# Patient Record
Sex: MALE | Race: WHITE | NOT HISPANIC OR LATINO | Employment: OTHER | URBAN - METROPOLITAN AREA
[De-identification: names, ages, dates, MRNs, and addresses within clinical notes are randomized per-mention and may not be internally consistent; named-entity substitution may affect disease eponyms.]

---

## 2017-01-09 ENCOUNTER — APPOINTMENT (OUTPATIENT)
Dept: LAB | Facility: CLINIC | Age: 72
End: 2017-01-09
Payer: MEDICARE

## 2017-01-09 ENCOUNTER — TRANSCRIBE ORDERS (OUTPATIENT)
Dept: LAB | Facility: CLINIC | Age: 72
End: 2017-01-09

## 2017-01-09 DIAGNOSIS — E11.9 TYPE 2 DIABETES MELLITUS WITHOUT COMPLICATIONS (HCC): ICD-10-CM

## 2017-01-09 DIAGNOSIS — E78.5 HYPERLIPIDEMIA: ICD-10-CM

## 2017-01-09 LAB
ALBUMIN SERPL BCP-MCNC: 3.8 G/DL (ref 3.5–5)
ALP SERPL-CCNC: 57 U/L (ref 46–116)
ALT SERPL W P-5'-P-CCNC: 14 U/L (ref 12–78)
ANION GAP SERPL CALCULATED.3IONS-SCNC: 7 MMOL/L (ref 4–13)
AST SERPL W P-5'-P-CCNC: 5 U/L (ref 5–45)
BILIRUB SERPL-MCNC: 0.48 MG/DL (ref 0.2–1)
BUN SERPL-MCNC: 33 MG/DL (ref 5–25)
CALCIUM SERPL-MCNC: 9.2 MG/DL (ref 8.3–10.1)
CHLORIDE SERPL-SCNC: 111 MMOL/L (ref 100–108)
CO2 SERPL-SCNC: 20 MMOL/L (ref 21–32)
CREAT SERPL-MCNC: 1.65 MG/DL (ref 0.6–1.3)
EST. AVERAGE GLUCOSE BLD GHB EST-MCNC: 128 MG/DL
GFR SERPL CREATININE-BSD FRML MDRD: 41.3 ML/MIN/1.73SQ M
GLUCOSE SERPL-MCNC: 162 MG/DL (ref 65–140)
HBA1C MFR BLD: 6.1 % (ref 4.2–6.3)
LDLC SERPL DIRECT ASSAY-MCNC: 88 MG/DL (ref 0–100)
POTASSIUM SERPL-SCNC: 5.5 MMOL/L (ref 3.5–5.3)
PROT SERPL-MCNC: 7.6 G/DL (ref 6.4–8.2)
SODIUM SERPL-SCNC: 138 MMOL/L (ref 136–145)
TRIGL SERPL-MCNC: 159 MG/DL

## 2017-01-09 PROCEDURE — 83036 HEMOGLOBIN GLYCOSYLATED A1C: CPT

## 2017-01-09 PROCEDURE — 83721 ASSAY OF BLOOD LIPOPROTEIN: CPT

## 2017-01-09 PROCEDURE — 84478 ASSAY OF TRIGLYCERIDES: CPT

## 2017-01-09 PROCEDURE — 36415 COLL VENOUS BLD VENIPUNCTURE: CPT

## 2017-01-09 PROCEDURE — 80053 COMPREHEN METABOLIC PANEL: CPT

## 2017-01-10 ENCOUNTER — GENERIC CONVERSION - ENCOUNTER (OUTPATIENT)
Dept: OTHER | Facility: OTHER | Age: 72
End: 2017-01-10

## 2017-01-18 ENCOUNTER — TRANSCRIBE ORDERS (OUTPATIENT)
Dept: LAB | Facility: CLINIC | Age: 72
End: 2017-01-18

## 2017-01-18 ENCOUNTER — ALLSCRIPTS OFFICE VISIT (OUTPATIENT)
Dept: OTHER | Facility: OTHER | Age: 72
End: 2017-01-18

## 2017-01-18 ENCOUNTER — APPOINTMENT (OUTPATIENT)
Dept: LAB | Facility: CLINIC | Age: 72
End: 2017-01-18
Payer: MEDICARE

## 2017-01-18 DIAGNOSIS — N28.9 DISORDER OF KIDNEY AND URETER: ICD-10-CM

## 2017-01-18 LAB
ANION GAP SERPL CALCULATED.3IONS-SCNC: 9 MMOL/L (ref 4–13)
BUN SERPL-MCNC: 23 MG/DL (ref 5–25)
CALCIUM SERPL-MCNC: 8.8 MG/DL (ref 8.3–10.1)
CHLORIDE SERPL-SCNC: 111 MMOL/L (ref 100–108)
CO2 SERPL-SCNC: 21 MMOL/L (ref 21–32)
CREAT SERPL-MCNC: 1.46 MG/DL (ref 0.6–1.3)
GFR SERPL CREATININE-BSD FRML MDRD: 47.6 ML/MIN/1.73SQ M
GLUCOSE SERPL-MCNC: 149 MG/DL (ref 65–140)
POTASSIUM SERPL-SCNC: 4.5 MMOL/L (ref 3.5–5.3)
SODIUM SERPL-SCNC: 141 MMOL/L (ref 136–145)

## 2017-01-18 PROCEDURE — 80048 BASIC METABOLIC PNL TOTAL CA: CPT

## 2017-01-18 PROCEDURE — 36415 COLL VENOUS BLD VENIPUNCTURE: CPT

## 2017-01-19 ENCOUNTER — GENERIC CONVERSION - ENCOUNTER (OUTPATIENT)
Dept: OTHER | Facility: OTHER | Age: 72
End: 2017-01-19

## 2017-02-01 ENCOUNTER — ALLSCRIPTS OFFICE VISIT (OUTPATIENT)
Dept: OTHER | Facility: OTHER | Age: 72
End: 2017-02-01

## 2017-02-07 DIAGNOSIS — I10 ESSENTIAL (PRIMARY) HYPERTENSION: ICD-10-CM

## 2017-03-21 ENCOUNTER — ALLSCRIPTS OFFICE VISIT (OUTPATIENT)
Dept: OTHER | Facility: OTHER | Age: 72
End: 2017-03-21

## 2017-03-21 DIAGNOSIS — Z12.5 ENCOUNTER FOR SCREENING FOR MALIGNANT NEOPLASM OF PROSTATE: ICD-10-CM

## 2017-03-21 DIAGNOSIS — I10 ESSENTIAL (PRIMARY) HYPERTENSION: ICD-10-CM

## 2017-03-21 DIAGNOSIS — E11.9 TYPE 2 DIABETES MELLITUS WITHOUT COMPLICATIONS (HCC): ICD-10-CM

## 2017-06-22 ENCOUNTER — APPOINTMENT (OUTPATIENT)
Dept: LAB | Facility: CLINIC | Age: 72
End: 2017-06-22
Payer: MEDICARE

## 2017-06-22 ENCOUNTER — TRANSCRIBE ORDERS (OUTPATIENT)
Dept: LAB | Facility: CLINIC | Age: 72
End: 2017-06-22

## 2017-06-22 DIAGNOSIS — Z12.5 ENCOUNTER FOR SCREENING FOR MALIGNANT NEOPLASM OF PROSTATE: ICD-10-CM

## 2017-06-22 DIAGNOSIS — E11.9 TYPE 2 DIABETES MELLITUS WITHOUT COMPLICATIONS (HCC): ICD-10-CM

## 2017-06-22 DIAGNOSIS — I10 ESSENTIAL (PRIMARY) HYPERTENSION: ICD-10-CM

## 2017-06-22 LAB
ANION GAP SERPL CALCULATED.3IONS-SCNC: 7 MMOL/L (ref 4–13)
BASOPHILS # BLD AUTO: 0.02 THOUSANDS/ΜL (ref 0–0.1)
BASOPHILS NFR BLD AUTO: 0 % (ref 0–1)
BUN SERPL-MCNC: 26 MG/DL (ref 5–25)
CALCIUM SERPL-MCNC: 8.9 MG/DL (ref 8.3–10.1)
CHLORIDE SERPL-SCNC: 106 MMOL/L (ref 100–108)
CO2 SERPL-SCNC: 23 MMOL/L (ref 21–32)
CREAT SERPL-MCNC: 1.53 MG/DL (ref 0.6–1.3)
CREAT UR-MCNC: 101 MG/DL
EOSINOPHIL # BLD AUTO: 0.23 THOUSAND/ΜL (ref 0–0.61)
EOSINOPHIL NFR BLD AUTO: 5 % (ref 0–6)
ERYTHROCYTE [DISTWIDTH] IN BLOOD BY AUTOMATED COUNT: 13.5 % (ref 11.6–15.1)
EST. AVERAGE GLUCOSE BLD GHB EST-MCNC: 131 MG/DL
GFR SERPL CREATININE-BSD FRML MDRD: 45.1 ML/MIN/1.73SQ M
GLUCOSE P FAST SERPL-MCNC: 152 MG/DL (ref 65–99)
HBA1C MFR BLD: 6.2 % (ref 4.2–6.3)
HCT VFR BLD AUTO: 38.3 % (ref 36.5–49.3)
HGB BLD-MCNC: 12.6 G/DL (ref 12–17)
LYMPHOCYTES # BLD AUTO: 1.59 THOUSANDS/ΜL (ref 0.6–4.47)
LYMPHOCYTES NFR BLD AUTO: 33 % (ref 14–44)
MCH RBC QN AUTO: 31.1 PG (ref 26.8–34.3)
MCHC RBC AUTO-ENTMCNC: 32.9 G/DL (ref 31.4–37.4)
MCV RBC AUTO: 95 FL (ref 82–98)
MICROALBUMIN UR-MCNC: 31 MG/L (ref 0–20)
MICROALBUMIN/CREAT 24H UR: 31 MG/G CREATININE (ref 0–30)
MONOCYTES # BLD AUTO: 0.38 THOUSAND/ΜL (ref 0.17–1.22)
MONOCYTES NFR BLD AUTO: 8 % (ref 4–12)
NEUTROPHILS # BLD AUTO: 2.64 THOUSANDS/ΜL (ref 1.85–7.62)
NEUTS SEG NFR BLD AUTO: 54 % (ref 43–75)
NRBC BLD AUTO-RTO: 0 /100 WBCS
PLATELET # BLD AUTO: 204 THOUSANDS/UL (ref 149–390)
PMV BLD AUTO: 10.8 FL (ref 8.9–12.7)
POTASSIUM SERPL-SCNC: 4.8 MMOL/L (ref 3.5–5.3)
PSA SERPL-MCNC: 1.8 NG/ML (ref 0–4)
RBC # BLD AUTO: 4.05 MILLION/UL (ref 3.88–5.62)
SODIUM SERPL-SCNC: 136 MMOL/L (ref 136–145)
WBC # BLD AUTO: 4.86 THOUSAND/UL (ref 4.31–10.16)

## 2017-06-22 PROCEDURE — G0103 PSA SCREENING: HCPCS

## 2017-06-22 PROCEDURE — 85025 COMPLETE CBC W/AUTO DIFF WBC: CPT

## 2017-06-22 PROCEDURE — 82043 UR ALBUMIN QUANTITATIVE: CPT

## 2017-06-22 PROCEDURE — 80048 BASIC METABOLIC PNL TOTAL CA: CPT

## 2017-06-22 PROCEDURE — 82570 ASSAY OF URINE CREATININE: CPT

## 2017-06-22 PROCEDURE — 36415 COLL VENOUS BLD VENIPUNCTURE: CPT

## 2017-06-22 PROCEDURE — 83036 HEMOGLOBIN GLYCOSYLATED A1C: CPT

## 2017-06-23 ENCOUNTER — GENERIC CONVERSION - ENCOUNTER (OUTPATIENT)
Dept: OTHER | Facility: OTHER | Age: 72
End: 2017-06-23

## 2017-07-17 DIAGNOSIS — E11.21 TYPE 2 DIABETES MELLITUS WITH DIABETIC NEPHROPATHY (HCC): ICD-10-CM

## 2017-08-04 ENCOUNTER — GENERIC CONVERSION - ENCOUNTER (OUTPATIENT)
Dept: OTHER | Facility: OTHER | Age: 72
End: 2017-08-04

## 2017-08-04 ENCOUNTER — APPOINTMENT (OUTPATIENT)
Dept: LAB | Facility: CLINIC | Age: 72
End: 2017-08-04
Payer: MEDICARE

## 2017-08-04 DIAGNOSIS — E11.21 TYPE 2 DIABETES MELLITUS WITH DIABETIC NEPHROPATHY (HCC): ICD-10-CM

## 2017-08-04 LAB
ANION GAP SERPL CALCULATED.3IONS-SCNC: 7 MMOL/L (ref 4–13)
BUN SERPL-MCNC: 20 MG/DL (ref 5–25)
CALCIUM SERPL-MCNC: 8.7 MG/DL (ref 8.3–10.1)
CHLORIDE SERPL-SCNC: 108 MMOL/L (ref 100–108)
CO2 SERPL-SCNC: 22 MMOL/L (ref 21–32)
CREAT SERPL-MCNC: 1.48 MG/DL (ref 0.6–1.3)
GFR SERPL CREATININE-BSD FRML MDRD: 47 ML/MIN/1.73SQ M
GLUCOSE P FAST SERPL-MCNC: 161 MG/DL (ref 65–99)
POTASSIUM SERPL-SCNC: 4.5 MMOL/L (ref 3.5–5.3)
SODIUM SERPL-SCNC: 137 MMOL/L (ref 136–145)

## 2017-08-04 PROCEDURE — 36415 COLL VENOUS BLD VENIPUNCTURE: CPT

## 2017-08-04 PROCEDURE — 80048 BASIC METABOLIC PNL TOTAL CA: CPT

## 2017-08-22 ENCOUNTER — ALLSCRIPTS OFFICE VISIT (OUTPATIENT)
Dept: OTHER | Facility: OTHER | Age: 72
End: 2017-08-22

## 2017-08-22 DIAGNOSIS — Z11.59 ENCOUNTER FOR SCREENING FOR OTHER VIRAL DISEASES: ICD-10-CM

## 2017-11-03 ENCOUNTER — ALLSCRIPTS OFFICE VISIT (OUTPATIENT)
Dept: OTHER | Facility: OTHER | Age: 72
End: 2017-11-03

## 2017-11-03 DIAGNOSIS — E78.5 HYPERLIPIDEMIA: ICD-10-CM

## 2017-11-03 DIAGNOSIS — E11.9 TYPE 2 DIABETES MELLITUS WITHOUT COMPLICATIONS (HCC): ICD-10-CM

## 2017-11-04 NOTE — PROGRESS NOTES
Assessment  1  Anemia (285 9) (D64 9)   2  DM type 2 (diabetes mellitus, type 2) (250 00) (E11 9)   3  Type 2 diabetes mellitus with diabetic chronic kidney disease (250 40,585 9) (E11 22)   4  Generalized anxiety disorder (300 02) (F41 1)   5  GERD without esophagitis (530 81) (K21 9)   6  Hyperlipidemia (272 4) (E78 5)   7  Hypertension (401 9) (I10)   8  Nephrolithiasis (592 0) (N20 0)    Plan  DM type 2 (diabetes mellitus, type 2)    · (1) HEMOGLOBIN A1C; Status:Active; Requested [id number];    · Follow-up visit in 3 months Evaluation and Treatment  Follow-up  Status: Hold For - Scheduling   Requested for: 26NQL1452   · Eat a normal well-balanced diet ; Status:Complete;   Done: 08XBS4449   · Have your eyes examined by an eye doctor every year ; Status:Complete;   Done: 56AZP0683   · Inspect your feet daily ; Status:Complete;   Done: 60HUJ4596   · Start eating more fiber ; Status:Complete;   Done: 09HMO1787  Hyperlipidemia    · (1) COMPREHENSIVE METABOLIC PANEL; Status:Active; Requested [id number];    · (1) LIPID PANEL FASTING W DIRECT LDL REFLEX; Status:Active; Requested [id number];    · Eat a low fat and low cholesterol diet ; Status:Complete;   Done: 87DWH9656  Hypertension    · Metoprolol Succinate  MG Oral Tablet Extended Release 24 Hour; TAKE 1 TABLET  DAILY   · A diet low in sodium and high in potassium, magnesium, and calcium can help your blood pressure ;  Status:Complete;   Done: 67FNR6206   · Begin a limited exercise program ; Status:Complete;   Done: 99SES0604   · Begin or continue regular aerobic exercise  Gradually work up to at least {count1} sessions of  {dur1} of exercise a week ; Status:Complete;   Done: 24ABB2157    Discussion/Summary  Discussion Summary:   Doing well except for the arthritis and refusing to see ortho again  Check labs  Refusing flu vaccine  BP remains up and will increase his metoprolol  Continue current treatment  Keep f/u with optho     Medication SE Review and Pt Understands Tx: Possible side effects of new medications were reviewed with the patient/guardian today  The treatment plan was reviewed with the patient/guardian  The patient/guardian understands and agrees with the treatment plan      Chief Complaint  Chief Complaint Free Text Note Form: Patient is being seen today for a RTN visit  Patient c/o knee and ankle arthritic pain  Chief Complaint Chronic Condition [facility]: Patient is here today for follow up of chronic conditions described in HPI  History of Present Illness  HPI: WM RTC for f/u htn, dm, etc  Doing well and no new issues  Remains active and goes to the gym despite knee and ankle pain  Sugars less than 140  Due for labs  Refusing flu vaccine  BP is up today  Anxiety Disorder (Follow-Up): The patient is being seen for follow-up of generalized anxiety disorder  The patient reports no change in the condition  He has no comorbid illnesses  He has had no significant interval events  Interval symptoms:  stable anxiety,-- improved sleep disruption-- and-- denies depression  Medication(s): beta blockers and benzodiazepines  Medications:  the patient is adherent to his medication regimen, but-- he denies medication side effects  Disease management:  the patient is doing well with his goals  Anemia (Follow-Up): The patient is being seen for follow-up of anemia  The patient reports doing well  There are no comorbid illnesses  He has had no significant interval events  The patient is currently asymptomatic  Associated symptoms: no palpitations-- and-- no chest pain  The patient is not currently on medication for this problem  Disease management:  the patient is not doing well with his goals  Gastroesophageal Reflux Disease (Follow-Up): The patient is being seen for follow-up of gastroesophageal reflux disease  The patient reports doing well  There are no comorbid illnesses  He has had no significant interval events   The patient is currently asymptomatic  No associated symptoms are reported  The patient is not currently on medication for this problem  Disease management:  the patient is doing well with his goals  Hyperlipidemia (Follow-Up): The patient states his hyperlipidemia has been under good control since the last visit  Comorbid Illnesses: diabetes mellitus-- and-- hypertension  He has no significant interval events  Symptoms: The patient is currently asymptomatic  Associated symptoms include no focal neurologic deficits-- and-- no memory loss  Medications: The patient is not currently on any medications for his hyperlipidemia  The patient is doing well with his hyperlipidemia goals  The patient is due for a lipid panel  Hypertension (Follow-Up): The patient presents for follow-up of essential hypertension  The patient states he has been stable with his blood pressure control since the last visit  Comorbid Illnesses: nephropathy  He has no significant interval events  Symptoms: The patient is currently asymptomatic  Associated symptoms include no headache-- and-- no focal neurologic deficits  Home monitoring: The patient checks his blood pressure sporadically  Blood pressure control has been good  Medications: the patient is adherent with his medication regimen  -- He denies medication side effects  Medication(s): a beta blocking agent-- and-- a calcium channel blocker  Disease Management: the patient is not doing well with his blood pressure goals  The patient is due for a lipid panel-- and-- a serum creatinine  Diabetes Type II (Follow-Up): The patient states he has been doing well with his Type II Diabetes control since the last visit  Comorbid Illnesses: hypertension-- and-- hyperlipidemia  Complications: peripheral neuropathy-- and-- nephropathy, but-- no gastroparesis,-- no coronary artery disease-- and-- no sexual dysfunction  He has no significant interval events     Symptoms: stable numbness of the feet-- and-- denies foot pain  Home monitoring: The patient checks his blood sugars regularly  -- Glycemic control has been good  -- the patient reports no symptomatic hypoglycemic episodes  Medications: the patient is adherent with his medication regimen  -- He denies medication side effects  Medication(s): Metformin HCl-- and-- Aspirin  The patient is doing well with his diabetes goals  Due For: a lipid panel-- and-- a hemoglobin A1c  Review of Systems  Complete-Male:   Constitutional: no fever,-- not feeling poorly,-- no chills-- and-- not feeling tired  Cardiovascular: no chest pain,-- no intermittent leg claudication,-- no palpitations-- and-- no extremity edema  Respiratory: no shortness of breath,-- no cough,-- no orthopnea,-- no wheezing,-- no shortness of breath during exertion-- and-- no PND  Gastrointestinal: no abdominal pain,-- no nausea,-- no constipation-- and-- no diarrhea  Genitourinary: No complaints of dysuria, no incontinence, no hesitancy, no nocturia, no genital lesion, no testicular pain  Musculoskeletal: arthralgias-- and-- joint stiffness, but-- as noted in HPI,-- no joint swelling-- and-- no myalgias  Integumentary: No complaints of skin rash or skin lesions, no itching, no skin wound, no dry skin  Neurological: no headache,-- no confusion-- and-- no convulsions  Psychiatric: anxiety, but-- not suicidal,-- no sleep disturbances-- and-- no depression  Endocrine: No complaints of proptosis, no hot flashes, no muscle weakness, no erectile dysfunction, no deepening of the voice, no feelings of weakness  Hematologic/Lymphatic: No complaints of swollen glands, no swollen glands in the neck, does not bleed easily, no easy bruising  Active Problems  1  Anemia (285 9) (D64 9)   2  Diabetic nephropathy (250 40,583 81) (E11 21)   3  Diabetic polyneuropathy associated with type 2 diabetes mellitus (250 60,357 2) (E11 42)   4  DM type 2 (diabetes mellitus, type 2) (250 00) (E11 9)   5   Eczema (692  9) (L30 9)   6  Erectile dysfunction of non-organic origin (302 72) (F52 21)   7  Generalized anxiety disorder (300 02) (F41 1)   8  GERD without esophagitis (530 81) (K21 9)   9  Hyperkalemia (276 7) (E87 5)   10  Hyperlipidemia (272 4) (E78 5)   11  Hypertension (401 9) (I10)   12  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)   13  Nephrolithiasis (592 0) (N20 0)   14  Neuropathy, diabetic (250 60,357 2) (E11 40)   15  Obesity (278 00) (E66 9)   16  Osteoarthritis (715 90) (M19 90)   17  Polyp of sigmoid colon (211 3) (D12 5)   18  Prostate cancer screening (V76 44) (Z12 5)   19  Screening for genitourinary condition (V81 6) (Z13 89)   20  Screening for neurological condition (V80 09) (Z13 89)   21  Type 2 diabetes mellitus with diabetic chronic kidney disease (250 40,585 9) (E11 22)   22  Uropathy, obstructive (599 60) (N13 9)    Past Medical History  1  History of Controlled type 2 diabetes mellitus without complication (410 03) (R93 5)   2  History of allergic rhinitis (V12 69) (Z87 09)   3  History of Medicare annual wellness visit, initial (V70 0) (Z00 00)   4  History of Need for hepatitis C screening test (V73 89) (Z11 59)  Active Problems And Past Medical History Reviewed: The active problems and past medical history were reviewed and updated today  Surgical History  1  History of Total Hip Replacement  Surgical History Reviewed: The surgical history was reviewed and updated today  Family History  Mother    1  No pertinent family history  Father    2  No pertinent family history  Family History    3  Denied: Family history of Family Problems  Family History Reviewed: The family history was reviewed and updated today  Social History   · Always uses seat belt   · Being A Social Drinker   · Cigars (___ A Day) (305 1)   · Former smoker (V15 82) (U32 211)   · No illicit drug use  Social History Reviewed: The social history was reviewed and updated today   The social history was reviewed and is unchanged  Current Meds   1  ALPRAZolam 0 5 MG Oral Tablet; TAKE 1 TABLET EVERY 8 HOURS AS NEEDED; Therapy: 38FTQ2216 to (Evaluate:11Lai3114); Last Rx:22Aug2017 Ordered   2  AmLODIPine Besylate 10 MG Oral Tablet; TAKE 1 TABLET DAILY  Requested for: 27Feb2017; Last   Rx:27Feb2017 Ordered   3  Dorzolamide HCl-Timolol Mal 22 3-6 8 MG/ML Ophthalmic Solution; Therapy: 01DYA9690 to (Evaluate:18Epl9657) Recorded   4  Latanoprost 0 005 % Ophthalmic Solution; Therapy: 96Uti8463 to (Evaluate:24Uhi7025) Recorded   5  MetFORMIN HCl - 850 MG Oral Tablet; TAKE 2 TABLET Daily  Requested for: 27Feb2017; Last   Rx:27Feb2017 Ordered   6  Metoprolol Succinate ER 50 MG Oral Tablet Extended Release 24 Hour; TAKE 1 TABLET DAILY; Therapy: 53KGJ4716 to (Evaluate:17Aug2018)  Requested for: 22Aug2017; Last Rx:22Aug2017;   Status: [address] tawanna Jones Verification Ordered   7  OneTouch Delica Lancets MISC; use 1 daily; Therapy: 26YSC2079 to (Evaluate:02Jan2014); Last CS:00YPH1703 Ordered   8  OneTouch Ultra 2 w/Device Kit; USE AS DIRECTED; Therapy: 23QRP7926 to (Last Rx:07Jan2013) Ordered   9  OneTouch Ultra Blue In [employer]; USE 1 STRIP DAILY; Therapy: 79MDM5471 to (Evaluate:02Jan2014); Last OB:59RTE9131 Ordered  Medication List Reviewed: The medication list was reviewed and updated today  Allergies  1  No Known Drug Allergies  2  Pollen    Vitals  Vital Signs    Recorded: 16CIL8693 10:58AM   Temperature 97 6 F   Heart Rate 71   Respiration 18   Systolic 981, Sitting   Diastolic 82, Sitting   Height 5 ft 11 in   Weight 245 lb 6 0 oz   BMI Calculated 34 22   BSA Calculated 2 3   O2 Saturation 98     Physical Exam    Constitutional   General appearance: No acute distress, well appearing and well nourished  Eyes   Conjunctiva and lids: No swelling, erythema, or discharge  Pupils and irises: Equal, round and reactive to light      Ears, Nose, Mouth, and Throat   Oropharynx: Normal with no erythema, edema, exudate or lesions  Pulmonary   Respiratory effort: No increased work of breathing or signs of respiratory distress  Auscultation of lungs: Clear to auscultation, equal breath sounds bilaterally, no wheezes, no rales, no rhonci  Cardiovascular   Auscultation of heart: Normal rate and rhythm, normal S1 and S2, without murmurs  Examination of extremities for edema and/or varicosities: Normal     Carotid pulses: Normal     Abdomen   Abdomen: Non-tender, no masses      Musculoskeletal   Gait and station: Normal     Psychiatric   Orientation to person, place and time: Normal     Mood and affect: Normal          Signatures   Electronically signed by : Everett Lombard, M D ; Nov  3 2017 11:38AM EST                       (Author)

## 2017-11-06 ENCOUNTER — APPOINTMENT (OUTPATIENT)
Dept: LAB | Facility: CLINIC | Age: 72
End: 2017-11-06
Payer: MEDICARE

## 2017-11-06 ENCOUNTER — TRANSCRIBE ORDERS (OUTPATIENT)
Dept: LAB | Facility: CLINIC | Age: 72
End: 2017-11-06

## 2017-11-06 DIAGNOSIS — E11.9 TYPE 2 DIABETES MELLITUS WITHOUT COMPLICATIONS (HCC): ICD-10-CM

## 2017-11-06 DIAGNOSIS — E78.5 HYPERLIPIDEMIA: ICD-10-CM

## 2017-11-06 LAB
EST. AVERAGE GLUCOSE BLD GHB EST-MCNC: 157 MG/DL
HBA1C MFR BLD: 7.1 % (ref 4.2–6.3)

## 2017-11-06 PROCEDURE — 83036 HEMOGLOBIN GLYCOSYLATED A1C: CPT

## 2017-11-06 PROCEDURE — 36415 COLL VENOUS BLD VENIPUNCTURE: CPT

## 2017-11-06 PROCEDURE — 80061 LIPID PANEL: CPT

## 2017-11-06 PROCEDURE — 80053 COMPREHEN METABOLIC PANEL: CPT

## 2017-11-07 ENCOUNTER — GENERIC CONVERSION - ENCOUNTER (OUTPATIENT)
Dept: OTHER | Facility: OTHER | Age: 72
End: 2017-11-07

## 2017-11-07 LAB
ALBUMIN SERPL BCP-MCNC: 3.6 G/DL (ref 3.5–5)
ALP SERPL-CCNC: 60 U/L (ref 46–116)
ALT SERPL W P-5'-P-CCNC: 15 U/L (ref 12–78)
ANION GAP SERPL CALCULATED.3IONS-SCNC: 8 MMOL/L (ref 4–13)
AST SERPL W P-5'-P-CCNC: 8 U/L (ref 5–45)
BILIRUB SERPL-MCNC: 0.64 MG/DL (ref 0.2–1)
BUN SERPL-MCNC: 25 MG/DL (ref 5–25)
CALCIUM SERPL-MCNC: 8.6 MG/DL (ref 8.3–10.1)
CHLORIDE SERPL-SCNC: 107 MMOL/L (ref 100–108)
CHOLEST SERPL-MCNC: 207 MG/DL (ref 50–200)
CO2 SERPL-SCNC: 23 MMOL/L (ref 21–32)
CREAT SERPL-MCNC: 1.5 MG/DL (ref 0.6–1.3)
GFR SERPL CREATININE-BSD FRML MDRD: 46 ML/MIN/1.73SQ M
GLUCOSE P FAST SERPL-MCNC: 150 MG/DL (ref 65–99)
HDLC SERPL-MCNC: 62 MG/DL (ref 40–60)
LDLC SERPL CALC-MCNC: 102 MG/DL (ref 0–100)
POTASSIUM SERPL-SCNC: 4.8 MMOL/L (ref 3.5–5.3)
PROT SERPL-MCNC: 7.6 G/DL (ref 6.4–8.2)
SODIUM SERPL-SCNC: 138 MMOL/L (ref 136–145)
TRIGL SERPL-MCNC: 213 MG/DL

## 2017-12-01 ENCOUNTER — GENERIC CONVERSION - ENCOUNTER (OUTPATIENT)
Dept: OTHER | Facility: OTHER | Age: 72
End: 2017-12-01

## 2018-01-10 NOTE — PROGRESS NOTES
Assessment    1  Medicare annual wellness visit, subsequent (V70 0) (Z00 00)   2  Anemia (285 9) (D64 9)   3  DM type 2 (diabetes mellitus, type 2) (250 00) (E11 9)   4  Type 2 diabetes mellitus with diabetic chronic kidney disease (250 40,585 9) (E11 22)   5  Generalized anxiety disorder (300 02) (F41 1)   6  GERD without esophagitis (530 81) (K21 9)   7  Hyperlipidemia (272 4) (E78 5)   8  Hypertension (401 9) (I10)   9  Nephrolithiasis (592 0) (N20 0)    Plan  DM type 2 (diabetes mellitus, type 2)    · (1) HEMOGLOBIN A1C; Status:Active; Requested [id number];    · Follow-up visit in 3 months Evaluation and Treatment  Follow-up  Status: Hold For -  Scheduling  Requested for: 86LUF3838   · Eat a normal well-balanced diet ; Status:Complete;   Done: 48CNT6735   · Have your eyes examined by an eye doctor every year ; Status:Complete;   Done:  90IQC3519   · Inspect your feet daily ; Status:Complete;   Done: 81ZPP8430   · Start eating more fiber ; Status:Complete;   Done: 25PCE7945  Hyperlipidemia    · (1) COMPREHENSIVE METABOLIC PANEL; Status:Active; Requested [id number];    · (1) LIPID PANEL FASTING W DIRECT LDL REFLEX; Status:Active; Requested  [id number];    · Eat a low fat and low cholesterol diet ; Status:Complete;   Done: 50TUX2164  Hypertension    · Metoprolol Succinate  MG Oral Tablet Extended Release 24 Hour; TAKE 1  TABLET DAILY   · A diet low in sodium and high in potassium, magnesium, and calcium can help your  blood pressure ; Status:Complete;   Done: 71KYV5334   · Begin a limited exercise program ; Status:Complete;   Done: 09YKP7858   · Begin or continue regular aerobic exercise  Gradually work up to at least {count1}  sessions of {dur1} of exercise a week ; Status:Complete;   Done: 77KEH2795    Discussion/Summary  Impression: Subsequent Annual Wellness Visit  Cardiovascular screening and counseling: due for a lipid panel  Diabetes screening and counseling: screening is current  Colorectal cancer screening and counseling: screening is current  Osteoporosis screening and counseling: screening is current  Abdominal aortic aneurysm screening and counseling: screening not indicated  HIV screening and counseling: the patient declines screening  Advance Directive Planning: not complete, paperwork and instructions were given to the patient  Advice and education were given regarding fall risk reduction, nutrition (non-diabetic), seat belt use and weight loss  Medical Equipment/Suppliers: none  Patient Discussion: plan discussed with the patient, follow-up visit needed in one year, follow-up as needed  History of Present Illness  WM presents for Medicare Wellness  The patient is being seen for the subsequent annual wellness visit  Medicare Screening and Risk Factors   Hospitalizations: he has been previously hospitalizied  Medicare Screening Tests Risk Questions   Abdominal aortic aneurysm risk assessment: over 72years of age  Osteoporosis risk assessment: none indicated  HIV risk assessment: none indicated  Drug and Alcohol Use: The patient is a former cigarette smoker  The patient reports rare alcohol use  Alcohol concern:   The patient has no concerns about alcohol abuse  He has never used illicit drugs  Diet and Physical Activity: Current diet includes well balanced meals and 4 cups of coffee per day  He exercises daily  Exercise: stretching, strength training  Mood Disorder and Cognitive Impairment Screening: He denies feeling down, depressed, or hopeless over the past two weeks  He denies feeling little interest or pleasure in doing things over the past two weeks  Cognitive impairment screening: denies difficulty learning/retaining new information, denies difficulty handling complex tasks, denies difficulty with reasoning, denies difficulty with spatial ability and orientation, denies difficulty with language and denies difficulty with behavior     Functional Ability/Level of Safety: Hearing is normal bilaterally  He denies hearing difficulties  He does not use a hearing aid  The patient is currently able to do activities of daily living without limitations  Activities of daily living details: does not need help using the phone, no transportation help needed, does not need help shopping, no meal preparation help needed, does not need help doing housework, does not need help doing laundry, does not need help managing medications and does not need help managing money  Home safety risk factors:  no unfamiliar surroundings, no loose rugs, no poor household lighting, no uneven floors, no household clutter, grab bars in the bathroom and handrails on the stairs  Advance Directives: Advance directives: living will and durable power of  for health care directives  Co-Managers and Medical Equipment/Suppliers: See Patient Care Team     Last Medicare Wellness Visit Information was reviewed, patient interviewed and updates made to the record today  Falls Risk: The patient fell 0 times in the past 12 months  The patient currently has no urinary incontinence symptoms  Patient Care Team    Care Team Member Role Specialty Office Number   Boogiejacques Hobbstonype MD  Gastroenterology Adult [phone]     Active Problems    1  Anemia (285 9) (D64 9)   2  Diabetic nephropathy (250 40,583 81) (E11 21)   3  Diabetic polyneuropathy associated with type 2 diabetes mellitus (250 60,357 2)   (E11 42)   4  DM type 2 (diabetes mellitus, type 2) (250 00) (E11 9)   5  Eczema (692 9) (L30 9)   6  Erectile dysfunction of non-organic origin (302 72) (F52 21)   7  Generalized anxiety disorder (300 02) (F41 1)   8  GERD without esophagitis (530 81) (K21 9)   9  Hyperkalemia (276 7) (E87 5)   10  Hyperlipidemia (272 4) (E78 5)   11  Hypertension (401 9) (I10)   12  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)   13  Nephrolithiasis (592 0) (N20 0)   14  Neuropathy, diabetic (250 60,357 2) (E11 40)   15  Obesity (278 00) (E66 9)   16  Osteoarthritis (715 90) (M19 90)   17  Polyp of sigmoid colon (211 3) (D12 5)   18  Prostate cancer screening (V76 44) (Z12 5)   19  Screening for genitourinary condition (V81 6) (Z13 89)   20  Screening for neurological condition (V80 09) (Z13 89)   21  Type 2 diabetes mellitus with diabetic chronic kidney disease (250 40,585 9) (E11 22)   22  Uropathy, obstructive (599 60) (N13 9)    Past Medical History    1  History of Controlled type 2 diabetes mellitus without complication (963 83) (P08 1)   2  History of allergic rhinitis (V12 69) (Z87 09)   3  History of Medicare annual wellness visit, initial (V70 0) (Z00 00)   4  History of Need for hepatitis C screening test (V73 89) (Z11 59)    Surgical History    1  History of Total Hip Replacement    Family History  Mother    1  No pertinent family history  Father    2  No pertinent family history  Family History    3  Denied: Family history of Family Problems    Social History    · Always uses seat belt   · Being A Social Drinker   · Cigars (___ A Day) (305 1)   · Former smoker (V15 82) (D03 305)   · No illicit drug use    Current Meds   1  ALPRAZolam 0 5 MG Oral Tablet; TAKE 1 TABLET EVERY 8 HOURS AS NEEDED; Therapy: 48ZGN8052 to (Evaluate:97Doo2568); Last Rx:53Lvz3076 Ordered   2  AmLODIPine Besylate 10 MG Oral Tablet; TAKE 1 TABLET DAILY  Requested for:   35Xxs1149; Last Rx:64Grv7379 Ordered   3  Dorzolamide HCl-Timolol Mal 22 3-6 8 MG/ML Ophthalmic Solution; Therapy: 05WUS0250 to (Evaluate:94Jik0846) Recorded   4  Latanoprost 0 005 % Ophthalmic Solution; Therapy: 38Iwj3288 to (Evaluate:44Zzb9338) Recorded   5  MetFORMIN HCl - 850 MG Oral Tablet; TAKE 2 TABLET Daily  Requested for: 99Dod0682;   Last Rx:86Rsq6619 Ordered   6  Metoprolol Succinate ER 50 MG Oral Tablet Extended Release 24 Hour; TAKE 1 TABLET   DAILY;    Therapy: 34ARC2294 to (Evaluate:14Ulp5026)  Requested for: 72Wda4200; Last   Rx:11Lwl4744; Status: ACTIVE - Transmit to Pharmacy - Awaiting Verification Ordered   7  OneTouch Delica Lancets MISC; use 1 daily; Therapy: 76WGF0379 to (Evaluate:02Jan2014); Last HY:64YKP6764 Ordered   8  OneTouch Ultra 2 w/Device Kit; USE AS DIRECTED; Therapy: 34CDG3817 to (Last Rx:07Jan2013) Ordered   9  OneTouch Ultra Blue In [employer]; USE 1 STRIP DAILY; Therapy: 92YCE8331 to (Evaluate:02Jan2014); Last Rx:07Jan2013 Ordered    Allergies    1  No Known Drug Allergies    2  Pollen    Immunizations  PCV --- Lanette Campanile: 21-Mar-2017   Tetanus --- Lanette Campanile: 14-Sep-2010   Tubersol 5 UNIT/0 1ML Intradermal Solution --- Lanette Campanile: 14-Jan-2015     Vitals  Signs   Recorded: 61PXB2305 10:58AM   Temperature: 97 6 F  Heart Rate: 71  Respiration: 18  Systolic: 495, Sitting  Diastolic: 82, Sitting  Height: 5 ft 11 in  Weight: 245 lb 6 0 oz  BMI Calculated: 34 22  BSA Calculated: 2 3  O2 Saturation: 98    Future Appointments    Date/Time Provider Specialty Site   02/09/2018 09:45 AM LUPE Glover   [facility] [place] [address]     Signatures   Electronically signed by : LUPE Carreon ; Nov  3 2017 11:40AM EST                       (Author)

## 2018-01-11 NOTE — RESULT NOTES
Verified Results  (1) CBC/PLT/DIFF [id number] 10:55AM [facility] Brood   TW Order Number: [id number]     Test Name Result Flag Reference   WBC COUNT 4 86 Thousand/uL  4 31-10 16   RBC COUNT 4 05 Million/uL  3 88-5 62   HEMOGLOBIN 12 6 g/dL  12 0-17 0   HEMATOCRIT 38 3 %  36 5-49 3   MCV 95 fL  82-98   MCH 31 1 pg  26 8-34 3   MCHC 32 9 g/dL  31 4-37 4   RDW 13 5 %  11 6-15 1   MPV 10 8 fL  8 9-12 7   PLATELET COUNT 656 Thousands/uL  149-390   nRBC AUTOMATED 0 /100 WBCs     NEUTROPHILS RELATIVE PERCENT 54 %  43-75   LYMPHOCYTES RELATIVE PERCENT 33 %  14-44   MONOCYTES RELATIVE PERCENT 8 %  4-12   EOSINOPHILS RELATIVE PERCENT 5 %  0-6   BASOPHILS RELATIVE PERCENT 0 %  0-1   NEUTROPHILS ABSOLUTE COUNT 2 64 Thousands/? ??L  1 85-7 62   LYMPHOCYTES ABSOLUTE COUNT 1 59 Thousands/? ??L  0 60-4 47   MONOCYTES ABSOLUTE COUNT 0 38 Thousand/? ??L  0 17-1 22   EOSINOPHILS ABSOLUTE COUNT 0 23 Thousand/? ??L  0 00-0 61   BASOPHILS ABSOLUTE COUNT 0 02 Thousands/? ??L  0 00-0 10   - Patient Instructions: This bloodwork is non-fasting  Please drink two glasses of water morning of bloodwork  (1) BASIC METABOLIC PROFILE [id number] 10:55AM [facility]: [id number]     Test Name Result Flag Reference   SODIUM 136 mmol/L  136-145   POTASSIUM 4 8 mmol/L  3 5-5 3   CHLORIDE 106 mmol/L  100-108   CARBON DIOXIDE 23 mmol/L  21-32   ANION GAP (CALC) 7 mmol/L  4-13   BLOOD UREA NITROGEN 26 mg/dL H 5-25   CREATININE 1 53 mg/dL H 0 60-1 30   Standardized to IDMS reference method   CALCIUM 8 9 mg/dL  8 3-10 1   eGFR Non-African American 45 1 ml/min/1 73sq [facility] Energy Disease Education Program recommendations are as follows:  GFR calculation is accurate only with a steady state creatinine  Chronic Kidney disease less than 60 ml/min/1 73 sq  meters  Kidney failure less than 15 ml/min/1 73 sq  meters     GLUCOSE FASTING 152 mg/dL H 65-99     (1) HEMOGLOBIN [facility]1C [id number] 10:55AM [facility]   TW Order Number: [id number]     Test Name Result Flag Reference   HEMOGLOBIN A1C 6 2 %  4 2-6 3   EST  AVG  GLUCOSE 131 mg/dl       (1) PSA (SCREEN) (Dx V76 44 Screen for Prostate Cancer) [id number] 10:55AM Art Javad Order Number: [id number]     Test Name Result Flag Reference   PROSTATE SPECIFIC ANTIGEN 1 8 ng/mL  0 0-4 0   American Urological Association Guidelines define biochemical recurrence of prostate cancer as a detectable or rising PSA value post-radical prostatectomy that is greater than or equal to 0 2 ng/mL with a second confirmatory level of greater than or equal to 0 2 ng/mL  - Patient Instructions: This test is non-fasting  Please drink two glasses of water morning of bloodwork       (1) MICROALBUMIN CREATININE RATIO, RANDOM URINE [id number] 10:55AM Art Javad Order Number: [id number]     Test Name Result Flag Reference   MICROALBUMIN/ CREAT R 31 mg/g creatinine H 0-30   MICROALBUMIN,URINE 31 0 mg/L H 0 0-20 0   CREATININE URINE 101 0 mg/dL

## 2018-01-11 NOTE — RESULT NOTES
Verified Results  (1) BASIC METABOLIC PROFILE [id number] 10:44AM Kelsey Bean Order Number: [id number]     Test Name Result Flag Reference   SODIUM 137 mmol/L  136-145   POTASSIUM 4 5 mmol/L  3 5-5 3   CHLORIDE 108 mmol/L  100-108   CARBON DIOXIDE 22 mmol/L  21-32   ANION GAP (CALC) 7 mmol/L  4-13   BLOOD UREA NITROGEN 20 mg/dL  5-25   CREATININE 1 48 mg/dL H 0 60-1 30   Standardized to IDMS reference method   CALCIUM 8 7 mg/dL  8 3-10 1   eGFR 47 ml/min/1 73sq m     National Kidney Disease Education Program recommendations are as follows:  GFR calculation is accurate only with a steady state creatinine  Chronic Kidney disease less than 60 ml/min/1 73 sq  meters  Kidney failure less than 15 ml/min/1 73 sq  meters     GLUCOSE FASTING 161 mg/dL H 65-99

## 2018-01-11 NOTE — RESULT NOTES
Verified Results  (1) COMPREHENSIVE METABOLIC PANEL [id number] 17:31UF Yair [facility] Order Number: [id number]     Test Name Result Flag Reference   SODIUM 138 mmol/L  136-145   POTASSIUM 4 8 mmol/L  3 5-5 3   CHLORIDE 107 mmol/L  100-108   CARBON DIOXIDE 23 mmol/L  21-32   ANION GAP (CALC) 8 mmol/L  4-13   BLOOD UREA NITROGEN 25 mg/dL  5-25   CREATININE 1 50 mg/dL H 0 60-1 30   Standardized to IDMS reference method   CALCIUM 8 6 mg/dL  8 3-10 1   BILI, TOTAL 0 64 mg/dL  0 20-1 00   ALK PHOSPHATAS 60 U/L     ALT (SGPT) 15 U/L  12-78   Specimen collection should occur prior to Sulfasalazine and/or Sulfapyridine administration due to the potential for falsely depressed results  AST(SGOT) 8 U/L  5-45   Specimen collection should occur prior to Sulfasalazine administration due to the potential for falsely depressed results  ALBUMIN 3 6 g/dL  3 5-5 0   TOTAL PROTEIN 7 6 g/dL  6 4-8 2   eGFR 46 ml/min/1 73sq [facility] Disease Education Program recommendations are as follows:  GFR calculation is accurate only with a steady state creatinine  Chronic Kidney disease less than 60 ml/min/1 73 sq  meters  Kidney failure less than 15 ml/min/1 73 sq  meters  GLUCOSE FASTING 150 mg/dL H 65-99   Specimen collection should occur prior to Sulfasalazine administration due to the potential for falsely depressed results  Specimen collection should occur prior to Sulfapyridine administration due to the potential for falsely elevated results       (1) LIPID PANEL FASTING W DIRECT LDL REFLEX [id number] 11:43AM Yair [facility] Order Number: [id number]     Test Name Result Flag Reference   CHOLESTEROL 207 mg/dL H    LDL CHOLESTEROL CALCULATED 102 mg/dL H 0-100   Triglyceride:        Normal <150 mg/dl   Borderline High 150-199 mg/dl   High 200-499 mg/dl   Very High >499 mg/dl      Cholesterol:       Desirable <200 mg/dl    Borderline High 200-239 mg/dl    High >239 mg/dl      HDL Cholesterol:       High>59 mg/dL    Low <41 mg/dL      HDL Cholesterol:       High>59 mg/dL    Low <41 mg/dL      This screening LDL is a calculated result  It does not have the accuracy of the Direct Measured LDL in the monitoring of patients with hyperlipidemia and/or statin therapy  Direct Measure LDL (HKL802) must be ordered separately in these patients  TRIGLYCERIDES 213 mg/dL H <=150   Specimen collection should occur prior to N-Acetylcysteine or Metamizole administration due to the potential for falsely depressed results  HDL,DIRECT 62 mg/dL H 40-60   Specimen collection should occur prior to Metamizole administration due to the potential for falsley depressed results  (1) HEMOGLOBIN A1C [id number] 11:43AM Tay Restrepo Order Number: [id number]     Test Name Result Flag Reference   HEMOGLOBIN A1C 7 1 % H 4 2-6 3   EST  AVG   GLUCOSE 157 mg/dl

## 2018-01-12 VITALS
HEART RATE: 86 BPM | RESPIRATION RATE: 16 BRPM | SYSTOLIC BLOOD PRESSURE: 158 MMHG | HEIGHT: 71 IN | WEIGHT: 238 LBS | BODY MASS INDEX: 33.32 KG/M2 | TEMPERATURE: 98.6 F | DIASTOLIC BLOOD PRESSURE: 88 MMHG

## 2018-01-12 VITALS
TEMPERATURE: 97.7 F | HEART RATE: 82 BPM | SYSTOLIC BLOOD PRESSURE: 156 MMHG | BODY MASS INDEX: 32.34 KG/M2 | DIASTOLIC BLOOD PRESSURE: 72 MMHG | HEIGHT: 71 IN | WEIGHT: 231 LBS | RESPIRATION RATE: 18 BRPM

## 2018-01-12 VITALS
DIASTOLIC BLOOD PRESSURE: 82 MMHG | TEMPERATURE: 97.6 F | WEIGHT: 245.38 LBS | HEART RATE: 71 BPM | SYSTOLIC BLOOD PRESSURE: 152 MMHG | RESPIRATION RATE: 18 BRPM | OXYGEN SATURATION: 98 % | BODY MASS INDEX: 34.35 KG/M2 | HEIGHT: 71 IN

## 2018-01-12 NOTE — RESULT NOTES
Verified Results  (1) HEMOGLOBIN A1C [id number] 11:41AM Eliseo Mcdowell     Test Name Result Flag Reference   HEMOGLOBIN A1C 6 2 %  4 2-6 3   EST  AVG   GLUCOSE 131 mg/dl

## 2018-01-12 NOTE — PROGRESS NOTES
Assessment    1  Medicare annual wellness visit, initial (V70 0) (Z00 00)    Plan  Hyperlipidemia    · (1) COMPREHENSIVE METABOLIC PANEL; Status:Active; Requested for:10Aug2016;    · (1) LDL,DIRECT; Status:Active; Requested for:10Aug2016;    · (1) TRIGLYCERIDE; Status:Active; Requested for:10Aug2016;   Nephrolithiasis    · 1 Ricarda Shepherd MD, Kristyn Thomas  (Urology) Physician Referral  Consult  Status: Active   Requested for: 66INQ7724  Care Summary provided  : Yes    Discussion/Summary    Doing ok, but bp is up  Will check with nephro per pt request to see if we can add ACEI  RTC three months  Impression: Initial Annual Wellness Visit  Cardiovascular screening and counseling: screening is current  Diabetes screening and counseling: screening is current  Colorectal cancer screening and counseling: screening is current  Prostate cancer screening and counseling: screening is current  Osteoporosis screening and counseling: screening not indicated  Abdominal aortic aneurysm screening and counseling: screening not indicated  Glaucoma screening and counseling: screening not indicated  HIV screening and counseling: screening not indicated  Immunizations: influenza vaccination is recommended annually, the patient declines the pneumococcal vaccination, hepatitis B vaccination series is not indicated at this time due to the patient's low risk of fausto the disease, Zostavax vaccination status is unknown, Td vaccination up to date and Tdap vaccination up to date  Advance Directive Planning: complete and up to date, need copy for the chart  Advice and education were given regarding fall risk reduction, increasing physical activity, nutrition (non-diabetic), seat belt use and sunscreen use  He was referred to nephrology and urology  Medical Equipment/Suppliers: none  Patient Discussion: plan discussed with the patient, follow-up visit needed in one year  Chief Complaint  Well Eval - No complaints  History of Present Illness  HPI: WM presents for medicare wellness  Doing ok and no c/o's except for some leg edema at times  Sugars are "good" and recent HgA1c was 6 2  Welcome to [employer] and Wellness Visits: The patient is being seen for the initial annual wellness visit  Medicare Screening and Risk Factors   Hospitalizations: he has been previously hospitalizied and 5 months ago - kidney stones  Once per lifetime medicare screening tests: ECG (1/4/13) and AAA screening US (refuses)  Medicare Screening Tests Risk Questions   Abdominal aortic aneurysm risk assessment: tobacco use, over 72years of age and refuses  Osteoporosis risk assessment: none indicated  HIV risk assessment: none indicated  Drug and Alcohol Use: The patient current cigar smoker  He is not ready to quit using tobacco  The patient reports occasional alcohol use  Alcohol concern:   The patient has no concerns about alcohol abuse  He has never used illicit drugs  Diet and Physical Activity: Current diet includes well balanced meals, low carbohydrate food choices, limited junk food, 1 servings of fruit per day, 2 servings of vegetables per day, 1 servings of meat per day, 1 servings of whole grains per day, 1 servings of dairy products per day, 2 cups of coffee per day, 0 cups of tea per day, 0 cans of regular soda per day and 0 cans of diet soda per day  He exercises infrequently  Exercise: walking  Mood Disorder and Cognitive Impairment Screening: He denies feeling down, depressed, or hopeless over the past two weeks  He denies feeling little interest or pleasure in doing things over the past two weeks  Cognitive impairment screening: denies difficulty learning/retaining new information, denies difficulty handling complex tasks, denies difficulty with reasoning, denies difficulty with spatial ability and orientation, denies difficulty with language and denies difficulty with behavior     Functional Ability/Level of Safety: Hearing is normal bilaterally, normal in the right ear, normal in the left ear and a hearing aid is not used  The patient is currently able to do activities of daily living without limitations, able to do instrumental activities of daily living without limitations, able to participate in social activities without limitations and able to drive without limitations  Activities of daily living details: does not need help using the phone, no transportation help needed, does not need help shopping, no meal preparation help needed, does not need help doing housework, does not need help doing laundry, does not need help managing medications and does not need help managing money  Fall risk factors:  alcohol use and antihypertensive use, but The patient fell 0 times in the past 12 months , no polypharmacy, no mobility impairment, no antidepressant use, no deconditioning, no postural hypotension, no sedative use, no visual impairment, no urinary incontinence, no cognitive impairment, up and go test was normal and no previous fall  Home safety risk factors:  no unfamiliar surroundings, no loose rugs, no poor household lighting, no uneven floors, no household clutter, grab bars in the bathroom and handrails on the stairs  Advance Directives: Advance directives: living will, durable power of  for health care directives and advance directives  end of life decisions were reviewed with the patient  Co-Managers and Medical Equipment/Suppliers: See Patient Care Team      Patient Care Team    Care Team Member Role Specialty Office Number   Sana Eldridge MD  Gastroenterology Adult [phone]     Review of Systems    Constitutional: no fever, no chills, no malaise and no fatigue  Head and Face: negative  Eyes: negative  ENT: negative  Cardiovascular: lower extremity edema, but no chest pain, no palpitations, the heart is not racing and no lightheadedness     Respiratory: no shortness of breath, no wheezing, not sleeping upright or with extra pillows and no cough  Gastrointestinal: no abdominal pain, no abdominal bloating, no abdominal cramps, no nausea, no vomiting, no diarrhea and no constipation  Genitourinary: negative  Musculoskeletal: joint stiffness, but no diffuse joint pain, no generalized muscle aches, no back pain and no joint swelling  Neurological: no headache, no confusion, no dizziness and no fainting  Psychiatric: anxiety, but no irritability and no depression  Active Problems    1  Acute on chronic renal insufficiency (593 9,585 9) (N28 9,N18 9)   2  Anemia (285 9) (D64 9)   3  Controlled type 2 diabetes mellitus without complication (758 20) (Z52 5)   4  Diabetic nephropathy (250 40,583 81) (E11 21)   5  Diabetic polyneuropathy associated with type 2 diabetes mellitus (250 60,357 2)   (E11 42)   6  DM type 2 (diabetes mellitus, type 2) (250 00) (E11 9)   7  Eczema (692 9) (L30 9)   8  Erectile dysfunction of non-organic origin (302 72) (F52 21)   9  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)   10  Generalized anxiety disorder (300 02) (F41 1)   11  GERD without esophagitis (530 81) (K21 9)   12  Hyperlipidemia (272 4) (E78 5)   13  Hypertension (401 9) (I10)   14  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)   15  Nephrolithiasis (592 0) (N20 0)   16  Neuropathy, diabetic (250 60,357 2) (E11 40)   17  Obesity (278 00) (E66 9)   18  Osteoarthritis (715 90) (M19 90)   19  Polyp of sigmoid colon (211 3) (D12 5)   20  Prostate cancer screening (V76 44) (Z12 5)   21  Uropathy, obstructive (599 60) (N13 9)    Past Medical History    · History of allergic rhinitis (V12 69) (Z87 09)    The active problems and past medical history were reviewed and updated today  Surgical History    · History of Total Hip Replacement    The surgical history was reviewed and updated today         Family History  Mother    · No pertinent family history  Father    · No pertinent family history  Family History    · Denied: Family history of Family Problems    The family history was reviewed and updated today  Social History    · Being A Social Drinker   · Cigars (___ A Day) (305 1)   · Current every day smoker (305 1) (F17 200)  The social history was reviewed and updated today  The social history was reviewed and is unchanged  Current Meds   1  ALPRAZolam 0 5 MG Oral Tablet; TAKE 1 TABLET EVERY 8 HOURS AS NEEDED; Therapy: 43JEO7376 to (Evaluate:77Bcj0540); Last Rx:01Jun2016 Ordered   2  AmLODIPine Besylate 5 MG Oral Tablet; One po BID  Requested for: 44VJP2464; Last   Rx:63Xvr4497 Ordered   3  Dorzolamide HCl-Timolol Mal 22 3-6 8 MG/ML Ophthalmic Solution; Therapy: 28UPW0338 to (Evaluate:32Zss1395) Recorded   4  Ferrous Sulfate 325 (65 Fe) MG Oral Tablet Delayed Release; TAKE 1 TABLET DAILY    Requested for: 86LHA2559; Last Rx:17Mar2016 Ordered   5  Latanoprost 0 005 % Ophthalmic Solution; Therapy: 19Apr2016 to (Evaluate:32Uwp4403) Recorded   6  MetFORMIN HCl - 850 MG Oral Tablet; TAKE 2 TABLET Daily Recorded   7  OneTouch Delica Lancets MISC; use 1 daily; Therapy: 43ENC4795 to (Evaluate:02Jan2014); Last RD:57VOZ4799 Ordered   8  OneTouch Ultra 2 w/Device Kit; USE AS DIRECTED; Therapy: 01AXR3831 to (Last Rx:07Jan2013) Ordered   9  OneTouch Ultra Blue In [employer]; USE 1 STRIP DAILY; Therapy: 91DEN0471 to (Evaluate:02Jan2014); Last RV:85WJF3102 Ordered   10  Vitamin B-12 1000 MCG Oral Tablet Recorded    The medication list was reviewed and updated today  Allergies    1  No Known Drug Allergies    2   Pollen    Immunizations   1    Tetanus  14-Sep-2010    Tubersol 5 UNIT/0 1ML Intradermal Solution  14-Jan-2015     Vitals  Signs    Systolic: 303  Diastolic: 68  Heart Rate: 80  Respiration: 18  Temperature: 97 8 F  Height: 5 ft 11 in  Weight: 227 lb   BMI Calculated: 31 66  BSA Calculated: 2 22    Physical Exam    Constitutional   General appearance: No acute distress, well appearing and well nourished  Head and Face   Head and face: Normal     Palpation of the face and sinuses: No sinus tenderness  Eyes   Conjunctiva and lids: No erythema, swelling or discharge  Pupils and irises: Equal, round, reactive to light  Ears, Nose, Mouth, and Throat   External inspection of ears and nose: Normal     Otoscopic examination: Tympanic membranes translucent with normal light reflex  Canals patent without erythema  Hearing: Normal     Nasal mucosa, septum, and turbinates: Normal without edema or erythema  Lips, teeth, and gums: Normal, good dentition  Oropharynx: Normal with no erythema, edema, exudate or lesions  Neck   Neck: Supple, symmetric, trachea midline, no masses  Thyroid: Normal, no thyromegaly  Pulmonary   Respiratory effort: No increased work of breathing or signs of respiratory distress  Auscultation of lungs: Clear to auscultation  Cardiovascular   Auscultation of heart: Normal rate and rhythm, normal S1 and S2, no murmurs  Carotid pulses: 2+ bilaterally  Peripheral vascular exam: Normal     Examination of extremities for edema and/or varicosities: Normal     Abdomen   Abdomen: Non-tender, no masses  Lymphatic   Palpation of lymph nodes in neck: No lymphadenopathy  Musculoskeletal   Gait and station: Normal     Inspection/palpation of digits and nails: Normal without clubbing or cyanosis  Inspection/palpation of joints, bones, and muscles: Normal     Range of motion: Normal     Stability: Normal     Muscle strength/tone: Normal     Neurologic   Cranial nerves: Cranial nerves 2-12 intact  Cortical function: Normal mental status  Reflexes: 2+ and symmetric  Sensation: No sensory loss  Coordination: Normal finger to nose and heel to shin  Psychiatric   Judgment and insight: Normal     Orientation to person, place and time: Normal     Recent and remote memory: Intact      Mood and affect: Normal        Signatures   Electronically signed by : LUPE Alexander ; Aug 10 2016  1:50PM EST                       (Author)

## 2018-01-12 NOTE — RESULT NOTES
Verified Results  (1) BASIC METABOLIC PROFILE [id number] 11:05AM Saniya Sosa Order Number: [id number]     Test Name Result Flag Reference   GLUCOSE,RANDM 149 mg/dL H    If the patient is fasting, the ADA then defines impaired fasting glucose as > 100 mg/dL and diabetes as > or equal to 123 mg/dL  SODIUM 141 mmol/L  136-145   POTASSIUM 4 5 mmol/L  3 5-5 3   CHLORIDE 111 mmol/L H 100-108   CARBON DIOXIDE 21 mmol/L  21-32   ANION GAP (CALC) 9 mmol/L  4-13   BLOOD UREA NITROGEN 23 mg/dL  5-25   CREATININE 1 46 mg/dL H 0 60-1 30   Standardized to IDMS reference method   CALCIUM 8 8 mg/dL  8 3-10 1   eGFR Non-African American 47 6 ml/min/1 73sq [facility] Energy Disease Education Program recommendations are as follows:  GFR calculation is accurate only with a steady state creatinine  Chronic Kidney disease less than 60 ml/min/1 73 sq  meters  Kidney failure less than 15 ml/min/1 73 sq  meters

## 2018-01-12 NOTE — RESULT NOTES
Verified Results  (1) CBC/PLT/DIFF [id number] 11:20AM Rissa Pablos Order Number: [id number]     Order Number: [id number]     Test Name Result Flag Reference   WBC COUNT 6 02 Thousand/uL  4 31-10 16   RBC COUNT 3 92 Million/uL  3 88-5 62   HEMOGLOBIN 12 4 g/dL  12 0-17 0   HEMATOCRIT 37 9 %  36 5-49 3   MCV 97 fL  82-98   MCH 31 6 pg  26 8-34 3   MCHC 32 7 g/dL  31 4-37 4   RDW 13 8 %  11 6-15 1   MPV 10 8 fL  8 9-12 7   PLATELET COUNT 998 Thousands/uL  149-390   NEUTROPHILS RELATIVE PERCENT 63 %  43-75   LYMPHOCYTES RELATIVE PERCENT 27 %  14-44   MONOCYTES RELATIVE PERCENT 8 %  4-12   EOSINOPHILS RELATIVE PERCENT 1 %  0-6   BASOPHILS RELATIVE PERCENT 1 %  0-1   NEUTROPHILS ABSOLUTE COUNT 3 82 Thousands/µL  1 85-7 62   LYMPHOCYTES ABSOLUTE COUNT 1 62 Thousands/µL  0 60-4 47   MONOCYTES ABSOLUTE COUNT 0 48 Thousand/µL  0 17-1 22   EOSINOPHILS ABSOLUTE COUNT 0 07 Thousand/µL  0 00-0 61   BASOPHILS ABSOLUTE COUNT 0 03 Thousands/µL  0 00-0 10     (1) COMPREHENSIVE METABOLIC PANEL [id number] 21:79IH Rissa Pablos Order Number: [id number]      National Kidney Disease Education Program recommendations are as follows:  GFR calculation is accurate only with a steady state creatinine  Chronic Kidney disease less than 60 ml/min/1 73 sq  meters  Kidney failure less than 15 ml/min/1 73 sq  meters  Test Name Result Flag Reference   GLUCOSE,RANDM 139 mg/dL     If the patient is fasting, the ADA then defines impaired fasting glucose as > 100 mg/dL and diabetes as > or equal to 123 mg/dL     SODIUM 138 mmol/L  136-145   POTASSIUM 5 5 mmol/L H 3 5-5 3   CHLORIDE 106 mmol/L  100-108   CARBON DIOXIDE 21 mmol/L  21-32   ANION GAP (CALC) 11 mmol/L  4-13   BLOOD UREA NITROGEN 29 mg/dL H 5-25   CREATININE 1 26 mg/dL  0 60-1 30   Standardized to IDMS reference method   CALCIUM 9 1 mg/dL  8 3-10 1   BILI, TOTAL 0 60 mg/dL  0 20-1 00   ALK PHOSPHATAS 55 U/L     ALT (SGPT) 19 U/L  12-78   AST(SGOT) 12 U/L  5-45 ALBUMIN 3 9 g/dL  3 5-5 0   TOTAL PROTEIN 7 1 g/dL  6 4-8 2   eGFR Non-African American 56 6 ml/min/1 73sq m       (1) LIPID PANEL FASTING W DIRECT LDL REFLEX [id number] 11:20AM Rosario Rangel Order Number: [id number]      Triglyceride:         Normal              <150 mg/dl       Borderline High    150-199 mg/dl       High               200-499 mg/dl       Very High          >499 mg/dl  Cholesterol:         Desirable        <200 mg/dl      Borderline High  200-239 mg/dl      High             >239 mg/dl  HDL Cholesterol:        High    >59 mg/dL      Low     <41 mg/dL  LDL Cholesterol:        Optimal          <100 mg/dl         Near Optimal     100-129 mg/dl        Above Optimal          Borderline High   130-159 mg/dl          High              160-189 mg/dl          Very High        >189 mg/dl  LDL CALCULATED:    This screening LDL is a calculated result  It does not have the accuracy of the Direct Measured LDL in the monitoring of patients with hyperlipidemia and/or statin therapy  Direct Measure LDL (LCU069) must be ordered separately in these patients  Test Name Result Flag Reference   CHOLESTEROL 194 mg/dL     LDL CHOLESTEROL CALCULATED 106 mg/dL H 0-100   TRIGLYCERIDES 129 mg/dL  <=150   HDL,DIRECT 62 mg/dL H 40-60     (1) MICROALBUMIN CREATININE RATIO, RANDOM URINE [id number] 11:20AM Rosario Mateuszreynold Order Number: [id number]     Test Name Result Flag Reference   MICROALBUMIN/ CREAT R 23 mg/g creatinine  0-30   MICROALBUMIN,URINE 23 4 mg/L H 0 0-20 0   CREATININE URINE 99 8 mg/dL       (1) TSH [id number] 11:20AM Americoalicia Mateuszreynold Order Number: [id number]    Patients undergoing fluorescein dye angiography may retain small amounts of fluorescein in the body for 48-72 hours post procedure  Samples containing fluorescein can produce falsely depressed TSH values  If the patient had this procedure,a specimen should be resubmitted post fluorescein clearance       Test Name Result Flag Reference   TSH 3 111 uIU/mL  0 358-3 740     (1) PSA (SCREEN) (Dx V76 44 Screen for Prostate Cancer) [id number] 11:20AM Kirstin Kaiser Order Number: [id number]     Order Number: [id number]     Test Name Result Flag Reference   PROSTATE SPECIFIC ANTIGEN 1 3 ng/mL  0 0-4 0     (1) URINALYSIS (will reflex a microscopy if leukocytes, occult blood, protein or nitrites are not within normal limits) [id number] 11:20AM Kirstin Kaiser Order Number: [id number]     Test Name Result Flag Reference   COLOR Yellow     CLARITY Clear     SPECIFIC GRAVITY UA 1 025  1 003-1 030   PH UA 5 5  4 5-8 0   LEUKOCYTE ESTERASE UA Negative  Negative   NITRITE UA Negative  Negative   PROTEIN UA Negative mg/dl  Negative   GLUCOSE UA Negative mg/dl  Negative   KETONES UA Negative mg/dl  Negative   UROBILINOGEN UA 0 2 E U /dl  0 2, 1 0 E U /dl   BILIRUBIN UA Negative  Negative   BLOOD UA Negative  Negative, Trace-Intact

## 2018-01-13 NOTE — RESULT NOTES
Verified Results  (1) HEMOGLOBIN A1C [id number] 11:20AM Banner Katina Order Number: [id number]      5 7-6 4% impaired fasting glucose  >=6 5% diagnosis of diabetes    Falsely low levels are seen in conditions linked to short RBC life span-  hemolytic anemia, and splenomegaly  Falsely elevated levels are seen in situations where there is an increased production of RBC- receipt of erythropoietin or blood transfusions  Adopted from ADA-Clinical Practice Recommendations     Test Name Result Flag Reference   HEMOGLOBIN A1C 6 3 % H 4 0-5 6   EST  AVG   GLUCOSE 134 mg/dl

## 2018-01-14 VITALS
SYSTOLIC BLOOD PRESSURE: 140 MMHG | RESPIRATION RATE: 18 BRPM | HEIGHT: 71 IN | TEMPERATURE: 97.8 F | HEART RATE: 84 BPM | WEIGHT: 236 LBS | DIASTOLIC BLOOD PRESSURE: 64 MMHG | BODY MASS INDEX: 33.04 KG/M2

## 2018-01-14 NOTE — RESULT NOTES
Verified Results  (1) HEMOGLOBIN A1C [id number] 11:22AM Ashley Perez Order Number: [id number]     Test Name Result Flag Reference   HEMOGLOBIN A1C 6 1 %  4 2-6 3   EST  AVG  GLUCOSE 128 mg/dl       (1) COMPREHENSIVE METABOLIC PANEL [id number] 18:28LD Ashley Perez Order Number: [id number]     Test Name Result Flag Reference   GLUCOSE,RANDM 162 mg/dL H    If the patient is fasting, the ADA then defines impaired fasting glucose as > 100 mg/dL and diabetes as > or equal to 123 mg/dL  SODIUM 138 mmol/L  136-145   POTASSIUM 5 5 mmol/L H 3 5-5 3   CHLORIDE 111 mmol/L H 100-108   CARBON DIOXIDE 20 mmol/L L 21-32   ANION GAP (CALC) 7 mmol/L  4-13   BLOOD UREA NITROGEN 33 mg/dL H 5-25   CREATININE 1 65 mg/dL H 0 60-1 30   Standardized to IDMS reference method   CALCIUM 9 2 mg/dL  8 3-10 1   BILI, TOTAL 0 48 mg/dL  0 20-1 00   ALK PHOSPHATAS 57 U/L     ALT (SGPT) 14 U/L  12-78   AST(SGOT) 5 U/L  5-45   ALBUMIN 3 8 g/dL  3 5-5 0   TOTAL PROTEIN 7 6 g/dL  6 4-8 2   eGFR Non-African American 41 3 ml/min/1 73sq m     - Patient Instructions: This is a fasting blood test  Water,black tea or black  coffee only after 9:00pm the night before test Drink 2 glasses of water the morning of test   National Kidney Disease Education Program recommendations are as follows:  GFR calculation is accurate only with a steady state creatinine  Chronic Kidney disease less than 60 ml/min/1 73 sq  meters  Kidney failure less than 15 ml/min/1 73 sq  meters  (1) LDL,DIRECT [id number] 11:22AM Ashley [facility] Order Number: [id number]     Test Name Result Flag Reference   LDL, DIRECT 88 mg/dl  0-100   - Patient Instructions: This is a fasting blood test  Water,black tea or black  coffee only after 9:00pm the night before test   Drink 2 glasses of water the morning of test     - Patient Instructions:  This is a fasting blood test  Water,black tea or black  coffee only after 9:00pm the night before test Drink 2 glasses of water the morning of test   LDL Cholesterol:        Optimal          <100 mg/dl        Near Optimal     100-129 mg/dl        Above Optimal          Borderline High   130-159 mg/dl          High              160-189 mg/dl          Very High        >189 mg/dl     (1) TRIGLYCERIDE [id number] 11:22AM [facility] Order Number: [id number]     Test Name Result Flag Reference   TRIGLYCERIDES 159 mg/dL H <=150   Specimen collection should occur prior to N-Acetylcysteine or Metamizole administration due to the potential for falsely depressed results  - Patient Instructions:  This is a fasting blood test  Water,black tea or black  coffee only after 9:00pm the night before test Drink 2 glasses of water the morning of test   Triglyceride:         Normal              <150 mg/dl       Borderline High    150-199 mg/dl       High               200-499 mg/dl       Very High          >499 mg/dl

## 2018-01-15 NOTE — PROGRESS NOTES
Chief Complaint  Pt presents today for BP check  Last /72 on 1/18/17 and today BP is 150/66, pt is taking Amlodipine Besylate 10 mg 1 tablet daily  Active Problems    1  Acute on chronic renal insufficiency (593 9,585 9) (N28 9,N18 9)   2  Anemia (285 9) (D64 9)   3  Controlled type 2 diabetes mellitus without complication (327 66) (M66 6)   4  Diabetic nephropathy (250 40,583 81) (E11 21)   5  Diabetic polyneuropathy associated with type 2 diabetes mellitus (250 60,357 2)   (E11 42)   6  DM type 2 (diabetes mellitus, type 2) (250 00) (E11 9)   7  Eczema (692 9) (L30 9)   8  Erectile dysfunction of non-organic origin (302 72) (F52 21)   9  Generalized anxiety disorder (300 02) (F41 1)   10  GERD without esophagitis (530 81) (K21 9)   11  Hyperkalemia (276 7) (E87 5)   12  Hyperlipidemia (272 4) (E78 5)   13  Hypertension (401 9) (I10)   14  Medicare annual wellness visit, initial (V70 0) (Z00 00)   15  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)   16  Nephrolithiasis (592 0) (N20 0)   17  Neuropathy, diabetic (250 60,357 2) (E11 40)   18  Obesity (278 00) (E66 9)   19  Osteoarthritis (715 90) (M19 90)   20  Polyp of sigmoid colon (211 3) (D12 5)   21  Prostate cancer screening (V76 44) (Z12 5)   22  Uropathy, obstructive (599 60) (N13 9)    Current Meds   1  ALPRAZolam 0 5 MG Oral Tablet; TAKE 1 TABLET EVERY 8 HOURS AS NEEDED; Therapy: 08TPS8826 to (Evaluate:69Ogz8020); Last Rx:03Jan2017 Ordered   2  AmLODIPine Besylate 10 MG Oral Tablet; TAKE 1 TABLET DAILY  Requested for:   57ZAA4167; Last Rx:18Jan2017 Ordered   3  Dorzolamide HCl-Timolol Mal 22 3-6 8 MG/ML Ophthalmic Solution; Therapy: 41QXX5659 to (Evaluate:57Ptj0856) Recorded   4  Latanoprost 0 005 % Ophthalmic Solution; Therapy: 19Apr2016 to (Evaluate:26Gnw6909) Recorded   5  MetFORMIN HCl - 850 MG Oral Tablet; TAKE 2 TABLET Daily Recorded   6  OneTouch Delica Lancets MISC; use 1 daily; Therapy: 78KID3616 to (Evaluate:02Jan2014);  Last SN:25VBS9590 Ordered   7  OneTouch Ultra 2 w/Device Kit; USE AS DIRECTED; Therapy: 44NFR6047 to (Last Rx:07Jan2013) Ordered   8  OneTouch Ultra Blue In [employer]; USE 1 STRIP DAILY; Therapy: 89CJX4626 to (Evaluate:02Jan2014); Last Rx:07Jan2013 Ordered    Allergies    1  No Known Drug Allergies    2  Pollen    Vitals  Signs    Systolic: 889, LUE, Sitting  Diastolic: 66, LUE, Sitting    Discussion/Summary    BP still up and had issues with ACEI  WIll add beta blocker  Future Appointments    Date/Time Provider Specialty Site   03/01/2017 09:15 AM LUPE Chacon   Internal Medicine [facility] 9049     Signatures   Electronically signed by : LUPE Guillen ; Feb 1 2017 10:46AM EST                       (Author)

## 2018-01-22 VITALS — SYSTOLIC BLOOD PRESSURE: 150 MMHG | DIASTOLIC BLOOD PRESSURE: 66 MMHG

## 2018-01-24 VITALS
HEIGHT: 71 IN | TEMPERATURE: 98.4 F | SYSTOLIC BLOOD PRESSURE: 148 MMHG | WEIGHT: 240.13 LBS | BODY MASS INDEX: 33.62 KG/M2 | HEART RATE: 68 BPM | RESPIRATION RATE: 18 BRPM | DIASTOLIC BLOOD PRESSURE: 80 MMHG | OXYGEN SATURATION: 98 %

## 2018-01-24 NOTE — MISCELLANEOUS
Assessment   1  Acute on chronic renal insufficiency (593 9,585 9) (N17 9,N18 9)1   2  Hypertension (401 9) (I10)1   3  Anemia (285 9) (D64 9)1   4  Diabetic polyneuropathy associated with type 2 diabetes mellitus (250 60,357 2)   (E11 42)1   5  Nephrolithiasis (592 0) (N20 0)1   6  Uropathy, obstructive (599 60) (N13 9)1      1 Amended By: Mandeep Vazquez; Mar 17 2016 10:17 AM EST    Discussion/Summary  Discussion Summary:   Doing better  Repeat labs show improved renal function  Keep off the ACEI and continue with norvasc  Obtain several OP BP and BS  RTC four weeks to see if DM meds and Norvasc dose adjustment needed  Has f/u labs from nephro  1        1 Amended By: Mandeep Vazquez; Mar 17 2016 10:14 AM EST    Chief Complaint  Chief Complaint Free Text Note Form: [facility] FU - Kidney Stones  Roxanne Silvestre Roxanne Silvestre Roxanne Silvestre Pt has no complaints today   feeling better  1        1 Amended By: Letha Duverney; Mar 17 2016 9:49 AM EST    History of Present Illness  TCM Communication [facility]: The patient is being contacted for follow-up after hospitalization and pt has appt on 3-17-16  Hospital records were not available  He was hospitalized [facility], [place]  The date of admission: 3-5-16, date of discharge: 3-7-16  Diagnosis: kidney stones  He was discharged to home  He scheduled a follow up appointment  Communication performed and completed by   HPI: WM presents for recent admission for flank pain due to recurrent nephrolithiasis  Coarse complicated by anemia and obstructive uropathy with elevated renal function  Stone passed and pt taken off ACEI and Metformin  Doing well and no new c/o's  Repeat labs improved  1        1 Amended By: Mandeep Vazquez; Mar 17 2016 10:16 AM EST    Review of Systems  Complete-Male:   Constitutional:1  no fever1 , not feeling poorly1 , no chills1  and not feeling tired1   Cardiovascular: 1  no chest pain1   Respiratory:1  no shortness of breath1      Gastrointestinal:1  no abdominal pain1 , no nausea1 , no constipation1  and no diarrhea1   Genitourinary:1  no dysuria1   Neurological:1  No compliants of headache, no confusion, no convulsions, no numbness or tingling, no dizziness or fainting, no limb weakness, no difficulty walking1         1 Amended By: Isaac Norris; Mar 17 2016 10:16 AM EST    Active Problems   1  Anemia (285 9) (D64 9)  2  Anxiety (300 00) (F41 9)  3  Arthritis (716 90) (M19 90)  4  Chronic left shoulder pain (719 41,338 29) (M25 512,G89 29)  5  Controlled type 2 diabetes mellitus without complication (076 68) (I97 1)  6  Diabetic nephropathy (250 40,583 81) (E11 21)  7  Diabetic polyneuropathy associated with type 2 diabetes mellitus (250 60,357 2)   (E11 42)  8  DM type 2 (diabetes mellitus, type 2) (250 00) (E11 9)  9  Eczema (692 9) (L30 9)  10  Encounter for screening for malignant neoplasm of prostate (V76 44) (Z12 5)  11  Erectile dysfunction of non-organic origin (302 72) (F52 21)  12  Esophageal reflux (530 81) (K21 9)  13  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)  14  Generalized anxiety disorder (300 02) (F41 1)  15  Hyperlipidemia (272 4) (E78 5)  16  Hypertension (401 9) (I10)  17  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)  18  Need for tuberculosis vaccination (V03 2) (Z23)  19  Neuropathy, diabetic (250 60,357 2) (E11 40)  20  Obesity (278 00) (E66 9)  21  Osteoarthritis (715 90) (M19 90)  22  Polyp of sigmoid colon (211 3) (D12 5)  23  Prostate cancer screening (V76 44) (Z12 5)  24  Screening for colon cancer (V76 51) (Z12 11)  25  Screening for genitourinary condition (V81 6) (Z13 89)  26  Screening for neurological condition (V80 09) (Z13 89)  27  Skin rash (782 1) (R21)  28  Tick bite (919 4,E906 4) [facility])    Past Medical History   1  History of allergic rhinitis (V12 69) (Z87 09)    Surgical History   1  History of Total Hip Replacement  Surgical History Reviewed: The surgical history was reviewed and updated today  1        1 Amended By: Isaac Norris;  Mar 17 2016 10:17 AM EST    Family History   1  No pertinent family history   2  No pertinent family history   3  Denied: Family history of Family Problems  Family History Reviewed: The family history was reviewed and updated today  1        1 Amended By: Esther Baker; Mar 17 2016 10:17 AM EST    Social History    · Being A Social Drinker   · Cigars (___ A Day) (305 1)   · Current every day smoker (305 1) (F17 200)    Social History Reviewed: The social history was reviewed and updated today  1  The social history was reviewed and is unchanged  1        1 Amended By: Esther Baker; Mar 17 2016 10:17 AM EST    Current Meds  1  ALPRAZolam 0 5 MG Oral Tablet; TAKE 1 TABLET EVERY 8 HOURS AS NEEDED; Therapy: 07LIA9083 to (Evaluate:84Qyx5550); Last Rx:18Jan2016 Ordered  2  AmLODIPine Besylate 5 MG Oral Tablet Recorded1   3  Ferrous Sulfate 325 (65 Fe) MG Oral Tablet Delayed Release; TAKE 1 TABLET DAILY; Last Rx:17Mar2016 Ordered1   4  1   5  1   6  OneTouch Delica Lancets MISC; use 1 daily; Therapy: 99JDQ2968 to (Evaluate:02Jan2014); Last YH:40ZET9289 Ordered  7  OneTouch Ultra 2 w/Device Kit; USE AS DIRECTED; Therapy: 95PIF1786 to (Last Rx:07Jan2013) Ordered  8  OneTouch Ultra Blue In [employer]; USE 1 STRIP DAILY; Therapy: 17OKR9756 to (Evaluate:02Jan2014); Last AS:27XYB3893 Ordered  9  Vitamin B-12 1000 MCG Oral Tablet Recorded1   Medication List Reviewed: The medication list was reviewed and updated today  1        1 Amended By: Esther Baker; Mar 17 2016 10:17 AM EST    Allergies   1  No Known Drug Allergies   2  Pollen    Physical Exam    Constitutional1    General appearance: No acute distress, well appearing and well nourished  1    Eyes1    Conjunctiva and lids: No swelling, erythema, or discharge  1    Pupils and irises: Equal, round and reactive to light  1    Ears, Nose, Mouth, and Throat1    Oropharynx: Normal with no erythema, edema, exudate or lesions  1    Pulmonary1    Respiratory effort: No increased work of breathing or signs of respiratory distress  1    Auscultation of lungs: Clear to auscultation, equal breath sounds bilaterally, no wheezes, no rales, no rhonci  1    Cardiovascular1    Auscultation of heart: Normal rate and rhythm, normal S1 and S2, without murmurs  1    Examination of extremities for edema and/or varicosities: Normal 1    Carotid pulses: Normal 1    Abdomen1    Abdomen: Non-tender, no masses  1    Musculoskeletal1    Gait and station: Normal 1          1 Amended By: Navdeep Esquivel; Mar 17 2016 10:17 AM EST    Future Appointments    Date/Time Provider Specialty Site   03/17/2016 09:30 AM LUPE Chacon  Internal [address] [place] [address]   05/18/2016 11:00 AM LUPE Chacon   Internal Medicine [address] [facility]     Signatures   Electronically signed by : Juan Ibanez, ; Mar  7 2016  1:35PM EST                       (Author)    Electronically signed by : LUPE Guillen ; Mar  7 2016  2:52PM EST                          Electronically signed by : LUPE Guillen ; Mar 17 2016 10:17AM EST                       (Author)    Electronically signed by : LUPE Guillen ; Mar 17 2016 10:21AM EST                       (Author)

## 2018-02-05 DIAGNOSIS — I10 HYPERTENSION, UNSPECIFIED TYPE: Primary | ICD-10-CM

## 2018-02-05 RX ORDER — AMLODIPINE BESYLATE 10 MG/1
1 TABLET ORAL DAILY
COMMUNITY
End: 2018-02-05 | Stop reason: SDUPTHER

## 2018-02-05 RX ORDER — AMLODIPINE BESYLATE 10 MG/1
10 TABLET ORAL DAILY
Qty: 90 TABLET | Refills: 3 | Status: SHIPPED | OUTPATIENT
Start: 2018-02-05 | End: 2018-11-15 | Stop reason: SDUPTHER

## 2018-02-07 RX ORDER — ATORVASTATIN CALCIUM 20 MG/1
1 TABLET, FILM COATED ORAL DAILY
COMMUNITY
Start: 2017-12-01 | End: 2019-01-12 | Stop reason: SDUPTHER

## 2018-02-07 RX ORDER — ALPRAZOLAM 0.5 MG/1
1 TABLET ORAL EVERY 8 HOURS PRN
COMMUNITY
Start: 2013-01-02 | End: 2018-03-09 | Stop reason: SDUPTHER

## 2018-02-07 RX ORDER — LANCETS 33 GAUGE
EACH MISCELLANEOUS DAILY
COMMUNITY
Start: 2013-01-07

## 2018-02-07 RX ORDER — METOPROLOL SUCCINATE 100 MG/1
1 TABLET, EXTENDED RELEASE ORAL DAILY
COMMUNITY
Start: 2017-02-01 | End: 2018-11-15 | Stop reason: SDUPTHER

## 2018-02-07 RX ORDER — BLOOD-GLUCOSE METER
EACH MISCELLANEOUS
COMMUNITY
Start: 2013-01-07

## 2018-02-09 ENCOUNTER — OFFICE VISIT (OUTPATIENT)
Dept: INTERNAL MEDICINE CLINIC | Facility: CLINIC | Age: 73
End: 2018-02-09
Payer: MEDICARE

## 2018-02-09 VITALS
DIASTOLIC BLOOD PRESSURE: 62 MMHG | OXYGEN SATURATION: 99 % | HEART RATE: 62 BPM | WEIGHT: 242.2 LBS | SYSTOLIC BLOOD PRESSURE: 118 MMHG | BODY MASS INDEX: 33.91 KG/M2 | RESPIRATION RATE: 18 BRPM | HEIGHT: 71 IN | TEMPERATURE: 98.3 F

## 2018-02-09 DIAGNOSIS — I10 ESSENTIAL HYPERTENSION: ICD-10-CM

## 2018-02-09 DIAGNOSIS — E78.5 HYPERLIPIDEMIA, UNSPECIFIED HYPERLIPIDEMIA TYPE: ICD-10-CM

## 2018-02-09 DIAGNOSIS — E11.22 TYPE 2 DIABETES MELLITUS WITH CHRONIC KIDNEY DISEASE, WITHOUT LONG-TERM CURRENT USE OF INSULIN, UNSPECIFIED CKD STAGE (HCC): Primary | ICD-10-CM

## 2018-02-09 DIAGNOSIS — F41.1 GENERALIZED ANXIETY DISORDER: ICD-10-CM

## 2018-02-09 DIAGNOSIS — M70.61 GREATER TROCHANTERIC BURSITIS OF RIGHT HIP: ICD-10-CM

## 2018-02-09 DIAGNOSIS — K21.9 GERD WITHOUT ESOPHAGITIS: ICD-10-CM

## 2018-02-09 LAB — SL AMB POCT HEMOGLOBIN AIC: 6

## 2018-02-09 PROCEDURE — 83036 HEMOGLOBIN GLYCOSYLATED A1C: CPT | Performed by: INTERNAL MEDICINE

## 2018-02-09 PROCEDURE — 99214 OFFICE O/P EST MOD 30 MIN: CPT | Performed by: INTERNAL MEDICINE

## 2018-02-09 PROCEDURE — 36416 COLLJ CAPILLARY BLOOD SPEC: CPT | Performed by: INTERNAL MEDICINE

## 2018-02-09 PROCEDURE — 96372 THER/PROPH/DIAG INJ SC/IM: CPT | Performed by: INTERNAL MEDICINE

## 2018-02-09 RX ORDER — KETOROLAC TROMETHAMINE 30 MG/ML
30 INJECTION, SOLUTION INTRAMUSCULAR; INTRAVENOUS ONCE
Status: COMPLETED | OUTPATIENT
Start: 2018-02-09 | End: 2018-02-09

## 2018-02-09 RX ORDER — NAPROXEN 500 MG/1
500 TABLET ORAL 2 TIMES DAILY WITH MEALS
Qty: 60 TABLET | Refills: 0 | Status: SHIPPED | OUTPATIENT
Start: 2018-02-09 | End: 2018-06-04

## 2018-02-09 RX ADMIN — KETOROLAC TROMETHAMINE 30 MG: 30 INJECTION, SOLUTION INTRAMUSCULAR; INTRAVENOUS at 12:07

## 2018-02-09 NOTE — PROGRESS NOTES
Assessment/Plan:    No problem-specific Assessment & Plan notes found for this encounter  Diagnoses and all orders for this visit:    Type 2 diabetes mellitus with chronic kidney disease, without long-term current use of insulin, unspecified CKD stage (HCC)  -     POCT hemoglobin A1c    Essential hypertension    Generalized anxiety disorder    Hyperlipidemia, unspecified hyperlipidemia type    GERD without esophagitis    Greater trochanteric bursitis of right hip  -     ketorolac (TORADOL) injection 30 mg; Inject 1 mL (30 mg total) into the shoulder, thigh, or buttocks once   -     naproxen (NAPROSYN) 500 mg tablet; Take 1 tablet (500 mg total) by mouth 2 (two) times a day with meals    Other orders  -     ALPRAZolam (XANAX) 0 5 mg tablet; Take 1 tablet by mouth every 8 (eight) hours as needed  -     atorvastatin (LIPITOR) 20 mg tablet; Take 1 tablet by mouth daily  -     sitaGLIPtin (JANUVIA) 50 mg tablet; Take 1 tablet by mouth daily  -     metFORMIN (GLUCOPHAGE) 850 mg tablet; Take 2 tablets by mouth daily  -     metoprolol succinate (TOPROL-XL) 100 mg 24 hr tablet; Take 1 tablet by mouth daily  -     glucose blood test strip; 1 Squirt by In Vitro route daily  -     Blood Glucose Monitoring Suppl (ONE TOUCH ULTRA 2) w/Device KIT; by Does not apply route  -     ONETOUCH DELICA LANCETS 01Z MISC; by Does not apply route daily      A/P: Doing ok and in office HgA1c is 6 0  Feel pt has a bursitis and start NSAID's  Re-eval in 1-2 weeks  If persists, check xray and start PT  Refusing pneumovax  Continue current treatment  Subjective:      Patient ID: Paul Soto is a 67 y o  male   RTC for f/u dm, htn, etc  Doing ok, but c/o several weeks right hip pain  No falls  Works out several times a week and shovels snow  No prior problems  Pain especially when lying supine or on his right side  No taking any meds for this  Remains active w/o difficulty  No falls  Due for HgA1c only  Due for Pneumovax 23  Reports, when he does check them, that his sugars are less than 140  The following portions of the patient's history were reviewed and updated as appropriate:   He  has a past medical history of Acute on chronic renal insufficiency; Allergic rhinitis; Anemia (6/11/2012); Controlled type 2 diabetes mellitus without complication ([facility] [address] ); GERD without esophagitis (6/11/2012); Hypertension; Nephrolithiasis (3/17/2016); Obesity (6/11/2012); and Osteoarthritis (6/11/2012)  He  does not have any pertinent problems on file  He  has a past surgical history that includes Total hip arthroplasty; Knee surgery; and Cataract extraction, bilateral   His family history includes Arthritis in his mother; No Known Problems in his father  He  reports that he has quit smoking  He has never used smokeless tobacco  He reports that he drinks alcohol  He reports that he does not use drugs    Current Outpatient Prescriptions   Medication Sig Dispense Refill    ALPRAZolam (XANAX) 0 5 mg tablet Take 1 tablet by mouth every 8 (eight) hours as needed      atorvastatin (LIPITOR) 20 mg tablet Take 1 tablet by mouth daily      Blood Glucose Monitoring Suppl (ONE TOUCH ULTRA 2) w/Device KIT by Does not apply route      glucose blood test strip 1 Squirt by In Vitro route daily      metoprolol succinate (TOPROL-XL) 100 mg 24 hr tablet Take 1 tablet by mouth daily      ONETOUCH DELICA LANCETS 78O MISC by Does not apply route daily      sitaGLIPtin (JANUVIA) 50 mg tablet Take 1 tablet by mouth daily      amLODIPine (NORVASC) 10 mg tablet Take 1 tablet (10 mg total) by mouth daily 90 tablet 3    metFORMIN (GLUCOPHAGE) 850 mg tablet Take 2 tablets by mouth daily      naproxen (NAPROSYN) 500 mg tablet Take 1 tablet (500 mg total) by mouth 2 (two) times a day with meals 60 tablet 0     Current Facility-Administered Medications   Medication Dose Route Frequency Provider Last Rate Last Dose    ketorolac (TORADOL) injection 30 mg  30 mg Intramuscular Once Carleene Phalen, DO         Current Outpatient Prescriptions on File Prior to Visit   Medication Sig    amLODIPine (NORVASC) 10 mg tablet Take 1 tablet (10 mg total) by mouth daily     No current facility-administered medications on file prior to visit  He is allergic to pollen extract       Review of Systems   Constitutional: Negative for activity change, chills, diaphoresis, fatigue and fever  Respiratory: Negative for cough, chest tightness, shortness of breath and wheezing  Cardiovascular: Negative for chest pain, palpitations and leg swelling  Gastrointestinal: Negative for abdominal pain, constipation, diarrhea, nausea and vomiting  Genitourinary: Negative for difficulty urinating, dysuria and frequency  Musculoskeletal: Positive for arthralgias  Negative for gait problem and myalgias  Neurological: Negative for light-headedness and headaches  Psychiatric/Behavioral: Negative for confusion  The patient is not nervous/anxious  Objective:    Vitals:    02/09/18 1036   BP: 118/62   Pulse: 62   Resp: 18   Temp: 98 3 °F (36 8 °C)   SpO2: 99%        Physical Exam   Constitutional: He is oriented to person, place, and time  He appears well-developed and well-nourished  No distress  HENT:   Head: Normocephalic and atraumatic  Mouth/Throat: Oropharynx is clear and moist    Eyes: Conjunctivae and EOM are normal  Pupils are equal, round, and reactive to light  Neck: No JVD present  Cardiovascular: Normal rate, regular rhythm and normal heart sounds  Pulmonary/Chest: Effort normal and breath sounds normal  No respiratory distress  He has no wheezes  Abdominal: Soft  Bowel sounds are normal  There is no tenderness  Musculoskeletal: He exhibits tenderness  He exhibits no edema  Right hip w/o any gross deformity, increase temp,erythema, or swelling  Positive tenderness with palpation over the trochanteric bursa  Joint integrity is intact as is ROM  Neurological: He is alert and oriented to person, place, and time  Psychiatric: He has a normal mood and affect  His behavior is normal  Judgment and thought content normal    Nursing note and vitals reviewed

## 2018-02-09 NOTE — PATIENT INSTRUCTIONS
Type 2 Diabetes in Adults   WHAT YOU NEED TO KNOW:   What is type 2 diabetes? Type 2 diabetes is a disease that affects how your body uses glucose (sugar)  Normally, when the blood sugar level increases, the pancreas makes more insulin  Insulin helps move sugar out of the blood so it can be used for energy  Type 2 diabetes develops because either the body cannot make enough insulin, or it cannot use the insulin correctly  After many years, your pancreas may stop making insulin  What increases my risk for type 2 diabetes? · Obesity    · Physical inactivity    · Older age    · High blood pressure or high cholesterol    · A history of heart disease, gestational diabetes, or polycystic ovary syndrome     · A family member with diabetes    · Being [place] American, , , [place] [employer], or [facility]  What are the signs and symptoms of type 2 diabetes? You may have high blood sugar levels for a long time before symptoms appear  You may have any of the following:  · More hunger or thirst than usual     · Frequent urination     · Weight loss without trying     · Blurred vision  How is type 2 diabetes diagnosed? You may need tests to check for type 2 diabetes starting at age 39  You may need any of the following:  · An A1c test  shows the average amount of sugar in your blood over the past 2 to 3 months  Your healthcare provider will tell you the A1c level that is right for you  The goal for your A1c is usually below 7%  Your provider can help you make changes if a check shows the A1c is too high  · A fasting plasma glucose test  is when your blood sugar level is tested after you have not eaten for 8 hours  · A 2-hour plasma glucose test  starts with a blood sugar level check after you have not eaten for 8 hours  You are then given a glucose drink  Your blood sugar level is checked after 2 hours       · A random glucose test  may be done any time of day, no matter how long ago you ate   How is type 2 diabetes treated? Type 2 diabetes can be controlled to prevent damage to your heart, blood vessels, and other organs  The goal is to keep your blood sugar at a normal level  You must eat the right foods, and exercise regularly  You may need 1 or more hypoglycemic medicines or insulin if you cannot control your blood sugar level with nutrition and exercise  You may also need medicine to lower your risk for heart disease  An example includes medicine to lower or control your cholesterol  How do I check my blood sugar level? You will be taught how to check a small drop of blood in a glucose monitor  You will need to check your blood sugar level at least 3 times each day if you are on insulin  Ask your healthcare provider when and how often to check during the day  If you check your blood sugar level before a meal , it should be between 80 and 130 mg/dL  If you check your blood sugar level 1 to 2 hours after a meal , it should be less than 180 mg/dL  Ask your healthcare provider if these are good goals for you  Write down your results, and show them to your healthcare provider  Your provider may use the results to make changes to your medicine, food, and exercise schedules  What should I do if my blood sugar level is too low? Your blood sugar level is too low if it goes below 70 mg/dL  If the level is too low, eat or drink 15 grams of fast-acting carbohydrate  These are found naturally in fruits  Fast-acting carbohydrates will raise your blood sugar level quickly  Examples of 15 grams of fast-acting carbohydrate are 4 ounces (½ cup) of fruit juice or 4 ounces of regular soda  Other examples are 2 tablespoons of raisins or 3 to 4 glucose tablets  Check your blood sugar level 15 minutes later  If the level is still low (less than 100 mg/dL), eat another 15 grams of carbohydrate  When the level returns to 100 mg/dL, eat a snack or meal that contains carbohydrates   This will help prevent another drop in blood sugar  Always carefully follow your healthcare provider's instructions on how to treat low blood sugar levels  What do I need to know about nutrition? A dietitian will help you make a meal plan to keep your blood sugar level steady  Do not skip meals  Your blood sugar level may drop too low if you have taken diabetes medicine and do not eat  · Keep track of carbohydrates (sugar and starchy foods)  Your blood sugar level can get too high if you eat too many carbohydrates  Eat fruits, legumes, vegetables, and whole grains  Your dietitian will help you plan meals and snacks that have the right amount of carbohydrates  · Eat low-fat foods , such as skinless chicken and low-fat milk  · Eat less sodium (salt)  Limit high-sodium foods, such as soy sauce, potato chips, and soup  Do not add salt to food you cook  Limit your use of table salt  You should have less than 2,300 mg of sodium per day  · Eat high-fiber foods , such as vegetables, whole-grain breads, and beans  · Limit alcohol  Alcohol affects your blood sugar level and can make it harder to manage your diabetes  Limit alcohol to 1 drink a day if you are a woman  Limit alcohol to 2 drinks a day if you are a man  A drink of alcohol is 12 ounces of beer, 5 ounces of wine, or 1½ ounces of liquor  How much exercise do I need? Exercise can help keep your blood sugar level steady, decrease your risk of heart disease, and help you lose weight  Stretch before and after you exercise  Exercise for at least 150 minutes every week  Spread this amount of exercise over at least 3 days a week  Do not skip exercise more than 2 days in a row  Include muscle strengthening activities 2 to 3 days each week  Older adults should include balance training 2 to 3 times each week  Activities that help increase balance include yoga and kalina chi  Work with your healthcare provider to create an exercise plan    · Check your blood sugar level before and after exercise  Healthcare providers may tell you to change the amount of insulin you take or food you eat  If your blood sugar level is high, check your blood or urine for ketones before you exercise  Do not exercise if your blood sugar level is high and you have ketones  · If your blood sugar level is less than 100 mg/dL, have a carbohydrate snack before you exercise  Examples are 4 to 6 crackers, ½ banana, 8 ounces (1 cup) of milk, or 4 ounces (½ cup) of juice  Drink water or liquids that do not contain sugar before, during, and after exercise  Ask your dietitian or healthcare provider which liquids you should drink when you exercise  · Do not sit for longer than 30 minutes  If you cannot walk around, at least stand up  This will help you stay active and keep your blood circulating  What else can I do to manage type 2 diabetes? · Check your feet each day for sores  Wear shoes and socks that fit correctly  Do not trim your toenails  Ask your healthcare provider for more information about foot care  · Maintain a healthy weight  Ask your healthcare provider how much you should weigh  A healthy weight can help you control your diabetes and prevent heart disease  Ask your provider to help you create a weight loss plan if you are overweight  Together you can set manageable weight loss goals  · Do not smoke  Nicotine and other chemicals in cigarettes and cigars can cause lung damage and make it more difficult to manage your diabetes  Ask your healthcare provider for information if you currently smoke and need help to quit  Do not use e-cigarettes or smokeless tobacco in place of cigarettes or to help you quit  They still contain nicotine  · Check your blood pressure as directed  Ask your healthcare provider what your blood pressure should be  Most adults with diabetes and high blood pressure should have a systolic blood pressure (first number) less than 140   Your diastolic blood pressure (second number) should be less than 90  · Wear medical alert identification  Wear medical alert jewelry or carry a card that says you have diabetes  Ask your healthcare provider where to get these items  · Ask about vaccines  You have a higher risk for serious illness if you get the flu, pneumonia, or hepatitis  Ask your healthcare provider if you should get a flu, pneumonia, or hepatitis B vaccine, and when to get the vaccine  What are the risks of type 2 diabetes? Uncontrolled diabetes can damage your nerves, veins, and arteries  High blood sugar levels may damage other body tissue and organs over time  Damage to arteries may increase your risk for heart attack and stroke  Nerve damage may also lead to other heart, stomach, and nerve problems  Diabetes is life-threatening if it is not controlled  Control your blood glucose levels to prevent health problems  Call 911 for any of the following:   · You have any of the following signs of a stroke:      ¨ Numbness or drooping on one side of your face     ¨ Weakness in an arm or leg    ¨ Confusion or difficulty speaking    ¨ Dizziness, a severe headache, or vision loss    · You have any of the following signs of a heart attack:      ¨ Squeezing, pressure, or pain in your chest that lasts longer than 5 minutes or returns    ¨ Discomfort or pain in your back, neck, jaw, stomach, or arm     ¨ Trouble breathing    ¨ Nausea or vomiting    ¨ Lightheadedness or a sudden cold sweat, especially with chest pain or trouble breathing  When should I seek immediate care? · You have severe abdominal pain, or the pain spreads to your back  You may also be vomiting  · You have trouble staying awake or focusing  · You are shaking or sweating  · You have blurred or double vision  · Your breath has a fruity, sweet smell  · Your breathing is deep and labored, or rapid and shallow  · Your heartbeat is fast and weak    When should I contact my healthcare provider? · You are vomiting or have diarrhea  · You have an upset stomach and cannot eat the foods on your meal plan  · You feel weak or more tired than usual      · You feel dizzy, have headaches, or are easily irritated  · Your skin is red, warm, dry, or swollen  · You have a wound that does not heal      · You have numbness in your arms or legs  · You have trouble coping with your illness, or you feel anxious or depressed  · You have questions or concerns about your condition or care  CARE AGREEMENT:   You have the right to help plan your care  Learn about your health condition and how it may be treated  Discuss treatment options with your caregivers to decide what care you want to receive  You always have the right to refuse treatment  The above information is an  only  It is not intended as medical advice for individual conditions or treatments  Talk to your doctor, nurse or pharmacist before following any medical regimen to see if it is safe and effective for you  © 2017 [employer] Information is for End User's use only and may not be sold, redistributed or otherwise used for commercial purposes  All illustrations and images included in CareNotes® are the copyrighted property of [employer]  or Pradip Spivey

## 2018-02-16 ENCOUNTER — OFFICE VISIT (OUTPATIENT)
Dept: INTERNAL MEDICINE CLINIC | Facility: CLINIC | Age: 73
End: 2018-02-16
Payer: MEDICARE

## 2018-02-16 VITALS
WEIGHT: 248 LBS | BODY MASS INDEX: 34.72 KG/M2 | HEIGHT: 71 IN | HEART RATE: 60 BPM | SYSTOLIC BLOOD PRESSURE: 138 MMHG | RESPIRATION RATE: 18 BRPM | DIASTOLIC BLOOD PRESSURE: 60 MMHG

## 2018-02-16 DIAGNOSIS — M70.61 GREATER TROCHANTERIC BURSITIS OF RIGHT HIP: Primary | ICD-10-CM

## 2018-02-16 PROCEDURE — 99212 OFFICE O/P EST SF 10 MIN: CPT | Performed by: INTERNAL MEDICINE

## 2018-02-16 NOTE — PATIENT INSTRUCTIONS
Hip Bursitis   WHAT YOU NEED TO KNOW:   What is hip bursitis? Hip bursitis is inflammation of the bursa in your hip  The bursa is a fluid-filled sac that acts as a cushion between a bone and a tendon  A tendon is a cord of strong tissue that connects muscles to bones  What causes hip bursitis? · An injury, such as a fall    · Bacterial infection    · Constant pressure on your hips, such as when you stand or sit on hard surfaces for long periods of time    · Overuse of your hips, such as when you run, climb stairs, or ride a bike    · Medical conditions, such as scoliosis, rheumatoid arthritis, or gout    · Past surgeries, such as hip joint replacement  What are the signs and symptoms of hip bursitis? · Pain on the side of your hip or at the base of your hips when you sit down    · Decreased movement or stiffness of your hip    · Crunching or popping when you move your hip    · Redness or swelling of the skin on your hip  How is hip bursitis diagnosed? Your healthcare provider will examine your hip and ask about your injury or activities  You may need any of the following:  · Blood tests:  Your blood is tested for signs of infection  Healthcare providers may also check for diseases that may be causing your bursitis, such as rheumatoid arthritis  · X-rays: These pictures will show bone position problems, arthritis, or a fracture  · MRI:  This scan uses powerful magnets and a computer to take pictures of your hip  An MRI may show tissue damage or arthritis  You may be given dye to help the pictures show up better  Tell the healthcare provider if you have ever had an allergic reaction to contrast dye  Do not enter the MRI room with anything metal  Metal can cause serious injury  Tell the healthcare provider if you have any metal in or on your body  · Fluid culture:  Healthcare providers use a needle to drain fluid from your bursa  The fluid will be sent to a lab and tested for infection   Removal of bursa fluid may also help relieve your symptoms  How is hip bursitis treated? · Medicine:      ¨ NSAIDs:  These medicines decrease swelling, pain, and fever  NSAIDs are available without a doctor's order  Ask your healthcare provider which medicine is right for you  Ask how much to take and when to take it  Take as directed  NSAIDs can cause stomach bleeding and kidney problems if not taken correctly  ¨ Antibiotics: These help fight an infection caused by bacteria  You may need antibiotics if your bursitis is caused by infection  ¨ Steroid injection: This shot will help decrease pain and swelling  · Surgery: You may need surgery to remove your bursa or part of your hip bone  Surgery is only done when other treatments do not work  What are the risks of hip bursitis? The infection may spread to nearby joints  You may develop long-term bursitis  This may include pain and severe limitation of movement  How can I manage my symptoms? · Rest:  Rest your hip as much as possible to decrease pain and swelling  Slowly start to do more each day  Return to your daily activities as directed  · Ice:  Ice helps decrease swelling and pain  Ice may also help prevent tissue damage  Use an ice pack, or put crushed ice in a plastic bag  Cover it with a towel and place it on your hip for 15 to 20 minutes, 3 to 4 times each day, as directed  · Sleep position:  Do not lie on your injured hip  You may be more comfortable if you sleep on your stomach or back  · Physical therapy:  A physical therapist teaches you exercises to help improve movement and strength, and to decrease pain  How can I prevent hip bursitis? · Stretch, warm up, and cool down:  Always stretch and do warmup and cool-down exercises before and after you exercise  This will help loosen your muscles and decrease stress on your hips  Rest between workouts  · Wear proper shoes:  Wear shoes that fit properly and support your feet   You may need to wear shoe inserts called orthotics  Orthotics help position your foot correctly as you walk or exercise  · Maintain a healthy weight:  Ask your healthcare provider how much you should weigh  Ask him to help you create a weight loss plan if you are overweight  · Keep pressure off your hips:  Do not stand or sit for long periods of time  Sit on padded surfaces, such as a cushion or pad, whenever possible  Bend your knees when you  objects from the ground  When should I contact my healthcare provider? · Your pain and swelling increase  · Your symptoms do not improve with treatment  · You have a fever  · You have questions or concerns about your condition or care  CARE AGREEMENT:   You have the right to help plan your care  Learn about your health condition and how it may be treated  Discuss treatment options with your caregivers to decide what care you want to receive  You always have the right to refuse treatment  The above information is an  only  It is not intended as medical advice for individual conditions or treatments  Talk to your doctor, nurse or pharmacist before following any medical regimen to see if it is safe and effective for you  © 2017 [employer]  Information is for End User's use only and may not be sold, redistributed or otherwise used for commercial purposes  All illustrations and images included in CareNotes® are the copyrighted property of [employer]  or Pradip Spivey

## 2018-02-16 NOTE — PROGRESS NOTES
Assessment/Plan:    No problem-specific Assessment & Plan notes found for this encounter  Diagnoses and all orders for this visit:    Greater trochanteric bursitis of right hip      A/P: Doing better  Advised pt to start back at the gym slowly and to continue with the NSAID's for another 1-2 weeks and to use ice over the area if needed  RTC in three months for routine  Subjective:      Patient ID: Delmas Schirmer is a 67 y o  male  WM RTC for two week f/u right hip pain felt to be due to bursitis  Started on NSAID's and notes complete resolution  NO new c/o's and tolerating the med  The following portions of the patient's history were reviewed and updated as appropriate:   He  has a past medical history of Acute on chronic renal insufficiency; Allergic rhinitis; Anemia (6/11/2012); Controlled type 2 diabetes mellitus without complication ([facility] [address] ); GERD without esophagitis (6/11/2012); Hypertension; Nephrolithiasis (3/17/2016); Obesity (6/11/2012); and Osteoarthritis (6/11/2012)  He  does not have any pertinent problems on file  He  has a past surgical history that includes Total hip arthroplasty; Knee surgery; and Cataract extraction, bilateral   His family history includes Arthritis in his mother; No Known Problems in his father  He  reports that he has quit smoking  He has never used smokeless tobacco  He reports that he drinks alcohol  He reports that he does not use drugs    Current Outpatient Prescriptions   Medication Sig Dispense Refill    ALPRAZolam (XANAX) 0 5 mg tablet Take 1 tablet by mouth every 8 (eight) hours as needed      amLODIPine (NORVASC) 10 mg tablet Take 1 tablet (10 mg total) by mouth daily 90 tablet 3    atorvastatin (LIPITOR) 20 mg tablet Take 1 tablet by mouth daily      Blood Glucose Monitoring Suppl (ONE TOUCH ULTRA 2) w/Device KIT by Does not apply route      glucose blood test strip 1 Squirt by In Vitro route daily      metFORMIN (GLUCOPHAGE) 850 mg tablet Take 2 tablets by mouth daily      metoprolol succinate (TOPROL-XL) 100 mg 24 hr tablet Take 1 tablet by mouth daily      naproxen (NAPROSYN) 500 mg tablet Take 1 tablet (500 mg total) by mouth 2 (two) times a day with meals 60 tablet 0    ONETOUCH DELICA LANCETS 65V MISC by Does not apply route daily      sitaGLIPtin (JANUVIA) 50 mg tablet Take 1 tablet by mouth daily       No current facility-administered medications for this visit  Current Outpatient Prescriptions on File Prior to Visit   Medication Sig    ALPRAZolam (XANAX) 0 5 mg tablet Take 1 tablet by mouth every 8 (eight) hours as needed    amLODIPine (NORVASC) 10 mg tablet Take 1 tablet (10 mg total) by mouth daily    atorvastatin (LIPITOR) 20 mg tablet Take 1 tablet by mouth daily    Blood Glucose Monitoring Suppl (ONE TOUCH ULTRA 2) w/Device KIT by Does not apply route    glucose blood test strip 1 Squirt by In Vitro route daily    metFORMIN (GLUCOPHAGE) 850 mg tablet Take 2 tablets by mouth daily    metoprolol succinate (TOPROL-XL) 100 mg 24 hr tablet Take 1 tablet by mouth daily    naproxen (NAPROSYN) 500 mg tablet Take 1 tablet (500 mg total) by mouth 2 (two) times a day with meals    ONETOUCH DELICA LANCETS 92Y MISC by Does not apply route daily    sitaGLIPtin (JANUVIA) 50 mg tablet Take 1 tablet by mouth daily     No current facility-administered medications on file prior to visit  He is allergic to pollen extract       Review of Systems   Constitutional: Negative for activity change, chills, diaphoresis, fatigue and fever  Respiratory: Negative for cough, chest tightness, shortness of breath and wheezing  Cardiovascular: Negative for chest pain, palpitations and leg swelling  Gastrointestinal: Negative for abdominal pain, constipation, diarrhea, nausea and vomiting  Genitourinary: Negative for difficulty urinating, dysuria and frequency  Musculoskeletal: Negative for arthralgias, gait problem and myalgias  Neurological: Negative for light-headedness and headaches  Psychiatric/Behavioral: Negative for confusion  The patient is not nervous/anxious  Objective:      /60   Pulse 60   Resp 18   Ht 5' 11" (1 803 m)   Wt 112 kg (248 lb)   BMI 34 59 kg/m²          Physical Exam   Constitutional: He is oriented to person, place, and time  He appears well-developed and well-nourished  No distress  HENT:   Head: Normocephalic and atraumatic  Mouth/Throat: Oropharynx is clear and moist    Eyes: Conjunctivae and EOM are normal  Pupils are equal, round, and reactive to light  Cardiovascular: Normal rate, regular rhythm and normal heart sounds  Pulmonary/Chest: Effort normal  No respiratory distress  He has no wheezes  Musculoskeletal: Normal range of motion  He exhibits no edema, tenderness or deformity  Neurological: He is alert and oriented to person, place, and time  Psychiatric: He has a normal mood and affect  His behavior is normal  Judgment and thought content normal    Nursing note and vitals reviewed

## 2018-03-09 ENCOUNTER — TELEPHONE (OUTPATIENT)
Dept: INTERNAL MEDICINE CLINIC | Facility: CLINIC | Age: 73
End: 2018-03-09

## 2018-03-09 DIAGNOSIS — F41.9 ANXIETY: Primary | ICD-10-CM

## 2018-03-09 RX ORDER — ALPRAZOLAM 0.5 MG/1
0.5 TABLET ORAL EVERY 8 HOURS PRN
Qty: 90 TABLET | Refills: 0 | Status: SHIPPED | OUTPATIENT
Start: 2018-03-09 | End: 2018-06-04 | Stop reason: SDUPTHER

## 2018-04-18 ENCOUNTER — APPOINTMENT (OUTPATIENT)
Dept: RADIOLOGY | Facility: CLINIC | Age: 73
End: 2018-04-18
Payer: MEDICARE

## 2018-04-18 ENCOUNTER — OFFICE VISIT (OUTPATIENT)
Dept: INTERNAL MEDICINE CLINIC | Facility: CLINIC | Age: 73
End: 2018-04-18
Payer: MEDICARE

## 2018-04-18 ENCOUNTER — APPOINTMENT (OUTPATIENT)
Dept: LAB | Facility: CLINIC | Age: 73
End: 2018-04-18
Payer: MEDICARE

## 2018-04-18 ENCOUNTER — TRANSCRIBE ORDERS (OUTPATIENT)
Dept: LAB | Facility: CLINIC | Age: 73
End: 2018-04-18

## 2018-04-18 VITALS
RESPIRATION RATE: 18 BRPM | BODY MASS INDEX: 33.74 KG/M2 | HEIGHT: 71 IN | DIASTOLIC BLOOD PRESSURE: 66 MMHG | HEART RATE: 66 BPM | WEIGHT: 241 LBS | TEMPERATURE: 97.8 F | SYSTOLIC BLOOD PRESSURE: 130 MMHG

## 2018-04-18 DIAGNOSIS — M25.50 ARTHRALGIA, UNSPECIFIED JOINT: ICD-10-CM

## 2018-04-18 DIAGNOSIS — R20.2 PARESTHESIA OF RIGHT ARM: ICD-10-CM

## 2018-04-18 DIAGNOSIS — R29.898 DECREASED RANGE OF MOTION OF NECK: ICD-10-CM

## 2018-04-18 DIAGNOSIS — M25.50 ARTHRALGIA, UNSPECIFIED JOINT: Primary | ICD-10-CM

## 2018-04-18 LAB — ERYTHROCYTE [SEDIMENTATION RATE] IN BLOOD: 29 MM/HOUR (ref 0–10)

## 2018-04-18 PROCEDURE — 86430 RHEUMATOID FACTOR TEST QUAL: CPT

## 2018-04-18 PROCEDURE — 86038 ANTINUCLEAR ANTIBODIES: CPT

## 2018-04-18 PROCEDURE — 86618 LYME DISEASE ANTIBODY: CPT

## 2018-04-18 PROCEDURE — 36415 COLL VENOUS BLD VENIPUNCTURE: CPT

## 2018-04-18 PROCEDURE — 99213 OFFICE O/P EST LOW 20 MIN: CPT | Performed by: INTERNAL MEDICINE

## 2018-04-18 PROCEDURE — 72050 X-RAY EXAM NECK SPINE 4/5VWS: CPT

## 2018-04-18 PROCEDURE — 85652 RBC SED RATE AUTOMATED: CPT

## 2018-04-18 RX ORDER — MELOXICAM 15 MG/1
15 TABLET ORAL DAILY
Qty: 30 TABLET | Refills: 0 | Status: SHIPPED | OUTPATIENT
Start: 2018-04-18 | End: 2018-06-04

## 2018-04-18 NOTE — PROGRESS NOTES
Assessment/Plan:    No problem-specific Assessment & Plan notes found for this encounter  Diagnoses and all orders for this visit:    Arthralgia, unspecified joint  -     CURRY Screen w/ Reflex to Titer/Pattern; Future  -     Lyme Antibody Profile with reflex to WB; Future  -     Sedimentation rate, automated; Future  -     RF Screen w/ Reflex to Titer; Future  -     XR spine cervical complete 4 or 5 vw non injury; Future  -     meloxicam (MOBIC) 15 mg tablet; Take 1 tablet (15 mg total) by mouth daily    Paresthesia of right arm  -     CURRY Screen w/ Reflex to Titer/Pattern; Future  -     Lyme Antibody Profile with reflex to WB; Future  -     Sedimentation rate, automated; Future  -     RF Screen w/ Reflex to Titer; Future  -     XR spine cervical complete 4 or 5 vw non injury; Future  -     meloxicam (MOBIC) 15 mg tablet; Take 1 tablet (15 mg total) by mouth daily    Decreased range of motion of neck  -     CURRY Screen w/ Reflex to Titer/Pattern; Future  -     Lyme Antibody Profile with reflex to WB; Future  -     Sedimentation rate, automated; Future  -     RF Screen w/ Reflex to Titer; Future  -     XR spine cervical complete 4 or 5 vw non injury; Future  -     meloxicam (MOBIC) 15 mg tablet; Take 1 tablet (15 mg total) by mouth daily      A/P: Suspect djd and also cervical radiculopathy  Change to mobic  Check an xray of the neck  May need an EMG  Check labs r/o CTD/lymes  Consider a RPR  RTC four weeks  Subjective:      Patient ID: Shanell Shannon is a 67 y o  male  WM presents c/o worsening joint pain  H/o of DJD, but reports several months of progressive worsening and several episodes so bad, that he had to stop going to the gym and stay in bed  Using prn naprosyn w/o relief  Multiple joints at a time and sometimes bilat  NO swelling, redness, or increase temp  No PMH or FHX of CTD  No tick bites  Also, notes tingling and numbness going down the right UE into the first three fingers  No weakness  Denies neck pain  The following portions of the patient's history were reviewed and updated as appropriate:   He  has a past medical history of Acute on chronic renal insufficiency; Allergic rhinitis; Anemia (6/11/2012); Controlled type 2 diabetes mellitus without complication ([place] [address] ); GERD without esophagitis (6/11/2012); Hypertension; Nephrolithiasis (3/17/2016); Obesity (6/11/2012); and Osteoarthritis (6/11/2012)  He   Patient Active Problem List    Diagnosis Date Noted    Type 2 diabetes mellitus with diabetic chronic kidney disease ([place] [address] ) 08/22/2017    Nephrolithiasis 03/17/2016    Diabetic polyneuropathy associated with type 2 diabetes mellitus ([place] [address] ) 02/17/2016    Hyperlipidemia 02/17/2016    Eczema 01/28/2013    Anemia 06/11/2012    Erectile dysfunction of non-organic origin 06/11/2012    Generalized anxiety disorder 06/11/2012    GERD without esophagitis 06/11/2012    Hypertension 06/11/2012    Obesity 06/11/2012    Osteoarthritis 06/11/2012    Polyp of sigmoid colon 06/11/2012     He  has a past surgical history that includes Total hip arthroplasty; Knee surgery; and Cataract extraction, bilateral   His family history includes Arthritis in his mother; No Known Problems in his father  He  reports that he has been smoking Cigars  He has never used smokeless tobacco  He reports that he drinks alcohol  He reports that he does not use drugs    Current Outpatient Prescriptions   Medication Sig Dispense Refill    ALPRAZolam (XANAX) 0 5 mg tablet Take 1 tablet (0 5 mg total) by mouth every 8 (eight) hours as needed (as needed) 90 tablet 0    amLODIPine (NORVASC) 10 mg tablet Take 1 tablet (10 mg total) by mouth daily 90 tablet 3    atorvastatin (LIPITOR) 20 mg tablet Take 1 tablet by mouth daily      Blood Glucose Monitoring Suppl (ONE TOUCH ULTRA 2) w/Device KIT by Does not apply route      glucose blood test strip 1 Squirt by In Vitro route daily      metFORMIN (GLUCOPHAGE) 850 mg tablet Take 2 tablets by mouth daily      metoprolol succinate (TOPROL-XL) 100 mg 24 hr tablet Take 1 tablet by mouth daily      naproxen (NAPROSYN) 500 mg tablet Take 1 tablet (500 mg total) by mouth 2 (two) times a day with meals 60 tablet 0    ONETOUCH DELICA LANCETS 49C MISC by Does not apply route daily      sitaGLIPtin (JANUVIA) 50 mg tablet Take 1 tablet by mouth daily      meloxicam (MOBIC) 15 mg tablet Take 1 tablet (15 mg total) by mouth daily 30 tablet 0     No current facility-administered medications for this visit  Current Outpatient Prescriptions on File Prior to Visit   Medication Sig    ALPRAZolam (XANAX) 0 5 mg tablet Take 1 tablet (0 5 mg total) by mouth every 8 (eight) hours as needed (as needed)    amLODIPine (NORVASC) 10 mg tablet Take 1 tablet (10 mg total) by mouth daily    atorvastatin (LIPITOR) 20 mg tablet Take 1 tablet by mouth daily    Blood Glucose Monitoring Suppl (ONE TOUCH ULTRA 2) w/Device KIT by Does not apply route    glucose blood test strip 1 Squirt by In Vitro route daily    metFORMIN (GLUCOPHAGE) 850 mg tablet Take 2 tablets by mouth daily    metoprolol succinate (TOPROL-XL) 100 mg 24 hr tablet Take 1 tablet by mouth daily    naproxen (NAPROSYN) 500 mg tablet Take 1 tablet (500 mg total) by mouth 2 (two) times a day with meals    ONETOUCH DELICA LANCETS 29V MISC by Does not apply route daily    sitaGLIPtin (JANUVIA) 50 mg tablet Take 1 tablet by mouth daily     No current facility-administered medications on file prior to visit  He is allergic to pollen extract       Review of Systems   Constitutional: Negative for activity change, chills, diaphoresis, fatigue and fever  Eyes: Negative for visual disturbance  Respiratory: Negative for cough, chest tightness, shortness of breath and wheezing  Cardiovascular: Negative for chest pain, palpitations and leg swelling     Gastrointestinal: Negative for abdominal pain, constipation, diarrhea, nausea and vomiting  Endocrine: Negative for cold intolerance and heat intolerance  Genitourinary: Negative for difficulty urinating, dysuria and frequency  Musculoskeletal: Positive for arthralgias  Negative for gait problem, joint swelling, myalgias, neck pain and neck stiffness  Neurological: Negative for dizziness, seizures, light-headedness and headaches  Psychiatric/Behavioral: Negative for confusion  The patient is not nervous/anxious  Objective:      /66   Pulse 66   Temp 97 8 °F (36 6 °C) (Tympanic)   Resp 18   Ht 5' 11" (1 803 m)   Wt 109 kg (241 lb)   BMI 33 61 kg/m²          Physical Exam   Constitutional: He is oriented to person, place, and time  He appears well-developed and well-nourished  No distress  HENT:   Head: Normocephalic and atraumatic  Mouth/Throat: Oropharynx is clear and moist    Eyes: Conjunctivae and EOM are normal  Pupils are equal, round, and reactive to light  Musculoskeletal: He exhibits tenderness  He exhibits no edema or deformity  C spine w/o gross deformity, increase temp, erythema, or swelling  Decreased ROM in all planes except for flexion  UE strngth 5/5 with tone and ROM wnl  Grasp wnl  DTR 2/4  Negative tinel's or phalen's  Multiple other joints w/o increase temp, erythema, or swelling  No swan neck deformity, ulnar deviation  No nodes  Joint integrity intact  No effusion or bogginess  Neurological: He is alert and oriented to person, place, and time  Psychiatric: He has a normal mood and affect  His behavior is normal  Judgment and thought content normal    Nursing note and vitals reviewed

## 2018-04-19 LAB — RHEUMATOID FACT SER QL LA: NEGATIVE

## 2018-04-20 LAB
B BURGDOR IGG SER IA-ACNC: 0.4
B BURGDOR IGM SER IA-ACNC: 0.23
RYE IGE QN: NEGATIVE

## 2018-04-23 ENCOUNTER — TELEPHONE (OUTPATIENT)
Dept: INTERNAL MEDICINE CLINIC | Facility: CLINIC | Age: 73
End: 2018-04-23

## 2018-06-04 ENCOUNTER — TELEPHONE (OUTPATIENT)
Dept: INTERNAL MEDICINE CLINIC | Facility: CLINIC | Age: 73
End: 2018-06-04

## 2018-06-04 ENCOUNTER — APPOINTMENT (OUTPATIENT)
Dept: LAB | Facility: CLINIC | Age: 73
End: 2018-06-04
Payer: MEDICARE

## 2018-06-04 ENCOUNTER — OFFICE VISIT (OUTPATIENT)
Dept: INTERNAL MEDICINE CLINIC | Facility: CLINIC | Age: 73
End: 2018-06-04
Payer: MEDICARE

## 2018-06-04 ENCOUNTER — APPOINTMENT (OUTPATIENT)
Dept: RADIOLOGY | Facility: CLINIC | Age: 73
End: 2018-06-04
Payer: MEDICARE

## 2018-06-04 ENCOUNTER — TRANSCRIBE ORDERS (OUTPATIENT)
Dept: LAB | Facility: CLINIC | Age: 73
End: 2018-06-04

## 2018-06-04 VITALS
SYSTOLIC BLOOD PRESSURE: 140 MMHG | BODY MASS INDEX: 33.88 KG/M2 | HEIGHT: 71 IN | WEIGHT: 242 LBS | HEART RATE: 78 BPM | TEMPERATURE: 98.2 F | DIASTOLIC BLOOD PRESSURE: 78 MMHG | RESPIRATION RATE: 18 BRPM | OXYGEN SATURATION: 99 %

## 2018-06-04 DIAGNOSIS — E11.22 TYPE 2 DIABETES MELLITUS WITH CHRONIC KIDNEY DISEASE, WITHOUT LONG-TERM CURRENT USE OF INSULIN, UNSPECIFIED CKD STAGE (HCC): Primary | ICD-10-CM

## 2018-06-04 DIAGNOSIS — G89.29 CHRONIC PAIN OF BOTH ANKLES: ICD-10-CM

## 2018-06-04 DIAGNOSIS — E78.5 HYPERLIPIDEMIA, UNSPECIFIED HYPERLIPIDEMIA TYPE: ICD-10-CM

## 2018-06-04 DIAGNOSIS — I10 ESSENTIAL HYPERTENSION: ICD-10-CM

## 2018-06-04 DIAGNOSIS — R70.0 ELEVATED SED RATE: ICD-10-CM

## 2018-06-04 DIAGNOSIS — E11.22 TYPE 2 DIABETES MELLITUS WITH CHRONIC KIDNEY DISEASE, WITHOUT LONG-TERM CURRENT USE OF INSULIN, UNSPECIFIED CKD STAGE (HCC): ICD-10-CM

## 2018-06-04 DIAGNOSIS — M25.571 CHRONIC PAIN OF BOTH ANKLES: ICD-10-CM

## 2018-06-04 DIAGNOSIS — M21.42 PES PLANUS OF BOTH FEET: ICD-10-CM

## 2018-06-04 DIAGNOSIS — F41.9 ANXIETY: ICD-10-CM

## 2018-06-04 DIAGNOSIS — M21.41 PES PLANUS OF BOTH FEET: ICD-10-CM

## 2018-06-04 DIAGNOSIS — Z11.59 NEED FOR HEPATITIS C SCREENING TEST: ICD-10-CM

## 2018-06-04 DIAGNOSIS — M25.572 CHRONIC PAIN OF BOTH ANKLES: ICD-10-CM

## 2018-06-04 DIAGNOSIS — R25.1 TREMOR: ICD-10-CM

## 2018-06-04 LAB
ALBUMIN SERPL BCP-MCNC: 3.6 G/DL (ref 3.5–5)
ALP SERPL-CCNC: 53 U/L (ref 46–116)
ALT SERPL W P-5'-P-CCNC: 16 U/L (ref 12–78)
ANION GAP SERPL CALCULATED.3IONS-SCNC: 7 MMOL/L (ref 4–13)
AST SERPL W P-5'-P-CCNC: 9 U/L (ref 5–45)
BASOPHILS # BLD AUTO: 0.06 THOUSANDS/ΜL (ref 0–0.1)
BASOPHILS NFR BLD AUTO: 1 % (ref 0–1)
BILIRUB SERPL-MCNC: 0.88 MG/DL (ref 0.2–1)
BUN SERPL-MCNC: 22 MG/DL (ref 5–25)
CALCIUM SERPL-MCNC: 9.2 MG/DL (ref 8.3–10.1)
CHLORIDE SERPL-SCNC: 109 MMOL/L (ref 100–108)
CO2 SERPL-SCNC: 23 MMOL/L (ref 21–32)
CREAT SERPL-MCNC: 1.51 MG/DL (ref 0.6–1.3)
CREAT UR-MCNC: 256 MG/DL
EOSINOPHIL # BLD AUTO: 0.19 THOUSAND/ΜL (ref 0–0.61)
EOSINOPHIL NFR BLD AUTO: 3 % (ref 0–6)
ERYTHROCYTE [DISTWIDTH] IN BLOOD BY AUTOMATED COUNT: 13 % (ref 11.6–15.1)
ERYTHROCYTE [SEDIMENTATION RATE] IN BLOOD: 27 MM/HOUR (ref 0–10)
EST. AVERAGE GLUCOSE BLD GHB EST-MCNC: 171 MG/DL
GFR SERPL CREATININE-BSD FRML MDRD: 45 ML/MIN/1.73SQ M
GLUCOSE P FAST SERPL-MCNC: 179 MG/DL (ref 65–99)
HBA1C MFR BLD: 7.6 % (ref 4.2–6.3)
HCT VFR BLD AUTO: 37.4 % (ref 36.5–49.3)
HGB BLD-MCNC: 12.1 G/DL (ref 12–17)
IMM GRANULOCYTES # BLD AUTO: 0.01 THOUSAND/UL (ref 0–0.2)
IMM GRANULOCYTES NFR BLD AUTO: 0 % (ref 0–2)
LDLC SERPL DIRECT ASSAY-MCNC: 60 MG/DL (ref 0–100)
LYMPHOCYTES # BLD AUTO: 1.48 THOUSANDS/ΜL (ref 0.6–4.47)
LYMPHOCYTES NFR BLD AUTO: 24 % (ref 14–44)
MCH RBC QN AUTO: 31.1 PG (ref 26.8–34.3)
MCHC RBC AUTO-ENTMCNC: 32.4 G/DL (ref 31.4–37.4)
MCV RBC AUTO: 96 FL (ref 82–98)
MICROALBUMIN UR-MCNC: 118 MG/L (ref 0–20)
MICROALBUMIN/CREAT 24H UR: 46 MG/G CREATININE (ref 0–30)
MONOCYTES # BLD AUTO: 0.47 THOUSAND/ΜL (ref 0.17–1.22)
MONOCYTES NFR BLD AUTO: 8 % (ref 4–12)
NEUTROPHILS # BLD AUTO: 3.9 THOUSANDS/ΜL (ref 1.85–7.62)
NEUTS SEG NFR BLD AUTO: 64 % (ref 43–75)
NRBC BLD AUTO-RTO: 0 /100 WBCS
PLATELET # BLD AUTO: 211 THOUSANDS/UL (ref 149–390)
PMV BLD AUTO: 10.5 FL (ref 8.9–12.7)
POTASSIUM SERPL-SCNC: 4.8 MMOL/L (ref 3.5–5.3)
PROT SERPL-MCNC: 7.3 G/DL (ref 6.4–8.2)
RBC # BLD AUTO: 3.89 MILLION/UL (ref 3.88–5.62)
SODIUM SERPL-SCNC: 139 MMOL/L (ref 136–145)
TRIGL SERPL-MCNC: 199 MG/DL
TSH SERPL DL<=0.05 MIU/L-ACNC: 3.93 UIU/ML (ref 0.36–3.74)
URATE SERPL-MCNC: 6.1 MG/DL (ref 4.2–8)
WBC # BLD AUTO: 6.11 THOUSAND/UL (ref 4.31–10.16)

## 2018-06-04 PROCEDURE — 84443 ASSAY THYROID STIM HORMONE: CPT

## 2018-06-04 PROCEDURE — 73610 X-RAY EXAM OF ANKLE: CPT

## 2018-06-04 PROCEDURE — 84550 ASSAY OF BLOOD/URIC ACID: CPT

## 2018-06-04 PROCEDURE — 99214 OFFICE O/P EST MOD 30 MIN: CPT | Performed by: INTERNAL MEDICINE

## 2018-06-04 PROCEDURE — 83036 HEMOGLOBIN GLYCOSYLATED A1C: CPT

## 2018-06-04 PROCEDURE — 86803 HEPATITIS C AB TEST: CPT

## 2018-06-04 PROCEDURE — 85025 COMPLETE CBC W/AUTO DIFF WBC: CPT

## 2018-06-04 PROCEDURE — 83721 ASSAY OF BLOOD LIPOPROTEIN: CPT

## 2018-06-04 PROCEDURE — 84478 ASSAY OF TRIGLYCERIDES: CPT

## 2018-06-04 PROCEDURE — 82570 ASSAY OF URINE CREATININE: CPT

## 2018-06-04 PROCEDURE — 82043 UR ALBUMIN QUANTITATIVE: CPT

## 2018-06-04 PROCEDURE — 36415 COLL VENOUS BLD VENIPUNCTURE: CPT

## 2018-06-04 PROCEDURE — 85652 RBC SED RATE AUTOMATED: CPT

## 2018-06-04 PROCEDURE — 80053 COMPREHEN METABOLIC PANEL: CPT

## 2018-06-04 RX ORDER — ALPRAZOLAM 0.5 MG/1
0.5 TABLET ORAL EVERY 8 HOURS PRN
Qty: 90 TABLET | Refills: 0 | Status: SHIPPED | OUTPATIENT
Start: 2018-06-04 | End: 2018-07-03 | Stop reason: SDUPTHER

## 2018-06-04 NOTE — PROGRESS NOTES
Assessment/Plan:    No problem-specific Assessment & Plan notes found for this encounter  Diagnoses and all orders for this visit:    Type 2 diabetes mellitus with chronic kidney disease, without long-term current use of insulin, unspecified CKD stage (HCC)  -     CBC and differential; Future  -     Comprehensive metabolic panel; Future  -     HEMOGLOBIN A1C W/ EAG ESTIMATION; Future  -     TSH, 3rd generation; Future  -     Microalbumin / creatinine urine ratio; Future    Essential hypertension  -     Comprehensive metabolic panel; Future  -     Microalbumin / creatinine urine ratio; Future    Hyperlipidemia, unspecified hyperlipidemia type  -     LDL cholesterol, direct; Future  -     TSH, 3rd generation; Future  -     Triglycerides; Future    Need for hepatitis C screening test  -     Hepatitis C antibody; Future    Elevated sed rate  -     Sedimentation rate, automated; Future  -     XR ankle 3+ vw left; Future  -     XR ankle 3+ vw right; Future  -     Ambulatory referral to Physical Therapy; Future  -     Uric acid; Future    Chronic pain of both ankles  -     XR ankle 3+ vw left; Future  -     XR ankle 3+ vw right; Future  -     Ambulatory referral to Physical Therapy; Future  -     Uric acid; Future    Tremor    Pes planus of both feet  -     Ambulatory referral to Physical Therapy; Future      A/P: PE not overly impressive for the ankles, but moderate pes planus  Has DM and ?? charcots deformity starting  Doubt CTD with negative labs unless seronegative  Will check labs, xrays and start PT  May need to see DPM  Pt to restart his Mobic q day  Tremor not that obvious at this time  Will check labs and may need referral to neuro to consider parkinson's, but no other s/s  Consider empiric treatment possibly  RTC three weeks for f/u   Subjective:      Patient ID: Wilfrid Arauz is a 67 y o  male  WM presents with his wife due to worsening bilat ankle pain and new onset tremors   Pt with flat feet all his life and reports chronic bilat ankle pain that has become increasing painful over the past several weeks  No trauma, but increase in activity due to the weather change  No swelling or redness  No h/o gout and recently had CTD and lymes labs done for paresthesia and were negative except for slightly elevated Sed Rate  Also, notes tremors several weeks ago when he was worked up about some issues  Fhx of Parkinson's  Overdue for routine labs as well  Ankle Pain          The following portions of the patient's history were reviewed and updated as appropriate:   He  has a past medical history of Acute on chronic renal insufficiency; Allergic rhinitis; Anemia (6/11/2012); Controlled type 2 diabetes mellitus without complication ([place] [address] ); GERD without esophagitis (6/11/2012); Hypertension; Nephrolithiasis (3/17/2016); Obesity (6/11/2012); and Osteoarthritis (6/11/2012)  He   Patient Active Problem List    Diagnosis Date Noted    Type 2 diabetes mellitus with diabetic chronic kidney disease ([place] [address] ) 08/22/2017    Nephrolithiasis 03/17/2016    Diabetic polyneuropathy associated with type 2 diabetes mellitus ([place] [address] ) 02/17/2016    Hyperlipidemia 02/17/2016    Eczema 01/28/2013    Anemia 06/11/2012    Erectile dysfunction of non-organic origin 06/11/2012    Generalized anxiety disorder 06/11/2012    GERD without esophagitis 06/11/2012    Hypertension 06/11/2012    Obesity 06/11/2012    Osteoarthritis 06/11/2012    Polyp of sigmoid colon 06/11/2012     He  has a past surgical history that includes Total hip arthroplasty; Knee surgery; and Cataract extraction, bilateral   His family history includes Arthritis in his mother; No Known Problems in his father; Parkinsonism in his mother  He  reports that he has been smoking Cigars  He has never used smokeless tobacco  He reports that he drinks alcohol  He reports that he does not use drugs    Current Outpatient Prescriptions   Medication Sig Dispense Refill    ALPRAZolam (XANAX) 0 5 mg tablet Take 1 tablet (0 5 mg total) by mouth every 8 (eight) hours as needed (as needed) 90 tablet 0    amLODIPine (NORVASC) 10 mg tablet Take 1 tablet (10 mg total) by mouth daily 90 tablet 3    atorvastatin (LIPITOR) 20 mg tablet Take 1 tablet by mouth daily      Blood Glucose Monitoring Suppl (ONE TOUCH ULTRA 2) w/Device KIT by Does not apply route      glucose blood test strip 1 Squirt by In Vitro route daily      metFORMIN (GLUCOPHAGE) 850 mg tablet Take 2 tablets by mouth daily      metoprolol succinate (TOPROL-XL) 100 mg 24 hr tablet Take 1 tablet by mouth daily      ONETOUCH DELICA LANCETS 56D MISC by Does not apply route daily       No current facility-administered medications for this visit  Current Outpatient Prescriptions on File Prior to Visit   Medication Sig    ALPRAZolam (XANAX) 0 5 mg tablet Take 1 tablet (0 5 mg total) by mouth every 8 (eight) hours as needed (as needed)    amLODIPine (NORVASC) 10 mg tablet Take 1 tablet (10 mg total) by mouth daily    atorvastatin (LIPITOR) 20 mg tablet Take 1 tablet by mouth daily    Blood Glucose Monitoring Suppl (ONE TOUCH ULTRA 2) w/Device KIT by Does not apply route    glucose blood test strip 1 Squirt by In Vitro route daily    metFORMIN (GLUCOPHAGE) 850 mg tablet Take 2 tablets by mouth daily    metoprolol succinate (TOPROL-XL) 100 mg 24 hr tablet Take 1 tablet by mouth daily    ONETOUCH DELICA LANCETS 57Y MISC by Does not apply route daily    [DISCONTINUED] meloxicam (MOBIC) 15 mg tablet Take 1 tablet (15 mg total) by mouth daily    [DISCONTINUED] naproxen (NAPROSYN) 500 mg tablet Take 1 tablet (500 mg total) by mouth 2 (two) times a day with meals    [DISCONTINUED] sitaGLIPtin (JANUVIA) 50 mg tablet Take 1 tablet by mouth daily     No current facility-administered medications on file prior to visit  He is allergic to pollen extract       Review of Systems   Constitutional: Negative for activity change, chills, diaphoresis, fatigue and fever  HENT: Negative  Eyes: Negative for visual disturbance  Respiratory: Negative for cough, chest tightness, shortness of breath and wheezing  Cardiovascular: Negative for chest pain, palpitations and leg swelling  Gastrointestinal: Negative for abdominal pain, constipation, diarrhea, nausea and vomiting  Endocrine: Negative for cold intolerance and heat intolerance  Genitourinary: Negative for difficulty urinating, dysuria and frequency  Musculoskeletal: Positive for arthralgias, back pain, gait problem and joint swelling  Negative for myalgias  Neurological: Positive for tremors  Negative for dizziness, seizures, syncope, facial asymmetry, speech difficulty, weakness, light-headedness and headaches  Psychiatric/Behavioral: Negative for confusion  The patient is nervous/anxious  Objective:      /78 (BP Location: Left arm, Patient Position: Sitting, Cuff Size: Adult)   Pulse 78   Temp 98 2 °F (36 8 °C) (Tympanic)   Resp 18   Ht 5' 11" (1 803 m)   Wt 110 kg (242 lb)   SpO2 99%   BMI 33 75 kg/m²          Physical Exam   Constitutional: He is oriented to person, place, and time  He appears well-developed and well-nourished  No distress  HENT:   Head: Normocephalic and atraumatic  Mouth/Throat: Oropharynx is clear and moist  No oropharyngeal exudate  Eyes: Conjunctivae and EOM are normal  Pupils are equal, round, and reactive to light  Neck: Neck supple  No JVD present  No thyromegaly present  Cardiovascular: Normal rate, regular rhythm and normal heart sounds  No murmur heard  Pulmonary/Chest: Effort normal and breath sounds normal  No respiratory distress  He has no wheezes  Abdominal: Soft  Bowel sounds are normal  There is no tenderness  Musculoskeletal: He exhibits edema (trace bilat leg edema 1/4), tenderness and deformity  bilat anklew w/o gross deformity, but bilat pes planus   No increase temp, erythema, or swelling  Diffuse tenderness  Joint integrity intact  Pulses 2/2  Neurological: He is alert and oriented to person, place, and time  He has normal reflexes  No cranial nerve deficit  Coordination normal    ?? Resting tremor   Skin: He is not diaphoretic  Psychiatric: He has a normal mood and affect  His behavior is normal  Judgment and thought content normal    Nursing note and vitals reviewed

## 2018-06-05 ENCOUNTER — TELEPHONE (OUTPATIENT)
Dept: INTERNAL MEDICINE CLINIC | Facility: CLINIC | Age: 73
End: 2018-06-05

## 2018-06-05 LAB — HCV AB SER QL: NORMAL

## 2018-06-11 DIAGNOSIS — R20.2 PARESTHESIA OF RIGHT ARM: ICD-10-CM

## 2018-06-11 DIAGNOSIS — R29.898 DECREASED RANGE OF MOTION OF NECK: ICD-10-CM

## 2018-06-11 DIAGNOSIS — M25.50 ARTHRALGIA, UNSPECIFIED JOINT: ICD-10-CM

## 2018-06-11 RX ORDER — MELOXICAM 15 MG/1
TABLET ORAL
Qty: 30 TABLET | Refills: 0 | Status: SHIPPED | OUTPATIENT
Start: 2018-06-11 | End: 2018-11-14

## 2018-06-18 DIAGNOSIS — E11.8 TYPE 2 DIABETES MELLITUS WITH COMPLICATION, UNSPECIFIED WHETHER LONG TERM INSULIN USE: Primary | ICD-10-CM

## 2018-06-22 ENCOUNTER — OFFICE VISIT (OUTPATIENT)
Dept: URGENT CARE | Facility: CLINIC | Age: 73
End: 2018-06-22
Payer: MEDICARE

## 2018-06-22 VITALS
BODY MASS INDEX: 33.88 KG/M2 | HEART RATE: 82 BPM | OXYGEN SATURATION: 100 % | RESPIRATION RATE: 16 BRPM | SYSTOLIC BLOOD PRESSURE: 155 MMHG | TEMPERATURE: 97.8 F | WEIGHT: 242 LBS | HEIGHT: 71 IN | DIASTOLIC BLOOD PRESSURE: 82 MMHG

## 2018-06-22 DIAGNOSIS — L03.011 PARONYCHIA OF FINGER OF RIGHT HAND: Primary | ICD-10-CM

## 2018-06-22 PROCEDURE — 99213 OFFICE O/P EST LOW 20 MIN: CPT | Performed by: FAMILY MEDICINE

## 2018-06-22 PROCEDURE — G0463 HOSPITAL OUTPT CLINIC VISIT: HCPCS | Performed by: FAMILY MEDICINE

## 2018-06-22 RX ORDER — CEPHALEXIN 500 MG/1
500 CAPSULE ORAL EVERY 8 HOURS SCHEDULED
Qty: 21 CAPSULE | Refills: 0 | Status: SHIPPED | OUTPATIENT
Start: 2018-06-22 | End: 2018-06-29

## 2018-06-22 RX ORDER — CEPHALEXIN 500 MG/1
500 CAPSULE ORAL EVERY 8 HOURS SCHEDULED
Qty: 21 CAPSULE | Refills: 0 | Status: SHIPPED | OUTPATIENT
Start: 2018-06-22 | End: 2018-06-22 | Stop reason: SDUPTHER

## 2018-06-22 NOTE — PROGRESS NOTES
[facility] - Patient Visit Note  Shanell Shannon 67 y o  male MRN: [id number]      Assessment / Plan:   Diagnosis ICD-10-CM Associated Orders   1  Paronychia of finger of right hand L03 011 cephalexin (KEFLEX) 500 mg capsule       Reason For Visit / Chief Complaint  Chief Complaint   Patient presents with    Wound Infection     right, ring finger             Blu Wallace Discussion:  Patient instructions given and talk back patient understands the need to avoid alcohol during the antibiotic period  The critical nature He understand of taking all of the antibiotics  If there is no improvement in the next 48 hours the patient will proceed to local emergency room where he is fishing for possible [employer]     HPI:  Shanell Shannon is a 67 y o  male Patient           This Patient Presents with a history of having paronychia of the right 4th finger approximately 4 days  This began while he was biting his fingernails  Areas quite painful and he is concerned because he is leaving tomorrow for a fishing trip at [facility] and the right hand is dominant and his casting hand  He did not want a tetanus shot thinking that he in fact had was up-to-date, so he refused today  Problem List     Anemia    Diabetic polyneuropathy associated with type 2 diabetes mellitus ([facility] )    Lab Results   Component Value Date    HGBA1C 7 6 (H) 06/04/2018       No results for input(s): POCGLU in the last 72 hours      Blood Sugar Average: Last 72 hrs:          Eczema    Erectile dysfunction of non-organic origin    Generalized anxiety disorder    Overview Signed 2/9/2018 11:06 AM by Shelby Lewis DO     Transitioned From: Anxiety         GERD without esophagitis    Hyperlipidemia    Overview Signed 2/9/2018 11:06 AM by Shelby Lewis DO     Transitioned From: Essential familial hypercholesterolemia         Hypertension Nephrolithiasis    Obesity    Osteoarthritis    Overview Signed 2/9/2018 11:06 AM by Nai Skinner DO     Transitioned From: Arthritis         Polyp of sigmoid colon    Type 2 diabetes mellitus with diabetic chronic kidney disease ([facility] )    Lab Results   Component Value Date    HGBA1C 7 6 (H) 06/04/2018       No results for input(s): POCGLU in the last 72 hours  Blood Sugar Average: Last 72 hrs: ALLERGIES:  Allergies as of 06/22/2018 - Reviewed 06/22/2018   Allergen Reaction Noted    Pollen extract  10/10/2012       The following portions of the patient's history were reviewed and updated as appropriate:   Allergies, current medications, past family history, past medical history, past social history, past surgical history, and the problem list     Historical Information   Past Medical History:   Diagnosis Date    Acute on chronic renal insufficiency     Allergic rhinitis     Anemia 6/11/2012    Controlled type 2 diabetes mellitus without complication ([facility] )     GERD without esophagitis 6/11/2012    Hypertension     Nephrolithiasis 3/17/2016    Obesity 6/11/2012    Osteoarthritis 6/11/2012     Past Surgical History:   Procedure Laterality Date    CATARACT EXTRACTION, BILATERAL      KNEE SURGERY      TOTAL HIP ARTHROPLASTY       Social History   History   Alcohol Use    Yes     Comment: Social     History   Drug Use No     History   Smoking Status    Current Some Day Smoker    Types: Cigars   Smokeless Tobacco    Never Used     Comment: Cigars (__ a day)     Family History   Problem Relation Age of Onset    Arthritis Mother     Parkinsonism Mother     No Known Problems Father              MEDS:    Current Outpatient Prescriptions:     ALPRAZolam (XANAX) 0 5 mg tablet, Take 1 tablet (0 5 mg total) by mouth every 8 (eight) hours as needed (as needed), Disp: 90 tablet, Rfl: 0    amLODIPine (NORVASC) 10 mg tablet, Take 1 tablet (10 mg total) by mouth daily, Disp: 90 tablet, Rfl: 3   atorvastatin (LIPITOR) 20 mg tablet, Take 1 tablet by mouth daily, Disp: , Rfl:     Blood Glucose Monitoring Suppl (ONE TOUCH ULTRA 2) w/Device KIT, by Does not apply route, Disp: , Rfl:     glucose blood test strip, 1 Squirt by In Vitro route daily, Disp: , Rfl:     meloxicam (MOBIC) 15 mg tablet, take 1 tablet by mouth once daily, Disp: 30 tablet, Rfl: 0    metFORMIN (GLUCOPHAGE) 850 mg tablet, Take 2 tablets (1,700 mg total) by mouth daily, Disp: 180 tablet, Rfl: 5    metoprolol succinate (TOPROL-XL) 100 mg 24 hr tablet, Take 1 tablet by mouth daily, Disp: , Rfl:     ONETOUCH DELICA LANCETS 78D MISC, by Does not apply route daily, Disp: , Rfl:     cephalexin (KEFLEX) 500 mg capsule, Take 1 capsule (500 mg total) by mouth every 8 (eight) hours for 7 days, Disp: 21 capsule, Rfl: 0    FACILITY ADMINISTERED MEDS:        REVIEW OF SYSTEMS    GENERAL: NEGATIVE for:  Generalized Fatigue                             Chills                              Fever                             Myalgias     OPTHALMIC: NEGATIVE for:  Diplopia                            Scotomata                            Visual Changes                            Blurred Vision     ENT:  EARS NEGATIVE for:  Hearing Difficulty                            Tinnitus                            Vertigo                            Dizziness                            Ear Pain                            Ear Drainage               NOSE NEGATIVE for:  Nasal Congestion                            Nasal Discharge                            Sinus Pain / Pressure               THROAT NEGATIVE for:  Sore Throat / Throat Pain                            Difficulty Swallowing     RESPIRATORY: NEGATIVE for:  Cough                            Wheezing                            Sputum Production                            Sob / Tachypnea                            Hemoptysis     CARDIOVASCULAR: NEGATIVE for:  Chest Pain                             SOB (cardiac Related)                             Dyspnea on Exertion                             Orthopnea                             PND                             Leg Edema                             Palpitations                               Irregularities/rythym                       CURRENT VITALS:   Blood Pressure: 155/82 (06/22/18 0929)  Pulse: 82 (06/22/18 0929)  Temperature: 97 8 °F (36 6 °C) (06/22/18 0929)  Respirations: 16 (06/22/18 0929)  Height: 5' 11" (180 3 cm) (06/22/18 0929)  Weight - Scale: 110 kg (242 lb) (06/22/18 0929)  SpO2: 100 % (06/22/18 0929)  /82   Pulse 82   Temp 97 8 °F (36 6 °C)   Resp 16   Ht 5' 11" (1 803 m)   Wt 110 kg (242 lb)   SpO2 100%   BMI 33 75 kg/m²       PHYSICAL EXAM:         General Appearance:    Alert, cooperative, no apparent distress, appears stated age     Oriented x3    Head:    Normocephalic, without obvious abnormality, atraumatic   Eyes:      EOM's intact,      OSMIN,        conjunctiva/corneas clear,          fundi not visualized well   Ears:     Normal external ear canals     Tm right side  Normal     Tm left side    Normal       Nose:   Nares normal externally, septum midline,     mucosa normal,     No anterior drainage         Sinuses   with out   tenderness to palpation / percussion     Throat:   Lips, mucosa, and tongue normal       Anterior pharynx   Normal      Posterior pharynx   Normal        No exudate obvious       Neck:   Supple, symmetrical, trachea midline and moveable    Normal thyroid click present    No carotid bruits appreciated        Lymphatics:     Adenopathy in anterior cervical chain  Normal      Adenopathy in posterior cervical chain   Normal     Lungs:     Clear to auscultation bilaterally    No rales    No ronchi    No wheeze     Heart[de-identified]    Regular rate and rhythm, S1 and S2 normal,     No S3, S4, audible    No murmurs, rubs      Extremities:     Examination the right 4th fing shows paronychia a which is not mature enough to HARMONY at this time  Neurovascular of the fingers within normal limits as is range of motion  Skin:   Skin color, texture, turgor normal, no rashes or lesions                         Follow up at primary care in 2 or 3 days, or sooner if needed OR pesent to local Emergency Room if symptoms are worsening  Portions of the record may have been created with voice recognition software   Occasional wrong word or "sound a like" substitutions may have occurred due to the inherent limitations of voice recognition software   Read the chart carefully and recognize, using context, where substitutions have occurred

## 2018-06-22 NOTE — PATIENT INSTRUCTIONS
Please take all the antibiotics as written  Do not drink alcohol during the time you are taking antibiotics  Please do not squeeze the paronychia is he will spread the infection  Please continue warm soaks at least twice a day as we discussed in talk back

## 2018-07-03 ENCOUNTER — OFFICE VISIT (OUTPATIENT)
Dept: INTERNAL MEDICINE CLINIC | Facility: CLINIC | Age: 73
End: 2018-07-03
Payer: MEDICARE

## 2018-07-03 VITALS
DIASTOLIC BLOOD PRESSURE: 82 MMHG | SYSTOLIC BLOOD PRESSURE: 140 MMHG | TEMPERATURE: 97.1 F | RESPIRATION RATE: 18 BRPM | HEART RATE: 94 BPM | HEIGHT: 71 IN | OXYGEN SATURATION: 98 % | WEIGHT: 239 LBS | BODY MASS INDEX: 33.46 KG/M2

## 2018-07-03 DIAGNOSIS — F41.9 ANXIETY: ICD-10-CM

## 2018-07-03 DIAGNOSIS — R60.0 LOCALIZED EDEMA: Primary | ICD-10-CM

## 2018-07-03 PROCEDURE — 99213 OFFICE O/P EST LOW 20 MIN: CPT | Performed by: INTERNAL MEDICINE

## 2018-07-03 RX ORDER — ALPRAZOLAM 0.5 MG/1
TABLET ORAL
Qty: 90 TABLET | Refills: 0 | Status: SHIPPED | OUTPATIENT
Start: 2018-07-03 | End: 2018-11-15 | Stop reason: SDUPTHER

## 2018-07-03 RX ORDER — FUROSEMIDE 20 MG/1
20 TABLET ORAL 2 TIMES DAILY
Qty: 10 TABLET | Refills: 0 | Status: SHIPPED | OUTPATIENT
Start: 2018-07-03 | End: 2018-11-14

## 2018-07-03 NOTE — PATIENT INSTRUCTIONS
Edema   WHAT YOU NEED TO KNOW:   What is edema? Edema is swelling throughout your body  Edema is usually a sign that you are retaining fluid  The swelling may be caused by heart failure or kidney, thyroid, or liver disease  It may also be caused by medicines such as antidepressants, blood pressure medicines, or hormones  Sudden swelling around the lips or face may be a sign of a severe allergic reaction  Swelling of an arm or leg may be caused by blockage of your veins  What other signs and symptoms may occur with edema? · Discomfort or tenderness in the swollen areas    · Tight and shiny skin over the swollen areas    · Weight gain  How is edema diagnosed? Your healthcare provider will ask about your symptoms and any other symptoms you have  He may also ask about any medical conditions you have  Your healthcare provider will examine your skin over the swollen areas  He may gently push on the swollen area for a short time to see if this leaves a dimple  He may also order tests to find the cause of your edema  How is edema treated and managed? Treatment for edema depends on the cause  Depending on your medical condition, you may be given medicine to help get rid of extra body fluid  Your healthcare provider may suggest that you do any of the following to help manage edema:  · Elevate  your arms or legs as directed  Raise them above the level of your heart as often as you can  This will help decrease swelling and pain  Prop them on pillows or blankets to keep them elevated comfortably  · Wear pressure stockings as directed  The stockings are tight and put pressure on your legs  This helps to keep fluid from collecting in your legs or ankles  · Limit your salt intake  Salt causes your body to hold water  Ask about any other changes to your diet  · Stay active  Do not stand or sit for long periods of time  Ask your healthcare provider about the best exercise plan for you      · Keep your skin moist using lotion, cream, or ointment  Ask your healthcare provider what to use and how often to use it  When should I contact my healthcare provider? · The swollen area feels cold and is pale or blue in color  · The swollen area feels warm, painful, and is red in color  · You have increased swelling or swelling in other parts of your body  · You have questions or concerns about your condition or care  When should I seek immediate care? · You have shortness of breath at rest, especially when you lie down  · You cough up pink, foamy sputum  · You have chest pain  · Your heartbeat is fast or uneven  CARE AGREEMENT:   You have the right to help plan your care  Learn about your health condition and how it may be treated  Discuss treatment options with your caregivers to decide what care you want to receive  You always have the right to refuse treatment  The above information is an  only  It is not intended as medical advice for individual conditions or treatments  Talk to your doctor, nurse or pharmacist before following any medical regimen to see if it is safe and effective for you  © 2017 [employer]  Information is for End User's use only and may not be sold, redistributed or otherwise used for commercial purposes  All illustrations and images included in CareNotes® are the copyrighted property of [employer]  or Pradip Spivey

## 2018-07-03 NOTE — PROGRESS NOTES
Assessment/Plan:    No problem-specific Assessment & Plan notes found for this encounter  Diagnoses and all orders for this visit:    Localized edema  -     furosemide (LASIX) 20 mg tablet; Take 1 tablet (20 mg total) by mouth 2 (two) times a day      A/P: NO s/s of CHF or DVT  Suspect edema due to increase salt load, traveling, and heat  Is also on Norvasc, but never had an issue before  Keep legs elevated  Get back on his prior diet  Will try several days of lasix  RTC 1-2 weeks for f/u  Subjective:      Patient ID: Otis Reyes is a 67 y o  male  WM w/o any of cardiac issues except for htn, presents due to acute onset of bilat leg swelling  Pt recently on [place] in [place] and admits to drinking beer and eating a lot of processed foods  It was hot and humid and was riding in a car for several hours  No SOB, CP, Palpitations, or leg pain  No orthopnea or PND  The following portions of the patient's history were reviewed and updated as appropriate:   He  has a past medical history of Acute on chronic renal insufficiency; Allergic rhinitis; Anemia (6/11/2012); Controlled type 2 diabetes mellitus without complication ([facility] ); GERD without esophagitis (6/11/2012); Hypertension; Nephrolithiasis (3/17/2016); Obesity (6/11/2012); and Osteoarthritis (6/11/2012)    He   Patient Active Problem List    Diagnosis Date Noted    Type 2 diabetes mellitus with diabetic chronic kidney disease ([facility] ) 08/22/2017    Nephrolithiasis 03/17/2016    Diabetic polyneuropathy associated with type 2 diabetes mellitus ([facility] ) 02/17/2016    Hyperlipidemia 02/17/2016    Eczema 01/28/2013    Anemia 06/11/2012    Erectile dysfunction of non-organic origin 06/11/2012    Generalized anxiety disorder 06/11/2012    GERD without esophagitis 06/11/2012    Hypertension 06/11/2012    Obesity 06/11/2012    Osteoarthritis 06/11/2012    Polyp of sigmoid colon 06/11/2012     He  has a past surgical history that includes Total hip arthroplasty; Knee surgery; and Cataract extraction, bilateral   His family history includes Arthritis in his mother; No Known Problems in his father; Parkinsonism in his mother  He  reports that he has been smoking Cigars  He has never used smokeless tobacco  He reports that he drinks alcohol  He reports that he does not use drugs  Current Outpatient Prescriptions   Medication Sig Dispense Refill    ALPRAZolam (XANAX) 0 5 mg tablet take 1 tablet by mouth every 8 hours if needed 90 tablet 0    amLODIPine (NORVASC) 10 mg tablet Take 1 tablet (10 mg total) by mouth daily 90 tablet 3    atorvastatin (LIPITOR) 20 mg tablet Take 1 tablet by mouth daily      Blood Glucose Monitoring Suppl (ONE TOUCH ULTRA 2) w/Device KIT by Does not apply route      glucose blood test strip 1 Squirt by In Vitro route daily      meloxicam (MOBIC) 15 mg tablet take 1 tablet by mouth once daily 30 tablet 0    metFORMIN (GLUCOPHAGE) 850 mg tablet Take 2 tablets (1,700 mg total) by mouth daily 180 tablet 5    metoprolol succinate (TOPROL-XL) 100 mg 24 hr tablet Take 1 tablet by mouth daily      ONETOUCH DELICA LANCETS 01O MISC by Does not apply route daily      furosemide (LASIX) 20 mg tablet Take 1 tablet (20 mg total) by mouth 2 (two) times a day 10 tablet 0     No current facility-administered medications for this visit        Current Outpatient Prescriptions on File Prior to Visit   Medication Sig    ALPRAZolam (XANAX) 0 5 mg tablet take 1 tablet by mouth every 8 hours if needed    amLODIPine (NORVASC) 10 mg tablet Take 1 tablet (10 mg total) by mouth daily    atorvastatin (LIPITOR) 20 mg tablet Take 1 tablet by mouth daily    Blood Glucose Monitoring Suppl (ONE TOUCH ULTRA 2) w/Device KIT by Does not apply route    glucose blood test strip 1 Squirt by In Vitro route daily    meloxicam (MOBIC) 15 mg tablet take 1 tablet by mouth once daily    metFORMIN (GLUCOPHAGE) 850 mg tablet Take 2 tablets (1,700 mg total) by mouth daily    metoprolol succinate (TOPROL-XL) 100 mg 24 hr tablet Take 1 tablet by mouth daily    ONETOUCH DELICA LANCETS 54R MISC by Does not apply route daily    [DISCONTINUED] ALPRAZolam (XANAX) 0 5 mg tablet Take 1 tablet (0 5 mg total) by mouth every 8 (eight) hours as needed (as needed)     No current facility-administered medications on file prior to visit  He is allergic to pollen extract       Review of Systems   Constitutional: Negative for activity change, chills, diaphoresis, fatigue and fever  Respiratory: Negative for cough, chest tightness, shortness of breath and wheezing  Cardiovascular: Positive for leg swelling  Negative for chest pain and palpitations  Gastrointestinal: Negative for abdominal pain, constipation, diarrhea, nausea and vomiting  Genitourinary: Negative for difficulty urinating, dysuria and frequency  Musculoskeletal: Negative for arthralgias, gait problem and myalgias  Neurological: Negative for light-headedness and headaches  Psychiatric/Behavioral: Negative for confusion  The patient is not nervous/anxious  Objective:      /82 (BP Location: Left arm, Patient Position: Sitting, Cuff Size: Adult)   Pulse 94   Temp (!) 97 1 °F (36 2 °C) (Tympanic)   Resp 18   Ht 5' 11" (1 803 m)   Wt 108 kg (239 lb)   SpO2 98%   BMI 33 33 kg/m²          Physical Exam   Constitutional: He is oriented to person, place, and time  He appears well-developed and well-nourished  No distress  HENT:   Head: Normocephalic and atraumatic  Mouth/Throat: Oropharynx is clear and moist    Eyes: EOM are normal  Pupils are equal, round, and reactive to light  Neck: Neck supple  No JVD present  Cardiovascular: Normal rate and regular rhythm  No murmur heard  Pulmonary/Chest: Effort normal and breath sounds normal  No respiratory distress  He has no wheezes  Abdominal: Soft  Bowel sounds are normal  There is no tenderness     Musculoskeletal: He exhibits edema (bilat edema legs 2/4  Negative turpin's  )  Neurological: He is alert and oriented to person, place, and time  Psychiatric: He has a normal mood and affect  His behavior is normal  Judgment and thought content normal    Nursing note and vitals reviewed

## 2018-07-20 ENCOUNTER — OFFICE VISIT (OUTPATIENT)
Dept: INTERNAL MEDICINE CLINIC | Facility: CLINIC | Age: 73
End: 2018-07-20
Payer: MEDICARE

## 2018-07-20 VITALS
SYSTOLIC BLOOD PRESSURE: 122 MMHG | RESPIRATION RATE: 18 BRPM | HEIGHT: 71 IN | DIASTOLIC BLOOD PRESSURE: 60 MMHG | HEART RATE: 78 BPM | BODY MASS INDEX: 33.74 KG/M2 | TEMPERATURE: 98.3 F | WEIGHT: 241 LBS

## 2018-07-20 DIAGNOSIS — I10 ESSENTIAL HYPERTENSION: ICD-10-CM

## 2018-07-20 DIAGNOSIS — R60.0 LOCALIZED EDEMA: Primary | ICD-10-CM

## 2018-07-20 PROCEDURE — 99213 OFFICE O/P EST LOW 20 MIN: CPT | Performed by: INTERNAL MEDICINE

## 2018-07-20 NOTE — PROGRESS NOTES
Assessment/Plan:    No problem-specific Assessment & Plan notes found for this encounter  Diagnoses and all orders for this visit:    Localized edema    Essential hypertension      A/P: Edema appears to be noncardiac  Current edema is his baseline due to prior sx on the leg  Will d/c lasix and monitor  RTC three months for routine  Subjective:      Patient ID: Ken Johnson is a 67 y o  male  WM RTC for f/u bilat leg edema w/o s/s of CHF and felt to be do to a period excessive salt, ETOH use, driving long distances, and hot humid weather  Given lasix and s/s are much improved  Off the lasix several days and no s/s  Left chronic edema is back to his prior baseline  No new issues  The following portions of the patient's history were reviewed and updated as appropriate:   He  has a past medical history of Acute on chronic renal insufficiency; Allergic rhinitis; Anemia (6/11/2012); Controlled type 2 diabetes mellitus without complication ([facility] ); GERD without esophagitis (6/11/2012); Hypertension; Nephrolithiasis (3/17/2016); Obesity (6/11/2012); and Osteoarthritis (6/11/2012)  He   Patient Active Problem List    Diagnosis Date Noted    [facility] mellitus with diabetic chronic [facility] disease [facility] ) 08/22/2017    Nephrolithiasis 03/17/2016    Diabetic polyneuropathy associated with type 2 diabetes mellitus ([facility] ) 02/17/2016    Hyperlipidemia 02/17/2016    Eczema 01/28/2013    Anemia 06/11/2012    Erectile dysfunction of non-organic origin 06/11/2012    Generalized anxiety disorder 06/11/2012    GERD without esophagitis 06/11/2012    Hypertension 06/11/2012    Obesity 06/11/2012    Osteoarthritis 06/11/2012    Polyp of sigmoid colon 06/11/2012     He  has a past surgical history that includes Total hip arthroplasty; Knee surgery; and Cataract extraction, bilateral   His family history includes Arthritis in his mother; No Known Problems in his father; Parkinsonism in his mother    He reports that he has been smoking Cigars  He has never used smokeless tobacco  He reports that he drinks alcohol  He reports that he does not use drugs  Current Outpatient Prescriptions   Medication Sig Dispense Refill    ALPRAZolam (XANAX) 0 5 mg tablet take 1 tablet by mouth every 8 hours if needed 90 tablet 0    amLODIPine (NORVASC) 10 mg tablet Take 1 tablet (10 mg total) by mouth daily 90 tablet 3    atorvastatin (LIPITOR) 20 mg tablet Take 1 tablet by mouth daily      Blood Glucose Monitoring Suppl (ONE TOUCH ULTRA 2) w/Device KIT by Does not apply route      furosemide (LASIX) 20 mg tablet Take 1 tablet (20 mg total) by mouth 2 (two) times a day 10 tablet 0    glucose blood test strip 1 Squirt by In Vitro route daily      meloxicam (MOBIC) 15 mg tablet take 1 tablet by mouth once daily 30 tablet 0    metFORMIN (GLUCOPHAGE) 850 mg tablet Take 2 tablets (1,700 mg total) by mouth daily 180 tablet 5    metoprolol succinate (TOPROL-XL) 100 mg 24 hr tablet Take 1 tablet by mouth daily      ONETOUCH DELICA LANCETS 00P MISC by Does not apply route daily       No current facility-administered medications for this visit        Current Outpatient Prescriptions on File Prior to Visit   Medication Sig    ALPRAZolam (XANAX) 0 5 mg tablet take 1 tablet by mouth every 8 hours if needed    amLODIPine (NORVASC) 10 mg tablet Take 1 tablet (10 mg total) by mouth daily    atorvastatin (LIPITOR) 20 mg tablet Take 1 tablet by mouth daily    Blood Glucose Monitoring Suppl (ONE TOUCH ULTRA 2) w/Device KIT by Does not apply route    furosemide (LASIX) 20 mg tablet Take 1 tablet (20 mg total) by mouth 2 (two) times a day    glucose blood test strip 1 Squirt by In Vitro route daily    meloxicam (MOBIC) 15 mg tablet take 1 tablet by mouth once daily    metFORMIN (GLUCOPHAGE) 850 mg tablet Take 2 tablets (1,700 mg total) by mouth daily    metoprolol succinate (TOPROL-XL) 100 mg 24 hr tablet Take 1 tablet by mouth daily  ONETOUCH DELICA LANCETS 65D MISC by Does not apply route daily     No current facility-administered medications on file prior to visit  He is allergic to pollen extract       Review of Systems   Constitutional: Negative for activity change, chills, diaphoresis, fatigue and fever  Respiratory: Negative for cough, chest tightness, shortness of breath and wheezing  Cardiovascular: Positive for leg swelling  Negative for chest pain and palpitations  Gastrointestinal: Negative for abdominal pain, constipation, diarrhea, nausea and vomiting  Genitourinary: Negative for difficulty urinating, dysuria and frequency  Musculoskeletal: Negative for arthralgias, gait problem and myalgias  Neurological: Negative for light-headedness and headaches  Psychiatric/Behavioral: Negative for confusion  The patient is not nervous/anxious  Objective:      /60   Pulse 78   Temp 98 3 °F (36 8 °C) (Tympanic)   Resp 18   Ht 5' 11" (1 803 m)   Wt 109 kg (241 lb)   BMI 33 61 kg/m²          Physical Exam   Constitutional: He is oriented to person, place, and time  He appears well-developed and well-nourished  HENT:   Head: Normocephalic and atraumatic  Mouth/Throat: Oropharynx is clear and moist    Eyes: Conjunctivae and EOM are normal  Pupils are equal, round, and reactive to light  Cardiovascular: Normal rate, regular rhythm and normal heart sounds  No murmur heard  Pulmonary/Chest: Effort normal and breath sounds normal  No respiratory distress  He has no wheezes  Musculoskeletal: He exhibits edema (left leg edema 1/4  )  He exhibits no tenderness or deformity  Neurological: He is alert and oriented to person, place, and time  Psychiatric: He has a normal mood and affect   His behavior is normal  Judgment and thought content normal

## 2018-11-09 ENCOUNTER — TELEPHONE (OUTPATIENT)
Dept: INTERNAL MEDICINE CLINIC | Facility: CLINIC | Age: 73
End: 2018-11-09

## 2018-11-09 NOTE — TELEPHONE ENCOUNTER
Pt was at [place] [address] having pre op testing done  His EKG is showing New onset A- Fib    His Surgery is 11/30/18 for a hip replacement    The anesthesiologist would like him to be placed on a blood thinner 2 weeks before the surgery  Please advise    Pharmacy [facility] on [address]      Phone: [phone]

## 2018-11-09 NOTE — TELEPHONE ENCOUNTER
Pt needs to be seen  Has anyone seen him for his afib? What caused it? What is his rate  Can't just blindly prescribe anticoagulation w/o assessing the pt

## 2018-11-09 NOTE — TELEPHONE ENCOUNTER
Pt called back and they will keep the appointment for Friday  She, his wife,  will asked them to fax over the EKG   She has to see if they will still be ok to do surgery on 11/20/18

## 2018-11-14 ENCOUNTER — APPOINTMENT (OUTPATIENT)
Dept: LAB | Facility: CLINIC | Age: 73
End: 2018-11-14
Payer: MEDICARE

## 2018-11-14 ENCOUNTER — OFFICE VISIT (OUTPATIENT)
Dept: INTERNAL MEDICINE CLINIC | Facility: CLINIC | Age: 73
End: 2018-11-14
Payer: MEDICARE

## 2018-11-14 VITALS
TEMPERATURE: 97.8 F | HEIGHT: 71 IN | HEART RATE: 82 BPM | BODY MASS INDEX: 34.16 KG/M2 | SYSTOLIC BLOOD PRESSURE: 130 MMHG | WEIGHT: 244 LBS | RESPIRATION RATE: 18 BRPM | DIASTOLIC BLOOD PRESSURE: 74 MMHG

## 2018-11-14 DIAGNOSIS — E11.42 DIABETIC POLYNEUROPATHY ASSOCIATED WITH TYPE 2 DIABETES MELLITUS (HCC): ICD-10-CM

## 2018-11-14 DIAGNOSIS — Z12.5 SCREENING FOR PROSTATE CANCER: ICD-10-CM

## 2018-11-14 DIAGNOSIS — I48.91 NEW ONSET A-FIB (HCC): Primary | ICD-10-CM

## 2018-11-14 DIAGNOSIS — F41.1 GENERALIZED ANXIETY DISORDER: ICD-10-CM

## 2018-11-14 DIAGNOSIS — K21.9 GERD WITHOUT ESOPHAGITIS: ICD-10-CM

## 2018-11-14 DIAGNOSIS — I48.91 NEW ONSET A-FIB (HCC): ICD-10-CM

## 2018-11-14 DIAGNOSIS — E11.22 TYPE 2 DIABETES MELLITUS WITH CHRONIC KIDNEY DISEASE, WITHOUT LONG-TERM CURRENT USE OF INSULIN, UNSPECIFIED CKD STAGE (HCC): ICD-10-CM

## 2018-11-14 DIAGNOSIS — I10 ESSENTIAL HYPERTENSION: ICD-10-CM

## 2018-11-14 DIAGNOSIS — E66.09 CLASS 1 OBESITY DUE TO EXCESS CALORIES WITH SERIOUS COMORBIDITY AND BODY MASS INDEX (BMI) OF 31.0 TO 31.9 IN ADULT: ICD-10-CM

## 2018-11-14 DIAGNOSIS — D64.9 ANEMIA, UNSPECIFIED TYPE: ICD-10-CM

## 2018-11-14 DIAGNOSIS — E78.5 HYPERLIPIDEMIA, UNSPECIFIED HYPERLIPIDEMIA TYPE: ICD-10-CM

## 2018-11-14 DIAGNOSIS — R41.3 MEMORY CHANGE: ICD-10-CM

## 2018-11-14 DIAGNOSIS — E11.9 ENCOUNTER FOR DIABETIC FOOT EXAM (HCC): ICD-10-CM

## 2018-11-14 DIAGNOSIS — M16.11 PRIMARY OSTEOARTHRITIS OF RIGHT HIP: ICD-10-CM

## 2018-11-14 LAB
ALBUMIN SERPL BCP-MCNC: 3.5 G/DL (ref 3.5–5)
ALP SERPL-CCNC: 67 U/L (ref 46–116)
ALT SERPL W P-5'-P-CCNC: 20 U/L (ref 12–78)
ANION GAP SERPL CALCULATED.3IONS-SCNC: 6 MMOL/L (ref 4–13)
APTT PPP: 30 SECONDS (ref 26–38)
AST SERPL W P-5'-P-CCNC: 8 U/L (ref 5–45)
BASOPHILS # BLD AUTO: 0.05 THOUSANDS/ΜL (ref 0–0.1)
BASOPHILS NFR BLD AUTO: 1 % (ref 0–1)
BILIRUB SERPL-MCNC: 0.79 MG/DL (ref 0.2–1)
BUN SERPL-MCNC: 22 MG/DL (ref 5–25)
CALCIUM SERPL-MCNC: 8.7 MG/DL (ref 8.3–10.1)
CHLORIDE SERPL-SCNC: 110 MMOL/L (ref 100–108)
CO2 SERPL-SCNC: 23 MMOL/L (ref 21–32)
CREAT SERPL-MCNC: 1.58 MG/DL (ref 0.6–1.3)
EOSINOPHIL # BLD AUTO: 0.17 THOUSAND/ΜL (ref 0–0.61)
EOSINOPHIL NFR BLD AUTO: 3 % (ref 0–6)
ERYTHROCYTE [DISTWIDTH] IN BLOOD BY AUTOMATED COUNT: 12.8 % (ref 11.6–15.1)
EST. AVERAGE GLUCOSE BLD GHB EST-MCNC: 186 MG/DL
GFR SERPL CREATININE-BSD FRML MDRD: 43 ML/MIN/1.73SQ M
GLUCOSE P FAST SERPL-MCNC: 223 MG/DL (ref 65–99)
HBA1C MFR BLD: 8.1 % (ref 4.2–6.3)
HCT VFR BLD AUTO: 37.8 % (ref 36.5–49.3)
HGB BLD-MCNC: 12.2 G/DL (ref 12–17)
IMM GRANULOCYTES # BLD AUTO: 0.02 THOUSAND/UL (ref 0–0.2)
IMM GRANULOCYTES NFR BLD AUTO: 0 % (ref 0–2)
INR PPP: 1.05 (ref 0.86–1.17)
LYMPHOCYTES # BLD AUTO: 1.36 THOUSANDS/ΜL (ref 0.6–4.47)
LYMPHOCYTES NFR BLD AUTO: 24 % (ref 14–44)
MCH RBC QN AUTO: 30.9 PG (ref 26.8–34.3)
MCHC RBC AUTO-ENTMCNC: 32.3 G/DL (ref 31.4–37.4)
MCV RBC AUTO: 96 FL (ref 82–98)
MONOCYTES # BLD AUTO: 0.5 THOUSAND/ΜL (ref 0.17–1.22)
MONOCYTES NFR BLD AUTO: 9 % (ref 4–12)
NEUTROPHILS # BLD AUTO: 3.57 THOUSANDS/ΜL (ref 1.85–7.62)
NEUTS SEG NFR BLD AUTO: 63 % (ref 43–75)
NRBC BLD AUTO-RTO: 0 /100 WBCS
PLATELET # BLD AUTO: 193 THOUSANDS/UL (ref 149–390)
PMV BLD AUTO: 10.6 FL (ref 8.9–12.7)
POTASSIUM SERPL-SCNC: 4.9 MMOL/L (ref 3.5–5.3)
PROT SERPL-MCNC: 7.6 G/DL (ref 6.4–8.2)
PROTHROMBIN TIME: 13.8 SECONDS (ref 11.8–14.2)
PSA SERPL-MCNC: 1.3 NG/ML (ref 0–4)
RBC # BLD AUTO: 3.95 MILLION/UL (ref 3.88–5.62)
SODIUM SERPL-SCNC: 139 MMOL/L (ref 136–145)
T4 FREE SERPL-MCNC: 1.08 NG/DL (ref 0.76–1.46)
TSH SERPL DL<=0.05 MIU/L-ACNC: 4.18 UIU/ML (ref 0.36–3.74)
WBC # BLD AUTO: 5.67 THOUSAND/UL (ref 4.31–10.16)

## 2018-11-14 PROCEDURE — 83036 HEMOGLOBIN GLYCOSYLATED A1C: CPT

## 2018-11-14 PROCEDURE — 93000 ELECTROCARDIOGRAM COMPLETE: CPT | Performed by: INTERNAL MEDICINE

## 2018-11-14 PROCEDURE — 84439 ASSAY OF FREE THYROXINE: CPT

## 2018-11-14 PROCEDURE — 84443 ASSAY THYROID STIM HORMONE: CPT

## 2018-11-14 PROCEDURE — 85730 THROMBOPLASTIN TIME PARTIAL: CPT

## 2018-11-14 PROCEDURE — 85610 PROTHROMBIN TIME: CPT

## 2018-11-14 PROCEDURE — G0103 PSA SCREENING: HCPCS

## 2018-11-14 PROCEDURE — 99214 OFFICE O/P EST MOD 30 MIN: CPT | Performed by: INTERNAL MEDICINE

## 2018-11-14 PROCEDURE — 85025 COMPLETE CBC W/AUTO DIFF WBC: CPT

## 2018-11-14 PROCEDURE — 36415 COLL VENOUS BLD VENIPUNCTURE: CPT

## 2018-11-14 PROCEDURE — 80053 COMPREHEN METABOLIC PANEL: CPT

## 2018-11-14 RX ORDER — CEPHALEXIN 500 MG/1
CAPSULE ORAL
COMMUNITY
End: 2018-11-26

## 2018-11-14 RX ORDER — DORZOLAMIDE HYDROCHLORIDE AND TIMOLOL MALEATE 20; 5 MG/ML; MG/ML
SOLUTION/ DROPS OPHTHALMIC
COMMUNITY
End: 2021-08-17

## 2018-11-14 RX ORDER — CHLORHEXIDINE GLUCONATE 4 G/100ML
SOLUTION TOPICAL
COMMUNITY
End: 2018-12-05 | Stop reason: ALTCHOICE

## 2018-11-14 RX ORDER — CELECOXIB 200 MG/1
CAPSULE ORAL
COMMUNITY
End: 2018-11-14

## 2018-11-14 NOTE — PROGRESS NOTES
Assessment/Plan:    No problem-specific Assessment & Plan notes found for this encounter  Diagnoses and all orders for this visit:    New onset a-fib ([facility] )  -     TSH, 3rd generation with Free T4 reflex; Future  -     POCT ECG  -     Ambulatory referral to Cardiology; Future  -     CBC and differential; Future  -     Comprehensive metabolic panel; Future  -     Echo complete with contrast if indicated; Future  -     APTT; Future  -     Protime-INR; Future  -     rivaroxaban (XARELTO) 15 mg tablet; Take 1 tablet (15 mg total) by mouth daily with breakfast    Diabetic polyneuropathy associated with type 2 diabetes mellitus (HCC)  -     Hemoglobin A1C; Future    Encounter for diabetic foot exam ([place] [address] )    Hyperlipidemia, unspecified hyperlipidemia type    Anemia, unspecified type    Class 1 obesity due to excess calories with serious comorbidity and body mass index (BMI) of 31 0 to 31 9 in adult    Primary osteoarthritis of right hip    Generalized anxiety disorder    GERD without esophagitis    Type 2 diabetes mellitus with chronic kidney disease, without long-term current use of insulin, unspecified CKD stage ([place] [address] )    Essential hypertension    Screening for prostate cancer  -     PSA, Total Screen; Future    Memory change    Other orders  -     dorzolamide-timolol (COSOPT) 22 3-6 8 MG/ML ophthalmic solution; instill 1 drop into both eyes twice a day  -     chlorhexidine (HIBICLENS) 4 % external liquid; Wash surgical site daily starting five days before surgery  -     cephalexin (KEFLEX) 500 mg capsule; cephalexin 500 mg capsule  -     Discontinue: celecoxib (CeleBREX) 200 mg capsule; take 1 capsule by mouth daily for 3 days PRIOR TO SURGERY  DO NOT     (REFER TO PRESCRIPTION NOTES)  -     mupirocin (BACTROBAN) 2 % ointment; APPLY A SMALL AMOUNT TO BOTH NOSTRILS TWICE DAILY FOR 5 DAYS PRIOR TO SURGERY      A/P: Preop turned into routine/acute and will do the preop after things sorted out  Pt not cleared for sx  New Onset AFIB with GVR, but need to find out why  Will check labs, order an echo and refer to cards  May need a stress test or cath as he has risks  Continue metoprolol, ASA, and will start renal dose NOAC  May consider cardioversion, but needs to be anticoagulated  Sugars not controlled and will check labs and and may need to go back on his meds as originally prescribed  ??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 starting  Will start the w/u  Refusing vaccines  RTC one week  Subjective:      Patient ID: Giselle Gonzalez is a 67 y o  male  WM presents with his wife for pre-op eval for THR  During PAT's, ekg showed new onset afib  Pt w/o and CAD, but has DM and HTN  No prior issues  Uncertain of when it started  No CP, SOB, or palpitations  No new meds, dietary changes and compliant with his cardiac meds, but wife reports he isn't with his diabetic meds and is not checking his sugars  Remains active otherwise and no falls  Doing well otherwise  Wife reports several months of worsening memory, but no headaches, slurred speech, facial droop, paralysis, or paresthesia  Due for labs  Recent PAT, cmp, cbc, urine, and CXR were ok  No orthopnea, edema, or PND  The following portions of the patient's history were reviewed and updated as appropriate:   He  has a past medical history of Acute on chronic renal insufficiency; Allergic rhinitis; Anemia (6/11/2012); Controlled type 2 diabetes mellitus without complication ([facility] ); Generalized anxiety disorder; GERD without esophagitis (6/11/2012); Hypertension; Nephrolithiasis (3/17/2016); Obesity (6/11/2012); and Osteoarthritis (6/11/2012)    He   Patient Active Problem List    Diagnosis Date Noted    Primary osteoarthritis of right hip 10/30/2018    Type 2 diabetes mellitus with diabetic chronic kidney disease ([facility] ) 08/22/2017    Nephrolithiasis 03/17/2016    Diabetic polyneuropathy associated with type 2 diabetes mellitus ([facility] ) 02/17/2016    Hyperlipidemia 02/17/2016    Eczema 01/28/2013    Anemia 06/11/2012    Erectile dysfunction of non-organic origin 06/11/2012    Generalized anxiety disorder 06/11/2012    GERD without esophagitis 06/11/2012    Hypertension 06/11/2012    Obesity 06/11/2012    Osteoarthritis 06/11/2012    Polyp of sigmoid colon 06/11/2012     He  has a past surgical history that includes Total hip arthroplasty; Knee surgery; and Cataract extraction, bilateral   His family history includes Arthritis in his mother; No Known Problems in his father; Parkinsonism in his mother  He  reports that he has quit smoking  His smoking use included Cigars  He has never used smokeless tobacco  He reports that he drinks alcohol  He reports that he does not use drugs    Current Outpatient Prescriptions   Medication Sig Dispense Refill    ALPRAZolam (XANAX) 0 5 mg tablet take 1 tablet by mouth every 8 hours if needed 90 tablet 0    amLODIPine (NORVASC) 10 mg tablet Take 1 tablet (10 mg total) by mouth daily 90 tablet 3    atorvastatin (LIPITOR) 20 mg tablet Take 1 tablet by mouth daily      Blood Glucose Monitoring Suppl (ONE TOUCH ULTRA 2) w/Device KIT by Does not apply route      cephalexin (KEFLEX) 500 mg capsule cephalexin 500 mg capsule      chlorhexidine (HIBICLENS) 4 % external liquid Wash surgical site daily starting five days before surgery      dorzolamide-timolol (COSOPT) 22 3-6 8 MG/ML ophthalmic solution instill 1 drop into both eyes twice a day      glucose blood test strip 1 Squirt by In Vitro route daily      metFORMIN (GLUCOPHAGE) 850 mg tablet Take 2 tablets (1,700 mg total) by mouth daily 180 tablet 5    metoprolol succinate (TOPROL-XL) 100 mg 24 hr tablet Take 1 tablet by mouth daily      mupirocin (BACTROBAN) 2 % ointment APPLY A SMALL AMOUNT TO BOTH NOSTRILS TWICE DAILY FOR 5 DAYS PRIOR TO SURGERY  0    ONETOUCH DELICA LANCETS 17X MISC by Does not apply route daily      rivaroxaban (XARELTO) 15 mg tablet Take 1 tablet (15 mg total) by mouth daily with breakfast 90 tablet 1     No current facility-administered medications for this visit  Current Outpatient Prescriptions on File Prior to Visit   Medication Sig    ALPRAZolam (XANAX) 0 5 mg tablet take 1 tablet by mouth every 8 hours if needed    amLODIPine (NORVASC) 10 mg tablet Take 1 tablet (10 mg total) by mouth daily    atorvastatin (LIPITOR) 20 mg tablet Take 1 tablet by mouth daily    Blood Glucose Monitoring Suppl (ONE TOUCH ULTRA 2) w/Device KIT by Does not apply route    glucose blood test strip 1 Squirt by In Vitro route daily    metFORMIN (GLUCOPHAGE) 850 mg tablet Take 2 tablets (1,700 mg total) by mouth daily    metoprolol succinate (TOPROL-XL) 100 mg 24 hr tablet Take 1 tablet by mouth daily    ONETOUCH DELICA LANCETS 88K MISC by Does not apply route daily    [DISCONTINUED] meloxicam (MOBIC) 15 mg tablet take 1 tablet by mouth once daily    [DISCONTINUED] furosemide (LASIX) 20 mg tablet Take 1 tablet (20 mg total) by mouth 2 (two) times a day     No current facility-administered medications on file prior to visit  He is allergic to pollen extract       Review of Systems   Constitutional: Negative for activity change, chills, diaphoresis, fatigue and fever  HENT: Negative  Eyes: Negative for visual disturbance  Respiratory: Negative for cough, chest tightness, shortness of breath and wheezing  Cardiovascular: Negative for chest pain, palpitations and leg swelling  Gastrointestinal: Negative for abdominal pain, constipation, diarrhea, nausea and vomiting  Endocrine: Negative for cold intolerance and heat intolerance  Genitourinary: Negative for difficulty urinating, dysuria and frequency  Musculoskeletal: Negative for arthralgias, gait problem and myalgias  Neurological: Negative for dizziness, tremors, seizures, syncope, facial asymmetry, speech difficulty, weakness, light-headedness, numbness and headaches  Some memory loss  Psychiatric/Behavioral: Negative for confusion and dysphoric mood  The patient is not nervous/anxious  Objective:      /74   Pulse 82   Temp 97 8 °F (36 6 °C) (Tympanic)   Resp 18   Ht 5' 11" (1 803 m)   Wt 111 kg (244 lb)   BMI 34 03 kg/m²          Physical Exam   Constitutional: He is oriented to person, place, and time  He appears well-developed and well-nourished  No distress  HENT:   Head: Normocephalic and atraumatic  Mouth/Throat: Oropharynx is clear and moist    Eyes: Pupils are equal, round, and reactive to light  Conjunctivae and EOM are normal    Neck: Normal range of motion  Neck supple  No JVD present  Cardiovascular: Normal rate and normal heart sounds  Pulses are no weak pulses  No murmur heard  Pulses:       Dorsalis pedis pulses are 1+ on the right side, and 1+ on the left side  Posterior tibial pulses are 1+ on the right side, and 1+ on the left side  Irregular irregular with a GVR   Pulmonary/Chest: Effort normal and breath sounds normal  No respiratory distress  He has no wheezes  Abdominal: Soft  Bowel sounds are normal  He exhibits no distension  There is no tenderness  Musculoskeletal: He exhibits no edema  Feet:   Right Foot:   Skin Integrity: Negative for ulcer, skin breakdown, erythema, warmth, callus or dry skin  Left Foot:   Skin Integrity: Negative for ulcer, skin breakdown, erythema, warmth, callus or dry skin  Neurological: He is alert and oriented to person, place, and time  He has normal reflexes  No cranial nerve deficit  Coordination normal    Psychiatric: He has a normal mood and affect  His behavior is normal  Judgment and thought content normal    Nursing note and vitals reviewed  Patient's shoes and socks removed  Right Foot/Ankle   Right Foot Inspection  Skin Exam: skin normal and skin intact no dry skin, no warmth, no callus, no erythema, no maceration, no abnormal color, no pre-ulcer, no ulcer and no callus Toe Exam: ROM and strength within normal limitsno swelling, no tenderness, erythema and  no right toe deformity  Sensory       Monofilament testing: diminished  Vascular  Capillary refills: < 3 seconds  The right DP pulse is 1+  The right PT pulse is 1+  Left Foot/Ankle  Left Foot Inspection  Skin Exam: skin normal and skin intactno dry skin, no warmth, no erythema, no maceration, normal color, no pre-ulcer, no ulcer and no callus                         Toe Exam: ROM and strength within normal limitsno swelling, no tenderness, no erythema and no left toe deformity                   Sensory       Monofilament: diminished  Vascular  Capillary refills: < 3 seconds  The left DP pulse is 1+  The left PT pulse is 1+  Assign Risk Category:  No deformity present; Loss of protective sensation;  No weak pulses       Risk: 1

## 2018-11-15 DIAGNOSIS — I10 HYPERTENSION, UNSPECIFIED TYPE: ICD-10-CM

## 2018-11-15 DIAGNOSIS — F41.9 ANXIETY: ICD-10-CM

## 2018-11-15 RX ORDER — AMLODIPINE BESYLATE 10 MG/1
TABLET ORAL
Qty: 90 TABLET | Refills: 3 | Status: SHIPPED | OUTPATIENT
Start: 2018-11-15 | End: 2019-11-26 | Stop reason: SDUPTHER

## 2018-11-15 RX ORDER — ALPRAZOLAM 0.5 MG/1
0.5 TABLET ORAL EVERY 8 HOURS PRN
Qty: 90 TABLET | Refills: 0 | Status: SHIPPED | OUTPATIENT
Start: 2018-11-15 | End: 2019-03-05 | Stop reason: SDUPTHER

## 2018-11-15 RX ORDER — METOPROLOL SUCCINATE 100 MG/1
100 TABLET, EXTENDED RELEASE ORAL DAILY
Qty: 90 TABLET | Refills: 1 | Status: SHIPPED | OUTPATIENT
Start: 2018-11-15 | End: 2019-05-07 | Stop reason: SDUPTHER

## 2018-11-20 ENCOUNTER — HOSPITAL ENCOUNTER (OUTPATIENT)
Dept: NON INVASIVE DIAGNOSTICS | Facility: CLINIC | Age: 73
Discharge: HOME | End: 2018-11-20
Payer: MEDICARE

## 2018-11-20 DIAGNOSIS — I48.91 NEW ONSET A-FIB (HCC): ICD-10-CM

## 2018-11-20 PROCEDURE — 93306 TTE W/DOPPLER COMPLETE: CPT

## 2018-11-20 PROCEDURE — 93306 TTE W/DOPPLER COMPLETE: CPT | Performed by: INTERNAL MEDICINE

## 2018-11-26 ENCOUNTER — OFFICE VISIT (OUTPATIENT)
Dept: INTERNAL MEDICINE CLINIC | Facility: CLINIC | Age: 73
End: 2018-11-26
Payer: MEDICARE

## 2018-11-26 VITALS
TEMPERATURE: 97.5 F | BODY MASS INDEX: 31.92 KG/M2 | SYSTOLIC BLOOD PRESSURE: 118 MMHG | DIASTOLIC BLOOD PRESSURE: 70 MMHG | OXYGEN SATURATION: 99 % | HEART RATE: 88 BPM | RESPIRATION RATE: 18 BRPM | WEIGHT: 228 LBS | HEIGHT: 71 IN

## 2018-11-26 DIAGNOSIS — E11.8 TYPE 2 DIABETES MELLITUS WITH COMPLICATION, UNSPECIFIED WHETHER LONG TERM INSULIN USE: ICD-10-CM

## 2018-11-26 DIAGNOSIS — I48.91 NEW ONSET A-FIB (HCC): Primary | ICD-10-CM

## 2018-11-26 DIAGNOSIS — M16.11 PRIMARY OSTEOARTHRITIS OF RIGHT HIP: ICD-10-CM

## 2018-11-26 DIAGNOSIS — E11.22 TYPE 2 DIABETES MELLITUS WITH CHRONIC KIDNEY DISEASE, WITHOUT LONG-TERM CURRENT USE OF INSULIN, UNSPECIFIED CKD STAGE (HCC): ICD-10-CM

## 2018-11-26 DIAGNOSIS — I51.7 MILD CONCENTRIC LEFT VENTRICULAR HYPERTROPHY (LVH): ICD-10-CM

## 2018-11-26 DIAGNOSIS — I10 HYPERTENSION, UNSPECIFIED TYPE: ICD-10-CM

## 2018-11-26 DIAGNOSIS — R79.89 ABNORMAL THYROID BLOOD TEST: ICD-10-CM

## 2018-11-26 PROCEDURE — 99214 OFFICE O/P EST MOD 30 MIN: CPT | Performed by: INTERNAL MEDICINE

## 2018-11-26 NOTE — PROGRESS NOTES
Assessment/Plan:    No problem-specific Assessment & Plan notes found for this encounter  Diagnoses and all orders for this visit:    New onset a-fib ([address] )  -     T4, free; Future  -     T3; Future  -     TSH, 3rd generation; Future  -     Thyroid Antibodies Panel; Future    Hypertension, unspecified type    Type 2 diabetes mellitus with chronic kidney disease, without long-term current use of insulin, unspecified CKD stage (HCC)    Abnormal thyroid blood test  -     T4, free; Future  -     T3; Future  -     TSH, 3rd generation; Future  -     Thyroid Antibodies Panel; Future    Mild concentric left ventricular hypertrophy (LVH)    Type 2 diabetes mellitus with complication, unspecified whether long term insulin use (HCC)  -     metFORMIN (GLUCOPHAGE) 850 mg tablet; Take 2 tablets (1,700 mg total) by mouth daily    Primary osteoarthritis of right hip      A/P: Doing well and rate is well controlled  Echo as above  Will continue with the metoprolol and NOAC  Await cards input and discussed with the pt that he should post-pone the non-urgent joint sx for now  Discussed the TSH and would expect hyperthyroid to be present rather than the hypothyroidism  Will recheck in six weeks and see if the condition actually exists and if we need to treat it once the afib is worked up  RTC two weeks for f/u  Subjective:      Patient ID: Godfrey Short is a 68 y o  male  WM RTC with his wife for f/u new onset Afib  Doing well and no issues  No CP, SOB, edema, orthopnea, slurred speech, headaches, etc  Tolerating the NOAC and no bleeding or bruising  Labs ok except for continued CKD, but TSH was slightly elevated  Echo with normal EF and mild LVH, but LAE size was normal  No new issues  The following portions of the patient's history were reviewed and updated as appropriate:   He  has a past medical history of Acute on chronic renal insufficiency; Allergic rhinitis; Anemia (6/11/2012);  Controlled type 2 diabetes mellitus without complication ([place] [address] ); Generalized anxiety disorder; GERD without esophagitis (6/11/2012); Hypertension; Nephrolithiasis (3/17/2016); Obesity (6/11/2012); and Osteoarthritis (6/11/2012)  He   Patient Active Problem List    Diagnosis Date Noted    Mild concentric left ventricular hypertrophy (LVH) 11/26/2018    New onset a-fib ([place] [address] ) 11/26/2018    Primary osteoarthritis of right hip 10/30/2018    Type 2 diabetes mellitus with diabetic chronic kidney disease ([place] [address] ) 08/22/2017    Nephrolithiasis 03/17/2016    Diabetic polyneuropathy associated with type 2 diabetes mellitus ([place] [address] ) 02/17/2016    Hyperlipidemia 02/17/2016    Eczema 01/28/2013    Anemia 06/11/2012    Erectile dysfunction of non-organic origin 06/11/2012    Generalized anxiety disorder 06/11/2012    GERD without esophagitis 06/11/2012    Hypertension 06/11/2012    Obesity 06/11/2012    Osteoarthritis 06/11/2012    Polyp of sigmoid colon 06/11/2012     He  has a past surgical history that includes Total hip arthroplasty; Knee surgery; and Cataract extraction, bilateral   His family history includes Arthritis in his mother; No Known Problems in his father; Parkinsonism in his mother  He  reports that he has quit smoking  His smoking use included Cigars  He has never used smokeless tobacco  He reports that he drinks alcohol  He reports that he does not use drugs    Current Outpatient Prescriptions   Medication Sig Dispense Refill    ALPRAZolam (XANAX) 0 5 mg tablet Take 1 tablet (0 5 mg total) by mouth every 8 (eight) hours as needed (AS NEEDED) 90 tablet 0    amLODIPine (NORVASC) 10 mg tablet take 1 tablet by mouth daily 90 tablet 3    atorvastatin (LIPITOR) 20 mg tablet Take 1 tablet by mouth daily      Blood Glucose Monitoring Suppl (ONE TOUCH ULTRA 2) w/Device KIT by Does not apply route      chlorhexidine (HIBICLENS) 4 % external liquid Wash surgical site daily starting five days before surgery      dorzolamide-timolol (COSOPT) 22 3-6 8 MG/ML ophthalmic solution instill 1 drop into both eyes twice a day      glucose blood test strip 1 Squirt by In Vitro route daily      metFORMIN (GLUCOPHAGE) 850 mg tablet Take 2 tablets (1,700 mg total) by mouth daily 180 tablet 0    metoprolol succinate (TOPROL-XL) 100 mg 24 hr tablet Take 1 tablet (100 mg total) by mouth daily 90 tablet 1    mupirocin (BACTROBAN) 2 % ointment APPLY A SMALL AMOUNT TO BOTH NOSTRILS TWICE DAILY FOR 5 DAYS PRIOR TO SURGERY  0    ONETOUCH DELICA LANCETS 36P MISC by Does not apply route daily      rivaroxaban (XARELTO) 15 mg tablet Take 1 tablet (15 mg total) by mouth daily with breakfast 90 tablet 1     No current facility-administered medications for this visit        Current Outpatient Prescriptions on File Prior to Visit   Medication Sig    ALPRAZolam (XANAX) 0 5 mg tablet Take 1 tablet (0 5 mg total) by mouth every 8 (eight) hours as needed (AS NEEDED)    amLODIPine (NORVASC) 10 mg tablet take 1 tablet by mouth daily    atorvastatin (LIPITOR) 20 mg tablet Take 1 tablet by mouth daily    Blood Glucose Monitoring Suppl (ONE TOUCH ULTRA 2) w/Device KIT by Does not apply route    chlorhexidine (HIBICLENS) 4 % external liquid Wash surgical site daily starting five days before surgery    dorzolamide-timolol (COSOPT) 22 3-6 8 MG/ML ophthalmic solution instill 1 drop into both eyes twice a day    glucose blood test strip 1 Squirt by In Vitro route daily    metoprolol succinate (TOPROL-XL) 100 mg 24 hr tablet Take 1 tablet (100 mg total) by mouth daily    mupirocin (BACTROBAN) 2 % ointment APPLY A SMALL AMOUNT TO BOTH NOSTRILS TWICE DAILY FOR 5 DAYS PRIOR TO SURGERY    ONETOUCH DELICA LANCETS 99R MISC by Does not apply route daily    rivaroxaban (XARELTO) 15 mg tablet Take 1 tablet (15 mg total) by mouth daily with breakfast    [DISCONTINUED] metFORMIN (GLUCOPHAGE) 850 mg tablet Take 2 tablets (1,700 mg total) by mouth daily    [DISCONTINUED] cephalexin (KEFLEX) 500 mg capsule cephalexin 500 mg capsule     No current facility-administered medications on file prior to visit  He is allergic to pollen extract       Review of Systems   Constitutional: Negative for activity change, chills, diaphoresis, fatigue and fever  HENT: Negative  Respiratory: Negative for cough, chest tightness, shortness of breath and wheezing  Cardiovascular: Negative for chest pain, palpitations and leg swelling  Gastrointestinal: Negative for abdominal pain, constipation, diarrhea, nausea and vomiting  Genitourinary: Negative for difficulty urinating, dysuria and frequency  Musculoskeletal: Negative for arthralgias, gait problem and myalgias  Neurological: Negative for dizziness, tremors, seizures, syncope, facial asymmetry, speech difficulty, weakness, light-headedness, numbness and headaches  Psychiatric/Behavioral: Negative for confusion  The patient is not nervous/anxious  Objective:      /70 (BP Location: Left arm, Patient Position: Sitting, Cuff Size: Large)   Pulse 88   Temp 97 5 °F (36 4 °C) (Tympanic)   Resp 18   Ht 5' 11" (1 803 m)   Wt 103 kg (228 lb)   SpO2 99%   BMI 31 80 kg/m²          Physical Exam   Constitutional: He is oriented to person, place, and time  He appears well-developed and well-nourished  No distress  HENT:   Head: Normocephalic and atraumatic  Mouth/Throat: Oropharynx is clear and moist    Eyes: Pupils are equal, round, and reactive to light  Conjunctivae and EOM are normal    Neck: Neck supple  No JVD present  Cardiovascular:   Regular with GVR and ectopy vs  Irregular irregular with a GVR  Pulmonary/Chest: Effort normal and breath sounds normal  No respiratory distress  He has no wheezes  Abdominal: Soft  Bowel sounds are normal  He exhibits no distension  There is no tenderness  Musculoskeletal: He exhibits no edema  Neurological: He is alert and oriented to person, place, and time     Psychiatric: He has a normal mood and affect  His behavior is normal  Judgment and thought content normal    Nursing note and vitals reviewed

## 2018-12-05 ENCOUNTER — OFFICE VISIT (OUTPATIENT)
Dept: CARDIOLOGY CLINIC | Facility: CLINIC | Age: 73
End: 2018-12-05
Payer: MEDICARE

## 2018-12-05 VITALS
HEIGHT: 71 IN | WEIGHT: 230.1 LBS | SYSTOLIC BLOOD PRESSURE: 132 MMHG | HEART RATE: 67 BPM | BODY MASS INDEX: 32.21 KG/M2 | DIASTOLIC BLOOD PRESSURE: 76 MMHG

## 2018-12-05 DIAGNOSIS — I10 ESSENTIAL HYPERTENSION: Primary | ICD-10-CM

## 2018-12-05 DIAGNOSIS — I48.91 NEW ONSET A-FIB (HCC): ICD-10-CM

## 2018-12-05 PROCEDURE — 99214 OFFICE O/P EST MOD 30 MIN: CPT | Performed by: INTERNAL MEDICINE

## 2018-12-05 PROCEDURE — 93000 ELECTROCARDIOGRAM COMPLETE: CPT | Performed by: INTERNAL MEDICINE

## 2018-12-05 NOTE — LETTER
December 5, 2018     Shannanelenokenji Turner   [facility] [address] [facility]    Patient: Sol Guerrero   YOB: 1945   Date of Visit: 12/5/2018       Dear Dr Ethel Shoemaker: Thank you for referring Yoli Sánchez to me for evaluation  Below are my notes for this consultation  If you have questions, please do not hesitate to call me  I look forward to following your patient along with you  Sincerely,        Billy Cedillo MD        CC: No Recipients  Billy Cedillo MD  12/5/2018  3:32 PM  Sign at close encounter  [address] [facility]    Outpatient New Consult   Today's Date: 12/05/18        Patient name: Sol Guerrero  YOB: 1945  Sex: male         Chief Complaint: New dx afib      ASSESSMENT:  Problem List Items Addressed This Visit     New onset a-fib [facility])    Relevant Orders    POCT ECG        67 yo male  1) Persistent afib  HR 67bpm narrow QRS found incidentally by Dr Ethel Shoemaker 11/14/18 for preop eval HR 70s in afib  Started on Xarelto 15mg and metoprolol  TTE was normal and no valve disease normal LA size  He has zero symptoms of afib  No CP/SOB/Palpitations  He has no exercise imitations other than his hips and ankles  Hits a "speed bag" and feels very good w/o SOB  SH was 4 1 FT4 1 08 euthyroid  Risk factors include social ETOH 2-3 beers per week and snoring possible SADAF, though denies fatigue/daytime sleepiness  CHADS2Vasc=3 (Age, HTN, DM)  He has some memory issues  PLAN:    1  Recommend we try DCCV since normal LA size maybe he will go back to NSR and stay in it, then maybe worth effort to keep in NSR long term w antiarrhythmics or ablation  Otherwise given how minimal symptoms are and he may have long standing persistenta fib I think risks outweigh benefits of aggressive care to maintain NSR  2  Continue xarelto/metoprolol  3   Recommend we wait unil January for hip surgery so we can try DCCV first w/o interupting anticoagulation    Follow up in: 1 month    Orders Placed This Encounter   Procedures    POCT ECG     Medications Discontinued During This Encounter   Medication Reason    chlorhexidine (HIBICLENS) 4 % external liquid Discontinued by another clinician             HPI/Subjective:   69 yo male  1) Persistent afib  HR 67bpm narrow QRS found incidentally by Dr Arti Galdamez 11/14/18 for preop eval HR 70s in afib  Started on Xarelto 15mg and metoprolol  TTE was normal and no valve disease normal LA size  He has zero symptoms of afib  No CP/SOB/Palpitations  He has no exercise imitations other than his hips and ankles  Hits a "speed bag" and feels very good w/o SOB  SH was 4 1 FT4 1 08 euthyroid  Risk factors include social ETOH 2-3 beers per week and snoring possible SADAF, though denies fatigue/daytime sleepiness  He has some memory issues  Please note HPI is listed by problem with with update following it, it is copied again in the assessment above and reflects medical decision making as well  Complete 12 point ROS reviewed and otherwise non pertinent or negative except as per HPI pertinent positives in Cardiovascular and Respiratory emphasized  Please see paper chart for outpatient clinic patients where the patient completed the 12 point ROS survey  Past Medical History:   Diagnosis Date    Acute on chronic renal insufficiency     Allergic rhinitis     Anemia 6/11/2012    Controlled type 2 diabetes mellitus without complication (HCC)     Generalized anxiety disorder     GERD without esophagitis 6/11/2012    Hypertension     Nephrolithiasis 3/17/2016    Obesity 6/11/2012    Osteoarthritis 6/11/2012       Allergies   Allergen Reactions    Pollen Extract      I reviewed the Home Medication list and Allergies in the chart     Scheduled Meds:  Current Outpatient Prescriptions   Medication Sig Dispense Refill    ALPRAZolam (XANAX) 0 5 mg tablet Take 1 tablet (0 5 mg total) by mouth every 8 (eight) hours as needed (AS NEEDED) 90 tablet 0    amLODIPine (NORVASC) 10 mg tablet take 1 tablet by mouth daily 90 tablet 3    atorvastatin (LIPITOR) 20 mg tablet Take 1 tablet by mouth daily      Blood Glucose Monitoring Suppl (ONE TOUCH ULTRA 2) w/Device KIT by Does not apply route      dorzolamide-timolol (COSOPT) 22 3-6 8 MG/ML ophthalmic solution instill 1 drop into both eyes twice a day      glucose blood test strip 1 Squirt by In Vitro route daily      metFORMIN (GLUCOPHAGE) 850 mg tablet Take 2 tablets (1,700 mg total) by mouth daily 180 tablet 0    metoprolol succinate (TOPROL-XL) 100 mg 24 hr tablet Take 1 tablet (100 mg total) by mouth daily 90 tablet 1    mupirocin (BACTROBAN) 2 % ointment APPLY A SMALL AMOUNT TO BOTH NOSTRILS TWICE DAILY FOR 5 DAYS PRIOR TO SURGERY  0    ONETOUCH DELICA LANCETS 38S MISC by Does not apply route daily      rivaroxaban (XARELTO) 15 mg tablet Take 1 tablet (15 mg total) by mouth daily with breakfast 90 tablet 1     No current facility-administered medications for this visit        PRN Meds:         Family History   Problem Relation Age of Onset    Arthritis Mother     Parkinsonism Mother     No Known Problems Father        Social History     Social History    Marital status: /Civil Union     Spouse name: N/A    Number of children: N/A    Years of education: N/A     Occupational History    Retired      Social History Main Topics    Smoking status: Former Smoker     Types: Cigars    Smokeless tobacco: Never Used      Comment: Cigars (__ a day)    Alcohol use Yes      Comment: Social    Drug use: No    Sexual activity: Not on file     Other Topics Concern    Not on file     Social History Narrative    No narrative on file         OBJECTIVE:    /76 (BP Location: Right arm, Patient Position: Sitting, Cuff Size: Large)   Pulse 67   Ht 5' 11" (1 803 m)   Wt 104 kg (230 lb 1 6 oz)   BMI 32 09 kg/m²    Vitals:    12/05/18 1429   Weight: 104 kg (230 lb 1 6 oz)     GEN: No acute distress, Alert and oriented, well appearing  HEENT:Head, neck, ears, oral pharynx: Mucus membranes moist, oral pharynx clear, nares clear  External ears normal  EYES: Pupils equal, sclera anicteric, midline, normal conjuctiva  NECK: No JVD, supple, no obvious masses or thryomegaly or goiter  CARDIOVASCULAR: irreg irreg, No murmur, rub, gallops S1,S2  LUNGS: Clear To auscultation bilaterally, normal effort, no rales, rhonchi, crackles  ABDOMEN:  nondistended,  without obvious organomegaly or ascites  EXTREMITIES/VASCULAR:  No edema  Radial pulses intact, pedal pulses difficult to palpate, warm an well perfused  PSYCH: Normal Affect, no overt suicidal ideation, linear speech pattern without evidence of psychosis  NEURO: Grossly intact, moving all extremiteis equal, face symmetric, alert and responsive, no obvious focal defecits  GAIT:  Ambulates normally without difficulty  HEME: No bleeding, bruising, petechia, purpura  SKIN: No significant rashes, warm, no diaphoresis or pallor       Lab Results:       LABS:      Chemistry        Component Value Date/Time     09/30/2014 1716    K 4 9 11/14/2018 0917    K 5 1 09/30/2014 1716     (H) 11/14/2018 0917     09/30/2014 1716    CO2 23 11/14/2018 0917    CO2 22 2 09/30/2014 1716    BUN 22 11/14/2018 0917    BUN 28 (H) 09/30/2014 1716    CREATININE 1 58 (H) 11/14/2018 0917    CREATININE 1 27 09/30/2014 1716        Component Value Date/Time    CALCIUM 8 7 11/14/2018 0917    CALCIUM 8 6 09/30/2014 1716    ALKPHOS 67 11/14/2018 0917    ALKPHOS 64 09/30/2014 1716    AST 8 11/14/2018 0917    AST 9 09/30/2014 1716    ALT 20 11/14/2018 0917    ALT 14 (L) 09/30/2014 1716    BILITOT 0 4 09/30/2014 1716            Lab Results   Component Value Date    CHOL 179 2014     Lab Results   Component Value Date    HDL 62 (H) 2017    HDL 62 (H) 2016    HDL 61 2014     Lab Results   Component Value Date    LDLCALC 102 (H) 2017    LDLCALC 106 (H) 2016    LDLCALC 101 (H) 2014     Lab Results   Component Value Date    TRIG 199 (H) 2018    TRIG 213 (H) 2017    TRIG 159 (H) 2017     No results found for: CHOLHDL    IMAGING: No results found  Cardiac testing:   Results for orders placed during the hospital encounter of 18   Echo complete with contrast if indicated    Narrative [address]  [address] [facility], [address] [place] [address]    Transthoracic Echocardiogram  2D, M-mode, Doppler, and Color Doppler    Study date:  2018    Patient: Jake Donahue [facility]  MR number: [id number]  Account number: [de-identified]  : 1945  Age: 67 years  Gender: Male  Status: Outpatient  Location: [facility] [place]  Height: 71 in  Weight: 244 lb  BP: 150/ 82 mmHg    Indications: Atrial Fibrillation    Diagnoses: I48 0 - Atrial fibrillation    Sonographer:  Zachary Sierra RDCS  Primary Physician:  Jamie Rodriguez DO  Group:  [facility]  cc:  Darshan Cabral MD  Interpreting Physician:  Sylvester Weaver MD    SUMMARY    LEFT VENTRICLE:  Systolic function was normal  Ejection fraction was estimated to be 60 %  There were no regional wall motion abnormalities  Wall thickness was mildly increased  MITRAL VALVE:  There was mild annular calcification  There was mild regurgitation  TRICUSPID VALVE:  There was mild regurgitation  AORTA:  The root exhibited mild dilatation  HISTORY: PRIOR HISTORY: Diabetes, GERD, Obesity    PROCEDURE: The study was performed in the [facility]  This was a routine study  The transthoracic approach was used   The study included complete 2D imaging, M-mode, complete spectral Doppler, and color  Doppler  The heart rate was 96 bpm, at the start of the study  Images were obtained from the parasternal, apical, subcostal, and suprasternal notch acoustic windows  Image quality was adequate  LEFT VENTRICLE: Size was normal  Systolic function was normal  Ejection fraction was estimated to be 60 %  There were no regional wall motion abnormalities  Wall thickness was mildly increased  No evidence of apical thrombus  DOPPLER: Left  ventricular diastolic function parameters were normal     RIGHT VENTRICLE: The size was normal  Systolic function was normal  Wall thickness was normal     LEFT ATRIUM: Size was normal     RIGHT ATRIUM: Size was normal     MITRAL VALVE: There was mild annular calcification  There was normal leaflet separation  DOPPLER: The transmitral velocity was within the normal range  There was no evidence for stenosis  There was mild regurgitation  AORTIC VALVE: The valve was trileaflet  Leaflets exhibited normal thickness and normal cuspal separation  DOPPLER: Transaortic velocity was within the normal range  There was no evidence for stenosis  There was no significant  regurgitation  TRICUSPID VALVE: The valve structure was normal  There was normal leaflet separation  DOPPLER: The transtricuspid velocity was within the normal range  There was no evidence for stenosis  There was mild regurgitation  PULMONIC VALVE: Leaflets exhibited normal thickness, no calcification, and normal cuspal separation  DOPPLER: The transpulmonic velocity was within the normal range  There was no significant regurgitation  PERICARDIUM: There was no pericardial effusion  The pericardium was normal in appearance  AORTA: The root exhibited mild dilatation  SYSTEMIC VEINS: IVC: The inferior vena cava was normal in size      SYSTEM MEASUREMENT TABLES    2D  %FS: 56 44 %  AV Diam: 4 82 cm  EDV(Teich): 96 73 ml  EF(Cube): 91 73 %  EF(Teich): 86 87 %  ESV(Cube): 7 98 ml  ESV(Teich): 12 7 ml  IVSd: 1 28 cm  LA Area: 25 75 cm2  LA Diam: 4 2 cm  LVEDV MOD A4C: 95 ml  LVEF MOD A4C: 67 1 %  LVESV MOD A4C: 31 25 ml  LVIDd: 4 59 cm  LVIDs: 2 cm  LVLd A4C: 8 2 cm  LVLs A4C: 6 6 cm  LVPWd: 1 33 cm  RA Area: 20 51 cm2  RVOT Diam: 3 07 cm  SV MOD A4C: 63 75 ml  SV(Cube): 88 57 ml  SV(Teich): 84 02 ml    CW  AV Env  Ti: 236 6 ms  AV VTI: 18 29 cm  AV Vmax: 1 34 m/s  AV Vmean: 0 77 m/s  AV maxP 16 mmHg  AV meanP 97 mmHg  PV Vmax: 1 05 m/s  PV maxP 43 mmHg  TR Vmax: 2 51 m/s  TR maxP 1 mmHg    PW  LVOT Env  Ti: 332 72 ms  LVOT VTI: 23 89 cm  LVOT Vmax: 1 06 m/s  LVOT Vmean: 0 71 m/s  LVOT maxP 58 mmHg  LVOT meanP 4 mmHg  PVA (Vmax): 6 3 cm2  RVOT Vmax: 0 9 m/s  RVOT maxPG: 3 23 mmHg    [facility]    Prepared and electronically signed by    Kristina Perera MD  Signed [id number] 13:43:56       No results found for this or any previous visit  No results found for this or any previous visit  No results found for this or any previous visit          I reviewed and interpreted the following LABS/EKG/TELE/IMAGING and below is summary of my interpretation (if data available):        Current EKG and Rhythm Strip: afib HR 67bpm narrow QRS      ECHO images reviewed and this is my interpretation: normal EF, normal LA size, no valve disease

## 2018-12-05 NOTE — PROGRESS NOTES
[facility] [address]     Outpatient New Consult   Today's Date: 12/05/18        Patient name: Darrell Mckeon  YOB: 1945  Sex: male         Chief Complaint: New dx afib      ASSESSMENT:  Problem List Items Addressed This Visit     New onset a-fib [facility])    Relevant Orders    POCT ECG        67 yo male  1) Persistent afib  HR 67bpm narrow QRS found incidentally by Dr Dalia Vasquez 11/14/18 for preop eval HR 70s in afib  Started on Xarelto 15mg and metoprolol  TTE was normal and no valve disease normal LA size  He has zero symptoms of afib  No CP/SOB/Palpitations  He has no exercise imitations other than his hips and ankles  Hits a "speed bag" and feels very good w/o SOB  SH was 4 1 FT4 1 08 euthyroid  Risk factors include social ETOH 2-3 beers per week and snoring possible SADAF, though denies fatigue/daytime sleepiness  CHADS2Vasc=3 (Age, HTN, DM)  He has some memory issues  PLAN:    1  Recommend we try DCCV since normal LA size maybe he will go back to NSR and stay in it, then maybe worth effort to keep in NSR long term w antiarrhythmics or ablation  Otherwise given how minimal symptoms are and he may have long standing persistenta fib I think risks outweigh benefits of aggressive care to maintain NSR  2  Continue xarelto/metoprolol  3  Recommend we wait unil January for hip surgery so we can try DCCV first w/o interupting anticoagulation    Follow up in: 1 month    Orders Placed This Encounter   Procedures    POCT ECG     Medications Discontinued During This Encounter   Medication Reason    chlorhexidine (HIBICLENS) 4 % external liquid Discontinued by another clinician                 HPI/Subjective:   67 yo male  1) Persistent afib  HR 67bpm narrow QRS found incidentally by Dr Dalia Vasquez 11/14/18 for preop eval HR 70s in afib  Started on Xarelto 15mg and metoprolol  TTE was normal and no valve disease normal LA size  He has zero symptoms of afib  No CP/SOB/Palpitations  He has no exercise imitations other than his hips and ankles  Hits a "speed bag" and feels very good w/o SOB  SH was 4 1 FT4 1 08 euthyroid  Risk factors include social ETOH 2-3 beers per week and snoring possible SADAF, though denies fatigue/daytime sleepiness  He has some memory issues  Please note HPI is listed by problem with with update following it, it is copied again in the assessment above and reflects medical decision making as well  Complete 12 point ROS reviewed and otherwise non pertinent or negative except as per HPI pertinent positives in Cardiovascular and Respiratory emphasized  Please see paper chart for outpatient clinic patients where the patient completed the 12 point ROS survey  Past Medical History:   Diagnosis Date    Acute on chronic renal insufficiency     Allergic rhinitis     Anemia 6/11/2012    Controlled type 2 diabetes mellitus without complication (HCC)     Generalized anxiety disorder     GERD without esophagitis 6/11/2012    Hypertension     Nephrolithiasis 3/17/2016    Obesity 6/11/2012    Osteoarthritis 6/11/2012       Allergies   Allergen Reactions    Pollen Extract      I reviewed the Home Medication list and Allergies in the chart     Scheduled Meds:  Current Outpatient Prescriptions   Medication Sig Dispense Refill    ALPRAZolam (XANAX) 0 5 mg tablet Take 1 tablet (0 5 mg total) by mouth every 8 (eight) hours as needed (AS NEEDED) 90 tablet 0    amLODIPine (NORVASC) 10 mg tablet take 1 tablet by mouth daily 90 tablet 3    atorvastatin (LIPITOR) 20 mg tablet Take 1 tablet by mouth daily      Blood Glucose Monitoring Suppl (ONE TOUCH ULTRA 2) w/Device KIT by Does not apply route      dorzolamide-timolol (COSOPT) 22 3-6 8 MG/ML ophthalmic solution instill 1 drop into both eyes twice a day  glucose blood test strip 1 Squirt by In Vitro route daily      metFORMIN (GLUCOPHAGE) 850 mg tablet Take 2 tablets (1,700 mg total) by mouth daily 180 tablet 0    metoprolol succinate (TOPROL-XL) 100 mg 24 hr tablet Take 1 tablet (100 mg total) by mouth daily 90 tablet 1    mupirocin (BACTROBAN) 2 % ointment APPLY A SMALL AMOUNT TO BOTH NOSTRILS TWICE DAILY FOR 5 DAYS PRIOR TO SURGERY  0    ONETOUCH DELICA LANCETS 38D MISC by Does not apply route daily      rivaroxaban (XARELTO) 15 mg tablet Take 1 tablet (15 mg total) by mouth daily with breakfast 90 tablet 1     No current facility-administered medications for this visit  PRN Meds:         Family History   Problem Relation Age of Onset    Arthritis Mother     Parkinsonism Mother     No Known Problems Father        Social History     Social History    Marital status: /Civil Union     Spouse name: N/A    Number of children: N/A    Years of education: N/A     Occupational History    Retired      Social History Main Topics    Smoking status: Former Smoker     Types: Cigars    Smokeless tobacco: Never Used      Comment: Cigars (__ a day)    Alcohol use Yes      Comment: Social    Drug use: No    Sexual activity: Not on file     Other Topics Concern    Not on file     Social History Narrative    No narrative on file         OBJECTIVE:    /76 (BP Location: Right arm, Patient Position: Sitting, Cuff Size: Large)   Pulse 67   Ht 5' 11" (1 803 m)   Wt 104 kg (230 lb 1 6 oz)   BMI 32 09 kg/m²   Vitals:    12/05/18 1429   Weight: 104 kg (230 lb 1 6 oz)     GEN: No acute distress, Alert and oriented, well appearing  HEENT:Head, neck, ears, oral pharynx: Mucus membranes moist, oral pharynx clear, nares clear   External ears normal  EYES: Pupils equal, sclera anicteric, midline, normal conjuctiva  NECK: No JVD, supple, no obvious masses or thryomegaly or goiter  CARDIOVASCULAR: irreg irreg, No murmur, rub, gallops S1,S2  LUNGS: Clear To auscultation bilaterally, normal effort, no rales, rhonchi, crackles  ABDOMEN:  nondistended,  without obvious organomegaly or ascites  EXTREMITIES/VASCULAR:  No edema  Radial pulses intact, pedal pulses difficult to palpate, warm an well perfused  PSYCH: Normal Affect, no overt suicidal ideation, linear speech pattern without evidence of psychosis  NEURO: Grossly intact, moving all extremiteis equal, face symmetric, alert and responsive, no obvious focal defecits  GAIT:  Ambulates normally without difficulty  HEME: No bleeding, bruising, petechia, purpura  SKIN: No significant rashes, warm, no diaphoresis or pallor  Lab Results:       LABS:      Chemistry        Component Value Date/Time     09/30/2014 1716    K 4 9 11/14/2018 0917    K 5 1 09/30/2014 1716     (H) 11/14/2018 0917     09/30/2014 1716    CO2 23 11/14/2018 0917    CO2 22 2 09/30/2014 1716    BUN 22 11/14/2018 0917    BUN 28 (H) 09/30/2014 1716    CREATININE 1 58 (H) 11/14/2018 0917    CREATININE 1 27 09/30/2014 1716        Component Value Date/Time    CALCIUM 8 7 11/14/2018 0917    CALCIUM 8 6 09/30/2014 1716    ALKPHOS 67 11/14/2018 0917    ALKPHOS 64 09/30/2014 1716    AST 8 11/14/2018 0917    AST 9 09/30/2014 1716    ALT 20 11/14/2018 0917    ALT 14 (L) 09/30/2014 1716    BILITOT 0 4 09/30/2014 1716            Lab Results   Component Value Date    CHOL 179 09/30/2014     Lab Results   Component Value Date    HDL 62 (H) 11/06/2017    HDL 62 (H) 02/17/2016    HDL 61 09/30/2014     Lab Results   Component Value Date    LDLCALC 102 (H) 11/06/2017    LDLCALC 106 (H) 02/17/2016    LDLCALC 101 (H) 09/30/2014     Lab Results   Component Value Date    TRIG 199 (H) 06/04/2018    TRIG 213 (H) 11/06/2017    TRIG 159 (H) 01/09/2017     No results found for: CHOLHDL    IMAGING: No results found       Cardiac testing:   Results for orders placed during the hospital encounter of 11/20/18   Echo complete with contrast if indicated Narrative [address]    Transthoracic Echocardiogram  2D, M-mode, Doppler, and Color Doppler    Study date:  2018    Patient: Beryl Mejia JUICE [facility]  MR number: [id number]  Account number: [de-identified]  : 1945  Age: 67 years  Gender: Male  Status: Outpatient  Location: [facility] [place]  Height: 71 in  Weight: 244 lb  BP: 150/ 82 mmHg    Indications: Atrial Fibrillation    Diagnoses: I48 0 - Atrial fibrillation    Sonographer:  Ajay Cuadra RDCS  Primary Physician:  Lisa Santos DO  Group:  [facility]  cc:  Vazquez Hendricks MD  Interpreting Physician:  Radha Haywood MD    SUMMARY    LEFT VENTRICLE:  Systolic function was normal  Ejection fraction was estimated to be 60 %  There were no regional wall motion abnormalities  Wall thickness was mildly increased  MITRAL VALVE:  There was mild annular calcification  There was mild regurgitation  TRICUSPID VALVE:  There was mild regurgitation  AORTA:  The root exhibited mild dilatation  HISTORY: PRIOR HISTORY: Diabetes, GERD, Obesity    PROCEDURE: The study was performed in the [facility]  This was a routine study  The transthoracic approach was used  The study included complete 2D imaging, M-mode, complete spectral Doppler, and color  Doppler  The heart rate was 96 bpm, at the start of the study  Images were obtained from the parasternal, apical, subcostal, and suprasternal notch acoustic windows  Image quality was adequate  LEFT VENTRICLE: Size was normal  Systolic function was normal  Ejection fraction was estimated to be 60 %  There were no regional wall motion abnormalities  Wall thickness was mildly increased  No evidence of apical thrombus   DOPPLER: Left  ventricular diastolic function parameters were normal     RIGHT VENTRICLE: The size was normal  Systolic function was normal  Wall thickness was normal     LEFT ATRIUM: Size was normal     RIGHT ATRIUM: Size was normal     MITRAL VALVE: There was mild annular calcification  There was normal leaflet separation  DOPPLER: The transmitral velocity was within the normal range  There was no evidence for stenosis  There was mild regurgitation  AORTIC VALVE: The valve was trileaflet  Leaflets exhibited normal thickness and normal cuspal separation  DOPPLER: Transaortic velocity was within the normal range  There was no evidence for stenosis  There was no significant  regurgitation  TRICUSPID VALVE: The valve structure was normal  There was normal leaflet separation  DOPPLER: The transtricuspid velocity was within the normal range  There was no evidence for stenosis  There was mild regurgitation  PULMONIC VALVE: Leaflets exhibited normal thickness, no calcification, and normal cuspal separation  DOPPLER: The transpulmonic velocity was within the normal range  There was no significant regurgitation  PERICARDIUM: There was no pericardial effusion  The pericardium was normal in appearance  AORTA: The root exhibited mild dilatation  SYSTEMIC VEINS: IVC: The inferior vena cava was normal in size  SYSTEM MEASUREMENT TABLES    2D  %FS: 56 44 %  AV Diam: 4 82 cm  EDV(Teich): 96 73 ml  EF(Cube): 91 73 %  EF(Teich): 86 87 %  ESV(Cube): 7 98 ml  ESV(Teich): 12 7 ml  IVSd: 1 28 cm  LA Area: 25 75 cm2  LA Diam: 4 2 cm  LVEDV MOD A4C: 95 ml  LVEF MOD A4C: 67 1 %  LVESV MOD A4C: 31 25 ml  LVIDd: 4 59 cm  LVIDs: 2 cm  LVLd A4C: 8 2 cm  LVLs A4C: 6 6 cm  LVPWd: 1 33 cm  RA Area: 20 51 cm2  RVOT Diam: 3 07 cm  SV MOD A4C: 63 75 ml  SV(Cube): 88 57 ml  SV(Teich): 84 02 ml    CW  AV Env  Ti: 236 6 ms  AV VTI: 18 29 cm  AV Vmax: 1 34 m/s  AV Vmean: 0 77 m/s  AV maxP 16 mmHg  AV meanP 97 mmHg  PV Vmax: 1 05 m/s  PV maxP 43 mmHg  TR Vmax: 2 51 m/s  TR maxP 1 mmHg    PW  LVOT Env  Ti: 332 72 ms  LVOT VTI: 23 89 cm  LVOT Vmax: 1 06 m/s  LVOT Vmean: 0 71 m/s  LVOT maxP 58 mmHg  LVOT meanP 4 mmHg  PVA (Vmax): 6 3 cm2  RVOT Vmax: 0 9 m/s  RVOT maxPG: 3 23 mmHg    [facility]    Prepared and electronically signed by    Shaista Davis MD  Signed [id number] 13:43:56       No results found for this or any previous visit  No results found for this or any previous visit  No results found for this or any previous visit          I reviewed and interpreted the following LABS/EKG/TELE/IMAGING and below is summary of my interpretation (if data available):        Current EKG and Rhythm Strip: afib HR 67bpm narrow QRS      ECHO images reviewed and this is my interpretation: normal EF, normal LA size, no valve disease

## 2018-12-07 ENCOUNTER — TELEPHONE (OUTPATIENT)
Dept: INTERNAL MEDICINE CLINIC | Facility: CLINIC | Age: 73
End: 2018-12-07

## 2018-12-07 NOTE — TELEPHONE ENCOUNTER
Sounds more like a hemorrhoid or fissure  Would monitor and and look for black stools or bleeding that won't stop  Increase po fluids, increase fiver, and can get some OTC stool softeners  To the ER or call if worsens

## 2018-12-14 ENCOUNTER — ANESTHESIA EVENT (OUTPATIENT)
Dept: SURGERY | Facility: HOSPITAL | Age: 73
End: 2018-12-14

## 2018-12-14 ENCOUNTER — HOSPITAL ENCOUNTER (OUTPATIENT)
Dept: NON INVASIVE DIAGNOSTICS | Facility: HOSPITAL | Age: 73
Discharge: HOME | End: 2018-12-14
Attending: INTERNAL MEDICINE | Admitting: INTERNAL MEDICINE
Payer: MEDICARE

## 2018-12-14 ENCOUNTER — ANESTHESIA (OUTPATIENT)
Dept: SURGERY | Facility: HOSPITAL | Age: 73
End: 2018-12-14

## 2018-12-14 VITALS
HEIGHT: 71 IN | SYSTOLIC BLOOD PRESSURE: 117 MMHG | OXYGEN SATURATION: 99 % | BODY MASS INDEX: 32.2 KG/M2 | HEART RATE: 56 BPM | DIASTOLIC BLOOD PRESSURE: 62 MMHG | TEMPERATURE: 97.7 F | WEIGHT: 230 LBS | RESPIRATION RATE: 18 BRPM

## 2018-12-14 DIAGNOSIS — I48.91 A-FIB (HCC): ICD-10-CM

## 2018-12-14 LAB
ANION GAP SERPL CALCULATED.3IONS-SCNC: 8 MMOL/L (ref 4–13)
ATRIAL RATE: 34 BPM
ATRIAL RATE: 55 BPM
BUN SERPL-MCNC: 25 MG/DL (ref 5–25)
CALCIUM SERPL-MCNC: 9.4 MG/DL (ref 8.3–10.1)
CHLORIDE SERPL-SCNC: 107 MMOL/L (ref 100–108)
CO2 SERPL-SCNC: 21 MMOL/L (ref 21–32)
CREAT SERPL-MCNC: 1.56 MG/DL (ref 0.6–1.3)
ERYTHROCYTE [DISTWIDTH] IN BLOOD BY AUTOMATED COUNT: 12 % (ref 11.6–15.1)
GFR SERPL CREATININE-BSD FRML MDRD: 43 ML/MIN/1.73SQ M
GLUCOSE P FAST SERPL-MCNC: 210 MG/DL (ref 65–99)
GLUCOSE SERPL-MCNC: 189 MG/DL (ref 65–140)
GLUCOSE SERPL-MCNC: 210 MG/DL (ref 65–140)
HCT VFR BLD AUTO: 36.3 % (ref 36.5–49.3)
HGB BLD-MCNC: 12.1 G/DL (ref 12–17)
INR PPP: 1.96 (ref 0.86–1.17)
MAGNESIUM SERPL-MCNC: 1.6 MG/DL (ref 1.6–2.6)
MCH RBC QN AUTO: 30.4 PG (ref 26.8–34.3)
MCHC RBC AUTO-ENTMCNC: 33.3 G/DL (ref 31.4–37.4)
MCV RBC AUTO: 91 FL (ref 82–98)
P AXIS: 77 DEGREES
PLATELET # BLD AUTO: 189 THOUSANDS/UL (ref 149–390)
PMV BLD AUTO: 10.6 FL (ref 8.9–12.7)
POTASSIUM SERPL-SCNC: 3.9 MMOL/L (ref 3.5–5.3)
PR INTERVAL: 180 MS
PROTHROMBIN TIME: 22.4 SECONDS (ref 11.8–14.2)
QRS AXIS: -15 DEGREES
QRS AXIS: -5 DEGREES
QRSD INTERVAL: 86 MS
QRSD INTERVAL: 86 MS
QT INTERVAL: 394 MS
QT INTERVAL: 452 MS
QTC INTERVAL: 432 MS
QTC INTERVAL: 437 MS
RBC # BLD AUTO: 3.98 MILLION/UL (ref 3.88–5.62)
SODIUM SERPL-SCNC: 136 MMOL/L (ref 136–145)
T WAVE AXIS: 14 DEGREES
T WAVE AXIS: 19 DEGREES
VENTRICULAR RATE: 55 BPM
VENTRICULAR RATE: 74 BPM
WBC # BLD AUTO: 6.55 THOUSAND/UL (ref 4.31–10.16)

## 2018-12-14 PROCEDURE — 83735 ASSAY OF MAGNESIUM: CPT | Performed by: INTERNAL MEDICINE

## 2018-12-14 PROCEDURE — 85027 COMPLETE CBC AUTOMATED: CPT | Performed by: INTERNAL MEDICINE

## 2018-12-14 PROCEDURE — 93010 ELECTROCARDIOGRAM REPORT: CPT | Performed by: INTERNAL MEDICINE

## 2018-12-14 PROCEDURE — 80048 BASIC METABOLIC PNL TOTAL CA: CPT | Performed by: INTERNAL MEDICINE

## 2018-12-14 PROCEDURE — 93005 ELECTROCARDIOGRAM TRACING: CPT

## 2018-12-14 PROCEDURE — 85610 PROTHROMBIN TIME: CPT | Performed by: INTERNAL MEDICINE

## 2018-12-14 PROCEDURE — 92960 CARDIOVERSION ELECTRIC EXT: CPT

## 2018-12-14 PROCEDURE — 82948 REAGENT STRIP/BLOOD GLUCOSE: CPT

## 2018-12-14 RX ORDER — SODIUM CHLORIDE 9 MG/ML
50 INJECTION, SOLUTION INTRAVENOUS CONTINUOUS
Status: DISCONTINUED | OUTPATIENT
Start: 2018-12-14 | End: 2018-12-14 | Stop reason: HOSPADM

## 2018-12-14 RX ORDER — LIDOCAINE HYDROCHLORIDE 10 MG/ML
INJECTION, SOLUTION INFILTRATION; PERINEURAL AS NEEDED
Status: DISCONTINUED | OUTPATIENT
Start: 2018-12-14 | End: 2018-12-14 | Stop reason: SURG

## 2018-12-14 RX ORDER — PROPOFOL 10 MG/ML
INJECTION, EMULSION INTRAVENOUS AS NEEDED
Status: DISCONTINUED | OUTPATIENT
Start: 2018-12-14 | End: 2018-12-14 | Stop reason: SURG

## 2018-12-14 RX ADMIN — SODIUM CHLORIDE 50 ML/HR: 0.9 INJECTION, SOLUTION INTRAVENOUS at 07:30

## 2018-12-14 RX ADMIN — PROPOFOL 80 MG: 10 INJECTION, EMULSION INTRAVENOUS at 08:27

## 2018-12-14 RX ADMIN — LIDOCAINE HYDROCHLORIDE 50 MG: 10 INJECTION, SOLUTION INFILTRATION; PERINEURAL at 08:27

## 2018-12-14 RX ADMIN — SODIUM CHLORIDE: 0.9 INJECTION, SOLUTION INTRAVENOUS at 08:12

## 2018-12-14 NOTE — PROCEDURES
Patient arrived in non-invasive lab  Consent signed  Defibrillator pads placed in AP orientation  Sedation achieved with propofol  Underwent one synchronized biphasic 200J shock  Successfully converted to normal sinus rhythm  Patient tolerated procedure without complications

## 2018-12-14 NOTE — ANESTHESIA POSTPROCEDURE EVALUATION
Post-Op Assessment Note      CV Status:  Stable    Mental Status:  Alert and awake    Hydration Status:  Euvolemic    PONV Controlled:  Controlled    Airway Patency:  Patent    Post Op Vitals Reviewed: Yes          Staff: CRNA           BP   104/59   Temp      Pulse  62   Resp   16   SpO2   99%

## 2018-12-14 NOTE — ANESTHESIA PREPROCEDURE EVALUATION
Review of Systems/Medical History  Patient summary reviewed    History of anesthetic complications     Cardiovascular  Hyperlipidemia, Hypertension ,    Pulmonary  Negative pulmonary ROS        GI/Hepatic    GERD ,        Kidney stones,        Endo/Other  Diabetes well controlled ,      GYN       Hematology  Anemia ,     Musculoskeletal    Arthritis     Neurology  Negative neurology ROS      Psychology   Anxiety,              Physical Exam    Airway    Mallampati score: II  TM Distance: >3 FB  Neck ROM: full     Dental   No notable dental hx     Cardiovascular  Rhythm: irregular, Rate: abnormal,     Pulmonary  Pulmonary exam normal     Other Findings        Anesthesia Plan  ASA Score- 3     Anesthesia Type- IV sedation with anesthesia with ASA Monitors  Additional Monitors:   Airway Plan:         Plan Factors-    Induction- intravenous  Postoperative Plan-     Informed Consent- Anesthetic plan and risks discussed with patient

## 2018-12-14 NOTE — PROGRESS NOTES
Verified with [facility] EP lab that patient is to take Tikosyn dose at scheduled time, not needed prior to willa/cv

## 2018-12-16 PROCEDURE — 92960 CARDIOVERSION ELECTRIC EXT: CPT | Performed by: INTERNAL MEDICINE

## 2019-01-08 ENCOUNTER — OFFICE VISIT (OUTPATIENT)
Dept: INTERNAL MEDICINE CLINIC | Facility: CLINIC | Age: 74
End: 2019-01-08
Payer: MEDICARE

## 2019-01-08 VITALS
RESPIRATION RATE: 18 BRPM | HEART RATE: 96 BPM | HEIGHT: 71 IN | SYSTOLIC BLOOD PRESSURE: 120 MMHG | OXYGEN SATURATION: 99 % | BODY MASS INDEX: 32.76 KG/M2 | WEIGHT: 234 LBS | TEMPERATURE: 98.3 F | DIASTOLIC BLOOD PRESSURE: 62 MMHG

## 2019-01-08 DIAGNOSIS — I48.20 CHRONIC ATRIAL FIBRILLATION (HCC): Primary | ICD-10-CM

## 2019-01-08 DIAGNOSIS — R41.3 MEMORY DIFFICULTIES: ICD-10-CM

## 2019-01-08 DIAGNOSIS — Z79.01 CHRONIC ANTICOAGULATION: ICD-10-CM

## 2019-01-08 PROCEDURE — 93000 ELECTROCARDIOGRAM COMPLETE: CPT | Performed by: INTERNAL MEDICINE

## 2019-01-08 PROCEDURE — 99214 OFFICE O/P EST MOD 30 MIN: CPT | Performed by: INTERNAL MEDICINE

## 2019-01-08 RX ORDER — DONEPEZIL HYDROCHLORIDE 5 MG/1
5 TABLET, FILM COATED ORAL
Qty: 90 TABLET | Refills: 0 | Status: SHIPPED | OUTPATIENT
Start: 2019-01-08 | End: 2019-04-04 | Stop reason: SDUPTHER

## 2019-01-08 NOTE — PATIENT INSTRUCTIONS
Dementia   AMBULATORY CARE:   Dementia  is a condition that causes loss of memory, thought control, and judgment  Dementia may develop quickly over a few months after a head injury or stroke  It may develop slowly over many years if you have Alzheimer disease  Dementia cannot be cured or prevented, but treatment may slow or reduce your symptoms  Common symptoms include the following:   · Loss of short-term memory, followed by loss of long-term memory    · Trouble remembering to go to the bathroom, to urinate, or have a bowel movement    · Anger or violent behavior     · Depression, anxiety, or hallucinations  Seek care immediately if  you or someone close to you notices:  · You have signs of delirium, such as extreme confusion, and seeing or hearing things that are not there  · You become angry or violent, and cannot be calmed down  · You faint and cannot be woken  Contact your healthcare provider if  you or someone close to you notices:  · You have a fever  · You have increased confusion, behavior, or mood changes  · You have questions or concerns about your condition or care  Treatment for dementia  may include medicines to slow memory loss  You may also need medicines to help control anger, decrease anxiety, or improve your mood  Take your medicines as directed  Manage dementia:   · Keep your mind and body active  Do activities that you love, such as art, gardening, or listening to music  Call or visit people often  This will keep your social skills sharp, and may help reduce depression  · Write daily schedules and routines  Record medical appointments, times to take your medicines, meal times, or any other things to remember  Write down reminders to use the bathroom if you have trouble remembering  You may need to ask someone to write things down for you  · Place clocks and calendars where you can see them  This will help you remember appointments and tasks  · Do not smoke  Nicotine and other chemicals in cigarettes and cigars can cause lung damage  Ask your healthcare provider for information if you currently smoke and need help to quit  E-cigarettes or smokeless tobacco still contain nicotine  Talk to your healthcare provider before you use these products  · Eat healthy foods  Examples are fruits, vegetables, whole-grain breads, low-fat dairy products, beans, lean meats, and fish  Ask if you need to be on a special diet  · Ask your healthcare provider for a list of organizations that can help  You may begin to need an in-home aide to help you remember your daily tasks  Arrange for help while you are thinking clearly  Follow up with your healthcare provider as directed:  Ask someone to go with you to help you remember what your healthcare provider tells you  The person can take notes for you during the visit and go over the notes with you later  Write down your questions so you remember to ask them during your visits  © 2017 [employer] Information is for End User's use only and may not be sold, redistributed or otherwise used for commercial purposes  All illustrations and images included in CareNotes® are the copyrighted property of [employer]  or Pradip Spivey  The above information is an  only  It is not intended as medical advice for individual conditions or treatments  Talk to your doctor, nurse or pharmacist before following any medical regimen to see if it is safe and effective for you

## 2019-01-08 NOTE — PROGRESS NOTES
Assessment/Plan:    No problem-specific Assessment & Plan notes found for this encounter  Diagnoses and all orders for this visit:    Chronic atrial fibrillation (HCC)  -     POCT ECG    Memory difficulties  -     donepezil (ARICEPT) 5 mg tablet; Take 1 tablet (5 mg total) by mouth daily at bedtime    Chronic anticoagulation      A/P: EKG with afib with GVR  Will inform cards and continue current treatment  Tolerating the meds  Discussed dementia and pt willing to start Aricept  Suspect early SDAT, but multi-infarct may be contributing as well given recent discovery of afib  Continue to modify risks  May need an MRI  Labs have been good  RTC six weeks for routine  Subjective:      Patient ID: Destiny Szymanski is a 68 y o  male  WM RTC with his wife for f/u AFIB after undergoing electrical cardioversion several weeks ago  Feels fine and no CP, SOB, palpitations, edema, orthopnea, or PND  Tolerating his meds  Only new c/o is from his wife that the pt's short term memory is starting to get bad  Pt forgets why he went to the store, that he has already eaten dinner, taking his meds, etc  Denies excessive ETOH use, illicit drug use, no h/o CVA's, etc  No Fhx of dementia  The following portions of the patient's history were reviewed and updated as appropriate:   He  has a past medical history of Acute on chronic renal insufficiency; Allergic rhinitis; Anemia (6/11/2012); Controlled type 2 diabetes mellitus without complication ([facility] ); Generalized anxiety disorder; GERD without esophagitis (6/11/2012); Hypertension; Memory difficulties (1/8/2019); Nephrolithiasis (3/17/2016); Obesity (6/11/2012); and Osteoarthritis (6/11/2012)    He   Patient Active Problem List    Diagnosis Date Noted    Chronic anticoagulation 01/08/2019    Memory difficulties 01/08/2019    Mild concentric left ventricular hypertrophy (LVH) 11/26/2018    Chronic atrial fibrillation ([facility] ) 11/26/2018    Primary osteoarthritis of right hip 10/30/2018    Type 2 diabetes mellitus with diabetic chronic kidney disease ([facility] ) 08/22/2017    Nephrolithiasis 03/17/2016    Diabetic polyneuropathy associated with type 2 diabetes mellitus ([facility] ) 02/17/2016    Hyperlipidemia 02/17/2016    Eczema 01/28/2013    Anemia 06/11/2012    Erectile dysfunction of non-organic origin 06/11/2012    Generalized anxiety disorder 06/11/2012    GERD without esophagitis 06/11/2012    Hypertension 06/11/2012    Obesity 06/11/2012    Osteoarthritis 06/11/2012    Polyp of sigmoid colon 06/11/2012     He  has a past surgical history that includes Total hip arthroplasty; Knee surgery; and Cataract extraction, bilateral   His family history includes Arthritis in his mother; No Known Problems in his father; Parkinsonism in his mother  He  reports that he has never smoked  He has never used smokeless tobacco  He reports that he does not drink alcohol or use drugs    Current Outpatient Prescriptions   Medication Sig Dispense Refill    ALPRAZolam (XANAX) 0 5 mg tablet Take 1 tablet (0 5 mg total) by mouth every 8 (eight) hours as needed (AS NEEDED) 90 tablet 0    amLODIPine (NORVASC) 10 mg tablet take 1 tablet by mouth daily 90 tablet 3    atorvastatin (LIPITOR) 20 mg tablet Take 1 tablet by mouth daily      Blood Glucose Monitoring Suppl (ONE TOUCH ULTRA 2) w/Device KIT by Does not apply route      donepezil (ARICEPT) 5 mg tablet Take 1 tablet (5 mg total) by mouth daily at bedtime 90 tablet 0    dorzolamide-timolol (COSOPT) 22 3-6 8 MG/ML ophthalmic solution instill 1 drop into both eyes twice a day      glucose blood test strip 1 Squirt by In Vitro route daily      metFORMIN (GLUCOPHAGE) 850 mg tablet Take 2 tablets (1,700 mg total) by mouth daily 180 tablet 0    metoprolol succinate (TOPROL-XL) 100 mg 24 hr tablet Take 1 tablet (100 mg total) by mouth daily 90 tablet 1    ONETOUCH DELICA LANCETS 09S MISC by Does not apply route daily      rivaroxaban (XARELTO) 15 mg tablet Take 1 tablet (15 mg total) by mouth daily with breakfast 90 tablet 1     No current facility-administered medications for this visit  Current Outpatient Prescriptions on File Prior to Visit   Medication Sig    ALPRAZolam (XANAX) 0 5 mg tablet Take 1 tablet (0 5 mg total) by mouth every 8 (eight) hours as needed (AS NEEDED)    amLODIPine (NORVASC) 10 mg tablet take 1 tablet by mouth daily    atorvastatin (LIPITOR) 20 mg tablet Take 1 tablet by mouth daily    Blood Glucose Monitoring Suppl (ONE TOUCH ULTRA 2) w/Device KIT by Does not apply route    dorzolamide-timolol (COSOPT) 22 3-6 8 MG/ML ophthalmic solution instill 1 drop into both eyes twice a day    glucose blood test strip 1 Squirt by In Vitro route daily    metFORMIN (GLUCOPHAGE) 850 mg tablet Take 2 tablets (1,700 mg total) by mouth daily    metoprolol succinate (TOPROL-XL) 100 mg 24 hr tablet Take 1 tablet (100 mg total) by mouth daily    ONETOUCH DELICA LANCETS 54U MISC by Does not apply route daily    rivaroxaban (XARELTO) 15 mg tablet Take 1 tablet (15 mg total) by mouth daily with breakfast     No current facility-administered medications on file prior to visit  He is allergic to pollen extract       Review of Systems   Constitutional: Negative for activity change, chills, diaphoresis, fatigue and fever  Respiratory: Negative for cough, chest tightness, shortness of breath and wheezing  Cardiovascular: Negative for chest pain, palpitations and leg swelling  Gastrointestinal: Negative for abdominal pain, constipation, diarrhea, nausea and vomiting  Genitourinary: Negative for difficulty urinating, dysuria and frequency  Musculoskeletal: Negative for arthralgias, gait problem and myalgias  Neurological: Negative for dizziness, tremors, seizures, syncope, speech difficulty, weakness, light-headedness and headaches  Memory issues      Psychiatric/Behavioral: Negative for confusion, dysphoric mood and sleep disturbance  The patient is not nervous/anxious  Objective:      /62 (BP Location: Left arm, Patient Position: Sitting, Cuff Size: Adult)   Pulse 96   Temp 98 3 °F (36 8 °C) (Tympanic)   Resp 18   Ht 5' 11" (1 803 m)   Wt 106 kg (234 lb)   SpO2 99%   BMI 32 64 kg/m²          Physical Exam   Constitutional: He is oriented to person, place, and time  He appears well-developed and well-nourished  No distress  HENT:   Head: Normocephalic and atraumatic  Mouth/Throat: Oropharynx is clear and moist    Eyes: Pupils are equal, round, and reactive to light  Conjunctivae and EOM are normal    Neck: Normal range of motion  Neck supple  No JVD present  Cardiovascular: Normal rate and normal heart sounds  No murmur heard  RRR with ectopy vs irregular irregular with GVR  Pulmonary/Chest: Effort normal and breath sounds normal  No respiratory distress  He has no wheezes  He has no rales  Musculoskeletal: He exhibits no edema  Neurological: He is alert and oriented to person, place, and time  Short term memory is bad at times with pt forgetting a series of words, etc     Psychiatric: He has a normal mood and affect  His behavior is normal  Judgment and thought content normal    Nursing note and vitals reviewed

## 2019-01-09 ENCOUNTER — TELEPHONE (OUTPATIENT)
Dept: INTERNAL MEDICINE CLINIC | Facility: CLINIC | Age: 74
End: 2019-01-09

## 2019-01-09 NOTE — TELEPHONE ENCOUNTER
----- Message from Mortimer Simple, DO sent at 1/8/2019  2:35 PM EST -----  Call pt, spoke with cards and they wish to just continue current treatment  Have pt keep f/u appt in several weeks for routine    ----- Message -----  From: Catalino Suarez MD  Sent: 1/8/2019   2:32 PM  To: Mortimer Simple, DO    Thanks for the open communication  Agree if back in afib now I think we just leave on xarelto and not pursue aggressive rhythm management  His rates are good as is      ----- Message -----  From: Mortimer Simple, DO  Sent: 1/8/2019   2:10 PM  To: Catalino Suarez MD    Hey doc,  It's Yanira Keller the internist for Mr Val Ulloa  He was in for f/u ekg for his afib after cardioversion  Appear to be back in Afib with a GVR and completely asymptomatic  See EKG  Will await your input if any further changes needed  Thanks

## 2019-01-12 DIAGNOSIS — E78.2 MIXED HYPERLIPIDEMIA: Primary | ICD-10-CM

## 2019-01-13 RX ORDER — ATORVASTATIN CALCIUM 20 MG/1
TABLET, FILM COATED ORAL
Qty: 90 TABLET | Refills: 2 | Status: SHIPPED | OUTPATIENT
Start: 2019-01-13 | End: 2019-04-04 | Stop reason: SDUPTHER

## 2019-02-19 ENCOUNTER — OFFICE VISIT (OUTPATIENT)
Dept: INTERNAL MEDICINE CLINIC | Facility: CLINIC | Age: 74
End: 2019-02-19
Payer: MEDICARE

## 2019-02-19 VITALS
OXYGEN SATURATION: 100 % | RESPIRATION RATE: 18 BRPM | WEIGHT: 229 LBS | HEIGHT: 71 IN | BODY MASS INDEX: 32.06 KG/M2 | TEMPERATURE: 98.5 F | SYSTOLIC BLOOD PRESSURE: 128 MMHG | DIASTOLIC BLOOD PRESSURE: 76 MMHG | HEART RATE: 52 BPM

## 2019-02-19 DIAGNOSIS — I10 ESSENTIAL HYPERTENSION: ICD-10-CM

## 2019-02-19 DIAGNOSIS — D64.9 ANEMIA, UNSPECIFIED TYPE: ICD-10-CM

## 2019-02-19 DIAGNOSIS — I48.20 CHRONIC ATRIAL FIBRILLATION (HCC): ICD-10-CM

## 2019-02-19 DIAGNOSIS — Z79.01 CHRONIC ANTICOAGULATION: ICD-10-CM

## 2019-02-19 DIAGNOSIS — E78.5 HYPERLIPIDEMIA, UNSPECIFIED HYPERLIPIDEMIA TYPE: ICD-10-CM

## 2019-02-19 DIAGNOSIS — E11.22 TYPE 2 DIABETES MELLITUS WITH CHRONIC KIDNEY DISEASE, WITHOUT LONG-TERM CURRENT USE OF INSULIN, UNSPECIFIED CKD STAGE (HCC): Primary | ICD-10-CM

## 2019-02-19 DIAGNOSIS — K21.9 GERD WITHOUT ESOPHAGITIS: ICD-10-CM

## 2019-02-19 DIAGNOSIS — F41.1 GENERALIZED ANXIETY DISORDER: ICD-10-CM

## 2019-02-19 DIAGNOSIS — R41.3 MEMORY DIFFICULTIES: ICD-10-CM

## 2019-02-19 PROCEDURE — 99214 OFFICE O/P EST MOD 30 MIN: CPT | Performed by: INTERNAL MEDICINE

## 2019-02-19 NOTE — PROGRESS NOTES
Assessment/Plan:    No problem-specific Assessment & Plan notes found for this encounter  Diagnoses and all orders for this visit:    Type 2 diabetes mellitus with chronic kidney disease, without long-term current use of insulin, unspecified CKD stage (HCC)  -     Hemoglobin A1C; Future    GERD without esophagitis    Essential hypertension    Chronic atrial fibrillation (HCC)    Anemia, unspecified type    Chronic anticoagulation    Generalized anxiety disorder    Hyperlipidemia, unspecified hyperlipidemia type  -     Lipid Panel with Direct LDL reflex; Future    Memory difficulties      A/P: Doing well and will check labs  Refusing second pneumonia vaccine  Continue current treatment and RTC three months for routine  Subjective:      Patient ID: Anne Blair is a 68 y o  male  WM RTC for f/u dm,htn, etc  Doing well and no new issues  Sugars less than 140  No low sugar events  Remains active w/o difficulty and no falls  No CP, SOB, palpitations, or lightheadedness  ALMA is good  Memory is no worse  DJD pain continues, but no worse  Due for labs and vaccines  The following portions of the patient's history were reviewed and updated as appropriate:   He  has a past medical history of Acute on chronic renal insufficiency, Allergic rhinitis, Anemia (6/11/2012), Controlled type 2 diabetes mellitus without complication ([facility] ), Generalized anxiety disorder, GERD without esophagitis (6/11/2012), Hypertension, Memory difficulties (1/8/2019), Nephrolithiasis (3/17/2016), Obesity (6/11/2012), and Osteoarthritis (6/11/2012)    He   Patient Active Problem List    Diagnosis Date Noted    Chronic anticoagulation 01/08/2019    Memory difficulties 01/08/2019    Mild concentric left ventricular hypertrophy (LVH) 11/26/2018    Chronic atrial fibrillation ([facility] ) 11/26/2018    Primary osteoarthritis of right hip 10/30/2018    Type 2 diabetes mellitus with [facility] chronic [facility] ) 08/22/2017    Nephrolithiasis 03/17/2016    Diabetic polyneuropathy associated with type 2 diabetes mellitus ([facility] ) 02/17/2016    Hyperlipidemia 02/17/2016    Eczema 01/28/2013    Anemia 06/11/2012    Erectile dysfunction of non-organic origin 06/11/2012    Generalized anxiety disorder 06/11/2012    GERD without esophagitis 06/11/2012    Hypertension 06/11/2012    Obesity 06/11/2012    Osteoarthritis 06/11/2012    Polyp of sigmoid colon 06/11/2012     He  has a past surgical history that includes Total hip arthroplasty; Knee surgery; and Cataract extraction, bilateral   His family history includes Arthritis in his mother; No Known Problems in his father; Parkinsonism in his mother  He  reports that he has never smoked  He has never used smokeless tobacco  He reports that he does not drink alcohol or use drugs    Current Outpatient Medications   Medication Sig Dispense Refill    ALPRAZolam (XANAX) 0 5 mg tablet Take 1 tablet (0 5 mg total) by mouth every 8 (eight) hours as needed (AS NEEDED) 90 tablet 0    amLODIPine (NORVASC) 10 mg tablet take 1 tablet by mouth daily 90 tablet 3    atorvastatin (LIPITOR) 20 mg tablet take 1 tablet by mouth once daily 90 tablet 2    Blood Glucose Monitoring Suppl (ONE TOUCH ULTRA 2) w/Device KIT by Does not apply route      donepezil (ARICEPT) 5 mg tablet Take 1 tablet (5 mg total) by mouth daily at bedtime 90 tablet 0    dorzolamide-timolol (COSOPT) 22 3-6 8 MG/ML ophthalmic solution instill 1 drop into both eyes twice a day      glucose blood test strip 1 Squirt by In Vitro route daily      metFORMIN (GLUCOPHAGE) 850 mg tablet Take 2 tablets (1,700 mg total) by mouth daily 180 tablet 0    metoprolol succinate (TOPROL-XL) 100 mg 24 hr tablet Take 1 tablet (100 mg total) by mouth daily 90 tablet 1    ONETOUCH DELICA LANCETS 29S MISC by Does not apply route daily      rivaroxaban (XARELTO) 15 mg tablet Take 1 tablet (15 mg total) by mouth daily with breakfast 90 tablet 1     No current facility-administered medications for this visit  Current Outpatient Medications on File Prior to Visit   Medication Sig    ALPRAZolam (XANAX) 0 5 mg tablet Take 1 tablet (0 5 mg total) by mouth every 8 (eight) hours as needed (AS NEEDED)    amLODIPine (NORVASC) 10 mg tablet take 1 tablet by mouth daily    atorvastatin (LIPITOR) 20 mg tablet take 1 tablet by mouth once daily    Blood Glucose Monitoring Suppl (ONE TOUCH ULTRA 2) w/Device KIT by Does not apply route    donepezil (ARICEPT) 5 mg tablet Take 1 tablet (5 mg total) by mouth daily at bedtime    dorzolamide-timolol (COSOPT) 22 3-6 8 MG/ML ophthalmic solution instill 1 drop into both eyes twice a day    glucose blood test strip 1 Squirt by In Vitro route daily    metFORMIN (GLUCOPHAGE) 850 mg tablet Take 2 tablets (1,700 mg total) by mouth daily    metoprolol succinate (TOPROL-XL) 100 mg 24 hr tablet Take 1 tablet (100 mg total) by mouth daily    ONETOUCH DELICA LANCETS 54J MISC by Does not apply route daily    rivaroxaban (XARELTO) 15 mg tablet Take 1 tablet (15 mg total) by mouth daily with breakfast     No current facility-administered medications on file prior to visit  He is allergic to pollen extract       Review of Systems   Constitutional: Negative for activity change, chills, diaphoresis, fatigue and fever  HENT: Negative  Eyes: Negative for visual disturbance  Respiratory: Negative for cough, chest tightness, shortness of breath and wheezing  Cardiovascular: Negative for chest pain, palpitations and leg swelling  Gastrointestinal: Negative for abdominal pain, constipation, diarrhea, nausea and vomiting  Endocrine: Negative for cold intolerance and heat intolerance  Genitourinary: Negative for difficulty urinating, dysuria and frequency  Musculoskeletal: Negative for arthralgias, gait problem and myalgias  Neurological: Negative for dizziness, seizures, syncope, weakness, light-headedness and headaches  Psychiatric/Behavioral: Negative for confusion, dysphoric mood and sleep disturbance  The patient is not nervous/anxious  Objective:      /76 (BP Location: Left arm, Patient Position: Sitting, Cuff Size: Adult)   Pulse (!) 52   Temp 98 5 °F (36 9 °C) (Tympanic)   Resp 18   Ht 5' 11" (1 803 m)   Wt 104 kg (229 lb)   SpO2 100%   BMI 31 94 kg/m²          Physical Exam   Constitutional: He is oriented to person, place, and time  He appears well-developed and well-nourished  No distress  HENT:   Head: Normocephalic and atraumatic  Mouth/Throat: Oropharynx is clear and moist    Eyes: Pupils are equal, round, and reactive to light  Conjunctivae and EOM are normal    Neck: Normal range of motion  Neck supple  No JVD present  Cardiovascular:   Irregular irregular with GVR  Pulmonary/Chest: Effort normal and breath sounds normal  No respiratory distress  He has no wheezes  He has no rales  Abdominal: Soft  Bowel sounds are normal  He exhibits no distension  There is no tenderness  Musculoskeletal: He exhibits no edema  Neurological: He is alert and oriented to person, place, and time  Psychiatric: He has a normal mood and affect  His behavior is normal  Judgment and thought content normal    Nursing note and vitals reviewed

## 2019-02-19 NOTE — PATIENT INSTRUCTIONS
Type 2 Diabetes in Adults   WHAT YOU NEED TO KNOW:   What is type 2 diabetes? Type 2 diabetes is a disease that affects how your body uses glucose (sugar)  Normally, when the blood sugar level increases, the pancreas makes more insulin  Insulin helps move sugar out of the blood so it can be used for energy  Type 2 diabetes develops because either the body cannot make enough insulin, or it cannot use the insulin correctly  After many years, your pancreas may stop making insulin  What increases my risk for type 2 diabetes? · Obesity    · Physical inactivity    · Older age    · High blood pressure or high cholesterol    · A history of heart disease, gestational diabetes, or polycystic ovary syndrome     · A family member with diabetes    · Being [place], , , [place], or [facility]  What are the signs and symptoms of type 2 diabetes? You may have high blood sugar levels for a long time before symptoms appear  You may have any of the following:  · More hunger or thirst than usual     · Frequent urination     · Weight loss without trying     · Blurred vision  How is type 2 diabetes diagnosed? You may need tests to check for type 2 diabetes starting at age 39  You may need any of the following:  · An A1c test  shows the average amount of sugar in your blood over the past 2 to 3 months  Your healthcare provider will tell you the A1c level that is right for you  The goal for your A1c is usually below 7%  Your provider can help you make changes if a check shows the A1c is too high  · A fasting plasma glucose test  is when your blood sugar level is tested after you have not eaten for 8 hours  · A 2-hour plasma glucose test  starts with a blood sugar level check after you have not eaten for 8 hours  You are then given a glucose drink  Your blood sugar level is checked after 2 hours       · A random glucose test  may be done any time of day, no matter how long ago you ate   How is type 2 diabetes treated? Type 2 diabetes can be controlled to prevent damage to your heart, blood vessels, and other organs  The goal is to keep your blood sugar at a normal level  You must eat the right foods, and exercise regularly  You may need 1 or more hypoglycemic medicines or insulin if you cannot control your blood sugar level with nutrition and exercise  You may also need medicine to lower your risk for heart disease  An example includes medicine to lower or control your cholesterol  How do I check my blood sugar level? You will be taught how to check a small drop of blood in a glucose monitor  You will need to check your blood sugar level at least 3 times each day if you are on insulin  Ask your healthcare provider when and how often to check during the day  If you check your blood sugar level before a meal , it should be between 80 and 130 mg/dL  If you check your blood sugar level 1 to 2 hours after a meal , it should be less than 180 mg/dL  Ask your healthcare provider if these are good goals for you  Write down your results, and show them to your healthcare provider  Your provider may use the results to make changes to your medicine, food, and exercise schedules  What should I do if my blood sugar level is too low? Your blood sugar level is too low if it goes below 70 mg/dL  If the level is too low, eat or drink 15 grams of fast-acting carbohydrate  These are found naturally in fruits  Fast-acting carbohydrates will raise your blood sugar level quickly  Examples of 15 grams of fast-acting carbohydrate are 4 ounces (½ cup) of fruit juice or 4 ounces of regular soda  Other examples are 2 tablespoons of raisins or 3 to 4 glucose tablets  Check your blood sugar level 15 minutes later  If the level is still low (less than 100 mg/dL), eat another 15 grams of carbohydrate  When the level returns to 100 mg/dL, eat a snack or meal that contains carbohydrates   This will help prevent another drop in blood sugar  Always carefully follow your healthcare provider's instructions on how to treat low blood sugar levels  What do I need to know about nutrition? A dietitian will help you make a meal plan to keep your blood sugar level steady  Do not skip meals  Your blood sugar level may drop too low if you have taken diabetes medicine and do not eat  · Keep track of carbohydrates (sugar and starchy foods)  Your blood sugar level can get too high if you eat too many carbohydrates  Eat fruits, legumes, vegetables, and whole grains  Your dietitian will help you plan meals and snacks that have the right amount of carbohydrates  · Eat low-fat foods , such as skinless chicken and low-fat milk  · Eat less sodium (salt)  Limit high-sodium foods, such as soy sauce, potato chips, and soup  Do not add salt to food you cook  Limit your use of table salt  You should have less than 2,300 mg of sodium per day  · Eat high-fiber foods , such as vegetables, whole-grain breads, and beans  · Limit alcohol  Alcohol affects your blood sugar level and can make it harder to manage your diabetes  Limit alcohol to 1 drink a day if you are a woman  Limit alcohol to 2 drinks a day if you are a man  A drink of alcohol is 12 ounces of beer, 5 ounces of wine, or 1½ ounces of liquor  How much exercise do I need? Exercise can help keep your blood sugar level steady, decrease your risk of heart disease, and help you lose weight  Stretch before and after you exercise  Exercise for at least 150 minutes every week  Spread this amount of exercise over at least 3 days a week  Do not skip exercise more than 2 days in a row  Include muscle strengthening activities 2 to 3 days each week  Older adults should include balance training 2 to 3 times each week  Activities that help increase balance include yoga and kalina chi  Work with your healthcare provider to create an exercise plan    · Check your blood sugar level before and after exercise  Healthcare providers may tell you to change the amount of insulin you take or food you eat  If your blood sugar level is high, check your blood or urine for ketones before you exercise  Do not exercise if your blood sugar level is high and you have ketones  · If your blood sugar level is less than 100 mg/dL, have a carbohydrate snack before you exercise  Examples are 4 to 6 crackers, ½ banana, 8 ounces (1 cup) of milk, or 4 ounces (½ cup) of juice  Drink water or liquids that do not contain sugar before, during, and after exercise  Ask your dietitian or healthcare provider which liquids you should drink when you exercise  · Do not sit for longer than 30 minutes  If you cannot walk around, at least stand up  This will help you stay active and keep your blood circulating  What else can I do to manage type 2 diabetes? · Check your feet each day for sores  Wear shoes and socks that fit correctly  Do not trim your toenails  Ask your healthcare provider for more information about foot care  · Maintain a healthy weight  Ask your healthcare provider how much you should weigh  A healthy weight can help you control your diabetes and prevent heart disease  Ask your provider to help you create a weight loss plan if you are overweight  Together you can set manageable weight loss goals  · Do not smoke  Nicotine and other chemicals in cigarettes and cigars can cause lung damage and make it more difficult to manage your diabetes  Ask your healthcare provider for information if you currently smoke and need help to quit  Do not use e-cigarettes or smokeless tobacco in place of cigarettes or to help you quit  They still contain nicotine  · Check your blood pressure as directed  Ask your healthcare provider what your blood pressure should be  Most adults with diabetes and high blood pressure should have a systolic blood pressure (first number) less than 140   Your diastolic blood pressure (second number) should be less than 90  · Wear medical alert identification  Wear medical alert jewelry or carry a card that says you have diabetes  Ask your healthcare provider where to get these items  · Ask about vaccines  You have a higher risk for serious illness if you get the flu, pneumonia, or hepatitis  Ask your healthcare provider if you should get a flu, pneumonia, or hepatitis B vaccine, and when to get the vaccine  What are the risks of type 2 diabetes? Uncontrolled diabetes can damage your nerves, veins, and arteries  High blood sugar levels may damage other body tissue and organs over time  Damage to arteries may increase your risk for heart attack and stroke  Nerve damage may also lead to other heart, stomach, and nerve problems  Diabetes is life-threatening if it is not controlled  Control your blood glucose levels to prevent health problems  Call 911 for any of the following:   · You have any of the following signs of a stroke:      ¨ Numbness or drooping on one side of your face     ¨ Weakness in an arm or leg    ¨ Confusion or difficulty speaking    ¨ Dizziness, a severe headache, or vision loss    · You have any of the following signs of a heart attack:      ¨ Squeezing, pressure, or pain in your chest that lasts longer than 5 minutes or returns    ¨ Discomfort or pain in your back, neck, jaw, stomach, or arm     ¨ Trouble breathing    ¨ Nausea or vomiting    ¨ Lightheadedness or a sudden cold sweat, especially with chest pain or trouble breathing  When should I seek immediate care? · You have severe abdominal pain, or the pain spreads to your back  You may also be vomiting  · You have trouble staying awake or focusing  · You are shaking or sweating  · You have blurred or double vision  · Your breath has a fruity, sweet smell  · Your breathing is deep and labored, or rapid and shallow  · Your heartbeat is fast and weak    When should I contact my healthcare provider? · You are vomiting or have diarrhea  · You have an upset stomach and cannot eat the foods on your meal plan  · You feel weak or more tired than usual      · You feel dizzy, have headaches, or are easily irritated  · Your skin is red, warm, dry, or swollen  · You have a wound that does not heal      · You have numbness in your arms or legs  · You have trouble coping with your illness, or you feel anxious or depressed  · You have questions or concerns about your condition or care  CARE AGREEMENT:   You have the right to help plan your care  Learn about your health condition and how it may be treated  Discuss treatment options with your caregivers to decide what care you want to receive  You always have the right to refuse treatment  The above information is an  only  It is not intended as medical advice for individual conditions or treatments  Talk to your doctor, nurse or pharmacist before following any medical regimen to see if it is safe and effective for you  © 2017 [employer] Information is for End User's use only and may not be sold, redistributed or otherwise used for commercial purposes  All illustrations and images included in CareNotes® are the copyrighted property of [employer]  or Pradip Spivey

## 2019-02-22 ENCOUNTER — APPOINTMENT (OUTPATIENT)
Dept: LAB | Facility: CLINIC | Age: 74
End: 2019-02-22
Payer: MEDICARE

## 2019-02-22 DIAGNOSIS — E78.5 HYPERLIPIDEMIA, UNSPECIFIED HYPERLIPIDEMIA TYPE: ICD-10-CM

## 2019-02-22 DIAGNOSIS — R79.89 ABNORMAL THYROID BLOOD TEST: ICD-10-CM

## 2019-02-22 DIAGNOSIS — I48.91 NEW ONSET A-FIB (HCC): ICD-10-CM

## 2019-02-22 DIAGNOSIS — E11.22 TYPE 2 DIABETES MELLITUS WITH CHRONIC KIDNEY DISEASE, WITHOUT LONG-TERM CURRENT USE OF INSULIN, UNSPECIFIED CKD STAGE (HCC): ICD-10-CM

## 2019-02-22 LAB
CHOLEST SERPL-MCNC: 108 MG/DL (ref 50–200)
EST. AVERAGE GLUCOSE BLD GHB EST-MCNC: 194 MG/DL
HBA1C MFR BLD: 8.4 % (ref 4.2–6.3)
HDLC SERPL-MCNC: 49 MG/DL (ref 40–60)
LDLC SERPL CALC-MCNC: 33 MG/DL (ref 0–100)
T3 SERPL-MCNC: 1.3 NG/ML (ref 0.6–1.8)
T4 FREE SERPL-MCNC: 1.01 NG/DL (ref 0.76–1.46)
TRIGL SERPL-MCNC: 131 MG/DL
TSH SERPL DL<=0.05 MIU/L-ACNC: 4.43 UIU/ML (ref 0.36–3.74)

## 2019-02-22 PROCEDURE — 84439 ASSAY OF FREE THYROXINE: CPT

## 2019-02-22 PROCEDURE — 36415 COLL VENOUS BLD VENIPUNCTURE: CPT

## 2019-02-22 PROCEDURE — 83036 HEMOGLOBIN GLYCOSYLATED A1C: CPT

## 2019-02-22 PROCEDURE — 86376 MICROSOMAL ANTIBODY EACH: CPT

## 2019-02-22 PROCEDURE — 84443 ASSAY THYROID STIM HORMONE: CPT

## 2019-02-22 PROCEDURE — 86800 THYROGLOBULIN ANTIBODY: CPT

## 2019-02-22 PROCEDURE — 84480 ASSAY TRIIODOTHYRONINE (T3): CPT

## 2019-02-22 PROCEDURE — 80061 LIPID PANEL: CPT

## 2019-02-23 LAB
THYROGLOB AB SERPL-ACNC: <1 IU/ML (ref 0–0.9)
THYROPEROXIDASE AB SERPL-ACNC: 16 IU/ML (ref 0–34)

## 2019-03-05 DIAGNOSIS — F41.9 ANXIETY: ICD-10-CM

## 2019-03-05 RX ORDER — ALPRAZOLAM 0.5 MG/1
0.5 TABLET ORAL EVERY 8 HOURS PRN
Qty: 90 TABLET | Refills: 0 | Status: SHIPPED | OUTPATIENT
Start: 2019-03-05 | End: 2019-07-11 | Stop reason: SDUPTHER

## 2019-04-01 ENCOUNTER — OFFICE VISIT (OUTPATIENT)
Dept: INTERNAL MEDICINE CLINIC | Facility: CLINIC | Age: 74
End: 2019-04-01
Payer: MEDICARE

## 2019-04-01 VITALS
RESPIRATION RATE: 18 BRPM | HEIGHT: 71 IN | DIASTOLIC BLOOD PRESSURE: 72 MMHG | SYSTOLIC BLOOD PRESSURE: 136 MMHG | BODY MASS INDEX: 31.67 KG/M2 | HEART RATE: 72 BPM | OXYGEN SATURATION: 99 % | WEIGHT: 226.2 LBS | TEMPERATURE: 97.4 F

## 2019-04-01 DIAGNOSIS — J01.10 ACUTE NON-RECURRENT FRONTAL SINUSITIS: Primary | ICD-10-CM

## 2019-04-01 PROCEDURE — 99213 OFFICE O/P EST LOW 20 MIN: CPT | Performed by: INTERNAL MEDICINE

## 2019-04-01 RX ORDER — GUAIFENESIN 600 MG
600 TABLET, EXTENDED RELEASE 12 HR ORAL EVERY 12 HOURS SCHEDULED
Qty: 20 TABLET | Refills: 0 | Status: SHIPPED | OUTPATIENT
Start: 2019-04-01 | End: 2019-05-08 | Stop reason: HOSPADM

## 2019-04-01 RX ORDER — AZITHROMYCIN 250 MG/1
TABLET, FILM COATED ORAL
Qty: 6 TABLET | Refills: 0 | Status: SHIPPED | OUTPATIENT
Start: 2019-04-01 | End: 2019-04-05

## 2019-04-04 DIAGNOSIS — E78.2 MIXED HYPERLIPIDEMIA: ICD-10-CM

## 2019-04-04 DIAGNOSIS — R41.3 MEMORY DIFFICULTIES: ICD-10-CM

## 2019-04-04 RX ORDER — ATORVASTATIN CALCIUM 20 MG/1
20 TABLET, FILM COATED ORAL DAILY
Qty: 90 TABLET | Refills: 2 | Status: SHIPPED | OUTPATIENT
Start: 2019-04-04 | End: 2019-12-16 | Stop reason: SDUPTHER

## 2019-04-04 RX ORDER — DONEPEZIL HYDROCHLORIDE 5 MG/1
5 TABLET, FILM COATED ORAL
Qty: 90 TABLET | Refills: 2 | Status: SHIPPED | OUTPATIENT
Start: 2019-04-04 | End: 2019-12-20 | Stop reason: SDUPTHER

## 2019-04-08 ENCOUNTER — TELEPHONE (OUTPATIENT)
Dept: INTERNAL MEDICINE CLINIC | Facility: CLINIC | Age: 74
End: 2019-04-08

## 2019-04-16 ENCOUNTER — TELEPHONE (OUTPATIENT)
Dept: CARDIOLOGY CLINIC | Facility: CLINIC | Age: 74
End: 2019-04-16

## 2019-04-24 ENCOUNTER — CONSULT (OUTPATIENT)
Dept: INTERNAL MEDICINE CLINIC | Facility: CLINIC | Age: 74
End: 2019-04-24
Payer: MEDICARE

## 2019-04-24 VITALS
OXYGEN SATURATION: 99 % | WEIGHT: 230.6 LBS | HEIGHT: 71 IN | HEART RATE: 83 BPM | RESPIRATION RATE: 18 BRPM | SYSTOLIC BLOOD PRESSURE: 124 MMHG | BODY MASS INDEX: 32.28 KG/M2 | TEMPERATURE: 97.5 F | DIASTOLIC BLOOD PRESSURE: 68 MMHG

## 2019-04-24 DIAGNOSIS — Z79.01 CHRONIC ANTICOAGULATION: ICD-10-CM

## 2019-04-24 DIAGNOSIS — E11.22 TYPE 2 DIABETES MELLITUS WITH CHRONIC KIDNEY DISEASE, WITHOUT LONG-TERM CURRENT USE OF INSULIN, UNSPECIFIED CKD STAGE (HCC): ICD-10-CM

## 2019-04-24 DIAGNOSIS — I48.20 CHRONIC ATRIAL FIBRILLATION (HCC): ICD-10-CM

## 2019-04-24 DIAGNOSIS — Z01.818 VISIT FOR PRE-OPERATIVE EXAMINATION: Primary | ICD-10-CM

## 2019-04-24 DIAGNOSIS — I10 ESSENTIAL HYPERTENSION: ICD-10-CM

## 2019-04-24 PROCEDURE — 99214 OFFICE O/P EST MOD 30 MIN: CPT | Performed by: INTERNAL MEDICINE

## 2019-04-24 PROCEDURE — 93000 ELECTROCARDIOGRAM COMPLETE: CPT | Performed by: INTERNAL MEDICINE

## 2019-04-30 DIAGNOSIS — E11.8 TYPE 2 DIABETES MELLITUS WITH COMPLICATION, UNSPECIFIED WHETHER LONG TERM INSULIN USE: ICD-10-CM

## 2019-05-07 ENCOUNTER — APPOINTMENT (EMERGENCY)
Dept: CT IMAGING | Facility: HOSPITAL | Age: 74
DRG: 069 | End: 2019-05-07
Payer: MEDICARE

## 2019-05-07 ENCOUNTER — HOSPITAL ENCOUNTER (INPATIENT)
Facility: HOSPITAL | Age: 74
LOS: 1 days | Discharge: HOME HEALTH | DRG: 069 | End: 2019-05-08
Attending: EMERGENCY MEDICINE | Admitting: INTERNAL MEDICINE
Payer: MEDICARE

## 2019-05-07 ENCOUNTER — APPOINTMENT (EMERGENCY)
Dept: RADIOLOGY | Facility: HOSPITAL | Age: 74
DRG: 069 | End: 2019-05-07
Payer: MEDICARE

## 2019-05-07 DIAGNOSIS — I10 HYPERTENSION, UNSPECIFIED TYPE: ICD-10-CM

## 2019-05-07 DIAGNOSIS — I63.9 CEREBROVASCULAR ACCIDENT (CVA), UNSPECIFIED MECHANISM (HCC): Primary | ICD-10-CM

## 2019-05-07 DIAGNOSIS — R47.81 SLURRED SPEECH: ICD-10-CM

## 2019-05-07 PROBLEM — N18.30 CKD (CHRONIC KIDNEY DISEASE) STAGE 3, GFR 30-59 ML/MIN (HCC): Status: ACTIVE | Noted: 2019-05-07

## 2019-05-07 LAB
ANION GAP SERPL CALCULATED.3IONS-SCNC: 9 MMOL/L (ref 4–13)
APTT PPP: 32 SECONDS (ref 26–38)
BACTERIA UR QL AUTO: ABNORMAL /HPF
BILIRUB UR QL STRIP: NEGATIVE
BUN SERPL-MCNC: 19 MG/DL (ref 7–25)
CALCIUM SERPL-MCNC: 9.1 MG/DL (ref 8.6–10.5)
CHLORIDE SERPL-SCNC: 104 MMOL/L (ref 98–107)
CLARITY UR: CLEAR
CO2 SERPL-SCNC: 23 MMOL/L (ref 21–31)
COLOR UR: YELLOW
CREAT SERPL-MCNC: 1.36 MG/DL (ref 0.7–1.3)
ERYTHROCYTE [DISTWIDTH] IN BLOOD BY AUTOMATED COUNT: 13.7 % (ref 11.5–14.5)
GFR SERPL CREATININE-BSD FRML MDRD: 51 ML/MIN/1.73SQ M
GLUCOSE SERPL-MCNC: 138 MG/DL (ref 65–140)
GLUCOSE SERPL-MCNC: 183 MG/DL (ref 65–99)
GLUCOSE UR STRIP-MCNC: NEGATIVE MG/DL
HCT VFR BLD AUTO: 33 % (ref 42–47)
HGB BLD-MCNC: 11.2 G/DL (ref 14–18)
HGB UR QL STRIP.AUTO: ABNORMAL
INR PPP: 1.14 (ref 0.9–1.5)
KETONES UR STRIP-MCNC: NEGATIVE MG/DL
LEUKOCYTE ESTERASE UR QL STRIP: NEGATIVE
MCH RBC QN AUTO: 31.1 PG (ref 26–34)
MCHC RBC AUTO-ENTMCNC: 33.9 G/DL (ref 31–37)
MCV RBC AUTO: 92 FL (ref 81–99)
NITRITE UR QL STRIP: NEGATIVE
NON-SQ EPI CELLS URNS QL MICRO: ABNORMAL /HPF
PH UR STRIP.AUTO: 6 [PH]
PLATELET # BLD AUTO: 214 THOUSANDS/UL (ref 149–390)
PMV BLD AUTO: 8 FL (ref 8.6–11.7)
POTASSIUM SERPL-SCNC: 4.2 MMOL/L (ref 3.5–5.5)
PROT UR STRIP-MCNC: ABNORMAL MG/DL
PROTHROMBIN TIME: 13.2 SECONDS (ref 10.2–13)
RBC # BLD AUTO: 3.6 MILLION/UL (ref 4.3–5.9)
RBC #/AREA URNS AUTO: ABNORMAL /HPF
SODIUM SERPL-SCNC: 136 MMOL/L (ref 134–143)
SP GR UR STRIP.AUTO: 1.01 (ref 1–1.03)
TROPONIN I SERPL-MCNC: <0.03 NG/ML
UROBILINOGEN UR QL STRIP.AUTO: 0.2 E.U./DL
WBC # BLD AUTO: 5.7 THOUSAND/UL (ref 4.8–10.8)
WBC #/AREA URNS AUTO: ABNORMAL /HPF

## 2019-05-07 PROCEDURE — 93005 ELECTROCARDIOGRAM TRACING: CPT

## 2019-05-07 PROCEDURE — 85730 THROMBOPLASTIN TIME PARTIAL: CPT | Performed by: EMERGENCY MEDICINE

## 2019-05-07 PROCEDURE — 99285 EMERGENCY DEPT VISIT HI MDM: CPT

## 2019-05-07 PROCEDURE — 81001 URINALYSIS AUTO W/SCOPE: CPT | Performed by: EMERGENCY MEDICINE

## 2019-05-07 PROCEDURE — 85610 PROTHROMBIN TIME: CPT | Performed by: EMERGENCY MEDICINE

## 2019-05-07 PROCEDURE — 36415 COLL VENOUS BLD VENIPUNCTURE: CPT | Performed by: EMERGENCY MEDICINE

## 2019-05-07 PROCEDURE — 71045 X-RAY EXAM CHEST 1 VIEW: CPT

## 2019-05-07 PROCEDURE — 85027 COMPLETE CBC AUTOMATED: CPT | Performed by: EMERGENCY MEDICINE

## 2019-05-07 PROCEDURE — 99223 1ST HOSP IP/OBS HIGH 75: CPT | Performed by: INTERNAL MEDICINE

## 2019-05-07 PROCEDURE — 70450 CT HEAD/BRAIN W/O DYE: CPT

## 2019-05-07 PROCEDURE — 80048 BASIC METABOLIC PNL TOTAL CA: CPT | Performed by: EMERGENCY MEDICINE

## 2019-05-07 PROCEDURE — 84484 ASSAY OF TROPONIN QUANT: CPT | Performed by: EMERGENCY MEDICINE

## 2019-05-07 PROCEDURE — 82948 REAGENT STRIP/BLOOD GLUCOSE: CPT

## 2019-05-07 PROCEDURE — 70498 CT ANGIOGRAPHY NECK: CPT

## 2019-05-07 PROCEDURE — 70496 CT ANGIOGRAPHY HEAD: CPT

## 2019-05-07 PROCEDURE — G0425 INPT/ED TELECONSULT30: HCPCS | Performed by: PSYCHIATRY & NEUROLOGY

## 2019-05-07 RX ORDER — ONDANSETRON 2 MG/ML
4 INJECTION INTRAMUSCULAR; INTRAVENOUS EVERY 6 HOURS PRN
Status: DISCONTINUED | OUTPATIENT
Start: 2019-05-07 | End: 2019-05-08 | Stop reason: HOSPADM

## 2019-05-07 RX ORDER — OXYCODONE HYDROCHLORIDE AND ACETAMINOPHEN 5; 325 MG/1; MG/1
1 TABLET ORAL EVERY 6 HOURS PRN
COMMUNITY
End: 2019-05-15

## 2019-05-07 RX ORDER — METHOCARBAMOL 500 MG/1
500 TABLET, FILM COATED ORAL AS NEEDED
COMMUNITY
End: 2019-11-14

## 2019-05-07 RX ORDER — ALPRAZOLAM 0.25 MG/1
0.5 TABLET ORAL
Status: DISCONTINUED | OUTPATIENT
Start: 2019-05-07 | End: 2019-05-08 | Stop reason: HOSPADM

## 2019-05-07 RX ORDER — DORZOLAMIDE HYDROCHLORIDE AND TIMOLOL MALEATE 20; 5 MG/ML; MG/ML
1 SOLUTION/ DROPS OPHTHALMIC 2 TIMES DAILY
Status: DISCONTINUED | OUTPATIENT
Start: 2019-05-07 | End: 2019-05-08 | Stop reason: HOSPADM

## 2019-05-07 RX ORDER — METOPROLOL SUCCINATE 50 MG/1
100 TABLET, EXTENDED RELEASE ORAL DAILY
Status: DISCONTINUED | OUTPATIENT
Start: 2019-05-08 | End: 2019-05-08 | Stop reason: HOSPADM

## 2019-05-07 RX ORDER — ACETAMINOPHEN 325 MG/1
650 TABLET ORAL EVERY 6 HOURS PRN
Status: DISCONTINUED | OUTPATIENT
Start: 2019-05-07 | End: 2019-05-08 | Stop reason: HOSPADM

## 2019-05-07 RX ORDER — OXYCODONE HYDROCHLORIDE AND ACETAMINOPHEN 5; 325 MG/1; MG/1
1 TABLET ORAL EVERY 6 HOURS PRN
Status: DISCONTINUED | OUTPATIENT
Start: 2019-05-07 | End: 2019-05-08 | Stop reason: HOSPADM

## 2019-05-07 RX ORDER — METOPROLOL SUCCINATE 100 MG/1
TABLET, EXTENDED RELEASE ORAL
Qty: 90 TABLET | Refills: 1 | Status: SHIPPED | OUTPATIENT
Start: 2019-05-07 | End: 2019-12-02 | Stop reason: SDUPTHER

## 2019-05-07 RX ORDER — TRAMADOL HYDROCHLORIDE 50 MG/1
50 TABLET ORAL EVERY 6 HOURS PRN
COMMUNITY
End: 2019-11-14

## 2019-05-07 RX ORDER — ATORVASTATIN CALCIUM 10 MG/1
20 TABLET, FILM COATED ORAL DAILY
Status: DISCONTINUED | OUTPATIENT
Start: 2019-05-08 | End: 2019-05-08 | Stop reason: HOSPADM

## 2019-05-07 RX ORDER — ASPIRIN 81 MG/1
324 TABLET, CHEWABLE ORAL ONCE
Status: COMPLETED | OUTPATIENT
Start: 2019-05-07 | End: 2019-05-07

## 2019-05-07 RX ORDER — WARFARIN SODIUM 5 MG/1
5 TABLET ORAL
COMMUNITY
End: 2019-05-08 | Stop reason: HOSPADM

## 2019-05-07 RX ORDER — DONEPEZIL HYDROCHLORIDE 5 MG/1
5 TABLET, FILM COATED ORAL
Status: DISCONTINUED | OUTPATIENT
Start: 2019-05-07 | End: 2019-05-08 | Stop reason: HOSPADM

## 2019-05-07 RX ADMIN — ASPIRIN 81 MG 324 MG: 81 TABLET ORAL at 18:19

## 2019-05-07 RX ADMIN — IOHEXOL 85 ML: 350 INJECTION, SOLUTION INTRAVENOUS at 17:01

## 2019-05-07 RX ADMIN — DORZOLAMIDE HYDROCHLORIDE AND TIMOLOL MALEATE 1 DROP: 20; 5 SOLUTION/ DROPS OPHTHALMIC at 22:08

## 2019-05-07 RX ADMIN — ALPRAZOLAM 0.5 MG: 0.25 TABLET ORAL at 23:02

## 2019-05-07 RX ADMIN — DONEPEZIL HYDROCHLORIDE 5 MG: 5 TABLET ORAL at 22:08

## 2019-05-08 ENCOUNTER — APPOINTMENT (INPATIENT)
Dept: MRI IMAGING | Facility: HOSPITAL | Age: 74
DRG: 069 | End: 2019-05-08
Payer: MEDICARE

## 2019-05-08 ENCOUNTER — APPOINTMENT (INPATIENT)
Dept: NON INVASIVE DIAGNOSTICS | Facility: HOSPITAL | Age: 74
DRG: 069 | End: 2019-05-08
Payer: MEDICARE

## 2019-05-08 VITALS
TEMPERATURE: 98.4 F | WEIGHT: 231.8 LBS | SYSTOLIC BLOOD PRESSURE: 118 MMHG | DIASTOLIC BLOOD PRESSURE: 63 MMHG | HEART RATE: 83 BPM | HEIGHT: 71 IN | BODY MASS INDEX: 32.45 KG/M2 | RESPIRATION RATE: 21 BRPM | OXYGEN SATURATION: 96 %

## 2019-05-08 LAB
ANION GAP SERPL CALCULATED.3IONS-SCNC: 9 MMOL/L (ref 4–13)
ATRIAL RATE: 93 BPM
BUN SERPL-MCNC: 16 MG/DL (ref 7–25)
CALCIUM SERPL-MCNC: 8.7 MG/DL (ref 8.6–10.5)
CHLORIDE SERPL-SCNC: 107 MMOL/L (ref 98–107)
CO2 SERPL-SCNC: 22 MMOL/L (ref 21–31)
CREAT SERPL-MCNC: 1.16 MG/DL (ref 0.7–1.3)
ERYTHROCYTE [DISTWIDTH] IN BLOOD BY AUTOMATED COUNT: 13.7 % (ref 11.5–14.5)
GFR SERPL CREATININE-BSD FRML MDRD: 62 ML/MIN/1.73SQ M
GLUCOSE SERPL-MCNC: 152 MG/DL (ref 65–99)
GLUCOSE SERPL-MCNC: 153 MG/DL (ref 65–140)
GLUCOSE SERPL-MCNC: 212 MG/DL (ref 65–140)
HCT VFR BLD AUTO: 30.1 % (ref 42–47)
HGB BLD-MCNC: 10.4 G/DL (ref 14–18)
MCH RBC QN AUTO: 31.5 PG (ref 26–34)
MCHC RBC AUTO-ENTMCNC: 34.6 G/DL (ref 31–37)
MCV RBC AUTO: 91 FL (ref 81–99)
PLATELET # BLD AUTO: 192 THOUSANDS/UL (ref 149–390)
PMV BLD AUTO: 8.3 FL (ref 8.6–11.7)
POTASSIUM SERPL-SCNC: 3.8 MMOL/L (ref 3.5–5.5)
QRS AXIS: 8 DEGREES
QRSD INTERVAL: 82 MS
QT INTERVAL: 364 MS
QTC INTERVAL: 433 MS
RBC # BLD AUTO: 3.3 MILLION/UL (ref 4.3–5.9)
SODIUM SERPL-SCNC: 138 MMOL/L (ref 134–143)
T WAVE AXIS: 26 DEGREES
VENTRICULAR RATE: 85 BPM
WBC # BLD AUTO: 4.3 THOUSAND/UL (ref 4.8–10.8)

## 2019-05-08 PROCEDURE — 97163 PT EVAL HIGH COMPLEX 45 MIN: CPT

## 2019-05-08 PROCEDURE — G8987 SELF CARE CURRENT STATUS: HCPCS

## 2019-05-08 PROCEDURE — 93010 ELECTROCARDIOGRAM REPORT: CPT | Performed by: INTERNAL MEDICINE

## 2019-05-08 PROCEDURE — 93306 TTE W/DOPPLER COMPLETE: CPT | Performed by: INTERNAL MEDICINE

## 2019-05-08 PROCEDURE — 70551 MRI BRAIN STEM W/O DYE: CPT

## 2019-05-08 PROCEDURE — 97167 OT EVAL HIGH COMPLEX 60 MIN: CPT

## 2019-05-08 PROCEDURE — 80048 BASIC METABOLIC PNL TOTAL CA: CPT | Performed by: INTERNAL MEDICINE

## 2019-05-08 PROCEDURE — G8988 SELF CARE GOAL STATUS: HCPCS

## 2019-05-08 PROCEDURE — 97535 SELF CARE MNGMENT TRAINING: CPT

## 2019-05-08 PROCEDURE — 93306 TTE W/DOPPLER COMPLETE: CPT

## 2019-05-08 PROCEDURE — G8979 MOBILITY GOAL STATUS: HCPCS

## 2019-05-08 PROCEDURE — 99239 HOSP IP/OBS DSCHRG MGMT >30: CPT | Performed by: INTERNAL MEDICINE

## 2019-05-08 PROCEDURE — G8978 MOBILITY CURRENT STATUS: HCPCS

## 2019-05-08 PROCEDURE — 85027 COMPLETE CBC AUTOMATED: CPT | Performed by: INTERNAL MEDICINE

## 2019-05-08 PROCEDURE — 82948 REAGENT STRIP/BLOOD GLUCOSE: CPT

## 2019-05-08 RX ADMIN — INSULIN LISPRO 2 UNITS: 100 INJECTION, SOLUTION INTRAVENOUS; SUBCUTANEOUS at 11:14

## 2019-05-08 RX ADMIN — METOPROLOL SUCCINATE 100 MG: 50 TABLET, EXTENDED RELEASE ORAL at 08:18

## 2019-05-08 RX ADMIN — RIVAROXABAN 15 MG: 15 TABLET, FILM COATED ORAL at 13:57

## 2019-05-08 RX ADMIN — INSULIN LISPRO 1 UNITS: 100 INJECTION, SOLUTION INTRAVENOUS; SUBCUTANEOUS at 08:17

## 2019-05-08 RX ADMIN — DORZOLAMIDE HYDROCHLORIDE AND TIMOLOL MALEATE 1 DROP: 20; 5 SOLUTION/ DROPS OPHTHALMIC at 08:18

## 2019-05-08 RX ADMIN — ATORVASTATIN CALCIUM 20 MG: 10 TABLET, FILM COATED ORAL at 08:18

## 2019-05-09 ENCOUNTER — TRANSITIONAL CARE MANAGEMENT (OUTPATIENT)
Dept: INTERNAL MEDICINE CLINIC | Facility: CLINIC | Age: 74
End: 2019-05-09

## 2019-05-14 ENCOUNTER — EVALUATION (OUTPATIENT)
Dept: PHYSICAL THERAPY | Facility: CLINIC | Age: 74
End: 2019-05-14
Payer: MEDICARE

## 2019-05-14 DIAGNOSIS — Z96.641 PRESENCE OF RIGHT ARTIFICIAL HIP JOINT: Primary | ICD-10-CM

## 2019-05-14 PROCEDURE — 97110 THERAPEUTIC EXERCISES: CPT | Performed by: PHYSICAL MEDICINE & REHABILITATION

## 2019-05-14 PROCEDURE — 97535 SELF CARE MNGMENT TRAINING: CPT | Performed by: PHYSICAL MEDICINE & REHABILITATION

## 2019-05-14 PROCEDURE — 97161 PT EVAL LOW COMPLEX 20 MIN: CPT | Performed by: PHYSICAL MEDICINE & REHABILITATION

## 2019-05-15 ENCOUNTER — APPOINTMENT (OUTPATIENT)
Dept: LAB | Facility: CLINIC | Age: 74
End: 2019-05-15
Payer: MEDICARE

## 2019-05-15 ENCOUNTER — OFFICE VISIT (OUTPATIENT)
Dept: INTERNAL MEDICINE CLINIC | Facility: CLINIC | Age: 74
End: 2019-05-15
Payer: MEDICARE

## 2019-05-15 VITALS
BODY MASS INDEX: 30.38 KG/M2 | HEIGHT: 71 IN | HEART RATE: 74 BPM | DIASTOLIC BLOOD PRESSURE: 60 MMHG | WEIGHT: 217 LBS | RESPIRATION RATE: 18 BRPM | SYSTOLIC BLOOD PRESSURE: 114 MMHG | TEMPERATURE: 98.4 F

## 2019-05-15 DIAGNOSIS — D64.9 ANEMIA, UNSPECIFIED TYPE: ICD-10-CM

## 2019-05-15 DIAGNOSIS — I48.20 CHRONIC ATRIAL FIBRILLATION (HCC): ICD-10-CM

## 2019-05-15 DIAGNOSIS — Z79.01 CHRONIC ANTICOAGULATION: ICD-10-CM

## 2019-05-15 DIAGNOSIS — Z96.641 STATUS POST TOTAL REPLACEMENT OF RIGHT HIP: ICD-10-CM

## 2019-05-15 DIAGNOSIS — G45.9 TIA (TRANSIENT ISCHEMIC ATTACK): Primary | ICD-10-CM

## 2019-05-15 LAB
HCT VFR BLD AUTO: 36.5 % (ref 36.5–49.3)
HGB BLD-MCNC: 11.8 G/DL (ref 12–17)

## 2019-05-15 PROCEDURE — 36415 COLL VENOUS BLD VENIPUNCTURE: CPT

## 2019-05-15 PROCEDURE — 85014 HEMATOCRIT: CPT

## 2019-05-15 PROCEDURE — 99495 TRANSJ CARE MGMT MOD F2F 14D: CPT | Performed by: INTERNAL MEDICINE

## 2019-05-15 PROCEDURE — 85018 HEMOGLOBIN: CPT

## 2019-05-15 RX ORDER — CELECOXIB 200 MG/1
200 CAPSULE ORAL DAILY
COMMUNITY
End: 2019-11-09 | Stop reason: HOSPADM

## 2019-05-17 ENCOUNTER — OFFICE VISIT (OUTPATIENT)
Dept: PHYSICAL THERAPY | Facility: CLINIC | Age: 74
End: 2019-05-17
Payer: MEDICARE

## 2019-05-17 DIAGNOSIS — Z96.641 PRESENCE OF RIGHT ARTIFICIAL HIP JOINT: Primary | ICD-10-CM

## 2019-05-17 PROCEDURE — 97150 GROUP THERAPEUTIC PROCEDURES: CPT | Performed by: PHYSICAL MEDICINE & REHABILITATION

## 2019-05-21 ENCOUNTER — OFFICE VISIT (OUTPATIENT)
Dept: PHYSICAL THERAPY | Facility: CLINIC | Age: 74
End: 2019-05-21
Payer: MEDICARE

## 2019-05-21 DIAGNOSIS — Z96.641 PRESENCE OF RIGHT ARTIFICIAL HIP JOINT: Primary | ICD-10-CM

## 2019-05-21 PROCEDURE — 97140 MANUAL THERAPY 1/> REGIONS: CPT

## 2019-05-21 PROCEDURE — 97110 THERAPEUTIC EXERCISES: CPT

## 2019-05-23 ENCOUNTER — APPOINTMENT (OUTPATIENT)
Dept: PHYSICAL THERAPY | Facility: CLINIC | Age: 74
End: 2019-05-23
Payer: MEDICARE

## 2019-05-28 ENCOUNTER — OFFICE VISIT (OUTPATIENT)
Dept: PHYSICAL THERAPY | Facility: CLINIC | Age: 74
End: 2019-05-28
Payer: MEDICARE

## 2019-05-28 DIAGNOSIS — Z96.641 PRESENCE OF RIGHT ARTIFICIAL HIP JOINT: Primary | ICD-10-CM

## 2019-05-28 PROCEDURE — 97140 MANUAL THERAPY 1/> REGIONS: CPT

## 2019-05-28 PROCEDURE — 97110 THERAPEUTIC EXERCISES: CPT

## 2019-05-30 ENCOUNTER — OFFICE VISIT (OUTPATIENT)
Dept: PHYSICAL THERAPY | Facility: CLINIC | Age: 74
End: 2019-05-30
Payer: MEDICARE

## 2019-05-30 DIAGNOSIS — Z96.641 PRESENCE OF RIGHT ARTIFICIAL HIP JOINT: Primary | ICD-10-CM

## 2019-05-30 PROCEDURE — 97110 THERAPEUTIC EXERCISES: CPT | Performed by: PHYSICAL MEDICINE & REHABILITATION

## 2019-05-30 PROCEDURE — 97140 MANUAL THERAPY 1/> REGIONS: CPT | Performed by: PHYSICAL MEDICINE & REHABILITATION

## 2019-06-03 DIAGNOSIS — I48.91 NEW ONSET A-FIB (HCC): ICD-10-CM

## 2019-06-03 RX ORDER — RIVAROXABAN 15 MG/1
TABLET, FILM COATED ORAL
Qty: 90 TABLET | Refills: 1 | Status: SHIPPED | OUTPATIENT
Start: 2019-06-03 | End: 2019-06-03 | Stop reason: SDUPTHER

## 2019-06-04 ENCOUNTER — OFFICE VISIT (OUTPATIENT)
Dept: PHYSICAL THERAPY | Facility: CLINIC | Age: 74
End: 2019-06-04
Payer: MEDICARE

## 2019-06-04 DIAGNOSIS — Z96.641 PRESENCE OF RIGHT ARTIFICIAL HIP JOINT: Primary | ICD-10-CM

## 2019-06-04 PROCEDURE — 97110 THERAPEUTIC EXERCISES: CPT | Performed by: PHYSICAL MEDICINE & REHABILITATION

## 2019-06-04 PROCEDURE — 97140 MANUAL THERAPY 1/> REGIONS: CPT | Performed by: PHYSICAL MEDICINE & REHABILITATION

## 2019-06-06 ENCOUNTER — OFFICE VISIT (OUTPATIENT)
Dept: PHYSICAL THERAPY | Facility: CLINIC | Age: 74
End: 2019-06-06
Payer: MEDICARE

## 2019-06-06 DIAGNOSIS — Z96.641 PRESENCE OF RIGHT ARTIFICIAL HIP JOINT: Primary | ICD-10-CM

## 2019-06-06 PROCEDURE — 97140 MANUAL THERAPY 1/> REGIONS: CPT | Performed by: PHYSICAL MEDICINE & REHABILITATION

## 2019-06-06 PROCEDURE — 97150 GROUP THERAPEUTIC PROCEDURES: CPT | Performed by: PHYSICAL MEDICINE & REHABILITATION

## 2019-06-06 PROCEDURE — 97110 THERAPEUTIC EXERCISES: CPT | Performed by: PHYSICAL MEDICINE & REHABILITATION

## 2019-06-11 ENCOUNTER — APPOINTMENT (OUTPATIENT)
Dept: PHYSICAL THERAPY | Facility: CLINIC | Age: 74
End: 2019-06-11
Payer: MEDICARE

## 2019-06-12 ENCOUNTER — EVALUATION (OUTPATIENT)
Dept: PHYSICAL THERAPY | Facility: CLINIC | Age: 74
End: 2019-06-12
Payer: MEDICARE

## 2019-06-12 ENCOUNTER — TRANSCRIBE ORDERS (OUTPATIENT)
Dept: PHYSICAL THERAPY | Facility: CLINIC | Age: 74
End: 2019-06-12

## 2019-06-12 DIAGNOSIS — Z96.641 PRESENCE OF RIGHT ARTIFICIAL HIP JOINT: Primary | ICD-10-CM

## 2019-06-12 PROCEDURE — 97110 THERAPEUTIC EXERCISES: CPT | Performed by: PHYSICAL MEDICINE & REHABILITATION

## 2019-06-12 PROCEDURE — 97164 PT RE-EVAL EST PLAN CARE: CPT | Performed by: PHYSICAL MEDICINE & REHABILITATION

## 2019-06-12 PROCEDURE — 97535 SELF CARE MNGMENT TRAINING: CPT | Performed by: PHYSICAL MEDICINE & REHABILITATION

## 2019-07-09 ENCOUNTER — OFFICE VISIT (OUTPATIENT)
Dept: INTERNAL MEDICINE CLINIC | Facility: CLINIC | Age: 74
End: 2019-07-09
Payer: MEDICARE

## 2019-07-09 ENCOUNTER — APPOINTMENT (OUTPATIENT)
Dept: LAB | Facility: CLINIC | Age: 74
End: 2019-07-09
Payer: MEDICARE

## 2019-07-09 VITALS
BODY MASS INDEX: 30.52 KG/M2 | DIASTOLIC BLOOD PRESSURE: 60 MMHG | TEMPERATURE: 97.6 F | HEART RATE: 84 BPM | HEIGHT: 71 IN | SYSTOLIC BLOOD PRESSURE: 126 MMHG | RESPIRATION RATE: 18 BRPM | WEIGHT: 218 LBS

## 2019-07-09 DIAGNOSIS — N18.30 TYPE 2 DIABETES MELLITUS WITH STAGE 3 CHRONIC KIDNEY DISEASE, WITHOUT LONG-TERM CURRENT USE OF INSULIN (HCC): Primary | ICD-10-CM

## 2019-07-09 DIAGNOSIS — Z00.00 MEDICARE ANNUAL WELLNESS VISIT, SUBSEQUENT: ICD-10-CM

## 2019-07-09 DIAGNOSIS — D64.9 ANEMIA, UNSPECIFIED TYPE: ICD-10-CM

## 2019-07-09 DIAGNOSIS — E11.22 TYPE 2 DIABETES MELLITUS WITH STAGE 3 CHRONIC KIDNEY DISEASE, WITHOUT LONG-TERM CURRENT USE OF INSULIN (HCC): Primary | ICD-10-CM

## 2019-07-09 DIAGNOSIS — F41.1 GENERALIZED ANXIETY DISORDER: ICD-10-CM

## 2019-07-09 DIAGNOSIS — M19.91 PRIMARY OSTEOARTHRITIS, UNSPECIFIED SITE: ICD-10-CM

## 2019-07-09 DIAGNOSIS — E11.22 TYPE 2 DIABETES MELLITUS WITH STAGE 3 CHRONIC KIDNEY DISEASE, WITHOUT LONG-TERM CURRENT USE OF INSULIN (HCC): ICD-10-CM

## 2019-07-09 DIAGNOSIS — K21.9 GERD WITHOUT ESOPHAGITIS: ICD-10-CM

## 2019-07-09 DIAGNOSIS — N18.30 CKD (CHRONIC KIDNEY DISEASE) STAGE 3, GFR 30-59 ML/MIN (HCC): ICD-10-CM

## 2019-07-09 DIAGNOSIS — E78.5 HYPERLIPIDEMIA, UNSPECIFIED HYPERLIPIDEMIA TYPE: ICD-10-CM

## 2019-07-09 DIAGNOSIS — N18.30 TYPE 2 DIABETES MELLITUS WITH STAGE 3 CHRONIC KIDNEY DISEASE, WITHOUT LONG-TERM CURRENT USE OF INSULIN (HCC): ICD-10-CM

## 2019-07-09 DIAGNOSIS — Z79.01 CHRONIC ANTICOAGULATION: ICD-10-CM

## 2019-07-09 DIAGNOSIS — E66.9 OBESITY (BMI 30.0-34.9): ICD-10-CM

## 2019-07-09 LAB
CREAT UR-MCNC: 155 MG/DL
EST. AVERAGE GLUCOSE BLD GHB EST-MCNC: 171 MG/DL
HBA1C MFR BLD: 7.6 % (ref 4.2–6.3)
HCT VFR BLD AUTO: 38 % (ref 36.5–49.3)
HGB BLD-MCNC: 12.2 G/DL (ref 12–17)
MICROALBUMIN UR-MCNC: 135 MG/L (ref 0–20)
MICROALBUMIN/CREAT 24H UR: 87 MG/G CREATININE (ref 0–30)
TSH SERPL DL<=0.05 MIU/L-ACNC: 3.47 UIU/ML (ref 0.36–3.74)

## 2019-07-09 PROCEDURE — G0439 PPPS, SUBSEQ VISIT: HCPCS | Performed by: INTERNAL MEDICINE

## 2019-07-09 PROCEDURE — 82043 UR ALBUMIN QUANTITATIVE: CPT | Performed by: INTERNAL MEDICINE

## 2019-07-09 PROCEDURE — 99214 OFFICE O/P EST MOD 30 MIN: CPT | Performed by: INTERNAL MEDICINE

## 2019-07-09 PROCEDURE — 82570 ASSAY OF URINE CREATININE: CPT | Performed by: INTERNAL MEDICINE

## 2019-07-09 PROCEDURE — 85014 HEMATOCRIT: CPT

## 2019-07-09 PROCEDURE — 83036 HEMOGLOBIN GLYCOSYLATED A1C: CPT

## 2019-07-09 PROCEDURE — 36415 COLL VENOUS BLD VENIPUNCTURE: CPT

## 2019-07-09 PROCEDURE — 1123F ACP DISCUSS/DSCN MKR DOCD: CPT | Performed by: INTERNAL MEDICINE

## 2019-07-09 PROCEDURE — 84443 ASSAY THYROID STIM HORMONE: CPT

## 2019-07-09 PROCEDURE — 85018 HEMOGLOBIN: CPT

## 2019-07-09 NOTE — PATIENT INSTRUCTIONS
Obesity   AMBULATORY CARE:   Obesity  is when your body mass index (BMI) is greater than 30  Your healthcare provider will use your height and weight to measure your BMI  The risks of obesity include  many health problems, such as injuries or physical disability  You may need tests to check for the following:  · Diabetes     · High blood pressure or high cholesterol     · Heart disease     · Gallbladder or liver disease     · Cancer of the colon, breast, prostate, liver, or kidney     · Sleep apnea     · Arthritis or gout  Seek care immediately if:   · You have a severe headache, confusion, or difficulty speaking  · You have weakness on one side of your body  · You have chest pain, sweating, or shortness of breath  Contact your healthcare provider if:   · You have symptoms of gallbladder or liver disease, such as pain in your upper abdomen  · You have knee or hip pain and discomfort while walking  · You have symptoms of diabetes, such as intense hunger and thirst, and frequent urination  · You have symptoms of sleep apnea, such as snoring or daytime sleepiness  · You have questions or concerns about your condition or care  Treatment for obesity  focuses on helping you lose weight to improve your health  Even a small decrease in BMI can reduce the risk for many health problems  Your healthcare provider will help you set a weight-loss goal   · Lifestyle changes  are the first step in treating obesity  These include making healthy food choices and getting regular physical activity  Your healthcare provider may suggest a weight-loss program that involves coaching, education, and therapy  · Medicine  may help you lose weight when it is used with a healthy diet and physical activity  · Surgery  can help you lose weight if you are very obese and have other health problems  There are several types of weight-loss surgery  Ask your healthcare provider for more information    Be successful losing weight:   · Set small, realistic goals  An example of a small goal is to walk for 20 minutes 5 days a week  Anther goal is to lose 5% of your body weight  · Tell friends, family members, and coworkers about your goals  and ask for their support  Ask a friend to lose weight with you, or join a weight-loss support group  · Identify foods or triggers that may cause you to overeat , and find ways to avoid them  Remove tempting high-calorie foods from your home and workplace  Place a bowl of fresh fruit on your kitchen counter  If stress causes you to eat, then find other ways to cope with stress  · Keep a diary to track what you eat and drink  Also write down how many minutes of physical activity you do each day  Weigh yourself once a week and record it in your diary  Eating changes: You will need to eat 500 to 1,000 fewer calories each day than you currently eat to lose 1 to 2 pounds a week  The following changes will help you cut calories:  · Eat smaller portions  Use small plates, no larger than 9 inches in diameter  Fill your plate half full of fruits and vegetables  Measure your food using measuring cups until you know what a serving size looks like  · Eat 3 meals and 1 or 2 snacks each day  Plan your meals in advance  Fabi Silence and eat at home most of the time  Eat slowly  · Eat fruits and vegetables at every meal   They are low in calories and high in fiber, which makes you feel full  Do not add butter, margarine, or cream sauce to vegetables  Use herbs to season steamed vegetables  · Eat less fat and fewer fried foods  Eat more baked or grilled chicken and fish  These protein sources are lower in calories and fat than red meat  Limit fast food  Dress your salads with olive oil and vinegar instead of bottled dressing  · Limit the amount of sugar you eat  Do not drink sugary beverages  Limit alcohol  Activity changes:  Physical activity is good for your body in many ways   It helps you burn calories and build strong muscles  It decreases stress and depression, and improves your mood  It can also help you sleep better  Talk to your healthcare provider before you begin an exercise program   · Exercise for at least 30 minutes 5 days a week  Start slowly  Set aside time each day for physical activity that you enjoy and that is convenient for you  It is best to do both weight training and an activity that increases your heart rate, such as walking, bicycling, or swimming  · Find ways to be more active  Do yard work and housecleaning  Walk up the stairs instead of using elevators  Spend your leisure time going to events that require walking, such as outdoor festivals or fairs  This extra physical activity can help you lose weight and keep it off  Follow up with your healthcare provider as directed: You may need to meet with a dietitian  Write down your questions so you remember to ask them during your visits  © 2017 [address] [employer] Information is for End User's use only and may not be sold, redistributed or otherwise used for commercial purposes  All illustrations and images included in CareNotes® are the copyrighted property of [employer]  or Pradip Spivey  The above information is an  only  It is not intended as medical advice for individual conditions or treatments  Talk to your doctor, nurse or pharmacist before following any medical regimen to see if it is safe and effective for you  Urinary Incontinence   WHAT YOU NEED TO KNOW:   What is urinary incontinence? Urinary incontinence (UI) is when you lose control of your bladder  What causes UI? UI occurs because your bladder cannot store or empty urine properly  The following are the most common types of UI:  · Stress incontinence  is when you leak urine due to increased bladder pressure  This may happen when you cough, sneeze, or exercise       · Urge incontinence  is when you feel the need to urinate right away and leak urine accidentally  · Mixed incontinence  is when you have both stress and urge UI  What are the signs and symptoms of UI?   · You feel like your bladder does not empty completely when you urinate  · You urinate often and need to urinate immediately  · You leak urine when you sleep, or you wake up with the urge to urinate  · You leak urine when you cough, sneeze, exercise, or laugh  How is UI diagnosed? Your healthcare provider will ask how often you leak urine and whether you have stress or urge symptoms  Tell him which medicines you take, how often you urinate, and how much liquid you drink each day  You may need any of the following tests:  · Urine tests  may show infection or kidney function  · A pelvic exam  may be done to check for blockages  A pelvic exam will also show if your bladder, uterus, or other organs have moved out of place  · An x-ray, ultrasound, or CT  may show problems with parts of your urinary system  You may be given contrast liquid to help your organs show up better in the pictures  Tell the healthcare provider if you have ever had an allergic reaction to contrast liquid  Do not enter the MRI room with anything metal  Metal can cause serious injury  Tell the healthcare provider if you have any metal in or on your body  · A bladder scan  will show how much urine is left in your bladder after you urinate  You will be asked to urinate and then healthcare providers will use a small ultrasound machine to check the urine left in your bladder  · Cystometry  is used to check the function of your urinary system  Your healthcare provider checks the pressure in your bladder while filling it with fluid  Your bladder pressure may also be tested when your bladder is full and while you urinate  How is UI treated? · Medicines  can help strengthen your bladder control      · Electrical stimulation  is used to send a small amount of electrical energy to your pelvic floor muscles  This helps control your bladder function  Electrodes may be placed outside your body or in your rectum  For women, the electrodes may be placed in the vagina  · A bulking agent  may be injected into the wall of your urethra to make it thicker  This helps keep your urethra closed and decreases urine leakage  · Devices  such as a clamp, pessary, or tampon may help stop urine leaks  Ask your healthcare provider for more information about these and other devices  · Surgery  may be needed if other treatments do not work  Several types of surgery can help improve your bladder control  Ask your healthcare provider for more information about the surgery you may need  How can I manage my symptoms? · Do pelvic muscle exercises often  Your pelvic muscles help you stop urinating  Squeeze these muscles tight for 5 seconds, then relax for 5 seconds  Gradually work up to squeezing for 10 seconds  Do 3 sets of 15 repetitions a day, or as directed  This will help strengthen your pelvic muscles and improve bladder control  · A catheter  may be used to help empty your bladder  A catheter is a tiny, plastic tube that is put into your bladder to drain your urine  Your healthcare provider may tell you to use a catheter to prevent your bladder from getting too full and leaking urine  · Keep a UI record  Write down how often you leak urine and how much you leak  Make a note of what you were doing when you leaked urine  · Train your bladder  Go to the bathroom at set times, such as every 2 hours, even if you do not feel the urge to go  You can also try to hold your urine when you feel the urge to go  For example, hold your urine for 5 minutes when you feel the urge to go  As that becomes easier, hold your urine for 10 minutes  · Drink liquids as directed  Ask your healthcare provider how much liquid to drink each day and which liquids are best for you   You may need to limit the amount of liquid you drink to help control your urine leakage  Limit or do not have drinks that contain caffeine or alcohol  Do not drink any liquid right before you go to bed  · Prevent constipation  Eat a variety of high-fiber foods  Good examples are high-fiber cereals, beans, vegetables, and whole-grain breads  Prune juice may help make your bowel movement softer  Walking is the best way to trigger your intestines to have a bowel movement  · Exercise regularly and maintain a healthy weight  Ask your healthcare provider how much you should weigh and about the best exercise plan for you  Weight loss and exercise will decrease pressure on your bladder and help you control your leakage  Ask him to help you create a weight loss plan if you are overweight  When should I seek immediate care? · You have severe pain  · You are confused or cannot think clearly  When should I contact my healthcare provider? · You have a fever  · You see blood in your urine  · You have pain when you urinate  · You have new or worse pain, even after treatment  · Your mouth feels dry or you have vision changes  · Your urine is cloudy or smells bad  · You have questions or concerns about your condition or care  CARE AGREEMENT:   You have the right to help plan your care  Learn about your health condition and how it may be treated  Discuss treatment options with your caregivers to decide what care you want to receive  You always have the right to refuse treatment  The above information is an  only  It is not intended as medical advice for individual conditions or treatments  Talk to your doctor, nurse or pharmacist before following any medical regimen to see if it is safe and effective for you  © 2017 [address] [employer] Information is for End User's use only and may not be sold, redistributed or otherwise used for commercial purposes   All illustrations and images included in CareNotes® are the copyrighted property of [employer]  or Pradip Spivey  Cigarette Smoking and Your Health   AMBULATORY CARE:   Risks to your health if you smoke:  Nicotine and other chemicals found in tobacco damage every cell in your body  Even if you are a light smoker, you have an increased risk for cancer, heart disease, and lung disease  If you are pregnant or have diabetes, smoking increases your risk for complications  Benefits to your health if you stop smoking:   · You decrease respiratory symptoms such as coughing, wheezing, and shortness of breath  · You reduce your risk for cancers of the lung, mouth, throat, kidney, bladder, pancreas, stomach, and cervix  If you already have cancer, you increase the benefits of chemotherapy  You also reduce your risk for cancer returning or a second cancer from developing  · You reduce your risk for heart disease, blood clots, heart attack, and stroke  · You reduce your risk for lung infections, and diseases such as pneumonia, asthma, chronic bronchitis, and emphysema  · Your circulation improves  More oxygen can be delivered to your body  If you have diabetes, you lower your risk for complications, such as kidney, artery, and eye diseases  You also lower your risk for nerve damage  Nerve damage can lead to amputations, poor vision, and blindness  · You improve your body's ability to heal and to fight infections  Benefits to the health of others if you stop smoking:  Tobacco is harmful to nonsmokers who breathe in your secondhand smoke  The following are ways the health of others around you may improve when you stop smoking:  · You lower the risks for lung cancer and heart disease in nonsmoking adults  · If you are pregnant, you lower the risk for miscarriage, early delivery, low birth weight, and stillbirth  You also lower your baby's risk for SIDS, obesity, developmental delay, and neurobehavioral problems, such as ADHD  · If you have children, you lower their risk for ear infections, colds, pneumonia, bronchitis, and asthma  For more information and support to stop smoking:   · Smokefree  gov  Phone: 4- 843 - 886-2519  Web Address: www [employer]  Follow up with your healthcare provider as directed:  Write down your questions so you remember to ask them during your visits  © 2017 [address] [employer] Information is for End User's use only and may not be sold, redistributed or otherwise used for commercial purposes  All illustrations and images included in CareNotes® are the copyrighted property of [employer]  or Pradip Spivey  The above information is an  only  It is not intended as medical advice for individual conditions or treatments  Talk to your doctor, nurse or pharmacist before following any medical regimen to see if it is safe and effective for you  Fall Prevention   WHAT YOU NEED TO KNOW:   What is fall prevention? Fall prevention includes ways to make your home and other areas safer  It also includes ways you can move more carefully to prevent a fall  What increases my risk for falls? · Lack of vitamin D    · Not getting enough sleep each night    · Trouble walking or keeping your balance, or foot problems    · Health conditions that cause changes in your blood pressure, vision, or muscle strength and coordination    · Medicines that make you dizzy, weak, or sleepy    · Problems seeing clearly    · Shoes that have high heels or are not supportive    · Tripping hazards, such as items left on the floor, no handrails on the stairs, or broken steps  How can I help protect myself from falls? · Stand or sit up slowly  This may help you keep your balance and prevent falls  If you need to get up during the night, sit up first  Be sure you are fully awake before you stand  Turn on the light before you start walking  Go slowly in case you are still sleepy   Make sure you will not trip over any pets sleeping in the bedroom  · Use assistive devices as directed  Your healthcare provider may suggest that you use a cane or walker to help you keep your balance  You may need to have grab bars put in your bathroom near the toilet or in the shower  · Wear shoes that fit well and have soles that   Wear shoes both inside and outside  Use slippers with good   Do not wear shoes with high heels  · Wear a personal alarm  This is a device that allows you to call 911 if you fall and need help  Ask your healthcare provider for more information  · Stay active  Exercise can help strengthen your muscles and improve your balance  Your healthcare provider may recommend water aerobics or walking  He or she may also recommend physical therapy to improve your coordination  Never start an exercise program without talking to your healthcare provider first      · Manage medical conditions  Keep all appointments with your healthcare providers  Visit your eye doctor as directed  How can I make my home safer? · Add items to prevent falls in the bathroom  Put nonslip strips on your bath or shower floor to prevent you from slipping  Use a bath mat if you do not have carpet in the bathroom  This will prevent you from falling when you step out of the bath or shower  Use a shower seat so you do not need to stand while you shower  Sit on the toilet or a chair in your bathroom to dry yourself and put on clothing  This will prevent you from losing your balance from drying or dressing yourself while you are standing  · Keep paths clear  Remove books, shoes, and other objects from walkways and stairs  Place cords for telephones and lamps out of the way so that you do not need to walk over them  Tape them down if you cannot move them  Remove small rugs  If you cannot remove a rug, secure it with double-sided tape  This will prevent you from tripping  · Install bright lights in your home  Use night lights to help light paths to the bathroom or kitchen  Always turn on the light before you start walking  · Keep items you use often on shelves within reach  Do not use a step stool to help you reach an item  · Paint or place reflective tape on the edges of your stairs  This will help you see the stairs better  Call 911 or have someone else call if:   · You have fallen and are unconscious  · You have fallen and cannot move part of your body  Contact your healthcare provider if:   · You have fallen and have pain or a headache  · You have questions or concerns about your condition or care  CARE AGREEMENT:   You have the right to help plan your care  Learn about your health condition and how it may be treated  Discuss treatment options with your caregivers to decide what care you want to receive  You always have the right to refuse treatment  The above information is an  only  It is not intended as medical advice for individual conditions or treatments  Talk to your doctor, nurse or pharmacist before following any medical regimen to see if it is safe and effective for you  © 2017 [address] [facility] Information is for End User's use only and may not be sold, redistributed or otherwise used for commercial purposes  All illustrations and images included in CareNotes® are the copyrighted property of [employer]  or Pradip Spivey  Advance Directives   WHAT YOU NEED TO KNOW:   What are advance directives? Advance directives are legal documents that state your wishes and plans for medical care  These plans are made ahead of time in case you lose your ability to make decisions for yourself  Advance directives can apply to any medical decision, such as the treatments you want, and if you want to donate organs  What are the types of advance directives? There are many types of advance directives, and each state has rules about how to use them   You may choose a combination of any of the following:  · Living will: This is a written record of the treatment you want  You can also choose which treatments you do not want, which to limit, and which to stop at a certain time  This includes surgery, medicine, IV fluid, and tube feedings  · Durable power of  for healthcare [facility]): This is a written record that states who you want to make healthcare choices for you when you are unable to make them for yourself  This person, called a proxy, is usually a family member or a friend  You may choose more than 1 proxy  · Do not resuscitate (DNR) order:  A DNR order is used in case your heart stops beating or you stop breathing  It is a request not to have certain forms of treatment, such as CPR  A DNR order may be included in other types of advance directives  · Medical directive: This covers the care that you want if you are in a coma, near death, or unable to make decisions for yourself  You can list the treatments you want for each condition  Treatment may include pain medicine, surgery, blood transfusions, dialysis, IV or tube feedings, and a ventilator (breathing machine)  · Values history: This document has questions about your views, beliefs, and how you feel and think about life  This information can help others choose the care that you would choose  Why are advance directives important? An advance directive helps you control your care  Although spoken wishes may be used, it is better to have your wishes written down  Spoken wishes can be misunderstood, or not followed  Treatments may be given even if you do not want them  An advance directive may make it easier for your family to make difficult choices about your care  How do I decide what to put in my advance directives? · Make informed decisions:  Make sure you fully understand treatments or care you may receive   Think about the benefits and problems your decisions could cause for you or your family  Talk to healthcare providers if you have concerns or questions before you write down your wishes  You may also want to talk with your [facility] or , or a   Check your state laws to make sure that what you put in your advance directive is legal      · Sign all forms:  Sign and date your advance directive when you have finished  You may also need 2 witnesses to sign the forms  Witnesses cannot be your doctor or his staff, your spouse, heirs or beneficiaries, people you owe money to, or your chosen proxy  Talk to your family, proxy, and healthcare providers about your advance directive  Give each person a copy, and keep one for yourself in a place you can get to easily  Do not keep it hidden or locked away  · Review and revise your plans: You can revise your advance directive at any time, as long as you are able to make decisions  Review your plan every year, and when there are changes in your life, or your health  When you make changes, let your family, proxy, and healthcare providers know  Give each a new copy  Where can I find more information? · [employer]  [address] , [address]  Phone: 1- 243 - [phone]  Phone: 6- 010 - [phone]  Web Address: http://www  aafp org  · [address] [facility])  [address] [facility], [address]  Phone: [phone] [phone] - [phone]  Phone: [phone]  Web Address: Terrence manuel  CARE AGREEMENT:   You have the right to help plan your care  To help with this plan, you must learn about your health condition and treatment options  You must also learn about advance directives and how they are used  Work with your healthcare providers to decide what care will be used to treat you  You always have the right to refuse treatment  The above information is an  only   It is not intended as medical advice for individual conditions or treatments  Talk to your doctor, nurse or pharmacist before following any medical regimen to see if it is safe and effective for you  © 2017 [address]  Information is for End User's use only and may not be sold, redistributed or otherwise used for commercial purposes  All illustrations and images included in CareNotes® are the copyrighted property of [employer] M [employer]  or Pradip Spivey  Type 2 Diabetes in Adults   WHAT YOU NEED TO KNOW:   What is type 2 diabetes? Type 2 diabetes is a disease that affects how your body uses glucose (sugar)  Normally, when the blood sugar level increases, the pancreas makes more insulin  Insulin helps move sugar out of the blood so it can be used for energy  Type 2 diabetes develops because either the body cannot make enough insulin, or it cannot use the insulin correctly  After many years, your pancreas may stop making insulin  What increases my risk for type 2 diabetes? · Obesity    · Physical inactivity    · Older age    · High blood pressure or high cholesterol    · A history of heart disease, gestational diabetes, or polycystic ovary syndrome     · A family member with diabetes    · Being [place] American, , , [place], or [facility]  What are the signs and symptoms of type 2 diabetes? You may have high blood sugar levels for a long time before symptoms appear  You may have any of the following:  · More hunger or thirst than usual     · Frequent urination     · Weight loss without trying     · Blurred vision  How is type 2 diabetes diagnosed? You may need tests to check for type 2 diabetes starting at age 39  You may need any of the following:  · An A1c test  shows the average amount of sugar in your blood over the past 2 to 3 months  Your healthcare provider will tell you the A1c level that is right for you  The goal for your A1c is usually below 7%   Your provider can help you make changes if a check shows the A1c is too high  · A fasting plasma glucose test  is when your blood sugar level is tested after you have not eaten for 8 hours  · A 2-hour plasma glucose test  starts with a blood sugar level check after you have not eaten for 8 hours  You are then given a glucose drink  Your blood sugar level is checked after 2 hours  · A random glucose test  may be done any time of day, no matter how long ago you ate  How is type 2 diabetes treated? Type 2 diabetes can be controlled to prevent damage to your heart, blood vessels, and other organs  The goal is to keep your blood sugar at a normal level  You must eat the right foods, and exercise regularly  You may need 1 or more hypoglycemic medicines or insulin if you cannot control your blood sugar level with nutrition and exercise  You may also need medicine to lower your risk for heart disease  An example includes medicine to lower or control your cholesterol  How do I check my blood sugar level? You will be taught how to check a small drop of blood in a glucose monitor  You will need to check your blood sugar level at least 3 times each day if you are on insulin  Ask your healthcare provider when and how often to check during the day  If you check your blood sugar level before a meal , it should be between 80 and 130 mg/dL  If you check your blood sugar level 1 to 2 hours after a meal , it should be less than 180 mg/dL  Ask your healthcare provider if these are good goals for you  Write down your results, and show them to your healthcare provider  Your provider may use the results to make changes to your medicine, food, and exercise schedules  What should I do if my blood sugar level is too low? Your blood sugar level is too low if it goes below 70 mg/dL  If the level is too low, eat or drink 15 grams of fast-acting carbohydrate  These are found naturally in fruits  Fast-acting carbohydrates will raise your blood sugar level quickly   Examples of 15 grams of fast-acting carbohydrate are 4 ounces (½ cup) of fruit juice or 4 ounces of regular soda  Other examples are 2 tablespoons of raisins or 3 to 4 glucose tablets  Check your blood sugar level 15 minutes later  If the level is still low (less than 100 mg/dL), eat another 15 grams of carbohydrate  When the level returns to 100 mg/dL, eat a snack or meal that contains carbohydrates  This will help prevent another drop in blood sugar  Always carefully follow your healthcare provider's instructions on how to treat low blood sugar levels  What do I need to know about nutrition? A dietitian will help you make a meal plan to keep your blood sugar level steady  Do not skip meals  Your blood sugar level may drop too low if you have taken diabetes medicine and do not eat  · Keep track of carbohydrates (sugar and starchy foods)  Your blood sugar level can get too high if you eat too many carbohydrates  Eat fruits, legumes, vegetables, and whole grains  Your dietitian will help you plan meals and snacks that have the right amount of carbohydrates  · Eat low-fat foods , such as skinless chicken and low-fat milk  · Eat less sodium (salt)  Limit high-sodium foods, such as soy sauce, potato chips, and soup  Do not add salt to food you cook  Limit your use of table salt  You should have less than 2,300 mg of sodium per day  · Eat high-fiber foods , such as vegetables, whole-grain breads, and beans  · Limit alcohol  Alcohol affects your blood sugar level and can make it harder to manage your diabetes  Limit alcohol to 1 drink a day if you are a woman  Limit alcohol to 2 drinks a day if you are a man  A drink of alcohol is 12 ounces of beer, 5 ounces of wine, or 1½ ounces of liquor  How much exercise do I need? Exercise can help keep your blood sugar level steady, decrease your risk of heart disease, and help you lose weight  Stretch before and after you exercise   Exercise for at least 150 minutes every week  Spread this amount of exercise over at least 3 days a week  Do not skip exercise more than 2 days in a row  Include muscle strengthening activities 2 to 3 days each week  Older adults should include balance training 2 to 3 times each week  Activities that help increase balance include yoga and kalina chi  Work with your healthcare provider to create an exercise plan  · Check your blood sugar level before and after exercise  Healthcare providers may tell you to change the amount of insulin you take or food you eat  If your blood sugar level is high, check your blood or urine for ketones before you exercise  Do not exercise if your blood sugar level is high and you have ketones  · If your blood sugar level is less than 100 mg/dL, have a carbohydrate snack before you exercise  Examples are 4 to 6 crackers, ½ banana, 8 ounces (1 cup) of milk, or 4 ounces (½ cup) of juice  Drink water or liquids that do not contain sugar before, during, and after exercise  Ask your dietitian or healthcare provider which liquids you should drink when you exercise  · Do not sit for longer than 30 minutes  If you cannot walk around, at least stand up  This will help you stay active and keep your blood circulating  What else can I do to manage type 2 diabetes? · Check your feet each day for sores  Wear shoes and socks that fit correctly  Do not trim your toenails  Ask your healthcare provider for more information about foot care  · Maintain a healthy weight  Ask your healthcare provider how much you should weigh  A healthy weight can help you control your diabetes and prevent heart disease  Ask your provider to help you create a weight loss plan if you are overweight  Together you can set manageable weight loss goals  · Do not smoke  Nicotine and other chemicals in cigarettes and cigars can cause lung damage and make it more difficult to manage your diabetes   Ask your healthcare provider for information if you currently smoke and need help to quit  Do not use e-cigarettes or smokeless tobacco in place of cigarettes or to help you quit  They still contain nicotine  · Check your blood pressure as directed  Ask your healthcare provider what your blood pressure should be  Most adults with diabetes and high blood pressure should have a systolic blood pressure (first number) less than 140  Your diastolic blood pressure (second number) should be less than 90  · Wear medical alert identification  Wear medical alert jewelry or carry a card that says you have diabetes  Ask your healthcare provider where to get these items  · Ask about vaccines  You have a higher risk for serious illness if you get the flu, pneumonia, or hepatitis  Ask your healthcare provider if you should get a flu, pneumonia, or hepatitis B vaccine, and when to get the vaccine  What are the risks of type 2 diabetes? Uncontrolled diabetes can damage your nerves, veins, and arteries  High blood sugar levels may damage other body tissue and organs over time  Damage to arteries may increase your risk for heart attack and stroke  Nerve damage may also lead to other heart, stomach, and nerve problems  Diabetes is life-threatening if it is not controlled  Control your blood glucose levels to prevent health problems    Call 911 for any of the following:   · You have any of the following signs of a stroke:      ¨ Numbness or drooping on one side of your face     ¨ Weakness in an arm or leg    ¨ Confusion or difficulty speaking    ¨ Dizziness, a severe headache, or vision loss    · You have any of the following signs of a heart attack:      ¨ Squeezing, pressure, or pain in your chest that lasts longer than 5 minutes or returns    ¨ Discomfort or pain in your back, neck, jaw, stomach, or arm     ¨ Trouble breathing    ¨ Nausea or vomiting    ¨ Lightheadedness or a sudden cold sweat, especially with chest pain or trouble breathing  When should I seek immediate care? · You have severe abdominal pain, or the pain spreads to your back  You may also be vomiting  · You have trouble staying awake or focusing  · You are shaking or sweating  · You have blurred or double vision  · Your breath has a fruity, sweet smell  · Your breathing is deep and labored, or rapid and shallow  · Your heartbeat is fast and weak  When should I contact my healthcare provider? · You are vomiting or have diarrhea  · You have an upset stomach and cannot eat the foods on your meal plan  · You feel weak or more tired than usual      · You feel dizzy, have headaches, or are easily irritated  · Your skin is red, warm, dry, or swollen  · You have a wound that does not heal      · You have numbness in your arms or legs  · You have trouble coping with your illness, or you feel anxious or depressed  · You have questions or concerns about your condition or care  CARE AGREEMENT:   You have the right to help plan your care  Learn about your health condition and how it may be treated  Discuss treatment options with your caregivers to decide what care you want to receive  You always have the right to refuse treatment  The above information is an  only  It is not intended as medical advice for individual conditions or treatments  Talk to your doctor, nurse or pharmacist before following any medical regimen to see if it is safe and effective for you  © 2017 [address] [employer] Information is for End User's use only and may not be sold, redistributed or otherwise used for commercial purposes  All illustrations and images included in CareNotes® are the copyrighted property of [employer]  or Pradip Spivey  Obesity   AMBULATORY CARE:   Obesity  is when your body mass index (BMI) is greater than 30  Your healthcare provider will use your height and weight to measure your BMI    The risks of obesity include  many health problems, such as injuries or physical disability  You may need tests to check for the following:  · Diabetes     · High blood pressure or high cholesterol     · Heart disease     · Gallbladder or liver disease     · Cancer of the colon, breast, prostate, liver, or kidney     · Sleep apnea     · Arthritis or gout  Seek care immediately if:   · You have a severe headache, confusion, or difficulty speaking  · You have weakness on one side of your body  · You have chest pain, sweating, or shortness of breath  Contact your healthcare provider if:   · You have symptoms of gallbladder or liver disease, such as pain in your upper abdomen  · You have knee or hip pain and discomfort while walking  · You have symptoms of diabetes, such as intense hunger and thirst, and frequent urination  · You have symptoms of sleep apnea, such as snoring or daytime sleepiness  · You have questions or concerns about your condition or care  Treatment for obesity  focuses on helping you lose weight to improve your health  Even a small decrease in BMI can reduce the risk for many health problems  Your healthcare provider will help you set a weight-loss goal   · Lifestyle changes  are the first step in treating obesity  These include making healthy food choices and getting regular physical activity  Your healthcare provider may suggest a weight-loss program that involves coaching, education, and therapy  · Medicine  may help you lose weight when it is used with a healthy diet and physical activity  · Surgery  can help you lose weight if you are very obese and have other health problems  There are several types of weight-loss surgery  Ask your healthcare provider for more information  Be successful losing weight:   · Set small, realistic goals  An example of a small goal is to walk for 20 minutes 5 days a week  Anther goal is to lose 5% of your body weight      · Tell friends, family members, and coworkers about your goals  and ask for their support  Ask a friend to lose weight with you, or join a weight-loss support group  · Identify foods or triggers that may cause you to overeat , and find ways to avoid them  Remove tempting high-calorie foods from your home and workplace  Place a bowl of fresh fruit on your kitchen counter  If stress causes you to eat, then find other ways to cope with stress  · Keep a diary to track what you eat and drink  Also write down how many minutes of physical activity you do each day  Weigh yourself once a week and record it in your diary  Eating changes: You will need to eat 500 to 1,000 fewer calories each day than you currently eat to lose 1 to 2 pounds a week  The following changes will help you cut calories:  · Eat smaller portions  Use small plates, no larger than 9 inches in diameter  Fill your plate half full of fruits and vegetables  Measure your food using measuring cups until you know what a serving size looks like  · Eat 3 meals and 1 or 2 snacks each day  Plan your meals in advance  [employer] and eat at home most of the time  Eat slowly  · Eat fruits and vegetables at every meal   They are low in calories and high in fiber, which makes you feel full  Do not add butter, margarine, or cream sauce to vegetables  Use herbs to season steamed vegetables  · Eat less fat and fewer fried foods  Eat more baked or grilled chicken and fish  These protein sources are lower in calories and fat than red meat  Limit fast food  Dress your salads with olive oil and vinegar instead of bottled dressing  · Limit the amount of sugar you eat  Do not drink sugary beverages  Limit alcohol  Activity changes:  Physical activity is good for your body in many ways  It helps you burn calories and build strong muscles  It decreases stress and depression, and improves your mood  It can also help you sleep better   Talk to your healthcare provider before you begin an exercise program   · Exercise for at least 30 minutes 5 days a week  Start slowly  Set aside time each day for physical activity that you enjoy and that is convenient for you  It is best to do both weight training and an activity that increases your heart rate, such as walking, bicycling, or swimming  · Find ways to be more active  Do yard work and housecleaning  Walk up the stairs instead of using elevators  Spend your leisure time going to events that require walking, such as outdoor festivals or fairs  This extra physical activity can help you lose weight and keep it off  Follow up with your healthcare provider as directed: You may need to meet with a dietitian  Write down your questions so you remember to ask them during your visits  © 2017 [address] [employer]  Information is for End User's use only and may not be sold, redistributed or otherwise used for commercial purposes  All illustrations and images included in CareNotes® are the copyrighted property of A [employer]  or Pradip Spivey  The above information is an  only  It is not intended as medical advice for individual conditions or treatments  Talk to your doctor, nurse or pharmacist before following any medical regimen to see if it is safe and effective for you  Low Fat Diet   AMBULATORY CARE:   A low-fat diet  is an eating plan that is low in total fat, unhealthy fat, and cholesterol  You may need to follow a low-fat diet if you have trouble digesting or absorbing fat  You may also need to follow this diet if you have high cholesterol  You can also lower your cholesterol by increasing the amount of fiber in your diet  Soluble fiber is a type of fiber that helps to decrease cholesterol levels  Different types of fat in food:   · Limit unhealthy fats  A diet that is high in cholesterol, saturated fat, and trans fat may cause unhealthy cholesterol levels   Unhealthy cholesterol levels increase your risk of heart disease  ¨ Cholesterol:  Limit intake of cholesterol to less than 200 mg per day  Cholesterol is found in meat, eggs, and dairy  ¨ Saturated fat:  Limit saturated fat to less than 7% of your total daily calories  Ask your dietitian how many calories you need each day  Saturated fat is found in butter, cheese, ice cream, whole milk, and palm oil  Saturated fat is also found in meat, such as beef, pork, chicken skin, and processed meats  Processed meats include sausage, hot dogs, and bologna  ¨ Trans fat:  Avoid trans fat as much as possible  Trans fat is used in fried and baked foods  Foods that say trans fat free on the label may still have up to 0 5 grams of trans fat per serving  · Include healthy fats  Replace foods that are high in saturated and trans fat with foods high in healthy fats  This may help to decrease high cholesterol levels  ¨ Monounsaturated fats: These are found in avocados, nuts, and vegetable oils, such as olive, canola, and sunflower oil  ¨ Polyunsaturated fats: These can be found in vegetable oils, such as soybean or corn oil  Omega-3 fats can help to decrease the risk of heart disease  Omega-3 fats are found in fish, such as salmon, herring, trout, and tuna  Omega-3 fats can also be found in plant foods, such as walnuts, flaxseed, soybeans, and canola oil    Foods to limit or avoid:   · Grains:      ¨ Snacks that are made with partially hydrogenated oils, such as chips, regular crackers, and butter-flavored popcorn    ¨ High-fat baked goods, such as biscuits, croissants, doughnuts, pies, cookies, and pastries    · Dairy:      ¨ Whole milk, 2% milk, and yogurt and ice cream made with whole milk    ¨ Half and half creamer, heavy cream, and whipping cream    ¨ Cheese, cream cheese, and sour cream    · Meats and proteins:      ¨ High-fat cuts of meat (T-bone steak, regular hamburger, and ribs)    ¨ [employer], poultry (turkey and chicken), and fish    ¨ Poultry (chicken and turkey) with skin    ¨ Cold cuts (salami or bologna), hot dogs, duarte, and sausage    ¨ Whole eggs and egg yolks    · Vegetables and fruits with added fat:      ¨ Fried vegetables or vegetables in butter or high-fat sauces, such as cream or cheese sauces    ¨ Fried fruit or fruit served with butter or cream    · Fats:      ¨ Butter, stick margarine, and shortening    ¨ Coconut, palm oil, and palm kernel oil  Foods to include:   · Grains:      ¨ Whole-grain breads, cereals, pasta, and brown rice    ¨ Low-fat crackers and pretzels    · Vegetables and fruits:      ¨ Fresh, frozen, or canned vegetables (no salt or low-sodium)    ¨ Fresh, frozen, dried, or canned fruit (canned in light syrup or fruit juice)    ¨ Avocado    · Low-fat dairy products:      ¨ Nonfat (skim) or 1% milk    ¨ Nonfat or low-fat cheese, yogurt, and cottage cheese    · Meats and proteins:      ¨ Chicken or turkey with no skin    ¨ Baked or broiled fish    ¨ Lean beef and pork (loin, round, extra lean hamburger)    ¨ Beans and peas, unsalted nuts, soy products    ¨ Egg whites and substitutes    ¨ Seeds and nuts    · Fats:      ¨ Unsaturated oil, such as canola, olive, peanut, soybean, or sunflower oil    ¨ Soft or liquid margarine and vegetable oil spread    ¨ Low-fat salad dressing  Other ways to decrease fat:   · Read food labels before you buy foods  Choose foods that have less than 30% of calories from fat  Choose low-fat or fat-free dairy products  Remember that fat free does not mean calorie free  These foods still contain calories, and too many calories can lead to weight gain  · Trim fat from meat and avoid fried food  Trim all visible fat from meat before you cook it  Remove the skin from poultry  Do not robles meat, fish, or poultry  Bake, roast, boil, or broil these foods instead  Avoid fried foods  Eat a baked potato instead of [place] fries  Steam vegetables instead of sautéing them in butter  · Add less fat to foods    Use imitation duarte bits on salads and baked potatoes instead of regular duarte bits  Use fat-free or low-fat salad dressings instead of regular dressings  Use low-fat or nonfat butter-flavored topping instead of regular butter or margarine on popcorn and other foods  Ways to decrease fat in recipes:  Replace high-fat ingredients with low-fat or nonfat ones  This may cause baked goods to be drier than usual  You may need to use nonfat cooking spray on pans to prevent food from sticking  You also may need to change the amount of other ingredients, such as water, in the recipe  Try the following:  · Use low-fat or light margarine instead of regular margarine or shortening  · Use lean ground turkey breast or chicken, or lean ground beef (less than 5% fat) instead of hamburger  · Add 1 teaspoon of canola oil to 8 ounces of skim milk instead of using cream or half and half  · Use grated zucchini, carrots, or apples in breads instead of coconut  · Use blenderized, low-fat cottage cheese, plain tofu, or low-fat ricotta cheese instead of cream cheese  · Use 1 egg white and 1 teaspoon of canola oil, or use ¼ cup (2 ounces) of fat-free egg substitute instead of a whole egg  · Replace half of the oil that is called for in a recipe with applesauce when you bake  Use 3 tablespoons of cocoa powder and 1 tablespoon of canola oil instead of a square of baking chocolate  How to increase fiber:  Eat enough high-fiber foods to get 20 to 30 grams of fiber every day  Slowly increase your fiber intake to avoid stomach cramps, gas, and other problems  · Eat 3 ounces of whole-grain foods each day  An ounce is about 1 slice of bread  Eat whole-grain breads, such as whole-wheat bread  Whole wheat, whole-wheat flour, or other whole grains should be listed as the first ingredient on the food label  Replace white flour with whole-grain flour or use half of each in recipes   Whole-grain flour is heavier than white flour, so you may have to add more yeast or baking powder  · Eat a high-fiber cereal for breakfast   Oatmeal is a good source of soluble fiber  Look for cereals that have bran or fiber in the name  Choose whole-grain products, such as brown rice, barley, and whole-wheat pasta  · Eat more beans, peas, and lentils  For example, add beans to soups or salads  Eat at least 5 cups of fruits and vegetables each day  Eat fruits and vegetables with the peel because the peel is high in fiber  © 2017 [address] [employer] Information is for End User's use only and may not be sold, redistributed or otherwise used for commercial purposes  All illustrations and images included in CareNotes® are the copyrighted property of [employer]  or Pradip Spivey  The above information is an  only  It is not intended as medical advice for individual conditions or treatments  Talk to your doctor, nurse or pharmacist before following any medical regimen to see if it is safe and effective for you  Heart Healthy Diet   AMBULATORY CARE:   A heart healthy diet  is an eating plan low in total fat, unhealthy fats, and sodium (salt)  A heart healthy diet helps decrease your risk for heart disease and stroke  Limit the amount of fat you eat to 25% to 35% of your total daily calories  Limit sodium to less than 2,300 mg each day  Healthy fats:  Healthy fats can help improve cholesterol levels  The risk for heart disease is decreased when cholesterol levels are normal  Choose healthy fats, such as the following:  · Unsaturated fat  is found in foods such as soybean, canola, olive, corn, and safflower oils  It is also found in soft tub margarine that is made with liquid vegetable oil  · Omega-3 fat  is found in certain fish, such as salmon, tuna, and trout, and in walnuts and flaxseed  Unhealthy fats:  Unhealthy fats can cause unhealthy cholesterol levels in your blood and increase your risk of heart disease  Limit unhealthy fats, such as the following:  · Cholesterol  is found in animal foods, such as eggs and lobster, and in dairy products made from whole milk  Limit cholesterol to less than 300 milligrams (mg) each day  You may need to limit cholesterol to 200 mg each day if you have heart disease  · Saturated fat  is found in meats, such as duarte and hamburger  It is also found in chicken or turkey skin, whole milk, and butter  Limit saturated fat to less than 7% of your total daily calories  Limit saturated fat to less than 6% if you have heart disease or are at increased risk for it  · Trans fat  is found in packaged foods, such as potato chips and cookies  It is also in hard margarine, some fried foods, and shortening  Avoid trans fats as much as possible    Heart healthy foods and drinks to include:  Ask your dietitian or healthcare provider how many servings to have from each of the following food groups:  · Grains:      ¨ Whole-wheat breads, cereals, and pastas, and brown rice    ¨ Low-fat, low-sodium crackers and chips    · Vegetables:      ¨ Broccoli, green beans, green peas, and spinach    ¨ Collards, kale, and lima beans    ¨ Carrots, sweet potatoes, tomatoes, and peppers    ¨ Canned vegetables with no salt added    · Fruits:      ¨ Bananas, peaches, pears, and pineapple    ¨ Grapes, raisins, and dates    ¨ Oranges, tangerines, grapefruit, orange juice, and grapefruit juice    ¨ Apricots, mangoes, melons, and papaya    ¨ Raspberries and strawberries    ¨ Canned fruit with no added sugar    · Low-fat dairy products:      ¨ Nonfat (skim) milk, 1% milk, and low-fat almond, cashew, or soy milks fortified with calcium    ¨ Low-fat cheese, regular or frozen yogurt, and cottage cheese    · Meats and proteins , such as lean cuts of beef and pork (loin, leg, round), skinless chicken and turkey, legumes, soy products, egg whites, and nuts  Foods and drinks to limit or avoid:  Ask your dietitian or healthcare provider about these and other foods that are high in unhealthy fat, sodium, and sugar:  · Snack or packaged foods , such as frozen dinners, cookies, macaroni and cheese, and cereals with more than 300 mg of sodium per serving    · Canned or dry mixes  for cakes, soups, sauces, or gravies    · Vegetables with added sodium , such as instant potatoes, vegetables with added sauces, or regular canned vegetables    · Other foods high in sodium , such as ketchup, barbecue sauce, salad dressing, pickles, olives, soy sauce, and miso    · High-fat dairy foods  such as whole or 2% milk, cream cheese, or sour cream, and cheeses     · High-fat protein foods  such as high-fat cuts of beef (T-bone steaks, ribs), chicken or turkey with skin, and organ meats, such as liver    · Cured or smoked meats , such as hot dogs, duarte, and sausage    · Unhealthy fats and oils , such as butter, stick margarine, shortening, and cooking oils such as coconut or palm oil    · Food and drinks high in sugar , such as soft drinks (soda), sports drinks, sweetened tea, candy, cake, cookies, pies, and doughnuts  Other diet guidelines to follow:   · Eat more foods containing omega-3 fats  Eat fish high in omega-3 fats at least 2 times a week  · Limit alcohol  Too much alcohol can damage your heart and raise your blood pressure  Women should limit alcohol to 1 drink a day  Men should limit alcohol to 2 drinks a day  A drink of alcohol is 12 ounces of beer, 5 ounces of wine, or 1½ ounces of liquor  · Choose low-sodium foods  High-sodium foods can lead to high blood pressure  Add little or no salt to food you prepare  Use herbs and spices in place of salt  · Eat more fiber  to help lower cholesterol levels  Eat at least 5 servings of fruits and vegetables each day  Eat 3 ounces of whole-grain foods each day  Legumes (beans) are also a good source of fiber  Lifestyle guidelines:   · Do not smoke    Nicotine and other chemicals in cigarettes and cigars can cause lung and heart damage  Ask your healthcare provider for information if you currently smoke and need help to quit  E-cigarettes or smokeless tobacco still contain nicotine  Talk to your healthcare provider before you use these products  · Exercise regularly  to help you maintain a healthy weight and improve your blood pressure and cholesterol levels  Ask your healthcare provider about the best exercise plan for you  Do not start an exercise program without asking your healthcare provider  Follow up with your healthcare provider as directed:  Write down your questions so you remember to ask them during your visits  © 2017 [facility] Information is for End User's use only and may not be sold, redistributed or otherwise used for commercial purposes  All illustrations and images included in CareNotes® are the copyrighted property of A D A M , [employer]  or Pradip Spivey  The above information is an  only  It is not intended as medical advice for individual conditions or treatments  Talk to your doctor, nurse or pharmacist before following any medical regimen to see if it is safe and effective for you  Obesity   AMBULATORY CARE:   Obesity  is when your body mass index (BMI) is greater than 30  Your healthcare provider will use your height and weight to measure your BMI  The risks of obesity include  many health problems, such as injuries or physical disability  You may need tests to check for the following:  · Diabetes     · High blood pressure or high cholesterol     · Heart disease     · Gallbladder or liver disease     · Cancer of the colon, breast, prostate, liver, or kidney     · Sleep apnea     · Arthritis or gout  Seek care immediately if:   · You have a severe headache, confusion, or difficulty speaking  · You have weakness on one side of your body  · You have chest pain, sweating, or shortness of breath    Contact your healthcare provider if:   · You have symptoms of gallbladder or liver disease, such as pain in your upper abdomen  · You have knee or hip pain and discomfort while walking  · You have symptoms of diabetes, such as intense hunger and thirst, and frequent urination  · You have symptoms of sleep apnea, such as snoring or daytime sleepiness  · You have questions or concerns about your condition or care  Treatment for obesity  focuses on helping you lose weight to improve your health  Even a small decrease in BMI can reduce the risk for many health problems  Your healthcare provider will help you set a weight-loss goal   · Lifestyle changes  are the first step in treating obesity  These include making healthy food choices and getting regular physical activity  Your healthcare provider may suggest a weight-loss program that involves coaching, education, and therapy  · Medicine  may help you lose weight when it is used with a healthy diet and physical activity  · Surgery  can help you lose weight if you are very obese and have other health problems  There are several types of weight-loss surgery  Ask your healthcare provider for more information  Be successful losing weight:   · Set small, realistic goals  An example of a small goal is to walk for 20 minutes 5 days a week  Anther goal is to lose 5% of your body weight  · Tell friends, family members, and coworkers about your goals  and ask for their support  Ask a friend to lose weight with you, or join a weight-loss support group  · Identify foods or triggers that may cause you to overeat , and find ways to avoid them  Remove tempting high-calorie foods from your home and workplace  Place a bowl of fresh fruit on your kitchen counter  If stress causes you to eat, then find other ways to cope with stress  · Keep a diary to track what you eat and drink  Also write down how many minutes of physical activity you do each day   Weigh yourself once a week and record it in your diary  Eating changes: You will need to eat 500 to 1,000 fewer calories each day than you currently eat to lose 1 to 2 pounds a week  The following changes will help you cut calories:  · Eat smaller portions  Use small plates, no larger than 9 inches in diameter  Fill your plate half full of fruits and vegetables  Measure your food using measuring cups until you know what a serving size looks like  · Eat 3 meals and 1 or 2 snacks each day  Plan your meals in advance  [place] and eat at home most of the time  Eat slowly  · Eat fruits and vegetables at every meal   They are low in calories and high in fiber, which makes you feel full  Do not add butter, margarine, or cream sauce to vegetables  Use herbs to season steamed vegetables  · Eat less fat and fewer fried foods  Eat more baked or grilled chicken and fish  These protein sources are lower in calories and fat than red meat  Limit fast food  Dress your salads with olive oil and vinegar instead of bottled dressing  · Limit the amount of sugar you eat  Do not drink sugary beverages  Limit alcohol  Activity changes:  Physical activity is good for your body in many ways  It helps you burn calories and build strong muscles  It decreases stress and depression, and improves your mood  It can also help you sleep better  Talk to your healthcare provider before you begin an exercise program   · Exercise for at least 30 minutes 5 days a week  Start slowly  Set aside time each day for physical activity that you enjoy and that is convenient for you  It is best to do both weight training and an activity that increases your heart rate, such as walking, bicycling, or swimming  · Find ways to be more active  Do yard work and housecleaning  Walk up the stairs instead of using elevators  Spend your leisure time going to events that require walking, such as outdoor festivals or fairs   This extra physical activity can help you lose weight and keep it off  Follow up with your healthcare provider as directed: You may need to meet with a dietitian  Write down your questions so you remember to ask them during your visits  © 2017 [address] [employer] Information is for End User's use only and may not be sold, redistributed or otherwise used for commercial purposes  All illustrations and images included in CareNotes® are the copyrighted property of A D A M [employer] Inc  or Pradip Spivey  The above information is an  only  It is not intended as medical advice for individual conditions or treatments  Talk to your doctor, nurse or pharmacist before following any medical regimen to see if it is safe and effective for you  Urinary Incontinence   WHAT YOU NEED TO KNOW:   What is urinary incontinence? Urinary incontinence (UI) is when you lose control of your bladder  What causes UI? UI occurs because your bladder cannot store or empty urine properly  The following are the most common types of UI:  · Stress incontinence  is when you leak urine due to increased bladder pressure  This may happen when you cough, sneeze, or exercise  · Urge incontinence  is when you feel the need to urinate right away and leak urine accidentally  · Mixed incontinence  is when you have both stress and urge UI  What are the signs and symptoms of UI?   · You feel like your bladder does not empty completely when you urinate  · You urinate often and need to urinate immediately  · You leak urine when you sleep, or you wake up with the urge to urinate  · You leak urine when you cough, sneeze, exercise, or laugh  How is UI diagnosed? Your healthcare provider will ask how often you leak urine and whether you have stress or urge symptoms  Tell him which medicines you take, how often you urinate, and how much liquid you drink each day  You may need any of the following tests:  · Urine tests  may show infection or kidney function      · A pelvic exam  may be done to check for blockages  A pelvic exam will also show if your bladder, uterus, or other organs have moved out of place  · An x-ray, ultrasound, or CT  may show problems with parts of your urinary system  You may be given contrast liquid to help your organs show up better in the pictures  Tell the healthcare provider if you have ever had an allergic reaction to contrast liquid  Do not enter the MRI room with anything metal  Metal can cause serious injury  Tell the healthcare provider if you have any metal in or on your body  · A bladder scan  will show how much urine is left in your bladder after you urinate  You will be asked to urinate and then healthcare providers will use a small ultrasound machine to check the urine left in your bladder  · Cystometry  is used to check the function of your urinary system  Your healthcare provider checks the pressure in your bladder while filling it with fluid  Your bladder pressure may also be tested when your bladder is full and while you urinate  How is UI treated? · Medicines  can help strengthen your bladder control  · Electrical stimulation  is used to send a small amount of electrical energy to your pelvic floor muscles  This helps control your bladder function  Electrodes may be placed outside your body or in your rectum  For women, the electrodes may be placed in the vagina  · A bulking agent  may be injected into the wall of your urethra to make it thicker  This helps keep your urethra closed and decreases urine leakage  · Devices  such as a clamp, pessary, or tampon may help stop urine leaks  Ask your healthcare provider for more information about these and other devices  · Surgery  may be needed if other treatments do not work  Several types of surgery can help improve your bladder control  Ask your healthcare provider for more information about the surgery you may need  How can I manage my symptoms?    · Do pelvic muscle exercises often  Your pelvic muscles help you stop urinating  Squeeze these muscles tight for 5 seconds, then relax for 5 seconds  Gradually work up to squeezing for 10 seconds  Do 3 sets of 15 repetitions a day, or as directed  This will help strengthen your pelvic muscles and improve bladder control  · A catheter  may be used to help empty your bladder  A catheter is a tiny, plastic tube that is put into your bladder to drain your urine  Your healthcare provider may tell you to use a catheter to prevent your bladder from getting too full and leaking urine  · Keep a UI record  Write down how often you leak urine and how much you leak  Make a note of what you were doing when you leaked urine  · Train your bladder  Go to the bathroom at set times, such as every 2 hours, even if you do not feel the urge to go  You can also try to hold your urine when you feel the urge to go  For example, hold your urine for 5 minutes when you feel the urge to go  As that becomes easier, hold your urine for 10 minutes  · Drink liquids as directed  Ask your healthcare provider how much liquid to drink each day and which liquids are best for you  You may need to limit the amount of liquid you drink to help control your urine leakage  Limit or do not have drinks that contain caffeine or alcohol  Do not drink any liquid right before you go to bed  · Prevent constipation  Eat a variety of high-fiber foods  Good examples are high-fiber cereals, beans, vegetables, and whole-grain breads  Prune juice may help make your bowel movement softer  Walking is the best way to trigger your intestines to have a bowel movement  · Exercise regularly and maintain a healthy weight  Ask your healthcare provider how much you should weigh and about the best exercise plan for you  Weight loss and exercise will decrease pressure on your bladder and help you control your leakage   Ask him to help you create a weight loss plan if you are overweight  When should I seek immediate care? · You have severe pain  · You are confused or cannot think clearly  When should I contact my healthcare provider? · You have a fever  · You see blood in your urine  · You have pain when you urinate  · You have new or worse pain, even after treatment  · Your mouth feels dry or you have vision changes  · Your urine is cloudy or smells bad  · You have questions or concerns about your condition or care  CARE AGREEMENT:   You have the right to help plan your care  Learn about your health condition and how it may be treated  Discuss treatment options with your caregivers to decide what care you want to receive  You always have the right to refuse treatment  The above information is an  only  It is not intended as medical advice for individual conditions or treatments  Talk to your doctor, nurse or pharmacist before following any medical regimen to see if it is safe and effective for you  © 2017 [address] [employer] Information is for End User's use only and may not be sold, redistributed or otherwise used for commercial purposes  All illustrations and images included in CareNotes® are the copyrighted property of [employer] M [employer] Inc  or [employer]  Cigarette Smoking and Your Health   AMBULATORY CARE:   Risks to your health if you smoke:  Nicotine and other chemicals found in tobacco damage every cell in your body  Even if you are a light smoker, you have an increased risk for cancer, heart disease, and lung disease  If you are pregnant or have diabetes, smoking increases your risk for complications  Benefits to your health if you stop smoking:   · You decrease respiratory symptoms such as coughing, wheezing, and shortness of breath  · You reduce your risk for cancers of the lung, mouth, throat, kidney, bladder, pancreas, stomach, and cervix   If you already have cancer, you increase the benefits of chemotherapy  You also reduce your risk for cancer returning or a second cancer from developing  · You reduce your risk for heart disease, blood clots, heart attack, and stroke  · You reduce your risk for lung infections, and diseases such as pneumonia, asthma, chronic bronchitis, and emphysema  · Your circulation improves  More oxygen can be delivered to your body  If you have diabetes, you lower your risk for complications, such as kidney, artery, and eye diseases  You also lower your risk for nerve damage  Nerve damage can lead to amputations, poor vision, and blindness  · You improve your body's ability to heal and to fight infections  Benefits to the health of others if you stop smoking:  Tobacco is harmful to nonsmokers who breathe in your secondhand smoke  The following are ways the health of others around you may improve when you stop smoking:  · You lower the risks for lung cancer and heart disease in nonsmoking adults  · If you are pregnant, you lower the risk for miscarriage, early delivery, low birth weight, and stillbirth  You also lower your baby's risk for SIDS, obesity, developmental delay, and neurobehavioral problems, such as ADHD  · If you have children, you lower their risk for ear infections, colds, pneumonia, bronchitis, and asthma  For more information and support to stop smoking:   · Smokefree  gov  Phone: 6- 963 - 118-9625  Web Address: www smokefrVoiceBunny  gov  Follow up with your healthcare provider as directed:  Write down your questions so you remember to ask them during your visits  © 2017 [address] Information is for End User's use only and may not be sold, redistributed or otherwise used for commercial purposes  All illustrations and images included in CareNotes® are the copyrighted property of [employer]  or Pradip Spivey  The above information is an  only   It is not intended as medical advice for individual conditions or treatments  Talk to your doctor, nurse or pharmacist before following any medical regimen to see if it is safe and effective for you  Fall Prevention   WHAT YOU NEED TO KNOW:   What is fall prevention? Fall prevention includes ways to make your home and other areas safer  It also includes ways you can move more carefully to prevent a fall  What increases my risk for falls? · Lack of vitamin D    · Not getting enough sleep each night    · Trouble walking or keeping your balance, or foot problems    · Health conditions that cause changes in your blood pressure, vision, or muscle strength and coordination    · Medicines that make you dizzy, weak, or sleepy    · Problems seeing clearly    · Shoes that have high heels or are not supportive    · Tripping hazards, such as items left on the floor, no handrails on the stairs, or broken steps  How can I help protect myself from falls? · Stand or sit up slowly  This may help you keep your balance and prevent falls  If you need to get up during the night, sit up first  Be sure you are fully awake before you stand  Turn on the light before you start walking  Go slowly in case you are still sleepy  Make sure you will not trip over any pets sleeping in the bedroom  · Use assistive devices as directed  Your healthcare provider may suggest that you use a cane or walker to help you keep your balance  You may need to have grab bars put in your bathroom near the toilet or in the shower  · Wear shoes that fit well and have soles that   Wear shoes both inside and outside  Use slippers with good   Do not wear shoes with high heels  · Wear a personal alarm  This is a device that allows you to call 911 if you fall and need help  Ask your healthcare provider for more information  · Stay active  Exercise can help strengthen your muscles and improve your balance  Your healthcare provider may recommend water aerobics or walking   He or she may also recommend physical therapy to improve your coordination  Never start an exercise program without talking to your healthcare provider first      · Manage medical conditions  Keep all appointments with your healthcare providers  Visit your eye doctor as directed  How can I make my home safer? · Add items to prevent falls in the bathroom  Put nonslip strips on your bath or shower floor to prevent you from slipping  Use a bath mat if you do not have carpet in the bathroom  This will prevent you from falling when you step out of the bath or shower  Use a shower seat so you do not need to stand while you shower  Sit on the toilet or a chair in your bathroom to dry yourself and put on clothing  This will prevent you from losing your balance from drying or dressing yourself while you are standing  · Keep paths clear  Remove books, shoes, and other objects from walkways and stairs  Place cords for telephones and lamps out of the way so that you do not need to walk over them  Tape them down if you cannot move them  Remove small rugs  If you cannot remove a rug, secure it with double-sided tape  This will prevent you from tripping  · Install bright lights in your home  Use night lights to help light paths to the bathroom or kitchen  Always turn on the light before you start walking  · Keep items you use often on shelves within reach  Do not use a step stool to help you reach an item  · Paint or place reflective tape on the edges of your stairs  This will help you see the stairs better  Call 911 or have someone else call if:   · You have fallen and are unconscious  · You have fallen and cannot move part of your body  Contact your healthcare provider if:   · You have fallen and have pain or a headache  · You have questions or concerns about your condition or care  CARE AGREEMENT:   You have the right to help plan your care  Learn about your health condition and how it may be treated   Discuss treatment options with your caregivers to decide what care you want to receive  You always have the right to refuse treatment  The above information is an  only  It is not intended as medical advice for individual conditions or treatments  Talk to your doctor, nurse or pharmacist before following any medical regimen to see if it is safe and effective for you  © 2017 [address] [employer] Information is for End User's use only and may not be sold, redistributed or otherwise used for commercial purposes  All illustrations and images included in CareNotes® are the copyrighted property of [employer]  or Pradip Spivey  Advance Directives   WHAT YOU NEED TO KNOW:   What are advance directives? Advance directives are legal documents that state your wishes and plans for medical care  These plans are made ahead of time in case you lose your ability to make decisions for yourself  Advance directives can apply to any medical decision, such as the treatments you want, and if you want to donate organs  What are the types of advance directives? There are many types of advance directives, and each state has rules about how to use them  You may choose a combination of any of the following:  · Living will: This is a written record of the treatment you want  You can also choose which treatments you do not want, which to limit, and which to stop at a certain time  This includes surgery, medicine, IV fluid, and tube feedings  · Durable power of  for healthcare [facility]): This is a written record that states who you want to make healthcare choices for you when you are unable to make them for yourself  This person, called a proxy, is usually a family member or a friend  You may choose more than 1 proxy  · Do not resuscitate (DNR) order:  A DNR order is used in case your heart stops beating or you stop breathing  It is a request not to have certain forms of treatment, such as CPR   A DNR order may be included in other types of advance directives  · Medical directive: This covers the care that you want if you are in a coma, near death, or unable to make decisions for yourself  You can list the treatments you want for each condition  Treatment may include pain medicine, surgery, blood transfusions, dialysis, IV or tube feedings, and a ventilator (breathing machine)  · Values history: This document has questions about your views, beliefs, and how you feel and think about life  This information can help others choose the care that you would choose  Why are advance directives important? An advance directive helps you control your care  Although spoken wishes may be used, it is better to have your wishes written down  Spoken wishes can be misunderstood, or not followed  Treatments may be given even if you do not want them  An advance directive may make it easier for your family to make difficult choices about your care  How do I decide what to put in my advance directives? · Make informed decisions:  Make sure you fully understand treatments or care you may receive  Think about the benefits and problems your decisions could cause for you or your family  Talk to healthcare providers if you have concerns or questions before you write down your wishes  You may also want to talk with your Jew or , or a   Check your state laws to make sure that what you put in your advance directive is legal      · Sign all forms:  Sign and date your advance directive when you have finished  You may also need 2 witnesses to sign the forms  Witnesses cannot be your doctor or his staff, your spouse, heirs or beneficiaries, people you owe money to, or your chosen proxy  Talk to your family, proxy, and healthcare providers about your advance directive  Give each person a copy, and keep one for yourself in a place you can get to easily  Do not keep it hidden or locked away  · Review and revise your plans: You can revise your advance directive at any time, as long as you are able to make decisions  Review your plan every year, and when there are changes in your life, or your health  When you make changes, let your family, proxy, and healthcare providers know  Give each a new copy  Where can I find more information? · [employer]  [address] , [place]  Phone: [phone]  Phone: [phone]  Web Address: http://www  aafp org  · [address] [facility])  [address], [address] [facility] , [address] [place] [address]  Phone: [phone]  Phone: [phone]  Web Address: Terrence manuel  CARE AGREEMENT:   You have the right to help plan your care  To help with this plan, you must learn about your health condition and treatment options  You must also learn about advance directives and how they are used  Work with your healthcare providers to decide what care will be used to treat you  You always have the right to refuse treatment  The above information is an  only  It is not intended as medical advice for individual conditions or treatments  Talk to your doctor, nurse or pharmacist before following any medical regimen to see if it is safe and effective for you  © 2017 [address] Information is for End User's use only and may not be sold, redistributed or otherwise used for commercial purposes  All illustrations and images included in CareNotes® are the copyrighted property of A D A M , Inc  or Pradip Spivey

## 2019-07-09 NOTE — PROGRESS NOTES
Assessment and Plan:     Problem List Items Addressed This Visit        Digestive    GERD without esophagitis       Endocrine    Type 2 diabetes mellitus with diabetic chronic kidney disease ([facility] [address] ) - Primary    Relevant Orders    Hemoglobin A1C    Microalbumin / creatinine urine ratio    TSH, 3rd generation    Ambulatory referral to Ophthalmology       Musculoskeletal and Integument    Osteoarthritis       Genitourinary    CKD (chronic kidney disease) stage 3, GFR 30-59 ml/min (HCC)       Other    Anemia    Relevant Orders    Hemoglobin and hematocrit, blood    Generalized anxiety disorder    Hyperlipidemia    Chronic anticoagulation      Other Visit Diagnoses     Obesity (BMI 30 0-34  9)        BMI 30 0-30 9,adult        Medicare annual wellness visit, subsequent             History of Present Illness:     Patient presents for Medicare Annual Wellness visit    Patient Care Team:  Andre Harris DO as PCP - General (Internal Medicine)     Problem List:     Patient Active Problem List   Diagnosis    Anemia    Diabetic polyneuropathy associated with type 2 diabetes mellitus ([facility] [address] )    Eczema    Erectile dysfunction of non-organic origin    Generalized anxiety disorder    GERD without esophagitis    Hyperlipidemia    Hypertension    Nephrolithiasis    Obesity    Osteoarthritis    Polyp of sigmoid colon    Type 2 diabetes mellitus with diabetic chronic kidney disease ([facility] [address] )    Primary osteoarthritis of right hip    Mild concentric left ventricular hypertrophy (LVH)    Chronic atrial fibrillation (HCC)    Chronic anticoagulation    Memory difficulties    TIA (transient ischemic attack)    CKD (chronic kidney disease) stage 3, GFR 30-59 ml/min (HCC)      Past Medical and Surgical History:     Past Medical History:   Diagnosis Date    Acute on chronic renal insufficiency     Allergic rhinitis     Anemia 6/11/2012    Atrial fibrillation ([facility] [address] )     Cerebrovascular accident (CVA) ([facility] [address] )     Controlled type 2 diabetes mellitus without complication ([facility] [address] )     Generalized anxiety disorder     GERD without esophagitis 6/11/2012    Hypertension     Memory difficulties 1/8/2019    Nephrolithiasis 3/17/2016    Obesity 6/11/2012    Osteoarthritis 6/11/2012     Past Surgical History:   Procedure Laterality Date    CATARACT EXTRACTION, BILATERAL      KNEE SURGERY      TOTAL HIP ARTHROPLASTY      TOTAL HIP ARTHROPLASTY Left 2011      Family History:     Family History   Problem Relation Age of Onset    Arthritis Mother     Parkinsonism Mother     No Known Problems Father       Social History:     Social History     Tobacco Use   Smoking Status Never Smoker   Smokeless Tobacco Never Used     Social History     Substance and Sexual Activity   Alcohol Use Never    Frequency: Never    Drinks per session: 1 or 2    Binge frequency: Never    Comment: socially      Social History     Substance and Sexual Activity   Drug Use No      Medications and Allergies:     Current Outpatient Medications   Medication Sig Dispense Refill    ALPRAZolam (XANAX) 0 5 mg tablet Take 1 tablet (0 5 mg total) by mouth every 8 (eight) hours as needed (AS NEEDED) 90 tablet 0    amLODIPine (NORVASC) 10 mg tablet take 1 tablet by mouth daily 90 tablet 3    atorvastatin (LIPITOR) 20 mg tablet Take 1 tablet (20 mg total) by mouth daily 90 tablet 2    Blood Glucose Monitoring Suppl (ONE TOUCH ULTRA 2) w/Device KIT by Does not apply route      celecoxib (CeleBREX) 200 mg capsule Take 200 mg by mouth daily      donepezil (ARICEPT) 5 mg tablet Take 1 tablet (5 mg total) by mouth daily at bedtime 90 tablet 2    dorzolamide-timolol (COSOPT) 22 3-6 8 MG/ML ophthalmic solution instill 1 drop into both eyes twice a day      glucose blood test strip 1 Squirt by In Vitro route daily      metFORMIN (GLUCOPHAGE) 850 mg tablet Take 2 tablets (1,700 mg total) by mouth daily 180 tablet 1    methocarbamol (ROBAXIN) 500 mg tablet Take 500 mg by mouth as needed for muscle spasms      metoprolol succinate (TOPROL-XL) 100 mg 24 hr tablet take 1 tablet by mouth daily 90 tablet 1    ONETOUCH DELICA LANCETS 07V MISC by Does not apply route daily      rivaroxaban (XARELTO) 15 mg tablet Take 1 tablet (15 mg total) by mouth daily with breakfast 90 tablet 1    traMADol (ULTRAM) 50 mg tablet Take 50 mg by mouth every 6 (six) hours as needed for moderate pain       No current facility-administered medications for this visit  Allergies   Allergen Reactions    Pollen Extract Itching     Runny nose, itchy eyes      Immunizations:     Immunization History   Administered Date(s) Administered    Pneumococcal Conjugate 13-Valent 03/21/2017    TD (adult) Preservative Free 09/14/2010    Tuberculin Skin Test-PPD Intradermal 01/14/2015      Medicare Screening Tests and Risk Assessments:     Saul Collado is here for his Subsequent Wellness visit  Last Medicare Wellness visit information reviewed, patient interviewed and updates made to the record today  Health Risk Assessment:  Patient rates overall health as very good  Patient feels that their physical health rating is Same  Eyesight was rated as Same  Hearing was rated as Same  Patient feels that their emotional and mental health rating is Same  Pain experienced by patient in the last 7 days has been None  Patient states that he has experienced no weight loss or gain in last 6 months  Emotional/Mental Health:  Patient has not been feeling nervous/anxious  PHQ-9 Depression Screening:    Frequency of the following problems over the past two weeks:      1  Little interest or pleasure in doing things: 0 - not at all      2  Feeling down, depressed, or hopeless: 0 - not at all  PHQ-2 Score: 0          Broken Bones/Falls: Fall Risk Assessment:    In the past year, patient has experienced: No history of falling in past year          Bladder/Bowel:  Patient has not leaked urine accidently in the last six months    Patient reports no loss of bowel control  Immunizations:  Patient has had a flu vaccination within the last year  Patient has received a pneumonia shot  Patient has not received a shingles shot  Patient has received tetanus/diphtheria shot  Home Safety:  Patient has trouble with stairs inside or outside of their home  Patient currently reports that there are no safety hazards present in home, working smoke alarms, working carbon monoxide detectors  Preventative Screenings:   prostate cancer screen performed, colon cancer screen completed, cholesterol screen completed, glaucoma eye exam completed,     Nutrition:  Current diet: Regular and Limited junk food with servings of the following:    Medications:  Patient is currently taking over-the-counter supplements  Patient is able to manage medications  Lifestyle Choices:  Patient reports current tobacco use  Patient reports alcohol use  Patient drives a vehicle  Patient wears seat belt  Current level of exercise of physical activity described by patient as: Walk  Activities of Daily Living:  Can get out of bed by his or her self, able to dress self, able to make own meals, able to do own shopping, able to bathe self, can do own laundry/housekeeping, can manage own money, pay bills and track expenses    Previous Hospitalizations:  Hospitalization or ED visit in past 12 months  Number of hospitalizations within the last year: 1-2        Advanced Directives:  Patient has decided on a power of   Patient has spoken to designated power of   Patient has completed advanced directive          Preventative Screening/Counseling:      Cardiovascular:      General: Screening Current      Counseling: Healthy Diet, Healthy Weight, Improve Cholesterol, Improve Blood Pressure and Improve Exercise Tolerance     Due for Labs/Analytes/Optional EKG: Lipid Panel          Diabetes:      General: Screening Current      Counseling: Healthy Diet, Healthy Weight and Improve Physical Activity      Due for labs: Blood Glucose          Colorectal Cancer:      General: Screening Current      Counseling: high fiber diet          Prostate Cancer:      General: Screening Current          Osteoporosis:      General: Screening Not Indicated      Counseling: Calcium and Vitamin D Intake and Regular Weightbearing Exercise          AAA:      General: Screening Not Indicated          Glaucoma:      General: Screening Current          HIV:      General: Screening Not Indicated          Hepatitis C:      General: Screening Current        Advanced Directives:   Patient has living will for healthcare, has durable POA for healthcare, patient has an advanced directive  Information on ACP and/or AD provided  No 5 wishes given  End of life assessment reviewed with patient  Provider agrees with end of life decisions   Immunizations:      Influenza: Influenza UTD This Year      Pneumococcal: Risks & Benefits Discussed      TDAP: Tdap Vaccine UTD  Additional Comments: Defers his second pneumonia vaccine  Other Preventative Counseling (Non-Medicare): Fall Prevention, Increase physical activity, Nutrition Counseling, Car/seat belt/driving safety reviewed, Sunscreen use and Weight reduction discussed      A/P: Doing well and no falls reported  Denies depression and feels safe at home  Diverse diet  No problems operating a MV and uses seat belts  Has a living will and POA  No DME or referrals needed today  RTC one year for medicare wellness

## 2019-07-09 NOTE — PROGRESS NOTES
Assessment/Plan:  Problem List Items Addressed This Visit        Digestive    GERD without esophagitis       Endocrine    Type 2 diabetes mellitus with diabetic chronic kidney disease ([facility] [address] ) - Primary    Relevant Orders    Hemoglobin A1C    Microalbumin / creatinine urine ratio    TSH, 3rd generation    Ambulatory referral to Ophthalmology       Musculoskeletal and Integument    Osteoarthritis       Genitourinary    CKD (chronic kidney disease) stage 3, GFR 30-59 ml/min (HCC)       Other    Anemia    Relevant Orders    Hemoglobin and hematocrit, blood    Generalized anxiety disorder    Hyperlipidemia    Chronic anticoagulation      Other Visit Diagnoses     Obesity (BMI 30 0-34  9)        BMI 30 0-30 9,adult        Medicare annual wellness visit, initial               Diagnoses and all orders for this visit:    Type 2 diabetes mellitus with stage 3 chronic kidney disease, without long-term current use of insulin (HCC)  -     Hemoglobin A1C; Future  -     Microalbumin / creatinine urine ratio  -     TSH, 3rd generation; Future  -     Ambulatory referral to Ophthalmology; Future    GERD without esophagitis    Primary osteoarthritis, unspecified site    CKD (chronic kidney disease) stage 3, GFR 30-59 ml/min (HCC)    Chronic anticoagulation    Anemia, unspecified type  -     Hemoglobin and hematocrit, blood; Future    Generalized anxiety disorder    Hyperlipidemia, unspecified hyperlipidemia type    Obesity (BMI 30 0-34  9)    BMI 30 0-30 9,adult    Medicare annual wellness visit, initial        No problem-specific Assessment & Plan notes found for this encounter  A/P: Doing well and will check labs  Discussed BMI and will give information on diet and exercise  Continue current treatment and RTC three months for routine  Subjective:      Patient ID: Natalie Pollard is a 68 y o  male  WM RTC for f/u dm, htn, etc  Doing well and no new issues   Recovering from joint replacement and activity level is almost back to normal  No falls  Not checking his sugar and no reports of low sugar events  Continues with chronic anticoagulation and no reports of bleeding  Denies angina, SOB, edema, palpitations, orthopnea, or PND  ALMA is good  Due for labs  The following portions of the patient's history were reviewed and updated as appropriate:   He has a past medical history of Acute on chronic renal insufficiency, Allergic rhinitis, Anemia (6/11/2012), Atrial fibrillation ([facility] [address] ), Cerebrovascular accident (CVA) ([facility] [address] ), Controlled type 2 diabetes mellitus without complication ([facility] ), Generalized anxiety disorder, GERD without esophagitis (6/11/2012), Hypertension, Memory difficulties (1/8/2019), Nephrolithiasis (3/17/2016), Obesity (6/11/2012), and Osteoarthritis (6/11/2012)  ,  does not have any pertinent problems on file  ,   has a past surgical history that includes Total hip arthroplasty; Knee surgery; Cataract extraction, bilateral; and Total hip arthroplasty (Left, 2011)  ,  family history includes Arthritis in his mother; No Known Problems in his father; Parkinsonism in his mother  ,   reports that he has never smoked  He has never used smokeless tobacco  He reports that he does not drink alcohol or use drugs  ,  is allergic to pollen extract     Current Outpatient Medications   Medication Sig Dispense Refill    ALPRAZolam (XANAX) 0 5 mg tablet Take 1 tablet (0 5 mg total) by mouth every 8 (eight) hours as needed (AS NEEDED) 90 tablet 0    amLODIPine (NORVASC) 10 mg tablet take 1 tablet by mouth daily 90 tablet 3    atorvastatin (LIPITOR) 20 mg tablet Take 1 tablet (20 mg total) by mouth daily 90 tablet 2    Blood Glucose Monitoring Suppl (ONE TOUCH ULTRA 2) w/Device KIT by Does not apply route      celecoxib (CeleBREX) 200 mg capsule Take 200 mg by mouth daily      donepezil (ARICEPT) 5 mg tablet Take 1 tablet (5 mg total) by mouth daily at bedtime 90 tablet 2    dorzolamide-timolol (COSOPT) 22 3-6 8 MG/ML ophthalmic solution instill 1 drop into both eyes twice a day      glucose blood test strip 1 Squirt by In Vitro route daily      metFORMIN (GLUCOPHAGE) 850 mg tablet Take 2 tablets (1,700 mg total) by mouth daily 180 tablet 1    methocarbamol (ROBAXIN) 500 mg tablet Take 500 mg by mouth as needed for muscle spasms      metoprolol succinate (TOPROL-XL) 100 mg 24 hr tablet take 1 tablet by mouth daily 90 tablet 1    ONETOUCH DELICA LANCETS 63B MISC by Does not apply route daily      rivaroxaban (XARELTO) 15 mg tablet Take 1 tablet (15 mg total) by mouth daily with breakfast 90 tablet 1    traMADol (ULTRAM) 50 mg tablet Take 50 mg by mouth every 6 (six) hours as needed for moderate pain       No current facility-administered medications for this visit  Review of Systems   Constitutional: Negative for activity change, chills, diaphoresis, fatigue and fever  HENT: Negative  Eyes: Negative for visual disturbance  Respiratory: Negative for cough, chest tightness, shortness of breath and wheezing  Cardiovascular: Negative for chest pain, palpitations and leg swelling  Gastrointestinal: Negative for abdominal pain, constipation, diarrhea, nausea and vomiting  Endocrine: Negative for cold intolerance and heat intolerance  Genitourinary: Negative for difficulty urinating, dysuria and frequency  Musculoskeletal: Negative for arthralgias, gait problem and myalgias  Neurological: Negative for dizziness, seizures, syncope, weakness, light-headedness and headaches  Psychiatric/Behavioral: Negative for confusion, dysphoric mood and sleep disturbance  The patient is not nervous/anxious  Objective:  Vitals:    07/09/19 0934   BP: 126/60   Pulse: 84   Resp: 18   Temp: 97 6 °F (36 4 °C)   TempSrc: Tympanic   Weight: 98 9 kg (218 lb)   Height: 5' 11" (1 803 m)     Body mass index is 30 4 kg/m²  Physical Exam   Constitutional: He is oriented to person, place, and time   He appears well-developed and well-nourished  No distress  HENT:   Head: Normocephalic and atraumatic  Mouth/Throat: Oropharynx is clear and moist    Eyes: Pupils are equal, round, and reactive to light  Conjunctivae and EOM are normal    Neck: Neck supple  No JVD present  Cardiovascular: Normal rate, regular rhythm and normal heart sounds  Pulmonary/Chest: Effort normal and breath sounds normal  No respiratory distress  He has no wheezes  He has no rales  Abdominal: Soft  Bowel sounds are normal  He exhibits no distension  There is no tenderness  Musculoskeletal: He exhibits no edema  Neurological: He is alert and oriented to person, place, and time  Psychiatric: He has a normal mood and affect  His behavior is normal  Judgment and thought content normal    Nursing note and vitals reviewed  BMI Counseling: Body mass index is 30 4 kg/m²  Discussed the patient's BMI with him  The BMI is above average  BMI counseling and education was provided to the patient  Nutrition recommendations include reducing portion sizes, decreasing overall calorie intake, reducing intake of saturated fat and trans fat and reducing intake of cholesterol  Exercise recommendations include moderate aerobic physical activity for 150 minutes/week

## 2019-07-10 ENCOUNTER — TELEPHONE (OUTPATIENT)
Dept: INTERNAL MEDICINE CLINIC | Facility: CLINIC | Age: 74
End: 2019-07-10

## 2019-07-10 DIAGNOSIS — N18.30 TYPE 2 DIABETES MELLITUS WITH STAGE 3 CHRONIC KIDNEY DISEASE, WITHOUT LONG-TERM CURRENT USE OF INSULIN (HCC): Primary | ICD-10-CM

## 2019-07-10 DIAGNOSIS — E11.22 TYPE 2 DIABETES MELLITUS WITH STAGE 3 CHRONIC KIDNEY DISEASE, WITHOUT LONG-TERM CURRENT USE OF INSULIN (HCC): Primary | ICD-10-CM

## 2019-07-10 RX ORDER — LISINOPRIL 5 MG/1
5 TABLET ORAL DAILY
Qty: 90 TABLET | Refills: 0 | Status: SHIPPED | OUTPATIENT
Start: 2019-07-10 | End: 2019-10-03 | Stop reason: SDUPTHER

## 2019-07-10 NOTE — TELEPHONE ENCOUNTER
Pt wife called  Just wanted to let you know that pt isn't taking the second dose of Metformin  He only takes it once a day instead of twice  That could be one of the reasons why his sugar is elevated  Pt's wife will make sure he takes the second dose this evening

## 2019-07-11 DIAGNOSIS — F41.9 ANXIETY: ICD-10-CM

## 2019-07-12 RX ORDER — ALPRAZOLAM 0.5 MG/1
0.5 TABLET ORAL EVERY 8 HOURS PRN
Qty: 90 TABLET | Refills: 0 | Status: SHIPPED | OUTPATIENT
Start: 2019-07-12 | End: 2019-11-14 | Stop reason: SDUPTHER

## 2019-08-13 LAB
LEFT EYE DIABETIC RETINOPATHY: NORMAL
RIGHT EYE DIABETIC RETINOPATHY: NORMAL

## 2019-08-29 DIAGNOSIS — E11.8 TYPE 2 DIABETES MELLITUS WITH COMPLICATION, UNSPECIFIED WHETHER LONG TERM INSULIN USE: ICD-10-CM

## 2019-10-03 DIAGNOSIS — N18.30 TYPE 2 DIABETES MELLITUS WITH STAGE 3 CHRONIC KIDNEY DISEASE, WITHOUT LONG-TERM CURRENT USE OF INSULIN (HCC): ICD-10-CM

## 2019-10-03 DIAGNOSIS — E11.22 TYPE 2 DIABETES MELLITUS WITH STAGE 3 CHRONIC KIDNEY DISEASE, WITHOUT LONG-TERM CURRENT USE OF INSULIN (HCC): ICD-10-CM

## 2019-10-03 RX ORDER — LISINOPRIL 5 MG/1
5 TABLET ORAL DAILY
Qty: 90 TABLET | Refills: 3 | Status: SHIPPED | OUTPATIENT
Start: 2019-10-03 | End: 2019-11-06 | Stop reason: ALTCHOICE

## 2019-10-10 ENCOUNTER — OFFICE VISIT (OUTPATIENT)
Dept: INTERNAL MEDICINE CLINIC | Facility: CLINIC | Age: 74
End: 2019-10-10
Payer: MEDICARE

## 2019-10-10 ENCOUNTER — APPOINTMENT (OUTPATIENT)
Dept: LAB | Facility: CLINIC | Age: 74
End: 2019-10-10
Payer: MEDICARE

## 2019-10-10 VITALS
SYSTOLIC BLOOD PRESSURE: 118 MMHG | RESPIRATION RATE: 18 BRPM | WEIGHT: 217 LBS | BODY MASS INDEX: 30.38 KG/M2 | HEIGHT: 71 IN | DIASTOLIC BLOOD PRESSURE: 64 MMHG | TEMPERATURE: 98.1 F | HEART RATE: 72 BPM

## 2019-10-10 DIAGNOSIS — E78.5 HYPERLIPIDEMIA, UNSPECIFIED HYPERLIPIDEMIA TYPE: ICD-10-CM

## 2019-10-10 DIAGNOSIS — E11.22 TYPE 2 DIABETES MELLITUS WITH STAGE 3 CHRONIC KIDNEY DISEASE, WITHOUT LONG-TERM CURRENT USE OF INSULIN (HCC): Primary | ICD-10-CM

## 2019-10-10 DIAGNOSIS — M19.91 PRIMARY OSTEOARTHRITIS, UNSPECIFIED SITE: ICD-10-CM

## 2019-10-10 DIAGNOSIS — I48.20 CHRONIC ATRIAL FIBRILLATION (HCC): ICD-10-CM

## 2019-10-10 DIAGNOSIS — I10 ESSENTIAL HYPERTENSION: ICD-10-CM

## 2019-10-10 DIAGNOSIS — D64.9 ANEMIA, UNSPECIFIED TYPE: ICD-10-CM

## 2019-10-10 DIAGNOSIS — N18.30 TYPE 2 DIABETES MELLITUS WITH STAGE 3 CHRONIC KIDNEY DISEASE, WITHOUT LONG-TERM CURRENT USE OF INSULIN (HCC): ICD-10-CM

## 2019-10-10 DIAGNOSIS — F41.1 GENERALIZED ANXIETY DISORDER: ICD-10-CM

## 2019-10-10 DIAGNOSIS — K21.9 GERD WITHOUT ESOPHAGITIS: ICD-10-CM

## 2019-10-10 DIAGNOSIS — N18.30 TYPE 2 DIABETES MELLITUS WITH STAGE 3 CHRONIC KIDNEY DISEASE, WITHOUT LONG-TERM CURRENT USE OF INSULIN (HCC): Primary | ICD-10-CM

## 2019-10-10 DIAGNOSIS — Z13.31 NEGATIVE DEPRESSION SCREENING: ICD-10-CM

## 2019-10-10 DIAGNOSIS — Z79.01 CHRONIC ANTICOAGULATION: ICD-10-CM

## 2019-10-10 DIAGNOSIS — E11.22 TYPE 2 DIABETES MELLITUS WITH STAGE 3 CHRONIC KIDNEY DISEASE, WITHOUT LONG-TERM CURRENT USE OF INSULIN (HCC): ICD-10-CM

## 2019-10-10 LAB
ALBUMIN SERPL BCP-MCNC: 3.5 G/DL (ref 3.5–5)
ALP SERPL-CCNC: 66 U/L (ref 46–116)
ALT SERPL W P-5'-P-CCNC: 18 U/L (ref 12–78)
ANION GAP SERPL CALCULATED.3IONS-SCNC: 7 MMOL/L (ref 4–13)
AST SERPL W P-5'-P-CCNC: 7 U/L (ref 5–45)
BASOPHILS # BLD AUTO: 0.06 THOUSANDS/ΜL (ref 0–0.1)
BASOPHILS NFR BLD AUTO: 1 % (ref 0–1)
BILIRUB SERPL-MCNC: 0.61 MG/DL (ref 0.2–1)
BUN SERPL-MCNC: 23 MG/DL (ref 5–25)
CALCIUM SERPL-MCNC: 9.8 MG/DL (ref 8.3–10.1)
CHLORIDE SERPL-SCNC: 114 MMOL/L (ref 100–108)
CO2 SERPL-SCNC: 22 MMOL/L (ref 21–32)
CREAT SERPL-MCNC: 1.59 MG/DL (ref 0.6–1.3)
CREAT UR-MCNC: 201 MG/DL
EOSINOPHIL # BLD AUTO: 0.24 THOUSAND/ΜL (ref 0–0.61)
EOSINOPHIL NFR BLD AUTO: 3 % (ref 0–6)
ERYTHROCYTE [DISTWIDTH] IN BLOOD BY AUTOMATED COUNT: 13.5 % (ref 11.6–15.1)
EST. AVERAGE GLUCOSE BLD GHB EST-MCNC: 163 MG/DL
GFR SERPL CREATININE-BSD FRML MDRD: 42 ML/MIN/1.73SQ M
GLUCOSE P FAST SERPL-MCNC: 182 MG/DL (ref 65–99)
HBA1C MFR BLD: 7.3 % (ref 4.2–6.3)
HCT VFR BLD AUTO: 38.9 % (ref 36.5–49.3)
HGB BLD-MCNC: 12.3 G/DL (ref 12–17)
IMM GRANULOCYTES # BLD AUTO: 0.01 THOUSAND/UL (ref 0–0.2)
IMM GRANULOCYTES NFR BLD AUTO: 0 % (ref 0–2)
LDLC SERPL DIRECT ASSAY-MCNC: 52 MG/DL (ref 0–100)
LYMPHOCYTES # BLD AUTO: 1.84 THOUSANDS/ΜL (ref 0.6–4.47)
LYMPHOCYTES NFR BLD AUTO: 25 % (ref 14–44)
MCH RBC QN AUTO: 30.4 PG (ref 26.8–34.3)
MCHC RBC AUTO-ENTMCNC: 31.6 G/DL (ref 31.4–37.4)
MCV RBC AUTO: 96 FL (ref 82–98)
MICROALBUMIN UR-MCNC: 94.4 MG/L (ref 0–20)
MICROALBUMIN/CREAT 24H UR: 47 MG/G CREATININE (ref 0–30)
MONOCYTES # BLD AUTO: 0.62 THOUSAND/ΜL (ref 0.17–1.22)
MONOCYTES NFR BLD AUTO: 8 % (ref 4–12)
NEUTROPHILS # BLD AUTO: 4.65 THOUSANDS/ΜL (ref 1.85–7.62)
NEUTS SEG NFR BLD AUTO: 63 % (ref 43–75)
NRBC BLD AUTO-RTO: 0 /100 WBCS
PLATELET # BLD AUTO: 235 THOUSANDS/UL (ref 149–390)
PMV BLD AUTO: 11 FL (ref 8.9–12.7)
POTASSIUM SERPL-SCNC: 5 MMOL/L (ref 3.5–5.3)
PROT SERPL-MCNC: 7.6 G/DL (ref 6.4–8.2)
RBC # BLD AUTO: 4.04 MILLION/UL (ref 3.88–5.62)
SODIUM SERPL-SCNC: 143 MMOL/L (ref 136–145)
TRIGL SERPL-MCNC: 117 MG/DL
WBC # BLD AUTO: 7.42 THOUSAND/UL (ref 4.31–10.16)

## 2019-10-10 PROCEDURE — 82043 UR ALBUMIN QUANTITATIVE: CPT | Performed by: INTERNAL MEDICINE

## 2019-10-10 PROCEDURE — 83036 HEMOGLOBIN GLYCOSYLATED A1C: CPT

## 2019-10-10 PROCEDURE — 80053 COMPREHEN METABOLIC PANEL: CPT

## 2019-10-10 PROCEDURE — 82570 ASSAY OF URINE CREATININE: CPT | Performed by: INTERNAL MEDICINE

## 2019-10-10 PROCEDURE — 36415 COLL VENOUS BLD VENIPUNCTURE: CPT

## 2019-10-10 PROCEDURE — 84478 ASSAY OF TRIGLYCERIDES: CPT

## 2019-10-10 PROCEDURE — 85025 COMPLETE CBC W/AUTO DIFF WBC: CPT

## 2019-10-10 PROCEDURE — 83721 ASSAY OF BLOOD LIPOPROTEIN: CPT

## 2019-10-10 PROCEDURE — 99214 OFFICE O/P EST MOD 30 MIN: CPT | Performed by: INTERNAL MEDICINE

## 2019-10-10 NOTE — PROGRESS NOTES
Assessment/Plan:  Problem List Items Addressed This Visit        Digestive    GERD without esophagitis       Endocrine    Type 2 diabetes mellitus with diabetic chronic kidney disease ([facility] [address] ) - Primary    Relevant Orders    CBC and differential    Comprehensive metabolic panel    Hemoglobin A1C    LDL cholesterol, direct    Triglycerides    Microalbumin / creatinine urine ratio       Cardiovascular and Mediastinum    Hypertension    Chronic atrial fibrillation       Musculoskeletal and Integument    Osteoarthritis       Other    Anemia    Generalized anxiety disorder    Hyperlipidemia    Relevant Orders    LDL cholesterol, direct    Triglycerides    Chronic anticoagulation      Other Visit Diagnoses     Negative depression screening               Diagnoses and all orders for this visit:    Type 2 diabetes mellitus with stage 3 chronic kidney disease, without long-term current use of insulin ([facility])  -     CBC and differential; Future  -     Comprehensive metabolic panel; Future  -     Hemoglobin A1C; Future  -     LDL cholesterol, direct; Future  -     Triglycerides; Future  -     Microalbumin / creatinine urine ratio    GERD without esophagitis    Chronic atrial fibrillation    Essential hypertension    Primary osteoarthritis, unspecified site    Anemia, unspecified type    Chronic anticoagulation    Generalized anxiety disorder    Hyperlipidemia, unspecified hyperlipidemia type  -     LDL cholesterol, direct; Future  -     Triglycerides; Future    Negative depression screening        No problem-specific Assessment & Plan notes found for this encounter  A/P: Doing well and will check labs  Discussed vaccines and declines  Continue current treatment pending labs and RTC three months for f/u  Subjective:      Patient ID: Chandler Will is a 68 y o  male  WM RTC for f/u dm,htn, etc  Doing well and no new issues  Remains active w/o difficulty and no falls, but DJD prevents him from doing some activities  Doesn't check sugars and reports no low sugar events  Denies angina, SOB, edema, palpitations, orthopnea or PND  No reflux  No stroke-like events  Remains on chronic anticoagulation and no bleeding events  Due for labs and vaccines  The following portions of the patient's history were reviewed and updated as appropriate:   He has a past medical history of Acute on chronic renal insufficiency, Allergic rhinitis, Anemia (6/11/2012), Atrial fibrillation ([facility] [address] ), Cerebrovascular accident (CVA) ([facility] ), Controlled type 2 diabetes mellitus without complication ([facility] ), Generalized anxiety disorder, GERD without esophagitis (6/11/2012), Hypertension, Memory difficulties (1/8/2019), Nephrolithiasis (3/17/2016), Obesity (6/11/2012), and Osteoarthritis (6/11/2012)  ,  does not have any pertinent problems on file  ,   has a past surgical history that includes Total hip arthroplasty; Knee surgery; Cataract extraction, bilateral; and Total hip arthroplasty (Left, 2011)  ,  family history includes Arthritis in his mother; No Known Problems in his father; Parkinsonism in his mother  ,   reports that he has never smoked  He has never used smokeless tobacco  He reports that he does not drink alcohol or use drugs  ,  is allergic to pollen extract     Current Outpatient Medications   Medication Sig Dispense Refill    ALPRAZolam (XANAX) 0 5 mg tablet Take 1 tablet (0 5 mg total) by mouth every 8 (eight) hours as needed (AS NEEDED) 90 tablet 0    amLODIPine (NORVASC) 10 mg tablet take 1 tablet by mouth daily 90 tablet 3    atorvastatin (LIPITOR) 20 mg tablet Take 1 tablet (20 mg total) by mouth daily 90 tablet 2    Blood Glucose Monitoring Suppl (ONE TOUCH ULTRA 2) w/Device KIT by Does not apply route      celecoxib (CeleBREX) 200 mg capsule Take 200 mg by mouth daily      donepezil (ARICEPT) 5 mg tablet Take 1 tablet (5 mg total) by mouth daily at bedtime 90 tablet 2    dorzolamide-timolol (COSOPT) 22 3-6 8 MG/ML ophthalmic solution instill 1 drop into both eyes twice a day      glucose blood test strip 1 Squirt by In Vitro route daily      lisinopril (ZESTRIL) 5 mg tablet Take 1 tablet (5 mg total) by mouth daily 90 tablet 3    metFORMIN (GLUCOPHAGE) 850 mg tablet Take 2 tablets (1,700 mg total) by mouth daily 180 tablet 1    methocarbamol (ROBAXIN) 500 mg tablet Take 500 mg by mouth as needed for muscle spasms      metoprolol succinate (TOPROL-XL) 100 mg 24 hr tablet take 1 tablet by mouth daily 90 tablet 1    ONETOUCH DELICA LANCETS 29E MISC by Does not apply route daily      rivaroxaban (XARELTO) 15 mg tablet Take 1 tablet (15 mg total) by mouth daily with breakfast 90 tablet 1    traMADol (ULTRAM) 50 mg tablet Take 50 mg by mouth every 6 (six) hours as needed for moderate pain       No current facility-administered medications for this visit  Review of Systems   Constitutional: Negative for activity change, chills, diaphoresis, fatigue and fever  HENT: Negative  Eyes: Negative for visual disturbance  Respiratory: Negative for cough, chest tightness, shortness of breath and wheezing  Cardiovascular: Negative for chest pain, palpitations and leg swelling  Gastrointestinal: Negative for abdominal pain, constipation, diarrhea, nausea and vomiting  Endocrine: Negative for cold intolerance and heat intolerance  Genitourinary: Negative for difficulty urinating, dysuria and frequency  Musculoskeletal: Negative for arthralgias, gait problem and myalgias  Neurological: Negative for dizziness, seizures, syncope, weakness, light-headedness and headaches  Psychiatric/Behavioral: Negative for confusion, dysphoric mood and sleep disturbance  The patient is not nervous/anxious          PHQ-9 Depression Screening    PHQ-9:    Frequency of the following problems over the past two weeks:       Little interest or pleasure in doing things:  0 - not at all  Feeling down, depressed, or hopeless:  0 - not at all  PHQ-2 Score: 0        Objective:  Vitals:    10/10/19 1109   BP: 118/64   Pulse: 72   Resp: 18   Temp: 98 1 °F (36 7 °C)   TempSrc: Tympanic   Weight: 98 4 kg (217 lb)   Height: 5' 11" (1 803 m)     Body mass index is 30 27 kg/m²  Physical Exam   Constitutional: He is oriented to person, place, and time  He appears well-developed and well-nourished  No distress  HENT:   Head: Normocephalic and atraumatic  Mouth/Throat: Oropharynx is clear and moist    Eyes: Pupils are equal, round, and reactive to light  Conjunctivae and EOM are normal    Neck: Neck supple  No JVD present  Cardiovascular: Normal rate and normal heart sounds  NSR with occasional ectopy vs irregular irregular with a GVR  Pulmonary/Chest: Effort normal and breath sounds normal  No respiratory distress  He has no wheezes  He has no rales  Abdominal: Soft  Bowel sounds are normal  He exhibits no distension  There is no tenderness  Musculoskeletal: He exhibits no edema  Neurological: He is alert and oriented to person, place, and time  Psychiatric: He has a normal mood and affect  His behavior is normal  Judgment and thought content normal    Nursing note and vitals reviewed

## 2019-10-10 NOTE — PATIENT INSTRUCTIONS
Type 2 Diabetes in Adults   WHAT YOU NEED TO KNOW:   What is type 2 diabetes? Type 2 diabetes is a disease that affects how your body uses glucose (sugar)  Normally, when the blood sugar level increases, the pancreas makes more insulin  Insulin helps move sugar out of the blood so it can be used for energy  Type 2 diabetes develops because either the body cannot make enough insulin, or it cannot use the insulin correctly  After many years, your pancreas may stop making insulin  What increases my risk for type 2 diabetes? · Obesity    · Physical inactivity    · Older age    · High blood pressure or high cholesterol    · A history of heart disease, gestational diabetes, or polycystic ovary syndrome     · A family member with diabetes    · Being [place] American, , , [place] American, or [facility]  What are the signs and symptoms of type 2 diabetes? You may have high blood sugar levels for a long time before symptoms appear  You may have any of the following:  · More hunger or thirst than usual     · Frequent urination     · Weight loss without trying     · Blurred vision  How is type 2 diabetes diagnosed? You may need tests to check for type 2 diabetes starting at age 39  You may need any of the following:  · An A1c test  shows the average amount of sugar in your blood over the past 2 to 3 months  Your healthcare provider will tell you the A1c level that is right for you  The goal for your A1c is usually below 7%  Your provider can help you make changes if a check shows the A1c is too high  · A fasting plasma glucose test  is when your blood sugar level is tested after you have not eaten for 8 hours  · A 2-hour plasma glucose test  starts with a blood sugar level check after you have not eaten for 8 hours  You are then given a glucose drink  Your blood sugar level is checked after 2 hours       · A random glucose test  may be done any time of day, no matter how long ago you ate   How is type 2 diabetes treated? Type 2 diabetes can be controlled to prevent damage to your heart, blood vessels, and other organs  The goal is to keep your blood sugar at a normal level  You must eat the right foods, and exercise regularly  You may need 1 or more hypoglycemic medicines or insulin if you cannot control your blood sugar level with nutrition and exercise  You may also need medicine to lower your risk for heart disease  An example includes medicine to lower or control your cholesterol  How do I check my blood sugar level? You will be taught how to check a small drop of blood in a glucose monitor  You will need to check your blood sugar level at least 3 times each day if you are on insulin  Ask your healthcare provider when and how often to check during the day  If you check your blood sugar level before a meal , it should be between 80 and 130 mg/dL  If you check your blood sugar level 1 to 2 hours after a meal , it should be less than 180 mg/dL  Ask your healthcare provider if these are good goals for you  Write down your results, and show them to your healthcare provider  Your provider may use the results to make changes to your medicine, food, and exercise schedules  What should I do if my blood sugar level is too low? Your blood sugar level is too low if it goes below 70 mg/dL  If the level is too low, eat or drink 15 grams of fast-acting carbohydrate  These are found naturally in fruits  Fast-acting carbohydrates will raise your blood sugar level quickly  Examples of 15 grams of fast-acting carbohydrate are 4 ounces (½ cup) of fruit juice or 4 ounces of regular soda  Other examples are 2 tablespoons of raisins or 3 to 4 glucose tablets  Check your blood sugar level 15 minutes later  If the level is still low (less than 100 mg/dL), eat another 15 grams of carbohydrate  When the level returns to 100 mg/dL, eat a snack or meal that contains carbohydrates   This will help prevent another drop in blood sugar  Always carefully follow your healthcare provider's instructions on how to treat low blood sugar levels  What do I need to know about nutrition? A dietitian will help you make a meal plan to keep your blood sugar level steady  Do not skip meals  Your blood sugar level may drop too low if you have taken diabetes medicine and do not eat  · Keep track of carbohydrates (sugar and starchy foods)  Your blood sugar level can get too high if you eat too many carbohydrates  Eat fruits, legumes, vegetables, and whole grains  Your dietitian will help you plan meals and snacks that have the right amount of carbohydrates  · Eat low-fat foods , such as skinless chicken and low-fat milk  · Eat less sodium (salt)  Limit high-sodium foods, such as soy sauce, potato chips, and soup  Do not add salt to food you cook  Limit your use of table salt  You should have less than 2,300 mg of sodium per day  · Eat high-fiber foods , such as vegetables, whole-grain breads, and beans  · Limit alcohol  Alcohol affects your blood sugar level and can make it harder to manage your diabetes  Limit alcohol to 1 drink a day if you are a woman  Limit alcohol to 2 drinks a day if you are a man  A drink of alcohol is 12 ounces of beer, 5 ounces of wine, or 1½ ounces of liquor  How much exercise do I need? Exercise can help keep your blood sugar level steady, decrease your risk of heart disease, and help you lose weight  Stretch before and after you exercise  Exercise for at least 150 minutes every week  Spread this amount of exercise over at least 3 days a week  Do not skip exercise more than 2 days in a row  Include muscle strengthening activities 2 to 3 days each week  Older adults should include balance training 2 to 3 times each week  Activities that help increase balance include yoga and kalina chi  Work with your healthcare provider to create an exercise plan    · Check your blood sugar level before and after exercise  Healthcare providers may tell you to change the amount of insulin you take or food you eat  If your blood sugar level is high, check your blood or urine for ketones before you exercise  Do not exercise if your blood sugar level is high and you have ketones  · If your blood sugar level is less than 100 mg/dL, have a carbohydrate snack before you exercise  Examples are 4 to 6 crackers, ½ banana, 8 ounces (1 cup) of milk, or 4 ounces (½ cup) of juice  Drink water or liquids that do not contain sugar before, during, and after exercise  Ask your dietitian or healthcare provider which liquids you should drink when you exercise  · Do not sit for longer than 30 minutes  If you cannot walk around, at least stand up  This will help you stay active and keep your blood circulating  What else can I do to manage type 2 diabetes? · Check your feet each day for sores  Wear shoes and socks that fit correctly  Do not trim your toenails  Ask your healthcare provider for more information about foot care  · Maintain a healthy weight  Ask your healthcare provider how much you should weigh  A healthy weight can help you control your diabetes and prevent heart disease  Ask your provider to help you create a weight loss plan if you are overweight  Together you can set manageable weight loss goals  · Do not smoke  Nicotine and other chemicals in cigarettes and cigars can cause lung damage and make it more difficult to manage your diabetes  Ask your healthcare provider for information if you currently smoke and need help to quit  Do not use e-cigarettes or smokeless tobacco in place of cigarettes or to help you quit  They still contain nicotine  · Check your blood pressure as directed  Ask your healthcare provider what your blood pressure should be  Most adults with diabetes and high blood pressure should have a systolic blood pressure (first number) less than 140   Your diastolic blood pressure (second number) should be less than 90  · Wear medical alert identification  Wear medical alert jewelry or carry a card that says you have diabetes  Ask your healthcare provider where to get these items  · Ask about vaccines  You have a higher risk for serious illness if you get the flu, pneumonia, or hepatitis  Ask your healthcare provider if you should get a flu, pneumonia, or hepatitis B vaccine, and when to get the vaccine  What are the risks of type 2 diabetes? Uncontrolled diabetes can damage your nerves, veins, and arteries  High blood sugar levels may damage other body tissue and organs over time  Damage to arteries may increase your risk for heart attack and stroke  Nerve damage may also lead to other heart, stomach, and nerve problems  Diabetes is life-threatening if it is not controlled  Control your blood glucose levels to prevent health problems  Call 911 for any of the following:   · You have any of the following signs of a stroke:      ¨ Numbness or drooping on one side of your face     ¨ Weakness in an arm or leg    ¨ Confusion or difficulty speaking    ¨ Dizziness, a severe headache, or vision loss    · You have any of the following signs of a heart attack:      ¨ Squeezing, pressure, or pain in your chest that lasts longer than 5 minutes or returns    ¨ Discomfort or pain in your back, neck, jaw, stomach, or arm     ¨ Trouble breathing    ¨ Nausea or vomiting    ¨ Lightheadedness or a sudden cold sweat, especially with chest pain or trouble breathing  When should I seek immediate care? · You have severe abdominal pain, or the pain spreads to your back  You may also be vomiting  · You have trouble staying awake or focusing  · You are shaking or sweating  · You have blurred or double vision  · Your breath has a fruity, sweet smell  · Your breathing is deep and labored, or rapid and shallow  · Your heartbeat is fast and weak    When should I contact my healthcare provider? · You are vomiting or have diarrhea  · You have an upset stomach and cannot eat the foods on your meal plan  · You feel weak or more tired than usual      · You feel dizzy, have headaches, or are easily irritated  · Your skin is red, warm, dry, or swollen  · You have a wound that does not heal      · You have numbness in your arms or legs  · You have trouble coping with your illness, or you feel anxious or depressed  · You have questions or concerns about your condition or care  CARE AGREEMENT:   You have the right to help plan your care  Learn about your health condition and how it may be treated  Discuss treatment options with your caregivers to decide what care you want to receive  You always have the right to refuse treatment  The above information is an  only  It is not intended as medical advice for individual conditions or treatments  Talk to your doctor, nurse or pharmacist before following any medical regimen to see if it is safe and effective for you  © 2017 [employer] Information is for End User's use only and may not be sold, redistributed or otherwise used for commercial purposes  All illustrations and images included in CareNotes® are the copyrighted property of [employer]  or Pradip Spivey

## 2019-10-14 ENCOUNTER — TELEPHONE (OUTPATIENT)
Dept: INTERNAL MEDICINE CLINIC | Facility: CLINIC | Age: 74
End: 2019-10-14

## 2019-10-14 DIAGNOSIS — R79.9 ABNORMAL BLOOD FINDINGS: Primary | ICD-10-CM

## 2019-11-06 ENCOUNTER — HOSPITAL ENCOUNTER (INPATIENT)
Facility: HOSPITAL | Age: 74
LOS: 2 days | Discharge: HOME | DRG: 660 | End: 2019-11-09
Attending: FAMILY MEDICINE | Admitting: INTERNAL MEDICINE
Payer: MEDICARE

## 2019-11-06 ENCOUNTER — APPOINTMENT (EMERGENCY)
Dept: CT IMAGING | Facility: HOSPITAL | Age: 74
DRG: 660 | End: 2019-11-06
Payer: MEDICARE

## 2019-11-06 DIAGNOSIS — N20.0 URINARY TRACT OBSTRUCTION BY KIDNEY STONE: ICD-10-CM

## 2019-11-06 DIAGNOSIS — N13.8 URINARY TRACT OBSTRUCTION BY KIDNEY STONE: ICD-10-CM

## 2019-11-06 DIAGNOSIS — N13.2 HYDRONEPHROSIS WITH OBSTRUCTING CALCULUS: ICD-10-CM

## 2019-11-06 DIAGNOSIS — N28.9 RENAL INSUFFICIENCY: ICD-10-CM

## 2019-11-06 DIAGNOSIS — N20.0 KIDNEY STONE: ICD-10-CM

## 2019-11-06 DIAGNOSIS — R10.9 LEFT FLANK PAIN: Primary | ICD-10-CM

## 2019-11-06 PROBLEM — E27.9 ADRENAL NODULE (HCC): Status: ACTIVE | Noted: 2019-11-06

## 2019-11-06 PROBLEM — E27.9 ADRENAL NODULE (HCC): Chronic | Status: ACTIVE | Noted: 2019-11-06

## 2019-11-06 PROBLEM — E27.8 ADRENAL NODULE (HCC): Chronic | Status: ACTIVE | Noted: 2019-11-06

## 2019-11-06 PROBLEM — I48.20 CHRONIC ATRIAL FIBRILLATION (HCC): Chronic | Status: ACTIVE | Noted: 2018-11-26

## 2019-11-06 PROBLEM — N18.30 CKD (CHRONIC KIDNEY DISEASE) STAGE 3, GFR 30-59 ML/MIN (HCC): Chronic | Status: ACTIVE | Noted: 2019-05-07

## 2019-11-06 PROBLEM — E27.8 ADRENAL NODULE (HCC): Status: ACTIVE | Noted: 2019-11-06

## 2019-11-06 LAB
ALBUMIN SERPL BCP-MCNC: 4 G/DL (ref 3.5–5.7)
ALP SERPL-CCNC: 52 U/L (ref 55–165)
ALT SERPL W P-5'-P-CCNC: 10 U/L (ref 7–52)
ANION GAP SERPL CALCULATED.3IONS-SCNC: 9 MMOL/L (ref 4–13)
AST SERPL W P-5'-P-CCNC: 10 U/L (ref 13–39)
BACTERIA UR QL AUTO: ABNORMAL /HPF
BASOPHILS # BLD AUTO: 0 THOUSANDS/ΜL (ref 0–0.1)
BASOPHILS NFR BLD AUTO: 0 % (ref 0–2)
BILIRUB SERPL-MCNC: 0.5 MG/DL (ref 0.2–1)
BILIRUB UR QL STRIP: NEGATIVE
BUN SERPL-MCNC: 25 MG/DL (ref 7–25)
CALCIUM SERPL-MCNC: 9 MG/DL (ref 8.6–10.5)
CHLORIDE SERPL-SCNC: 108 MMOL/L (ref 98–107)
CLARITY UR: CLEAR
CO2 SERPL-SCNC: 19 MMOL/L (ref 21–31)
COLOR UR: ABNORMAL
CREAT SERPL-MCNC: 2.09 MG/DL (ref 0.7–1.3)
EOSINOPHIL # BLD AUTO: 0 THOUSAND/ΜL (ref 0–0.61)
EOSINOPHIL NFR BLD AUTO: 0 % (ref 0–5)
ERYTHROCYTE [DISTWIDTH] IN BLOOD BY AUTOMATED COUNT: 14 % (ref 11.5–14.5)
GFR SERPL CREATININE-BSD FRML MDRD: 30 ML/MIN/1.73SQ M
GLUCOSE SERPL-MCNC: 156 MG/DL (ref 65–140)
GLUCOSE SERPL-MCNC: 207 MG/DL (ref 65–140)
GLUCOSE SERPL-MCNC: 309 MG/DL (ref 65–99)
GLUCOSE SERPL-MCNC: 92 MG/DL (ref 65–140)
GLUCOSE UR STRIP-MCNC: ABNORMAL MG/DL
HCT VFR BLD AUTO: 35.1 % (ref 42–47)
HGB BLD-MCNC: 11.7 G/DL (ref 14–18)
HGB UR QL STRIP.AUTO: ABNORMAL
KETONES UR STRIP-MCNC: NEGATIVE MG/DL
LEUKOCYTE ESTERASE UR QL STRIP: NEGATIVE
LYMPHOCYTES # BLD AUTO: 0.7 THOUSANDS/ΜL (ref 0.6–4.47)
LYMPHOCYTES NFR BLD AUTO: 10 % (ref 21–51)
MCH RBC QN AUTO: 31.4 PG (ref 26–34)
MCHC RBC AUTO-ENTMCNC: 33.4 G/DL (ref 31–37)
MCV RBC AUTO: 94 FL (ref 81–99)
MONOCYTES # BLD AUTO: 0.7 THOUSAND/ΜL (ref 0.17–1.22)
MONOCYTES NFR BLD AUTO: 9 % (ref 2–12)
NEUTROPHILS # BLD AUTO: 6.2 THOUSANDS/ΜL (ref 1.4–6.5)
NEUTS SEG NFR BLD AUTO: 81 % (ref 42–75)
NITRITE UR QL STRIP: NEGATIVE
NON-SQ EPI CELLS URNS QL MICRO: ABNORMAL /HPF
PH UR STRIP.AUTO: 5.5 [PH]
PLATELET # BLD AUTO: 184 THOUSANDS/UL (ref 149–390)
PMV BLD AUTO: 8.2 FL (ref 8.6–11.7)
POTASSIUM SERPL-SCNC: 4.3 MMOL/L (ref 3.5–5.5)
PROT SERPL-MCNC: 6.9 G/DL (ref 6.4–8.9)
PROT UR STRIP-MCNC: ABNORMAL MG/DL
RBC # BLD AUTO: 3.73 MILLION/UL (ref 4.3–5.9)
RBC #/AREA URNS AUTO: ABNORMAL /HPF
SODIUM SERPL-SCNC: 136 MMOL/L (ref 134–143)
SP GR UR STRIP.AUTO: 1.01 (ref 1–1.03)
UROBILINOGEN UR QL STRIP.AUTO: 0.2 E.U./DL
WBC # BLD AUTO: 7.7 THOUSAND/UL (ref 4.8–10.8)
WBC #/AREA URNS AUTO: ABNORMAL /HPF

## 2019-11-06 PROCEDURE — 85025 COMPLETE CBC W/AUTO DIFF WBC: CPT | Performed by: PHYSICIAN ASSISTANT

## 2019-11-06 PROCEDURE — 99285 EMERGENCY DEPT VISIT HI MDM: CPT

## 2019-11-06 PROCEDURE — 99285 EMERGENCY DEPT VISIT HI MDM: CPT | Performed by: PHYSICIAN ASSISTANT

## 2019-11-06 PROCEDURE — 36415 COLL VENOUS BLD VENIPUNCTURE: CPT | Performed by: PHYSICIAN ASSISTANT

## 2019-11-06 PROCEDURE — 74176 CT ABD & PELVIS W/O CONTRAST: CPT

## 2019-11-06 PROCEDURE — 96361 HYDRATE IV INFUSION ADD-ON: CPT

## 2019-11-06 PROCEDURE — 99220 PR INITIAL OBSERVATION CARE/DAY 70 MINUTES: CPT | Performed by: PHYSICIAN ASSISTANT

## 2019-11-06 PROCEDURE — 96375 TX/PRO/DX INJ NEW DRUG ADDON: CPT

## 2019-11-06 PROCEDURE — 81001 URINALYSIS AUTO W/SCOPE: CPT | Performed by: PHYSICIAN ASSISTANT

## 2019-11-06 PROCEDURE — 80053 COMPREHEN METABOLIC PANEL: CPT | Performed by: PHYSICIAN ASSISTANT

## 2019-11-06 PROCEDURE — 82948 REAGENT STRIP/BLOOD GLUCOSE: CPT

## 2019-11-06 PROCEDURE — 96374 THER/PROPH/DIAG INJ IV PUSH: CPT

## 2019-11-06 RX ORDER — ALPRAZOLAM 0.5 MG/1
0.5 TABLET ORAL EVERY 8 HOURS PRN
Status: DISCONTINUED | OUTPATIENT
Start: 2019-11-06 | End: 2019-11-06

## 2019-11-06 RX ORDER — ACETAMINOPHEN 160 MG/5ML
650 SUSPENSION, ORAL (FINAL DOSE FORM) ORAL ONCE
Status: DISCONTINUED | OUTPATIENT
Start: 2019-11-06 | End: 2019-11-06

## 2019-11-06 RX ORDER — METHOCARBAMOL 500 MG/1
500 TABLET, FILM COATED ORAL AS NEEDED
Status: DISCONTINUED | OUTPATIENT
Start: 2019-11-06 | End: 2019-11-06

## 2019-11-06 RX ORDER — METOPROLOL SUCCINATE 50 MG/1
100 TABLET, EXTENDED RELEASE ORAL DAILY
Status: DISCONTINUED | OUTPATIENT
Start: 2019-11-07 | End: 2019-11-09 | Stop reason: HOSPADM

## 2019-11-06 RX ORDER — ALPRAZOLAM 0.5 MG/1
0.5 TABLET ORAL EVERY 8 HOURS PRN
Status: DISCONTINUED | OUTPATIENT
Start: 2019-11-06 | End: 2019-11-09 | Stop reason: HOSPADM

## 2019-11-06 RX ORDER — AMLODIPINE BESYLATE 5 MG/1
10 TABLET ORAL DAILY
Status: DISCONTINUED | OUTPATIENT
Start: 2019-11-07 | End: 2019-11-09 | Stop reason: HOSPADM

## 2019-11-06 RX ORDER — ATORVASTATIN CALCIUM 20 MG/1
20 TABLET, FILM COATED ORAL DAILY
Status: DISCONTINUED | OUTPATIENT
Start: 2019-11-06 | End: 2019-11-09 | Stop reason: HOSPADM

## 2019-11-06 RX ORDER — METHOCARBAMOL 500 MG/1
500 TABLET, FILM COATED ORAL EVERY 6 HOURS PRN
Status: DISCONTINUED | OUTPATIENT
Start: 2019-11-06 | End: 2019-11-09 | Stop reason: HOSPADM

## 2019-11-06 RX ORDER — DONEPEZIL HYDROCHLORIDE 5 MG/1
5 TABLET, FILM COATED ORAL
Status: DISCONTINUED | OUTPATIENT
Start: 2019-11-06 | End: 2019-11-09 | Stop reason: HOSPADM

## 2019-11-06 RX ORDER — FUROSEMIDE 10 MG/ML
40 INJECTION INTRAMUSCULAR; INTRAVENOUS ONCE
Status: COMPLETED | OUTPATIENT
Start: 2019-11-07 | End: 2019-11-07

## 2019-11-06 RX ORDER — ONDANSETRON 2 MG/ML
4 INJECTION INTRAMUSCULAR; INTRAVENOUS ONCE
Status: COMPLETED | OUTPATIENT
Start: 2019-11-06 | End: 2019-11-06

## 2019-11-06 RX ORDER — DORZOLAMIDE HYDROCHLORIDE AND TIMOLOL MALEATE 20; 5 MG/ML; MG/ML
1 SOLUTION/ DROPS OPHTHALMIC EVERY 12 HOURS SCHEDULED
Status: DISCONTINUED | OUTPATIENT
Start: 2019-11-06 | End: 2019-11-09 | Stop reason: HOSPADM

## 2019-11-06 RX ORDER — TAMSULOSIN HYDROCHLORIDE 0.4 MG/1
0.4 CAPSULE ORAL
Status: DISCONTINUED | OUTPATIENT
Start: 2019-11-06 | End: 2019-11-09 | Stop reason: HOSPADM

## 2019-11-06 RX ORDER — ACETAMINOPHEN 325 MG/1
650 TABLET ORAL ONCE
Status: COMPLETED | OUTPATIENT
Start: 2019-11-06 | End: 2019-11-06

## 2019-11-06 RX ORDER — ACETAMINOPHEN 325 MG/1
650 TABLET ORAL EVERY 6 HOURS PRN
Status: DISCONTINUED | OUTPATIENT
Start: 2019-11-06 | End: 2019-11-09 | Stop reason: HOSPADM

## 2019-11-06 RX ORDER — ONDANSETRON 2 MG/ML
4 INJECTION INTRAMUSCULAR; INTRAVENOUS EVERY 6 HOURS PRN
Status: DISCONTINUED | OUTPATIENT
Start: 2019-11-06 | End: 2019-11-09 | Stop reason: HOSPADM

## 2019-11-06 RX ORDER — TRAMADOL HYDROCHLORIDE 50 MG/1
50 TABLET ORAL EVERY 6 HOURS PRN
Status: DISCONTINUED | OUTPATIENT
Start: 2019-11-06 | End: 2019-11-09 | Stop reason: HOSPADM

## 2019-11-06 RX ORDER — SODIUM CHLORIDE 9 MG/ML
125 INJECTION, SOLUTION INTRAVENOUS CONTINUOUS
Status: DISCONTINUED | OUTPATIENT
Start: 2019-11-06 | End: 2019-11-09 | Stop reason: HOSPADM

## 2019-11-06 RX ADMIN — INSULIN LISPRO 5 UNITS: 100 INJECTION, SOLUTION INTRAVENOUS; SUBCUTANEOUS at 16:17

## 2019-11-06 RX ADMIN — ACETAMINOPHEN 650 MG: 325 TABLET ORAL at 10:12

## 2019-11-06 RX ADMIN — INSULIN LISPRO 1 UNITS: 100 INJECTION, SOLUTION INTRAVENOUS; SUBCUTANEOUS at 16:17

## 2019-11-06 RX ADMIN — MORPHINE SULFATE 2 MG: 2 INJECTION, SOLUTION INTRAMUSCULAR; INTRAVENOUS at 11:25

## 2019-11-06 RX ADMIN — ONDANSETRON 4 MG: 2 INJECTION INTRAMUSCULAR; INTRAVENOUS at 11:15

## 2019-11-06 RX ADMIN — INSULIN LISPRO 2 UNITS: 100 INJECTION, SOLUTION INTRAVENOUS; SUBCUTANEOUS at 13:25

## 2019-11-06 RX ADMIN — DONEPEZIL HYDROCHLORIDE 5 MG: 5 TABLET ORAL at 21:53

## 2019-11-06 RX ADMIN — SODIUM CHLORIDE 125 ML/HR: 9 INJECTION, SOLUTION INTRAVENOUS at 13:19

## 2019-11-06 RX ADMIN — TAMSULOSIN HYDROCHLORIDE 0.4 MG: 0.4 CAPSULE ORAL at 16:18

## 2019-11-06 RX ADMIN — ATORVASTATIN CALCIUM 20 MG: 20 TABLET, FILM COATED ORAL at 14:18

## 2019-11-06 RX ADMIN — SODIUM CHLORIDE 500 ML: 0.9 INJECTION, SOLUTION INTRAVENOUS at 10:08

## 2019-11-06 RX ADMIN — DORZOLAMIDE HYDROCHLORIDE AND TIMOLOL MALEATE 1 DROP: 20; 5 SOLUTION/ DROPS OPHTHALMIC at 21:53

## 2019-11-06 RX ADMIN — INSULIN LISPRO 5 UNITS: 100 INJECTION, SOLUTION INTRAVENOUS; SUBCUTANEOUS at 13:24

## 2019-11-06 NOTE — ASSESSMENT & PLAN NOTE
· CT w/ 5mm obstructing calculus in the proximal 3rd left ureter with mild left hydronephrosis  There is nonobstructing 2 mm left lower pole renal calculus

## 2019-11-06 NOTE — CONSULTS
Consultation - Urology   Yessy Yeh 68 y o  male MRN: [id number]  Unit/Bed#: -01 Encounter: [id number]      Assessment/Plan      Assessment:  Left proximal ureteral stone causing renal colic and acute renal injury  Plan:  Agree with current management including intravenous hydration and straining all urine  Repeat laboratory studies and KUB in the morning  If his renal function does not improve and the stone does not progress, he may require intervention such as ureteral stenting in the near future  I will continue to follow along with you  History of Present Illness   Attending: Taco Linares MD  Reason for Consult / Principal Problem:  Left ureteral stone  HPI: Yessy Yeh is a 68y o  year old male who presents with left flank pain radiating to the left low back starting last night  He went to the emergency room today where a CT scan showed a 5 mm stone in the left ureter at the L4 vertebral level, as well as a tiny left kidney stone  There was resulting mild hydroureteronephrosis  He denied any urinary symptoms other than some pink urine  He denied any nausea, vomiting, fever or chills  He does have a history of chronic renal insufficiency with creatinine baseline about 1 4  Creatinine in the emergency room was over 2  He does have a history of passing at least 3 kidney stones in the past   The last 1 was over 1 year ago  He has never required intervention for any of his stones  Consults    Review of Systems   Gastrointestinal: Negative for abdominal distention and abdominal pain  Genitourinary: Positive for flank pain and hematuria  Negative for difficulty urinating, dysuria and frequency         Historical Information   Past Medical History:   Diagnosis Date    Acute on chronic renal insufficiency     Allergic rhinitis     Anemia 6/11/2012    [facility] [address] [facility]     Cerebrovascular accident (CVA) [facility] [address] [facility]     Controlled type 2 diabetes mellitus without complication ([address] )     Generalized anxiety disorder     GERD without esophagitis 6/11/2012    Hypertension     Memory difficulties 1/8/2019    Nephrolithiasis 3/17/2016    Obesity 6/11/2012    Osteoarthritis 6/11/2012     Past Surgical History:   Procedure Laterality Date    CATARACT EXTRACTION, BILATERAL      KNEE SURGERY      TOTAL HIP ARTHROPLASTY      TOTAL HIP ARTHROPLASTY Left 2011     Social History   Social History     Substance and Sexual Activity   Alcohol Use Never    Frequency: Never    Drinks per session: 1 or 2    Binge frequency: Never    Comment: socially      Social History     Substance and Sexual Activity   Drug Use Never     Social History     Tobacco Use   Smoking Status Never Smoker   Smokeless Tobacco Never Used     Family History: non-contributory    Meds/Allergies   current meds:   Current Facility-Administered Medications   Medication Dose Route Frequency    acetaminophen (TYLENOL) tablet 650 mg  650 mg Oral Q6H PRN    ALPRAZolam (XANAX) tablet 0 5 mg  0 5 mg Oral Q8H PRN    [START ON 11/7/2019] amLODIPine (NORVASC) tablet 10 mg  10 mg Oral Daily    atorvastatin (LIPITOR) tablet 20 mg  20 mg Oral Daily    donepezil (ARICEPT) tablet 5 mg  5 mg Oral HS    dorzolamide-timolol (COSOPT) 22 3-6 8 MG/ML ophthalmic solution 1 drop  1 drop Both Eyes Q12H [facility]    [START ON 11/7/2019] furosemide (LASIX) injection 40 mg  40 mg Intravenous Once    insulin lispro (HumaLOG) 100 units/mL subcutaneous injection 1-5 Units  1-5 Units Subcutaneous HS    insulin lispro (HumaLOG) 100 units/mL subcutaneous injection 1-6 Units  1-6 Units Subcutaneous TID AC    insulin lispro (HumaLOG) 100 units/mL subcutaneous injection 5 Units  5 Units Subcutaneous TID With Meals    methocarbamol (ROBAXIN) tablet 500 mg  500 mg Oral Q6H PRN    [START ON 11/7/2019] metoprolol succinate (TOPROL-XL) 24 hr tablet 100 mg  100 mg Oral Daily    morphine injection 2 mg  2 mg Intravenous Q4H PRN    ondansetron (ZOFRAN) injection 4 mg  4 mg Intravenous Q6H PRN    [START ON 11/7/2019] rivaroxaban (XARELTO) tablet 15 mg  15 mg Oral Daily With Breakfast    sodium chloride 0 9 % infusion  125 mL/hr Intravenous Continuous    tamsulosin (FLOMAX) capsule 0 4 mg  0 4 mg Oral Daily With Dinner    traMADol (ULTRAM) tablet 50 mg  50 mg Oral Q6H PRN     Allergies   Allergen Reactions    Pollen Extract Itching     Runny nose, itchy eyes       Objective   Vitals: Blood pressure 143/78, pulse 82, temperature (!) 96 6 °F (35 9 °C), temperature source Tympanic, resp  rate 18, height 5' 11" (1 803 m), weight 101 kg (223 lb 5 2 oz), SpO2 99 %  No intake/output data recorded  Invasive Devices     Peripheral Intravenous Line            Peripheral IV 11/06/19 Left Antecubital less than 1 day                Physical Exam   Abdominal: He exhibits no distension  There is no tenderness  Genitourinary: Penis normal    Normal testicles and spermatic cords bilaterally  No flank tenderness      Lab Results:   CBC:   Lab Results   Component Value Date    WBC 7 70 11/06/2019    HGB 11 7 (L) 11/06/2019    HCT 35 1 (L) 11/06/2019    MCV 94 11/06/2019     11/06/2019    MCH 31 4 11/06/2019    MCHC 33 4 11/06/2019    RDW 14 0 11/06/2019    MPV 8 2 (L) 11/06/2019     CMP:   Lab Results   Component Value Date    SODIUM 136 11/06/2019     (H) 11/06/2019    CO2 19 (L) 11/06/2019    BUN 25 11/06/2019    CREATININE 2 09 (H) 11/06/2019    CALCIUM 9 0 11/06/2019    AST 10 (L) 11/06/2019    ALT 10 11/06/2019    ALKPHOS 52 (L) 11/06/2019    EGFR 30 11/06/2019     Urinalysis:   Lab Results   Component Value Date    COLORU Straw 11/06/2019    CLARITYU Clear 11/06/2019    SPECGRAV 1 015 11/06/2019    PHUR 5 5 11/06/2019    LEUKOCYTESUR Negative 11/06/2019    NITRITE Negative 11/06/2019    GLUCOSEU 3+ (A) 11/06/2019    KETONESU Negative 11/06/2019    BILIRUBINUR Negative 11/06/2019    BLOODU 3+ (A) 11/06/2019     Imaging Studies: I have personally reviewed pertinent reports  EKG, Pathology, and Other Studies: I have personally reviewed pertinent reports  VTE Prophylaxis: Sequential compression device (Venodyne)     Code Status: Level 1 - Full Code  Advance Directive and Living Will:      Power of :    POLST:      Counseling / Coordination of Care  Total floor / unit time spent today 30 minutes  Greater than 50% of total time was spent with the patient and / or family counseling and / or coordination of care   A description of the counseling / coordination of care:  I have discussed the case with the hospitalist

## 2019-11-06 NOTE — ASSESSMENT & PLAN NOTE
Lab Results   Component Value Date    HGBA1C 7 3 (H) 10/10/2019       No results for input(s): POCGLU in the last 72 hours      Blood Sugar Average: Last 72 hrs:  ·  hold metformin for now  · Sliding scale insulin coverage

## 2019-11-06 NOTE — H&P
H&P- Racheal Alford 1945, 68 y o  male MRN: [id number]    Unit/Bed#: -01 Encounter: [id number]    Primary Care Provider: Yamil Robins DO   Date and time admitted to hospital: 11/6/2019  9:54 AM      * Hydronephrosis with obstructing calculus  Assessment & Plan  · CT w/ 5mm obstructing calculus in the proximal 3rd left ureter with mild left hydronephrosis  There is nonobstructing 2 mm left lower pole renal calculus  CKD (chronic kidney disease) stage 3, GFR 30-59 ml/min ([facility])  Assessment & Plan  · With acute kidney injury  · Creatinine is up from baseline  · 1 1-1 4 range  · IV fluids and monitor    Right Adrenal nodule   Assessment & Plan  · Incidental finding on CT was a 13 mm right adrenal nodule  Follow up outpatient with PCP for imaging    Chronic atrial fibrillation  Assessment & Plan  · Continue rate control and anticoagulation    Hypertension  Assessment & Plan  · Continue meds and monitor    Diabetic polyneuropathy associated with type 2 diabetes mellitus ([address] )  Assessment & Plan  Lab Results   Component Value Date    HGBA1C 7 3 (H) 10/10/2019       No results for input(s): POCGLU in the last 72 hours  Blood Sugar Average: Last 72 hrs:  ·  hold metformin for now  · Sliding scale insulin coverage    VTE Prophylaxis: Rivaroxaban (Xarelto)  Code Status: Full code  POLST: POLST is not applicable to this patient  Discussion with patient    Anticipated Length of Stay:  Patient will be admitted on an Observation basis with an anticipated length of stay of  < 2 midnights  Justification for Hospital Stay: IVF, specialist input    Chief Complaint:   Flank pain    History of Present Illness:    Racheal Alford is a 68 y o  male who has past medical history of atrial fibrillation on anticoagulation, history of TIA, diabetes mellitus type 2 not on insulin, chronic kidney disease stage 3, history of nephrolithiasis presented to the emergency room secondary to flank pain    Patient reports went to gym last evening, hit speed bag, about 3 hours later when he returned home he started with flank pain on left and could feel the pain traveling down  He noted urine is now pink in color, previously was normal in color  Review of Systems:  Review of Systems   Constitutional: Negative for chills and fever  HENT: Negative for rhinorrhea, sore throat and trouble swallowing  Eyes: Negative for discharge and redness  Respiratory: Negative for cough and shortness of breath  Cardiovascular: Positive for leg swelling  Negative for chest pain  Gastrointestinal: Negative for abdominal pain, diarrhea, nausea and vomiting  Genitourinary: Positive for difficulty urinating, flank pain and hematuria  Negative for dysuria  Musculoskeletal: Negative for back pain and neck pain  Skin: Negative for rash and wound  Neurological: Positive for dizziness  Negative for weakness and headaches  Psychiatric/Behavioral: Negative for agitation and confusion  Past Medical and Surgical History:   Past Medical History:   Diagnosis Date    Acute on chronic renal insufficiency     Allergic rhinitis     Anemia 6/11/2012    [facility] [address] [facility]     Cerebrovascular [facility] (CVA) [facility] [address] [facility]     Controlled type 2 diabetes mellitus without complication ([facility] [address] [facility]     Generalized anxiety disorder     GERD without esophagitis 6/11/2012    Hypertension     Memory difficulties 1/8/2019    Nephrolithiasis 3/17/2016    Obesity 6/11/2012    Osteoarthritis 6/11/2012       Past Surgical History:   Procedure Laterality Date    CATARACT EXTRACTION, BILATERAL      KNEE SURGERY      TOTAL HIP ARTHROPLASTY      TOTAL HIP ARTHROPLASTY Left 2011       Meds/Allergies:  Prior to Admission medications    Medication Sig Start Date End Date Taking?  Authorizing Provider   ALPRAZolam Shanell Hope) 0 5 mg tablet Take 1 tablet (0 5 mg total) by mouth every 8 (eight) hours as needed (AS NEEDED) 7/12/19  Yes Mary Tadeo,    amLODIPine (NORVASC) 10 mg tablet take 1 tablet by mouth daily 11/15/18  Yes Julius Simmons DO   atorvastatin (LIPITOR) 20 mg tablet Take 1 tablet (20 mg total) by mouth daily 4/4/19  Yes Julius Simmons DO   donepezil (ARICEPT) 5 mg tablet Take 1 tablet (5 mg total) by mouth daily at bedtime 4/4/19  Yes Julius Simmons DO   metFORMIN (GLUCOPHAGE) 850 mg tablet Take 2 tablets (1,700 mg total) by mouth daily 8/29/19  Yes Julius Simmons DO   metoprolol succinate (TOPROL-XL) 100 mg 24 hr tablet take 1 tablet by mouth daily 5/7/19  Yes Julius Simmons DO   rivaroxaban (XARELTO) 15 mg tablet Take 1 tablet (15 mg total) by mouth daily with breakfast 6/3/19  Yes Julius Simmons DO   Blood Glucose Monitoring Suppl (ONE TOUCH ULTRA 2) w/Device KIT by Does not apply route 1/7/13   Historical Provider, MD   celecoxib (CeleBREX) 200 mg capsule Take 200 mg by mouth daily    Historical Provider, MD   dorzolamide-timolol (COSOPT) 22 3-6 8 MG/ML ophthalmic solution instill 1 drop into both eyes twice a day    Historical Provider, MD   glucose blood test strip 1 Squirt by In Vitro route daily 1/7/13   Historical Provider, MD   methocarbamol (ROBAXIN) 500 mg tablet Take 500 mg by mouth as needed for muscle spasms    Historical Provider, MD Ailin Grays LANCETS [id number] [facility] by Does not apply route daily 1/7/13   Historical Provider, MD   traMADol (ULTRAM) 50 mg tablet Take 50 mg by mouth every 6 (six) hours as needed for moderate pain    Historical Provider, MD   lisinopril (ZESTRIL) 5 mg tablet Take 1 tablet (5 mg total) by mouth daily 10/3/19 11/6/19  Julius Simmons DO     I have reviewed home medications with patient personally  Allergies:    Allergies   Allergen Reactions    Pollen Extract Itching     Runny nose, itchy eyes       Social History:  Marital Status: /Civil Union   Occupation: retired   Patient Pre-hospital Living Situation: home  Patient Pre-hospital Level of Mobility: no assisted device  Patient Pre-hospital Diet Restrictions: none  Substance Use History:   Social History     Substance and Sexual Activity   Alcohol Use Never    Frequency: Never    Drinks per session: 1 or 2    Binge frequency: Never    Comment: socially      Social History     Tobacco Use   Smoking Status Never Smoker   Smokeless Tobacco Never Used     Social History     Substance and Sexual Activity   Drug Use Never       Family History:  I have reviewed the patients family history    Physical Exam:   Vitals:   Blood Pressure: 143/78 (11/06/19 1228)  Pulse: 82 (11/06/19 1228)  Temperature: (!) 96 6 °F (35 9 °C) (11/06/19 1228)  Temp Source: Tympanic (11/06/19 1228)  Respirations: 18 (11/06/19 1228)  Height: 5' 11" (180 3 cm) (11/06/19 1223)  Weight - Scale: 101 kg (223 lb 5 2 oz) (11/06/19 1223)  SpO2: 99 % (11/06/19 1228)    Physical Exam   Constitutional: He is oriented to person, place, and time  He appears well-developed and well-nourished  Pleasant elderly  male   HENT:   Head: Normocephalic and atraumatic  Eyes: Conjunctivae and EOM are normal  Right eye exhibits no discharge  Left eye exhibits no discharge  Neck: Normal range of motion  No tracheal deviation present  Cardiovascular: Normal rate and normal heart sounds  An irregularly irregular rhythm present  Exam reveals no gallop and no friction rub  No murmur heard  Pulmonary/Chest: Effort normal and breath sounds normal  No respiratory distress  He has no wheezes  He has no rales  Abdominal: Soft  Bowel sounds are normal  He exhibits no distension and no mass  There is no tenderness  There is no guarding  Musculoskeletal: Normal range of motion  He exhibits edema (bilaterally)  He exhibits no tenderness or deformity  Neurological: He is alert and oriented to person, place, and time  Skin: Skin is warm and dry  No rash noted  No erythema  No pallor  Psychiatric: He has a normal mood and affect   His behavior is normal  Judgment and thought content normal    Nursing note and vitals reviewed  Additional Data:   Lab Results: I have personally reviewed pertinent reports  Results from last 7 days   Lab Units 11/06/19  1007   WBC Thousand/uL 7 70   HEMOGLOBIN g/dL 11 7*   HEMATOCRIT % 35 1*   PLATELETS Thousands/uL 184   NEUTROS PCT % 81*   LYMPHS PCT % 10*   MONOS PCT % 9   EOS PCT % 0     Results from last 7 days   Lab Units 11/06/19  1007   POTASSIUM mmol/L 4 3   CHLORIDE mmol/L 108*   CO2 mmol/L 19*   BUN mg/dL 25   CREATININE mg/dL 2 09*   CALCIUM mg/dL 9 0   ALK PHOS U/L 52*   ALT U/L 10   AST U/L 10*     Imaging: I have personally reviewed pertinent reports  CT renal stone study abdomen pelvis without contrast   Final Result by Magda Holt MD (11/06 1106)   1  5 mm obstructing calculus in the proximal third left ureter with mild left-sided hydroureteronephrosis  2  Nonobstructing 2 mm left lower pole renal calculus  3  13 mm right adrenal nodule  Although its imaging features are indeterminate, it does not have suspicious imaging features (heterogeneity, necrosis, irregular margins), therefore this is likely benign, and can be followed by non-contrast abdomen CT or    MRI in 12 months  If patient has history of adrenal hyperfunction, consider biochemical evaluation  Adrenal recommendation based on institutional consensus and Journal of [employer] of Radiology 2017;14:1652-7547      The study was marked in [facility] for significant notification  Workstation performed: [id number]         XR abdomen 1 view kub    (Results Pending)       NetAccess / Exit41 Records Reviewed: Yes     ** Please Note: This note has been constructed using a voice recognition system   **

## 2019-11-06 NOTE — ED PROVIDER NOTES
History  Chief Complaint   Patient presents with    Flank Pain     This 66-year-old male patient who states he started last night with left flank pain it is slowly moving around towards his abdomen  It is intermittent but he states the pain is moving  He describes it as sharp  He has tried Ultram without improvement  He drove himself here he is nontoxic in no acute distress  He denies fever chills headache blurred vision double vision cough congestion sore throat nausea vomiting diarrhea pain  No chest pain or shortness of breath  No urgency frequency dysuria testicular pain  He states he feels like his previous kidney stones  He states he is making urine  Differential diagnosis includes not limited to kidney stone, dissection, aortic abdominal aneurysm both the last 2 less likely  Prior to Admission Medications   Prescriptions Last Dose Informant Patient Reported? Taking?    ALPRAZolam (XANAX) 0 5 mg tablet   No No   Sig: Take 1 tablet (0 5 mg total) by mouth every 8 (eight) hours as needed (AS NEEDED)   Blood Glucose Monitoring Suppl (ONE TOUCH ULTRA 2) w/Device KIT  Self Yes No   Sig: by Does not apply route   ONETOUCH DELICA LANCETS 13U MISC  Self Yes No   Sig: by Does not apply route daily   amLODIPine (NORVASC) 10 mg tablet 2019 at Unknown time Self No Yes   Sig: take 1 tablet by mouth daily   atorvastatin (LIPITOR) 20 mg tablet 2019 at Unknown time  No Yes   Sig: Take 1 tablet (20 mg total) by mouth daily   celecoxib (CeleBREX) 200 mg capsule   Yes No   Sig: Take 200 mg by mouth daily   donepezil (ARICEPT) 5 mg tablet 2019 at Unknown time  No Yes   Sig: Take 1 tablet (5 mg total) by mouth daily at bedtime   dorzolamide-timolol (COSOPT) 22 3-6 8 MG/ML ophthalmic solution  Self Yes No   Sig: instill 1 drop into both eyes twice a day   glucose blood test strip  Self Yes No   Si Squirt by In Vitro route daily   metFORMIN (GLUCOPHAGE) 850 mg tablet 2019 at Unknown time No Yes   Sig: Take 2 tablets (1,700 mg total) by mouth daily   methocarbamol (ROBAXIN) 500 mg tablet   Yes No   Sig: Take 500 mg by mouth as needed for muscle spasms   metoprolol succinate (TOPROL-XL) 100 mg 24 hr tablet 11/6/2019 at Unknown time  No Yes   Sig: take 1 tablet by mouth daily   rivaroxaban (XARELTO) 15 mg tablet 11/6/2019 at Unknown time  No Yes   Sig: Take 1 tablet (15 mg total) by mouth daily with breakfast   traMADol (ULTRAM) 50 mg tablet   Yes No   Sig: Take 50 mg by mouth every 6 (six) hours as needed for moderate pain      Facility-Administered Medications: None       Past Medical History:   Diagnosis Date    Acute on chronic renal insufficiency     Allergic rhinitis     Anemia 6/11/2012    [facility] fibrillation [facility] [address] [facility]     C[facility]brovascular accident (CVA) [facility] [address] [facility]     Controlled type 2 diabetes mellitus without complication ([facility]     Generalized anxiety disorder     GERD without esophagitis 6/11/2012    Hypertension     Memory difficulties 1/8/2019    Nephrolithiasis 3/17/2016    Obesity 6/11/2012    Osteoarthritis 6/11/2012       Past Surgical History:   Procedure Laterality Date    CATARACT EXTRACTION, BILATERAL      KNEE SURGERY      TOTAL HIP ARTHROPLASTY      TOTAL HIP ARTHROPLASTY Left 2011       Family History   Problem Relation Age of Onset    Arthritis Mother     Parkinsonism Mother     No Known Problems Father      I have reviewed and agree with the history as documented  Social History     Tobacco Use    Smoking status: Never Smoker    Smokeless tobacco: Never Used   Substance Use Topics    Alcohol use: Never     Frequency: Never     Drinks per session: 1 or 2     Binge frequency: Never     Comment: socially     Drug use: No        Review of Systems   All other systems reviewed and are negative  Physical Exam  Physical Exam   Constitutional: He appears well-developed and well-nourished  HENT:   Head: Normocephalic and atraumatic     Right Ear: External ear normal    Left Ear: External ear normal    Nose: Nose normal    Mouth/Throat: Oropharynx is clear and moist    Eyes: Pupils are equal, round, and reactive to light  Conjunctivae are normal    Neck: Normal range of motion  Neck supple  Cardiovascular: Normal rate and regular rhythm  Pulmonary/Chest: Effort normal and breath sounds normal    Abdominal: Soft  Bowel sounds are normal  There is no tenderness  Musculoskeletal:        Back:    Neurological: He is alert  Skin: Skin is warm  Psychiatric: He has a normal mood and affect  His behavior is normal    Nursing note and vitals reviewed        Vital Signs  ED Triage Vitals [11/06/19 0954]   Temperature Pulse Respirations Blood Pressure SpO2   97 6 °F (36 4 °C) 91 18 142/80 100 %      Temp Source Heart Rate Source Patient Position - Orthostatic VS BP Location FiO2 (%)   Temporal Monitor Sitting Left arm --      Pain Score       5           Vitals:    11/06/19 0954   BP: 142/80   Pulse: 91   Patient Position - Orthostatic VS: Sitting         Visual Acuity      ED Medications  Medications   sodium chloride 0 9 % bolus 500 mL (0 mL Intravenous Stopped 11/6/19 1120)   acetaminophen (TYLENOL) tablet 650 mg (650 mg Oral Given 11/6/19 1012)   morphine injection 2 mg (2 mg Intravenous Given 11/6/19 1125)   ondansetron (ZOFRAN) injection 4 mg (4 mg Intravenous Given 11/6/19 1115)       Diagnostic Studies  Results Reviewed     Procedure Component Value Units Date/Time    UA w Reflex to Microscopic [[id number]]  (Abnormal) Collected:  11/06/19 1124    Lab Status:  Final result Specimen:  Urine, Clean Catch Updated:  11/06/19 1145     Color, UA Straw     Clarity, UA Clear     Specific Gravity, UA 1 015     pH, UA 5 5     Leukocytes, UA Negative     Nitrite, UA Negative     Protein, UA Trace mg/dl      Glucose, UA 3+ mg/dl      Ketones, UA Negative mg/dl      Urobilinogen, UA 0 2 E U /dl      Bilirubin, UA Negative     Blood, UA 3+    Urine Microscopic [[id number]] Collected:  11/06/19 1124    Lab Status:   In process Specimen:  Urine, Clean Catch Updated:  11/06/19 1138    Comprehensive metabolic panel [[id number]]  (Abnormal) Collected:  11/06/19 1007    Lab Status:  Final result Specimen:  Blood from Arm, Left Updated:  11/06/19 1036     Sodium 136 mmol/L      Potassium 4 3 mmol/L      Chloride 108 mmol/L      CO2 19 mmol/L      ANION GAP 9 mmol/L      BUN 25 mg/dL      Creatinine 2 09 mg/dL      Glucose 309 mg/dL      Calcium 9 0 mg/dL      AST 10 U/L      ALT 10 U/L      Alkaline Phosphatase 52 U/L      Total Protein 6 9 g/dL      Albumin 4 0 g/dL      Total Bilirubin 0 50 mg/dL      eGFR 30 ml/min/1 73sq m     Narrative:       National Kidney Disease Foundation guidelines for Chronic Kidney Disease (CKD):     Stage 1 with normal or high GFR (GFR > 90 mL/min/1 73 square meters)    Stage 2 Mild CKD (GFR = 60-89 mL/min/1 73 square meters)    Stage 3A Moderate CKD (GFR = 45-59 mL/min/1 73 square meters)    Stage 3B Moderate CKD (GFR = 30-44 mL/min/1 73 square meters)    Stage 4 Severe CKD (GFR = 15-29 mL/min/1 73 square meters)    Stage 5 End Stage CKD (GFR <15 mL/min/1 73 square meters)  Note: GFR calculation is accurate only with a steady state creatinine    CBC and differential [[id number]]  (Abnormal) Collected:  11/06/19 1007    Lab Status:  Final result Specimen:  Blood from Arm, Left Updated:  11/06/19 1015     WBC 7 70 Thousand/uL      RBC 3 73 Million/uL      Hemoglobin 11 7 g/dL      Hematocrit 35 1 %      MCV 94 fL      MCH 31 4 pg      MCHC 33 4 g/dL      RDW 14 0 %      MPV 8 2 fL      Platelets 398 Thousands/uL      Neutrophils Relative 81 %      Lymphocytes Relative 10 %      Monocytes Relative 9 %      Eosinophils Relative 0 %      Basophils Relative 0 %      Neutrophils Absolute 6 20 Thousands/µL      Lymphocytes Absolute 0 70 Thousands/µL      Monocytes Absolute 0 70 Thousand/µL      Eosinophils Absolute 0 00 Thousand/µL      Basophils Absolute 0 00 Thousands/µL                  CT renal stone study abdomen pelvis without contrast   Final Result by Kevan Arauz MD (11/06 1106)   1  5 mm obstructing calculus in the proximal third left ureter with mild left-sided hydroureteronephrosis  2  Nonobstructing 2 mm left lower pole renal calculus  3  13 mm right adrenal nodule  Although its imaging features are indeterminate, it does not have suspicious imaging features (heterogeneity, necrosis, irregular margins), therefore this is likely benign, and can be followed by non-contrast abdomen CT or    MRI in 12 months  If patient has history of adrenal hyperfunction, consider biochemical evaluation  Adrenal recommendation based on institutional consensus and Journal of [employer] of Radiology 2017;14:[id number]      The study was marked in [facility] for significant notification  Workstation performed: [id number]                    Procedures  Procedures       ED Course  ED Course as of Nov 06 1153   Wed Nov 06, 2019   1115 Spoke with Dr Leonid Chou from Urology reviewed case  He agrees patient should be admitted due to increased renal function and because of the obstruction stone for fluids and possible intervention                                    MDM    Disposition  Final diagnoses:   Left flank pain   Kidney stone   Renal insufficiency   Urinary tract obstruction by kidney stone     Time reflects when diagnosis was documented in both MDM as applicable and the Disposition within this note     Time User Action Codes Description Comment    11/6/2019 11:51 AM Dinapoli, Chet Add [R10 9] Left flank pain     11/6/2019 11:51 AM Dinapoli Chet Add [N20 0] Kidney stone     11/6/2019 11:51 AM Dinapoli Chet Add [N28 9] Renal insufficiency     11/6/2019 11:51 AM Dinapoli Chet Add [K80 61] Multiple obstructing stones in biliary tract     11/6/2019 11:51 AM Dinapoli Chet Remove [K80 61] Multiple obstructing stones in biliary tract     11/6/2019 11:51 AM Chet Wilcox Add [N20 0,  N13 8] Urinary tract obstruction by kidney stone       ED Disposition     ED Disposition Condition Date/Time Comment    Admit Stable Wed Nov 6, 2019 11:51 AM Case was discussed with TRISTAN and the patient's admission status was agreed to be Admission Status: observation status to the service of Dr Adalid Coleman   Follow-up Information    None         Patient's Medications   Discharge Prescriptions    No medications on file     No discharge procedures on file      ED Provider  Electronically Signed by           Rhonda Gomez PA-C  11/06/19 4427

## 2019-11-06 NOTE — PLAN OF CARE
Problem: Potential for Falls  Goal: Patient will remain free of falls  Description  INTERVENTIONS:  - Assess patient frequently for physical needs  -  Identify cognitive and physical deficits and behaviors that affect risk of falls    -  [place] fall precautions as indicated by assessment   - Educate patient/family on patient safety including physical limitations  - Instruct patient to call for assistance with activity based on assessment  - Modify environment to reduce risk of injury  - Consider OT/PT consult to assist with strengthening/mobility  Outcome: Progressing     Problem: PAIN - ADULT  Goal: Verbalizes/displays adequate comfort level or baseline comfort level  Description  Interventions:  - Encourage patient to monitor pain and request assistance  - Assess pain using appropriate pain scale  - Administer analgesics based on type and severity of pain and evaluate response  - Implement non-pharmacological measures as appropriate and evaluate response  - Consider cultural and social influences on pain and pain management  - Notify physician/advanced practitioner if interventions unsuccessful or patient reports new pain  Outcome: Progressing     Problem: INFECTION - ADULT  Goal: Absence or prevention of progression during hospitalization  Description  INTERVENTIONS:  - Assess and monitor for signs and symptoms of infection  - Monitor lab/diagnostic results  - Monitor all insertion sites, i e  indwelling lines, tubes, and drains  - Monitor endotracheal if appropriate and nasal secretions for changes in amount and color  - [place] appropriate cooling/warming therapies per order  - Administer medications as ordered  - Instruct and encourage patient and family to use good hand hygiene technique  - Identify and instruct in appropriate isolation precautions for identified infection/condition  Outcome: Progressing  Goal: Absence of fever/infection during neutropenic period  Description  INTERVENTIONS:  - Monitor WBC    Outcome: Progressing     Problem: SAFETY ADULT  Goal: Patient will remain free of falls  Description  INTERVENTIONS:  - Assess patient frequently for physical needs  -  Identify cognitive and physical deficits and behaviors that affect risk of falls    -  [place] fall precautions as indicated by assessment   - Educate patient/family on patient safety including physical limitations  - Instruct patient to call for assistance with activity based on assessment  - Modify environment to reduce risk of injury  - Consider OT/PT consult to assist with strengthening/mobility  Outcome: Progressing  Goal: Maintain or return to baseline ADL function  Description  INTERVENTIONS:  -  Assess patient's ability to carry out ADLs; assess patient's baseline for ADL function and identify physical deficits which impact ability to perform ADLs (bathing, care of mouth/teeth, toileting, grooming, dressing, etc )  - Assess/evaluate cause of self-care deficits   - Assess range of motion  - Assess patient's mobility; develop plan if impaired  - Assess patient's need for assistive devices and provide as appropriate  - Encourage maximum independence but intervene and supervise when necessary  - Involve family in performance of ADLs  - Assess for home care needs following discharge   - Consider OT consult to assist with ADL evaluation and planning for discharge  - Provide patient education as appropriate  Outcome: Progressing  Goal: Maintain or return mobility status to optimal level  Description  INTERVENTIONS:  - Assess patient's baseline mobility status (ambulation, transfers, stairs, etc )    - Identify cognitive and physical deficits and behaviors that affect mobility  - Identify mobility aids required to assist with transfers and/or ambulation (gait belt, sit-to-stand, lift, walker, cane, etc )  - [place] fall precautions as indicated by assessment  - Record patient progress and toleration of activity level on Mobility SBAR; progress patient to next Phase/Stage  - Instruct patient to call for assistance with activity based on assessment  - Consider rehabilitation consult to assist with strengthening/weightbearing, etc   Outcome: Progressing     Problem: DISCHARGE PLANNING  Goal: Discharge to home or other facility with appropriate resources  Description  INTERVENTIONS:  - Identify barriers to discharge w/patient and caregiver  - Arrange for needed discharge resources and transportation as appropriate  - Identify discharge learning needs (meds, wound care, etc )  - Arrange for interpretive services to assist at discharge as needed  - Refer to Case Management Department for coordinating discharge planning if the patient needs post-hospital services based on physician/advanced practitioner order or complex needs related to functional status, cognitive ability, or social support system  Outcome: Progressing     Problem: Knowledge Deficit  Goal: Patient/family/caregiver demonstrates understanding of disease process, treatment plan, medications, and discharge instructions  Description  Complete learning assessment and assess knowledge base    Interventions:  - Provide teaching at level of understanding  - Provide teaching via preferred learning methods  Outcome: Progressing     Problem: GENITOURINARY - ADULT  Goal: Maintains or returns to baseline urinary function  Description  INTERVENTIONS:  - Assess urinary function  - Encourage oral fluids to ensure adequate hydration if ordered  - Administer IV fluids as ordered to ensure adequate hydration  - Administer ordered medications as needed  - Offer frequent toileting  - Follow urinary retention protocol if ordered  Outcome: Progressing  Goal: Absence of urinary retention  Description  INTERVENTIONS:  - Assess patients ability to void and empty bladder  - Monitor I/O  - Bladder scan as needed  - Discuss with physician/AP medications to alleviate retention as needed  - Discuss catheterization for long term situations as appropriate  Outcome: Progressing     Problem: METABOLIC, FLUID AND ELECTROLYTES - ADULT  Goal: Electrolytes maintained within normal limits  Description  INTERVENTIONS:  - Monitor labs and assess patient for signs and symptoms of electrolyte imbalances  - Administer electrolyte replacement as ordered  - Monitor response to electrolyte replacements, including repeat lab results as appropriate  - Instruct patient on fluid and nutrition as appropriate  Outcome: Progressing  Goal: Fluid balance maintained  Description  INTERVENTIONS:  - Monitor labs   - Monitor I/O and WT  - Instruct patient on fluid and nutrition as appropriate  - Assess for signs & symptoms of volume excess or deficit  Outcome: Progressing  Goal: Glucose maintained within target range  Description  INTERVENTIONS:  - Monitor Blood Glucose as ordered  - Assess for signs and symptoms of hyperglycemia and hypoglycemia  - Administer ordered medications to maintain glucose within target range  - Assess nutritional intake and initiate nutrition service referral as needed  Outcome: Progressing

## 2019-11-06 NOTE — INCIDENTAL FINDINGS
The following findings require follow up:  Radiographic finding   Findinmm adrenal nodule   Follow up required: follow up imaging adrenal nodule   Follow up should be done within 1 month(s)    Please notify the following clinician to assist with the follow up:   Dr Esteban Scott

## 2019-11-06 NOTE — NURSING NOTE
Pt received to med surg  Vss  Pt denies pain, denies shortness of breath  All questions answered  Urine strained    Call bell within reach, hourly rounding explained  Personal needs within reach, will continue to monitor

## 2019-11-06 NOTE — ASSESSMENT & PLAN NOTE
· With acute kidney injury  · Creatinine is up from baseline  · 1 1-1 4 range  · IV fluids and monitor

## 2019-11-07 ENCOUNTER — APPOINTMENT (OUTPATIENT)
Dept: RADIOLOGY | Facility: HOSPITAL | Age: 74
DRG: 660 | End: 2019-11-07
Payer: MEDICARE

## 2019-11-07 LAB
ANION GAP SERPL CALCULATED.3IONS-SCNC: 7 MMOL/L (ref 4–13)
BUN SERPL-MCNC: 21 MG/DL (ref 7–25)
CALCIUM SERPL-MCNC: 8.2 MG/DL (ref 8.6–10.5)
CHLORIDE SERPL-SCNC: 112 MMOL/L (ref 98–107)
CO2 SERPL-SCNC: 20 MMOL/L (ref 21–31)
CREAT SERPL-MCNC: 1.88 MG/DL (ref 0.7–1.3)
ERYTHROCYTE [DISTWIDTH] IN BLOOD BY AUTOMATED COUNT: 14 % (ref 11.5–14.5)
GFR SERPL CREATININE-BSD FRML MDRD: 35 ML/MIN/1.73SQ M
GLUCOSE SERPL-MCNC: 117 MG/DL (ref 65–140)
GLUCOSE SERPL-MCNC: 132 MG/DL (ref 65–99)
GLUCOSE SERPL-MCNC: 182 MG/DL (ref 65–140)
GLUCOSE SERPL-MCNC: 73 MG/DL (ref 65–140)
GLUCOSE SERPL-MCNC: 77 MG/DL (ref 65–140)
HCT VFR BLD AUTO: 28.3 % (ref 42–47)
HGB BLD-MCNC: 9.6 G/DL (ref 14–18)
MCH RBC QN AUTO: 32 PG (ref 26–34)
MCHC RBC AUTO-ENTMCNC: 33.8 G/DL (ref 31–37)
MCV RBC AUTO: 95 FL (ref 81–99)
PLATELET # BLD AUTO: 139 THOUSANDS/UL (ref 149–390)
PMV BLD AUTO: 8.8 FL (ref 8.6–11.7)
POTASSIUM SERPL-SCNC: 4.1 MMOL/L (ref 3.5–5.5)
RBC # BLD AUTO: 2.99 MILLION/UL (ref 4.3–5.9)
SODIUM SERPL-SCNC: 139 MMOL/L (ref 134–143)
WBC # BLD AUTO: 5.8 THOUSAND/UL (ref 4.8–10.8)

## 2019-11-07 PROCEDURE — 80048 BASIC METABOLIC PNL TOTAL CA: CPT | Performed by: PHYSICIAN ASSISTANT

## 2019-11-07 PROCEDURE — 99232 SBSQ HOSP IP/OBS MODERATE 35: CPT | Performed by: INTERNAL MEDICINE

## 2019-11-07 PROCEDURE — 82948 REAGENT STRIP/BLOOD GLUCOSE: CPT

## 2019-11-07 PROCEDURE — 74018 RADEX ABDOMEN 1 VIEW: CPT

## 2019-11-07 PROCEDURE — 85027 COMPLETE CBC AUTOMATED: CPT | Performed by: PHYSICIAN ASSISTANT

## 2019-11-07 RX ADMIN — INSULIN LISPRO 1 UNITS: 100 INJECTION, SOLUTION INTRAVENOUS; SUBCUTANEOUS at 17:04

## 2019-11-07 RX ADMIN — ALPRAZOLAM 0.5 MG: 0.5 TABLET ORAL at 00:00

## 2019-11-07 RX ADMIN — DORZOLAMIDE HYDROCHLORIDE AND TIMOLOL MALEATE 1 DROP: 20; 5 SOLUTION/ DROPS OPHTHALMIC at 08:02

## 2019-11-07 RX ADMIN — DORZOLAMIDE HYDROCHLORIDE AND TIMOLOL MALEATE 1 DROP: 20; 5 SOLUTION/ DROPS OPHTHALMIC at 21:25

## 2019-11-07 RX ADMIN — FUROSEMIDE 40 MG: 10 INJECTION, SOLUTION INTRAMUSCULAR; INTRAVENOUS at 06:10

## 2019-11-07 RX ADMIN — ALPRAZOLAM 0.5 MG: 0.5 TABLET ORAL at 21:26

## 2019-11-07 RX ADMIN — RIVAROXABAN 15 MG: 10 TABLET, FILM COATED ORAL at 08:02

## 2019-11-07 RX ADMIN — SODIUM CHLORIDE 125 ML/HR: 9 INJECTION, SOLUTION INTRAVENOUS at 10:49

## 2019-11-07 RX ADMIN — DONEPEZIL HYDROCHLORIDE 5 MG: 5 TABLET ORAL at 21:26

## 2019-11-07 RX ADMIN — INSULIN LISPRO 5 UNITS: 100 INJECTION, SOLUTION INTRAVENOUS; SUBCUTANEOUS at 08:01

## 2019-11-07 RX ADMIN — TAMSULOSIN HYDROCHLORIDE 0.4 MG: 0.4 CAPSULE ORAL at 17:04

## 2019-11-07 RX ADMIN — SODIUM CHLORIDE 125 ML/HR: 9 INJECTION, SOLUTION INTRAVENOUS at 20:38

## 2019-11-07 RX ADMIN — SODIUM CHLORIDE 125 ML/HR: 9 INJECTION, SOLUTION INTRAVENOUS at 01:53

## 2019-11-07 RX ADMIN — INSULIN LISPRO 5 UNITS: 100 INJECTION, SOLUTION INTRAVENOUS; SUBCUTANEOUS at 12:34

## 2019-11-07 RX ADMIN — INSULIN LISPRO 5 UNITS: 100 INJECTION, SOLUTION INTRAVENOUS; SUBCUTANEOUS at 17:04

## 2019-11-07 RX ADMIN — AMLODIPINE BESYLATE 10 MG: 5 TABLET ORAL at 08:02

## 2019-11-07 RX ADMIN — METOPROLOL SUCCINATE 100 MG: 50 TABLET, EXTENDED RELEASE ORAL at 08:02

## 2019-11-07 RX ADMIN — ATORVASTATIN CALCIUM 20 MG: 20 TABLET, FILM COATED ORAL at 08:02

## 2019-11-07 NOTE — ASSESSMENT & PLAN NOTE
· Continue rate control  · Patient is on Xarelto, will hold tomorrow morning in anticipation of possible stent placement

## 2019-11-07 NOTE — PLAN OF CARE
Problem: Potential for Falls  Goal: Patient will remain free of falls  Description  INTERVENTIONS:  - Assess patient frequently for physical needs  -  Identify cognitive and physical deficits and behaviors that affect risk of falls    -  [place] fall precautions as indicated by assessment   - Educate patient/family on patient safety including physical limitations  - Instruct patient to call for assistance with activity based on assessment  - Modify environment to reduce risk of injury  - Consider OT/PT consult to assist with strengthening/mobility  Outcome: Progressing     Problem: PAIN - ADULT  Goal: Verbalizes/displays adequate comfort level or baseline comfort level  Description  Interventions:  - Encourage patient to monitor pain and request assistance  - Assess pain using appropriate pain scale  - Administer analgesics based on type and severity of pain and evaluate response  - Implement non-pharmacological measures as appropriate and evaluate response  - Consider cultural and social influences on pain and pain management  - Notify physician/advanced practitioner if interventions unsuccessful or patient reports new pain  Outcome: Progressing     Problem: INFECTION - ADULT  Goal: Absence or prevention of progression during hospitalization  Description  INTERVENTIONS:  - Assess and monitor for signs and symptoms of infection  - Monitor lab/diagnostic results  - Monitor all insertion sites, i e  indwelling lines, tubes, and drains  - Monitor endotracheal if appropriate and nasal secretions for changes in amount and color  - [place] appropriate cooling/warming therapies per order  - Administer medications as ordered  - Instruct and encourage patient and family to use good hand hygiene technique  - Identify and instruct in appropriate isolation precautions for identified infection/condition  Outcome: Progressing  Goal: Absence of fever/infection during neutropenic period  Description  INTERVENTIONS:  - Monitor WBC    Outcome: Progressing     Problem: SAFETY ADULT  Goal: Patient will remain free of falls  Description  INTERVENTIONS:  - Assess patient frequently for physical needs  -  Identify cognitive and physical deficits and behaviors that affect risk of falls    -  [place] fall precautions as indicated by assessment   - Educate patient/family on patient safety including physical limitations  - Instruct patient to call for assistance with activity based on assessment  - Modify environment to reduce risk of injury  - Consider OT/PT consult to assist with strengthening/mobility  Outcome: Progressing  Goal: Maintain or return to baseline ADL function  Description  INTERVENTIONS:  -  Assess patient's ability to carry out ADLs; assess patient's baseline for ADL function and identify physical deficits which impact ability to perform ADLs (bathing, care of mouth/teeth, toileting, grooming, dressing, etc )  - Assess/evaluate cause of self-care deficits   - Assess range of motion  - Assess patient's mobility; develop plan if impaired  - Assess patient's need for assistive devices and provide as appropriate  - Encourage maximum independence but intervene and supervise when necessary  - Involve family in performance of ADLs  - Assess for home care needs following discharge   - Consider OT consult to assist with ADL evaluation and planning for discharge  - Provide patient education as appropriate  Outcome: Progressing  Goal: Maintain or return mobility status to optimal level  Description  INTERVENTIONS:  - Assess patient's baseline mobility status (ambulation, transfers, stairs, etc )    - Identify cognitive and physical deficits and behaviors that affect mobility  - Identify mobility aids required to assist with transfers and/or ambulation (gait belt, sit-to-stand, lift, walker, cane, etc )  - [place] fall precautions as indicated by assessment  - Record patient progress and toleration of activity level on Mobility SBAR; progress patient to next Phase/Stage  - Instruct patient to call for assistance with activity based on assessment  - Consider rehabilitation consult to assist with strengthening/weightbearing, etc   Outcome: Progressing     Problem: DISCHARGE PLANNING  Goal: Discharge to home or other facility with appropriate resources  Description  INTERVENTIONS:  - Identify barriers to discharge w/patient and caregiver  - Arrange for needed discharge resources and transportation as appropriate  - Identify discharge learning needs (meds, wound care, etc )  - Arrange for interpretive services to assist at discharge as needed  - Refer to Case Management Department for coordinating discharge planning if the patient needs post-hospital services based on physician/advanced practitioner order or complex needs related to functional status, cognitive ability, or social support system  Outcome: Progressing     Problem: Knowledge Deficit  Goal: Patient/family/caregiver demonstrates understanding of disease process, treatment plan, medications, and discharge instructions  Description  Complete learning assessment and assess knowledge base    Interventions:  - Provide teaching at level of understanding  - Provide teaching via preferred learning methods  Outcome: Progressing     Problem: GENITOURINARY - ADULT  Goal: Maintains or returns to baseline urinary function  Description  INTERVENTIONS:  - Assess urinary function  - Encourage oral fluids to ensure adequate hydration if ordered  - Administer IV fluids as ordered to ensure adequate hydration  - Administer ordered medications as needed  - Offer frequent toileting  - Follow urinary retention protocol if ordered  Outcome: Progressing  Goal: Absence of urinary retention  Description  INTERVENTIONS:  - Assess patients ability to void and empty bladder  - Monitor I/O  - Bladder scan as needed  - Discuss with physician/AP medications to alleviate retention as needed  - Discuss catheterization for long term situations as appropriate  Outcome: Progressing     Problem: METABOLIC, FLUID AND ELECTROLYTES - ADULT  Goal: Electrolytes maintained within normal limits  Description  INTERVENTIONS:  - Monitor labs and assess patient for signs and symptoms of electrolyte imbalances  - Administer electrolyte replacement as ordered  - Monitor response to electrolyte replacements, including repeat lab results as appropriate  - Instruct patient on fluid and nutrition as appropriate  Outcome: Progressing  Goal: Fluid balance maintained  Description  INTERVENTIONS:  - Monitor labs   - Monitor I/O and WT  - Instruct patient on fluid and nutrition as appropriate  - Assess for signs & symptoms of volume excess or deficit  Outcome: Progressing  Goal: Glucose maintained within target range  Description  INTERVENTIONS:  - Monitor Blood Glucose as ordered  - Assess for signs and symptoms of hyperglycemia and hypoglycemia  - Administer ordered medications to maintain glucose within target range  - Assess nutritional intake and initiate nutrition service referral as needed  Outcome: Progressing

## 2019-11-07 NOTE — SOCIAL WORK
Chart reviewed by case management,assessmetn was completed, pt was made aware that he was an obs and obs booklet was given, pt is independent and drives, pt lives with his wife in a 1 story home with no steps outside and no steps inside, pt has a walker, cane & bp cuff, pt;'s wife will transport the pt home when stable for d/c , pt denies smoking and states that he drinks alcohol on rare occasions, pt denies any d/c needs, cm will continue to follow, no d/c today as discussed in care coordination rounds today, pt continues to receive iv fluids and straining his urine, doctor will reassess the pt tomorrow to see if he needs a stent,   Patient/caregiver received discharge checklist   Content reviewed  Patient/caregiver encouraged to participate in discharge plan of care prior to discharge home  CM reviewed d/c planning process including the following: identifying help at home, patient preference for d/c planning needs, availability of treatment team to discuss questions or concerns patient and/or family may have regarding understanding medications and recognizing signs and symptoms once discharged  CM also encouraged patient to follow up with all recommended appointments after discharge  Patient advised of importance for patient and family to participate in managing patients medical well being

## 2019-11-07 NOTE — PROGRESS NOTES
Progress Note - Mariia Donovan 68 y o  male MRN: [id number]    Unit/Bed#: -Jeronimo Encounter: [id number]      Assessment:  Only very little left flank pain today  KUB shows no obvious stone  Creatinine down to 1 88  Plan:  Options, management and risks were discussed with the patient  He will continue with intravenous hydration today  Laboratory studies and KUB x-ray will be obtained in the morning  He will be kept NPO past midnight in case a ureteral stent is necessary  Further management depends upon clinical course  Subjective:   Very little left flank pain today  Voiding without difficulty  No stone has passed, but he has missed straining his urine several times  Objective:     Vitals: Blood pressure 126/70, pulse 65, temperature 99 3 °F (37 4 °C), temperature source Tympanic, resp  rate 18, height 5' 11" (1 803 m), weight 101 kg (223 lb 5 2 oz), SpO2 99 %  ,Body mass index is 31 15 kg/m²  Intake/Output Summary (Last 24 hours) at 11/7/2019 1701  Last data filed at 11/7/2019 6141  Gross per 24 hour   Intake 0 ml   Output 1000 ml   Net -1000 ml       Physical Exam: Abdomen: soft, non-tender; bowel sounds normal; no masses,  no organomegaly     Invasive Devices     Peripheral Intravenous Line            Peripheral IV 11/06/19 Left Antecubital 1 day                Lab, Imaging and other studies: I have personally reviewed pertinent reports      VTE Pharmacologic Prophylaxis: Sequential compression device (Venodyne)   VTE Mechanical Prophylaxis: sequential compression device

## 2019-11-07 NOTE — ASSESSMENT & PLAN NOTE
· Incidental finding on CT was a 13 mm right adrenal nodule    Follow up outpatient with PCP for imaging

## 2019-11-07 NOTE — PROGRESS NOTES
Progress Note Hair Rhodes 1945, 68 y o  male MRN: [id number]    Unit/Bed#: -01 Encounter: [id number]    Primary Care Provider: Ezra Diego DO   Date and time admitted to hospital: 11/6/2019  9:54 AM        * Hydronephrosis with obstructing calculus  Assessment & Plan  · CT w/ 5mm obstructing calculus in the proximal 3rd left ureter with mild left hydronephrosis  There is nonobstructing 2 mm left lower pole renal calculus  · Continue pain control as needed  · Monitor kidney function  · Urology input appreciated, recommend monitoring overnight and re-evaluate in the morning    Chronic atrial fibrillation  Assessment & Plan  · Continue rate control  · Patient is on Xarelto, will hold tomorrow morning in anticipation of possible stent placement    Right Adrenal nodule   Assessment & Plan  · Incidental finding on CT was a 13 mm right adrenal nodule  Follow up outpatient with PCP for imaging    CKD (chronic kidney disease) stage 3, GFR 30-59 ml/min ([facility])  Assessment & Plan  · With acute kidney injury  · Creatinine is up from baseline  · 1 1-1 4 range  · IV fluids and monitor    Hypertension  Assessment & Plan  · Continue meds and monitor    Diabetic polyneuropathy associated with type 2 diabetes mellitus ([facility] [place] [address] )  Assessment & Plan  · hold metformin for now  · Sliding scale insulin coverage      VTE Pharmacologic Prophylaxis: Pharmacologic: Rivaroxaban (Xarelto)    Patient Centered Rounds: I have performed bedside rounds with nursing staff today      Discussions with Specialists or Other Care Team Provider: yes  Education and Discussions with Family / Patient: yes    Current Length of Stay: 0 day(s)    Current Patient Status: Observation   Certification Statement: The patient will continue to require additional inpatient hospital stay due to Kidney stone    Discharge Plan:  Possible discharge tomorrow if stent not needed    Code Status: Level 1 - Full Code    Subjective:   Patient states his pain is controlled currently  Denies any nausea or vomiting  Afebrile  Objective:     Vitals:   Temp (24hrs), Av 9 °F (36 6 °C), Min:96 6 °F (35 9 °C), Max:98 8 °F (37 1 °C)    Temp:  [96 6 °F (35 9 °C)-98 8 °F (37 1 °C)] 98 8 °F (37 1 °C)  HR:  [66-88] 66  Resp:  [18-20] 20  BP: (100-143)/(55-80) 116/56  SpO2:  [94 %-99 %] 96 %  Body mass index is 31 15 kg/m²  Input and Output Summary (last 24 hours): Intake/Output Summary (Last 24 hours) at 2019 1019  Last data filed at 2019 0921  Gross per 24 hour   Intake 120 ml   Output 1000 ml   Net -880 ml       Physical Exam:     Physical Exam   Constitutional: He appears well-developed  No distress  HENT:   Head: Normocephalic and atraumatic  Eyes: Conjunctivae and EOM are normal    Neck: Normal range of motion  Neck supple  Cardiovascular: Normal rate and regular rhythm  Pulmonary/Chest: Effort normal  No respiratory distress  Abdominal: Soft  He exhibits no distension  There is no tenderness  Musculoskeletal: Normal range of motion  He exhibits no edema  Neurological: He is alert  No cranial nerve deficit  Skin: Skin is warm and dry  Psychiatric: He has a normal mood and affect   His behavior is normal        Additional Data:     Labs:    Results from last 7 days   Lab Units 19  0500 19  1007   WBC Thousand/uL 5 80 7 70   HEMOGLOBIN g/dL 9 6* 11 7*   HEMATOCRIT % 28 3* 35 1*   PLATELETS Thousands/uL 139* 184   NEUTROS PCT %  --  81*   LYMPHS PCT %  --  10*   MONOS PCT %  --  9   EOS PCT %  --  0     Results from last 7 days   Lab Units 19  0500 19  1007   POTASSIUM mmol/L 4 1 4 3   CHLORIDE mmol/L 112* 108*   CO2 mmol/L 20* 19*   BUN mg/dL 21 25   CREATININE mg/dL 1 88* 2 09*   CALCIUM mg/dL 8 2* 9 0   ALK PHOS U/L  --  52*   ALT U/L  --  10   AST U/L  --  10*         Results from last 7 days   Lab Units 19  0647 196 19  1605 19  1314   POC GLUCOSE mg/dl 117 92 156* 207* * I Have Reviewed All Lab Data Listed Above  * Additional Pertinent Lab Tests Reviewed: En 66 Admission  Reviewed    Imaging:  Imaging Reports Reviewed Today Include:  No new imaging    Recent Cultures (last 7 days):           Last 24 Hours Medication List:     Current Facility-Administered Medications:  acetaminophen 650 mg Oral Q6H PRN Blanca Glory, PA-C    ALPRAZolam 0 5 mg Oral Q8H PRN Blanca Glory, PA-C    amLODIPine 10 mg Oral Daily Blanca Glory, PA-C    atorvastatin 20 mg Oral Daily Blanca Glory, PA-C    [facility]il 5 [facility]    dorzolamide-timolol 1 drop Both Eyes Q12H [facility], PA-C    insulin lispro 1-5 Units Subcutaneous HS [facility], PA-C    insulin lispro 1-6 Units Subcutaneous TID AC [facility]    insulin lispro 5 Units Subcutaneous TID With Meals Blanca Glory, PA-C    methocarbamol 500 mg Oral Q6H PRN Blanca Glory, PA-C    metoprolol succinate 100 mg Oral Daily Blanca Glory, PA-C    morphine injection 2 mg Intravenous Q4H PRN Blanca Glory, PA-C    ondansetron 4 mg Intravenous Q6H PRN Blanca Glory, PA-C    rivaroxaban 15 mg Oral Daily With Vanessa Fernandez MD    sodium chloride 125 mL/hr Intravenous Continuous Blanca Glory, PA-C Last Rate: 125 mL/hr (11/07/19 0153)   tamsulosin 0 4 mg Oral Daily With Dinner Blanca Glory, PA-C    traMADol 50 mg Oral Q6H PRN Blanca Glory, PA-C         Today, Patient Was Seen By: Radha Leos MD    ** Please Note: Dictation voice to text software may have been used in the creation of this document   **

## 2019-11-07 NOTE — ASSESSMENT & PLAN NOTE
· CT w/ 5mm obstructing calculus in the proximal 3rd left ureter with mild left hydronephrosis  There is nonobstructing 2 mm left lower pole renal calculus    · Continue pain control as needed  · Monitor kidney function  · Urology input appreciated, recommend monitoring overnight and re-evaluate in the morning

## 2019-11-07 NOTE — UTILIZATION REVIEW
Initial Clinical Review    Admission: Date/Time/Statement: 11/6 @ 1152 OBSERVATION CONVERTED TO INPATIENT ADMISSION 11/7 @ 1020 DUE TO >2 MN STAY REQUIRED TO CARE FOR PT WITH HYDRONEPHROSIS, OBSTRUCTING URETERAL CALCULUS NEEDING CONTINUED IVF, RE-EVAL BY UROLOGY 11/8 11/07/19 1021  Inpatient Admission Once     Transfer Service: General Medicine    Expected Discharge Date: 11/08/19       Question Answer Comment   Admitting Physician Eliel Reyna    Level of Care Med Surg    Estimated length of stay More than 2 Midnights    Certification I certify that inpatient services are medically necessary for this patient for a duration of greater than two midnights  See H&P and MD Progress Notes for additional information about the patient's course of treatment  11/07/19 1020       ED Arrival Information     Expected Arrival Acuity Means of Arrival Escorted By Service Admission Type    - 11/6/2019 09:46 Urgent Walk-In Self General Medicine Urgent    Arrival Complaint    flank pain        Chief Complaint   Patient presents with    Flank Pain     Assessment/Plan: 69 y/o male presents to ED from home with L flank pain moving around toward abdomen, described as sharp  No improvement with ultram   Admitted as observation  Hydronephrosis with obstructing calculus  Assessment & Plan  · CT w/ 5mm obstructing calculus in the proximal 3rd left ureter with mild left hydronephrosis  There is nonobstructing 2 mm left lower pole renal calculus  CKD (chronic kidney disease) stage 3, GFR 30-59 ml/min ([facility])  Assessment & Plan  · With acute kidney injury  · Creatinine is up from baseline  · 1 1-1 4 range  · IV fluids and monitor  Right Adrenal nodule   Assessment & Plan  · Incidental finding on CT was a 13 mm right adrenal nodule    Follow up outpatient with PCP for imaging    11/6 Urology consult:    Assessment:  Left proximal ureteral stone causing renal colic and acute renal injury  Plan:  Agree with current management including intravenous hydration and straining all urine  Repeat laboratory studies and KUB in the morning  If his renal function does not improve and the stone does not progress, he may require intervention such as ureteral stenting in the near future  I will continue to follow along with you      ED Triage Vitals [11/06/19 0954]   Temperature Pulse Respirations Blood Pressure SpO2   97 6 °F (36 4 °C) 91 18 142/80 100 %      Temp Source Heart Rate Source Patient Position - Orthostatic VS BP Location FiO2 (%)   Temporal Monitor Sitting Left arm --      Pain Score       5        Wt Readings from Last 1 Encounters:   11/06/19 101 kg (223 lb 5 2 oz)     Additional Vital Signs:   11/07/19 0733  98 8 °F (37 1 °C)  66  20  116/56  96 %  None (Room air)  Lying   11/07/19 0617  97 8 °F (36 6 °C)  83  20  118/61  96 %  None (Room air)  Lying   11/07/19 0018  98 5 °F (36 9 °C)  66  18  100/55  94 %  None (Room air)  Lying   11/06/19 1228  96 6 °F (35 9 °C)Abnormal   82  18  143/78  99 %  None (Room air)         Pertinent Labs/Diagnostic Test Results:   Results from last 7 days   Lab Units 11/07/19  0500 11/06/19  1007   WBC Thousand/uL 5 80 7 70   HEMOGLOBIN g/dL 9 6* 11 7*   HEMATOCRIT % 28 3* 35 1*   PLATELETS Thousands/uL 139* 184   NEUTROS ABS Thousands/µL  --  6 20         Results from last 7 days   Lab Units 11/07/19  0500 11/06/19  1007   SODIUM mmol/L 139 136   POTASSIUM mmol/L 4 1 4 3   CHLORIDE mmol/L 112* 108*   CO2 mmol/L 20* 19*   ANION GAP mmol/L 7 9   BUN mg/dL 21 25   CREATININE mg/dL 1 88* 2 09*   EGFR ml/min/1 73sq m 35 30   CALCIUM mg/dL 8 2* 9 0     Results from last 7 days   Lab Units 11/06/19  1007   AST U/L 10*   ALT U/L 10   ALK PHOS U/L 52*   TOTAL PROTEIN g/dL 6 9   ALBUMIN g/dL 4 0   TOTAL BILIRUBIN mg/dL 0 50     Results from last 7 days   Lab Units 11/07/19  0647 11/06/19  2026 11/06/19  1605 11/06/19  1314   POC GLUCOSE mg/dl 117 92 156* 207*     Results from last 7 days   Lab Units 11/07/19  0500 11/06/19  1007   GLUCOSE RANDOM mg/dL 132* 309*             No results found for: BETA-HYDROXYBUTYRATE                                                                           Results from last 7 days   Lab Units 11/06/19  1124   CLARITY UA  Clear   COLOR UA  Straw   SPEC GRAV UA  1 015   PH UA  5 5   GLUCOSE UA mg/dl 3+*   KETONES UA mg/dl Negative   BLOOD UA  3+*   PROTEIN UA mg/dl Trace*   NITRITE UA  Negative   BILIRUBIN UA  Negative   UROBILINOGEN UA E U /dl 0 2   LEUKOCYTES UA  Negative   WBC UA /hpf 0-1*   RBC UA /hpf Innumerable*   BACTERIA UA /hpf None Seen   EPITHELIAL CELLS WET PREP /hpf None Seen     CT abd/pelvis renal stone study:  1  5 mm obstructing calculus in the proximal third left ureter with mild left-sided hydroureteronephrosis  2  Nonobstructing 2 mm left lower pole renal calculus  3  13 mm right adrenal nodule  Although its imaging features are indeterminate, it does not have suspicious imaging features (heterogeneity, necrosis, irregular margins), therefore this is likely benign, and can be followed by non-contrast abdomen CT or   MRI in 12 months   If patient has history of adrenal hyperfunction, consider biochemical evaluation       KUB pending    ED Treatment:   Medication Administration from 11/06/2019 0945 to 11/06/2019 1220       Date/Time Order Dose Route Action Action by Comments     11/06/2019 1120 sodium chloride 0 9 % bolus 500 mL 0 mL Intravenous Stopped Jeanine Dudley RN      11/06/2019 1008 sodium chloride 0 9 % bolus 500 mL 500 mL Intravenous Heriberto 37 Jeanine Dudley RN      11/06/2019 1130 acetaminophen (TYLENOL) oral suspension 650 mg 650 mg Oral Not Given Jeanine Dudley RN      11/06/2019 1012 acetaminophen (TYLENOL) tablet 650 mg 650 mg Oral Given Jeanine Dudley RN      11/06/2019 1125 morphine injection 2 mg 2 mg Intravenous Given Jeanine Dudley RN      11/06/2019 1115 ondansetron (ZOFRAN) injection 4 mg 4 mg Intravenous Given Jeanine Dudley RN Past Medical History:   Diagnosis Date    Acute on chronic renal insufficiency     Allergic rhinitis     Anemia 6/11/2012    Atrial fibrillation (HCC)     C[facility] (CVA) [facility] [address] [facility]     Controlled type 2 diabetes mellitus without complication ([facility] [address] [facility]     Generalized anxiety disorder     GERD without esophagitis 6/11/2012    Hypertension     Memory difficulties 1/8/2019    Nephrolithiasis 3/17/2016    Obesity 6/11/2012    Osteoarthritis 6/11/2012     Present on Admission:   Hydronephrosis with obstructing calculus   Right Adrenal nodule    Diabetic polyneuropathy associated with type 2 diabetes mellitus (HCC)   Hypertension   CKD (chronic kidney disease) stage 3, GFR 30-59 ml/min ([facility])   Chronic atrial fibrillation      Admitting Diagnosis: Kidney stone [N20 0]  Renal insufficiency [N28 9]  Flank pain [R10 9]  Left flank pain [R10 9]  Urinary tract obstruction by kidney stone [N20 0, N13 8]  Age/Sex: 68 y o  male  Admission Orders:  Scheduled Medications:    Medications:  amLODIPine 10 mg Oral Daily   atorvastatin 20 mg Oral Daily   donepezil 5 mg Oral HS   dorzolamide-timolol 1 drop Both Eyes Q12H [address]   insulin lispro 1-5 Units Subcutaneous HS   insulin lispro 1-6 Units Subcutaneous TID AC   insulin lispro 5 Units Subcutaneous TID With Meals   metoprolol succinate 100 mg Oral Daily   rivaroxaban 15 mg Oral Daily With Breakfast   tamsulosin 0 4 mg Oral Daily With Dinner     Continuous IV Infusions:    sodium chloride 125 mL/hr Intravenous Continuous     PRN Meds:    acetaminophen 650 mg Oral Q6H PRN   ALPRAZolam 0 5 mg Oral Q8H PRN   methocarbamol 500 mg Oral Q6H PRN   morphine injection 2 mg Intravenous Q4H PRN   ondansetron 4 mg Intravenous Q6H PRN   traMADol 50 mg Oral Q6H PRN       IP CONSULT TO UROLOGY    Network Utilization Review Department  [email or web address]  ATTENTION: Please call with any questions or concerns to [phone] and carefully listen to the prompts so that you are directed to the right person  All voicemails are confidential   Janette Magana all requests for admission clinical reviews, approved or denied determinations and any other requests to dedicated fax number below belonging to the campus where the patient is receiving treatment    FACILITY NAME UR FAX NUMBER   ADMISSION DENIALS (Administrative/Medical Necessity) [address] [facility] (Maternity/NICU/Pediatrics) [phone]   [facility] [address] [facility] [address] [facility] [phone]   [address] [place] [address] [phone]   [facility] [address] [phone]

## 2019-11-08 ENCOUNTER — ANESTHESIA EVENT (INPATIENT)
Dept: PERIOP | Facility: HOSPITAL | Age: 74
DRG: 660 | End: 2019-11-08
Payer: MEDICARE

## 2019-11-08 ENCOUNTER — APPOINTMENT (INPATIENT)
Dept: RADIOLOGY | Facility: HOSPITAL | Age: 74
DRG: 660 | End: 2019-11-08
Payer: MEDICARE

## 2019-11-08 ENCOUNTER — ANESTHESIA (INPATIENT)
Dept: PERIOP | Facility: HOSPITAL | Age: 74
DRG: 660 | End: 2019-11-08
Payer: MEDICARE

## 2019-11-08 ENCOUNTER — ANESTHESIA EVENT (OUTPATIENT)
Dept: ANESTHESIOLOGY | Facility: HOSPITAL | Age: 74
End: 2019-11-08

## 2019-11-08 ENCOUNTER — ANESTHESIA (OUTPATIENT)
Dept: ANESTHESIOLOGY | Facility: HOSPITAL | Age: 74
End: 2019-11-08

## 2019-11-08 LAB
ANION GAP SERPL CALCULATED.3IONS-SCNC: 10 MMOL/L (ref 4–13)
BASOPHILS # BLD AUTO: 0 THOUSANDS/ΜL (ref 0–0.1)
BASOPHILS NFR BLD AUTO: 0 % (ref 0–2)
BUN SERPL-MCNC: 20 MG/DL (ref 7–25)
CALCIUM SERPL-MCNC: 8.2 MG/DL (ref 8.6–10.5)
CHLORIDE SERPL-SCNC: 108 MMOL/L (ref 98–107)
CO2 SERPL-SCNC: 20 MMOL/L (ref 21–31)
CREAT SERPL-MCNC: 2.01 MG/DL (ref 0.7–1.3)
EOSINOPHIL # BLD AUTO: 0.1 THOUSAND/ΜL (ref 0–0.61)
EOSINOPHIL NFR BLD AUTO: 1 % (ref 0–5)
ERYTHROCYTE [DISTWIDTH] IN BLOOD BY AUTOMATED COUNT: 13.5 % (ref 11.5–14.5)
GFR SERPL CREATININE-BSD FRML MDRD: 32 ML/MIN/1.73SQ M
GLUCOSE SERPL-MCNC: 115 MG/DL (ref 65–99)
GLUCOSE SERPL-MCNC: 128 MG/DL (ref 65–140)
GLUCOSE SERPL-MCNC: 173 MG/DL (ref 65–140)
GLUCOSE SERPL-MCNC: 174 MG/DL (ref 65–140)
GLUCOSE SERPL-MCNC: 96 MG/DL (ref 65–140)
HCT VFR BLD AUTO: 29.3 % (ref 42–47)
HGB BLD-MCNC: 9.9 G/DL (ref 14–18)
LYMPHOCYTES # BLD AUTO: 1.2 THOUSANDS/ΜL (ref 0.6–4.47)
LYMPHOCYTES NFR BLD AUTO: 17 % (ref 21–51)
MCH RBC QN AUTO: 31.8 PG (ref 26–34)
MCHC RBC AUTO-ENTMCNC: 33.6 G/DL (ref 31–37)
MCV RBC AUTO: 94 FL (ref 81–99)
MONOCYTES # BLD AUTO: 0.7 THOUSAND/ΜL (ref 0.17–1.22)
MONOCYTES NFR BLD AUTO: 11 % (ref 2–12)
NEUTROPHILS # BLD AUTO: 5 THOUSANDS/ΜL (ref 1.4–6.5)
NEUTS SEG NFR BLD AUTO: 71 % (ref 42–75)
PLATELET # BLD AUTO: 150 THOUSANDS/UL (ref 149–390)
PMV BLD AUTO: 9 FL (ref 8.6–11.7)
POTASSIUM SERPL-SCNC: 4 MMOL/L (ref 3.5–5.5)
RBC # BLD AUTO: 3.11 MILLION/UL (ref 4.3–5.9)
SODIUM SERPL-SCNC: 138 MMOL/L (ref 134–143)
WBC # BLD AUTO: 7 THOUSAND/UL (ref 4.8–10.8)

## 2019-11-08 PROCEDURE — 74018 RADEX ABDOMEN 1 VIEW: CPT

## 2019-11-08 PROCEDURE — 99232 SBSQ HOSP IP/OBS MODERATE 35: CPT | Performed by: INTERNAL MEDICINE

## 2019-11-08 PROCEDURE — 82948 REAGENT STRIP/BLOOD GLUCOSE: CPT

## 2019-11-08 PROCEDURE — C2617 STENT, NON-COR, TEM W/O DEL: HCPCS | Performed by: UROLOGY

## 2019-11-08 PROCEDURE — 74420 UROGRAPHY RTRGR +-KUB: CPT

## 2019-11-08 PROCEDURE — 80048 BASIC METABOLIC PNL TOTAL CA: CPT | Performed by: UROLOGY

## 2019-11-08 PROCEDURE — 87086 URINE CULTURE/COLONY COUNT: CPT | Performed by: UROLOGY

## 2019-11-08 PROCEDURE — 85025 COMPLETE CBC W/AUTO DIFF WBC: CPT | Performed by: UROLOGY

## 2019-11-08 PROCEDURE — BT1F1ZZ FLUOROSCOPY OF LEFT KIDNEY, URETER AND BLADDER USING LOW OSMOLAR CONTRAST: ICD-10-PCS | Performed by: RADIOLOGY

## 2019-11-08 PROCEDURE — 0T778DZ DILATION OF LEFT URETER WITH INTRALUMINAL DEVICE, VIA NATURAL OR ARTIFICIAL OPENING ENDOSCOPIC: ICD-10-PCS | Performed by: UROLOGY

## 2019-11-08 PROCEDURE — C1769 GUIDE WIRE: HCPCS | Performed by: UROLOGY

## 2019-11-08 DEVICE — STENT URETERAL 6FR 24CML INLAY OPTIMA
Type: IMPLANTABLE DEVICE | Site: URETER | Status: NON-FUNCTIONAL
Removed: 2020-01-20

## 2019-11-08 RX ORDER — FENTANYL CITRATE/PF 50 MCG/ML
50 SYRINGE (ML) INJECTION 2 TIMES DAILY PRN
Status: DISCONTINUED | OUTPATIENT
Start: 2019-11-08 | End: 2019-11-08 | Stop reason: HOSPADM

## 2019-11-08 RX ORDER — ONDANSETRON 2 MG/ML
4 INJECTION INTRAMUSCULAR; INTRAVENOUS ONCE AS NEEDED
Status: DISCONTINUED | OUTPATIENT
Start: 2019-11-08 | End: 2019-11-08 | Stop reason: HOSPADM

## 2019-11-08 RX ORDER — CEPHALEXIN 500 MG/1
500 CAPSULE ORAL EVERY 12 HOURS SCHEDULED
Status: DISCONTINUED | OUTPATIENT
Start: 2019-11-09 | End: 2019-11-09 | Stop reason: HOSPADM

## 2019-11-08 RX ORDER — PROPOFOL 10 MG/ML
INJECTION, EMULSION INTRAVENOUS AS NEEDED
Status: DISCONTINUED | OUTPATIENT
Start: 2019-11-08 | End: 2019-11-08 | Stop reason: SURG

## 2019-11-08 RX ORDER — SODIUM CHLORIDE 9 MG/ML
20 INJECTION, SOLUTION INTRAVENOUS CONTINUOUS
Status: DISCONTINUED | OUTPATIENT
Start: 2019-11-08 | End: 2019-11-08

## 2019-11-08 RX ORDER — KETOROLAC TROMETHAMINE 30 MG/ML
15 INJECTION, SOLUTION INTRAMUSCULAR; INTRAVENOUS ONCE AS NEEDED
Status: DISCONTINUED | OUTPATIENT
Start: 2019-11-08 | End: 2019-11-08 | Stop reason: HOSPADM

## 2019-11-08 RX ORDER — METOCLOPRAMIDE HYDROCHLORIDE 5 MG/ML
10 INJECTION INTRAMUSCULAR; INTRAVENOUS ONCE AS NEEDED
Status: DISCONTINUED | OUTPATIENT
Start: 2019-11-08 | End: 2019-11-08 | Stop reason: HOSPADM

## 2019-11-08 RX ORDER — FENTANYL CITRATE/PF 50 MCG/ML
50 SYRINGE (ML) INJECTION
Status: DISCONTINUED | OUTPATIENT
Start: 2019-11-08 | End: 2019-11-08 | Stop reason: HOSPADM

## 2019-11-08 RX ORDER — CEFTRIAXONE 1 G/50ML
1000 INJECTION, SOLUTION INTRAVENOUS ONCE
Status: COMPLETED | OUTPATIENT
Start: 2019-11-08 | End: 2019-11-08

## 2019-11-08 RX ORDER — LIDOCAINE HYDROCHLORIDE 20 MG/ML
INJECTION, SOLUTION EPIDURAL; INFILTRATION; INTRACAUDAL; PERINEURAL AS NEEDED
Status: DISCONTINUED | OUTPATIENT
Start: 2019-11-08 | End: 2019-11-08 | Stop reason: SURG

## 2019-11-08 RX ORDER — HYDROMORPHONE HCL/PF 1 MG/ML
0.5 SYRINGE (ML) INJECTION
Status: DISCONTINUED | OUTPATIENT
Start: 2019-11-08 | End: 2019-11-08 | Stop reason: HOSPADM

## 2019-11-08 RX ORDER — SODIUM CHLORIDE 9 MG/ML
INJECTION, SOLUTION INTRAVENOUS AS NEEDED
Status: DISCONTINUED | OUTPATIENT
Start: 2019-11-08 | End: 2019-11-08 | Stop reason: HOSPADM

## 2019-11-08 RX ORDER — SODIUM CHLORIDE, SODIUM LACTATE, POTASSIUM CHLORIDE, CALCIUM CHLORIDE 600; 310; 30; 20 MG/100ML; MG/100ML; MG/100ML; MG/100ML
100 INJECTION, SOLUTION INTRAVENOUS CONTINUOUS
Status: DISCONTINUED | OUTPATIENT
Start: 2019-11-08 | End: 2019-11-08

## 2019-11-08 RX ORDER — MAGNESIUM HYDROXIDE 1200 MG/15ML
LIQUID ORAL AS NEEDED
Status: DISCONTINUED | OUTPATIENT
Start: 2019-11-08 | End: 2019-11-08 | Stop reason: HOSPADM

## 2019-11-08 RX ORDER — FENTANYL CITRATE 50 UG/ML
INJECTION, SOLUTION INTRAMUSCULAR; INTRAVENOUS AS NEEDED
Status: DISCONTINUED | OUTPATIENT
Start: 2019-11-08 | End: 2019-11-08 | Stop reason: SURG

## 2019-11-08 RX ORDER — LIDOCAINE HYDROCHLORIDE 20 MG/ML
JELLY TOPICAL AS NEEDED
Status: DISCONTINUED | OUTPATIENT
Start: 2019-11-08 | End: 2019-11-08 | Stop reason: HOSPADM

## 2019-11-08 RX ORDER — MIDAZOLAM HYDROCHLORIDE 2 MG/2ML
INJECTION, SOLUTION INTRAMUSCULAR; INTRAVENOUS AS NEEDED
Status: DISCONTINUED | OUTPATIENT
Start: 2019-11-08 | End: 2019-11-08 | Stop reason: SURG

## 2019-11-08 RX ORDER — MEPERIDINE HYDROCHLORIDE 50 MG/ML
12.5 INJECTION INTRAMUSCULAR; INTRAVENOUS; SUBCUTANEOUS
Status: DISCONTINUED | OUTPATIENT
Start: 2019-11-08 | End: 2019-11-08 | Stop reason: HOSPADM

## 2019-11-08 RX ADMIN — TAMSULOSIN HYDROCHLORIDE 0.4 MG: 0.4 CAPSULE ORAL at 16:45

## 2019-11-08 RX ADMIN — PROPOFOL 30 MG: 10 INJECTION, EMULSION INTRAVENOUS at 09:35

## 2019-11-08 RX ADMIN — PROPOFOL 30 MG: 10 INJECTION, EMULSION INTRAVENOUS at 09:41

## 2019-11-08 RX ADMIN — METOPROLOL SUCCINATE 100 MG: 50 TABLET, EXTENDED RELEASE ORAL at 12:47

## 2019-11-08 RX ADMIN — PROPOFOL 30 MG: 10 INJECTION, EMULSION INTRAVENOUS at 09:44

## 2019-11-08 RX ADMIN — INSULIN LISPRO 1 UNITS: 100 INJECTION, SOLUTION INTRAVENOUS; SUBCUTANEOUS at 12:53

## 2019-11-08 RX ADMIN — SODIUM CHLORIDE 125 ML/HR: 9 INJECTION, SOLUTION INTRAVENOUS at 04:45

## 2019-11-08 RX ADMIN — ALPRAZOLAM 0.5 MG: 0.5 TABLET ORAL at 21:38

## 2019-11-08 RX ADMIN — MIDAZOLAM HYDROCHLORIDE 2 MG: 1 INJECTION, SOLUTION INTRAMUSCULAR; INTRAVENOUS at 09:19

## 2019-11-08 RX ADMIN — PROPOFOL 30 MG: 10 INJECTION, EMULSION INTRAVENOUS at 09:31

## 2019-11-08 RX ADMIN — LIDOCAINE HYDROCHLORIDE 100 MG: 20 INJECTION, SOLUTION EPIDURAL; INFILTRATION; INTRACAUDAL; PERINEURAL at 09:24

## 2019-11-08 RX ADMIN — DORZOLAMIDE HYDROCHLORIDE AND TIMOLOL MALEATE 1 DROP: 20; 5 SOLUTION/ DROPS OPHTHALMIC at 12:48

## 2019-11-08 RX ADMIN — PROPOFOL 30 MG: 10 INJECTION, EMULSION INTRAVENOUS at 09:39

## 2019-11-08 RX ADMIN — ATORVASTATIN CALCIUM 20 MG: 20 TABLET, FILM COATED ORAL at 12:56

## 2019-11-08 RX ADMIN — SODIUM CHLORIDE 125 ML/HR: 9 INJECTION, SOLUTION INTRAVENOUS at 15:55

## 2019-11-08 RX ADMIN — DONEPEZIL HYDROCHLORIDE 5 MG: 5 TABLET ORAL at 21:38

## 2019-11-08 RX ADMIN — FENTANYL CITRATE 100 MCG: 50 INJECTION INTRAMUSCULAR; INTRAVENOUS at 09:23

## 2019-11-08 RX ADMIN — PROPOFOL 30 MG: 10 INJECTION, EMULSION INTRAVENOUS at 09:24

## 2019-11-08 RX ADMIN — DORZOLAMIDE HYDROCHLORIDE AND TIMOLOL MALEATE 1 DROP: 20; 5 SOLUTION/ DROPS OPHTHALMIC at 21:38

## 2019-11-08 RX ADMIN — INSULIN LISPRO 5 UNITS: 100 INJECTION, SOLUTION INTRAVENOUS; SUBCUTANEOUS at 16:46

## 2019-11-08 RX ADMIN — PROPOFOL 30 MG: 10 INJECTION, EMULSION INTRAVENOUS at 09:46

## 2019-11-08 RX ADMIN — SODIUM CHLORIDE, POTASSIUM CHLORIDE, SODIUM LACTATE AND CALCIUM CHLORIDE 100 ML/HR: 600; 310; 30; 20 INJECTION, SOLUTION INTRAVENOUS at 12:53

## 2019-11-08 RX ADMIN — CEFTRIAXONE 1000 MG: 1 INJECTION, SOLUTION INTRAVENOUS at 09:18

## 2019-11-08 RX ADMIN — INSULIN LISPRO 5 UNITS: 100 INJECTION, SOLUTION INTRAVENOUS; SUBCUTANEOUS at 12:47

## 2019-11-08 RX ADMIN — AMLODIPINE BESYLATE 10 MG: 5 TABLET ORAL at 12:47

## 2019-11-08 RX ADMIN — INSULIN LISPRO 1 UNITS: 100 INJECTION, SOLUTION INTRAVENOUS; SUBCUTANEOUS at 16:46

## 2019-11-08 NOTE — ASSESSMENT & PLAN NOTE
· Continue rate control  · Xarelto held in anticipation of procedure  · Resume once cleared by Urology

## 2019-11-08 NOTE — SOCIAL WORK
Chart reviewed by case management, pt went to the OR today for insertion of a stent, pt had denied any d/c needs prior to the surgery, cm will continue to follow and assess for any additional d/c needs, pt's wife will transport the pt home when stable for d/c , tentative d/c for tomorrow as discussed at care coordination rounds today

## 2019-11-08 NOTE — ANESTHESIA PREPROCEDURE EVALUATION
Review of Systems/Medical History  Patient summary reviewed  Chart reviewed      Cardiovascular  Hyperlipidemia, Hypertension , Dysrhythmias , atrial fibrillation,    Pulmonary  Negative pulmonary ROS        GI/Hepatic    GERD ,        Kidney stones,        Endo/Other  Diabetes well controlled ,      GYN       Hematology  Anemia ,     Musculoskeletal    Arthritis     Neurology  Negative neurology ROS      Psychology   Anxiety,            Lab Results   Component Value Date    WBC 7 00 11/08/2019    HGB 9 9 (L) 11/08/2019    HCT 29 3 (L) 11/08/2019    MCV 94 11/08/2019     11/08/2019     Lab Results   Component Value Date    GLUCOSE 150 (H) 09/30/2014    CALCIUM 8 2 (L) 11/08/2019     09/30/2014    K 4 0 11/08/2019    CO2 20 (L) 11/08/2019     (H) 11/08/2019    BUN 20 11/08/2019    CREATININE 2 01 (H) 11/08/2019     Lab Results   Component Value Date    INR 1 14 05/07/2019    INR 1 96 (H) 12/14/2018    INR 1 05 11/14/2018    PROTIME 13 2 (H) 05/07/2019    PROTIME 22 4 (H) 12/14/2018    PROTIME 13 8 11/14/2018     Lab Results   Component Value Date    PTT 32 05/07/2019       Physical Exam    Airway    Mallampati score: II  TM Distance: >3 FB  Neck ROM: full     Dental   No notable dental hx     Cardiovascular  Rhythm: irregular, Rate: abnormal,     Pulmonary  Pulmonary exam normal     Other Findings        Anesthesia Plan  ASA Score- 3     Anesthesia Type- IV sedation with anesthesia with ASA Monitors  Additional Monitors:   Airway Plan:     Comment: Plan discussed is MAC with GA as backup  I personally discussed risks and benefits to this anesthetic  All patient questions were answered  Pt agrees with anesthesia plan        Plan Factors-    Induction- intravenous  Postoperative Plan- Plan for postoperative opioid use  Informed Consent- Anesthetic plan and risks discussed with patient  I personally reviewed this patient with the CRNA   Discussed and agreed on the Anesthesia Plan with the DASH Alston

## 2019-11-08 NOTE — ASSESSMENT & PLAN NOTE
· With acute kidney injury  · Creatinine is up from baseline  · 1 1-1 4 range  · IV fluids and monitor  · Scheduled for stent placement today

## 2019-11-08 NOTE — ASSESSMENT & PLAN NOTE
· CT w/ 5mm obstructing calculus in the proximal 3rd left ureter with mild left hydronephrosis  There is nonobstructing 2 mm left lower pole renal calculus    · Continue pain control as needed  · Kidney function has worsened this morning  · Discussed case with Urology  · Patient scheduled for OR today for left ureteral stent placement

## 2019-11-08 NOTE — ANESTHESIA POSTPROCEDURE EVALUATION
Post-Op Assessment Note    CV Status:  Stable  Pain Score: 0    Pain management: adequate     Mental Status:  Alert and awake   Hydration Status:  Euvolemic   PONV Controlled:  Controlled   Airway Patency:  Patent   Post Op Vitals Reviewed: Yes      Staff: CRNA           BP   132/64   Temp   98 5   Pulse  92   Resp   16   SpO2   98

## 2019-11-08 NOTE — NURSING NOTE
Report from prior shift, Pt in bed at this time eating breakfast, urine remains light pink, strained with no stones noted, callbell in reach of the pt

## 2019-11-08 NOTE — PROGRESS NOTES
Progress Note Ang Pastrana 1945, 68 y o  male MRN: [id number]    Unit/Bed#: -01 Encounter: [id number]    Primary Care Provider: Mira Friedman DO   Date and time admitted to hospital: 11/6/2019  9:54 AM        * Hydronephrosis with obstructing calculus  Assessment & Plan  · CT w/ 5mm obstructing calculus in the proximal 3rd left ureter with mild left hydronephrosis  There is nonobstructing 2 mm left lower pole renal calculus  · Continue pain control as needed  · Kidney function has worsened this morning  · Discussed case with Urology  · Patient scheduled for OR today for left ureteral stent placement    Chronic atrial fibrillation  Assessment & Plan  · Continue rate control  · Xarelto held in anticipation of procedure  · Resume once cleared by Urology    Right Adrenal nodule   Assessment & Plan  · Incidental finding on CT was a 13 mm right adrenal nodule  Follow up outpatient with PCP for imaging    CKD (chronic kidney disease) stage 3, GFR 30-59 ml/min ([facility])  Assessment & Plan  · With acute kidney injury  · Creatinine is up from baseline  · 1 1-1 4 range  · IV fluids and monitor  · Scheduled for stent placement today    Hypertension  Assessment & Plan  · Continue meds and monitor    Diabetic polyneuropathy associated with type 2 diabetes mellitus ([facility] [address] )  Assessment & Plan  · hold metformin for now  · Sliding scale insulin coverage      VTE Pharmacologic Prophylaxis: Pharmacologic: Rivaroxaban (Xarelto)    Patient Centered Rounds: I have performed bedside rounds with nursing staff today      Discussions with Specialists or Other Care Team Provider: yes  Education and Discussions with Family / Patient: yes    Current Length of Stay: 1 day(s)    Current Patient Status: Inpatient   Certification Statement: The patient will continue to require additional inpatient hospital stay due to Worsening kidney function    Discharge Plan:  Pending hospital course    Code Status: Level 1 - Full Code    Subjective:   No overnight events noted  Patient with intermittent pain of left flank  Kidney function worsened this morning and patient is scheduled for left ureteral stent placement  Objective:     Vitals:   Temp (24hrs), Av 4 °F (37 4 °C), Min:99 3 °F (37 4 °C), Max:99 7 °F (37 6 °C)    Temp:  [99 3 °F (37 4 °C)-99 7 °F (37 6 °C)] 99 3 °F (37 4 °C)  HR:  [65-90] 90  Resp:  [18] 18  BP: (118-130)/(57-70) 130/63  SpO2:  [96 %-99 %] 97 %  Body mass index is 31 15 kg/m²  Input and Output Summary (last 24 hours): Intake/Output Summary (Last 24 hours) at 2019 0849  Last data filed at 2019 0726  Gross per 24 hour   Intake 2480 ml   Output 2400 ml   Net 80 ml       Physical Exam:     Physical Exam   Constitutional: He appears well-developed  No distress  HENT:   Head: Normocephalic and atraumatic  Eyes: Conjunctivae and EOM are normal    Neck: Normal range of motion  Neck supple  Cardiovascular: Normal rate and regular rhythm  Pulmonary/Chest: Effort normal  No respiratory distress  Abdominal: Soft  He exhibits no distension  There is no tenderness  Musculoskeletal: Normal range of motion  He exhibits no edema  Neurological: He is alert  No cranial nerve deficit  Skin: Skin is warm and dry  Psychiatric: He has a normal mood and affect   His behavior is normal        Additional Data:     Labs:    Results from last 7 days   Lab Units 19  0452   WBC Thousand/uL 7 00   HEMOGLOBIN g/dL 9 9*   HEMATOCRIT % 29 3*   PLATELETS Thousands/uL 150   NEUTROS PCT % 71   LYMPHS PCT % 17*   MONOS PCT % 11   EOS PCT % 1     Results from last 7 days   Lab Units 19  0452  19  1007   POTASSIUM mmol/L 4 0   < > 4 3   CHLORIDE mmol/L 108*   < > 108*   CO2 mmol/L 20*   < > 19*   BUN mg/dL 20   < > 25   CREATININE mg/dL 2 01*   < > 2 09*   CALCIUM mg/dL 8 2*   < > 9 0   ALK PHOS U/L  --   --  52*   ALT U/L  --   --  10   AST U/L  --   --  10*    < > = values in this interval not displayed  Results from last 7 days   Lab Units 11/08/19  0623 11/07/19 1959 11/07/19  1624 11/07/19  1106 11/07/19  0647 11/06/19 2026 11/06/19  1605 11/06/19  1314   POC GLUCOSE mg/dl 128 77 182* 73 117 92 156* 207*           * I Have Reviewed All Lab Data Listed Above  * Additional Pertinent Lab Tests Reviewed: [address] Admission  Reviewed    Imaging:  Imaging Reports Reviewed Today Include:  No new imaging    Recent Cultures (last 7 days):           Last 24 Hours Medication List:     Current Facility-Administered Medications:  acetaminophen 650 mg Oral Q6H PRN Robert Chamorro PA-C    ALPRAZolam 0 5 mg Oral Q8H PRN Robert Chamorro PA-C    amLODIPine 10 mg Oral Daily Robert Chamorro PA-C    atorvastatin 20 mg Oral Daily Robert Chamorro PA-C    cefTRIAXone 1,000 mg Intravenous Once Jones Wheat MD    donepezil 5 mg Oral HS Robert Chamorro PA-C    dorzolamide-timolol 1 drop Both Eyes Q12H [address] Mikala Florian PA-C    insulin lispro 1-5 Units Subcutaneous HS Mikala Florian PA-C    insulin lispro 1-6 Units Subcutaneous TID AC Mikala Florian PA-C    insulin lispro 5 Units Subcutaneous TID With Meals Robert Chamorro PA-C    methocarbamol 500 mg Oral Q6H PRN Robert Chamorro PA-C    metoprolol succinate 100 mg Oral Daily Robert Chamorro PA-C    morphine injection 2 mg Intravenous Q4H PRN Robert Chamorro PA-C    ondansetron 4 mg Intravenous Q6H PRN Robert Chamorro PA-C    rivaroxaban 15 mg Oral Daily With Juan Samaniego MD    sodium chloride 125 mL/hr Intravenous Continuous Robert Chamorro PA-C Last Rate: 125 mL/hr (11/08/19 0445)   tamsulosin 0 4 mg Oral Daily With Dinner Robert Chamorro PA-C    traMADol 50 mg Oral Q6H PRN Robert Chamorro PA-C         Today, Patient Was Seen By: Jones Wheat MD    ** Please Note: Dictation voice to text software may have been used in the creation of this document   **

## 2019-11-08 NOTE — ANESTHESIA PREPROCEDURE EVALUATION
Review of Systems/Medical History  Patient summary reviewed  Chart reviewed      Cardiovascular  Hyperlipidemia, Hypertension , Dysrhythmias , atrial fibrillation,    Pulmonary  Negative pulmonary ROS        GI/Hepatic    GERD ,        Kidney stones,        Endo/Other  Diabetes well controlled ,      GYN       Hematology  Anemia ,     Musculoskeletal    Arthritis     Neurology  Negative neurology ROS      Psychology   Anxiety,              Physical Exam    Airway    Mallampati score: II  TM Distance: >3 FB  Neck ROM: full     Dental   No notable dental hx     Cardiovascular  Rhythm: irregular, Rate: abnormal,     Pulmonary  Pulmonary exam normal     Other Findings        Anesthesia Plan  ASA Score- 3     Anesthesia Type- IV sedation with anesthesia with ASA Monitors  Additional Monitors:   Airway Plan:     Comment: Plan discussed is MAC with GA as backup  I personally discussed risks and benefits to this anesthetic  All patient questions were answered  Pt agrees with anesthesia plan        Plan Factors-    Induction- intravenous  Postoperative Plan- Plan for postoperative opioid use  Informed Consent- Anesthetic plan and risks discussed with patient

## 2019-11-08 NOTE — OP NOTE
OPERATIVE REPORT  PATIENT NAME: Racheal Alford    :  1945  MRN: [id number]  Pt Location: [address] [place] [address] OR ROOM 01    SURGERY DATE: 2019    Surgeon(s) and Role:     * Selina Valenzuela MD - Primary    Preop Diagnosis:  Hydronephrosis with obstructing calculus [N13 2]    Post-Op Diagnosis Codes: * Hydronephrosis with obstructing calculus [N13 2]    Procedure(s) (LRB):  CYSTOSCOPY RETROGRADE PYELOGRAM WITH INSERTION STENT URETERAL (Left)    Specimen(s):  ID Type Source Tests Collected by Time Destination   A : urine culture and sensitivity Urine Urine, Cystoscopic URINE CULTURE Selina Valenzuela MD 2019 0934    B :  Urine Urine, Renal, Left URINE CULTURE Selina Valenzuela MD 2019 [id number]        Estimated Blood Loss:   Minimal    Drains:  * No LDAs found *    Anesthesia Type:   IV Sedation with Anesthesia    Operative Indications:  Hydronephrosis with obstructing calculus [N13 2]  The patient presented with a 5 mm mid left ureteral stone associated with acute renal injury  He failed conservative management and his creatinine remained elevated  Options, procedures, benefits and risks were discussed and he agreed to the planned procedure  Operative Findings:  Filling defect consistent with stone at the left L4 vertebral level with no contrast passing above on initial retrograde  On later retrograde films with the catheter passed into the proximal ureter, there was of mobile filling defect in the proximal ureter that could be consistent with migration of the stone  There was mild hydroureteronephrosis including the proximal ureter  Complications:   None    Procedure and Technique:  The patient was properly identified in the operating room and after adequate intravenous sedation was placed in the lithotomy position  The lower abdomen and genitalia were prepped and draped in the usual fashion  2% lidocaine jelly was instilled per urethra    Cystourethroscopy was performed with a 21 [place] cystoscope using 30 degree and 70 degree lenses  The anterior urethra was normal   The prostate was mildly enlarged  The bladder was smooth with no masses or calcifications  The ureteral orifices were in normal position and of normal configuration bilaterally  A 5 Faroese open-ended ureteral catheter was passed into the left ureteral orifice using a 70 degree lens and the deflecting element  Contrast material was instilled under fluoroscopic guidance  The course and caliber of the ureter was normal with no mass or filling defect until the approximate L4 vertebral level  At this point no contrast would pass above  With the aid of a guidewire, the ureteral catheter passed into the mid to distal ureter  Further contrast revealed a filling defect consistent with the stone with mild dilation of the ureter proximally  A solo guidewire was passed through the catheter into the collecting system and the catheter followed  The wire was removed  A rapid drip of cloudy caitlin urine was drained and sent for culture  Dilute contrast was instilled outlining a mildly dilated collecting system with no filling defects  There was a persistent mobile filling defect in the proximal ureter that could be consistent with migration of the stone, but could also have been secondary to air bubble  The wire was replaced into the upper pole  The ureteral length was measured and the catheter was removed  A 6 Faroese 24 cm Bard inlay optima stent was passed over the wire and the wire was removed leaving a good curl within the renal pelvis and within the bladder  The bladder was emptied and the cystoscope was removed  The patient tolerated the procedure well and left the operating room in good condition     I was present for the entire procedure    Patient Disposition:  PACU     SIGNATURE: Shanti Sweet MD  DATE: November 8, 2019  TIME: 10:28 AM

## 2019-11-09 ENCOUNTER — APPOINTMENT (INPATIENT)
Dept: RADIOLOGY | Facility: HOSPITAL | Age: 74
DRG: 660 | End: 2019-11-09
Payer: MEDICARE

## 2019-11-09 VITALS
WEIGHT: 223.33 LBS | TEMPERATURE: 98.1 F | BODY MASS INDEX: 31.27 KG/M2 | HEART RATE: 65 BPM | DIASTOLIC BLOOD PRESSURE: 59 MMHG | RESPIRATION RATE: 18 BRPM | SYSTOLIC BLOOD PRESSURE: 115 MMHG | HEIGHT: 71 IN | OXYGEN SATURATION: 98 %

## 2019-11-09 LAB
ANION GAP SERPL CALCULATED.3IONS-SCNC: 8 MMOL/L (ref 4–13)
BASOPHILS # BLD AUTO: 0 THOUSANDS/ΜL (ref 0–0.1)
BASOPHILS NFR BLD AUTO: 1 % (ref 0–2)
BUN SERPL-MCNC: 20 MG/DL (ref 7–25)
CALCIUM SERPL-MCNC: 8.4 MG/DL (ref 8.6–10.5)
CHLORIDE SERPL-SCNC: 109 MMOL/L (ref 98–107)
CO2 SERPL-SCNC: 21 MMOL/L (ref 21–31)
CREAT SERPL-MCNC: 1.5 MG/DL (ref 0.7–1.3)
EOSINOPHIL # BLD AUTO: 0.1 THOUSAND/ΜL (ref 0–0.61)
EOSINOPHIL NFR BLD AUTO: 2 % (ref 0–5)
ERYTHROCYTE [DISTWIDTH] IN BLOOD BY AUTOMATED COUNT: 13.3 % (ref 11.5–14.5)
GFR SERPL CREATININE-BSD FRML MDRD: 46 ML/MIN/1.73SQ M
GLUCOSE SERPL-MCNC: 103 MG/DL (ref 65–99)
GLUCOSE SERPL-MCNC: 107 MG/DL (ref 65–140)
GLUCOSE SERPL-MCNC: 153 MG/DL (ref 65–140)
HCT VFR BLD AUTO: 28.1 % (ref 42–47)
HGB BLD-MCNC: 9.5 G/DL (ref 14–18)
LYMPHOCYTES # BLD AUTO: 1.3 THOUSANDS/ΜL (ref 0.6–4.47)
LYMPHOCYTES NFR BLD AUTO: 24 % (ref 21–51)
MCH RBC QN AUTO: 32 PG (ref 26–34)
MCHC RBC AUTO-ENTMCNC: 33.9 G/DL (ref 31–37)
MCV RBC AUTO: 95 FL (ref 81–99)
MONOCYTES # BLD AUTO: 0.6 THOUSAND/ΜL (ref 0.17–1.22)
MONOCYTES NFR BLD AUTO: 11 % (ref 2–12)
NEUTROPHILS # BLD AUTO: 3.5 THOUSANDS/ΜL (ref 1.4–6.5)
NEUTS SEG NFR BLD AUTO: 62 % (ref 42–75)
PLATELET # BLD AUTO: 150 THOUSANDS/UL (ref 149–390)
PMV BLD AUTO: 8.8 FL (ref 8.6–11.7)
POTASSIUM SERPL-SCNC: 4 MMOL/L (ref 3.5–5.5)
RBC # BLD AUTO: 2.98 MILLION/UL (ref 4.3–5.9)
SODIUM SERPL-SCNC: 138 MMOL/L (ref 134–143)
WBC # BLD AUTO: 5.6 THOUSAND/UL (ref 4.8–10.8)

## 2019-11-09 PROCEDURE — 80048 BASIC METABOLIC PNL TOTAL CA: CPT | Performed by: UROLOGY

## 2019-11-09 PROCEDURE — 82948 REAGENT STRIP/BLOOD GLUCOSE: CPT

## 2019-11-09 PROCEDURE — 85025 COMPLETE CBC W/AUTO DIFF WBC: CPT | Performed by: UROLOGY

## 2019-11-09 PROCEDURE — 99239 HOSP IP/OBS DSCHRG MGMT >30: CPT | Performed by: INTERNAL MEDICINE

## 2019-11-09 PROCEDURE — 74018 RADEX ABDOMEN 1 VIEW: CPT

## 2019-11-09 RX ORDER — CEPHALEXIN 500 MG/1
500 CAPSULE ORAL EVERY 12 HOURS SCHEDULED
Qty: 6 CAPSULE | Refills: 0 | Status: SHIPPED | OUTPATIENT
Start: 2019-11-09 | End: 2019-11-12

## 2019-11-09 RX ORDER — TAMSULOSIN HYDROCHLORIDE 0.4 MG/1
0.4 CAPSULE ORAL
Qty: 30 CAPSULE | Refills: 0 | Status: SHIPPED | OUTPATIENT
Start: 2019-11-09 | End: 2020-11-03

## 2019-11-09 RX ADMIN — INSULIN LISPRO 5 UNITS: 100 INJECTION, SOLUTION INTRAVENOUS; SUBCUTANEOUS at 08:59

## 2019-11-09 RX ADMIN — ATORVASTATIN CALCIUM 20 MG: 20 TABLET, FILM COATED ORAL at 08:58

## 2019-11-09 RX ADMIN — CEPHALEXIN 500 MG: 500 CAPSULE ORAL at 08:58

## 2019-11-09 RX ADMIN — INSULIN LISPRO 5 UNITS: 100 INJECTION, SOLUTION INTRAVENOUS; SUBCUTANEOUS at 11:40

## 2019-11-09 RX ADMIN — SODIUM CHLORIDE 125 ML/HR: 9 INJECTION, SOLUTION INTRAVENOUS at 05:50

## 2019-11-09 RX ADMIN — INSULIN LISPRO 1 UNITS: 100 INJECTION, SOLUTION INTRAVENOUS; SUBCUTANEOUS at 11:40

## 2019-11-09 RX ADMIN — AMLODIPINE BESYLATE 10 MG: 5 TABLET ORAL at 08:58

## 2019-11-09 RX ADMIN — RIVAROXABAN 15 MG: 10 TABLET, FILM COATED ORAL at 09:00

## 2019-11-09 RX ADMIN — METOPROLOL SUCCINATE 100 MG: 50 TABLET, EXTENDED RELEASE ORAL at 08:59

## 2019-11-09 NOTE — DISCHARGE SUMMARY
Discharge- Giovana Thompson 1945, 68 y o  male MRN: [id number]    Unit/Bed#: -01 Encounter: [id number]    Primary Care Provider: Matthew Lorenz DO   Date and time admitted to hospital: 11/6/2019  9:54 AM        Right Adrenal nodule   Assessment & Plan  · Incidental finding on CT was a 13 mm right adrenal nodule  Follow up outpatient with PCP for imaging    CKD (chronic kidney disease) stage 3, GFR 30-59 ml/min ([facility])  Assessment & Plan  · With acute kidney injury  · Creatinine has trended down to near baseline    Chronic atrial fibrillation  Assessment & Plan  · Continue home medication regimen  · Xarelto resumed    Hypertension  Assessment & Plan  · Continue home antihypertensives    Diabetic polyneuropathy associated with type 2 diabetes mellitus ([facility] [address] )  Assessment & Plan  · Resume home meds on discharge    * Hydronephrosis with obstructing calculus  Assessment & Plan  · CT w/ 5mm obstructing calculus in the proximal 3rd left ureter with mild left hydronephrosis  There is nonobstructing 2 mm left lower pole renal calculus    · No further pain  · Kidney function improved after stent placement  · Discussed case with Urology, cleared for discharge home today        Discharging Physician / Practitioner: Domenico Ross MD  PCP: Matthew Lorenz DO  Admission Date:   Admission Orders (From admission, onward)     Ordered        11/07/19 1020  Inpatient Admission  Once         11/06/19 1152  Place in Observation  Once                   Discharge Date: 11/09/19    Resolved Problems  Date Reviewed: 11/9/2019    None          Consultations During Hospital Stay:  · urology    Procedures Performed:   · Ureteral stent placement    Significant Findings / Test Results:   · adilene    Incidental Findings:   · Incidental adrenal nodule     Test Results Pending at Discharge (will require follow up):   · n/a     Outpatient Tests Requested:  · n/a    Complications:     none    Reason for Admission:  Flank pain    Hospital Course: Blanka Salas is a 68 y o  male patient who originally presented to the hospital on 11/6/2019 due to flank plain due to nephrolithiasis  Patient was also noted to have acute kidney injury, hydronephrosis and patient was admitted to the hospital further treatment evaluation  Patient was seen in consultation by Urology and underwent stent placement  Postoperatively, his pain improved, creatinine improved, and he was deemed stable for discharge  Patient will be discharged on empiric antibiotics for urinary tract infection related to his nephrolithiasis  He will be following up with Urology as an outpatient  Please see above list of diagnoses and related plan for additional information  Condition at Discharge: fair     Discharge Day Visit / Exam:     Subjective:  Patient seen examined  No acute events were noted  Feels better, no pain, wishes to go home  Vitals: Blood Pressure: 115/59 (11/09/19 0759)  Pulse: 65 (11/09/19 0759)  Temperature: 98 1 °F (36 7 °C) (11/09/19 0759)  Temp Source: Tympanic (11/09/19 0759)  Respirations: 18 (11/09/19 0759)  Height: 5' 11" (180 3 cm) (11/06/19 1223)  Weight - Scale: 101 kg (223 lb 5 2 oz) (11/06/19 1223)  SpO2: 98 % (11/09/19 0759)  Exam:   Physical Exam   Constitutional: He is oriented to person, place, and time  He appears well-developed and well-nourished  HENT:   Head: Normocephalic and atraumatic  Eyes: Pupils are equal, round, and reactive to light  EOM are normal    Neck: Normal range of motion  Neck supple  Cardiovascular: Normal rate and regular rhythm  Pulmonary/Chest: Effort normal and breath sounds normal    Abdominal: Soft  Bowel sounds are normal    Obese   Musculoskeletal: Normal range of motion  He exhibits no edema  No flank tenderness   Neurological: He is alert and oriented to person, place, and time  Skin: Skin is warm  Capillary refill takes less than 2 seconds  Psychiatric: He has a normal mood and affect   His behavior is normal  Thought content normal    Nursing note and vitals reviewed  Discussion with Family: n/a    Discharge instructions/Information to patient and family:   See after visit summary for information provided to patient and family  Provisions for Follow-Up Care:  See after visit summary for information related to follow-up care and any pertinent home health orders  Disposition:     Home    For Discharges to [facility] SNF:   · Not Applicable to this Patient - Not Applicable to this Patient    Planned Readmission:    n/a     Discharge Statement:  I spent 35 minutes discharging the patient  This time was spent on the day of discharge  I had direct contact with the patient on the day of discharge  Greater than 50% of the total time was spent examining patient, answering all patient questions, arranging and discussing plan of care with patient as well as directly providing post-discharge instructions  Additional time then spent on discharge activities  Discharge Medications:  See after visit summary for reconciled discharge medications provided to patient and family        ** Please Note: This note has been constructed using a voice recognition system **

## 2019-11-09 NOTE — SOCIAL WORK
Pt for discharge home today per Dr Bakari Romero  Pt has no identified discharge needs at this time  Spouse will transport home

## 2019-11-09 NOTE — ASSESSMENT & PLAN NOTE
· CT w/ 5mm obstructing calculus in the proximal 3rd left ureter with mild left hydronephrosis  There is nonobstructing 2 mm left lower pole renal calculus    · No further pain  · Kidney function improved after stent placement  · Discussed case with Urology, cleared for discharge home today

## 2019-11-10 LAB
BACTERIA UR CULT: NORMAL
BACTERIA UR CULT: NORMAL

## 2019-11-11 ENCOUNTER — TRANSITIONAL CARE MANAGEMENT (OUTPATIENT)
Dept: INTERNAL MEDICINE CLINIC | Facility: CLINIC | Age: 74
End: 2019-11-11

## 2019-11-11 DIAGNOSIS — Z71.89 COMPLEX CARE COORDINATION: Primary | ICD-10-CM

## 2019-11-12 ENCOUNTER — PATIENT OUTREACH (OUTPATIENT)
Dept: INTERNAL MEDICINE CLINIC | Facility: CLINIC | Age: 74
End: 2019-11-12

## 2019-11-12 ENCOUNTER — TRANSITIONAL CARE MANAGEMENT (OUTPATIENT)
Dept: INTERNAL MEDICINE CLINIC | Facility: CLINIC | Age: 74
End: 2019-11-12

## 2019-11-12 LAB — GLUCOSE SERPL-MCNC: 155 MG/DL (ref 65–140)

## 2019-11-14 ENCOUNTER — OFFICE VISIT (OUTPATIENT)
Dept: INTERNAL MEDICINE CLINIC | Facility: CLINIC | Age: 74
End: 2019-11-14
Payer: MEDICARE

## 2019-11-14 ENCOUNTER — APPOINTMENT (OUTPATIENT)
Dept: LAB | Facility: CLINIC | Age: 74
End: 2019-11-14
Payer: MEDICARE

## 2019-11-14 VITALS
TEMPERATURE: 97.5 F | WEIGHT: 217 LBS | HEIGHT: 71 IN | SYSTOLIC BLOOD PRESSURE: 116 MMHG | HEART RATE: 70 BPM | BODY MASS INDEX: 30.38 KG/M2 | RESPIRATION RATE: 18 BRPM | DIASTOLIC BLOOD PRESSURE: 68 MMHG

## 2019-11-14 DIAGNOSIS — D64.9 ANEMIA, UNSPECIFIED TYPE: ICD-10-CM

## 2019-11-14 DIAGNOSIS — E11.22 TYPE 2 DIABETES MELLITUS WITH STAGE 3 CHRONIC KIDNEY DISEASE, WITHOUT LONG-TERM CURRENT USE OF INSULIN (HCC): ICD-10-CM

## 2019-11-14 DIAGNOSIS — N17.9 AKI (ACUTE KIDNEY INJURY) (HCC): ICD-10-CM

## 2019-11-14 DIAGNOSIS — Z13.31 NEGATIVE DEPRESSION SCREENING: ICD-10-CM

## 2019-11-14 DIAGNOSIS — N18.30 TYPE 2 DIABETES MELLITUS WITH STAGE 3 CHRONIC KIDNEY DISEASE, WITHOUT LONG-TERM CURRENT USE OF INSULIN (HCC): ICD-10-CM

## 2019-11-14 DIAGNOSIS — F41.9 ANXIETY: ICD-10-CM

## 2019-11-14 DIAGNOSIS — Z23 ENCOUNTER FOR VACCINATION: ICD-10-CM

## 2019-11-14 DIAGNOSIS — N13.2 HYDRONEPHROSIS WITH OBSTRUCTING CALCULUS: Primary | Chronic | ICD-10-CM

## 2019-11-14 DIAGNOSIS — N20.0 NEPHROLITHIASIS: ICD-10-CM

## 2019-11-14 DIAGNOSIS — E27.8 ADRENAL NODULE (HCC): Chronic | ICD-10-CM

## 2019-11-14 DIAGNOSIS — K62.5 BRBPR (BRIGHT RED BLOOD PER RECTUM): ICD-10-CM

## 2019-11-14 DIAGNOSIS — Z96.0 S/P CYSTOSCOPY WITH URETERAL STENT PLACEMENT: ICD-10-CM

## 2019-11-14 DIAGNOSIS — Z79.01 CHRONIC ANTICOAGULATION: ICD-10-CM

## 2019-11-14 DIAGNOSIS — N18.30 CKD (CHRONIC KIDNEY DISEASE) STAGE 3, GFR 30-59 ML/MIN (HCC): Chronic | ICD-10-CM

## 2019-11-14 DIAGNOSIS — K64.9 HEMORRHOIDS, UNSPECIFIED HEMORRHOID TYPE: ICD-10-CM

## 2019-11-14 LAB
ANION GAP SERPL CALCULATED.3IONS-SCNC: 9 MMOL/L (ref 4–13)
BASOPHILS # BLD AUTO: 0.05 THOUSANDS/ΜL (ref 0–0.1)
BASOPHILS NFR BLD AUTO: 1 % (ref 0–1)
BUN SERPL-MCNC: 12 MG/DL (ref 5–25)
CALCIUM SERPL-MCNC: 8.9 MG/DL (ref 8.3–10.1)
CHLORIDE SERPL-SCNC: 110 MMOL/L (ref 100–108)
CO2 SERPL-SCNC: 23 MMOL/L (ref 21–32)
CREAT SERPL-MCNC: 1.47 MG/DL (ref 0.6–1.3)
EOSINOPHIL # BLD AUTO: 0.14 THOUSAND/ΜL (ref 0–0.61)
EOSINOPHIL NFR BLD AUTO: 2 % (ref 0–6)
ERYTHROCYTE [DISTWIDTH] IN BLOOD BY AUTOMATED COUNT: 12.5 % (ref 11.6–15.1)
GFR SERPL CREATININE-BSD FRML MDRD: 47 ML/MIN/1.73SQ M
GLUCOSE P FAST SERPL-MCNC: 185 MG/DL (ref 65–99)
HCT VFR BLD AUTO: 34.2 % (ref 36.5–49.3)
HGB BLD-MCNC: 11.1 G/DL (ref 12–17)
IMM GRANULOCYTES # BLD AUTO: 0.04 THOUSAND/UL (ref 0–0.2)
IMM GRANULOCYTES NFR BLD AUTO: 1 % (ref 0–2)
LYMPHOCYTES # BLD AUTO: 1.12 THOUSANDS/ΜL (ref 0.6–4.47)
LYMPHOCYTES NFR BLD AUTO: 19 % (ref 14–44)
MCH RBC QN AUTO: 30.9 PG (ref 26.8–34.3)
MCHC RBC AUTO-ENTMCNC: 32.5 G/DL (ref 31.4–37.4)
MCV RBC AUTO: 95 FL (ref 82–98)
MONOCYTES # BLD AUTO: 0.33 THOUSAND/ΜL (ref 0.17–1.22)
MONOCYTES NFR BLD AUTO: 6 % (ref 4–12)
NEUTROPHILS # BLD AUTO: 4.31 THOUSANDS/ΜL (ref 1.85–7.62)
NEUTS SEG NFR BLD AUTO: 71 % (ref 43–75)
NRBC BLD AUTO-RTO: 0 /100 WBCS
PLATELET # BLD AUTO: 246 THOUSANDS/UL (ref 149–390)
PMV BLD AUTO: 10.7 FL (ref 8.9–12.7)
POTASSIUM SERPL-SCNC: 4.1 MMOL/L (ref 3.5–5.3)
RBC # BLD AUTO: 3.59 MILLION/UL (ref 3.88–5.62)
SODIUM SERPL-SCNC: 142 MMOL/L (ref 136–145)
WBC # BLD AUTO: 5.99 THOUSAND/UL (ref 4.31–10.16)

## 2019-11-14 PROCEDURE — G0008 ADMIN INFLUENZA VIRUS VAC: HCPCS | Performed by: INTERNAL MEDICINE

## 2019-11-14 PROCEDURE — 99496 TRANSJ CARE MGMT HIGH F2F 7D: CPT | Performed by: INTERNAL MEDICINE

## 2019-11-14 PROCEDURE — 80048 BASIC METABOLIC PNL TOTAL CA: CPT

## 2019-11-14 PROCEDURE — 36415 COLL VENOUS BLD VENIPUNCTURE: CPT

## 2019-11-14 PROCEDURE — 90662 IIV NO PRSV INCREASED AG IM: CPT | Performed by: INTERNAL MEDICINE

## 2019-11-14 PROCEDURE — 85025 COMPLETE CBC W/AUTO DIFF WBC: CPT

## 2019-11-14 RX ORDER — ALPRAZOLAM 0.5 MG/1
0.5 TABLET ORAL EVERY 8 HOURS PRN
Qty: 90 TABLET | Refills: 0 | Status: SHIPPED | OUTPATIENT
Start: 2019-11-14 | End: 2020-03-06 | Stop reason: SDUPTHER

## 2019-11-14 NOTE — PROGRESS NOTES
Assessment/Plan:     No problem-specific Assessment & Plan notes found for this encounter  Diagnoses and all orders for this visit:    Hydronephrosis with obstructing calculus    Nephrolithiasis    CKD (chronic kidney disease) stage 3, GFR 30-59 ml/min (HCC)    Type 2 diabetes mellitus with stage 3 chronic kidney disease, without long-term current use of insulin (HCC)    Chronic anticoagulation  -     Ambulatory referral to Gastroenterology; Future    Right Adrenal nodule     Negative depression screening    Encounter for vaccination  -     influenza vaccine, high-dose, PF 0 5 mL    Anemia, unspecified type  -     CBC and differential; Future    RAUDEL (acute kidney injury) ([facility] [address] )  -     Basic metabolic panel; Future    S/P cystoscopy with ureteral stent placement    Anxiety  -     ALPRAZolam (XANAX) 0 5 mg tablet; Take 1 tablet (0 5 mg total) by mouth every 8 (eight) hours as needed (AS NEEDED)    BRBPR (bright red blood per rectum)  -     Ambulatory referral to Gastroenterology; Future    Hemorrhoids, unspecified hemorrhoid type  -     Ambulatory referral to Gastroenterology; Future     A/P: Doing well, but still with the stent and now BRBPR  Suspect due to hemorrhoids and NOAC, but is due to [facility] anyway  Will recheck his labs to insure his renal function and H/H are stable  Will refer to GI for scoping  QUESTION if the colonoscopy and the stent removal can be done at the same time to decrease the need for two anesthesia events and decrease his risk given his co-morbidities  Continue current treatment, including the NOAC as the benefits are greater than the risks  Will update his flu shot  Continue the rest of his current treatment and maintain hydration  Keep f/u with  as well  RTC three weeks for f/u  Subjective:     Patient ID: Sary Mason is a 68 y o  male  WM, with h/o renal stones, afib on NOAC, and hemorrhoids, presents with his wife for a recent admission for intractable back pain   Seen in the ER and found to have an obstructing renal stone and RAUDEL  Treated with IVF's and abx, but eventually required stenting and d/c'd to home  Remains on NOAC  Coarse complicated by anemia as well  Since d/c, doing better, but notes BRBPR on the TP ONLY  No black tarry stools, melena, or hematochezia  NO SOB, edema, CP, palpitations, orthopnea or PND  Occasional hematuria  No fever, chills, nausea, or emesis  Appetite and activity level ar good  Off abx  Overdue for CRC  No abdominal or flank pain  Review of Systems   Constitutional: Negative for activity change, chills, diaphoresis, fatigue and fever  Respiratory: Negative for cough, chest tightness, shortness of breath and wheezing  Cardiovascular: Negative for chest pain, palpitations and leg swelling  Gastrointestinal: Positive for anal bleeding  Negative for abdominal distention, abdominal pain, blood in stool, constipation, diarrhea, nausea, rectal pain and vomiting  Endocrine: Negative for cold intolerance and heat intolerance  Genitourinary: Positive for hematuria  Negative for decreased urine volume, difficulty urinating, dysuria and frequency  Musculoskeletal: Negative for arthralgias, gait problem and myalgias  Neurological: Negative for dizziness, syncope, weakness, light-headedness and headaches  Psychiatric/Behavioral: Negative for confusion  The patient is not nervous/anxious  Objective:     Physical Exam   Constitutional: He is oriented to person, place, and time  He appears well-developed and well-nourished  No distress  HENT:   Head: Normocephalic and atraumatic  Mouth/Throat: Oropharynx is clear and moist    Eyes: Pupils are equal, round, and reactive to light  Conjunctivae and EOM are normal    Neck: Normal range of motion  Neck supple  No JVD present  No tracheal deviation present  No thyromegaly present  Cardiovascular: Normal rate and normal heart sounds  Irregular irregular with GVR     Pulmonary/Chest: Effort normal and breath sounds normal  No respiratory distress  He has no wheezes  He has no rales  Abdominal: Soft  Bowel sounds are normal  He exhibits no distension and no mass  There is no tenderness  There is no rebound and no guarding  Musculoskeletal: He exhibits no edema  Lymphadenopathy:     He has no cervical adenopathy  Neurological: He is alert and oriented to person, place, and time  Psychiatric: He has a normal mood and affect  His behavior is normal  Judgment and thought content normal    Nursing note and vitals reviewed  Vitals:    11/14/19 0852   BP: 116/68   Pulse: 70   Resp: 18   Temp: 97 5 °F (36 4 °C)   TempSrc: Tympanic   Weight: 98 4 kg (217 lb)   Height: 5' 11" (1 803 m)       Transitional Care Management Review:  Mera Dc is a 68 y o  male here for TCM follow up  During the TCM phone call patient stated:    TCM Call (since 10/14/2019)     Date and time call was made  11/12/2019  9:15 AM    Hospital care reviewed  Records reviewed    Patient was hospitialized at  [address] [place] [address]    Date of Admission  11/06/19    Date of discharge  11/09/19    Diagnosis  Hydronephrosis w/obstructing calculus     Disposition  Home    Were the patients medications reviewed and updated  Yes    Current Symptoms  None      TCM Call (since 10/14/2019)     Post hospital issues  None    Should patient be enrolled in anticoag monitoring? No    Scheduled for follow up?   Yes    Patients specialists  Urologist    Urologist name  Annemarie Rodrigues     Urologist contact #  [phone]    Did you obtain your prescribed medications  Yes    Do you need help managing your prescriptions or medications  No    Is transportation to your appointment needed  No    I have advised the patient to call PCP with any new or worsening symptoms  Ellen/RMA    Are you recieving any outpatient services  No    Are you recieving home care services  No    Are you using any community resources  No    Current waiver services  No    Have you fallen in the last 12 months  No    Interperter language line needed  No    Counseling  Patient    Counseling topics  Diagnostic results; instructions for management; Risk factor reduction; Prognosis; Activities of daily living; patient and family education        Scheduled Medication Review:  Pt's scheduled medication use was reviewed by myself/staff via the [facility] website  Pt's use has been found to be appropriate w/o any concerns for misuse by the patient  Pt's current conditions require continued scheduled medication use at this time  Future review for continued appropriate medication use and misuse will continue     Tra Smith DO

## 2019-11-15 ENCOUNTER — PATIENT OUTREACH (OUTPATIENT)
Dept: INTERNAL MEDICINE CLINIC | Facility: CLINIC | Age: 74
End: 2019-11-15

## 2019-11-18 ENCOUNTER — TELEPHONE (OUTPATIENT)
Dept: INTERNAL MEDICINE CLINIC | Facility: CLINIC | Age: 74
End: 2019-11-18

## 2019-11-18 NOTE — TELEPHONE ENCOUNTER
Have pt stop it today 
Pt aware
Pt called, is getting colonoscopy this wed,11-20-19, pt currently on xarelto, when should he stop this?  Gastro told him to contact you
Good
Good

## 2019-11-19 ENCOUNTER — ANESTHESIA EVENT (OUTPATIENT)
Dept: GASTROENTEROLOGY | Facility: HOSPITAL | Age: 74
End: 2019-11-19

## 2019-11-19 NOTE — ANESTHESIA PREPROCEDURE EVALUATION
Review of Systems/Medical History  Patient summary reviewed  Chart reviewed      Cardiovascular  EKG reviewed, Hyperlipidemia, Hypertension , Dysrhythmias , atrial fibrillation,   Comment: EF 60%,  Pulmonary  Negative pulmonary ROS        GI/Hepatic    GERD ,        Kidney stones,        Endo/Other  Diabetes well controlled ,      GYN       Hematology  Anemia ,     Musculoskeletal    Arthritis     Neurology  Negative neurology ROS   CVA ,    Psychology   Anxiety,            Lab Results   Component Value Date    WBC 5 99 11/14/2019    HGB 11 1 (L) 11/14/2019    HCT 34 2 (L) 11/14/2019    MCV 95 11/14/2019     11/14/2019     Lab Results   Component Value Date    GLUCOSE 150 (H) 09/30/2014    CALCIUM 8 9 11/14/2019     09/30/2014    K 4 1 11/14/2019    CO2 23 11/14/2019     (H) 11/14/2019    BUN 12 11/14/2019    CREATININE 1 47 (H) 11/14/2019     Lab Results   Component Value Date    INR 1 14 05/07/2019    INR 1 96 (H) 12/14/2018    INR 1 05 11/14/2018    PROTIME 13 2 (H) 05/07/2019    PROTIME 22 4 (H) 12/14/2018    PROTIME 13 8 11/14/2018     Lab Results   Component Value Date    PTT 32 05/07/2019       Physical Exam    Airway    Mallampati score: II  TM Distance: >3 FB  Neck ROM: full     Dental   No notable dental hx     Cardiovascular  Rhythm: irregular, Rate: abnormal,     Pulmonary  Pulmonary exam normal     Other Findings        Anesthesia Plan  ASA Score- 3     Anesthesia Type- IV sedation with anesthesia with ASA Monitors  Additional Monitors:   Airway Plan:     Comment: Plan discussed is MAC with GA as backup  I personally discussed risks and benefits to this anesthetic  All patient questions were answered  Pt agrees with anesthesia plan        Plan Factors-    Induction- intravenous  Postoperative Plan- Plan for postoperative opioid use  Informed Consent- Anesthetic plan and risks discussed with patient  I personally reviewed this patient with the CRNA   Discussed and agreed on the Anesthesia Plan with the CRNA  Veena Snell

## 2019-11-20 ENCOUNTER — ANESTHESIA (OUTPATIENT)
Dept: GASTROENTEROLOGY | Facility: HOSPITAL | Age: 74
End: 2019-11-20

## 2019-11-20 ENCOUNTER — HOSPITAL ENCOUNTER (OUTPATIENT)
Dept: GASTROENTEROLOGY | Facility: HOSPITAL | Age: 74
Setting detail: OUTPATIENT SURGERY
Discharge: HOME | End: 2019-11-20
Attending: INTERNAL MEDICINE
Payer: MEDICARE

## 2019-11-20 VITALS
OXYGEN SATURATION: 100 % | BODY MASS INDEX: 30.38 KG/M2 | SYSTOLIC BLOOD PRESSURE: 117 MMHG | TEMPERATURE: 97.5 F | HEART RATE: 69 BPM | DIASTOLIC BLOOD PRESSURE: 55 MMHG | WEIGHT: 217 LBS | RESPIRATION RATE: 18 BRPM | HEIGHT: 71 IN

## 2019-11-20 DIAGNOSIS — D64.9 ANEMIA, UNSPECIFIED: ICD-10-CM

## 2019-11-20 DIAGNOSIS — K62.5 HEMORRHAGE OF ANUS AND RECTUM: ICD-10-CM

## 2019-11-20 LAB — GLUCOSE SERPL-MCNC: 158 MG/DL (ref 65–140)

## 2019-11-20 PROCEDURE — 82948 REAGENT STRIP/BLOOD GLUCOSE: CPT

## 2019-11-20 PROCEDURE — 88305 TISSUE EXAM BY PATHOLOGIST: CPT | Performed by: PATHOLOGY

## 2019-11-20 RX ORDER — SODIUM CHLORIDE, SODIUM LACTATE, POTASSIUM CHLORIDE, CALCIUM CHLORIDE 600; 310; 30; 20 MG/100ML; MG/100ML; MG/100ML; MG/100ML
125 INJECTION, SOLUTION INTRAVENOUS CONTINUOUS
Status: DISCONTINUED | OUTPATIENT
Start: 2019-11-20 | End: 2019-11-24 | Stop reason: HOSPADM

## 2019-11-20 RX ORDER — LIDOCAINE HYDROCHLORIDE 10 MG/ML
INJECTION, SOLUTION INFILTRATION; PERINEURAL AS NEEDED
Status: DISCONTINUED | OUTPATIENT
Start: 2019-11-20 | End: 2019-11-20 | Stop reason: SURG

## 2019-11-20 RX ORDER — PROPOFOL 10 MG/ML
INJECTION, EMULSION INTRAVENOUS AS NEEDED
Status: DISCONTINUED | OUTPATIENT
Start: 2019-11-20 | End: 2019-11-20 | Stop reason: SURG

## 2019-11-20 RX ADMIN — PROPOFOL 50 MG: 10 INJECTION, EMULSION INTRAVENOUS at 10:39

## 2019-11-20 RX ADMIN — SODIUM CHLORIDE, POTASSIUM CHLORIDE, SODIUM LACTATE AND CALCIUM CHLORIDE 125 ML/HR: 600; 310; 30; 20 INJECTION, SOLUTION INTRAVENOUS at 08:35

## 2019-11-20 RX ADMIN — PROPOFOL 10 MG: 10 INJECTION, EMULSION INTRAVENOUS at 11:04

## 2019-11-20 RX ADMIN — PROPOFOL 20 MG: 10 INJECTION, EMULSION INTRAVENOUS at 11:07

## 2019-11-20 RX ADMIN — PROPOFOL 20 MG: 10 INJECTION, EMULSION INTRAVENOUS at 10:40

## 2019-11-20 RX ADMIN — PROPOFOL 20 MG: 10 INJECTION, EMULSION INTRAVENOUS at 10:45

## 2019-11-20 RX ADMIN — PROPOFOL 20 MG: 10 INJECTION, EMULSION INTRAVENOUS at 10:43

## 2019-11-20 RX ADMIN — PROPOFOL 20 MG: 10 INJECTION, EMULSION INTRAVENOUS at 10:41

## 2019-11-20 RX ADMIN — PROPOFOL 20 MG: 10 INJECTION, EMULSION INTRAVENOUS at 11:19

## 2019-11-20 RX ADMIN — PROPOFOL 80 MG: 10 INJECTION, EMULSION INTRAVENOUS at 10:53

## 2019-11-20 RX ADMIN — LIDOCAINE HYDROCHLORIDE 100 MG: 10 INJECTION, SOLUTION INFILTRATION; PERINEURAL at 10:37

## 2019-11-20 RX ADMIN — PROPOFOL 50 MG: 10 INJECTION, EMULSION INTRAVENOUS at 10:37

## 2019-11-20 RX ADMIN — PROPOFOL 40 MG: 10 INJECTION, EMULSION INTRAVENOUS at 11:15

## 2019-11-20 RX ADMIN — PROPOFOL 10 MG: 10 INJECTION, EMULSION INTRAVENOUS at 10:59

## 2019-11-20 RX ADMIN — PROPOFOL 20 MG: 10 INJECTION, EMULSION INTRAVENOUS at 10:48

## 2019-11-20 RX ADMIN — PROPOFOL 20 MG: 10 INJECTION, EMULSION INTRAVENOUS at 10:57

## 2019-11-20 RX ADMIN — PROPOFOL 20 MG: 10 INJECTION, EMULSION INTRAVENOUS at 11:02

## 2019-11-20 RX ADMIN — PROPOFOL 40 MG: 10 INJECTION, EMULSION INTRAVENOUS at 11:09

## 2019-11-20 NOTE — ANESTHESIA POSTPROCEDURE EVALUATION
Post-Op Assessment Note    CV Status:  Stable    Pain management: satisfactory to patient     Mental Status:  Alert   Hydration Status:  Stable   PONV Controlled:  Controlled   Airway Patency:  Patent   Post Op Vitals Reviewed: Yes      Staff: CRNA           BP      Temp      Pulse     Resp      SpO2

## 2019-11-20 NOTE — DISCHARGE INSTRUCTIONS
Colonoscopy   WHAT YOU NEED TO KNOW:   A colonoscopy is a procedure to examine the inside of your colon (intestine) with a scope  Polyps or tissue growths may have been removed during your colonoscopy  It is normal to feel bloated and to have some abdominal discomfort  You should be passing gas  If you have hemorrhoids or you had polyps removed, you may have a small amount of bleeding  DISCHARGE INSTRUCTIONS:   Seek care immediately if:   · You have a large amount of bright red blood in your bowel movements  · Your abdomen is hard and firm and you have severe pain  · You have sudden trouble breathing  Contact your healthcare provider if:   · You develop a rash or hives  · You have a fever within 24 hours of your procedure  · You have not had a bowel movement for 3 days after your procedure  · You have questions or concerns about your condition or care  Activity:   · Do not lift, strain, or run  for 3 days after your procedure  · Rest after your procedure  You have been given medicine to relax you  Do not  drive or make important decisions until the day after your procedure  Return to your normal activity as directed  · Relieve gas and discomfort from bloating  by lying on your right side with a heating pad on your abdomen  You may need to take short walks to help the gas move out  Eat small meals until bloating is relieved  If you had polyps removed: For 7 days after your procedure:  · Do not  take aspirin  · Do not  go on long car rides  Help prevent constipation:   · Eat a variety of healthy foods  Healthy foods include fruit, vegetables, whole-grain breads, low-fat dairy products, beans, lean meat, and fish  Ask if you need to be on a special diet  Your healthcare provider may recommend that you eat high-fiber foods such as cooked beans  Fiber helps you have regular bowel movements  · Drink liquids as directed    Adults should drink between 9 and 13 eight-ounce cups of liquid every day  Ask what amount is best for you  For most people, good liquids to drink are water, juice, and milk  · Exercise as directed  Talk to your healthcare provider about the best exercise plan for you  Exercise can help prevent constipation, decrease your blood pressure and improve your health  Follow up with your healthcare provider as directed:  Write down your questions so you remember to ask them during your visits  © 2017 [employer] Information is for End User's use only and may not be sold, redistributed or otherwise used for commercial purposes  All illustrations and images included in CareNotes® are the copyrighted property of [employer]  or Pradip Spive[employer]  The above information is an  only  It is not intended as medical advice for individual conditions or treatments  Talk to your doctor, nurse or pharmacist before following any medical regimen to see if it is safe and effective for you

## 2019-11-20 NOTE — INTERVAL H&P NOTE
H&P reviewed  After examining the patient I find no changes in the patients condition since the H&P had been written      Vitals:    11/20/19 0831   BP: 120/59   Pulse: 88   Resp: 20   Temp: 97 9 °F (36 6 °C)   SpO2: 100%

## 2019-11-21 ENCOUNTER — PATIENT OUTREACH (OUTPATIENT)
Dept: INTERNAL MEDICINE CLINIC | Facility: CLINIC | Age: 74
End: 2019-11-21

## 2019-11-21 ENCOUNTER — HOSPITAL ENCOUNTER (OUTPATIENT)
Dept: RADIOLOGY | Facility: HOSPITAL | Age: 74
Discharge: HOME | End: 2019-11-21
Attending: UROLOGY
Payer: MEDICARE

## 2019-11-21 ENCOUNTER — TRANSCRIBE ORDERS (OUTPATIENT)
Dept: ADMINISTRATIVE | Facility: HOSPITAL | Age: 74
End: 2019-11-21

## 2019-11-21 DIAGNOSIS — N20.1 CALCULUS OF URETER: Primary | ICD-10-CM

## 2019-11-21 DIAGNOSIS — N20.1 CALCULUS OF URETER: ICD-10-CM

## 2019-11-21 PROCEDURE — 74018 RADEX ABDOMEN 1 VIEW: CPT

## 2019-11-25 ENCOUNTER — TELEPHONE (OUTPATIENT)
Dept: INTERNAL MEDICINE CLINIC | Facility: CLINIC | Age: 74
End: 2019-11-25

## 2019-11-25 DIAGNOSIS — D64.9 ANEMIA, UNSPECIFIED TYPE: Primary | ICD-10-CM

## 2019-11-26 DIAGNOSIS — I10 HYPERTENSION, UNSPECIFIED TYPE: ICD-10-CM

## 2019-11-26 RX ORDER — AMLODIPINE BESYLATE 10 MG/1
TABLET ORAL
Qty: 90 TABLET | Refills: 3 | Status: SHIPPED | OUTPATIENT
Start: 2019-11-26 | End: 2020-06-26 | Stop reason: SDUPTHER

## 2019-11-27 ENCOUNTER — TRANSCRIBE ORDERS (OUTPATIENT)
Dept: ADMINISTRATIVE | Facility: HOSPITAL | Age: 74
End: 2019-11-27

## 2019-11-27 DIAGNOSIS — N20.1 CALCULUS OF URETER: Primary | ICD-10-CM

## 2019-12-02 DIAGNOSIS — I10 HYPERTENSION, UNSPECIFIED TYPE: ICD-10-CM

## 2019-12-02 RX ORDER — METOPROLOL SUCCINATE 100 MG/1
100 TABLET, EXTENDED RELEASE ORAL DAILY
Qty: 90 TABLET | Refills: 1 | Status: SHIPPED | OUTPATIENT
Start: 2019-12-02 | End: 2020-06-30 | Stop reason: SDUPTHER

## 2019-12-03 ENCOUNTER — PATIENT OUTREACH (OUTPATIENT)
Dept: INTERNAL MEDICINE CLINIC | Facility: CLINIC | Age: 74
End: 2019-12-03

## 2019-12-03 NOTE — PROGRESS NOTES
Outpatient Care Management Note:  Call placed to Michael Roselyn to introduce myself and my role in his care  He is doing well and does not feel that he needs continued outreach at this time  He has a follow up appointment with Dr Yossi Chua tomorrow and is hoping his renal stent is removed at that time

## 2019-12-04 ENCOUNTER — HOSPITAL ENCOUNTER (OUTPATIENT)
Dept: CT IMAGING | Facility: HOSPITAL | Age: 74
Discharge: HOME | End: 2019-12-04
Attending: UROLOGY
Payer: MEDICARE

## 2019-12-04 ENCOUNTER — APPOINTMENT (OUTPATIENT)
Dept: LAB | Facility: CLINIC | Age: 74
End: 2019-12-04
Payer: MEDICARE

## 2019-12-04 DIAGNOSIS — D64.9 ANEMIA, UNSPECIFIED TYPE: ICD-10-CM

## 2019-12-04 DIAGNOSIS — N20.1 CALCULUS OF URETER: ICD-10-CM

## 2019-12-04 LAB
FERRITIN SERPL-MCNC: 125 NG/ML (ref 8–388)
IRON SERPL-MCNC: 55 UG/DL (ref 65–175)
TIBC SERPL-MCNC: 286 UG/DL (ref 250–450)

## 2019-12-04 PROCEDURE — 83550 IRON BINDING TEST: CPT

## 2019-12-04 PROCEDURE — 36415 COLL VENOUS BLD VENIPUNCTURE: CPT

## 2019-12-04 PROCEDURE — 83540 ASSAY OF IRON: CPT

## 2019-12-04 PROCEDURE — 82728 ASSAY OF FERRITIN: CPT

## 2019-12-04 PROCEDURE — 74176 CT ABD & PELVIS W/O CONTRAST: CPT

## 2019-12-05 DIAGNOSIS — D64.9 ANEMIA, UNSPECIFIED TYPE: Primary | ICD-10-CM

## 2019-12-09 DIAGNOSIS — I48.91 NEW ONSET A-FIB (HCC): ICD-10-CM

## 2019-12-16 DIAGNOSIS — E78.2 MIXED HYPERLIPIDEMIA: ICD-10-CM

## 2019-12-16 RX ORDER — ATORVASTATIN CALCIUM 20 MG/1
20 TABLET, FILM COATED ORAL DAILY
Qty: 90 TABLET | Refills: 2 | Status: SHIPPED | OUTPATIENT
Start: 2019-12-16 | End: 2020-10-07

## 2019-12-20 DIAGNOSIS — R41.3 MEMORY DIFFICULTIES: ICD-10-CM

## 2019-12-20 RX ORDER — DONEPEZIL HYDROCHLORIDE 5 MG/1
5 TABLET, FILM COATED ORAL
Qty: 90 TABLET | Refills: 2 | Status: SHIPPED | OUTPATIENT
Start: 2019-12-20 | End: 2020-01-10 | Stop reason: SDUPTHER

## 2020-01-10 ENCOUNTER — OFFICE VISIT (OUTPATIENT)
Dept: INTERNAL MEDICINE CLINIC | Facility: CLINIC | Age: 75
End: 2020-01-10
Payer: MEDICARE

## 2020-01-10 ENCOUNTER — TRANSCRIBE ORDERS (OUTPATIENT)
Dept: LAB | Facility: CLINIC | Age: 75
End: 2020-01-10

## 2020-01-10 ENCOUNTER — APPOINTMENT (OUTPATIENT)
Dept: LAB | Facility: CLINIC | Age: 75
End: 2020-01-10
Payer: MEDICARE

## 2020-01-10 VITALS
RESPIRATION RATE: 18 BRPM | SYSTOLIC BLOOD PRESSURE: 112 MMHG | WEIGHT: 225 LBS | HEART RATE: 72 BPM | DIASTOLIC BLOOD PRESSURE: 62 MMHG | HEIGHT: 71 IN | BODY MASS INDEX: 31.5 KG/M2 | TEMPERATURE: 96 F | OXYGEN SATURATION: 97 %

## 2020-01-10 DIAGNOSIS — E11.9 ENCOUNTER FOR DIABETIC FOOT EXAM (HCC): ICD-10-CM

## 2020-01-10 DIAGNOSIS — Z12.5 SCREENING FOR PROSTATE CANCER: ICD-10-CM

## 2020-01-10 DIAGNOSIS — E11.22 TYPE 2 DIABETES MELLITUS WITH STAGE 3 CHRONIC KIDNEY DISEASE, WITHOUT LONG-TERM CURRENT USE OF INSULIN (HCC): ICD-10-CM

## 2020-01-10 DIAGNOSIS — N18.30 TYPE 2 DIABETES MELLITUS WITH STAGE 3 CHRONIC KIDNEY DISEASE, WITHOUT LONG-TERM CURRENT USE OF INSULIN (HCC): ICD-10-CM

## 2020-01-10 DIAGNOSIS — R41.3 MEMORY DIFFICULTIES: ICD-10-CM

## 2020-01-10 DIAGNOSIS — N20.0 NEPHROLITHIASIS: ICD-10-CM

## 2020-01-10 DIAGNOSIS — N20.1 CALCULUS OF URETER: Primary | ICD-10-CM

## 2020-01-10 DIAGNOSIS — Z01.818 VISIT FOR PRE-OPERATIVE EXAMINATION: Primary | ICD-10-CM

## 2020-01-10 DIAGNOSIS — I10 ESSENTIAL HYPERTENSION: Chronic | ICD-10-CM

## 2020-01-10 DIAGNOSIS — R31.0 GROSS HEMATURIA: ICD-10-CM

## 2020-01-10 DIAGNOSIS — N20.1 CALCULUS OF URETER: ICD-10-CM

## 2020-01-10 DIAGNOSIS — Z79.01 CHRONIC ANTICOAGULATION: ICD-10-CM

## 2020-01-10 DIAGNOSIS — N39.0 URINARY TRACT INFECTION WITHOUT HEMATURIA, SITE UNSPECIFIED: ICD-10-CM

## 2020-01-10 DIAGNOSIS — N13.2 HYDRONEPHROSIS WITH OBSTRUCTING CALCULUS: Chronic | ICD-10-CM

## 2020-01-10 DIAGNOSIS — Z01.818 VISIT FOR PRE-OPERATIVE EXAMINATION: ICD-10-CM

## 2020-01-10 DIAGNOSIS — I48.20 CHRONIC ATRIAL FIBRILLATION (HCC): Chronic | ICD-10-CM

## 2020-01-10 LAB
ALBUMIN SERPL BCP-MCNC: 3.5 G/DL (ref 3.5–5)
ALP SERPL-CCNC: 66 U/L (ref 46–116)
ALT SERPL W P-5'-P-CCNC: 18 U/L (ref 12–78)
ANION GAP SERPL CALCULATED.3IONS-SCNC: 7 MMOL/L (ref 4–13)
APTT PPP: 47 SECONDS (ref 23–37)
AST SERPL W P-5'-P-CCNC: 7 U/L (ref 5–45)
BACTERIA UR QL AUTO: ABNORMAL /HPF
BASOPHILS # BLD AUTO: 0.04 THOUSANDS/ΜL (ref 0–0.1)
BASOPHILS NFR BLD AUTO: 1 % (ref 0–1)
BILIRUB SERPL-MCNC: 0.68 MG/DL (ref 0.2–1)
BILIRUB UR QL STRIP: ABNORMAL
BUN SERPL-MCNC: 22 MG/DL (ref 5–25)
CALCIUM SERPL-MCNC: 9 MG/DL (ref 8.3–10.1)
CHLORIDE SERPL-SCNC: 113 MMOL/L (ref 100–108)
CLARITY UR: ABNORMAL
CO2 SERPL-SCNC: 22 MMOL/L (ref 21–32)
COLOR UR: ABNORMAL
CREAT SERPL-MCNC: 1.55 MG/DL (ref 0.6–1.3)
EOSINOPHIL # BLD AUTO: 0.14 THOUSAND/ΜL (ref 0–0.61)
EOSINOPHIL NFR BLD AUTO: 3 % (ref 0–6)
ERYTHROCYTE [DISTWIDTH] IN BLOOD BY AUTOMATED COUNT: 13.2 % (ref 11.6–15.1)
EST. AVERAGE GLUCOSE BLD GHB EST-MCNC: 151 MG/DL
GFR SERPL CREATININE-BSD FRML MDRD: 43 ML/MIN/1.73SQ M
GLUCOSE P FAST SERPL-MCNC: 162 MG/DL (ref 65–99)
GLUCOSE UR STRIP-MCNC: ABNORMAL MG/DL
HBA1C MFR BLD: 6.9 % (ref 4.2–6.3)
HCT VFR BLD AUTO: 32.4 % (ref 36.5–49.3)
HGB BLD-MCNC: 10.1 G/DL (ref 12–17)
HGB UR QL STRIP.AUTO: ABNORMAL
IMM GRANULOCYTES # BLD AUTO: 0.01 THOUSAND/UL (ref 0–0.2)
IMM GRANULOCYTES NFR BLD AUTO: 0 % (ref 0–2)
INR PPP: 2.74 (ref 0.84–1.19)
IRON SERPL-MCNC: 53 UG/DL (ref 65–175)
KETONES UR STRIP-MCNC: ABNORMAL MG/DL
LEUKOCYTE ESTERASE UR QL STRIP: ABNORMAL
LYMPHOCYTES # BLD AUTO: 1.21 THOUSANDS/ΜL (ref 0.6–4.47)
LYMPHOCYTES NFR BLD AUTO: 22 % (ref 14–44)
MCH RBC QN AUTO: 30.1 PG (ref 26.8–34.3)
MCHC RBC AUTO-ENTMCNC: 31.2 G/DL (ref 31.4–37.4)
MCV RBC AUTO: 97 FL (ref 82–98)
MONOCYTES # BLD AUTO: 0.39 THOUSAND/ΜL (ref 0.17–1.22)
MONOCYTES NFR BLD AUTO: 7 % (ref 4–12)
NEUTROPHILS # BLD AUTO: 3.82 THOUSANDS/ΜL (ref 1.85–7.62)
NEUTS SEG NFR BLD AUTO: 67 % (ref 43–75)
NITRITE UR QL STRIP: POSITIVE
NON-SQ EPI CELLS URNS QL MICRO: ABNORMAL /HPF
NRBC BLD AUTO-RTO: 0 /100 WBCS
PH UR STRIP.AUTO: 5.5 [PH]
PLATELET # BLD AUTO: 207 THOUSANDS/UL (ref 149–390)
PMV BLD AUTO: 10.8 FL (ref 8.9–12.7)
POTASSIUM SERPL-SCNC: 4.6 MMOL/L (ref 3.5–5.3)
PROT SERPL-MCNC: 7 G/DL (ref 6.4–8.2)
PROT UR STRIP-MCNC: ABNORMAL MG/DL
PROTHROMBIN TIME: 28.5 SECONDS (ref 11.6–14.5)
PSA SERPL-MCNC: 2.2 NG/ML (ref 0–4)
RBC # BLD AUTO: 3.35 MILLION/UL (ref 3.88–5.62)
RBC #/AREA URNS AUTO: ABNORMAL /HPF
SODIUM SERPL-SCNC: 142 MMOL/L (ref 136–145)
SP GR UR STRIP.AUTO: 1.02 (ref 1–1.03)
UROBILINOGEN UR QL STRIP.AUTO: 1 E.U./DL
WBC # BLD AUTO: 5.61 THOUSAND/UL (ref 4.31–10.16)
WBC #/AREA URNS AUTO: ABNORMAL /HPF

## 2020-01-10 PROCEDURE — 81001 URINALYSIS AUTO W/SCOPE: CPT | Performed by: INTERNAL MEDICINE

## 2020-01-10 PROCEDURE — 87086 URINE CULTURE/COLONY COUNT: CPT | Performed by: INTERNAL MEDICINE

## 2020-01-10 PROCEDURE — 85610 PROTHROMBIN TIME: CPT

## 2020-01-10 PROCEDURE — 83036 HEMOGLOBIN GLYCOSYLATED A1C: CPT

## 2020-01-10 PROCEDURE — 85025 COMPLETE CBC W/AUTO DIFF WBC: CPT

## 2020-01-10 PROCEDURE — 87086 URINE CULTURE/COLONY COUNT: CPT

## 2020-01-10 PROCEDURE — 85730 THROMBOPLASTIN TIME PARTIAL: CPT

## 2020-01-10 PROCEDURE — 93000 ELECTROCARDIOGRAM COMPLETE: CPT | Performed by: INTERNAL MEDICINE

## 2020-01-10 PROCEDURE — 83540 ASSAY OF IRON: CPT

## 2020-01-10 PROCEDURE — G0103 PSA SCREENING: HCPCS

## 2020-01-10 PROCEDURE — 99214 OFFICE O/P EST MOD 30 MIN: CPT | Performed by: INTERNAL MEDICINE

## 2020-01-10 PROCEDURE — 80053 COMPREHEN METABOLIC PANEL: CPT

## 2020-01-10 PROCEDURE — 36415 COLL VENOUS BLD VENIPUNCTURE: CPT

## 2020-01-10 RX ORDER — DONEPEZIL HYDROCHLORIDE 5 MG/1
5 TABLET, FILM COATED ORAL
Qty: 90 TABLET | Refills: 2 | Status: SHIPPED | OUTPATIENT
Start: 2020-01-10 | End: 2020-01-13 | Stop reason: SDUPTHER

## 2020-01-10 NOTE — PROGRESS NOTES
Assessment/Plan:  Problem List Items Addressed This Visit        Endocrine    Type 2 diabetes mellitus with diabetic chronic kidney disease ([place] [address] )    Relevant Orders    Comprehensive metabolic panel    Hemoglobin A1C       Cardiovascular and Mediastinum    Hypertension (Chronic)    Chronic atrial fibrillation (Chronic)       Genitourinary    Hydronephrosis with obstructing calculus (Chronic)    Nephrolithiasis       Other    Chronic anticoagulation    Memory difficulties    Relevant Medications    donepezil (ARICEPT) 5 mg tablet      Other Visit Diagnoses     Visit for pre-operative examination    -  Primary    Relevant Orders    CBC and differential    Comprehensive metabolic panel    Protime-INR    APTT    UA w Reflex to Microscopic w Reflex to Culture    POCT ECG (Completed)    Encounter for diabetic foot exam ([place] [address] )        Screening for prostate cancer        Relevant Orders    PSA, Total Screen           Diagnoses and all orders for this visit:    Visit for pre-operative examination  -     CBC and differential; Future  -     Comprehensive metabolic panel; Future  -     Protime-INR; Future  -     APTT; Future  -     UA w Reflex to Microscopic w Reflex to Culture  -     POCT ECG    Nephrolithiasis    Hydronephrosis with obstructing calculus    Type 2 diabetes mellitus with stage 3 chronic kidney disease, without long-term current use of insulin (HCC)  -     Comprehensive metabolic panel; Future  -     Hemoglobin A1C; Future    Chronic atrial fibrillation    Essential hypertension    Chronic anticoagulation    Encounter for diabetic foot exam ([place] [address] )    Memory difficulties  -     donepezil (ARICEPT) 5 mg tablet; Take 1 tablet (5 mg total) by mouth daily at bedtime    Screening for prostate cancer  -     PSA, Total Screen; Future        No problem-specific Assessment & Plan notes found for this encounter  A/P: PAT tests ordered today  EKG with AFib and GVR and no acute changes  Pt and co-morbidities are stable   Pt is a Shabazz's Class II, mainly due to his age and the afib,  and carries a cardiac risk of 1-7%  Make anesthesia aware of the C spine restrictions  Recommend holding any ASA, NSAID's, omega 3, and MVT one week prior to the procedure  Would hold the NOAC  48 hours prior to procedure  On the day before sx, would hold the PM dose of metformin  No meds on the day of sx except may take an xanax with a sip of water prn  Recommend checking a post op sugar and covering with rapid insulin if needed  Restart the NOAC ASAP  May resume his other meds once taking po  Pt cleared pending the labs  Thanks and good luck  Subjective:      Patient ID: Chandler Will is a 76 y o  male  WM presents at the request of Dr Shabnam Milan  for pre-op eval for upcoming nephrolithiasis extraction  tentatively scheduled for 1/20  Since last visit, doing well and no recent illnesses, but continues with intermittent hematuria with the stent in place  Doesn't check sugars, but no report of any low sugars    Remains active w/o difficulty and no falls  No travel history  Denies depression  No recent illnesses  No fever, chills, or sweats  No unexplained wt changes  Denies CP, SOB, or palpitations  No edema  No orthopnea or PND  No sz or syncope  No changes in bowel or bladder habits  PMH includes DM with CKD, GERD, AFib with chronic anticoagulation, HTN, DJD, anemia, ALMA, hyperlipidemia, and TIA   Past sx include cataract, THR, knee sx and cysto and reports no problems with prior procedures or anesthesia  Non smoking and rare ETOH use  No history of DVT or PE  NO history of bleeding issues and is currently on anti-coagulant in the form of NOAC  Denies dental plates  Denies C spine issues  No objections to getting blood products if deemed necessary  No PAT testing done, but requested         The following portions of the patient's history were reviewed and updated as appropriate:   He has a past medical history of Acute on chronic renal insufficiency, Allergic rhinitis, Anemia (6/11/2012), Atrial fibrillation ([facility] [address] ), Cerebrovascular accident (CVA) ([facility] [address] ), Controlled type 2 diabetes mellitus without complication ([facility] ), Generalized anxiety disorder, GERD without esophagitis (6/11/2012), Hypertension, Memory difficulties (1/8/2019), Nephrolithiasis (3/17/2016), Obesity (6/11/2012), and Osteoarthritis (6/11/2012)  ,  does not have any pertinent problems on file  ,   has a past surgical history that includes Total hip arthroplasty; Knee surgery; Cataract extraction, bilateral; Total hip arthroplasty (Bilateral, 2011); and pr cystourethroscopy,ureter catheter (Left, 11/8/2019)  ,  family history includes Arthritis in his mother; No Known Problems in his father; Parkinsonism in his mother  ,   reports that he has never smoked  He has never used smokeless tobacco  He reports that he does not drink alcohol or use drugs  ,  is allergic to pollen extract     Current Outpatient Medications   Medication Sig Dispense Refill    ALPRAZolam (XANAX) 0 5 mg tablet Take 1 tablet (0 5 mg total) by mouth every 8 (eight) hours as needed (AS NEEDED) 90 tablet 0    amLODIPine (NORVASC) 10 mg tablet take 1 tablet by mouth daily 90 tablet 3    atorvastatin (LIPITOR) 20 mg tablet Take 1 tablet (20 mg total) by mouth daily 90 tablet 2    Blood Glucose Monitoring Suppl (ONE TOUCH ULTRA 2) w/Device KIT by Does not apply route      donepezil (ARICEPT) 5 mg tablet Take 1 tablet (5 mg total) by mouth daily at bedtime 90 tablet 2    dorzolamide-timolol (COSOPT) 22 3-6 8 MG/ML ophthalmic solution instill 1 drop into both eyes twice a day      glucose blood test strip 1 Squirt by In Vitro route daily      metFORMIN (GLUCOPHAGE) 850 mg tablet Take 2 tablets (1,700 mg total) by mouth daily 180 tablet 1    metoprolol succinate (TOPROL-XL) 100 mg 24 hr tablet Take 1 tablet (100 mg total) by mouth daily 90 tablet 1    ONETOUCH DELICA LANCETS 88N MISC by Does not apply route daily      rivaroxaban (XARELTO) 15 mg tablet Take 1 tablet (15 mg total) by mouth daily with breakfast 90 tablet 1    tamsulosin (FLOMAX) 0 4 mg Take 1 capsule (0 4 mg total) by mouth daily with dinner 30 capsule 0     No current facility-administered medications for this visit  Review of Systems   Constitutional: Negative for activity change, chills, diaphoresis, fatigue and fever  HENT: Negative  Eyes: Negative for visual disturbance  Respiratory: Negative for cough, chest tightness, shortness of breath and wheezing  Cardiovascular: Negative for chest pain, palpitations and leg swelling  Gastrointestinal: Negative for abdominal pain, constipation, diarrhea, nausea and vomiting  Genitourinary: Negative for difficulty urinating, dysuria and frequency  Musculoskeletal: Negative for arthralgias, gait problem and myalgias  Allergic/Immunologic: Negative for immunocompromised state  Neurological: Negative for dizziness, seizures, syncope, weakness, light-headedness and headaches  Hematological: Does not bruise/bleed easily  Psychiatric/Behavioral: Negative for confusion, dysphoric mood and sleep disturbance  The patient is not nervous/anxious  PHQ-9 Depression Screening    PHQ-9:    Frequency of the following problems over the past two weeks:             Objective:  Vitals:    01/10/20 0933   BP: 112/62   BP Location: Left arm   Patient Position: Sitting   Cuff Size: Standard   Pulse: 72   Resp: 18   Temp: (!) 96 °F (35 6 °C)   TempSrc: Tympanic   SpO2: 97%   Weight: 102 kg (225 lb)   Height: 5' 11" (1 803 m)     Body mass index is 31 38 kg/m²  Physical Exam   Constitutional: He is oriented to person, place, and time  He appears well-developed and well-nourished  No distress  HENT:   Head: Normocephalic and atraumatic  Mouth/Throat: Oropharynx is clear and moist    No oropharyngeal obstruction  Eyes: Pupils are equal, round, and reactive to light   EOM are normal    Neck: Normal range of motion  Neck supple  No JVD present  No tracheal deviation present  No thyromegaly present  C spine with restriction in extension by 75%   Cardiovascular: Normal rate and normal heart sounds  Pulses are no weak pulses  Pulses:       Dorsalis pedis pulses are 1+ on the right side, and 1+ on the left side  Posterior tibial pulses are 1+ on the right side, and 1+ on the left side  Irregular irregular with GVR   Pulmonary/Chest: Effort normal and breath sounds normal  No respiratory distress  He has no wheezes  He has no rales  Abdominal: Soft  Bowel sounds are normal  He exhibits no distension  There is no tenderness  Musculoskeletal: Normal range of motion  He exhibits no edema, tenderness or deformity  Feet:   Right Foot:   Skin Integrity: Negative for ulcer, skin breakdown, erythema, warmth, callus or dry skin  Left Foot: amputated  Skin Integrity: Negative for ulcer, skin breakdown, erythema, warmth, callus or dry skin  Lymphadenopathy:     He has no cervical adenopathy  Neurological: He is alert and oriented to person, place, and time  He displays normal reflexes  No cranial nerve deficit  He exhibits normal muscle tone  Coordination normal    Psychiatric: He has a normal mood and affect  His behavior is normal  Judgment and thought content normal    Nursing note and vitals reviewed  Patient's shoes and socks removed  Right Foot/Ankle   Right Foot Inspection  Skin Exam: skin normal and skin intact no dry skin, no warmth, no callus, no erythema, no maceration, no abnormal color, no pre-ulcer, no ulcer and no callus                          Toe Exam: ROM and strength within normal limitsno swelling, no tenderness, erythema and  no right toe deformity  Sensory       Monofilament testing: intact  Vascular  Capillary refills: < 3 seconds  The right DP pulse is 1+  The right PT pulse is 1+       Left Foot/Ankle  Left Foot Inspection  Skin Exam: skin normal and skin intactno dry skin, no warmth, no erythema, no maceration, normal color, no pre-ulcer, no ulcer and no callus                       Amputation: amputation left foot (Comments: Left little toe)  Toe Exam: ROM and strength within normal limitsno swelling, no tenderness, no erythema and no left toe deformity                   Sensory       Monofilament: intact  Vascular  Capillary refills: < 3 seconds  The left DP pulse is 1+  The left PT pulse is 1+  Assign Risk Category:  No deformity present; No loss of protective sensation;  No weak pulses       Risk: 0

## 2020-01-11 LAB
BACTERIA UR CULT: NORMAL
BACTERIA UR CULT: NORMAL

## 2020-01-13 DIAGNOSIS — R41.3 MEMORY DIFFICULTIES: ICD-10-CM

## 2020-01-13 RX ORDER — DONEPEZIL HYDROCHLORIDE 5 MG/1
5 TABLET, FILM COATED ORAL
Qty: 90 TABLET | Refills: 2 | Status: SHIPPED | OUTPATIENT
Start: 2020-01-13 | End: 2020-07-08 | Stop reason: SDUPTHER

## 2020-01-14 ENCOUNTER — TELEPHONE (OUTPATIENT)
Dept: INTERNAL MEDICINE CLINIC | Facility: CLINIC | Age: 75
End: 2020-01-14

## 2020-01-14 DIAGNOSIS — R79.9 ABNORMAL BLOOD FINDINGS: Primary | ICD-10-CM

## 2020-01-14 NOTE — PRE-PROCEDURE INSTRUCTIONS
Pre-Surgery Instructions:   Medication Instructions    ALPRAZolam (XANAX) 0 5 mg tablet Instructed patient per Anesthesia Guidelines  LD 1/19    amLODIPine (NORVASC) 10 mg tablet Instructed patient per Anesthesia Guidelines  pt takes this at night-LD 1/19    atorvastatin (LIPITOR) 20 mg tablet Instructed patient per Anesthesia Guidelines  LD 1/19    Blood Glucose Monitoring Suppl (ONE TOUCH ULTRA 2) w/Device KIT Instructed patient per Anesthesia Guidelines   donepezil (ARICEPT) 5 mg tablet Instructed patient per Anesthesia Guidelines  LD 1/19    dorzolamide-timolol (COSOPT) 22 3-6 8 MG/ML ophthalmic solution Instructed patient per Anesthesia Guidelines  LD 1/19    glucose blood test strip Instructed patient per Anesthesia Guidelines   metFORMIN (GLUCOPHAGE) 850 mg tablet Instructed patient per Anesthesia Guidelines  LD 1/19    metoprolol succinate (TOPROL-XL) 100 mg 24 hr tablet Instructed patient per Anesthesia Guidelines  take DOS    ONETOUCH DELICA LANCETS 21Z MISC Instructed patient per Anesthesia Guidelines   rivaroxaban (XARELTO) 15 mg tablet Patient was instructed by Physician and understands  Dr Ruiz Moment him to stop this med 2 days before his procedure    tamsulosin (FLOMAX) 0 4 mg Instructed patient per Anesthesia Guidelines  LD 1/19     My Surgical Experience    The following information was developed to assist you to prepare for your operation  What do I need to do before coming to the hospital?   Arrange for a responsible person to drive you to and from the hospital    Arrange care for your children at home  Children are not allowed in the recovery areas of the hospital   Plan to wear clothing that is easy to put on and take off  If you are having shoulder surgery, wear a shirt that buttons or zippers in the front  Bathing  o Shower the evening before and the morning of your surgery with an antibacterial soap   Please refer to the Pre Op Showering Instructions for Surgery Patients Sheet   o Remove nail polish and all body piercing jewelry  o Do not shave any body part for at least 24 hours before surgery-this includes face, arms, legs and upper body  Food  o Nothing to eat or drink after midnight the night before your surgery  This includes candy and chewing gum  o Exception: If your surgery is after 12:00pm (noon), you may have clear liquids such as 7-Up®, ginger ale, apple or cranberry juice, Jell-O®, water, or clear broth until 8:00 am  o Do not drink milk or juice with pulp on the morning before surgery  o Do not drink alcohol 24 hours before surgery  Medicine  o Follow instructions you received from your surgeon about which medicines you may take on the day of surgery  o If instructed to take medicine on the morning of surgery, take pills with just a small sip of water  Call your prescribing doctor for specific infroamtion on what to do if you take insulin    What should I bring to the hospital?    Bring:  Daria Vallecillo or a walker, if you have them, for foot or knee surgery   A list of the daily medicines, vitamins, minerals, herbals and nutritional supplements you take  Include the dosages of medicines and the time you take them each day   Glasses, dentures or hearing aids   Minimal clothing; you will be wearing hospital sleepwear   Photo ID; required to verify your identity   If you have a Living Will or Power of , bring a copy of the documents   If you have an ostomy, bring an extra pouch and any supplies you use    Do not bring   Medicines or inhalers   Money, valuables or jewelry    What other information should I know about the day of surgery?  Notify your surgeons if you develop a cold, sore throat, cough, fever, rash or any other illness     Report to the Ambulatory Surgical/Same Day Surgery Unit   You will be instructed to stop at Registration only if you have not been pre-registered   Inform your  fi they do not stay that they will be asked by the staff to leave a phone number where they can be reached   Be available to be reached before surgery  In the event the operating room schedule changes, you may be asked to come in earlier or later than expected    *It is important to tell your doctor and others involved in your health care if you are taking or have been taking any non-prescription drugs, vitamins, minerals, herbals or other nutritional supplements   Any of these may interact with some food or medicines and cause a reaction

## 2020-01-14 NOTE — TELEPHONE ENCOUNTER
Pt is due for his routine and should consider keeping it  Ultimately is up to him if he wants to reschedule it

## 2020-01-20 ENCOUNTER — ANESTHESIA (OUTPATIENT)
Dept: PERIOP | Facility: HOSPITAL | Age: 75
End: 2020-01-20
Payer: MEDICARE

## 2020-01-20 ENCOUNTER — HOSPITAL ENCOUNTER (OUTPATIENT)
Facility: HOSPITAL | Age: 75
Setting detail: OUTPATIENT SURGERY
Discharge: HOME | End: 2020-01-20
Attending: UROLOGY | Admitting: UROLOGY
Payer: MEDICARE

## 2020-01-20 ENCOUNTER — APPOINTMENT (OUTPATIENT)
Dept: RADIOLOGY | Facility: HOSPITAL | Age: 75
End: 2020-01-20
Payer: MEDICARE

## 2020-01-20 ENCOUNTER — ANESTHESIA EVENT (OUTPATIENT)
Dept: PERIOP | Facility: HOSPITAL | Age: 75
End: 2020-01-20
Payer: MEDICARE

## 2020-01-20 ENCOUNTER — APPOINTMENT (OUTPATIENT)
Dept: RADIOLOGY | Facility: HOSPITAL | Age: 75
End: 2020-01-20
Attending: UROLOGY
Payer: MEDICARE

## 2020-01-20 VITALS
TEMPERATURE: 97.5 F | HEART RATE: 67 BPM | OXYGEN SATURATION: 98 % | SYSTOLIC BLOOD PRESSURE: 125 MMHG | RESPIRATION RATE: 16 BRPM | DIASTOLIC BLOOD PRESSURE: 68 MMHG

## 2020-01-20 DIAGNOSIS — N20.1 CALCULUS OF URETER: ICD-10-CM

## 2020-01-20 LAB
APTT PPP: 35 SECONDS (ref 23–37)
BACTERIA UR QL AUTO: ABNORMAL /HPF
BILIRUB UR QL STRIP: NEGATIVE
CLARITY UR: CLEAR
COLOR UR: YELLOW
GLUCOSE UR STRIP-MCNC: NEGATIVE MG/DL
HCT VFR BLD AUTO: 30.7 % (ref 42–47)
HGB BLD-MCNC: 10.1 G/DL (ref 14–18)
HGB UR QL STRIP.AUTO: ABNORMAL
INR PPP: 1.01 (ref 0.9–1.5)
KETONES UR STRIP-MCNC: NEGATIVE MG/DL
LEUKOCYTE ESTERASE UR QL STRIP: ABNORMAL
NITRITE UR QL STRIP: NEGATIVE
NON-SQ EPI CELLS URNS QL MICRO: ABNORMAL /HPF
PH UR STRIP.AUTO: 6 [PH]
PROT UR STRIP-MCNC: ABNORMAL MG/DL
PROTHROMBIN TIME: 11.7 SECONDS (ref 10.2–13)
RBC #/AREA URNS AUTO: ABNORMAL /HPF
SP GR UR STRIP.AUTO: 1.01 (ref 1–1.03)
UROBILINOGEN UR QL STRIP.AUTO: 0.2 E.U./DL
WBC #/AREA URNS AUTO: ABNORMAL /HPF

## 2020-01-20 PROCEDURE — 85610 PROTHROMBIN TIME: CPT | Performed by: UROLOGY

## 2020-01-20 PROCEDURE — 85014 HEMATOCRIT: CPT | Performed by: UROLOGY

## 2020-01-20 PROCEDURE — 74420 UROGRAPHY RTRGR +-KUB: CPT

## 2020-01-20 PROCEDURE — 82948 REAGENT STRIP/BLOOD GLUCOSE: CPT

## 2020-01-20 PROCEDURE — 81001 URINALYSIS AUTO W/SCOPE: CPT | Performed by: UROLOGY

## 2020-01-20 PROCEDURE — 87086 URINE CULTURE/COLONY COUNT: CPT | Performed by: UROLOGY

## 2020-01-20 PROCEDURE — 85018 HEMOGLOBIN: CPT | Performed by: UROLOGY

## 2020-01-20 PROCEDURE — C1769 GUIDE WIRE: HCPCS | Performed by: UROLOGY

## 2020-01-20 PROCEDURE — 74018 RADEX ABDOMEN 1 VIEW: CPT

## 2020-01-20 PROCEDURE — 85730 THROMBOPLASTIN TIME PARTIAL: CPT | Performed by: UROLOGY

## 2020-01-20 PROCEDURE — 81003 URINALYSIS AUTO W/O SCOPE: CPT | Performed by: UROLOGY

## 2020-01-20 PROCEDURE — C2617 STENT, NON-COR, TEM W/O DEL: HCPCS | Performed by: UROLOGY

## 2020-01-20 DEVICE — INLAY OPTIMA URETERAL STENT W/O GUIDEWIRE
Type: IMPLANTABLE DEVICE | Site: URETER | Status: FUNCTIONAL
Brand: BARD® INLAY OPTIMA® URETERAL STENT

## 2020-01-20 RX ORDER — DEXAMETHASONE SODIUM PHOSPHATE 10 MG/ML
INJECTION, SOLUTION INTRAMUSCULAR; INTRAVENOUS AS NEEDED
Status: DISCONTINUED | OUTPATIENT
Start: 2020-01-20 | End: 2020-01-20 | Stop reason: SURG

## 2020-01-20 RX ORDER — EPHEDRINE SULFATE 50 MG/ML
INJECTION INTRAVENOUS AS NEEDED
Status: DISCONTINUED | OUTPATIENT
Start: 2020-01-20 | End: 2020-01-20 | Stop reason: SURG

## 2020-01-20 RX ORDER — FENTANYL CITRATE 50 UG/ML
INJECTION, SOLUTION INTRAMUSCULAR; INTRAVENOUS AS NEEDED
Status: DISCONTINUED | OUTPATIENT
Start: 2020-01-20 | End: 2020-01-20 | Stop reason: SURG

## 2020-01-20 RX ORDER — LEVOFLOXACIN 5 MG/ML
500 INJECTION, SOLUTION INTRAVENOUS ONCE
Status: COMPLETED | OUTPATIENT
Start: 2020-01-20 | End: 2020-01-20

## 2020-01-20 RX ORDER — LEVOFLOXACIN 5 MG/ML
500 INJECTION, SOLUTION INTRAVENOUS ONCE
Status: CANCELLED | OUTPATIENT
Start: 2020-01-20

## 2020-01-20 RX ORDER — LIDOCAINE HYDROCHLORIDE 10 MG/ML
INJECTION, SOLUTION EPIDURAL; INFILTRATION; INTRACAUDAL; PERINEURAL AS NEEDED
Status: DISCONTINUED | OUTPATIENT
Start: 2020-01-20 | End: 2020-01-20 | Stop reason: SURG

## 2020-01-20 RX ORDER — FENTANYL CITRATE/PF 50 MCG/ML
12.5 SYRINGE (ML) INJECTION
Status: DISCONTINUED | OUTPATIENT
Start: 2020-01-20 | End: 2020-01-20 | Stop reason: HOSPADM

## 2020-01-20 RX ORDER — MIDAZOLAM HYDROCHLORIDE 2 MG/2ML
INJECTION, SOLUTION INTRAMUSCULAR; INTRAVENOUS AS NEEDED
Status: DISCONTINUED | OUTPATIENT
Start: 2020-01-20 | End: 2020-01-20 | Stop reason: SURG

## 2020-01-20 RX ORDER — SODIUM CHLORIDE 9 MG/ML
125 INJECTION, SOLUTION INTRAVENOUS CONTINUOUS
Status: DISCONTINUED | OUTPATIENT
Start: 2020-01-20 | End: 2020-01-20 | Stop reason: HOSPADM

## 2020-01-20 RX ORDER — PROPOFOL 10 MG/ML
INJECTION, EMULSION INTRAVENOUS AS NEEDED
Status: DISCONTINUED | OUTPATIENT
Start: 2020-01-20 | End: 2020-01-20 | Stop reason: SURG

## 2020-01-20 RX ADMIN — FENTANYL CITRATE 25 MCG: 50 INJECTION INTRAMUSCULAR; INTRAVENOUS at 11:16

## 2020-01-20 RX ADMIN — LIDOCAINE HYDROCHLORIDE 80 MG: 10 INJECTION, SOLUTION EPIDURAL; INFILTRATION; INTRACAUDAL; PERINEURAL at 10:54

## 2020-01-20 RX ADMIN — MIDAZOLAM HYDROCHLORIDE 1 MG: 1 INJECTION, SOLUTION INTRAMUSCULAR; INTRAVENOUS at 10:50

## 2020-01-20 RX ADMIN — DEXAMETHASONE SODIUM PHOSPHATE 5 MG: 10 INJECTION, SOLUTION INTRAMUSCULAR; INTRAVENOUS at 10:59

## 2020-01-20 RX ADMIN — PROPOFOL 170 MG: 10 INJECTION, EMULSION INTRAVENOUS at 10:54

## 2020-01-20 RX ADMIN — FENTANYL CITRATE 50 MCG: 50 INJECTION INTRAMUSCULAR; INTRAVENOUS at 10:50

## 2020-01-20 RX ADMIN — MIDAZOLAM HYDROCHLORIDE 1 MG: 1 INJECTION, SOLUTION INTRAMUSCULAR; INTRAVENOUS at 10:54

## 2020-01-20 RX ADMIN — EPHEDRINE SULFATE 5 MG: 50 INJECTION, SOLUTION INTRAVENOUS at 11:06

## 2020-01-20 RX ADMIN — SODIUM CHLORIDE 125 ML/HR: 9 INJECTION, SOLUTION INTRAVENOUS at 09:52

## 2020-01-20 RX ADMIN — LEVOFLOXACIN 500 MG: 5 INJECTION, SOLUTION INTRAVENOUS at 09:39

## 2020-01-20 RX ADMIN — PHENYLEPHRINE HYDROCHLORIDE 100 MCG: 10 INJECTION INTRAVENOUS at 11:06

## 2020-01-20 RX ADMIN — LEVOFLOXACIN 500 MG: 5 INJECTION, SOLUTION INTRAVENOUS at 10:59

## 2020-01-20 RX ADMIN — FENTANYL CITRATE 25 MCG: 50 INJECTION INTRAMUSCULAR; INTRAVENOUS at 11:59

## 2020-01-20 NOTE — INTERVAL H&P NOTE
H&P reviewed  After examining the patient I find no changes in the patients condition since the H&P had been written      Vitals:    01/20/20 0929   BP: 117/56   Pulse: 69   Resp: 18   Temp: 97 8 °F (36 6 °C)   SpO2: 100%

## 2020-01-20 NOTE — DISCHARGE INSTRUCTIONS
Lithotripsy   WHAT YOU NEED TO KNOW:   Lithotripsy is a procedure that uses sound waves to break up stones in the kidney, ureter, or bladder  The stone pieces then pass out of your body through your urine  You may have blood in your urine for 1 to 2 days after the procedure  You may also have bruising and discomfort in your back or abdomen  You may have pain whenever you pass pieces of your kidney stone  This may happen over a few weeks  DISCHARGE INSTRUCTIONS:   Call 911 for any of the following:   · You have chest pain  · You have trouble thinking clearly  · You have severe lower back pain  Seek care immediately if:   · You urinate bright red blood  · You have severe vomiting  · You cannot urinate  Contact your healthcare provider if:   · You have a fever and chills  · You feel burning when you urinate  · You feel the urge to urinate often and immediately  · You have questions or concerns about your condition or care  Medicines:   · Medicines  can help decrease pain or prevent an infection  Medicines may also help you pass the stones or prevent more stones from forming  · Take your medicine as directed  Contact your healthcare provider if you think your medicine is not helping or if you have side effects  Tell him or her if you are allergic to any medicine  Keep a list of the medicines, vitamins, and herbs you take  Include the amounts, and when and why you take them  Bring the list or the pill bottles to follow-up visits  Carry your medicine list with you in case of an emergency  Self-care:   · Strain your urine every time you go to the bathroom  Urinate through a strainer or a piece of thin cloth to catch the stones  Take the pieces to your follow-up visits  · If you have a stent, do not pull on it  This can cause pain and bleeding  · Apply heat  on your lower back for 20 to 30 minutes every 2 hours for as many days as directed   Heat helps decrease pain and muscle spasms  · Drink liquids as directed  You may need to drink more liquid than usual  This will help flush any remaining small pieces of stone  Liquids can also help prevent more stones from forming  Ask how much liquid to drink each day and which liquids are best for you  Do not drink caffeine  · Rest  when you feel it is needed  Slowly start to do more each day  · Eat a variety of healthy foods  Ask if you need to make changes to the foods you eat  Healthy foods include fruits, vegetables, whole-grain breads, low-fat dairy products, beans, and fish  You may need to limit nuts, chocolate, coffee, and certain green leafy vegetables  You may also need to limit meat and salt  Follow up with your healthcare provider as directed: You may need to return to have your stent removed  Write down your questions so you remember to ask them during your visits  © 2017 [employer] Information is for End User's use only and may not be sold, redistributed or otherwise used for commercial purposes  All illustrations and images included in CareNotes® are the copyrighted property of [employer]  or Pradip Spivey  The above information is an  only  It is not intended as medical advice for individual conditions or treatments  Talk to your doctor, nurse or pharmacist before following any medical regimen to see if it is safe and effective for you

## 2020-01-20 NOTE — ANESTHESIA POSTPROCEDURE EVALUATION
Post-Op Assessment Note    CV Status:  Stable  Pain Score: 0    Pain management: adequate     Mental Status:  Alert and awake   Hydration Status:  Euvolemic   PONV Controlled:  Controlled   Airway Patency:  Patent   Post Op Vitals Reviewed: Yes      Staff: CRNA           BP   117/57   Temp   97 5   Pulse  64   Resp   20   SpO2   96

## 2020-01-20 NOTE — ANESTHESIA PREPROCEDURE EVALUATION
Review of Systems/Medical History  Patient summary reviewed  Chart reviewed      Cardiovascular  EKG reviewed, Hyperlipidemia, Hypertension , Dysrhythmias , atrial fibrillation,   Comment: EF 60%,  Pulmonary  Negative pulmonary ROS        GI/Hepatic    GERD ,        Kidney stones,        Endo/Other  Diabetes well controlled ,      GYN       Hematology  Anemia ,     Musculoskeletal    Arthritis     Neurology  Negative neurology ROS   CVA ,    Psychology   Anxiety,            Lab Results   Component Value Date    WBC 5 61 01/10/2020    HGB 10 1 (L) 01/10/2020    HCT 32 4 (L) 01/10/2020    MCV 97 01/10/2020     01/10/2020     Lab Results   Component Value Date    GLUCOSE 150 (H) 09/30/2014    CALCIUM 9 0 01/10/2020     09/30/2014    K 4 6 01/10/2020    CO2 22 01/10/2020     (H) 01/10/2020    BUN 22 01/10/2020    CREATININE 1 55 (H) 01/10/2020     Lab Results   Component Value Date    INR 2 74 (H) 01/10/2020    INR 1 14 05/07/2019    INR 1 96 (H) 12/14/2018    PROTIME 28 5 (H) 01/10/2020    PROTIME 13 2 (H) 05/07/2019    PROTIME 22 4 (H) 12/14/2018     Lab Results   Component Value Date    PTT 47 (H) 01/10/2020       Physical Exam    Airway    Mallampati score: II  TM Distance: >3 FB  Neck ROM: full     Dental   No notable dental hx     Cardiovascular  Rhythm: irregular, Rate: abnormal,     Pulmonary  Pulmonary exam normal     Other Findings        Anesthesia Plan  ASA Score- 3     Anesthesia Type- general with ASA Monitors  Additional Monitors:   Airway Plan: LMA  Comment: Plan discussed is MAC with GA as backup  I personally discussed risks and benefits to this anesthetic  All patient questions were answered  Pt agrees with anesthesia plan        Plan Factors-    Induction- intravenous  Postoperative Plan- Plan for postoperative opioid use  Informed Consent- Anesthetic plan and risks discussed with patient  I personally reviewed this patient with the CRNA   Discussed and agreed on the Anesthesia Plan with the CRNA  Lori Hand

## 2020-01-20 NOTE — OP NOTE
OPERATIVE REPORT  PATIENT NAME: Shivam Conroy III    :  1945  MRN: [id number]  Pt Location: [facility] OR ROOM 01    SURGERY DATE: 2020    Surgeon(s) and Role:     Juni Tran MD - Primary    Preop Diagnosis:  Calculus of ureter [N20 1]  Retained left ureteral stent    Post-Op Diagnosis Codes:     * Calculus of ureter [N20 1]  Retained left ureteral stent    Procedure:  Cystoscopy, left ureteroscopy, left retrograde urogram, left ureteral stent exchange    Specimen(s):  ID Type Source Tests Collected by Time Destination   A :  Urine Urine, Other URINE CULTURE Teddy Nicolas MD 2020 1108    B :  Urine Urine, Other URINALYSIS WITH REFLEX TO SCOPE Teddy Nicolas MD 2020 1108        Estimated Blood Loss:   Minimal    Drains:  Ureteral Drain/Stent Left ureter 6 Fr  (Active)   Number of days: 0       Anesthesia Type:   General    Operative Indications:  Calculus of ureter [N20 1]  This is a 35-year-old male who presented with an obstructing 5 mm mid left ureteral stone and underwent left ureteral stent insertion  The stone was never visible on plain x-ray but was visible on initial CT scan as well as CT scan after stent placement which showed a 5 mm stone adjacent to the stent at approximately the L4 vertebral level  However, preoperative KUB shows a calcification adjacent to the stent in the mid pelvis which was not seen on CT scan  Options, procedures, benefits and risks were discussed and he agreed to the planned procedure  Operative Findings:  No significant left ureteral or renal calculus seen    Complications:   None    Procedure and Technique:  The patient was properly identified in the operating room and after adequate general anesthesia was placed in the lithotomy position  The lower abdomen and genitalia were prepped and draped in the usual fashion  Cystourethroscopy was performed with a 21 [place] cystoscope using 30 degree lens    The bladder was drained and urine was sent for urinalysis and culture  The stent was seen in good position from the left ureteral orifice  Under fluoroscopic guidance, a solo guidewire was passed alongside the stent into the collecting system  The wire was externalized and secured as a safety wire  The cystoscope was reintroduced and the stent was removed with a flexible grasping forceps  Semi rigid ureteroscopy was then performed up to the level of the top of the L4 vertebra  The ureter appeared normal and no stone was seen throughout this portion of the ureter  A 2nd solo guidewire was passed through the ureteral scope into the collecting system and the ureteral scope was removed leaving the wire in place  A flexible ureteral scope was then passed over this wire into the collecting system and the wire was removed  Ureteroscopy was then very carefully performed throughout the renal pelvis and all the way down to the very distal ureter  Only a few truly tiny yellow calcifications which were smaller than the with of the guidewire were seen  No significant calculus was seen throughout the entire ureteroscopy  The ureteral scope was therefore removed  The safety wire was backloaded onto the cystoscope and a 5 [place] open-ended ureteral catheter was passed over the wire into the collecting system  The wire was removed leaving the catheter in place  Dilute contrast was instilled which outlined a mildly dilated collecting system  There was a persistent round filling defect within the mid renal pelvis which did not appeared typical of an air bubble  Although nothing was seen in the renal pelvis previously, it was elected to perform repeat flexible ureteroscopy  A guidewire was reintroduced into the collecting system through the catheter and the catheter was removed  Another guidewire was passed up the ureter into the collecting system cystoscopically  The flexible ureteral scope was then passed over this wire into the upper pole and the wire was removed  Careful ureteroscopy was then performed throughout the entire collecting system including the renal pelvis  No mass or calcification was seen  Contrast was instilled into the collecting system and no residual filling defect was seen at this point  The ureteral scope was removed  Safety wire was then backloaded onto the cystoscope and a 6 [place] 24 cm Bard inlay optima stent was passed over the wire leaving the string attached  A good curl of stent was seen within the upper pole and within the bladder  The bladder was emptied and the cystoscope was removed  The string was secured to the penis with tape  The patient tolerated the procedure well and left the operating room in good condition     I was present for the entire procedure    Patient Disposition:  PACU     SIGNATURE: Stephon Gordon MD  DATE: January 20, 2020  TIME: 12:12 PM

## 2020-01-21 LAB — BACTERIA UR CULT: NORMAL

## 2020-01-22 LAB — GLUCOSE SERPL-MCNC: 180 MG/DL (ref 65–140)

## 2020-01-23 ENCOUNTER — TRANSCRIBE ORDERS (OUTPATIENT)
Dept: ADMINISTRATIVE | Facility: HOSPITAL | Age: 75
End: 2020-01-23

## 2020-01-23 DIAGNOSIS — N20.1 CALCULUS OF URETER: Primary | ICD-10-CM

## 2020-02-10 ENCOUNTER — HOSPITAL ENCOUNTER (OUTPATIENT)
Dept: ULTRASOUND IMAGING | Facility: HOSPITAL | Age: 75
Discharge: HOME | End: 2020-02-10
Attending: UROLOGY
Payer: MEDICARE

## 2020-02-10 DIAGNOSIS — N20.1 CALCULUS OF URETER: ICD-10-CM

## 2020-02-10 PROCEDURE — 76770 US EXAM ABDO BACK WALL COMP: CPT

## 2020-03-06 DIAGNOSIS — F41.9 ANXIETY: ICD-10-CM

## 2020-03-06 RX ORDER — ALPRAZOLAM 0.5 MG/1
0.5 TABLET ORAL EVERY 8 HOURS PRN
Qty: 90 TABLET | Refills: 0 | Status: SHIPPED | OUTPATIENT
Start: 2020-03-06 | End: 2020-05-28 | Stop reason: SDUPTHER

## 2020-03-06 NOTE — TELEPHONE ENCOUNTER
Scheduled Medication Review:  Pt's scheduled medication use was reviewed by myself/staff via the [employer] website  Pt's use has been found to be appropriate w/o any concerns for misuse by the patient  Pt's current conditions require continued scheduled medication use at this time  Future review for continued appropriate medication use and misuse will continue

## 2020-04-24 DIAGNOSIS — E11.8 TYPE 2 DIABETES MELLITUS WITH COMPLICATION (HCC): ICD-10-CM

## 2020-05-28 DIAGNOSIS — F41.9 ANXIETY: ICD-10-CM

## 2020-05-28 RX ORDER — ALPRAZOLAM 0.5 MG/1
0.5 TABLET ORAL EVERY 8 HOURS PRN
Qty: 90 TABLET | Refills: 0 | Status: SHIPPED | OUTPATIENT
Start: 2020-05-28 | End: 2020-09-22 | Stop reason: SDUPTHER

## 2020-06-26 DIAGNOSIS — I10 HYPERTENSION, UNSPECIFIED TYPE: ICD-10-CM

## 2020-06-26 DIAGNOSIS — I48.91 NEW ONSET A-FIB (HCC): ICD-10-CM

## 2020-06-26 RX ORDER — AMLODIPINE BESYLATE 10 MG/1
10 TABLET ORAL DAILY
Qty: 90 TABLET | Refills: 3 | Status: SHIPPED | OUTPATIENT
Start: 2020-06-26 | End: 2021-01-19 | Stop reason: SDUPTHER

## 2020-06-30 DIAGNOSIS — I10 HYPERTENSION, UNSPECIFIED TYPE: ICD-10-CM

## 2020-06-30 RX ORDER — METOPROLOL SUCCINATE 100 MG/1
100 TABLET, EXTENDED RELEASE ORAL DAILY
Qty: 90 TABLET | Refills: 1 | Status: SHIPPED | OUTPATIENT
Start: 2020-06-30 | End: 2021-01-25

## 2020-07-07 ENCOUNTER — TELEPHONE (OUTPATIENT)
Dept: INTERNAL MEDICINE CLINIC | Facility: CLINIC | Age: 75
End: 2020-07-07

## 2020-07-07 NOTE — TELEPHONE ENCOUNTER
Libra confirmed appt for tomorrow for aime-she wanted to let you know that by the end of the day - his ankles and from the knee down is swollen  The ankles are really bad  He is experiencing a lot of rheumatoid pain-espec his knees  He cannot take ibuprofin because he is on a blood thinner  What can you give him to ease the pain? He is on the generic for dementia-his dementia is getting worse per Libra  He keeps repeating and does not remember

## 2020-07-08 ENCOUNTER — OFFICE VISIT (OUTPATIENT)
Dept: INTERNAL MEDICINE CLINIC | Facility: CLINIC | Age: 75
End: 2020-07-08
Payer: MEDICARE

## 2020-07-08 ENCOUNTER — APPOINTMENT (OUTPATIENT)
Dept: LAB | Facility: CLINIC | Age: 75
End: 2020-07-08
Payer: MEDICARE

## 2020-07-08 VITALS
BODY MASS INDEX: 32.62 KG/M2 | OXYGEN SATURATION: 97 % | TEMPERATURE: 97.8 F | DIASTOLIC BLOOD PRESSURE: 70 MMHG | HEART RATE: 98 BPM | WEIGHT: 233 LBS | RESPIRATION RATE: 18 BRPM | SYSTOLIC BLOOD PRESSURE: 126 MMHG | HEIGHT: 71 IN

## 2020-07-08 DIAGNOSIS — D64.9 ANEMIA, UNSPECIFIED TYPE: ICD-10-CM

## 2020-07-08 DIAGNOSIS — F41.1 GENERALIZED ANXIETY DISORDER: ICD-10-CM

## 2020-07-08 DIAGNOSIS — I48.20 CHRONIC ATRIAL FIBRILLATION (HCC): Chronic | ICD-10-CM

## 2020-07-08 DIAGNOSIS — R79.9 ABNORMAL BLOOD FINDINGS: ICD-10-CM

## 2020-07-08 DIAGNOSIS — E27.8 ADRENAL NODULE (HCC): ICD-10-CM

## 2020-07-08 DIAGNOSIS — R41.3 MEMORY DIFFICULTIES: ICD-10-CM

## 2020-07-08 DIAGNOSIS — E78.5 HYPERLIPIDEMIA, UNSPECIFIED HYPERLIPIDEMIA TYPE: ICD-10-CM

## 2020-07-08 DIAGNOSIS — N18.30 CKD (CHRONIC KIDNEY DISEASE) STAGE 3, GFR 30-59 ML/MIN (HCC): Chronic | ICD-10-CM

## 2020-07-08 DIAGNOSIS — Z79.01 CHRONIC ANTICOAGULATION: ICD-10-CM

## 2020-07-08 DIAGNOSIS — M19.91 PRIMARY OSTEOARTHRITIS, UNSPECIFIED SITE: ICD-10-CM

## 2020-07-08 DIAGNOSIS — E11.42 DIABETIC POLYNEUROPATHY ASSOCIATED WITH TYPE 2 DIABETES MELLITUS (HCC): Primary | Chronic | ICD-10-CM

## 2020-07-08 DIAGNOSIS — E11.42 DIABETIC POLYNEUROPATHY ASSOCIATED WITH TYPE 2 DIABETES MELLITUS (HCC): Chronic | ICD-10-CM

## 2020-07-08 DIAGNOSIS — I10 ESSENTIAL HYPERTENSION: Chronic | ICD-10-CM

## 2020-07-08 DIAGNOSIS — K21.9 GERD WITHOUT ESOPHAGITIS: ICD-10-CM

## 2020-07-08 DIAGNOSIS — R60.0 LOCALIZED EDEMA: ICD-10-CM

## 2020-07-08 LAB
ALBUMIN SERPL BCP-MCNC: 3.8 G/DL (ref 3.5–5)
ALP SERPL-CCNC: 76 U/L (ref 46–116)
ALT SERPL W P-5'-P-CCNC: 14 U/L (ref 12–78)
ANION GAP SERPL CALCULATED.3IONS-SCNC: 7 MMOL/L (ref 4–13)
AST SERPL W P-5'-P-CCNC: 12 U/L (ref 5–45)
BACTERIA UR QL AUTO: NORMAL /HPF
BASOPHILS # BLD AUTO: 0.05 THOUSANDS/ΜL (ref 0–0.1)
BASOPHILS NFR BLD AUTO: 1 % (ref 0–1)
BILIRUB SERPL-MCNC: 0.96 MG/DL (ref 0.2–1)
BILIRUB UR QL STRIP: NEGATIVE
BUN SERPL-MCNC: 22 MG/DL (ref 5–25)
CALCIUM SERPL-MCNC: 9.7 MG/DL (ref 8.3–10.1)
CHLORIDE SERPL-SCNC: 110 MMOL/L (ref 100–108)
CHOLEST SERPL-MCNC: 114 MG/DL (ref 50–200)
CLARITY UR: CLEAR
CO2 SERPL-SCNC: 21 MMOL/L (ref 21–32)
COLOR UR: YELLOW
CREAT SERPL-MCNC: 1.66 MG/DL (ref 0.6–1.3)
EOSINOPHIL # BLD AUTO: 0.17 THOUSAND/ΜL (ref 0–0.61)
EOSINOPHIL NFR BLD AUTO: 3 % (ref 0–6)
ERYTHROCYTE [DISTWIDTH] IN BLOOD BY AUTOMATED COUNT: 14.2 % (ref 11.6–15.1)
EST. AVERAGE GLUCOSE BLD GHB EST-MCNC: 171 MG/DL
FERRITIN SERPL-MCNC: 62 NG/ML (ref 8–388)
GFR SERPL CREATININE-BSD FRML MDRD: 40 ML/MIN/1.73SQ M
GLUCOSE P FAST SERPL-MCNC: 137 MG/DL (ref 65–99)
GLUCOSE UR STRIP-MCNC: NEGATIVE MG/DL
HBA1C MFR BLD: 7.6 %
HCT VFR BLD AUTO: 38.5 % (ref 36.5–49.3)
HDLC SERPL-MCNC: 45 MG/DL
HGB BLD-MCNC: 12.4 G/DL (ref 12–17)
HGB UR QL STRIP.AUTO: NEGATIVE
HYALINE CASTS #/AREA URNS LPF: NORMAL /LPF
IMM GRANULOCYTES # BLD AUTO: 0.02 THOUSAND/UL (ref 0–0.2)
IMM GRANULOCYTES NFR BLD AUTO: 0 % (ref 0–2)
IRON SATN MFR SERPL: 28 %
IRON SERPL-MCNC: 94 UG/DL (ref 65–175)
KETONES UR STRIP-MCNC: ABNORMAL MG/DL
LDLC SERPL CALC-MCNC: 44 MG/DL (ref 0–100)
LEUKOCYTE ESTERASE UR QL STRIP: NEGATIVE
LYMPHOCYTES # BLD AUTO: 0.9 THOUSANDS/ΜL (ref 0.6–4.47)
LYMPHOCYTES NFR BLD AUTO: 15 % (ref 14–44)
MCH RBC QN AUTO: 29.8 PG (ref 26.8–34.3)
MCHC RBC AUTO-ENTMCNC: 32.2 G/DL (ref 31.4–37.4)
MCV RBC AUTO: 93 FL (ref 82–98)
MONOCYTES # BLD AUTO: 0.64 THOUSAND/ΜL (ref 0.17–1.22)
MONOCYTES NFR BLD AUTO: 11 % (ref 4–12)
NEUTROPHILS # BLD AUTO: 4.1 THOUSANDS/ΜL (ref 1.85–7.62)
NEUTS SEG NFR BLD AUTO: 70 % (ref 43–75)
NITRITE UR QL STRIP: NEGATIVE
NON-SQ EPI CELLS URNS QL MICRO: NORMAL /HPF
NRBC BLD AUTO-RTO: 0 /100 WBCS
PH UR STRIP.AUTO: 6 [PH]
PLATELET # BLD AUTO: 213 THOUSANDS/UL (ref 149–390)
PMV BLD AUTO: 10.7 FL (ref 8.9–12.7)
POTASSIUM SERPL-SCNC: 4.4 MMOL/L (ref 3.5–5.3)
PROT SERPL-MCNC: 7.7 G/DL (ref 6.4–8.2)
PROT UR STRIP-MCNC: ABNORMAL MG/DL
RBC # BLD AUTO: 4.16 MILLION/UL (ref 3.88–5.62)
RBC #/AREA URNS AUTO: NORMAL /HPF
SODIUM SERPL-SCNC: 138 MMOL/L (ref 136–145)
SP GR UR STRIP.AUTO: 1.02 (ref 1–1.03)
TIBC SERPL-MCNC: 331 UG/DL (ref 250–450)
TRIGL SERPL-MCNC: 124 MG/DL
UROBILINOGEN UR QL STRIP.AUTO: 0.2 E.U./DL
WBC # BLD AUTO: 5.88 THOUSAND/UL (ref 4.31–10.16)
WBC #/AREA URNS AUTO: NORMAL /HPF

## 2020-07-08 PROCEDURE — 82728 ASSAY OF FERRITIN: CPT

## 2020-07-08 PROCEDURE — 3066F NEPHROPATHY DOC TX: CPT | Performed by: INTERNAL MEDICINE

## 2020-07-08 PROCEDURE — 4040F PNEUMOC VAC/ADMIN/RCVD: CPT | Performed by: INTERNAL MEDICINE

## 2020-07-08 PROCEDURE — 83550 IRON BINDING TEST: CPT

## 2020-07-08 PROCEDURE — 36415 COLL VENOUS BLD VENIPUNCTURE: CPT

## 2020-07-08 PROCEDURE — 3078F DIAST BP <80 MM HG: CPT | Performed by: INTERNAL MEDICINE

## 2020-07-08 PROCEDURE — 80053 COMPREHEN METABOLIC PANEL: CPT

## 2020-07-08 PROCEDURE — 2022F DILAT RTA XM EVC RTNOPTHY: CPT | Performed by: INTERNAL MEDICINE

## 2020-07-08 PROCEDURE — 81001 URINALYSIS AUTO W/SCOPE: CPT | Performed by: INTERNAL MEDICINE

## 2020-07-08 PROCEDURE — 80061 LIPID PANEL: CPT

## 2020-07-08 PROCEDURE — 3008F BODY MASS INDEX DOCD: CPT | Performed by: INTERNAL MEDICINE

## 2020-07-08 PROCEDURE — 1160F RVW MEDS BY RX/DR IN RCRD: CPT | Performed by: INTERNAL MEDICINE

## 2020-07-08 PROCEDURE — 99214 OFFICE O/P EST MOD 30 MIN: CPT | Performed by: INTERNAL MEDICINE

## 2020-07-08 PROCEDURE — 85025 COMPLETE CBC W/AUTO DIFF WBC: CPT

## 2020-07-08 PROCEDURE — 3044F HG A1C LEVEL LT 7.0%: CPT | Performed by: INTERNAL MEDICINE

## 2020-07-08 PROCEDURE — 83036 HEMOGLOBIN GLYCOSYLATED A1C: CPT

## 2020-07-08 PROCEDURE — 3074F SYST BP LT 130 MM HG: CPT | Performed by: INTERNAL MEDICINE

## 2020-07-08 PROCEDURE — 1036F TOBACCO NON-USER: CPT | Performed by: INTERNAL MEDICINE

## 2020-07-08 PROCEDURE — 83540 ASSAY OF IRON: CPT

## 2020-07-08 RX ORDER — FUROSEMIDE 20 MG/1
20 TABLET ORAL DAILY PRN
Qty: 10 TABLET | Refills: 0 | Status: SHIPPED | OUTPATIENT
Start: 2020-07-08 | End: 2020-11-03

## 2020-07-08 RX ORDER — DONEPEZIL HYDROCHLORIDE 5 MG/1
5 TABLET, FILM COATED ORAL
Qty: 90 TABLET | Refills: 2 | Status: SHIPPED | OUTPATIENT
Start: 2020-07-08 | End: 2020-07-08 | Stop reason: SDUPTHER

## 2020-07-08 RX ORDER — DONEPEZIL HYDROCHLORIDE 10 MG/1
5 TABLET, FILM COATED ORAL
Qty: 90 TABLET | Refills: 1 | Status: SHIPPED | OUTPATIENT
Start: 2020-07-08 | End: 2020-07-08 | Stop reason: SDUPTHER

## 2020-07-08 RX ORDER — HYDROCORTISONE 0.5 %
CREAM (GRAM) TOPICAL
Qty: 30 G | Refills: 0 | Status: SHIPPED | OUTPATIENT
Start: 2020-07-08 | End: 2021-08-17

## 2020-07-08 RX ORDER — DONEPEZIL HYDROCHLORIDE 10 MG/1
10 TABLET, FILM COATED ORAL
Qty: 90 TABLET | Refills: 1 | Status: SHIPPED | OUTPATIENT
Start: 2020-07-08 | End: 2021-01-29

## 2020-07-08 NOTE — PATIENT INSTRUCTIONS
Type 2 Diabetes in Adults   WHAT YOU NEED TO KNOW:   What is type 2 diabetes? Type 2 diabetes is a disease that affects how your body uses glucose (sugar)  Normally, when the blood sugar level increases, the pancreas makes more insulin  Insulin helps move sugar out of the blood so it can be used for energy  Type 2 diabetes develops because either the body cannot make enough insulin, or it cannot use the insulin correctly  After many years, your pancreas may stop making insulin  What increases my risk for type 2 diabetes? · Obesity    · Physical inactivity    · Older age    · High blood pressure or high cholesterol    · A history of heart disease, gestational diabetes, or polycystic ovary syndrome     · A family member with diabetes    · Being [place] American, , , [place] American, or [facility]  What are the signs and symptoms of type 2 diabetes? You may have high blood sugar levels for a long time before symptoms appear  You may have any of the following:  · More hunger or thirst than usual     · Frequent urination     · Weight loss without trying     · Blurred vision  How is type 2 diabetes diagnosed? You may need tests to check for type 2 diabetes starting at age 39  You may need any of the following:  · An A1c test  shows the average amount of sugar in your blood over the past 2 to 3 months  Your healthcare provider will tell you the A1c level that is right for you  The goal for your A1c is usually below 7%  Your provider can help you make changes if a check shows the A1c is too high  · A fasting plasma glucose test  is when your blood sugar level is tested after you have not eaten for 8 hours  · A 2-hour plasma glucose test  starts with a blood sugar level check after you have not eaten for 8 hours  You are then given a glucose drink  Your blood sugar level is checked after 2 hours       · A random glucose test  may be done any time of day, no matter how long ago you ate   How is type 2 diabetes treated? Type 2 diabetes can be controlled to prevent damage to your heart, blood vessels, and other organs  The goal is to keep your blood sugar at a normal level  You must eat the right foods, and exercise regularly  You may need 1 or more hypoglycemic medicines or insulin if you cannot control your blood sugar level with nutrition and exercise  You may also need medicine to lower your risk for heart disease  An example includes medicine to lower or control your cholesterol  How do I check my blood sugar level? You will be taught how to check a small drop of blood in a glucose monitor  You will need to check your blood sugar level at least 3 times each day if you are on insulin  Ask your healthcare provider when and how often to check during the day  If you check your blood sugar level before a meal , it should be between 80 and 130 mg/dL  If you check your blood sugar level 1 to 2 hours after a meal , it should be less than 180 mg/dL  Ask your healthcare provider if these are good goals for you  Write down your results, and show them to your healthcare provider  Your provider may use the results to make changes to your medicine, food, and exercise schedules  What should I do if my blood sugar level is too low? Your blood sugar level is too low if it goes below 70 mg/dL  If the level is too low, eat or drink 15 grams of fast-acting carbohydrate  These are found naturally in fruits  Fast-acting carbohydrates will raise your blood sugar level quickly  Examples of 15 grams of fast-acting carbohydrate are 4 ounces (½ cup) of fruit juice or 4 ounces of regular soda  Other examples are 2 tablespoons of raisins or 3 to 4 glucose tablets  Check your blood sugar level 15 minutes later  If the level is still low (less than 100 mg/dL), eat another 15 grams of carbohydrate  When the level returns to 100 mg/dL, eat a snack or meal that contains carbohydrates   This will help prevent another drop in blood sugar  Always carefully follow your healthcare provider's instructions on how to treat low blood sugar levels  What do I need to know about nutrition? A dietitian will help you make a meal plan to keep your blood sugar level steady  Do not skip meals  Your blood sugar level may drop too low if you have taken diabetes medicine and do not eat  · Keep track of carbohydrates (sugar and starchy foods)  Your blood sugar level can get too high if you eat too many carbohydrates  Eat fruits, legumes, vegetables, and whole grains  Your dietitian will help you plan meals and snacks that have the right amount of carbohydrates  · Eat low-fat foods , such as skinless chicken and low-fat milk  · Eat less sodium (salt)  Limit high-sodium foods, such as soy sauce, potato chips, and soup  Do not add salt to food you cook  Limit your use of table salt  You should have less than 2,300 mg of sodium per day  · Eat high-fiber foods , such as vegetables, whole-grain breads, and beans  · Limit alcohol  Alcohol affects your blood sugar level and can make it harder to manage your diabetes  Limit alcohol to 1 drink a day if you are a woman  Limit alcohol to 2 drinks a day if you are a man  A drink of alcohol is 12 ounces of beer, 5 ounces of wine, or 1½ ounces of liquor  How much exercise do I need? Exercise can help keep your blood sugar level steady, decrease your risk of heart disease, and help you lose weight  Stretch before and after you exercise  Exercise for at least 150 minutes every week  Spread this amount of exercise over at least 3 days a week  Do not skip exercise more than 2 days in a row  Include muscle strengthening activities 2 to 3 days each week  Older adults should include balance training 2 to 3 times each week  Activities that help increase balance include yoga and kalina chi  Work with your healthcare provider to create an exercise plan    · Check your blood sugar level before and after exercise  Healthcare providers may tell you to change the amount of insulin you take or food you eat  If your blood sugar level is high, check your blood or urine for ketones before you exercise  Do not exercise if your blood sugar level is high and you have ketones  · If your blood sugar level is less than 100 mg/dL, have a carbohydrate snack before you exercise  Examples are 4 to 6 crackers, ½ banana, 8 ounces (1 cup) of milk, or 4 ounces (½ cup) of juice  Drink water or liquids that do not contain sugar before, during, and after exercise  Ask your dietitian or healthcare provider which liquids you should drink when you exercise  · Do not sit for longer than 30 minutes  If you cannot walk around, at least stand up  This will help you stay active and keep your blood circulating  What else can I do to manage type 2 diabetes? · Check your feet each day for sores  Wear shoes and socks that fit correctly  Do not trim your toenails  Ask your healthcare provider for more information about foot care  · Maintain a healthy weight  Ask your healthcare provider how much you should weigh  A healthy weight can help you control your diabetes and prevent heart disease  Ask your provider to help you create a weight loss plan if you are overweight  Together you can set manageable weight loss goals  · Do not smoke  Nicotine and other chemicals in cigarettes and cigars can cause lung damage and make it more difficult to manage your diabetes  Ask your healthcare provider for information if you currently smoke and need help to quit  Do not use e-cigarettes or smokeless tobacco in place of cigarettes or to help you quit  They still contain nicotine  · Check your blood pressure as directed  Ask your healthcare provider what your blood pressure should be  Most adults with diabetes and high blood pressure should have a systolic blood pressure (first number) less than 140   Your diastolic blood pressure (second number) should be less than 90  · Wear medical alert identification  Wear medical alert jewelry or carry a card that says you have diabetes  Ask your healthcare provider where to get these items  · Ask about vaccines  You have a higher risk for serious illness if you get the flu, pneumonia, or hepatitis  Ask your healthcare provider if you should get a flu, pneumonia, or hepatitis B vaccine, and when to get the vaccine  What are the risks of type 2 diabetes? Uncontrolled diabetes can damage your nerves, veins, and arteries  High blood sugar levels may damage other body tissue and organs over time  Damage to arteries may increase your risk for heart attack and stroke  Nerve damage may also lead to other heart, stomach, and nerve problems  Diabetes is life-threatening if it is not controlled  Control your blood glucose levels to prevent health problems  Call 911 for any of the following:   · You have any of the following signs of a stroke:      ¨ Numbness or drooping on one side of your face     ¨ Weakness in an arm or leg    ¨ Confusion or difficulty speaking    ¨ Dizziness, a severe headache, or vision loss    · You have any of the following signs of a heart attack:      ¨ Squeezing, pressure, or pain in your chest that lasts longer than 5 minutes or returns    ¨ Discomfort or pain in your back, neck, jaw, stomach, or arm     ¨ Trouble breathing    ¨ Nausea or vomiting    ¨ Lightheadedness or a sudden cold sweat, especially with chest pain or trouble breathing  When should I seek immediate care? · You have severe abdominal pain, or the pain spreads to your back  You may also be vomiting  · You have trouble staying awake or focusing  · You are shaking or sweating  · You have blurred or double vision  · Your breath has a fruity, sweet smell  · Your breathing is deep and labored, or rapid and shallow  · Your heartbeat is fast and weak    When should I contact my healthcare provider? · You are vomiting or have diarrhea  · You have an upset stomach and cannot eat the foods on your meal plan  · You feel weak or more tired than usual      · You feel dizzy, have headaches, or are easily irritated  · Your skin is red, warm, dry, or swollen  · You have a wound that does not heal      · You have numbness in your arms or legs  · You have trouble coping with your illness, or you feel anxious or depressed  · You have questions or concerns about your condition or care  CARE AGREEMENT:   You have the right to help plan your care  Learn about your health condition and how it may be treated  Discuss treatment options with your caregivers to decide what care you want to receive  You always have the right to refuse treatment  The above information is an  only  It is not intended as medical advice for individual conditions or treatments  Talk to your doctor, nurse or pharmacist before following any medical regimen to see if it is safe and effective for you  © 2017 [facility] Information is for End User's use only and may not be sold, redistributed or otherwise used for commercial purposes  All illustrations and images included in CareNotes® are the copyrighted property of [employer]  or Pradip Spivey

## 2020-07-08 NOTE — PROGRESS NOTES
BMI Counseling: Body mass index is 32 5 kg/m²  The BMI is above normal  Nutrition recommendations include decreasing portion sizes and encouraging healthy choices of fruits and vegetables  Exercise recommendations include moderate physical activity 150 minutes/week  No pharmacotherapy was ordered  Assessment/Plan:  Problem List Items Addressed This Visit        Digestive    GERD without esophagitis       Endocrine    Diabetic polyneuropathy associated with type 2 diabetes mellitus ([facility] [address] ) - Primary (Chronic)    Relevant Orders    Comprehensive metabolic panel    CBC and differential    Hemoglobin A1C       Cardiovascular and Mediastinum    Hypertension (Chronic)    Relevant Medications    furosemide (LASIX) 20 mg tablet    Chronic atrial fibrillation (Chronic)       Musculoskeletal and Integument    Osteoarthritis    Relevant Medications    Menthol-Methyl Salicylate (RODRI DE LOS SANTOS GREASELESS) 10-15 % greaseless cream       Genitourinary    CKD (chronic kidney disease) stage 3, GFR 30-59 ml/min ([facility]) (Chronic)    Relevant Medications    furosemide (LASIX) 20 mg tablet       Other    Right Adrenal nodule  (Chronic)    Anemia    Relevant Orders    Iron Panel (Includes Ferritin, Iron Sat%, Iron, and TIBC)    Generalized anxiety disorder    Relevant Medications    donepezil (ARICEPT) 10 mg tablet    Hyperlipidemia    Relevant Orders    Lipid Panel with Direct LDL reflex    Chronic anticoagulation    Memory difficulties    Relevant Medications    donepezil (ARICEPT) 10 mg tablet    Other Relevant Orders    UA w Reflex to Microscopic w Reflex to Culture      Other Visit Diagnoses     Localized edema        Relevant Medications    furosemide (LASIX) 20 mg tablet           Diagnoses and all orders for this visit:    Diabetic polyneuropathy associated with type 2 diabetes mellitus ([facility])  -     Comprehensive metabolic panel;  Future  -     CBC and differential; Future  -     Hemoglobin A1C; Future    GERD without esophagitis    Chronic atrial fibrillation    Essential hypertension    Primary osteoarthritis, unspecified site  -     Menthol-Methyl Salicylate (RODRI DE LOS SANTOS GREASELESS) 10-15 % greaseless cream; Apply topically daily at bedtime    CKD (chronic kidney disease) stage 3, GFR 30-59 ml/min (HCC)    Anemia, unspecified type  -     Iron Panel (Includes Ferritin, Iron Sat%, Iron, and TIBC); Future    Chronic anticoagulation    Generalized anxiety disorder    Hyperlipidemia, unspecified hyperlipidemia type  -     Lipid Panel with Direct LDL reflex; Future    Memory difficulties  -     UA w Reflex to Microscopic w Reflex to Culture  -     Discontinue: donepezil (ARICEPT) 5 mg tablet; Take 1 tablet (5 mg total) by mouth daily at bedtime  -     donepezil (ARICEPT) 10 mg tablet; Take 0 5 tablets (5 mg total) by mouth daily at bedtime    Adrenal nodule (HCC)    Localized edema  -     furosemide (LASIX) 20 mg tablet; Take 1 tablet (20 mg total) by mouth daily as needed (swelling of the legs NO MORE than two days in a row )        No problem-specific Assessment & Plan notes found for this encounter  A/P: Doing ok and will check labs  Short coarse of lasix due to his renal function, will increase the aricept, and recommend daily tylenol and bengay for the pain  May need to consider decreasing or d/c'ing the norvasc as possible cause of the edema  Continue current treatment and RTC one month for f/u with memory, edema, labs, and pain  Subjective:      Patient ID: Pepper Morales is a 76 y o  male  WM RTC for f/u dm, htn, etc  Doing ok and no new c/o's, but wife reports memory,  chronic RA pain, and edema is worse  Remains active w/o difficulty and no falls  Sugars less than 140 and no low sugar events when he checks them  No reflux  Denies CP, palpitations, SOB, orthopnea, or PND  Due for labs         The following portions of the patient's history were reviewed and updated as appropriate:   He has a past medical history of Acute on chronic renal insufficiency, Allergic rhinitis, Anemia (6/11/2012), Atrial fibrillation ([facility] [address] ), Cerebrovascular accident (CVA) ([facility] ), Controlled type 2 diabetes mellitus without complication ([facility] ), Generalized anxiety disorder, GERD without esophagitis (6/11/2012), Hypertension, Memory difficulties (1/8/2019), Nephrolithiasis (3/17/2016), Obesity (6/11/2012), and Osteoarthritis (6/11/2012)  ,  does not have any pertinent problems on file  ,   has a past surgical history that includes Total hip arthroplasty; Knee surgery; Cataract extraction, bilateral; Total hip arthroplasty (Bilateral, 2011); pr cystourethroscopy,ureter catheter (Left, 11/8/2019); and Cystoscopy w/ laser lithotripsy (Left, 1/20/2020)  ,  family history includes Arthritis in his mother; No Known Problems in his father; Parkinsonism in his mother  ,   reports that he has never smoked  He has never used smokeless tobacco  He reports that he does not drink alcohol or use drugs  ,  is allergic to pollen extract     Current Outpatient Medications   Medication Sig Dispense Refill    ALPRAZolam (XANAX) 0 5 mg tablet Take 1 tablet (0 5 mg total) by mouth every 8 (eight) hours as needed (AS NEEDED) 90 tablet 0    amLODIPine (NORVASC) 10 mg tablet Take 1 tablet (10 mg total) by mouth daily 90 tablet 3    atorvastatin (LIPITOR) 20 mg tablet Take 1 tablet (20 mg total) by mouth daily 90 tablet 2    Blood Glucose Monitoring Suppl (ONE TOUCH ULTRA 2) w/Device KIT by Does not apply route      donepezil (ARICEPT) 10 mg tablet Take 0 5 tablets (5 mg total) by mouth daily at bedtime 90 tablet 1    dorzolamide-timolol (COSOPT) 22 3-6 8 MG/ML ophthalmic solution instill 1 drop into both eyes twice a day      glucose blood test strip 1 Squirt by In Vitro route daily      metFORMIN (GLUCOPHAGE) 850 mg tablet Take 2 tablets (1,700 mg total) by mouth daily 180 tablet 1    metoprolol succinate (TOPROL-XL) 100 mg 24 hr tablet Take 1 tablet (100 mg total) by mouth daily 90 tablet 1    ONETOUCH DELICA LANCETS 92X MISC by Does not apply route daily      rivaroxaban (Xarelto) 15 mg tablet Take 1 tablet (15 mg total) by mouth daily with breakfast 90 tablet 3    tamsulosin (FLOMAX) 0 4 mg Take 1 capsule (0 4 mg total) by mouth daily with dinner 30 capsule 0    furosemide (LASIX) 20 mg tablet Take 1 tablet (20 mg total) by mouth daily as needed (swelling of the legs NO MORE than two days in a row ) 10 tablet 0    Menthol-Methyl Salicylate (RODRI DE LOS SANTOS GREASELESS) 10-15 % greaseless cream Apply topically daily at bedtime 30 g 0     No current facility-administered medications for this visit  Review of Systems   Constitutional: Negative for activity change, chills, diaphoresis, fatigue and fever  HENT: Negative  Eyes: Negative for visual disturbance  Respiratory: Negative for cough, chest tightness, shortness of breath and wheezing  Cardiovascular: Positive for leg swelling  Negative for chest pain and palpitations  Gastrointestinal: Negative for abdominal pain, constipation, diarrhea, nausea and vomiting  Endocrine: Negative for cold intolerance and heat intolerance  Genitourinary: Negative for difficulty urinating, dysuria and frequency  Musculoskeletal: Positive for arthralgias  Negative for gait problem and myalgias  Neurological: Negative for dizziness, seizures, syncope, light-headedness and headaches  Psychiatric/Behavioral: Negative for confusion and sleep disturbance  The patient is not nervous/anxious  Memory worse       PHQ-9 Depression Screening    PHQ-9:    Frequency of the following problems over the past two weeks:             Objective:  Vitals:    07/08/20 0912   BP: 126/70   BP Location: Left arm   Patient Position: Sitting   Cuff Size: Adult   Pulse: 98   Resp: 18   Temp: 97 8 °F (36 6 °C)   TempSrc: Tympanic   SpO2: 97%   Weight: 106 kg (233 lb)   Height: 5' 11" (1 803 m)     Body mass index is 32 5 kg/m²       Physical Exam   Constitutional: He is oriented to person, place, and time  He appears well-developed and well-nourished  No distress  HENT:   Head: Normocephalic and atraumatic  Mouth/Throat: Oropharynx is clear and moist    Eyes: Pupils are equal, round, and reactive to light  Conjunctivae and EOM are normal    Neck: Neck supple  No JVD present  Cardiovascular: Normal rate, regular rhythm and normal heart sounds  Pulmonary/Chest: Effort normal and breath sounds normal  No respiratory distress  He has no wheezes  He has no rales  Abdominal: Soft  Bowel sounds are normal  He exhibits no distension  There is no tenderness  Musculoskeletal: He exhibits no edema  Neurological: He is alert and oriented to person, place, and time  Psychiatric: He has a normal mood and affect  His behavior is normal  Judgment and thought content normal    Nursing note and vitals reviewed

## 2020-07-09 DIAGNOSIS — R79.9 ABNORMAL BLOOD CHEMISTRY LEVEL: Primary | ICD-10-CM

## 2020-07-15 ENCOUNTER — TELEPHONE (OUTPATIENT)
Dept: INTERNAL MEDICINE CLINIC | Facility: CLINIC | Age: 75
End: 2020-07-15

## 2020-09-22 DIAGNOSIS — F41.9 ANXIETY: ICD-10-CM

## 2020-09-22 RX ORDER — ALPRAZOLAM 0.5 MG/1
0.5 TABLET ORAL EVERY 8 HOURS PRN
Qty: 90 TABLET | Refills: 0 | Status: SHIPPED | OUTPATIENT
Start: 2020-09-22 | End: 2021-02-16 | Stop reason: SDUPTHER

## 2020-10-05 ENCOUNTER — LAB (OUTPATIENT)
Dept: LAB | Facility: CLINIC | Age: 75
End: 2020-10-05
Payer: MEDICARE

## 2020-10-05 LAB
ANION GAP SERPL CALCULATED.3IONS-SCNC: 5 MMOL/L (ref 4–13)
BUN SERPL-MCNC: 21 MG/DL (ref 5–25)
CALCIUM SERPL-MCNC: 9.4 MG/DL (ref 8.3–10.1)
CHLORIDE SERPL-SCNC: 114 MMOL/L (ref 100–108)
CO2 SERPL-SCNC: 21 MMOL/L (ref 21–32)
CREAT SERPL-MCNC: 1.53 MG/DL (ref 0.6–1.3)
GFR SERPL CREATININE-BSD FRML MDRD: 44 ML/MIN/1.73SQ M
GLUCOSE SERPL-MCNC: 182 MG/DL (ref 65–140)
POTASSIUM SERPL-SCNC: 4.4 MMOL/L (ref 3.5–5.3)
SODIUM SERPL-SCNC: 140 MMOL/L (ref 136–145)

## 2020-10-05 PROCEDURE — 80048 BASIC METABOLIC PNL TOTAL CA: CPT

## 2020-10-05 PROCEDURE — 36415 COLL VENOUS BLD VENIPUNCTURE: CPT

## 2020-10-07 DIAGNOSIS — E78.2 MIXED HYPERLIPIDEMIA: ICD-10-CM

## 2020-10-07 RX ORDER — ATORVASTATIN CALCIUM 20 MG/1
TABLET, FILM COATED ORAL
Qty: 90 TABLET | Refills: 2 | Status: SHIPPED | OUTPATIENT
Start: 2020-10-07 | End: 2021-08-02 | Stop reason: SDUPTHER

## 2020-10-09 ENCOUNTER — OFFICE VISIT (OUTPATIENT)
Dept: INTERNAL MEDICINE CLINIC | Facility: CLINIC | Age: 75
End: 2020-10-09
Payer: MEDICARE

## 2020-10-09 ENCOUNTER — APPOINTMENT (OUTPATIENT)
Dept: LAB | Facility: CLINIC | Age: 75
End: 2020-10-09
Payer: MEDICARE

## 2020-10-09 ENCOUNTER — TRANSCRIBE ORDERS (OUTPATIENT)
Dept: RADIOLOGY | Facility: CLINIC | Age: 75
End: 2020-10-09

## 2020-10-09 VITALS
WEIGHT: 227 LBS | HEIGHT: 71 IN | HEART RATE: 84 BPM | SYSTOLIC BLOOD PRESSURE: 134 MMHG | BODY MASS INDEX: 31.78 KG/M2 | TEMPERATURE: 96.8 F | DIASTOLIC BLOOD PRESSURE: 72 MMHG | OXYGEN SATURATION: 99 %

## 2020-10-09 DIAGNOSIS — I48.20 CHRONIC ATRIAL FIBRILLATION (HCC): Chronic | ICD-10-CM

## 2020-10-09 DIAGNOSIS — Z23 ENCOUNTER FOR VACCINATION: ICD-10-CM

## 2020-10-09 DIAGNOSIS — M19.91 PRIMARY OSTEOARTHRITIS, UNSPECIFIED SITE: ICD-10-CM

## 2020-10-09 DIAGNOSIS — F41.1 GENERALIZED ANXIETY DISORDER: ICD-10-CM

## 2020-10-09 DIAGNOSIS — Z79.01 CHRONIC ANTICOAGULATION: ICD-10-CM

## 2020-10-09 DIAGNOSIS — F41.1 GENERALIZED ANXIETY DISORDER: Primary | ICD-10-CM

## 2020-10-09 DIAGNOSIS — E11.22 TYPE 2 DIABETES MELLITUS WITH STAGE 3 CHRONIC KIDNEY DISEASE, WITHOUT LONG-TERM CURRENT USE OF INSULIN, UNSPECIFIED WHETHER STAGE 3A OR 3B CKD (HCC): Primary | ICD-10-CM

## 2020-10-09 DIAGNOSIS — D64.9 ANEMIA, UNSPECIFIED TYPE: ICD-10-CM

## 2020-10-09 DIAGNOSIS — Z71.51 DRUG ABUSE COUNSELING AND SURVEILLANCE OF DRUG ABUSER: ICD-10-CM

## 2020-10-09 DIAGNOSIS — N18.30 STAGE 3 CHRONIC KIDNEY DISEASE, UNSPECIFIED WHETHER STAGE 3A OR 3B CKD (HCC): Chronic | ICD-10-CM

## 2020-10-09 DIAGNOSIS — N18.30 TYPE 2 DIABETES MELLITUS WITH STAGE 3 CHRONIC KIDNEY DISEASE, WITHOUT LONG-TERM CURRENT USE OF INSULIN, UNSPECIFIED WHETHER STAGE 3A OR 3B CKD (HCC): Primary | ICD-10-CM

## 2020-10-09 DIAGNOSIS — I10 ESSENTIAL HYPERTENSION: Chronic | ICD-10-CM

## 2020-10-09 DIAGNOSIS — K21.9 GERD WITHOUT ESOPHAGITIS: ICD-10-CM

## 2020-10-09 DIAGNOSIS — Z00.00 MEDICARE ANNUAL WELLNESS VISIT, SUBSEQUENT: ICD-10-CM

## 2020-10-09 DIAGNOSIS — E78.5 HYPERLIPIDEMIA, UNSPECIFIED HYPERLIPIDEMIA TYPE: ICD-10-CM

## 2020-10-09 LAB
CREAT UR-MCNC: 156 MG/DL
MICROALBUMIN UR-MCNC: 255 MG/L (ref 0–20)
MICROALBUMIN/CREAT 24H UR: 163 MG/G CREATININE (ref 0–30)
SL AMB POCT HEMOGLOBIN AIC: 7.1 (ref ?–6.5)

## 2020-10-09 PROCEDURE — G0009 ADMIN PNEUMOCOCCAL VACCINE: HCPCS | Performed by: INTERNAL MEDICINE

## 2020-10-09 PROCEDURE — 82043 UR ALBUMIN QUANTITATIVE: CPT | Performed by: INTERNAL MEDICINE

## 2020-10-09 PROCEDURE — 80307 DRUG TEST PRSMV CHEM ANLYZR: CPT | Performed by: INTERNAL MEDICINE

## 2020-10-09 PROCEDURE — 80307 DRUG TEST PRSMV CHEM ANLYZR: CPT

## 2020-10-09 PROCEDURE — 82570 ASSAY OF URINE CREATININE: CPT | Performed by: INTERNAL MEDICINE

## 2020-10-09 PROCEDURE — 99214 OFFICE O/P EST MOD 30 MIN: CPT | Performed by: INTERNAL MEDICINE

## 2020-10-09 PROCEDURE — 83036 HEMOGLOBIN GLYCOSYLATED A1C: CPT | Performed by: INTERNAL MEDICINE

## 2020-10-09 PROCEDURE — 90732 PPSV23 VACC 2 YRS+ SUBQ/IM: CPT | Performed by: INTERNAL MEDICINE

## 2020-10-09 PROCEDURE — 36415 COLL VENOUS BLD VENIPUNCTURE: CPT

## 2020-10-09 PROCEDURE — G0439 PPPS, SUBSEQ VISIT: HCPCS | Performed by: INTERNAL MEDICINE

## 2020-10-09 RX ORDER — A/SINGAPORE/GP1908/2015 IVR-180 (AN A/MICHIGAN/45/2015 (H1N1)PDM09-LIKE VIRUS, A/HONG KONG/4801/2014, NYMC X-263B (H3N2) (AN A/HONG KONG/4801/2014-LIKE VIRUS), AND B/BRISBANE/60/2008, WILD TYPE (A B/BRISBANE/60/2008-LIKE VIRUS) 15; 15; 15 UG/.5ML; UG/.5ML; UG/.5ML
INJECTION, SUSPENSION INTRAMUSCULAR
COMMUNITY
Start: 2020-09-19 | End: 2021-08-17

## 2020-10-10 LAB
AMPHETAMINES UR QL SCN: NEGATIVE NG/ML
BARBITURATES UR QL SCN: NEGATIVE NG/ML
BENZODIAZ UR QL: NEGATIVE NG/ML
BZE UR QL: NEGATIVE NG/ML
CANNABINOIDS UR QL SCN: NEGATIVE NG/ML
METHADONE UR QL SCN: NEGATIVE NG/ML
OPIATES UR QL: NEGATIVE NG/ML
PCP UR QL: NEGATIVE NG/ML
PROPOXYPH UR QL SCN: NEGATIVE NG/ML

## 2020-10-12 LAB — MISCELLANEOUS LAB TEST RESULT: NORMAL

## 2020-11-03 ENCOUNTER — OFFICE VISIT (OUTPATIENT)
Dept: NEPHROLOGY | Facility: CLINIC | Age: 75
End: 2020-11-03
Payer: MEDICARE

## 2020-11-03 VITALS
DIASTOLIC BLOOD PRESSURE: 76 MMHG | TEMPERATURE: 97.3 F | BODY MASS INDEX: 32.59 KG/M2 | HEART RATE: 86 BPM | WEIGHT: 232.8 LBS | OXYGEN SATURATION: 97 % | HEIGHT: 71 IN | SYSTOLIC BLOOD PRESSURE: 142 MMHG

## 2020-11-03 DIAGNOSIS — N20.0 NEPHROLITHIASIS: ICD-10-CM

## 2020-11-03 DIAGNOSIS — E55.9 VITAMIN D DEFICIENCY: ICD-10-CM

## 2020-11-03 DIAGNOSIS — R80.9 PROTEINURIA, UNSPECIFIED TYPE: ICD-10-CM

## 2020-11-03 DIAGNOSIS — N25.81 SECONDARY HYPERPARATHYROIDISM OF RENAL ORIGIN (HCC): ICD-10-CM

## 2020-11-03 DIAGNOSIS — E11.8 TYPE 2 DIABETES MELLITUS WITH COMPLICATION (HCC): ICD-10-CM

## 2020-11-03 DIAGNOSIS — E11.22 TYPE 2 DIABETES MELLITUS WITH STAGE 3B CHRONIC KIDNEY DISEASE, WITHOUT LONG-TERM CURRENT USE OF INSULIN (HCC): ICD-10-CM

## 2020-11-03 DIAGNOSIS — N18.32 STAGE 3B CHRONIC KIDNEY DISEASE (HCC): Primary | Chronic | ICD-10-CM

## 2020-11-03 DIAGNOSIS — N18.32 TYPE 2 DIABETES MELLITUS WITH STAGE 3B CHRONIC KIDNEY DISEASE, WITHOUT LONG-TERM CURRENT USE OF INSULIN (HCC): ICD-10-CM

## 2020-11-03 PROCEDURE — 99204 OFFICE O/P NEW MOD 45 MIN: CPT | Performed by: INTERNAL MEDICINE

## 2020-11-06 ENCOUNTER — LAB (OUTPATIENT)
Dept: LAB | Facility: CLINIC | Age: 75
End: 2020-11-06
Payer: MEDICARE

## 2020-11-06 DIAGNOSIS — N18.32 STAGE 3B CHRONIC KIDNEY DISEASE (HCC): Chronic | ICD-10-CM

## 2020-11-06 DIAGNOSIS — N25.81 SECONDARY HYPERPARATHYROIDISM OF RENAL ORIGIN (HCC): ICD-10-CM

## 2020-11-06 DIAGNOSIS — R80.9 PROTEINURIA, UNSPECIFIED TYPE: ICD-10-CM

## 2020-11-06 DIAGNOSIS — E55.9 VITAMIN D DEFICIENCY: ICD-10-CM

## 2020-11-06 LAB
25(OH)D3 SERPL-MCNC: 25.6 NG/ML (ref 30–100)
PTH-INTACT SERPL-MCNC: 76.5 PG/ML (ref 18.4–80.1)

## 2020-11-06 PROCEDURE — 84165 PROTEIN E-PHORESIS SERUM: CPT | Performed by: PATHOLOGY

## 2020-11-06 PROCEDURE — 83970 ASSAY OF PARATHORMONE: CPT

## 2020-11-06 PROCEDURE — 84165 PROTEIN E-PHORESIS SERUM: CPT

## 2020-11-06 PROCEDURE — 84166 PROTEIN E-PHORESIS/URINE/CSF: CPT | Performed by: PATHOLOGY

## 2020-11-06 PROCEDURE — 36415 COLL VENOUS BLD VENIPUNCTURE: CPT

## 2020-11-06 PROCEDURE — 82306 VITAMIN D 25 HYDROXY: CPT

## 2020-11-06 PROCEDURE — 84166 PROTEIN E-PHORESIS/URINE/CSF: CPT | Performed by: INTERNAL MEDICINE

## 2020-11-09 LAB
ALBUMIN SERPL ELPH-MCNC: 3.75 G/DL (ref 3.5–5)
ALBUMIN SERPL ELPH-MCNC: 54.3 % (ref 52–65)
ALBUMIN UR ELPH-MCNC: 100 %
ALPHA1 GLOB MFR UR ELPH: 0 %
ALPHA1 GLOB SERPL ELPH-MCNC: 0.33 G/DL (ref 0.1–0.4)
ALPHA1 GLOB SERPL ELPH-MCNC: 4.8 % (ref 2.5–5)
ALPHA2 GLOB MFR UR ELPH: 0 %
ALPHA2 GLOB SERPL ELPH-MCNC: 0.75 G/DL (ref 0.4–1.2)
ALPHA2 GLOB SERPL ELPH-MCNC: 10.9 % (ref 7–13)
B-GLOBULIN MFR UR ELPH: 0 %
BETA GLOB ABNORMAL SERPL ELPH-MCNC: 0.51 G/DL (ref 0.4–0.8)
BETA1 GLOB SERPL ELPH-MCNC: 7.4 % (ref 5–13)
BETA2 GLOB SERPL ELPH-MCNC: 7.4 % (ref 2–8)
BETA2+GAMMA GLOB SERPL ELPH-MCNC: 0.51 G/DL (ref 0.2–0.5)
GAMMA GLOB ABNORMAL SERPL ELPH-MCNC: 1.05 G/DL (ref 0.5–1.6)
GAMMA GLOB MFR UR ELPH: 0 %
GAMMA GLOB SERPL ELPH-MCNC: 15.2 % (ref 12–22)
IGG/ALB SER: 1.19 {RATIO} (ref 1.1–1.8)
PROT PATTERN SERPL ELPH-IMP: ABNORMAL
PROT PATTERN UR ELPH-IMP: ABNORMAL
PROT SERPL-MCNC: 6.9 G/DL (ref 6.4–8.2)
PROT UR-MCNC: 46 MG/DL

## 2020-12-10 ENCOUNTER — OFFICE VISIT (OUTPATIENT)
Dept: NEPHROLOGY | Facility: CLINIC | Age: 75
End: 2020-12-10
Payer: MEDICARE

## 2020-12-10 VITALS
DIASTOLIC BLOOD PRESSURE: 72 MMHG | BODY MASS INDEX: 32.7 KG/M2 | SYSTOLIC BLOOD PRESSURE: 128 MMHG | HEIGHT: 71 IN | WEIGHT: 233.6 LBS

## 2020-12-10 DIAGNOSIS — N25.81 SECONDARY HYPERPARATHYROIDISM OF RENAL ORIGIN (HCC): ICD-10-CM

## 2020-12-10 DIAGNOSIS — I10 ESSENTIAL HYPERTENSION: Chronic | ICD-10-CM

## 2020-12-10 DIAGNOSIS — N18.32 STAGE 3B CHRONIC KIDNEY DISEASE (HCC): Primary | Chronic | ICD-10-CM

## 2020-12-10 DIAGNOSIS — I48.20 CHRONIC ATRIAL FIBRILLATION (HCC): Chronic | ICD-10-CM

## 2020-12-10 DIAGNOSIS — I51.7 MILD CONCENTRIC LEFT VENTRICULAR HYPERTROPHY (LVH): ICD-10-CM

## 2020-12-10 DIAGNOSIS — E11.21 DIABETIC NEPHROPATHY ASSOCIATED WITH TYPE 2 DIABETES MELLITUS (HCC): ICD-10-CM

## 2020-12-10 DIAGNOSIS — N20.0 NEPHROLITHIASIS: ICD-10-CM

## 2020-12-10 DIAGNOSIS — R80.9 PROTEINURIA, UNSPECIFIED TYPE: ICD-10-CM

## 2020-12-10 PROCEDURE — 99214 OFFICE O/P EST MOD 30 MIN: CPT | Performed by: INTERNAL MEDICINE

## 2020-12-10 RX ORDER — EMPAGLIFLOZIN 10 MG/1
10 TABLET, FILM COATED ORAL EVERY MORNING
Qty: 30 TABLET | Refills: 1 | Status: SHIPPED | OUTPATIENT
Start: 2020-12-10 | End: 2021-01-11

## 2021-01-11 ENCOUNTER — APPOINTMENT (OUTPATIENT)
Dept: LAB | Facility: CLINIC | Age: 76
End: 2021-01-11
Payer: MEDICARE

## 2021-01-11 ENCOUNTER — OFFICE VISIT (OUTPATIENT)
Dept: INTERNAL MEDICINE CLINIC | Facility: CLINIC | Age: 76
End: 2021-01-11
Payer: MEDICARE

## 2021-01-11 VITALS
HEART RATE: 92 BPM | SYSTOLIC BLOOD PRESSURE: 126 MMHG | HEIGHT: 71 IN | TEMPERATURE: 96.3 F | DIASTOLIC BLOOD PRESSURE: 68 MMHG | WEIGHT: 229.5 LBS | BODY MASS INDEX: 32.13 KG/M2 | OXYGEN SATURATION: 100 %

## 2021-01-11 DIAGNOSIS — E78.5 HYPERLIPIDEMIA, UNSPECIFIED HYPERLIPIDEMIA TYPE: ICD-10-CM

## 2021-01-11 DIAGNOSIS — I48.20 CHRONIC ATRIAL FIBRILLATION (HCC): ICD-10-CM

## 2021-01-11 DIAGNOSIS — Z13.31 NEGATIVE DEPRESSION SCREENING: ICD-10-CM

## 2021-01-11 DIAGNOSIS — N18.32 STAGE 3B CHRONIC KIDNEY DISEASE (HCC): Chronic | ICD-10-CM

## 2021-01-11 DIAGNOSIS — E11.9 ENCOUNTER FOR DIABETIC FOOT EXAM (HCC): ICD-10-CM

## 2021-01-11 DIAGNOSIS — Z12.5 SCREENING FOR PROSTATE CANCER: ICD-10-CM

## 2021-01-11 DIAGNOSIS — D64.9 ANEMIA, UNSPECIFIED TYPE: ICD-10-CM

## 2021-01-11 DIAGNOSIS — I48.20 CHRONIC ATRIAL FIBRILLATION (HCC): Chronic | ICD-10-CM

## 2021-01-11 DIAGNOSIS — I10 ESSENTIAL HYPERTENSION: Chronic | ICD-10-CM

## 2021-01-11 DIAGNOSIS — Z13.29 SCREENING FOR THYROID DISORDER: ICD-10-CM

## 2021-01-11 DIAGNOSIS — Z13.1 SCREENING FOR DIABETES MELLITUS (DM): ICD-10-CM

## 2021-01-11 DIAGNOSIS — N18.32 TYPE 2 DIABETES MELLITUS WITH STAGE 3B CHRONIC KIDNEY DISEASE, WITHOUT LONG-TERM CURRENT USE OF INSULIN (HCC): ICD-10-CM

## 2021-01-11 DIAGNOSIS — E11.42 DIABETIC POLYNEUROPATHY ASSOCIATED WITH TYPE 2 DIABETES MELLITUS (HCC): Chronic | ICD-10-CM

## 2021-01-11 DIAGNOSIS — Z79.01 CHRONIC ANTICOAGULATION: ICD-10-CM

## 2021-01-11 DIAGNOSIS — F41.1 GENERALIZED ANXIETY DISORDER: ICD-10-CM

## 2021-01-11 DIAGNOSIS — E11.22 TYPE 2 DIABETES MELLITUS WITH STAGE 3B CHRONIC KIDNEY DISEASE, WITHOUT LONG-TERM CURRENT USE OF INSULIN (HCC): ICD-10-CM

## 2021-01-11 DIAGNOSIS — Z13.0 SCREENING FOR DEFICIENCY ANEMIA: ICD-10-CM

## 2021-01-11 DIAGNOSIS — R41.3 MEMORY DIFFICULTIES: ICD-10-CM

## 2021-01-11 DIAGNOSIS — E11.22 TYPE 2 DIABETES MELLITUS WITH STAGE 3B CHRONIC KIDNEY DISEASE, WITHOUT LONG-TERM CURRENT USE OF INSULIN (HCC): Primary | ICD-10-CM

## 2021-01-11 DIAGNOSIS — N25.81 SECONDARY HYPERPARATHYROIDISM OF RENAL ORIGIN (HCC): ICD-10-CM

## 2021-01-11 DIAGNOSIS — Z23 ENCOUNTER FOR IMMUNIZATION: ICD-10-CM

## 2021-01-11 DIAGNOSIS — Z13.220 SCREENING FOR LIPID DISORDERS: ICD-10-CM

## 2021-01-11 DIAGNOSIS — K21.9 GERD WITHOUT ESOPHAGITIS: ICD-10-CM

## 2021-01-11 DIAGNOSIS — N18.32 TYPE 2 DIABETES MELLITUS WITH STAGE 3B CHRONIC KIDNEY DISEASE, WITHOUT LONG-TERM CURRENT USE OF INSULIN (HCC): Primary | ICD-10-CM

## 2021-01-11 DIAGNOSIS — E11.21 DIABETIC NEPHROPATHY ASSOCIATED WITH TYPE 2 DIABETES MELLITUS (HCC): ICD-10-CM

## 2021-01-11 DIAGNOSIS — Z23 ENCOUNTER FOR VACCINATION: ICD-10-CM

## 2021-01-11 LAB
ALBUMIN SERPL BCP-MCNC: 3.7 G/DL (ref 3.5–5)
ALP SERPL-CCNC: 79 U/L (ref 46–116)
ALT SERPL W P-5'-P-CCNC: 21 U/L (ref 12–78)
ANION GAP SERPL CALCULATED.3IONS-SCNC: 6 MMOL/L (ref 4–13)
AST SERPL W P-5'-P-CCNC: 11 U/L (ref 5–45)
BILIRUB SERPL-MCNC: 0.67 MG/DL (ref 0.2–1)
BUN SERPL-MCNC: 20 MG/DL (ref 5–25)
CALCIUM SERPL-MCNC: 9.2 MG/DL (ref 8.3–10.1)
CHLORIDE SERPL-SCNC: 114 MMOL/L (ref 100–108)
CO2 SERPL-SCNC: 23 MMOL/L (ref 21–32)
CREAT SERPL-MCNC: 1.62 MG/DL (ref 0.6–1.3)
GFR SERPL CREATININE-BSD FRML MDRD: 41 ML/MIN/1.73SQ M
GLUCOSE P FAST SERPL-MCNC: 180 MG/DL (ref 65–99)
LDLC SERPL DIRECT ASSAY-MCNC: 68 MG/DL (ref 0–100)
POTASSIUM SERPL-SCNC: 4.4 MMOL/L (ref 3.5–5.3)
PROT SERPL-MCNC: 7.5 G/DL (ref 6.4–8.2)
PSA SERPL-MCNC: 2.8 NG/ML (ref 0–4)
SL AMB POCT HEMOGLOBIN AIC: 7.2 (ref ?–6.5)
SODIUM SERPL-SCNC: 143 MMOL/L (ref 136–145)
T4 FREE SERPL-MCNC: 1.15 NG/DL (ref 0.76–1.46)
TRIGL SERPL-MCNC: 140 MG/DL
TSH SERPL DL<=0.05 MIU/L-ACNC: 4.19 UIU/ML (ref 0.36–3.74)

## 2021-01-11 PROCEDURE — 36415 COLL VENOUS BLD VENIPUNCTURE: CPT

## 2021-01-11 PROCEDURE — G0103 PSA SCREENING: HCPCS

## 2021-01-11 PROCEDURE — G0008 ADMIN INFLUENZA VIRUS VAC: HCPCS | Performed by: INTERNAL MEDICINE

## 2021-01-11 PROCEDURE — 83721 ASSAY OF BLOOD LIPOPROTEIN: CPT

## 2021-01-11 PROCEDURE — 99214 OFFICE O/P EST MOD 30 MIN: CPT | Performed by: INTERNAL MEDICINE

## 2021-01-11 PROCEDURE — 90662 IIV NO PRSV INCREASED AG IM: CPT | Performed by: INTERNAL MEDICINE

## 2021-01-11 PROCEDURE — 83036 HEMOGLOBIN GLYCOSYLATED A1C: CPT | Performed by: INTERNAL MEDICINE

## 2021-01-11 PROCEDURE — 80053 COMPREHEN METABOLIC PANEL: CPT

## 2021-01-11 PROCEDURE — 84443 ASSAY THYROID STIM HORMONE: CPT

## 2021-01-11 PROCEDURE — 84439 ASSAY OF FREE THYROXINE: CPT

## 2021-01-11 PROCEDURE — 84478 ASSAY OF TRIGLYCERIDES: CPT

## 2021-01-11 NOTE — PROGRESS NOTES
BMI Counseling: Body mass index is 32 01 kg/m²  The BMI is above normal  Nutrition recommendations include decreasing portion sizes and encouraging healthy choices of fruits and vegetables  Exercise recommendations include moderate physical activity 150 minutes/week  No pharmacotherapy was ordered         Assessment/Plan:  Problem List Items Addressed This Visit        Digestive    GERD without esophagitis       Endocrine    Diabetic nephropathy associated with type 2 diabetes mellitus ([facility] )    Relevant Medications    Empagliflozin 25 MG TABS    Diabetic polyneuropathy associated with type 2 diabetes mellitus (HCC) (Chronic)    Relevant Medications    Empagliflozin 25 MG TABS    Type 2 diabetes mellitus with diabetic chronic kidney disease (HCC) - Primary    Relevant Medications    Empagliflozin 25 MG TABS    Other Relevant Orders    Comprehensive metabolic panel    Diabetic foot exam    POCT hemoglobin A1c (Completed)       Cardiovascular and Mediastinum    Chronic atrial fibrillation (Chronic)    Relevant Orders    TSH, 3rd generation with Free T4 reflex    Hypertension (Chronic)       Genitourinary    CKD (chronic kidney disease) stage 3, GFR 30-59 ml/min (Chronic)    Relevant Orders    Comprehensive metabolic panel       Other    Anemia    Chronic anticoagulation    Generalized anxiety disorder    Hyperlipidemia    Relevant Orders    Comprehensive metabolic panel    LDL cholesterol, direct    Triglycerides    TSH, 3rd generation with Free T4 reflex    Memory difficulties      Other Visit Diagnoses     Encounter for immunization        Relevant Orders    influenza vaccine, high-dose, PF 0 7 mL (FLUZONE HIGH-DOSE) (Completed)    Encounter for diabetic foot exam ([place] [id number] )        Screening for prostate cancer        Relevant Orders    PSA, Total Screen    Screening for lipid disorders        Screening for diabetes mellitus (DM)        Screening for thyroid disorder        Screening for deficiency anemia        Negative depression screening        Secondary hyperparathyroidism of renal origin [facility])        Encounter for vaccination        Relevant Orders    influenza vaccine, high-dose, PF 0 5 mL           Diagnoses and all orders for this visit:    Type 2 diabetes mellitus with stage 3b chronic kidney disease, without long-term current use of insulin (HCC)  -     Comprehensive metabolic panel; Future  -     Diabetic foot exam; Future  -     POCT hemoglobin A1c  -     Empagliflozin 25 MG TABS; Take 1 tablet (25 mg total) by mouth every morning    Encounter for immunization  -     influenza vaccine, high-dose, PF 0 7 mL (FLUZONE HIGH-DOSE)    Diabetic polyneuropathy associated with type 2 diabetes mellitus (HCC)    GERD without esophagitis    Essential hypertension    Chronic atrial fibrillation  -     TSH, 3rd generation with Free T4 reflex; Future    Stage 3b chronic kidney disease  -     Comprehensive metabolic panel; Future    Anemia, unspecified type    Generalized anxiety disorder    Hyperlipidemia, unspecified hyperlipidemia type  -     Comprehensive metabolic panel; Future  -     LDL cholesterol, direct; Future  -     Triglycerides; Future  -     TSH, 3rd generation with Free T4 reflex; Future    Chronic anticoagulation    Encounter for diabetic foot exam ([place] [address] )    Screening for prostate cancer  -     PSA, Total Screen; Future    Screening for lipid disorders    Screening for diabetes mellitus (DM)    Screening for thyroid disorder    Screening for deficiency anemia    Negative depression screening    Secondary hyperparathyroidism of renal origin ([facility] )    Encounter for vaccination  -     influenza vaccine, high-dose, PF 0 5 mL    Memory difficulties    Diabetic nephropathy associated with type 2 diabetes mellitus ([facility] )        No problem-specific Assessment & Plan notes found for this encounter  A/P: Doing well and in office HgA1c was 7 2  Will increase his SGLT2    Discussed BMI and will give information on diet and exercises  Will up date his flu vaccine  CHeck routine labs  Continue current treatment  RTC three months for routine  Subjective:      Patient ID: Jorge Luis Weinstein is a 76 y o  male  WM RTC for f/u dm, htn, etc  Doing well and no new issues  Remains active w/o difficulty and no falls  Sugars less than 140 and no low sugar events  Denies CP, SOB, edema, palpitations, orthopnea, or PND  No reflux  Remains on DOAC and no s/s of bleeding  ALMA and chronic pain is manageable  Due for labs and vaccines  The following portions of the patient's history were reviewed and updated as appropriate:   He has a past medical history of Acute on chronic renal insufficiency, Allergic rhinitis, Anemia (6/11/2012), Atrial fibrillation ([facility] [address] ), Cerebrovascular accident (CVA) ([facility] [address] ), Controlled type 2 diabetes mellitus without complication ([facility] [address] ), Generalized anxiety disorder, GERD without esophagitis (6/11/2012), Hypertension, Memory difficulties (1/8/2019), Nephrolithiasis (3/17/2016), Obesity (6/11/2012), and Osteoarthritis (6/11/2012)  ,  does not have any pertinent problems on file  ,   has a past surgical history that includes Total hip arthroplasty; Knee surgery; Cataract extraction, bilateral; Total hip arthroplasty (Bilateral, 2011); pr cystourethroscopy,ureter catheter (Left, 11/8/2019); and Cystoscopy w/ laser lithotripsy (Left, 1/20/2020)  ,  family history includes Arthritis in his mother; No Known Problems in his father; Parkinsonism in his mother  ,   reports that he has never smoked  He has never used smokeless tobacco  He reports that he does not drink alcohol or use drugs  ,  is allergic to pollen extract     Current Outpatient Medications   Medication Sig Dispense Refill    ALPRAZolam (XANAX) 0 5 mg tablet Take 1 tablet (0 5 mg total) by mouth every 8 (eight) hours as needed (AS NEEDED) 90 tablet 0    amLODIPine (NORVASC) 10 mg tablet Take 1 tablet (10 mg total) by mouth daily 90 tablet 3    atorvastatin (LIPITOR) 20 mg tablet take 1 tablet by mouth once daily 90 tablet 2    Blood Glucose Monitoring Suppl (ONE TOUCH ULTRA 2) w/Device KIT by Does not apply route      donepezil (ARICEPT) 10 mg tablet Take 1 tablet (10 mg total) by mouth daily at bedtime 90 tablet 1    dorzolamide-timolol (COSOPT) 22 3-6 8 MG/ML ophthalmic solution instill 1 drop into both eyes twice a day      glucose blood test strip 1 Squirt by In Vitro route daily      Menthol-Methyl Salicylate (RODRI DE LOS SANTOS GREASELESS) 10-15 % greaseless cream Apply topically daily at bedtime 30 g 0    metFORMIN (GLUCOPHAGE) 850 mg tablet Take 2 tablets (1,700 mg total) by mouth 3 (three) times a day      metoprolol succinate (TOPROL-XL) 100 mg 24 hr tablet Take 1 tablet (100 mg total) by mouth daily 90 tablet 1    ONETOUCH DELICA LANCETS 21H MISC by Does not apply route daily      rivaroxaban (Xarelto) 15 mg tablet Take 1 tablet (15 mg total) by mouth daily with breakfast 90 tablet 3    Empagliflozin 25 MG TABS Take 1 tablet (25 mg total) by mouth every morning 90 tablet 1    Fluad Quadrivalent 0 5 ML PRSY inject 0 5 milliliters intramuscularly       No current facility-administered medications for this visit  Review of Systems   Constitutional: Negative for activity change, chills, diaphoresis, fatigue and fever  HENT: Negative  Eyes: Negative for visual disturbance  Respiratory: Negative for cough, chest tightness, shortness of breath and wheezing  Cardiovascular: Negative for chest pain, palpitations and leg swelling  Gastrointestinal: Negative for abdominal pain, constipation, diarrhea, nausea and vomiting  Endocrine: Negative for cold intolerance and heat intolerance  Genitourinary: Negative for difficulty urinating, dysuria and frequency  Musculoskeletal: Negative for arthralgias, gait problem and myalgias  Neurological: Negative for dizziness, seizures, syncope, light-headedness and headaches     Psychiatric/Behavioral: Negative for confusion, dysphoric mood and sleep disturbance  The patient is not nervous/anxious  PHQ-9 Depression Screening    PHQ-9:   Frequency of the following problems over the past two weeks:      Little interest or pleasure in doing things: 0 - not at all  Feeling down, depressed, or hopeless: 0 - not at all  PHQ-2 Score: 0        Objective:  Vitals:    01/11/21 1122   BP: 126/68   BP Location: Left arm   Patient Position: Sitting   Cuff Size: Standard   Pulse: 92   Temp: (!) 96 3 °F (35 7 °C)   TempSrc: Tympanic   SpO2: 100%   Weight: 104 kg (229 lb 8 oz)   Height: 5' 11" (1 803 m)     Body mass index is 32 01 kg/m²  Physical Exam  Vitals signs and nursing note reviewed  Constitutional:       General: He is not in acute distress  Appearance: Normal appearance  He is not ill-appearing  HENT:      Head: Normocephalic and atraumatic  Mouth/Throat:      Mouth: Mucous membranes are moist    Eyes:      Extraocular Movements: Extraocular movements intact  Conjunctiva/sclera: Conjunctivae normal       Pupils: Pupils are equal, round, and reactive to light  Neck:      Musculoskeletal: Neck supple  Vascular: No carotid bruit  Cardiovascular:      Rate and Rhythm: Normal rate  Pulses:           Dorsalis pedis pulses are 1+ on the right side and 1+ on the left side  Posterior tibial pulses are 1+ on the right side and 1+ on the left side  Heart sounds: Normal heart sounds  Comments: Irregular irregular with GVR  Pulmonary:      Effort: Pulmonary effort is normal  No respiratory distress  Breath sounds: Normal breath sounds  No wheezing or rales  Abdominal:      General: Bowel sounds are normal  There is no distension  Palpations: Abdomen is soft  Tenderness: There is no abdominal tenderness  Musculoskeletal:      Right lower leg: No edema  Left lower leg: No edema     Feet:      Right foot:      Skin integrity: No ulcer, skin breakdown, erythema, warmth, callus or dry skin  Left foot:      Skin integrity: No ulcer, skin breakdown, erythema, warmth, callus or dry skin  Neurological:      General: No focal deficit present  Mental Status: He is alert and oriented to person, place, and time  Mental status is at baseline  Psychiatric:         Mood and Affect: Mood normal          Behavior: Behavior normal          Thought Content: Thought content normal          Judgment: Judgment normal          Patient's shoes and socks removed  Right Foot/Ankle   Right Foot Inspection  Skin Exam: skin normal and skin intact no dry skin, no warmth, no callus, no erythema, no maceration, no abnormal color, no pre-ulcer, no ulcer and no callus                          Toe Exam: ROM and strength within normal limitsno swelling, no tenderness, erythema and  no right toe deformity  Sensory       Monofilament testing: intact  Vascular  Capillary refills: < 3 seconds  The right DP pulse is 1+  The right PT pulse is 1+  Left Foot/Ankle  Left Foot Inspection  Skin Exam: skin normal and skin intactno dry skin, no warmth, no erythema, no maceration, normal color, no pre-ulcer, no ulcer and no callus                         Toe Exam: ROM and strength within normal limitsno swelling, no tenderness, no erythema and no left toe deformity                   Sensory       Monofilament: intact  Vascular  Capillary refills: < 3 seconds  The left DP pulse is 1+  The left PT pulse is 1+  Assign Risk Category:  No deformity present; No loss of protective sensation;        Risk: 0    BMI Counseling: Body mass index is 32 01 kg/m²  The BMI is above normal  Nutrition recommendations include reducing portion sizes, decreasing overall calorie intake, reducing intake of saturated fat and trans fat and reducing intake of cholesterol  Exercise recommendations include moderate aerobic physical activity for 150 minutes/week

## 2021-01-11 NOTE — PATIENT INSTRUCTIONS
Type 2 Diabetes in the Older Adult   WHAT YOU NEED TO KNOW:   The risk for type 2 diabetes increases as a person gets older  Type 2 diabetes means your pancreas does not make enough insulin, or your body does not use insulin well  Insulin helps move sugar out of the blood so it can be used for energy  Diabetes cannot be cured, but it can be managed  DISCHARGE INSTRUCTIONS:   Call or have someone close to you call your local emergency number (911 in the [address] [facility] [facility]) for any of the following:   · You have any of the following signs of a stroke:      ? Numbness or drooping on one side of your face     ? Weakness in an arm or leg    ? Confusion or difficulty speaking    ? Dizziness, a severe headache, or vision loss    · You have any of the following signs of a heart attack:      ? Squeezing, pressure, or pain in your chest    ? You may  also have any of the following:     § Discomfort or pain in your back, neck, jaw, stomach, or arm    § Shortness of breath    § Nausea or vomiting    § Lightheadedness or a sudden cold sweat    Return to the emergency department if:   · Your blood sugar level is higher than your goal and does not come down with treatment  · You have signs of a high blood sugar level, such as blurred or double vision  · You have signs of a high ketone level, such as fruity, sweet smelling breath, or shallow breathing  · You have symptoms of a low blood sugar level, such as trouble thinking, sweating, or a pounding heartbeat  · Your blood sugar level is lower than normal and does not improve with treatment  Call your doctor or diabetes care team if:   · You are vomiting or have diarrhea  · You have an upset stomach and cannot eat the foods on your meal plan  · You feel weak or more tired than usual     · You feel dizzy, have headaches, or are easily irritated  · Your skin is red, warm, dry, or swollen      · You have a wound that does not heal     · You have numbness in your arms or legs     · You have trouble coping with diabetes, or you feel anxious or depressed  · You have problems with your memory  · You have changes in your vision  · You have questions or concerns about your condition or care  Manage diabetes and prevent problems:  Sometimes type 2 diabetes can be managed with changes in nutrition and physical activity  · Work with your diabetes care team to create plans to meet your needs  Your diabetes care team may include a physician, nurse practitioner, and physician assistant  It may also include a diabetes nurse educator, dietitian, and an exercise specialist  Family members, or others who are close to you, may also be part of the team  You and your team will make goals and plans to manage diabetes and other health problems  For example, the plan will include how to manage medicines you may take for diabetes and for other health conditions  The plans and goals will be specific to your needs and abilities  Your plan will change as your needs and abilities change  · Manage other health issues as directed  Health issues may include high blood pressure, high cholesterol levels, and heart problems  Health issues may also include depression  Together you and your care team can create a plan to manage any other health issues  · Try to be physically active for 30 to 60 minutes most days of the week  Physical activity, such as exercise, helps keep your blood sugar level steady and lowers your risk for heart disease  Physical activity can help improve your balance and strength and lower your risk for falls  Start slowly  Activity can be done in 10-minute intervals  ? Set a goal for 30 minutes of aerobic activity at least 5 times a week  Aerobic activity helps your heart stay strong  Aerobic activity includes walking, bicycling, dancing, swimming, and raking leaves  ? Set a goal for strength training 2 times a week    Strength training helps you keep the muscles you have and build new muscles  Strength training includes lifting weights, climbing stairs, and doing yoga or kalina chi     ? Stay steady on your on your feet with balancing activities  These include walking backwards, standing on one foot, and walking heel to toe in a straight line  · Maintain a healthy weight  Ask your provider what a healthy weight is for you  A healthy weight can help you control diabetes and prevent heart disease  Ask your provider to help you create a weight loss plan if you are overweight  Weight loss of 10 to 15 pounds can help make a difference in managing diabetes  Together you and your care team can set manageable weight loss goals  · Know the risks if you choose to drink alcohol  Alcohol can cause your blood sugar levels to be low if you use insulin  Alcohol can cause high blood sugar levels and weight gain if you drink too much  Women 21 years or older and men 72 years or older should limit alcohol to 1 drink a day  Men 21 to 64 years should limit alcohol to 2 drinks a day  A drink of alcohol is 12 ounces of beer, 5 ounces of wine, or 1½ ounces of liquor  · Do not smoke  Nicotine and other chemicals in cigarettes can cause lung disease and other health problems  It can also cause blood vessel damage that makes diabetes more difficult to manage  Ask your healthcare provider for information if you currently smoke and need help to quit  Do not use e-cigarettes or smokeless tobacco in place of cigarettes or to help you quit  They still contain nicotine  Diabetes education:  Diabetes education will start right away  Members of your care team teach you, your family, and caregivers the following:  · How to check your blood sugar level: You will learn when to check your blood sugar level and what the level should be  You will learn what to do if your level is too high or too low  Write down the times of your checks and your levels   Take them to all follow-up appointments  · About diabetes medicine: You and your family members will be taught how to draw up and give insulin, if needed  You will learn how much insulin you need and what time to inject insulin  You will be taught when not to give insulin  Your team will also teach you how to dispose of needles and syringes  If you need oral diabetes medicine, you will be taught about side effects  You will also be taught when to take or not take the medicine  · About nutrition:  A dietitian will help you make a meal plan to keep your blood sugar level steady  You will learn how food affects your blood sugar levels  You will also learn to keep track of sugar and starchy foods (carbohydrates)  Do not skip meals  Your blood sugar level may drop too low if you have taken insulin and do not eat  · How to prevent complications:  Diabetes that is not well controlled can lead to health problems  Examples include foot sores, retinopathy (vision loss), and peripheral neuropathy (loss of feeling in your hands and feet)  Your team will help you know when to get regular checkups, such as vision checks  They will teach you how to watch for problems and when to get a problem checked  Other ways to manage diabetes:   · Check your feet every day for sores  Look at your whole foot, including the bottom, and between and under your toes  Check for wounds, corns, and calluses  Use a mirror to see the bottom of your feet  The skin on your feet may be shiny, tight, dry, or darker than normal  Your feet may also be cold and pale  Feel your feet by running your hands along the tops, bottoms, sides, and between your toes  Redness, swelling, and warmth are signs of blood flow problems that can lead to a foot ulcer  Do not try to remove corns or calluses yourself  · Wear medical alert identification  Wear medical alert jewelry or carry a card that says you have type 2 diabetes   Ask your healthcare provider where to get these items          · Ask about vaccines  You have a higher risk for serious illness if you get the flu, pneumonia, or hepatitis  Ask your healthcare provider if you should get a flu, pneumonia, shingles, or hepatitis B vaccine, and when to get the vaccine  · Get help from family and friends  You may need help checking your blood sugar level, giving insulin injections, or preparing your meals  You may also need help to check your feet for sores  Ask your family and friends to help you with these tasks  Talk to your care team if you need someone at home to help you  Follow up with your doctor or diabetes care team as directed: You will need to return to meet with different care team members  You may need tests to monitor for problems  Write down your questions so you remember to ask them during your visits  © Copyright [address] Information is for End User's use only and may not be sold, redistributed or otherwise used for commercial purposes  All illustrations and images included in CareNotes® are the copyrighted property of [employer] M [employer]  or [employer]   The above information is an  only  It is not intended as medical advice for individual conditions or treatments  Talk to your doctor, nurse or pharmacist before following any medical regimen to see if it is safe and effective for you  Obesity   AMBULATORY CARE:   Obesity  is when your body mass index (BMI) is greater than 30  Your healthcare provider will use your height and weight to measure your BMI  The risks of obesity include  many health problems, such as injuries or physical disability   You may need tests to check for the following:  · Diabetes    · High blood pressure or high cholesterol    · Heart disease    · Gallbladder or liver disease    · Cancer of the colon, breast, prostate, liver, or kidney    · Sleep apnea    · Arthritis or gout    Seek care immediately if:   · You have a severe headache, confusion, or difficulty speaking  · You have weakness on one side of your body  · You have chest pain, sweating, or shortness of breath  Contact your healthcare provider if:   · You have symptoms of gallbladder or liver disease, such as pain in your upper abdomen  · You have knee or hip pain and discomfort while walking  · You have symptoms of diabetes, such as intense hunger and thirst, and frequent urination  · You have symptoms of sleep apnea, such as snoring or daytime sleepiness  · You have questions or concerns about your condition or care  Treatment for obesity  focuses on helping you lose weight to improve your health  Even a small decrease in BMI can reduce the risk for many health problems  Your healthcare provider will help you set a weight-loss goal   · Lifestyle changes  are the first step in treating obesity  These include making healthy food choices and getting regular physical activity  Your healthcare provider may suggest a weight-loss program that involves coaching, education, and therapy  · Medicine  may help you lose weight when it is used with a healthy diet and physical activity  · Surgery  can help you lose weight if you are very obese and have other health problems  There are several types of weight-loss surgery  Ask your healthcare provider for more information  Be successful losing weight:   · Set small, realistic goals  An example of a small goal is to walk for 20 minutes 5 days a week  Anther goal is to lose 5% of your body weight  · Tell friends, family members, and coworkers about your goals  and ask for their support  Ask a friend to lose weight with you, or join a weight-loss support group  · Identify foods or triggers that may cause you to overeat , and find ways to avoid them  Remove tempting high-calorie foods from your home and workplace  Place a bowl of fresh fruit on your kitchen counter   If stress causes you to eat, then find other ways to cope with stress  · Keep a diary to track what you eat and drink  Also write down how many minutes of physical activity you do each day  Weigh yourself once a week and record it in your diary  Eating changes: You will need to eat 500 to 1,000 fewer calories each day than you currently eat to lose 1 to 2 pounds a week  The following changes will help you cut calories:  · Eat smaller portions  Use small plates, no larger than 9 inches in diameter  Fill your plate half full of fruits and vegetables  Measure your food using measuring cups until you know what a serving size looks like  · Eat 3 meals and 1 or 2 snacks each day  Plan your meals in advance  [place] and eat at home most of the time  Eat slowly  Do not skip meals  Skipping meals can lead to overeating later in the day  This can make it harder for you to lose weight  Talk with a dietitian to help you make a meal plan and schedule that is right for you  · Eat fruits and vegetables at every meal   They are low in calories and high in fiber, which makes you feel full  Do not add butter, margarine, or cream sauce to vegetables  Use herbs to season steamed vegetables  · Eat less fat and fewer fried foods  Eat more baked or grilled chicken and fish  These protein sources are lower in calories and fat than red meat  Limit fast food  Dress your salads with olive oil and vinegar instead of bottled dressing  · Limit the amount of sugar you eat  Do not drink sugary beverages  Limit alcohol  Activity changes:  Physical activity is good for your body in many ways  It helps you burn calories and build strong muscles  It decreases stress and depression, and improves your mood  It can also help you sleep better  Talk to your healthcare provider before you begin an exercise program   · Exercise for at least 30 minutes 5 days a week  Start slowly  Set aside time each day for physical activity that you enjoy and that is convenient for you   It is best to do both weight training and an activity that increases your heart rate, such as walking, bicycling, or swimming  · Find ways to be more active  Do yard work and housecleaning  Walk up the stairs instead of using elevators  Spend your leisure time going to events that require walking, such as outdoor festivals or fairs  This extra physical activity can help you lose weight and keep it off  Follow up with your healthcare provider as directed: You may need to meet with a dietitian  Write down your questions so you remember to ask them during your visits  © Copyright [address] [place] [address] Drive Information is for End User's use only and may not be sold, redistributed or otherwise used for commercial purposes  All illustrations and images included in CareNotes® are the copyrighted property of [employer]  or Chyna Tinajero   The above information is an  only  It is not intended as medical advice for individual conditions or treatments  Talk to your doctor, nurse or pharmacist before following any medical regimen to see if it is safe and effective for you  Low Fat Diet   AMBULATORY CARE:   A low-fat diet  is an eating plan that is low in total fat, unhealthy fat, and cholesterol  You may need to follow a low-fat diet if you have trouble digesting or absorbing fat  You may also need to follow this diet if you have high cholesterol  You can also lower your cholesterol by increasing the amount of fiber in your diet  Soluble fiber is a type of fiber that helps to decrease cholesterol levels  Different types of fat in food:   · Limit unhealthy fats  A diet that is high in cholesterol, saturated fat, and trans fat may cause unhealthy cholesterol levels  Unhealthy cholesterol levels increase your risk of heart disease  ? Cholesterol:  Limit intake of cholesterol to less than 200 mg per day  Cholesterol is found in meat, eggs, and dairy      ? Saturated fat:  Limit saturated fat to less than 7% of your total daily calories  Ask your dietitian how many calories you need each day  Saturated fat is found in butter, cheese, ice cream, whole milk, and palm oil  Saturated fat is also found in meat, such as beef, pork, chicken skin, and processed meats  Processed meats include sausage, hot dogs, and bologna  ? Trans fat:  Avoid trans fat as much as possible  Trans fat is used in fried and baked foods  Foods that say trans fat free on the label may still have up to 0 5 grams of trans fat per serving  · Include healthy fats  Replace foods that are high in saturated and trans fat with foods high in healthy fats  This may help to decrease high cholesterol levels  ? Monounsaturated fats: These are found in avocados, nuts, and vegetable oils, such as olive, canola, and sunflower oil  ? Polyunsaturated fats: These can be found in vegetable oils, such as soybean or corn oil  Omega-3 fats can help to decrease the risk of heart disease  Omega-3 fats are found in fish, such as salmon, herring, trout, and tuna  Omega-3 fats can also be found in plant foods, such as walnuts, flaxseed, soybeans, and canola oil  Foods to limit or avoid:   · Grains:      ? Snacks that are made with partially hydrogenated oils, such as chips, regular crackers, and butter-flavored popcorn    ? High-fat baked goods, such as biscuits, croissants, doughnuts, pies, cookies, and pastries    · Dairy:      ? Whole milk, 2% milk, and yogurt and ice cream made with whole milk    ? Half and half creamer, heavy cream, and whipping cream    ? Cheese, cream cheese, and sour cream    · Meats and proteins:      ? High-fat cuts of meat (T-bone steak, regular hamburger, and ribs)    ? Fried meat, poultry (turkey and chicken), and fish    ? Poultry (chicken and turkey) with skin    ? Cold cuts (salami or bologna), hot dogs, duarte, and sausage    ?  Whole eggs and egg yolks    · Vegetables and fruits with added fat:      ? Fried vegetables or vegetables in butter or high-fat sauces, such as cream or cheese sauces    ? Fried fruit or fruit served with butter or cream    · Fats:      ? Butter, stick margarine, and shortening    ? Coconut, palm oil, and palm kernel oil    Foods to include:   · Grains:      ? Whole-grain breads, cereals, pasta, and brown rice    ? Low-fat crackers and pretzels    · Vegetables and fruits:      ? Fresh, frozen, or canned vegetables (no salt or low-sodium)    ? Fresh, frozen, dried, or canned fruit (canned in light syrup or fruit juice)    ? Avocado    · Low-fat dairy products:      ? Nonfat (skim) or 1% milk    ? Nonfat or low-fat cheese, yogurt, and cottage cheese    · Meats and proteins:      ? Chicken or turkey with no skin    ? Baked or broiled fish    ? Lean beef and pork (loin, round, extra lean hamburger)    ? Beans and peas, unsalted nuts, soy products    ? Egg whites and substitutes    ? Seeds and nuts    · Fats:      ? Unsaturated oil, such as canola, olive, peanut, soybean, or sunflower oil    ? Soft or liquid margarine and vegetable oil spread    ? Low-fat salad dressing    Other ways to decrease fat:   · Read food labels before you buy foods  Choose foods that have less than 30% of calories from fat  Choose low-fat or fat-free dairy products  Remember that fat free does not mean calorie free  These foods still contain calories, and too many calories can lead to weight gain  · Trim fat from meat and avoid fried food  Trim all visible fat from meat before you cook it  Remove the skin from poultry  Do not robles meat, fish, or poultry  Bake, roast, boil, or broil these foods instead  Avoid fried foods  Eat a baked potato instead of [place] fries  Steam vegetables instead of sautéing them in butter  · Add less fat to foods  Use imitation duarte bits on salads and baked potatoes instead of regular duarte bits  Use fat-free or low-fat salad dressings instead of regular dressings   Use low-fat or nonfat butter-flavored topping instead of regular butter or margarine on popcorn and other foods  Ways to decrease fat in recipes:  Replace high-fat ingredients with low-fat or nonfat ones  This may cause baked goods to be drier than usual  You may need to use nonfat cooking spray on pans to prevent food from sticking  You also may need to change the amount of other ingredients, such as water, in the recipe  Try the following:  · Use low-fat or light margarine instead of regular margarine or shortening  · Use lean ground turkey breast or chicken, or lean ground beef (less than 5% fat) instead of hamburger  · Add 1 teaspoon of canola oil to 8 ounces of skim milk instead of using cream or half and half  · Use grated zucchini, carrots, or apples in breads instead of coconut  · Use blenderized, low-fat cottage cheese, plain tofu, or low-fat ricotta cheese instead of cream cheese  · Use 1 egg white and 1 teaspoon of canola oil, or use ¼ cup (2 ounces) of fat-free egg substitute instead of a whole egg  · Replace half of the oil that is called for in a recipe with applesauce when you bake  Use 3 tablespoons of cocoa powder and 1 tablespoon of canola oil instead of a square of baking chocolate  How to increase fiber:  Eat enough high-fiber foods to get 20 to 30 grams of fiber every day  Slowly increase your fiber intake to avoid stomach cramps, gas, and other problems  · Eat 3 ounces of whole-grain foods each day  An ounce is about 1 slice of bread  Eat whole-grain breads, such as whole-wheat bread  Whole wheat, whole-wheat flour, or other whole grains should be listed as the first ingredient on the food label  Replace white flour with whole-grain flour or use half of each in recipes  Whole-grain flour is heavier than white flour, so you may have to add more yeast or baking powder  · Eat a high-fiber cereal for breakfast   Oatmeal is a good source of soluble fiber   Look for cereals that have bran or fiber in the name  Choose whole-grain products, such as brown rice, barley, and whole-wheat pasta  · Eat more beans, peas, and lentils  For example, add beans to soups or salads  Eat at least 5 cups of fruits and vegetables each day  Eat fruits and vegetables with the peel because the peel is high in fiber  © Copyright [address] Information is for End User's use only and may not be sold, redistributed or otherwise used for commercial purposes  All illustrations and images included in CareNotes® are the copyrighted property of [employer]  or [address] [place] [address]  The above information is an  only  It is not intended as medical advice for individual conditions or treatments  Talk to your doctor, nurse or pharmacist before following any medical regimen to see if it is safe and effective for you  Heart Healthy Diet   AMBULATORY CARE:   A heart healthy diet  is an eating plan low in unhealthy fats and sodium (salt)  The plan is high in healthy fats and fiber  A heart healthy diet helps improve your cholesterol levels and lowers your risk for heart disease and stroke  A dietitian will teach you how to read and understand food labels  Heart healthy diet guidelines to follow:   · Choose foods that contain healthy fats  ? Unsaturated fats  include monounsaturated and polyunsaturated fats  Unsaturated fat is found in foods such as soybean, canola, olive, corn, and safflower oils  It is also found in soft tub margarine that is made with liquid vegetable oil  ? Omega-3 fat  is found in certain fish, such as salmon, tuna, and trout, and in walnuts and flaxseed  Eat fish high in omega-3 fats at least 2 times a week  · Get 20 to 30 grams of fiber each day  Fruits, vegetables, whole-grain foods, and legumes (cooked beans) are good sources of fiber  · Limit or do not have unhealthy fats  ?  Cholesterol  is found in animal foods, such as eggs and lobster, and in dairy products made from whole milk  Limit cholesterol to less than 200 mg each day  ? Saturated fat  is found in meats, such as duarte and hamburger  It is also found in chicken or turkey skin, whole milk, and butter  Limit saturated fat to less than 7% of your total daily calories  ? Trans fat  is found in packaged foods, such as potato chips and cookies  It is also in hard margarine, some fried foods, and shortening  Do not eat foods that contain trans fats  · Limit sodium as directed  You may be told to limit sodium to 2,000 to 2,300 mg each day  Choose low-sodium or no-salt-added foods  Add little or no salt to food you prepare  Use herbs and spices in place of salt  Include the following in your heart healthy plan:  Ask your dietitian or healthcare provider how many servings to have from each of the following food groups:  · Grains:      ? Whole-wheat breads, cereals, and pastas, and brown rice    ? Low-fat, low-sodium crackers and chips    · Vegetables:      ? Broccoli, green beans, green peas, and spinach    ? Collards, kale, and lima beans    ? Carrots, sweet potatoes, tomatoes, and peppers    ? Canned vegetables with no salt added    · Fruits:      ? Bananas, peaches, pears, and pineapple    ? Grapes, raisins, and dates    ? Oranges, tangerines, grapefruit, orange juice, and grapefruit juice    ? Apricots, mangoes, melons, and papaya    ? Raspberries and strawberries    ? Canned fruit with no added sugar    · Low-fat dairy:      ? Nonfat (skim) milk, 1% milk, and low-fat almond, cashew, or soy milks fortified with calcium    ? Low-fat cheese, regular or frozen yogurt, and cottage cheese    · Meats and proteins:      ? Lean cuts of beef and pork (loin, leg, round), skinless chicken and turkey    ? Legumes, soy products, egg whites, or nuts    Limit or do not include the following in your heart healthy plan:   · Unhealthy fats and oils:      ?  Whole or 2% milk, cream cheese, sour cream, or cheese    ? High-fat cuts of beef (T-bone steaks, ribs), chicken or turkey with skin, and organ meats such as liver    ? Butter, stick margarine, shortening, and cooking oils such as coconut or palm oil    · Foods and liquids high in sodium:      ? Packaged foods, such as frozen dinners, cookies, macaroni and cheese, and cereals with more than 300 mg of sodium per serving    ? Vegetables with added sodium, such as instant potatoes, vegetables with added sauces, or regular canned vegetables    ? Cured or smoked meats, such as hot dogs, duarte, and sausage    ? High-sodium ketchup, barbecue sauce, salad dressing, pickles, olives, soy sauce, or miso    · Foods and liquids high in sugar:      ? Candy, cake, cookies, pies, or doughnuts    ? Soft drinks (soda), sports drinks, or sweetened tea    ? Canned or dry mixes for cakes, soups, sauces, or gravies    Other healthy heart guidelines:   · Do not smoke  Nicotine and other chemicals in cigarettes and cigars can cause lung and heart damage  Ask your healthcare provider for information if you currently smoke and need help to quit  E-cigarettes or smokeless tobacco still contain nicotine  Talk to your healthcare provider before you use these products  · Limit or do not drink alcohol as directed  Alcohol can damage your heart and raise your blood pressure  Your healthcare provider may give you specific daily and weekly limits  The general recommended limit is 1 drink a day for women 21 or older and for men 72 or older  Do not have more than 3 drinks in a day or 7 in a week  The recommended limit is 2 drinks a day for men 24to 59years of age  Do not have more than 4 drinks in a day or 14 in a week  A drink of alcohol is 12 ounces of beer, 5 ounces of wine, or 1½ ounces of liquor  · Exercise regularly  Exercise can help you maintain a healthy weight and improve your blood pressure and cholesterol levels   Regular exercise can also decrease your risk for heart problems  Ask your healthcare provider about the best exercise plan for you  Do not start an exercise program without asking your healthcare provider  Follow up with your doctor or cardiologist as directed:  Write down your questions so you remember to ask them during your visits  © Copyright [address] Information is for End User's use only and may not be sold, redistributed or otherwise used for commercial purposes  All illustrations and images included in CareNotes® are the copyrighted property of A D A M , Inc  or [facility] Scot Tinajero   The above information is an  only  It is not intended as medical advice for individual conditions or treatments  Talk to your doctor, nurse or pharmacist before following any medical regimen to see if it is safe and effective for you

## 2021-01-12 DIAGNOSIS — N18.32 STAGE 3B CHRONIC KIDNEY DISEASE (HCC): Primary | Chronic | ICD-10-CM

## 2021-01-12 DIAGNOSIS — R94.6 ABNORMAL RESULTS OF THYROID FUNCTION STUDIES: ICD-10-CM

## 2021-01-12 DIAGNOSIS — R79.9 ABNORMAL BLOOD CHEMISTRY LEVEL: ICD-10-CM

## 2021-01-13 ENCOUNTER — TELEPHONE (OUTPATIENT)
Dept: INTERNAL MEDICINE CLINIC | Facility: CLINIC | Age: 76
End: 2021-01-13

## 2021-01-13 ENCOUNTER — DOCUMENTATION (OUTPATIENT)
Dept: INTERNAL MEDICINE CLINIC | Facility: CLINIC | Age: 76
End: 2021-01-13

## 2021-01-13 DIAGNOSIS — E11.22 TYPE 2 DIABETES MELLITUS WITH STAGE 3B CHRONIC KIDNEY DISEASE, WITHOUT LONG-TERM CURRENT USE OF INSULIN (HCC): Primary | ICD-10-CM

## 2021-01-13 DIAGNOSIS — N18.32 TYPE 2 DIABETES MELLITUS WITH STAGE 3B CHRONIC KIDNEY DISEASE, WITHOUT LONG-TERM CURRENT USE OF INSULIN (HCC): Primary | ICD-10-CM

## 2021-01-13 NOTE — TELEPHONE ENCOUNTER
Patients wife called stating that the jardiance that you prescribed is well over 300$ for one month  She is asking if you could send in invokana or victoza in its place?

## 2021-01-19 DIAGNOSIS — I10 HYPERTENSION, UNSPECIFIED TYPE: ICD-10-CM

## 2021-01-19 RX ORDER — AMLODIPINE BESYLATE 10 MG/1
10 TABLET ORAL DAILY
Qty: 90 TABLET | Refills: 3 | Status: SHIPPED | OUTPATIENT
Start: 2021-01-19 | End: 2022-03-29 | Stop reason: SDUPTHER

## 2021-01-25 DIAGNOSIS — I10 HYPERTENSION, UNSPECIFIED TYPE: ICD-10-CM

## 2021-01-25 RX ORDER — METOPROLOL SUCCINATE 100 MG/1
TABLET, EXTENDED RELEASE ORAL
Qty: 90 TABLET | Refills: 1 | Status: SHIPPED | OUTPATIENT
Start: 2021-01-25 | End: 2021-09-01 | Stop reason: SDUPTHER

## 2021-01-29 DIAGNOSIS — R41.3 MEMORY DIFFICULTIES: ICD-10-CM

## 2021-01-29 RX ORDER — DONEPEZIL HYDROCHLORIDE 10 MG/1
TABLET, FILM COATED ORAL
Qty: 90 TABLET | Refills: 1 | Status: SHIPPED | OUTPATIENT
Start: 2021-01-29 | End: 2021-09-08 | Stop reason: SDUPTHER

## 2021-02-16 DIAGNOSIS — F41.9 ANXIETY: ICD-10-CM

## 2021-02-16 DIAGNOSIS — E11.8 TYPE 2 DIABETES MELLITUS WITH COMPLICATION (HCC): ICD-10-CM

## 2021-02-16 RX ORDER — ALPRAZOLAM 0.5 MG/1
0.5 TABLET ORAL EVERY 8 HOURS PRN
Qty: 90 TABLET | Refills: 0 | Status: SHIPPED | OUTPATIENT
Start: 2021-02-16 | End: 2021-07-14 | Stop reason: SDUPTHER

## 2021-04-13 ENCOUNTER — APPOINTMENT (OUTPATIENT)
Dept: LAB | Facility: CLINIC | Age: 76
End: 2021-04-13
Payer: MEDICARE

## 2021-04-13 ENCOUNTER — OFFICE VISIT (OUTPATIENT)
Dept: INTERNAL MEDICINE CLINIC | Facility: CLINIC | Age: 76
End: 2021-04-13
Payer: MEDICARE

## 2021-04-13 VITALS
WEIGHT: 234.38 LBS | DIASTOLIC BLOOD PRESSURE: 76 MMHG | HEIGHT: 71 IN | OXYGEN SATURATION: 98 % | SYSTOLIC BLOOD PRESSURE: 130 MMHG | TEMPERATURE: 98.4 F | BODY MASS INDEX: 32.81 KG/M2 | HEART RATE: 112 BPM

## 2021-04-13 DIAGNOSIS — F41.1 GENERALIZED ANXIETY DISORDER: ICD-10-CM

## 2021-04-13 DIAGNOSIS — I48.20 CHRONIC ATRIAL FIBRILLATION (HCC): Chronic | ICD-10-CM

## 2021-04-13 DIAGNOSIS — N18.32 TYPE 2 DIABETES MELLITUS WITH STAGE 3B CHRONIC KIDNEY DISEASE, WITHOUT LONG-TERM CURRENT USE OF INSULIN (HCC): Primary | ICD-10-CM

## 2021-04-13 DIAGNOSIS — E78.5 HYPERLIPIDEMIA, UNSPECIFIED HYPERLIPIDEMIA TYPE: ICD-10-CM

## 2021-04-13 DIAGNOSIS — D64.9 ANEMIA, UNSPECIFIED TYPE: ICD-10-CM

## 2021-04-13 DIAGNOSIS — N18.32 STAGE 3B CHRONIC KIDNEY DISEASE (HCC): Chronic | ICD-10-CM

## 2021-04-13 DIAGNOSIS — K21.9 GERD WITHOUT ESOPHAGITIS: ICD-10-CM

## 2021-04-13 DIAGNOSIS — I10 ESSENTIAL HYPERTENSION: Chronic | ICD-10-CM

## 2021-04-13 DIAGNOSIS — E11.22 TYPE 2 DIABETES MELLITUS WITH STAGE 3B CHRONIC KIDNEY DISEASE, WITHOUT LONG-TERM CURRENT USE OF INSULIN (HCC): Primary | ICD-10-CM

## 2021-04-13 DIAGNOSIS — N25.81 SECONDARY HYPERPARATHYROIDISM OF RENAL ORIGIN (HCC): ICD-10-CM

## 2021-04-13 DIAGNOSIS — N18.30 STAGE 3 CHRONIC KIDNEY DISEASE, UNSPECIFIED WHETHER STAGE 3A OR 3B CKD (HCC): ICD-10-CM

## 2021-04-13 DIAGNOSIS — Z79.01 CHRONIC ANTICOAGULATION: ICD-10-CM

## 2021-04-13 LAB
ALBUMIN SERPL BCP-MCNC: 3.7 G/DL (ref 3.5–5)
ALP SERPL-CCNC: 79 U/L (ref 46–116)
ALT SERPL W P-5'-P-CCNC: 20 U/L (ref 12–78)
ANION GAP SERPL CALCULATED.3IONS-SCNC: 8 MMOL/L (ref 4–13)
AST SERPL W P-5'-P-CCNC: 11 U/L (ref 5–45)
BACTERIA UR QL AUTO: ABNORMAL /HPF
BILIRUB SERPL-MCNC: 0.63 MG/DL (ref 0.2–1)
BILIRUB UR QL STRIP: NEGATIVE
BUN SERPL-MCNC: 19 MG/DL (ref 5–25)
CALCIUM SERPL-MCNC: 9.3 MG/DL (ref 8.3–10.1)
CHLORIDE SERPL-SCNC: 109 MMOL/L (ref 100–108)
CLARITY UR: CLEAR
CO2 SERPL-SCNC: 22 MMOL/L (ref 21–32)
COLOR UR: YELLOW
CREAT SERPL-MCNC: 1.72 MG/DL (ref 0.6–1.3)
CREAT UR-MCNC: 82.2 MG/DL
GFR SERPL CREATININE-BSD FRML MDRD: 38 ML/MIN/1.73SQ M
GLUCOSE P FAST SERPL-MCNC: 185 MG/DL (ref 65–99)
GLUCOSE UR STRIP-MCNC: ABNORMAL MG/DL
HGB UR QL STRIP.AUTO: ABNORMAL
HYALINE CASTS #/AREA URNS LPF: ABNORMAL /LPF
KETONES UR STRIP-MCNC: NEGATIVE MG/DL
LEUKOCYTE ESTERASE UR QL STRIP: ABNORMAL
MICROALBUMIN UR-MCNC: 205 MG/L (ref 0–20)
MICROALBUMIN/CREAT 24H UR: 249 MG/G CREATININE (ref 0–30)
NITRITE UR QL STRIP: NEGATIVE
NON-SQ EPI CELLS URNS QL MICRO: ABNORMAL /HPF
PH UR STRIP.AUTO: 6 [PH]
POTASSIUM SERPL-SCNC: 4.6 MMOL/L (ref 3.5–5.3)
PROT SERPL-MCNC: 7.5 G/DL (ref 6.4–8.2)
PROT UR STRIP-MCNC: ABNORMAL MG/DL
PTH-INTACT SERPL-MCNC: 75.9 PG/ML (ref 18.4–80.1)
RBC #/AREA URNS AUTO: ABNORMAL /HPF
SL AMB POCT HEMOGLOBIN AIC: 7.5 (ref ?–6.5)
SODIUM SERPL-SCNC: 139 MMOL/L (ref 136–145)
SP GR UR STRIP.AUTO: 1.02 (ref 1–1.03)
UROBILINOGEN UR QL STRIP.AUTO: 0.2 E.U./DL
WBC #/AREA URNS AUTO: ABNORMAL /HPF

## 2021-04-13 PROCEDURE — 82043 UR ALBUMIN QUANTITATIVE: CPT | Performed by: INTERNAL MEDICINE

## 2021-04-13 PROCEDURE — 82570 ASSAY OF URINE CREATININE: CPT | Performed by: INTERNAL MEDICINE

## 2021-04-13 PROCEDURE — 99214 OFFICE O/P EST MOD 30 MIN: CPT | Performed by: INTERNAL MEDICINE

## 2021-04-13 PROCEDURE — 83036 HEMOGLOBIN GLYCOSYLATED A1C: CPT | Performed by: INTERNAL MEDICINE

## 2021-04-13 PROCEDURE — 81001 URINALYSIS AUTO W/SCOPE: CPT | Performed by: INTERNAL MEDICINE

## 2021-04-13 PROCEDURE — 83970 ASSAY OF PARATHORMONE: CPT

## 2021-04-13 PROCEDURE — 80053 COMPREHEN METABOLIC PANEL: CPT

## 2021-04-13 PROCEDURE — 36415 COLL VENOUS BLD VENIPUNCTURE: CPT

## 2021-04-13 NOTE — PROGRESS NOTES
Assessment/Plan:  Problem List Items Addressed This Visit        Digestive    GERD without esophagitis       Endocrine    Type 2 diabetes mellitus with diabetic chronic kidney disease ([facility]) - Primary    Relevant Medications    canagliflozin 300 MG TABS    Other Relevant Orders    POCT hemoglobin A1c (Completed)       Cardiovascular and Mediastinum    Hypertension (Chronic)    Chronic atrial fibrillation (Chronic)       Genitourinary    Stage 3 chronic kidney disease       Other    Hyperlipidemia    Generalized anxiety disorder    Chronic anticoagulation    Anemia           Diagnoses and all orders for this visit:    Type 2 diabetes mellitus with stage 3b chronic kidney disease, without long-term current use of insulin ([facility])  -     POCT hemoglobin A1c  -     canagliflozin 300 MG TABS; Take 1 tablet (300 mg total) by mouth daily before breakfast    Essential hypertension    Chronic atrial fibrillation    GERD without esophagitis    Anemia, unspecified type    Hyperlipidemia, unspecified hyperlipidemia type    Generalized anxiety disorder    Chronic anticoagulation    Stage 3 chronic kidney disease, unspecified whether stage 3a or 3b CKD        No problem-specific Assessment & Plan notes found for this encounter  A/P: Doing well, but in office HgA1c was 7 5  Will increase his SGLT2  Due for labs of his thyroid  Continue current treatment and RTC three months for routine  Subjective:      Patient ID: Isma Kaiser is a 76 y o  male  WM RTC for f/u DM, htn, etc  DOing ok and no new issues  Remains active w/o difficulty and no falls  Doesn't check sugars, but no low sugar events to report  Remains on chronic anticoaglulation and no bleeding reported  Denies stroke-like events, CP, SOB, palpitations, edema, orthopnea, or PND  No reflux  ALMA is controlled  Due for labs         The following portions of the patient's history were reviewed and updated as appropriate:   He has a past medical history of Acute on chronic renal insufficiency, Allergic rhinitis, Anemia (6/11/2012), Atrial fibrillation ([facility] [address] ), Cerebrovascular accident (CVA) ([facility] [address] ), Controlled type 2 diabetes mellitus without complication ([facility] ), Generalized anxiety disorder, GERD without esophagitis (6/11/2012), Hypertension, Memory difficulties (1/8/2019), Nephrolithiasis (3/17/2016), Obesity (6/11/2012), and Osteoarthritis (6/11/2012)  ,  does not have any pertinent problems on file  ,   has a past surgical history that includes Total hip arthroplasty; Knee surgery; Cataract extraction, bilateral; Total hip arthroplasty (Bilateral, 2011); pr cystourethroscopy,ureter catheter (Left, 11/8/2019); and Cystoscopy w/ laser lithotripsy (Left, 1/20/2020)  ,  family history includes Arthritis in his mother; No Known Problems in his father; Parkinsonism in his mother  ,   reports that he has never smoked  He has never used smokeless tobacco  He reports that he does not drink alcohol or use drugs  ,  is allergic to pollen extract     Current Outpatient Medications   Medication Sig Dispense Refill    ALPRAZolam (XANAX) 0 5 mg tablet Take 1 tablet (0 5 mg total) by mouth every 8 (eight) hours as needed (AS NEEDED) 90 tablet 0    amLODIPine (NORVASC) 10 mg tablet Take 1 tablet (10 mg total) by mouth daily 90 tablet 3    atorvastatin (LIPITOR) 20 mg tablet take 1 tablet by mouth once daily 90 tablet 2    canagliflozin 300 MG TABS Take 1 tablet (300 mg total) by mouth daily before breakfast 90 tablet 1    donepezil (ARICEPT) 10 mg tablet take 1 tablet by mouth at bedtime 90 tablet 1    metFORMIN (GLUCOPHAGE) 850 mg tablet take 2 tablets by mouth once daily 180 tablet 1    metoprolol succinate (TOPROL-XL) 100 mg 24 hr tablet take 1 tablet by mouth once daily 90 tablet 1    rivaroxaban (Xarelto) 15 mg tablet Take 1 tablet (15 mg total) by mouth daily with breakfast 90 tablet 3    Blood Glucose Monitoring Suppl (ONE TOUCH ULTRA 2) w/Device KIT by Does not apply route      dorzolamide-timolol (COSOPT) 22 3-6 8 MG/ML ophthalmic solution instill 1 drop into both eyes twice a day      Fluad Quadrivalent 0 5 ML PRSY inject 0 5 milliliters intramuscularly      glucose blood test strip 1 Squirt by In Vitro route daily      Menthol-Methyl Salicylate (RODRI DE LOS SANTOS GREASELESS) 10-15 % greaseless cream Apply topically daily at bedtime 30 g 0    ONETOUCH DELICA LANCETS 13M MISC by Does not apply route daily       No current facility-administered medications for this visit  Review of Systems   Constitutional: Negative for activity change, chills, diaphoresis, fatigue and fever  HENT: Negative  Eyes: Negative for visual disturbance  Respiratory: Negative for cough, chest tightness, shortness of breath and wheezing  Cardiovascular: Negative for chest pain, palpitations and leg swelling  Gastrointestinal: Negative for abdominal pain, constipation, diarrhea, nausea and vomiting  Endocrine: Negative for cold intolerance and heat intolerance  Genitourinary: Negative for difficulty urinating, dysuria and frequency  Musculoskeletal: Negative for arthralgias, gait problem and myalgias  Neurological: Negative for dizziness, seizures, syncope, light-headedness and headaches  Psychiatric/Behavioral: Negative for confusion, dysphoric mood and sleep disturbance  The patient is not nervous/anxious  PHQ-9 Depression Screening    PHQ-9:   Frequency of the following problems over the past two weeks:            Objective:  Vitals:    04/13/21 0909   BP: 130/76   Pulse: (!) 112   Temp: 98 4 °F (36 9 °C)   SpO2: 98%   Weight: 106 kg (234 lb 6 oz)   Height: 5' 11" (1 803 m)     Body mass index is 32 69 kg/m²  Physical Exam  Vitals signs and nursing note reviewed  Constitutional:       General: He is not in acute distress  Appearance: Normal appearance  He is obese  He is not ill-appearing  HENT:      Head: Normocephalic and atraumatic        Mouth/Throat:      Mouth: Mucous membranes are moist    Eyes:      Extraocular Movements: Extraocular movements intact  Conjunctiva/sclera: Conjunctivae normal       Pupils: Pupils are equal, round, and reactive to light  Neck:      Musculoskeletal: Neck supple  Cardiovascular:      Rate and Rhythm: Normal rate  Rhythm irregular  Heart sounds: Normal heart sounds  Comments: Irregular irregular with GVR  Pulmonary:      Effort: Pulmonary effort is normal  No respiratory distress  Breath sounds: Normal breath sounds  No wheezing or rales  Abdominal:      General: Bowel sounds are normal  There is no distension  Palpations: Abdomen is soft  Tenderness: There is no abdominal tenderness  Musculoskeletal:      Right lower leg: Edema present  Left lower leg: Edema present  Comments: Trace bilat LE edema  Neurological:      General: No focal deficit present  Mental Status: He is alert and oriented to person, place, and time  Mental status is at baseline  Psychiatric:         Mood and Affect: Mood normal          Behavior: Behavior normal          Thought Content:  Thought content normal          Judgment: Judgment normal

## 2021-04-13 NOTE — PATIENT INSTRUCTIONS
Type 2 Diabetes in the Older Adult   WHAT YOU NEED TO KNOW:   The risk for type 2 diabetes increases as a person gets older  Type 2 diabetes means your pancreas does not make enough insulin, or your body does not use insulin well  Insulin helps move sugar out of the blood so it can be used for energy  Diabetes cannot be cured, but it can be managed  DISCHARGE INSTRUCTIONS:   Call or have someone close to you call your local emergency number (911 in the [address] [facility]) for any of the following:   · You have any of the following signs of a stroke:      ? Numbness or drooping on one side of your face     ? Weakness in an arm or leg    ? Confusion or difficulty speaking    ? Dizziness, a severe headache, or vision loss    · You have any of the following signs of a heart attack:      ? Squeezing, pressure, or pain in your chest    ? You may  also have any of the following:     § Discomfort or pain in your back, neck, jaw, stomach, or arm    § Shortness of breath    § Nausea or vomiting    § Lightheadedness or a sudden cold sweat    Return to the emergency department if:   · Your blood sugar level is higher than your goal and does not come down with treatment  · You have signs of a high blood sugar level, such as blurred or double vision  · You have signs of a high ketone level, such as fruity, sweet smelling breath, or shallow breathing  · You have symptoms of a low blood sugar level, such as trouble thinking, sweating, or a pounding heartbeat  · Your blood sugar level is lower than normal and does not improve with treatment  Call your doctor or diabetes care team if:   · You are vomiting or have diarrhea  · You have an upset stomach and cannot eat the foods on your meal plan  · You feel weak or more tired than usual     · You feel dizzy, have headaches, or are easily irritated  · Your skin is red, warm, dry, or swollen      · You have a wound that does not heal     · You have numbness in your arms or legs     · You have trouble coping with diabetes, or you feel anxious or depressed  · You have problems with your memory  · You have changes in your vision  · You have questions or concerns about your condition or care  Manage diabetes and prevent problems:  Sometimes type 2 diabetes can be managed with changes in nutrition and physical activity  · Work with your diabetes care team to create plans to meet your needs  Your diabetes care team may include a physician, nurse practitioner, and physician assistant  It may also include a diabetes nurse educator, dietitian, and an exercise specialist  Family members, or others who are close to you, may also be part of the team  You and your team will make goals and plans to manage diabetes and other health problems  For example, the plan will include how to manage medicines you may take for diabetes and for other health conditions  The plans and goals will be specific to your needs and abilities  Your plan will change as your needs and abilities change  · Manage other health issues as directed  Health issues may include high blood pressure, high cholesterol levels, and heart problems  Health issues may also include depression  Together you and your care team can create a plan to manage any other health issues  · Try to be physically active for 30 to 60 minutes most days of the week  Physical activity, such as exercise, helps keep your blood sugar level steady and lowers your risk for heart disease  Physical activity can help improve your balance and strength and lower your risk for falls  Start slowly  Activity can be done in 10-minute intervals  ? Set a goal for 30 minutes of aerobic activity at least 5 times a week  Aerobic activity helps your heart stay strong  Aerobic activity includes walking, bicycling, dancing, swimming, and raking leaves  ? Set a goal for strength training 2 times a week    Strength training helps you keep the muscles you have and build new muscles  Strength training includes lifting weights, climbing stairs, and doing yoga or kalina chi     ? Stay steady on your on your feet with balancing activities  These include walking backwards, standing on one foot, and walking heel to toe in a straight line  · Maintain a healthy weight  Ask your provider what a healthy weight is for you  A healthy weight can help you control diabetes and prevent heart disease  Ask your provider to help you create a weight loss plan if you are overweight  Weight loss of 10 to 15 pounds can help make a difference in managing diabetes  Together you and your care team can set manageable weight loss goals  · Know the risks if you choose to drink alcohol  Alcohol can cause your blood sugar levels to be low if you use insulin  Alcohol can cause high blood sugar levels and weight gain if you drink too much  Women 21 years or older and men 72 years or older should limit alcohol to 1 drink a day  Men 21 to 64 years should limit alcohol to 2 drinks a day  A drink of alcohol is 12 ounces of beer, 5 ounces of wine, or 1½ ounces of liquor  · Do not smoke  Nicotine and other chemicals in cigarettes can cause lung disease and other health problems  It can also cause blood vessel damage that makes diabetes more difficult to manage  Ask your healthcare provider for information if you currently smoke and need help to quit  Do not use e-cigarettes or smokeless tobacco in place of cigarettes or to help you quit  They still contain nicotine  Diabetes education:  Diabetes education will start right away  Members of your care team teach you, your family, and caregivers the following:  · How to check your blood sugar level: You will learn when to check your blood sugar level and what the level should be  You will learn what to do if your level is too high or too low  Write down the times of your checks and your levels   Take them to all follow-up appointments  · About diabetes medicine: You and your family members will be taught how to draw up and give insulin, if needed  You will learn how much insulin you need and what time to inject insulin  You will be taught when not to give insulin  Your team will also teach you how to dispose of needles and syringes  If you need oral diabetes medicine, you will be taught about side effects  You will also be taught when to take or not take the medicine  · About nutrition:  A dietitian will help you make a meal plan to keep your blood sugar level steady  You will learn how food affects your blood sugar levels  You will also learn to keep track of sugar and starchy foods (carbohydrates)  Do not skip meals  Your blood sugar level may drop too low if you have taken insulin and do not eat  · How to prevent complications:  Diabetes that is not well controlled can lead to health problems  Examples include foot sores, retinopathy (vision loss), and peripheral neuropathy (loss of feeling in your hands and feet)  Your team will help you know when to get regular checkups, such as vision checks  They will teach you how to watch for problems and when to get a problem checked  Other ways to manage diabetes:   · Check your feet every day for sores  Look at your whole foot, including the bottom, and between and under your toes  Check for wounds, corns, and calluses  Use a mirror to see the bottom of your feet  The skin on your feet may be shiny, tight, dry, or darker than normal  Your feet may also be cold and pale  Feel your feet by running your hands along the tops, bottoms, sides, and between your toes  Redness, swelling, and warmth are signs of blood flow problems that can lead to a foot ulcer  Do not try to remove corns or calluses yourself  · Wear medical alert identification  Wear medical alert jewelry or carry a card that says you have type 2 diabetes   Ask your healthcare provider where to get these items          · Ask about vaccines  You have a higher risk for serious illness if you get the flu, pneumonia, or hepatitis  Ask your healthcare provider if you should get a flu, pneumonia, shingles, or hepatitis B vaccine, and when to get the vaccine  · Get help from family and friends  You may need help checking your blood sugar level, giving insulin injections, or preparing your meals  You may also need help to check your feet for sores  Ask your family and friends to help you with these tasks  Talk to your care team if you need someone at home to help you  Follow up with your doctor or diabetes care team as directed: You will need to return to meet with different care team members  You may need tests to monitor for problems  Write down your questions so you remember to ask them during your visits  © Copyright [address] Information is for End User's use only and may not be sold, redistributed or otherwise used for commercial purposes  All illustrations and images included in CareNotes® are the copyrighted property of A D A M , Inc  or [facility] Scot Silva  The above information is an  only  It is not intended as medical advice for individual conditions or treatments  Talk to your doctor, nurse or pharmacist before following any medical regimen to see if it is safe and effective for you

## 2021-07-14 DIAGNOSIS — F41.9 ANXIETY: ICD-10-CM

## 2021-07-14 RX ORDER — ALPRAZOLAM 0.5 MG/1
0.5 TABLET ORAL EVERY 8 HOURS PRN
Qty: 90 TABLET | Refills: 0 | Status: SHIPPED | OUTPATIENT
Start: 2021-07-14 | End: 2021-12-06 | Stop reason: SDUPTHER

## 2021-07-15 ENCOUNTER — OFFICE VISIT (OUTPATIENT)
Dept: INTERNAL MEDICINE CLINIC | Facility: CLINIC | Age: 76
End: 2021-07-15
Payer: MEDICARE

## 2021-07-15 VITALS
HEIGHT: 71 IN | BODY MASS INDEX: 32.26 KG/M2 | OXYGEN SATURATION: 96 % | WEIGHT: 230.4 LBS | RESPIRATION RATE: 14 BRPM | SYSTOLIC BLOOD PRESSURE: 128 MMHG | HEART RATE: 74 BPM | DIASTOLIC BLOOD PRESSURE: 68 MMHG | TEMPERATURE: 98.7 F

## 2021-07-15 DIAGNOSIS — I10 ESSENTIAL HYPERTENSION: Chronic | ICD-10-CM

## 2021-07-15 DIAGNOSIS — Z79.01 CHRONIC ANTICOAGULATION: ICD-10-CM

## 2021-07-15 DIAGNOSIS — I48.20 CHRONIC ATRIAL FIBRILLATION (HCC): Chronic | ICD-10-CM

## 2021-07-15 DIAGNOSIS — E78.5 HYPERLIPIDEMIA, UNSPECIFIED HYPERLIPIDEMIA TYPE: ICD-10-CM

## 2021-07-15 DIAGNOSIS — R41.3 MEMORY DIFFICULTIES: ICD-10-CM

## 2021-07-15 DIAGNOSIS — N18.32 TYPE 2 DIABETES MELLITUS WITH STAGE 3B CHRONIC KIDNEY DISEASE, WITHOUT LONG-TERM CURRENT USE OF INSULIN (HCC): Primary | ICD-10-CM

## 2021-07-15 DIAGNOSIS — D64.9 ANEMIA, UNSPECIFIED TYPE: ICD-10-CM

## 2021-07-15 DIAGNOSIS — M19.91 PRIMARY OSTEOARTHRITIS, UNSPECIFIED SITE: ICD-10-CM

## 2021-07-15 DIAGNOSIS — N18.32 STAGE 3B CHRONIC KIDNEY DISEASE (HCC): ICD-10-CM

## 2021-07-15 DIAGNOSIS — E11.22 TYPE 2 DIABETES MELLITUS WITH STAGE 3B CHRONIC KIDNEY DISEASE, WITHOUT LONG-TERM CURRENT USE OF INSULIN (HCC): Primary | ICD-10-CM

## 2021-07-15 DIAGNOSIS — F41.1 GENERALIZED ANXIETY DISORDER: ICD-10-CM

## 2021-07-15 DIAGNOSIS — K21.9 GERD WITHOUT ESOPHAGITIS: ICD-10-CM

## 2021-07-15 LAB — SL AMB POCT HEMOGLOBIN AIC: 7.4 (ref ?–6.5)

## 2021-07-15 PROCEDURE — 99214 OFFICE O/P EST MOD 30 MIN: CPT | Performed by: INTERNAL MEDICINE

## 2021-07-15 PROCEDURE — 83036 HEMOGLOBIN GLYCOSYLATED A1C: CPT | Performed by: INTERNAL MEDICINE

## 2021-07-15 NOTE — PATIENT INSTRUCTIONS
Type 2 Diabetes in the Older Adult   WHAT YOU NEED TO KNOW:   The risk for type 2 diabetes increases as a person gets older  Type 2 diabetes means your pancreas does not make enough insulin, or your body does not use insulin well  Insulin helps move sugar out of the blood so it can be used for energy  Diabetes cannot be cured, but it can be managed  DISCHARGE INSTRUCTIONS:   Call or have someone close to you call your local emergency number (911 in the [facility]) for any of the following:   · You have any of the following signs of a stroke:      ? Numbness or drooping on one side of your face     ? Weakness in an arm or leg    ? Confusion or difficulty speaking    ? Dizziness, a severe headache, or vision loss    · You have any of the following signs of a heart attack:      ? Squeezing, pressure, or pain in your chest    ? You may  also have any of the following:     § Discomfort or pain in your back, neck, jaw, stomach, or arm    § Shortness of breath    § Nausea or vomiting    § Lightheadedness or a sudden cold sweat    Return to the emergency department if:   · Your blood sugar level is higher than your goal and does not come down with treatment  · You have signs of a high blood sugar level, such as blurred or double vision  · You have signs of a high ketone level, such as fruity, sweet smelling breath, or shallow breathing  · You have symptoms of a low blood sugar level, such as trouble thinking, sweating, or a pounding heartbeat  · Your blood sugar level is lower than normal and does not improve with treatment  Call your doctor or diabetes care team if:   · You are vomiting or have diarrhea  · You have an upset stomach and cannot eat the foods on your meal plan  · You feel weak or more tired than usual     · You feel dizzy, have headaches, or are easily irritated  · Your skin is red, warm, dry, or swollen      · You have a wound that does not heal     · You have numbness in your arms or legs     · You have trouble coping with diabetes, or you feel anxious or depressed  · You have problems with your memory  · You have changes in your vision  · You have questions or concerns about your condition or care  Manage diabetes and prevent problems:  Sometimes type 2 diabetes can be managed with changes in nutrition and physical activity  · Work with your diabetes care team to create plans to meet your needs  Your diabetes care team may include a physician, nurse practitioner, and physician assistant  It may also include a diabetes nurse educator, dietitian, and an exercise specialist  Family members, or others who are close to you, may also be part of the team  You and your team will make goals and plans to manage diabetes and other health problems  For example, the plan will include how to manage medicines you may take for diabetes and for other health conditions  The plans and goals will be specific to your needs and abilities  Your plan will change as your needs and abilities change  · Manage other health issues as directed  Health issues may include high blood pressure, high cholesterol levels, and heart problems  Health issues may also include depression  Together you and your care team can create a plan to manage any other health issues  · Try to be physically active for 30 to 60 minutes most days of the week  Physical activity, such as exercise, helps keep your blood sugar level steady and lowers your risk for heart disease  Physical activity can help improve your balance and strength and lower your risk for falls  Start slowly  Activity can be done in 10-minute intervals  ? Set a goal for 30 minutes of aerobic activity at least 5 times a week  Aerobic activity helps your heart stay strong  Aerobic activity includes walking, bicycling, dancing, swimming, and raking leaves  ? Set a goal for strength training 2 times a week    Strength training helps you keep the muscles you have and build new muscles  Strength training includes lifting weights, climbing stairs, and doing yoga or kalina chi     ? Stay steady on your on your feet with balancing activities  These include walking backwards, standing on one foot, and walking heel to toe in a straight line  · Maintain a healthy weight  Ask your provider what a healthy weight is for you  A healthy weight can help you control diabetes and prevent heart disease  Ask your provider to help you create a weight loss plan if you are overweight  Weight loss of 10 to 15 pounds can help make a difference in managing diabetes  Together you and your care team can set manageable weight loss goals  · Know the risks if you choose to drink alcohol  Alcohol can cause your blood sugar levels to be low if you use insulin  Alcohol can cause high blood sugar levels and weight gain if you drink too much  Women 21 years or older and men 72 years or older should limit alcohol to 1 drink a day  Men 21 to 64 years should limit alcohol to 2 drinks a day  A drink of alcohol is 12 ounces of beer, 5 ounces of wine, or 1½ ounces of liquor  · Do not smoke  Nicotine and other chemicals in cigarettes can cause lung disease and other health problems  It can also cause blood vessel damage that makes diabetes more difficult to manage  Ask your healthcare provider for information if you currently smoke and need help to quit  Do not use e-cigarettes or smokeless tobacco in place of cigarettes or to help you quit  They still contain nicotine  Diabetes education:  Diabetes education will start right away  Members of your care team teach you, your family, and caregivers the following:  · How to check your blood sugar level: You will learn when to check your blood sugar level and what the level should be  You will learn what to do if your level is too high or too low  Write down the times of your checks and your levels   Take them to all follow-up appointments  · About diabetes medicine: You and your family members will be taught how to draw up and give insulin, if needed  You will learn how much insulin you need and what time to inject insulin  You will be taught when not to give insulin  Your team will also teach you how to dispose of needles and syringes  If you need oral diabetes medicine, you will be taught about side effects  You will also be taught when to take or not take the medicine  · About nutrition:  A dietitian will help you make a meal plan to keep your blood sugar level steady  You will learn how food affects your blood sugar levels  You will also learn to keep track of sugar and starchy foods (carbohydrates)  Do not skip meals  Your blood sugar level may drop too low if you have taken insulin and do not eat  · How to prevent complications:  Diabetes that is not well controlled can lead to health problems  Examples include foot sores, retinopathy (vision loss), and peripheral neuropathy (loss of feeling in your hands and feet)  Your team will help you know when to get regular checkups, such as vision checks  They will teach you how to watch for problems and when to get a problem checked  Other ways to manage diabetes:   · Check your feet every day for sores  Look at your whole foot, including the bottom, and between and under your toes  Check for wounds, corns, and calluses  Use a mirror to see the bottom of your feet  The skin on your feet may be shiny, tight, dry, or darker than normal  Your feet may also be cold and pale  Feel your feet by running your hands along the tops, bottoms, sides, and between your toes  Redness, swelling, and warmth are signs of blood flow problems that can lead to a foot ulcer  Do not try to remove corns or calluses yourself  · Wear medical alert identification  Wear medical alert jewelry or carry a card that says you have type 2 diabetes   Ask your healthcare provider where to get these items          · Ask about vaccines  You have a higher risk for serious illness if you get the flu, pneumonia, or hepatitis  Ask your healthcare provider if you should get a flu, pneumonia, shingles, or hepatitis B vaccine, and when to get the vaccine  · Get help from family and friends  You may need help checking your blood sugar level, giving insulin injections, or preparing your meals  You may also need help to check your feet for sores  Ask your family and friends to help you with these tasks  Talk to your care team if you need someone at home to help you  Follow up with your doctor or diabetes care team as directed: You will need to return to meet with different care team members  You may need tests to monitor for problems  Write down your questions so you remember to ask them during your visits  © Copyright [address] Information is for End User's use only and may not be sold, redistributed or otherwise used for commercial purposes  All illustrations and images included in CareNotes® are the copyrighted property of A D A M , Inc  or [facility] Scot Silva  The above information is an  only  It is not intended as medical advice for individual conditions or treatments  Talk to your doctor, nurse or pharmacist before following any medical regimen to see if it is safe and effective for you

## 2021-07-16 ENCOUNTER — APPOINTMENT (OUTPATIENT)
Dept: LAB | Facility: CLINIC | Age: 76
End: 2021-07-16
Payer: MEDICARE

## 2021-07-16 DIAGNOSIS — E11.22 TYPE 2 DIABETES MELLITUS WITH STAGE 3B CHRONIC KIDNEY DISEASE, WITHOUT LONG-TERM CURRENT USE OF INSULIN (HCC): ICD-10-CM

## 2021-07-16 DIAGNOSIS — R79.9 ABNORMAL BLOOD CHEMISTRY LEVEL: ICD-10-CM

## 2021-07-16 DIAGNOSIS — R94.6 ABNORMAL RESULTS OF THYROID FUNCTION STUDIES: ICD-10-CM

## 2021-07-16 DIAGNOSIS — N18.32 TYPE 2 DIABETES MELLITUS WITH STAGE 3B CHRONIC KIDNEY DISEASE, WITHOUT LONG-TERM CURRENT USE OF INSULIN (HCC): ICD-10-CM

## 2021-07-16 DIAGNOSIS — N18.32 STAGE 3B CHRONIC KIDNEY DISEASE (HCC): Chronic | ICD-10-CM

## 2021-07-16 LAB
ALBUMIN SERPL BCP-MCNC: 3.4 G/DL (ref 3.5–5)
ALP SERPL-CCNC: 72 U/L (ref 46–116)
ALT SERPL W P-5'-P-CCNC: 19 U/L (ref 12–78)
ANION GAP SERPL CALCULATED.3IONS-SCNC: 8 MMOL/L (ref 4–13)
AST SERPL W P-5'-P-CCNC: 10 U/L (ref 5–45)
BILIRUB SERPL-MCNC: 0.84 MG/DL (ref 0.2–1)
BUN SERPL-MCNC: 18 MG/DL (ref 5–25)
CALCIUM ALBUM COR SERPL-MCNC: 9.5 MG/DL (ref 8.3–10.1)
CALCIUM SERPL-MCNC: 9 MG/DL (ref 8.3–10.1)
CHLORIDE SERPL-SCNC: 108 MMOL/L (ref 100–108)
CHOLEST SERPL-MCNC: 133 MG/DL (ref 50–200)
CO2 SERPL-SCNC: 21 MMOL/L (ref 21–32)
CREAT SERPL-MCNC: 1.63 MG/DL (ref 0.6–1.3)
GFR SERPL CREATININE-BSD FRML MDRD: 41 ML/MIN/1.73SQ M
GLUCOSE P FAST SERPL-MCNC: 147 MG/DL (ref 65–99)
HDLC SERPL-MCNC: 66 MG/DL
LDLC SERPL CALC-MCNC: 29 MG/DL (ref 0–100)
POTASSIUM SERPL-SCNC: 4.6 MMOL/L (ref 3.5–5.3)
PROT SERPL-MCNC: 7.7 G/DL (ref 6.4–8.2)
SODIUM SERPL-SCNC: 137 MMOL/L (ref 136–145)
T3 SERPL-MCNC: 1.2 NG/ML (ref 0.6–1.8)
TRIGL SERPL-MCNC: 191 MG/DL
TSH SERPL DL<=0.05 MIU/L-ACNC: 3.69 UIU/ML (ref 0.36–3.74)

## 2021-07-16 PROCEDURE — 36415 COLL VENOUS BLD VENIPUNCTURE: CPT

## 2021-07-16 PROCEDURE — 80053 COMPREHEN METABOLIC PANEL: CPT

## 2021-07-16 PROCEDURE — 86376 MICROSOMAL ANTIBODY EACH: CPT

## 2021-07-16 PROCEDURE — 80061 LIPID PANEL: CPT

## 2021-07-16 PROCEDURE — 84443 ASSAY THYROID STIM HORMONE: CPT

## 2021-07-16 PROCEDURE — 84480 ASSAY TRIIODOTHYRONINE (T3): CPT

## 2021-07-16 PROCEDURE — 86800 THYROGLOBULIN ANTIBODY: CPT

## 2021-07-17 LAB
THYROGLOB AB SERPL-ACNC: <1 IU/ML (ref 0–0.9)
THYROPEROXIDASE AB SERPL-ACNC: <8 IU/ML (ref 0–34)

## 2021-08-02 DIAGNOSIS — E78.2 MIXED HYPERLIPIDEMIA: ICD-10-CM

## 2021-08-02 RX ORDER — ATORVASTATIN CALCIUM 20 MG/1
20 TABLET, FILM COATED ORAL DAILY
Qty: 90 TABLET | Refills: 2 | Status: SHIPPED | OUTPATIENT
Start: 2021-08-02 | End: 2022-07-11 | Stop reason: SDUPTHER

## 2021-08-17 ENCOUNTER — OFFICE VISIT (OUTPATIENT)
Dept: NEPHROLOGY | Facility: CLINIC | Age: 76
End: 2021-08-17
Payer: MEDICARE

## 2021-08-17 VITALS
HEART RATE: 74 BPM | BODY MASS INDEX: 32.81 KG/M2 | OXYGEN SATURATION: 97 % | WEIGHT: 234.4 LBS | SYSTOLIC BLOOD PRESSURE: 142 MMHG | DIASTOLIC BLOOD PRESSURE: 78 MMHG | HEIGHT: 71 IN

## 2021-08-17 DIAGNOSIS — E11.21 DIABETIC NEPHROPATHY ASSOCIATED WITH TYPE 2 DIABETES MELLITUS (HCC): ICD-10-CM

## 2021-08-17 DIAGNOSIS — R80.1 PERSISTENT PROTEINURIA: ICD-10-CM

## 2021-08-17 DIAGNOSIS — N20.0 NEPHROLITHIASIS: ICD-10-CM

## 2021-08-17 DIAGNOSIS — R60.0 LOCALIZED EDEMA: ICD-10-CM

## 2021-08-17 DIAGNOSIS — N18.32 STAGE 3B CHRONIC KIDNEY DISEASE (HCC): Primary | ICD-10-CM

## 2021-08-17 PROCEDURE — 99214 OFFICE O/P EST MOD 30 MIN: CPT | Performed by: INTERNAL MEDICINE

## 2021-08-17 NOTE — PATIENT INSTRUCTIONS
COVID supplements    Start VitC 250 mg PO twice a day (the wife will take 500 mg twice a day)  Take zinc 100 mg once daily (with a meal)  Start VitD3 5,000 units once daily

## 2021-08-17 NOTE — PROGRESS NOTES
[facility] [place], [place]    Name: Michael Hooper III  YOB: 1945      Assessment/Plan:    Stage 3 chronic kidney disease [facility])  Lab Results   Component Value Date    EGFR 41 07/16/2021    EGFR 38 04/13/2021    EGFR 41 01/11/2021    CREATININE 1 63 (H) 07/16/2021    CREATININE 1 72 (H) 04/13/2021    CREATININE 1 62 (H) 01/11/2021     Kidney function stable at this time  Continue to manage blood pressures and blood sugars as best as possible  Diabetic nephropathy associated with type 2 diabetes mellitus ([facility] [address] )    Continue with Invokana, patient appears to be tolerating well  As mentioned previously no angiotensin receptor blocker due to propensity toward acute kidney injury  Continue to focus on low carbohydrate diet, patient is not on a low-carbohydrate diet at this time  Lab Results   Component Value Date    HGBA1C 7 4 (A) 07/15/2021       Nephrolithiasis    Continue to encourage at least years fluid intake a day  Patient's last kidney stone appears have been in the winter of 2020  Patient is on a high sodium diet, he will try to significantly reduce this  Localized edema   Continue with Invokana  Will avoid loop diuretic, instead focus on reducing sodium intake  Of note the patient is on a very high sodium diet at this time  Problem List Items Addressed This Visit        Endocrine    Diabetic nephropathy associated with type 2 diabetes mellitus ([facility] [address] )       Continue with Invokana, patient appears to be tolerating well  As mentioned previously no angiotensin receptor blocker due to propensity toward acute kidney injury  Continue to focus on low carbohydrate diet, patient is not on a low-carbohydrate diet at this time  Lab Results   Component Value Date    HGBA1C 7 4 (A) 07/15/2021               Genitourinary    Nephrolithiasis       Continue to encourage at least years fluid intake a day    Patient's last kidney stone appears have been in the winter of 2020  Patient is on a high sodium diet, he will try to significantly reduce this  Stage 3 chronic kidney disease [facility]) - Primary     Lab Results   Component Value Date    EGFR 41 07/16/2021    EGFR 38 04/13/2021    EGFR 41 01/11/2021    CREATININE 1 63 (H) 07/16/2021    CREATININE 1 72 (H) 04/13/2021    CREATININE 1 62 (H) 01/11/2021     Kidney function stable at this time  Continue to manage blood pressures and blood sugars as best as possible  Relevant Orders    Microalbumin / creatinine urine ratio    Urinalysis with microscopic       Other    Proteinuria    Localized edema      Continue with Invokana  Will avoid loop diuretic, instead focus on reducing sodium intake  Of note the patient is on a very high sodium diet at this time  Patient is stable at this time  He claims that he will be making a concerted effort at a lower sodium diet  This will be very helpful in controlling the patient's blood pressures as well as lower extremity edema  I am hesitant to add a loop diuretic given that he is already on Invokana 300 mg once daily  We will see the patient back in 6 months for regular appointment blood work to be done prior to that appointment  In the meantime, patient will have a urine study to re-evaluate albuminuria  Subjective:      Patient ID: Kristie Rodrigez is a 76 y o  male  Patient presents for follow-up  Labs reviewed with patient, his kidney function is essentially stable at an estimated GFR around 40 mL/ minute  Electrolytes looked okay  Patient is trying to follow a low sodium diet for the bilateral lower extremity edema, however after review of his diet, he is on a very high sodium diet at this time including adding significant amount of sodium with the salt shaker  Hypertension  This is a chronic problem  The current episode started more than 1 year ago  The problem is unchanged  The problem is controlled  Pertinent negatives include no chest pain or orthopnea  There are no associated agents to hypertension  Risk factors for coronary artery disease include male gender and diabetes mellitus  Past treatments include beta blockers and lifestyle changes  Compliance problems include diet  Hypertensive end-organ damage includes kidney disease  Identifiable causes of hypertension include chronic renal disease  The following portions of the patient's history were reviewed and updated as appropriate: allergies, current medications, past family history, past medical history, past social history, past surgical history and problem list     Review of Systems   Cardiovascular: Negative for chest pain and orthopnea  All other systems reviewed and are negative  Social History     Socioeconomic History    Marital status: /Civil Union     Spouse name: None    Number of children: None    Years of education: None    Highest education level: None   Occupational History    Occupation: Retired   Tobacco Use    Smoking status: Never Smoker    Smokeless tobacco: Never Used   Vaping Use    Vaping Use: Never used   Substance and Sexual Activity    Alcohol use: Never     Comment: socially     Drug use: Never    Sexual activity: Yes   Other Topics Concern    None   Social History Narrative    RETIRED     Social Determinants of Health     Financial Resource Strain:     Difficulty of Paying Living Expenses:    Food Insecurity:     Worried About Running Out of Food in the Last Year:     [address] in the Last Year:    Transportation Needs:     Lack of Transportation (Medical):      Lack of Transportation (Non-Medical):    Physical Activity:     Days of Exercise per Week:     Minutes of Exercise per Session:    Stress:     Feeling of Stress :    Social Connections:     Frequency of Communication with Friends and Family:     Frequency of Social Gatherings with Friends and Family:     Attends [employer] Services:     Active Member of Clubs or Organizations:     Attends Club or Organization Meetings:     Marital Status:    Intimate Partner Violence:     Fear of Current or Ex-Partner:     Emotionally Abused:     Physically Abused:     Sexually Abused:      Past Medical History:   Diagnosis Date    Acute on chronic renal insufficiency     Allergic rhinitis     Anemia 6/11/2012    Atrial fibrillation ([facility] [address] )     Cerebrovascular accident (CVA) ([facility] [address] )     Controlled type 2 diabetes mellitus without complication ([facility] [address] )     Generalized anxiety disorder     GERD without esophagitis 6/11/2012    Hypertension     Memory difficulties 1/8/2019    Nephrolithiasis 3/17/2016    Obesity 6/11/2012    Osteoarthritis 6/11/2012     Past Surgical History:   Procedure Laterality Date    CATARACT EXTRACTION, BILATERAL      CYSTOSCOPY W/ LASER LITHOTRIPSY Left 1/20/2020    Procedure: CYSTOSCOPY; RETROGRADE; URETEROSCOPY; STONE EXTRACTION; POSSIBLE HOLMIUM LASER LITHOTRIPSY;  Surgeon: Ildefonso Hidalgo MD;  Location: [address] [place] [address] OR;  Service: Urology    KNEE SURGERY      WA CYSTOURETHROSCOPY,URETER CATHETER Left 11/8/2019    Procedure: CYSTOSCOPY RETROGRADE PYELOGRAM WITH INSERTION STENT URETERAL;  Surgeon: Ildefonso Hidalgo MD;  Location: [address] [place] [address] OR;  Service: Urology    TOTAL HIP ARTHROPLASTY      TOTAL HIP ARTHROPLASTY Bilateral 2011       Current Outpatient Medications:     ALPRAZolam (XANAX) 0 5 mg tablet, Take 1 tablet (0 5 mg total) by mouth every 8 (eight) hours as needed (AS NEEDED), Disp: 90 tablet, Rfl: 0    amLODIPine (NORVASC) 10 mg tablet, Take 1 tablet (10 mg total) by mouth daily, Disp: 90 tablet, Rfl: 3    atorvastatin (LIPITOR) 20 mg tablet, Take 1 tablet (20 mg total) by mouth daily, Disp: 90 tablet, Rfl: 2    Blood Glucose Monitoring Suppl (ONE TOUCH ULTRA 2) w/Device KIT, by Does not apply route, Disp: , Rfl:     canagliflozin 300 MG TABS, Take 1 tablet (300 mg total) by mouth daily before breakfast, Disp: 90 tablet, Rfl: 1    donepezil (ARICEPT) 10 mg tablet, take 1 tablet by mouth at bedtime, Disp: 90 tablet, Rfl: 1    glucose blood test strip, 1 Squirt by In Vitro route daily, Disp: , Rfl:     metFORMIN (GLUCOPHAGE) 850 mg tablet, take 2 tablets by mouth once daily, Disp: 180 tablet, Rfl: 1    metoprolol succinate (TOPROL-XL) 100 mg 24 hr tablet, take 1 tablet by mouth once daily, Disp: 90 tablet, Rfl: 1    ONETOUCH DELICA LANCETS 13O MISC, by Does not apply route daily, Disp: , Rfl:     rivaroxaban (Xarelto) 15 mg tablet, Take 1 tablet (15 mg total) by mouth daily with breakfast, Disp: 90 tablet, Rfl: 3    Lab Results   Component Value Date     09/30/2014    SODIUM 137 07/16/2021    K 4 6 07/16/2021     07/16/2021    CO2 21 07/16/2021    ANIONGAP 13 09/30/2014    AGAP 8 07/16/2021    BUN 18 07/16/2021    CREATININE 1 63 (H) 07/16/2021    GLUC 182 (H) 10/05/2020    GLUF 147 (H) 07/16/2021    CALCIUM 9 0 07/16/2021    AST 10 07/16/2021    ALT 19 07/16/2021    ALKPHOS 72 07/16/2021    PROT 6 8 09/30/2014    TP 7 7 07/16/2021    BILITOT 0 4 09/30/2014    TBILI 0 84 07/16/2021    EGFR 41 07/16/2021     Lab Results   Component Value Date    WBC 5 88 07/08/2020    HGB 12 4 07/08/2020    HCT 38 5 07/08/2020    MCV 93 07/08/2020     07/08/2020     Lab Results   Component Value Date    CHOLESTEROL 133 07/16/2021    CHOLESTEROL 114 07/08/2020    CHOLESTEROL 108 02/22/2019     Lab Results   Component Value Date    HDL 66 07/16/2021    HDL 45 07/08/2020    HDL 49 02/22/2019     Lab Results   Component Value Date    LDLCALC 29 07/16/2021    LDLCALC 44 07/08/2020    LDLCALC 33 02/22/2019     Lab Results   Component Value Date    TRIG 191 (H) 07/16/2021    TRIG 140 01/11/2021    TRIG 124 07/08/2020     No results found for: [place], [place]  Lab Results   Component Value Date    VQD8FKYNDIWG 3 690 07/16/2021     Lab Results   Component Value Date    PTH 75 9 04/13/2021    CALCIUM 9 0 07/16/2021     Lab Results Component Value Date    SPEP  11/06/2020     No monoclonal bands noted  Reviewed by: Daily Howell MD ([id number]) **Electronic Signature**    UPEP  11/06/2020     The UPEP shows selective proteinuria   No monoclonal bands noted  Reviewed by: Daily Howell MD ([id number])  **Electronic Signature**     No results found for: AXEL [id number]        Objective:      /78   Pulse 74   Ht 5' 11" (1 803 m)   Wt 106 kg (234 lb 6 4 oz)   SpO2 97%   BMI 32 69 kg/m²          Physical Exam  Vitals reviewed  Constitutional:       General: He is not in acute distress  Appearance: He is well-developed  HENT:      Head: Normocephalic and atraumatic  Eyes:      Conjunctiva/sclera: Conjunctivae normal    Cardiovascular:      Rate and Rhythm: Normal rate and regular rhythm  Pulmonary:      Effort: Pulmonary effort is normal       Breath sounds: Normal breath sounds  Abdominal:      Palpations: Abdomen is soft  Musculoskeletal:      Cervical back: Neck supple  Right lower leg: Edema present  Left lower leg: Edema present  Skin:     General: Skin is warm  Findings: No rash  Neurological:      Mental Status: He is alert and oriented to person, place, and time  Cranial Nerves: No cranial nerve deficit     Psychiatric:         Behavior: Behavior normal

## 2021-08-17 NOTE — ASSESSMENT & PLAN NOTE
Lab Results   Component Value Date    EGFR 41 07/16/2021    EGFR 38 04/13/2021    EGFR 41 01/11/2021    CREATININE 1 63 (H) 07/16/2021    CREATININE 1 72 (H) 04/13/2021    CREATININE 1 62 (H) 01/11/2021     Kidney function stable at this time  Continue to manage blood pressures and blood sugars as best as possible

## 2021-08-17 NOTE — ASSESSMENT & PLAN NOTE
Continue with Invokana, patient appears to be tolerating well  As mentioned previously no angiotensin receptor blocker due to propensity toward acute kidney injury  Continue to focus on low carbohydrate diet, patient is not on a low-carbohydrate diet at this time      Lab Results   Component Value Date    HGBA1C 7 4 (A) 07/15/2021

## 2021-08-17 NOTE — ASSESSMENT & PLAN NOTE
Continue with Invokana  Will avoid loop diuretic, instead focus on reducing sodium intake  Of note the patient is on a very high sodium diet at this time

## 2021-08-17 NOTE — ASSESSMENT & PLAN NOTE
Continue to encourage at least years fluid intake a day  Patient's last kidney stone appears have been in the winter of 2020  Patient is on a high sodium diet, he will try to significantly reduce this

## 2021-08-20 ENCOUNTER — APPOINTMENT (OUTPATIENT)
Dept: LAB | Facility: CLINIC | Age: 76
End: 2021-08-20
Payer: MEDICARE

## 2021-08-20 ENCOUNTER — OFFICE VISIT (OUTPATIENT)
Dept: OBGYN CLINIC | Facility: CLINIC | Age: 76
End: 2021-08-20
Payer: MEDICARE

## 2021-08-20 VITALS
HEART RATE: 81 BPM | WEIGHT: 235 LBS | BODY MASS INDEX: 32.9 KG/M2 | SYSTOLIC BLOOD PRESSURE: 133 MMHG | DIASTOLIC BLOOD PRESSURE: 80 MMHG | HEIGHT: 71 IN

## 2021-08-20 DIAGNOSIS — M72.0 DUPUYTREN'S CONTRACTURE OF RIGHT HAND: Primary | ICD-10-CM

## 2021-08-20 DIAGNOSIS — N18.32 STAGE 3B CHRONIC KIDNEY DISEASE (HCC): ICD-10-CM

## 2021-08-20 LAB
BACTERIA UR QL AUTO: ABNORMAL /HPF
BILIRUB UR QL STRIP: NEGATIVE
CLARITY UR: CLEAR
COLOR UR: YELLOW
CREAT UR-MCNC: 93.7 MG/DL
GLUCOSE UR STRIP-MCNC: ABNORMAL MG/DL
HGB UR QL STRIP.AUTO: NEGATIVE
HYALINE CASTS #/AREA URNS LPF: ABNORMAL /LPF
KETONES UR STRIP-MCNC: NEGATIVE MG/DL
LEUKOCYTE ESTERASE UR QL STRIP: ABNORMAL
MICROALBUMIN UR-MCNC: 187 MG/L (ref 0–20)
MICROALBUMIN/CREAT 24H UR: 200 MG/G CREATININE (ref 0–30)
NITRITE UR QL STRIP: NEGATIVE
NON-SQ EPI CELLS URNS QL MICRO: ABNORMAL /HPF
PH UR STRIP.AUTO: 6 [PH]
PROT UR STRIP-MCNC: ABNORMAL MG/DL
RBC #/AREA URNS AUTO: ABNORMAL /HPF
SP GR UR STRIP.AUTO: 1.03 (ref 1–1.03)
UROBILINOGEN UR QL STRIP.AUTO: 0.2 E.U./DL
WBC #/AREA URNS AUTO: ABNORMAL /HPF

## 2021-08-20 PROCEDURE — 81001 URINALYSIS AUTO W/SCOPE: CPT

## 2021-08-20 PROCEDURE — 82043 UR ALBUMIN QUANTITATIVE: CPT

## 2021-08-20 PROCEDURE — 82570 ASSAY OF URINE CREATININE: CPT

## 2021-08-20 PROCEDURE — 99203 OFFICE O/P NEW LOW 30 MIN: CPT | Performed by: ORTHOPAEDIC SURGERY

## 2021-08-20 NOTE — PROGRESS NOTES
CHIEF COMPLAINT:  Chief Complaint   Patient presents with    Right Index Finger - Locking       SUBJECTIVE:  Nayely Garsia is a 76y o  year oldmale who presents to the office with right index finger deformity  Patient stated that his right index finger pip joint has been stuck in flexion for the last 2 years without any incidence of injury  Patient denied any incidence of pain  He stated that he has never seen anyone for this issue before  He denied any numbness or tingling           PAST MEDICAL HISTORY:  Past Medical History:   Diagnosis Date    Acute on chronic renal insufficiency     Allergic rhinitis     Anemia 6/11/2012    Atrial fibrillation ([facility] [address] [facility]     Cerebrovascular accident (CVA) ([facility] [address] [facility]     Controlled type 2 diabetes mellitus without complication ([facility] [address] [facility]     Generalized anxiety disorder     GERD without esophagitis 6/11/2012    Hypertension     Memory difficulties 1/8/2019    Nephrolithiasis 3/17/2016    Obesity 6/11/2012    Osteoarthritis 6/11/2012       PAST SURGICAL HISTORY:  Past Surgical History:   Procedure Laterality Date    CATARACT EXTRACTION, BILATERAL      CYSTOSCOPY W/ LASER LITHOTRIPSY Left 1/20/2020    Procedure: CYSTOSCOPY; RETROGRADE; URETEROSCOPY; STONE EXTRACTION; POSSIBLE HOLMIUM LASER LITHOTRIPSY;  Surgeon: Godfrey Rothman MD;  Location: [facility] MAIN OR;  Service: Urology    KNEE SURGERY      MN CYSTOURETHROSCOPY,URETER CATHETER Left 11/8/2019    Procedure: CYSTOSCOPY RETROGRADE PYELOGRAM WITH INSERTION STENT URETERAL;  Surgeon: Godfrey Rothman MD;  Location: [facility] MAIN OR;  Service: Urology    TOTAL HIP ARTHROPLASTY      TOTAL HIP ARTHROPLASTY Bilateral 2011       FAMILY HISTORY:  Family History   Problem Relation Age of Onset    Arthritis Mother     Parkinsonism Mother     No Known Problems Father        SOCIAL HISTORY:  Social History     Tobacco Use    Smoking status: Never Smoker    Smokeless tobacco: Never Used   Vaping Use    Vaping Use: Never used   Substance Use Topics    Alcohol use: Never     Comment: socially     Drug use: Never       MEDICATIONS:    Current Outpatient Medications:     ALPRAZolam (XANAX) 0 5 mg tablet, Take 1 tablet (0 5 mg total) by mouth every 8 (eight) hours as needed (AS NEEDED), Disp: 90 tablet, Rfl: 0    amLODIPine (NORVASC) 10 mg tablet, Take 1 tablet (10 mg total) by mouth daily, Disp: 90 tablet, Rfl: 3    atorvastatin (LIPITOR) 20 mg tablet, Take 1 tablet (20 mg total) by mouth daily, Disp: 90 tablet, Rfl: 2    Blood Glucose Monitoring Suppl (ONE TOUCH ULTRA 2) w/Device KIT, by Does not apply route, Disp: , Rfl:     canagliflozin 300 MG TABS, Take 1 tablet (300 mg total) by mouth daily before breakfast, Disp: 90 tablet, Rfl: 1    donepezil (ARICEPT) 10 mg tablet, take 1 tablet by mouth at bedtime, Disp: 90 tablet, Rfl: 1    glucose blood test strip, 1 Squirt by In Vitro route daily, Disp: , Rfl:     metFORMIN (GLUCOPHAGE) 850 mg tablet, take 2 tablets by mouth once daily, Disp: 180 tablet, Rfl: 1    metoprolol succinate (TOPROL-XL) 100 mg 24 hr tablet, take 1 tablet by mouth once daily, Disp: 90 tablet, Rfl: 1    ONETOUCH DELICA LANCETS 38M MISC, by Does not apply route daily, Disp: , Rfl:     rivaroxaban (Xarelto) 15 mg tablet, Take 1 tablet (15 mg total) by mouth daily with breakfast, Disp: 90 tablet, Rfl: 3    ALLERGIES:  Allergies   Allergen Reactions    Pollen Extract Itching     Runny nose, itchy eyes       REVIEW OF SYSTEMS:  Review of Systems   Constitutional: Negative for chills and fever  HENT: Negative for ear pain and sore throat  Eyes: Negative for pain and visual disturbance  Respiratory: Negative for cough and shortness of breath  Cardiovascular: Negative for chest pain and palpitations  Gastrointestinal: Negative for abdominal pain and vomiting  Genitourinary: Negative for dysuria and hematuria  Musculoskeletal: Positive for arthralgias  Negative for back pain     Skin: Negative for color change and rash    Neurological: Negative for seizures and syncope  All other systems reviewed and are negative  VITALS:  Vitals:    08/20/21 1041   BP: 133/80   Pulse: 81       LABS:  HgA1c:   Lab Results   Component Value Date    HGBA1C 7 4 (A) 07/15/2021     BMP:   Lab Results   Component Value Date    GLUCOSE 150 (H) 09/30/2014    CALCIUM 9 0 07/16/2021     09/30/2014    K 4 6 07/16/2021    CO2 21 07/16/2021     07/16/2021    BUN 18 07/16/2021    CREATININE 1 63 (H) 07/16/2021       _____________________________________________________  PHYSICAL EXAMINATION:  General: well developed and well nourished, alert, oriented times 3 and appears comfortable  Psychiatric: Normal  HEENT: Trachea Midline, No torticollis  Pulmonary: No audible wheezing or strider  Cardiovascular: No discernable arrhythmia   Skin: No masses, erythema, lacerations, fluctation, ulcerations  Neurovascular: Sensation Intact to the Median, Ulnar, Radial Nerve, Motor Intact to the Median, Ulnar, Radial Nerve and Pulses Intact    MUSCULOSKELETAL EXAMINATION:  Right Index Finger:   Skin intact   No obvious swelling   Dupuytren's contracture noted ulnar cord webspace to DIP joint  Unable to pass table top test   Sensation intact  Brisk capillary refill     ___________________________________________________  STUDIES REVIEWED:  No studies reviewed  PROCEDURES PERFORMED:  Procedures  No Procedures performed today    _____________________________________________________  ASSESSMENT/PLAN:    76 y o  male with right index finger Dupuytren's contracture    -I discussed that a Xiaflex injection would be warranted at this time to treat his right index finger Dupuytren's contracture  -Prior authorization for the Xiaflex injection was sent    -I will see him back in office once the injections are approved to proceed with the Xiaflex injections at that time      Diagnoses and all orders for this visit:    Dupuytren's contracture of right hand  - Injection procedure prior authorization; Future        Follow Up:  Return for after xiaflex is approved   Work/school status:  No restrictions     To Do Next Visit:  Re-evaluation of current issue    General Discussions:  Dupuytren's Disease: The anatomy and physiology of Dupuytren's disease were discussed with the patient today in the office  Increased collagen formation (similar to scar tissue formation but without injury) within the interval between the skin volarly and the flexor tendons dorsally can result in pit formation, nodular formation, or eventual cord formation  These pathologic cords can cause contracture at either the metacarpophalangeal joint, proximal interphalangeal joint, or both  As the cords progress towards the proximal interphalangeal joint, the neurovascular structures of the finger may become involved within the disease process  While this is a genetic condition with variable penetrance, repetitive micro trauma may trigger the development of cord formation and thus contracture  Conservative treatment options including therapy to maintain joint mobility and tabletop testing were discussed  Other treatments include Xiaflex (collagenase) injection and surgical excision of abnormal cords  Scribe Attestation    I,:  Marielena Craig am acting as a scribe while in the presence of the attending physician :       I,:  Flaco Carnes MD personally performed the services described in this documentation    as scribed in my presence :           Portions of the record may have been created with voice recognition software  Occasional wrong word or "sound a like" substitutions may have occurred due to the inherent limitations of voice recognition software  Read the chart carefully and recognize, using context, where substitutions have occurred

## 2021-09-01 DIAGNOSIS — I48.91 NEW ONSET A-FIB (HCC): ICD-10-CM

## 2021-09-01 DIAGNOSIS — I10 HYPERTENSION, UNSPECIFIED TYPE: ICD-10-CM

## 2021-09-01 RX ORDER — METOPROLOL SUCCINATE 100 MG/1
100 TABLET, EXTENDED RELEASE ORAL DAILY
Qty: 90 TABLET | Refills: 1 | Status: SHIPPED | OUTPATIENT
Start: 2021-09-01 | End: 2022-03-29 | Stop reason: SDUPTHER

## 2021-09-08 DIAGNOSIS — R41.3 MEMORY DIFFICULTIES: ICD-10-CM

## 2021-09-08 RX ORDER — DONEPEZIL HYDROCHLORIDE 10 MG/1
10 TABLET, FILM COATED ORAL
Qty: 90 TABLET | Refills: 1 | Status: SHIPPED | OUTPATIENT
Start: 2021-09-08 | End: 2022-04-19 | Stop reason: SDUPTHER

## 2021-10-21 ENCOUNTER — TELEPHONE (OUTPATIENT)
Dept: INTERNAL MEDICINE CLINIC | Facility: CLINIC | Age: 76
End: 2021-10-21

## 2021-10-21 ENCOUNTER — OFFICE VISIT (OUTPATIENT)
Dept: INTERNAL MEDICINE CLINIC | Facility: CLINIC | Age: 76
End: 2021-10-21
Payer: MEDICARE

## 2021-10-21 ENCOUNTER — APPOINTMENT (OUTPATIENT)
Dept: LAB | Facility: CLINIC | Age: 76
End: 2021-10-21
Payer: MEDICARE

## 2021-10-21 VITALS
RESPIRATION RATE: 14 BRPM | DIASTOLIC BLOOD PRESSURE: 62 MMHG | HEART RATE: 72 BPM | HEIGHT: 71 IN | TEMPERATURE: 97.3 F | SYSTOLIC BLOOD PRESSURE: 114 MMHG | WEIGHT: 236.4 LBS | OXYGEN SATURATION: 99 % | BODY MASS INDEX: 33.1 KG/M2

## 2021-10-21 DIAGNOSIS — M19.91 PRIMARY OSTEOARTHRITIS, UNSPECIFIED SITE: ICD-10-CM

## 2021-10-21 DIAGNOSIS — E66.09 CLASS 1 OBESITY DUE TO EXCESS CALORIES WITH SERIOUS COMORBIDITY AND BODY MASS INDEX (BMI) OF 32.0 TO 32.9 IN ADULT: ICD-10-CM

## 2021-10-21 DIAGNOSIS — Z23 ENCOUNTER FOR VACCINATION: ICD-10-CM

## 2021-10-21 DIAGNOSIS — S39.012A LUMBOSACRAL STRAIN, INITIAL ENCOUNTER: ICD-10-CM

## 2021-10-21 DIAGNOSIS — K21.9 GERD WITHOUT ESOPHAGITIS: ICD-10-CM

## 2021-10-21 DIAGNOSIS — Z13.31 NEGATIVE DEPRESSION SCREENING: ICD-10-CM

## 2021-10-21 DIAGNOSIS — N18.32 TYPE 2 DIABETES MELLITUS WITH STAGE 3B CHRONIC KIDNEY DISEASE, WITHOUT LONG-TERM CURRENT USE OF INSULIN (HCC): Primary | ICD-10-CM

## 2021-10-21 DIAGNOSIS — N18.32 TYPE 2 DIABETES MELLITUS WITH STAGE 3B CHRONIC KIDNEY DISEASE, WITHOUT LONG-TERM CURRENT USE OF INSULIN (HCC): ICD-10-CM

## 2021-10-21 DIAGNOSIS — E55.9 VITAMIN D DEFICIENCY: ICD-10-CM

## 2021-10-21 DIAGNOSIS — Z79.01 CHRONIC ANTICOAGULATION: ICD-10-CM

## 2021-10-21 DIAGNOSIS — E78.5 HYPERLIPIDEMIA, UNSPECIFIED HYPERLIPIDEMIA TYPE: ICD-10-CM

## 2021-10-21 DIAGNOSIS — I10 PRIMARY HYPERTENSION: Chronic | ICD-10-CM

## 2021-10-21 DIAGNOSIS — E11.22 TYPE 2 DIABETES MELLITUS WITH STAGE 3B CHRONIC KIDNEY DISEASE, WITHOUT LONG-TERM CURRENT USE OF INSULIN (HCC): Primary | ICD-10-CM

## 2021-10-21 DIAGNOSIS — F41.1 GENERALIZED ANXIETY DISORDER: ICD-10-CM

## 2021-10-21 DIAGNOSIS — Z79.899 LONG-TERM CURRENT USE OF BENZODIAZEPINE: ICD-10-CM

## 2021-10-21 DIAGNOSIS — E11.22 TYPE 2 DIABETES MELLITUS WITH STAGE 3B CHRONIC KIDNEY DISEASE, WITHOUT LONG-TERM CURRENT USE OF INSULIN (HCC): ICD-10-CM

## 2021-10-21 DIAGNOSIS — N18.32 STAGE 3B CHRONIC KIDNEY DISEASE (HCC): ICD-10-CM

## 2021-10-21 DIAGNOSIS — D64.9 ANEMIA, UNSPECIFIED TYPE: ICD-10-CM

## 2021-10-21 DIAGNOSIS — Z00.00 MEDICARE ANNUAL WELLNESS VISIT, SUBSEQUENT: ICD-10-CM

## 2021-10-21 DIAGNOSIS — I48.20 CHRONIC ATRIAL FIBRILLATION (HCC): Chronic | ICD-10-CM

## 2021-10-21 LAB
25(OH)D3 SERPL-MCNC: 23.7 NG/ML (ref 30–100)
ANION GAP SERPL CALCULATED.3IONS-SCNC: 5 MMOL/L (ref 4–13)
BUN SERPL-MCNC: 21 MG/DL (ref 5–25)
CALCIUM SERPL-MCNC: 9.4 MG/DL (ref 8.3–10.1)
CHLORIDE SERPL-SCNC: 113 MMOL/L (ref 100–108)
CO2 SERPL-SCNC: 21 MMOL/L (ref 21–32)
CREAT SERPL-MCNC: 1.74 MG/DL (ref 0.6–1.3)
GFR SERPL CREATININE-BSD FRML MDRD: 38 ML/MIN/1.73SQ M
GLUCOSE P FAST SERPL-MCNC: 176 MG/DL (ref 65–99)
POTASSIUM SERPL-SCNC: 4.5 MMOL/L (ref 3.5–5.3)
SL AMB POCT HEMOGLOBIN AIC: 7.3 (ref ?–6.5)
SODIUM SERPL-SCNC: 139 MMOL/L (ref 136–145)

## 2021-10-21 PROCEDURE — 80048 BASIC METABOLIC PNL TOTAL CA: CPT

## 2021-10-21 PROCEDURE — 99214 OFFICE O/P EST MOD 30 MIN: CPT | Performed by: INTERNAL MEDICINE

## 2021-10-21 PROCEDURE — 1123F ACP DISCUSS/DSCN MKR DOCD: CPT | Performed by: INTERNAL MEDICINE

## 2021-10-21 PROCEDURE — 96372 THER/PROPH/DIAG INJ SC/IM: CPT | Performed by: INTERNAL MEDICINE

## 2021-10-21 PROCEDURE — 36415 COLL VENOUS BLD VENIPUNCTURE: CPT

## 2021-10-21 PROCEDURE — 83036 HEMOGLOBIN GLYCOSYLATED A1C: CPT | Performed by: INTERNAL MEDICINE

## 2021-10-21 PROCEDURE — G0439 PPPS, SUBSEQ VISIT: HCPCS | Performed by: INTERNAL MEDICINE

## 2021-10-21 PROCEDURE — 82306 VITAMIN D 25 HYDROXY: CPT

## 2021-10-21 PROCEDURE — 80307 DRUG TEST PRSMV CHEM ANLYZR: CPT | Performed by: INTERNAL MEDICINE

## 2021-10-21 RX ORDER — DEXAMETHASONE SODIUM PHOSPHATE 4 MG/ML
4 INJECTION, SOLUTION INTRA-ARTICULAR; INTRALESIONAL; INTRAMUSCULAR; INTRAVENOUS; SOFT TISSUE ONCE
Status: COMPLETED | OUTPATIENT
Start: 2021-10-21 | End: 2021-10-21

## 2021-10-21 RX ORDER — CYCLOBENZAPRINE HCL 10 MG
10 TABLET ORAL 3 TIMES DAILY PRN
Qty: 30 TABLET | Refills: 0 | Status: SHIPPED | OUTPATIENT
Start: 2021-10-21 | End: 2021-10-21

## 2021-10-21 RX ORDER — METHOCARBAMOL 500 MG/1
500 TABLET, FILM COATED ORAL 4 TIMES DAILY PRN
Qty: 90 TABLET | Refills: 0 | Status: SHIPPED | OUTPATIENT
Start: 2021-10-21

## 2021-10-21 RX ADMIN — DEXAMETHASONE SODIUM PHOSPHATE 4 MG: 4 INJECTION, SOLUTION INTRA-ARTICULAR; INTRALESIONAL; INTRAMUSCULAR; INTRAVENOUS; SOFT TISSUE at 08:30

## 2021-10-25 DIAGNOSIS — E11.8 TYPE 2 DIABETES MELLITUS WITH COMPLICATION (HCC): ICD-10-CM

## 2021-12-06 DIAGNOSIS — F41.9 ANXIETY: ICD-10-CM

## 2021-12-06 RX ORDER — ALPRAZOLAM 0.5 MG/1
0.5 TABLET ORAL EVERY 8 HOURS PRN
Qty: 90 TABLET | Refills: 0 | Status: SHIPPED | OUTPATIENT
Start: 2021-12-06 | End: 2022-04-19 | Stop reason: SDUPTHER

## 2021-12-15 DIAGNOSIS — N18.32 TYPE 2 DIABETES MELLITUS WITH STAGE 3B CHRONIC KIDNEY DISEASE, WITHOUT LONG-TERM CURRENT USE OF INSULIN (HCC): Primary | ICD-10-CM

## 2021-12-15 DIAGNOSIS — E11.22 TYPE 2 DIABETES MELLITUS WITH STAGE 3B CHRONIC KIDNEY DISEASE, WITHOUT LONG-TERM CURRENT USE OF INSULIN (HCC): Primary | ICD-10-CM

## 2022-03-29 DIAGNOSIS — I10 HYPERTENSION, UNSPECIFIED TYPE: ICD-10-CM

## 2022-03-29 DIAGNOSIS — I48.91 NEW ONSET A-FIB (HCC): ICD-10-CM

## 2022-03-29 RX ORDER — AMLODIPINE BESYLATE 10 MG/1
10 TABLET ORAL DAILY
Qty: 90 TABLET | Refills: 3 | Status: SHIPPED | OUTPATIENT
Start: 2022-03-29

## 2022-03-29 RX ORDER — METOPROLOL SUCCINATE 100 MG/1
100 TABLET, EXTENDED RELEASE ORAL DAILY
Qty: 90 TABLET | Refills: 1 | Status: SHIPPED | OUTPATIENT
Start: 2022-03-29

## 2022-03-30 ENCOUNTER — TELEPHONE (OUTPATIENT)
Dept: INTERNAL MEDICINE CLINIC | Facility: CLINIC | Age: 77
End: 2022-03-30

## 2022-03-30 NOTE — TELEPHONE ENCOUNTER
Pt wife called she went to  his xarelto and it was $600 can he take that every other day? They cant afford this twice a year with all his other med's  also his invocona will be due soon for a refill pharmacy told her that will cost a lot to fill as well    Is there anything cheaper

## 2022-03-31 NOTE — TELEPHONE ENCOUNTER
Pt wife is aware in detail and will call the pharmacy get get the prices  She will get back to me with the prices

## 2022-04-19 DIAGNOSIS — R41.3 MEMORY DIFFICULTIES: ICD-10-CM

## 2022-04-19 DIAGNOSIS — F41.9 ANXIETY: ICD-10-CM

## 2022-04-19 RX ORDER — DONEPEZIL HYDROCHLORIDE 10 MG/1
10 TABLET, FILM COATED ORAL
Qty: 90 TABLET | Refills: 1 | Status: SHIPPED | OUTPATIENT
Start: 2022-04-19

## 2022-04-19 RX ORDER — ALPRAZOLAM 0.5 MG/1
0.5 TABLET ORAL EVERY 8 HOURS PRN
Qty: 90 TABLET | Refills: 0 | Status: SHIPPED | OUTPATIENT
Start: 2022-04-19

## 2022-06-06 DIAGNOSIS — E11.8 TYPE 2 DIABETES MELLITUS WITH COMPLICATION (HCC): ICD-10-CM

## 2022-07-11 DIAGNOSIS — E78.2 MIXED HYPERLIPIDEMIA: ICD-10-CM

## 2022-07-11 RX ORDER — ATORVASTATIN CALCIUM 20 MG/1
20 TABLET, FILM COATED ORAL DAILY
Qty: 90 TABLET | Refills: 2 | Status: SHIPPED | OUTPATIENT
Start: 2022-07-11

## 2022-07-15 DIAGNOSIS — N18.32 TYPE 2 DIABETES MELLITUS WITH STAGE 3B CHRONIC KIDNEY DISEASE, WITHOUT LONG-TERM CURRENT USE OF INSULIN (HCC): ICD-10-CM

## 2022-07-15 DIAGNOSIS — E11.22 TYPE 2 DIABETES MELLITUS WITH STAGE 3B CHRONIC KIDNEY DISEASE, WITHOUT LONG-TERM CURRENT USE OF INSULIN (HCC): ICD-10-CM

## 2022-08-05 NOTE — ASSESSMENT & PLAN NOTE
Cooling: DCD setting · Incidental finding on CT was a 13 mm right adrenal nodule    Follow up outpatient with PCP for imaging Post-Procedure Care: Immediate endpoint: perifollicular erythema and edema. Vaseline and ice applied. Post care reviewed with patient. Post-Care Instructions: I reviewed with the patient in detail post-care instructions. Patient should avoid sun for a minimum of 4 weeks before and after treatment. External Cooling Fan Speed: 0 Cooling: DCD 40/20 Eye Shield Text: Given the treatment area eye shields were inserted prior to treatment. Shaving (Optional): The patient was shaved in the office prior to the procedure Laser Type: Alexandrite 755nm Spot Size: 12 mm Fluence (Will Not Render If 0): 20 Fluence (Will Not Render If 0): 12 Render Post-Care In The Note: No Cooling: DCD 40/30 Pulse Duration (Include Units): 3 Pre-Procedure: Prior to proceeding the treatment areas were cleaned and all present put on their eye protection. Tolerated Procedure (Optional): 10 out of 10 Fluence (Will Not Render If 0): 34 Detail Level: Zone Consent: Written consent obtained, risks reviewed including but not limited to crusting, scabbing, blistering, scarring, darker or lighter pigmentary change, paradoxical hair regrowth, incomplete removal of hair and infection. Fluence (Will Not Render If 0): 20 Spot Size: 18 mm

## 2022-08-09 ENCOUNTER — RA CDI HCC (OUTPATIENT)
Dept: OTHER | Facility: HOSPITAL | Age: 77
End: 2022-08-09

## 2022-08-09 NOTE — PROGRESS NOTES
E11 42, E11 65  [facility]  coding opportunities          Chart Reviewed number of suggestions sent to Provider: 2     Patients Insurance     Medicare Insurance: [employer]     Commercial Insurance: [employer]

## 2022-08-16 ENCOUNTER — APPOINTMENT (OUTPATIENT)
Dept: LAB | Facility: CLINIC | Age: 77
End: 2022-08-16
Payer: MEDICARE

## 2022-08-16 ENCOUNTER — OFFICE VISIT (OUTPATIENT)
Dept: INTERNAL MEDICINE CLINIC | Facility: CLINIC | Age: 77
End: 2022-08-16
Payer: MEDICARE

## 2022-08-16 VITALS
OXYGEN SATURATION: 96 % | SYSTOLIC BLOOD PRESSURE: 122 MMHG | HEIGHT: 71 IN | DIASTOLIC BLOOD PRESSURE: 62 MMHG | HEART RATE: 82 BPM | WEIGHT: 232.38 LBS | BODY MASS INDEX: 32.53 KG/M2 | TEMPERATURE: 97.6 F

## 2022-08-16 DIAGNOSIS — M19.91 PRIMARY OSTEOARTHRITIS, UNSPECIFIED SITE: ICD-10-CM

## 2022-08-16 DIAGNOSIS — E11.22 TYPE 2 DIABETES MELLITUS WITH STAGE 3B CHRONIC KIDNEY DISEASE, WITHOUT LONG-TERM CURRENT USE OF INSULIN (HCC): Primary | ICD-10-CM

## 2022-08-16 DIAGNOSIS — N25.81 SECONDARY HYPERPARATHYROIDISM OF RENAL ORIGIN (HCC): ICD-10-CM

## 2022-08-16 DIAGNOSIS — R26.9 GAIT ABNORMALITY: ICD-10-CM

## 2022-08-16 DIAGNOSIS — K21.9 GERD WITHOUT ESOPHAGITIS: ICD-10-CM

## 2022-08-16 DIAGNOSIS — Z12.5 SCREENING FOR PROSTATE CANCER: ICD-10-CM

## 2022-08-16 DIAGNOSIS — R68.89: ICD-10-CM

## 2022-08-16 DIAGNOSIS — N18.32 STAGE 3B CHRONIC KIDNEY DISEASE (HCC): ICD-10-CM
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
## 2022-08-16 DIAGNOSIS — D64.9 ANEMIA, UNSPECIFIED TYPE: ICD-10-CM

## 2022-08-16 DIAGNOSIS — I48.20 CHRONIC ATRIAL FIBRILLATION (HCC): Chronic | ICD-10-CM

## 2022-08-16 DIAGNOSIS — E55.9 VITAMIN D DEFICIENCY: ICD-10-CM

## 2022-08-16 DIAGNOSIS — I10 PRIMARY HYPERTENSION: Chronic | ICD-10-CM

## 2022-08-16 DIAGNOSIS — N18.32 TYPE 2 DIABETES MELLITUS WITH STAGE 3B CHRONIC KIDNEY DISEASE, WITHOUT LONG-TERM CURRENT USE OF INSULIN (HCC): ICD-10-CM

## 2022-08-16 DIAGNOSIS — E11.22 TYPE 2 DIABETES MELLITUS WITH STAGE 3B CHRONIC KIDNEY DISEASE, WITHOUT LONG-TERM CURRENT USE OF INSULIN (HCC): ICD-10-CM

## 2022-08-16 DIAGNOSIS — N18.32 TYPE 2 DIABETES MELLITUS WITH STAGE 3B CHRONIC KIDNEY DISEASE, WITHOUT LONG-TERM CURRENT USE OF INSULIN (HCC): Primary | ICD-10-CM

## 2022-08-16 DIAGNOSIS — F41.1 GENERALIZED ANXIETY DISORDER: ICD-10-CM

## 2022-08-16 DIAGNOSIS — Z86.73 HISTORY OF TIA (TRANSIENT ISCHEMIC ATTACK): ICD-10-CM

## 2022-08-16 DIAGNOSIS — Z79.01 CHRONIC ANTICOAGULATION: ICD-10-CM

## 2022-08-16 DIAGNOSIS — R41.3 MEMORY DIFFICULTIES: ICD-10-CM

## 2022-08-16 DIAGNOSIS — E78.5 HYPERLIPIDEMIA, UNSPECIFIED HYPERLIPIDEMIA TYPE: ICD-10-CM

## 2022-08-16 DIAGNOSIS — E11.9 ENCOUNTER FOR DIABETIC FOOT EXAM (HCC): ICD-10-CM

## 2022-08-16 LAB
25(OH)D3 SERPL-MCNC: 29 NG/ML (ref 30–100)
ALBUMIN SERPL BCP-MCNC: 3.3 G/DL (ref 3.5–5)
ALP SERPL-CCNC: 65 U/L (ref 46–116)
ALT SERPL W P-5'-P-CCNC: 16 U/L (ref 12–78)
ANION GAP SERPL CALCULATED.3IONS-SCNC: 5 MMOL/L (ref 4–13)
AST SERPL W P-5'-P-CCNC: 14 U/L (ref 5–45)
BASOPHILS # BLD AUTO: 0.04 THOUSANDS/ΜL (ref 0–0.1)
BASOPHILS NFR BLD AUTO: 1 % (ref 0–1)
BILIRUB SERPL-MCNC: 0.83 MG/DL (ref 0.2–1)
BUN SERPL-MCNC: 23 MG/DL (ref 5–25)
CALCIUM ALBUM COR SERPL-MCNC: 9.6 MG/DL (ref 8.3–10.1)
CALCIUM SERPL-MCNC: 9 MG/DL (ref 8.3–10.1)
CHLORIDE SERPL-SCNC: 111 MMOL/L (ref 96–108)
CHOLEST SERPL-MCNC: 139 MG/DL
CO2 SERPL-SCNC: 22 MMOL/L (ref 21–32)
CREAT SERPL-MCNC: 1.93 MG/DL (ref 0.6–1.3)
CREAT UR-MCNC: 118 MG/DL
EOSINOPHIL # BLD AUTO: 0.17 THOUSAND/ΜL (ref 0–0.61)
EOSINOPHIL NFR BLD AUTO: 3 % (ref 0–6)
ERYTHROCYTE [DISTWIDTH] IN BLOOD BY AUTOMATED COUNT: 13.2 % (ref 11.6–15.1)
GFR SERPL CREATININE-BSD FRML MDRD: 32 ML/MIN/1.73SQ M
GLUCOSE P FAST SERPL-MCNC: 144 MG/DL (ref 65–99)
HCT VFR BLD AUTO: 42.4 % (ref 36.5–49.3)
HDLC SERPL-MCNC: 55 MG/DL
HGB BLD-MCNC: 13.8 G/DL (ref 12–17)
IMM GRANULOCYTES # BLD AUTO: 0.03 THOUSAND/UL (ref 0–0.2)
IMM GRANULOCYTES NFR BLD AUTO: 1 % (ref 0–2)
LDLC SERPL CALC-MCNC: 53 MG/DL (ref 0–100)
LYMPHOCYTES # BLD AUTO: 1.48 THOUSANDS/ΜL (ref 0.6–4.47)
LYMPHOCYTES NFR BLD AUTO: 24 % (ref 14–44)
MCH RBC QN AUTO: 31.8 PG (ref 26.8–34.3)
MCHC RBC AUTO-ENTMCNC: 32.5 G/DL (ref 31.4–37.4)
MCV RBC AUTO: 98 FL (ref 82–98)
MICROALBUMIN UR-MCNC: 300 MG/L (ref 0–20)
MICROALBUMIN/CREAT 24H UR: 254 MG/G CREATININE (ref 0–30)
MONOCYTES # BLD AUTO: 0.5 THOUSAND/ΜL (ref 0.17–1.22)
MONOCYTES NFR BLD AUTO: 8 % (ref 4–12)
NEUTROPHILS # BLD AUTO: 4 THOUSANDS/ΜL (ref 1.85–7.62)
NEUTS SEG NFR BLD AUTO: 63 % (ref 43–75)
NRBC BLD AUTO-RTO: 0 /100 WBCS
PLATELET # BLD AUTO: 204 THOUSANDS/UL (ref 149–390)
PMV BLD AUTO: 10.7 FL (ref 8.9–12.7)
POTASSIUM SERPL-SCNC: 5.1 MMOL/L (ref 3.5–5.3)
PROT SERPL-MCNC: 7.7 G/DL (ref 6.4–8.4)
PSA SERPL-MCNC: 4 NG/ML (ref 0–4)
PTH-INTACT SERPL-MCNC: 116.2 PG/ML (ref 18.4–80.1)
RBC # BLD AUTO: 4.34 MILLION/UL (ref 3.88–5.62)
SL AMB POCT HEMOGLOBIN AIC: 7.5 (ref ?–6.5)
SODIUM SERPL-SCNC: 138 MMOL/L (ref 135–147)
TRIGL SERPL-MCNC: 154 MG/DL
TSH SERPL DL<=0.05 MIU/L-ACNC: 3.32 UIU/ML (ref 0.45–4.5)
URATE SERPL-MCNC: 6.4 MG/DL (ref 3.5–8.5)
WBC # BLD AUTO: 6.22 THOUSAND/UL (ref 4.31–10.16)

## 2022-08-16 PROCEDURE — 83036 HEMOGLOBIN GLYCOSYLATED A1C: CPT | Performed by: INTERNAL MEDICINE

## 2022-08-16 PROCEDURE — 82043 UR ALBUMIN QUANTITATIVE: CPT | Performed by: INTERNAL MEDICINE

## 2022-08-16 PROCEDURE — 83970 ASSAY OF PARATHORMONE: CPT

## 2022-08-16 PROCEDURE — 82570 ASSAY OF URINE CREATININE: CPT | Performed by: INTERNAL MEDICINE

## 2022-08-16 PROCEDURE — 84550 ASSAY OF BLOOD/URIC ACID: CPT

## 2022-08-16 PROCEDURE — 36415 COLL VENOUS BLD VENIPUNCTURE: CPT

## 2022-08-16 PROCEDURE — 82306 VITAMIN D 25 HYDROXY: CPT

## 2022-08-16 PROCEDURE — 80053 COMPREHEN METABOLIC PANEL: CPT

## 2022-08-16 PROCEDURE — 85025 COMPLETE CBC W/AUTO DIFF WBC: CPT

## 2022-08-16 PROCEDURE — 84443 ASSAY THYROID STIM HORMONE: CPT

## 2022-08-16 PROCEDURE — 80061 LIPID PANEL: CPT

## 2022-08-16 PROCEDURE — G0103 PSA SCREENING: HCPCS

## 2022-08-16 PROCEDURE — 99214 OFFICE O/P EST MOD 30 MIN: CPT | Performed by: INTERNAL MEDICINE

## 2022-08-16 NOTE — PROGRESS NOTES
BMI Counseling: Body mass index is 32 41 kg/m²  The BMI is above normal  Nutrition recommendations include decreasing portion sizes and encouraging healthy choices of fruits and vegetables  Exercise recommendations include moderate physical activity 150 minutes/week  No pharmacotherapy was ordered  Rationale for BMI follow-up plan is due to patient being overweight or obese  Depression Screening and Follow-up Plan: Patient was screened for depression during today's encounter  They screened negative with a PHQ-2 score of 0      Assessment/Plan:  Problem List Items Addressed This Visit        Digestive    GERD without esophagitis       Endocrine    Type 2 diabetes mellitus with diabetic chronic kidney disease ([facility] ) - Primary    Relevant Orders    CBC and differential    Comprehensive metabolic panel    Lipid Panel with Direct LDL reflex    Microalbumin / creatinine urine ratio    TSH, 3rd generation with Free T4 reflex    POCT hemoglobin A1c (Completed)       Cardiovascular and Mediastinum    Hypertension (Chronic)    Relevant Orders    Comprehensive metabolic panel    Microalbumin / creatinine urine ratio    Chronic atrial fibrillation (Chronic)    Relevant Orders    TSH, 3rd generation with Free T4 reflex       Musculoskeletal and Integument    Osteoarthritis       Genitourinary    Stage 3 chronic kidney disease ([place] [id number] )    Relevant Orders    PTH, intact    Uric acid       Other    Memory difficulties    Hyperlipidemia    History of TIA (transient ischemic attack)    Generalized anxiety disorder    Chronic anticoagulation    Anemia      Other Visit Diagnoses     Encounter for diabetic foot exam ([place] [address] )        Screening for prostate cancer        Relevant Orders    PSA, Total Screen    Vitamin D deficiency        Relevant Orders    Vitamin D 25 hydroxy    Secondary hyperparathyroidism of renal origin ([facility] )        Relevant Orders    PTH, intact    Uric acid    Fall, initial encounter        Relevant Orders    Ambulatory referral to Physical Therapy    Ambulatory referral to Physical Therapy (*VB)    Gait abnormality        Relevant Orders    Ambulatory referral to Physical Therapy    Ambulatory referral to Physical Therapy (*VB)    Trouble getting out of chair        Relevant Orders    Ambulatory referral to Physical Therapy    Ambulatory referral to Physical Therapy (*VB)           Diagnoses and all orders for this visit:    Type 2 diabetes mellitus with stage 3b chronic kidney disease, without long-term current use of insulin (HCC)  -     CBC and differential; Future  -     Comprehensive metabolic panel; Future  -     Lipid Panel with Direct LDL reflex; Future  -     Microalbumin / creatinine urine ratio  -     TSH, 3rd generation with Free T4 reflex; Future  -     POCT hemoglobin A1c    GERD without esophagitis    Primary hypertension  -     Comprehensive metabolic panel; Future  -     Microalbumin / creatinine urine ratio    Chronic atrial fibrillation  -     TSH, 3rd generation with Free T4 reflex; Future    Primary osteoarthritis, unspecified site    Stage 3b chronic kidney disease (HCC)  -     PTH, intact; Future  -     Uric acid; Future    Memory difficulties    Hyperlipidemia, unspecified hyperlipidemia type    History of TIA (transient ischemic attack)    Generalized anxiety disorder    Chronic anticoagulation    Anemia, unspecified type    Encounter for diabetic foot exam ([facility] )    Screening for prostate cancer  -     PSA, Total Screen; Future    Vitamin D deficiency  -     Vitamin D 25 hydroxy; Future    Secondary hyperparathyroidism of renal origin ([facility] [address] )  -     PTH, intact; Future  -     Uric acid; Future    Fall, initial encounter  -     Ambulatory referral to Physical Therapy; Future  -     Ambulatory referral to Physical Therapy (*VB); Future    Gait abnormality  -     Ambulatory referral to Physical Therapy; Future  -     Ambulatory referral to Physical Therapy (*VB);  Future    Trouble getting out of chair  - Ambulatory referral to Physical Therapy; Future  -     Ambulatory referral to Physical Therapy (*VB); Future      No problem-specific Assessment & Plan notes found for this encounter  A/P: Doing ok and will check labs  In office HgA1c was acceptable at 7 5  Discussed falls and has a lot of problems getting out of chair so will refer to PT  Continue current treatment and RTC three months for routine  Subjective:      Patient ID: Saida Harris is a 68 y o  male  WM RTC for f/u dm, AFIB, etc  Doing ok and no new issues  Remains active w/o difficulty, but one fall  Doesn't check sugars often, but no low sugar events  Denies CP, SOB,palpitations, orthopnea or PND  ALMITA no worse  Remains on chronic anticoagulation and no bleeding events  No reflux  Chronic pain is manageable  No stroke like events  Due for labs  The following portions of the patient's history were reviewed and updated as appropriate:   He has a past medical history of Acute on chronic renal insufficiency, Allergic rhinitis, Anemia (6/11/2012), Atrial fibrillation ([facility] [address] ), Cerebrovascular accident (CVA) ([facility] [address] ), Controlled type 2 diabetes mellitus without complication ([facility] [address] ), Generalized anxiety disorder, GERD without esophagitis (6/11/2012), Hypertension, Memory difficulties (1/8/2019), Nephrolithiasis (3/17/2016), Obesity (6/11/2012), and Osteoarthritis (6/11/2012)  ,  does not have any pertinent problems on file  ,   has a past surgical history that includes Total hip arthroplasty; Knee surgery; Cataract extraction, bilateral; Total hip arthroplasty (Bilateral, 2011); pr cystourethroscopy,ureter catheter (Left, 11/8/2019); and Cystoscopy w/ laser lithotripsy (Left, 1/20/2020)  ,  family history includes Arthritis in his mother; No Known Problems in his father; Parkinsonism in his mother  ,   reports that he has never smoked  He has never used smokeless tobacco  He reports that he does not drink alcohol and does not use drugs  ,  is allergic to pollen extract     Current Outpatient Medications   Medication Sig Dispense Refill    ALPRAZolam (XANAX) 0 5 mg tablet Take 1 tablet (0 5 mg total) by mouth every 8 (eight) hours as needed (AS NEEDED) 90 tablet 0    amLODIPine (NORVASC) 10 mg tablet Take 1 tablet (10 mg total) by mouth daily 90 tablet 3    atorvastatin (LIPITOR) 20 mg tablet Take 1 tablet (20 mg total) by mouth daily 90 tablet 2    Blood Glucose Monitoring Suppl (ONE TOUCH ULTRA 2) w/Device KIT by Does not apply route      Canagliflozin 300 MG TABS Take 1 tablet (300 mg total) by mouth daily 90 tablet 1    Canagliflozin 300 MG TABS Take 1 tablet (300 mg total) by mouth daily before breakfast 90 tablet 0    donepezil (ARICEPT) 10 mg tablet Take 1 tablet (10 mg total) by mouth daily at bedtime 90 tablet 1    glucose blood test strip 1 Squirt by In Vitro route daily      metFORMIN (GLUCOPHAGE) 850 mg tablet Take 2 tablets (1,700 mg total) by mouth daily 180 tablet 1    methocarbamol (ROBAXIN) 500 mg tablet Take 1 tablet (500 mg total) by mouth 4 (four) times a day as needed for muscle spasms 90 tablet 0    metoprolol succinate (TOPROL-XL) 100 mg 24 hr tablet Take 1 tablet (100 mg total) by mouth daily 90 tablet 1    ONETOUCH DELICA LANCETS 42D MISC by Does not apply route daily      rivaroxaban (Xarelto) 15 mg tablet Take 1 tablet (15 mg total) by mouth daily with breakfast 90 tablet 1    sitaGLIPtin (JANUVIA) 50 mg tablet Take 1 tablet (50 mg total) by mouth daily 90 tablet 3     No current facility-administered medications for this visit  Review of Systems   Constitutional: Negative for activity change, chills, diaphoresis, fatigue and fever  HENT: Negative  Eyes: Negative for visual disturbance  Respiratory: Negative for cough, chest tightness, shortness of breath and wheezing  Cardiovascular: Negative for chest pain, palpitations and leg swelling     Gastrointestinal: Negative for abdominal pain, constipation, diarrhea, nausea and vomiting  Endocrine: Negative for cold intolerance and heat intolerance  Genitourinary: Negative for difficulty urinating, dysuria and frequency  Musculoskeletal: Positive for gait problem  Negative for arthralgias and myalgias  Neurological: Negative for dizziness, seizures, syncope, weakness, light-headedness and headaches  Psychiatric/Behavioral: Negative for confusion, dysphoric mood and sleep disturbance  The patient is not nervous/anxious  PHQ-2/9 Depression Screening    Little interest or pleasure in doing things: 0 - not at all  Feeling down, depressed, or hopeless: 0 - not at all  PHQ-2 Score: 0  PHQ-2 Interpretation: Negative depression screen        Objective:  Vitals:    08/16/22 1336   BP: 122/62   Pulse: 82   Temp: 97 6 °F (36 4 °C)   SpO2: 96%   Weight: 105 kg (232 lb 6 oz)   Height: 5' 11" (1 803 m)     Body mass index is 32 41 kg/m²  Physical Exam  Vitals and nursing note reviewed  Constitutional:       General: He is not in acute distress  Appearance: Normal appearance  He is not ill-appearing  HENT:      Head: Normocephalic and atraumatic  Mouth/Throat:      Mouth: Mucous membranes are moist    Eyes:      Extraocular Movements: Extraocular movements intact  Conjunctiva/sclera: Conjunctivae normal       Pupils: Pupils are equal, round, and reactive to light  Neck:      Vascular: No carotid bruit  Cardiovascular:      Rate and Rhythm: Normal rate  Rhythm irregular  Heart sounds: Normal heart sounds  Comments: Irregular irregular with GVR  Pulmonary:      Effort: Pulmonary effort is normal  No respiratory distress  Breath sounds: Normal breath sounds  No wheezing or rales  Abdominal:      General: Bowel sounds are normal  There is no distension  Palpations: Abdomen is soft  Tenderness: There is no abdominal tenderness  Musculoskeletal:      Cervical back: Neck supple        Right lower leg: Edema present  Left lower leg: Edema present  Comments: bilat 2/4  Trouble getting out of chair  Neurological:      General: No focal deficit present  Mental Status: He is alert and oriented to person, place, and time  Mental status is at baseline  Psychiatric:         Mood and Affect: Mood normal          Behavior: Behavior normal          Thought Content: Thought content normal          Judgment: Judgment normal          BMI Counseling: Body mass index is 32 41 kg/m²  The BMI is above normal  Nutrition recommendations include reducing portion sizes, decreasing overall calorie intake, reducing intake of saturated fat and trans fat and reducing intake of cholesterol  Exercise recommendations include moderate aerobic physical activity for 150 minutes/week  Falls Plan of Care: Balance, strength, and gait training instructions were provided  Patient referred to physical therapy

## 2022-08-16 NOTE — PATIENT INSTRUCTIONS
Basic Carbohydrate Counting   AMBULATORY CARE:   Carbohydrate counting  is a way to plan your meals by counting the amount of carbohydrate in foods  Carbohydrates are the sugars, starches, and fiber found in fruit, grains, vegetables, and milk products  Carbohydrates increase your blood sugar levels  Carbohydrate counting can help you eat the right amount of carbohydrate to keep your blood sugar levels under control  What you need to know about planning meals using carbohydrate counting:  · A dietitian or healthcare provider will help you develop a healthy meal plan that works best for you  You will be taught how much carbohydrate to eat or drink for each meal and snack  Your meal plan will be based on your age, weight, usual food intake, and physical activity level  If you have diabetes, it will also include your blood sugar levels and diabetes medicine  Once you know how much carbohydrate you should eat, you can decide what type of food you want to eat  · You will need to know what foods contain carbohydrate and how much they contain  Keep track of the amount of carbohydrate in meals and snacks in order to follow your meal plan  Do not avoid carbohydrates or skip meals  Your blood sugar may fall too low if you do not eat enough carbohydrate or you skip meals  Foods that contain carbohydrate:   · Breads:  Each serving of food listed below contains about 15 g of carbohydrate   ? 1 slice of bread (1 ounce) or 1 flour or corn tortilla (6 inch)    ? ½ of a hamburger bun or ¼ of a large bagel (about 1 ounce)    ? 1 pancake (about 4 inches across and ¼ inch thick)    · Cereals and grains:  Serving sizes of ready-to-eat cereals vary  Look at the serving size and the total carbohydrate amount listed on the food label  Each serving of food listed below contains about 15 g of carbohydrate   ? ¾ cup of dry, unsweetened, ready-to-eat cereal or ¼ cup of low-fat granola     ?  ½ cup of oatmeal or other cooked cereal     ? ? cup of cooked rice or pasta    · Starchy vegetables and beans:  Each serving of food listed below contains about 15 g of carbohydrate   ? ½ cup of corn, green peas, sweet potatoes, or mashed potatoes    ? ¼ of a large baked potato    ? ½ cup of beans, lentils, and peas (garbanzo, wisdom, kidney, white, split, black-eyed)    · Crackers and snacks:  Each serving of food listed below contains about 15 g of carbohydrate   ? 3 derick cracker squares or 8 animal crackers     ? 6 saltine-type crackers    ? 3 cups of popcorn or ¾ ounce of pretzels, potato chips, or tortilla chips    · Fruit:  Each serving of food listed below contains about 15 g of carbohydrate   ? 1 small (4 ounce) piece of fresh fruit or ¾ to 1 cup of fresh fruit    ? ½ cup of canned or frozen fruit, packed in natural juice    ? ½ cup (4 ounces) of unsweetened fruit juice    ? 2 tablespoons of dried fruit    · Desserts or sugary foods:  Each serving of food listed below contains about 15 g of carbohydrate   ? 2-inch square unfrosted cake or brownie     ? 2 small cookies    ? ½ cup of ice cream, frozen yogurt, or nondairy frozen yogurt    ? ¼ cup of sherbet or sorbet    ? 1 tablespoon of regular syrup, jam, or jelly    ? 2 tablespoons of light syrup    · Milk and yogurt:  Foods from the milk group contain about 12 g of carbohydrate per serving  ? 1 cup of fat-free or low-fat milk    ? 1 cup of soy milk    ? ? cup of fat-free, yogurt sweetened with artificial sweetener    · Non-starchy vegetables:  Each serving contains about 5 g of carbohydrate   Three servings of non-starch vegetables count as 1 carbohydrate serving  ? ½ cup of cooked vegetables or 1 cup of raw vegetables  This includes beets, broccoli, cabbage, cauliflower, cucumber, mushrooms, tomatoes, and zucchini    ?  ½ cup of vegetable juice    How to use carbohydrate counting to plan meals:   · Count carbohydrate amounts using serving sizes:      ? Pasta dinner example: You plan to have pasta, tossed salad, and an 8-ounce glass of milk  Your healthcare provider tells you that you may have 4 carbohydrate servings for dinner  One carbohydrate serving of pasta is ? cup  One cup of pasta will equal 3 carbohydrate servings  An 8-ounce glass of milk will count as 1 carbohydrate serving  These amounts of food would equal 4 carbohydrate servings  One cup of tossed salad does not count toward your carbohydrate servings  · Count carbohydrate amounts using food labels:  Find the total amount of carbohydrate in a packaged food by reading the food label  Food labels tell you the serving size of the food and the total carbohydrate amount in each serving  Find the serving size on the food label and then decide how many servings you will eat  Multiply the number of servings you plan to eat by the carbohydrate amount per serving  ? Granola bar snack example: Your meal plan allows you to have 2 carbohydrate servings (30 grams) of carbohydrate for a snack  You plan to eat 1 package of granola bars, which contains 2 bars  According to the food label, the serving size of food in this package is 1 bar  Each serving (1 bar) contains 25 grams of carbohydrate  The total amount of carbohydrate in this package of granola bars would be 50 g  Based on your meal plan, you should eat only 1 bar  Follow up with your doctor as directed:  Write down your questions so you remember to ask them during your visits  © Copyright [employer] 2022 Information is for End User's use only and may not be sold, redistributed or otherwise used for commercial purposes  All illustrations and images included in CareNotes® are the copyrighted property of A D A M , Inc  or [facility] Scot Tinajero   The above information is an  only  It is not intended as medical advice for individual conditions or treatments   Talk to your doctor, nurse or pharmacist before following any medical regimen to see if it is safe and effective for you  Obesity   AMBULATORY CARE:   Obesity  means your body mass index (BMI) is greater than 30  Your healthcare provider will use your height and weight to measure your BMI  The risks of obesity include  many health problems, including injuries or physical disability  · Diabetes (high blood sugar level)    · High blood pressure or high cholesterol    · Heart disease    · Stroke    · Gallbladder or liver disease    · Cancer of the colon, breast, prostate, liver, or kidney    · Sleep apnea    · Arthritis or gout    Screening  is done to check for health conditions before you have signs or symptoms  If you are 28to 79years old, your blood sugar level may be checked every 3 years for signs of prediabetes or diabetes  Your healthcare provider will check your blood pressure at each visit  High blood pressure can lead to a stroke or other problems  Your provider may check for signs of heart disease, cancer, or other health problems  Seek care immediately if:   · You have a severe headache, confusion, or difficulty speaking  · You have weakness on one side of your body  · You have chest pain, sweating, or shortness of breath  Call your doctor if:   · You have symptoms of gallbladder or liver disease, such as pain in your upper abdomen  · You have knee or hip pain and discomfort while walking  · You have symptoms of diabetes, such as intense hunger and thirst, and frequent urination  · You have symptoms of sleep apnea, such as snoring or daytime sleepiness  · You have questions or concerns about your condition or care  Treatment for obesity  focuses on helping you lose weight to improve your health  Even a small decrease in BMI can reduce the risk for many health problems  Your healthcare provider will help you set a weight-loss goal   · Lifestyle changes  are the first step in treating obesity   These include making healthy food choices and getting regular physical activity  Your healthcare provider may suggest a weight-loss program that involves coaching, education, and therapy  · Medicine  may help you lose weight when it is used with a healthy foods and physical activity  · Surgery  can help you lose weight if you are very obese and have other health problems  There are several types of weight-loss surgery  Ask your healthcare provider for more information  Tips for safe weight loss:   · Set small, realistic goals  An example of a small goal is to walk for 20 minutes 5 days a week  Anther goal is to lose 5% of your body weight  · Tell friends, family members, and coworkers about your goals  and ask for their support  Ask a friend to lose weight with you, or join a weight-loss support group  · Identify foods or triggers that may cause you to overeat , and find ways to avoid them  Remove tempting high-calorie foods from your home and workplace  Place a bowl of fresh fruit on your kitchen counter  If stress causes you to eat, then find other ways to cope with stress  A counselor or therapist may be able to help you  · Keep a diary to track what you eat and drink  Also write down how many minutes of physical activity you do each day  Weigh yourself once a week and record it in your diary  Eating changes: You will need to eat 500 to 1,000 fewer calories each day than you currently eat to lose 1 to 2 pounds a week  The following changes will help you cut calories:  · Eat smaller portions  Use small plates, no larger than 9 inches in diameter  Fill your plate half full of fruits and vegetables  Measure your food using measuring cups until you know what a serving size looks like  · Eat 3 meals and 1 or 2 snacks each day  Plan your meals in advance  Maya Bermudez and eat at home most of the time  Eat slowly  Do not skip meals  Skipping meals can lead to overeating later in the day  This can make it harder for you to lose weight   Talk with a dietitian to help you make a meal plan and schedule that is right for you  · Eat fruits and vegetables at every meal   They are low in calories and high in fiber, which makes you feel full  Do not add butter, margarine, or cream sauce to vegetables  Use herbs to season steamed vegetables  · Eat less fat and fewer fried foods  Eat more baked or grilled chicken and fish  These protein sources are lower in calories and fat than red meat  Limit fast food  Dress your salads with olive oil and vinegar instead of bottled dressing  · Limit the amount of sugar you eat  Do not drink sugary beverages  Limit alcohol  Activity changes:  Physical activity is good for your body in many ways  It helps you burn calories and build strong muscles  It decreases stress and depression, and improves your mood  It can also help you sleep better  Talk to your healthcare provider before you begin an exercise program   · Exercise for at least 30 minutes 5 days a week  Start slowly  Set aside time each day for physical activity that you enjoy and that is convenient for you  It is best to do both weight training and an activity that increases your heart rate, such as walking, bicycling, or swimming  · Find ways to be more active  Do yard work and housecleaning  Walk up the stairs instead of using elevators  Spend your leisure time going to events that require walking, such as outdoor festivals or fairs  This extra physical activity can help you lose weight and keep it off  Follow up with your doctor as directed: You may need to meet with a dietitian  Write down your questions so you remember to ask them during your visits  © [employer] 2022 Information is for End User's use only and may not be sold, redistributed or otherwise used for commercial purposes   All illustrations and images included in CareNotes® are the copyrighted property of [employer] M [employer] Inc  or Chyna Silva  The above information is an  only  It is not intended as medical advice for individual conditions or treatments  Talk to your doctor, nurse or pharmacist before following any medical regimen to see if it is safe and effective for you  Low Fat Diet   AMBULATORY CARE:   A low-fat diet  is an eating plan that is low in total fat, unhealthy fat, and cholesterol  You may need to follow a low-fat diet if you have trouble digesting or absorbing fat  You may also need to follow this diet if you have high cholesterol  You can also lower your cholesterol by increasing the amount of fiber in your diet  Soluble fiber is a type of fiber that helps to decrease cholesterol levels  Different types of fat in food:   · Limit unhealthy fats  A diet that is high in cholesterol, saturated fat, and trans fat may cause unhealthy cholesterol levels  Unhealthy cholesterol levels increase your risk of heart disease  ? Cholesterol:  Limit intake of cholesterol to less than 200 mg per day  Cholesterol is found in meat, eggs, and dairy  ? Saturated fat:  Limit saturated fat to less than 7% of your total daily calories  Ask your dietitian how many calories you need each day  Saturated fat is found in butter, cheese, ice cream, whole milk, and palm oil  Saturated fat is also found in meat, such as beef, pork, chicken skin, and processed meats  Processed meats include sausage, hot dogs, and bologna  ? Trans fat:  Avoid trans fat as much as possible  Trans fat is used in fried and baked foods  Foods that say trans fat free on the label may still have up to 0 5 grams of trans fat per serving  · Include healthy fats  Replace foods that are high in saturated and trans fat with foods high in healthy fats  This may help to decrease high cholesterol levels  ? Monounsaturated fats: These are found in avocados, nuts, and vegetable oils, such as olive, canola, and sunflower oil  ? Polyunsaturated fats:   These can be found in vegetable oils, such as soybean or corn oil  Omega-3 fats can help to decrease the risk of heart disease  Omega-3 fats are found in fish, such as salmon, herring, trout, and tuna  Omega-3 fats can also be found in plant foods, such as walnuts, flaxseed, soybeans, and canola oil  Foods to limit or avoid:   · Grains:      ? Snacks that are made with partially hydrogenated oils, such as chips, regular crackers, and butter-flavored popcorn    ? High-fat baked goods, such as biscuits, croissants, doughnuts, pies, cookies, and pastries    · Dairy:      ? Whole milk, 2% milk, and yogurt and ice cream made with whole milk    ? Half and half creamer, heavy cream, and whipping cream    ? Cheese, cream cheese, and sour cream    · Meats and proteins:      ? High-fat cuts of meat (T-bone steak, regular hamburger, and ribs)    ? Fried meat, poultry (turkey and chicken), and fish    ? Poultry (chicken and turkey) with skin    ? Cold cuts (salami or bologna), hot dogs, durate, and sausage    ? Whole eggs and egg yolks    · Vegetables and fruits with added fat:      ? Fried vegetables or vegetables in butter or high-fat sauces, such as cream or cheese sauces    ? Fried fruit or fruit served with butter or cream    · Fats:      ? Butter, stick margarine, and shortening    ? Coconut, palm oil, and palm kernel oil    Foods to include:   · Grains:      ? Whole-grain breads, cereals, pasta, and brown rice    ? Low-fat crackers and pretzels    · Vegetables and fruits:      ? Fresh, frozen, or canned vegetables (no salt or low-sodium)    ? Fresh, frozen, dried, or canned fruit (canned in light syrup or fruit juice)    ? Avocado    · Low-fat dairy products:      ? Nonfat (skim) or 1% milk    ? Nonfat or low-fat cheese, yogurt, and cottage cheese    · Meats and proteins:      ? Chicken or turkey with no skin    ? Baked or broiled fish    ? Lean beef and pork (loin, round, extra lean hamburger)    ?  Beans and peas, unsalted nuts, soy products    ? Egg whites and substitutes    ? Seeds and nuts    · Fats:      ? Unsaturated oil, such as canola, olive, peanut, soybean, or sunflower oil    ? Soft or liquid margarine and vegetable oil spread    ? Low-fat salad dressing    Other ways to decrease fat:   · Read food labels before you buy foods  Choose foods that have less than 30% of calories from fat  Choose low-fat or fat-free dairy products  Remember that fat free does not mean calorie free  These foods still contain calories, and too many calories can lead to weight gain  · Trim fat from meat and avoid fried food  Trim all visible fat from meat before you cook it  Remove the skin from poultry  Do not robles meat, fish, or poultry  Bake, roast, boil, or broil these foods instead  Avoid fried foods  Eat a baked potato instead of [place] fries  Steam vegetables instead of sautéing them in butter  · Add less fat to foods  Use imitation duarte bits on salads and baked potatoes instead of regular duarte bits  Use fat-free or low-fat salad dressings instead of regular dressings  Use low-fat or nonfat butter-flavored topping instead of regular butter or margarine on popcorn and other foods  Ways to decrease fat in recipes:  Replace high-fat ingredients with low-fat or nonfat ones  This may cause baked goods to be drier than usual  You may need to use nonfat cooking spray on pans to prevent food from sticking  You also may need to change the amount of other ingredients, such as water, in the recipe  Try the following:  · Use low-fat or light margarine instead of regular margarine or shortening  · Use lean ground turkey breast or chicken, or lean ground beef (less than 5% fat) instead of hamburger  · Add 1 teaspoon of canola oil to 8 ounces of skim milk instead of using cream or half and half  · Use grated zucchini, carrots, or apples in breads instead of coconut      · Use blenderized, low-fat cottage cheese, plain tofu, or low-fat ricotta cheese instead of cream cheese  · Use 1 egg white and 1 teaspoon of canola oil, or use ¼ cup (2 ounces) of fat-free egg substitute instead of a whole egg  · Replace half of the oil that is called for in a recipe with applesauce when you bake  Use 3 tablespoons of cocoa powder and 1 tablespoon of canola oil instead of a square of baking chocolate  How to increase fiber:  Eat enough high-fiber foods to get 20 to 30 grams of fiber every day  Slowly increase your fiber intake to avoid stomach cramps, gas, and other problems  · Eat 3 ounces of whole-grain foods each day  An ounce is about 1 slice of bread  Eat whole-grain breads, such as whole-wheat bread  Whole wheat, whole-wheat flour, or other whole grains should be listed as the first ingredient on the food label  Replace white flour with whole-grain flour or use half of each in recipes  Whole-grain flour is heavier than white flour, so you may have to add more yeast or baking powder  · Eat a high-fiber cereal for breakfast   Oatmeal is a good source of soluble fiber  Look for cereals that have bran or fiber in the name  Choose whole-grain products, such as brown rice, barley, and whole-wheat pasta  · Eat more beans, peas, and lentils  For example, add beans to soups or salads  Eat at least 5 cups of fruits and vegetables each day  Eat fruits and vegetables with the peel because the peel is high in fiber  © Copyright [employer] 2022 Information is for End User's use only and may not be sold, redistributed or otherwise used for commercial purposes  All illustrations and images included in CareNotes® are the copyrighted property of A [employer] A M [employer] Inc  or [facility] Scot Tinajero   The above information is an  only  It is not intended as medical advice for individual conditions or treatments  Talk to your doctor, nurse or pharmacist before following any medical regimen to see if it is safe and effective for you      Heart Healthy Diet   AMBULATORY CARE:   A heart healthy diet  is an eating plan low in unhealthy fats and sodium (salt)  The plan is high in healthy fats and fiber  A heart healthy diet helps improve your cholesterol levels and lowers your risk for heart disease and stroke  A dietitian will teach you how to read and understand food labels  Heart healthy diet guidelines to follow:   · Choose foods that contain healthy fats  ? Unsaturated fats  include monounsaturated and polyunsaturated fats  Unsaturated fat is found in foods such as soybean, canola, olive, corn, and safflower oils  It is also found in soft tub margarine that is made with liquid vegetable oil  ? Omega-3 fat  is found in certain fish, such as salmon, tuna, and trout, and in walnuts and flaxseed  Eat fish high in omega-3 fats at least 2 times a week  · Get 20 to 30 grams of fiber each day  Fruits, vegetables, whole-grain foods, and legumes (cooked beans) are good sources of fiber  · Limit or do not have unhealthy fats  ? Cholesterol  is found in animal foods, such as eggs and lobster, and in dairy products made from whole milk  Limit cholesterol to less than 200 mg each day  ? Saturated fat  is found in meats, such as duarte and hamburger  It is also found in chicken or turkey skin, whole milk, and butter  Limit saturated fat to less than 7% of your total daily calories  ? Trans fat  is found in packaged foods, such as potato chips and cookies  It is also in hard margarine, some fried foods, and shortening  Do not eat foods that contain trans fats  · Limit sodium as directed  You may be told to limit sodium to 2,000 to 2,300 mg each day  Choose low-sodium or no-salt-added foods  Add little or no salt to food you prepare  Use herbs and spices in place of salt         Include the following in your heart healthy plan:  Ask your dietitian or healthcare provider how many servings to have from each of the following food groups:  · Grains:      ? Whole-wheat breads, cereals, and pastas, and brown rice    ? Low-fat, low-sodium crackers and chips    · Vegetables:      ? Broccoli, green beans, green peas, and spinach    ? Collards, kale, and lima beans    ? Carrots, sweet potatoes, tomatoes, and peppers    ? Canned vegetables with no salt added    · Fruits:      ? Bananas, peaches, pears, and pineapple    ? Grapes, raisins, and dates    ? Oranges, tangerines, grapefruit, orange juice, and grapefruit juice    ? Apricots, mangoes, melons, and papaya    ? Raspberries and strawberries    ? Canned fruit with no added sugar    · Low-fat dairy:      ? Nonfat (skim) milk, 1% milk, and low-fat almond, cashew, or soy milks fortified with calcium    ? Low-fat cheese, regular or frozen yogurt, and cottage cheese    · Meats and proteins:      ? Lean cuts of beef and pork (loin, leg, round), skinless chicken and turkey    ? Legumes, soy products, egg whites, or nuts    Limit or do not include the following in your heart healthy plan:   · Unhealthy fats and oils:      ? Whole or 2% milk, cream cheese, sour cream, or cheese    ? High-fat cuts of beef (T-bone steaks, ribs), chicken or turkey with skin, and organ meats such as liver    ? Butter, stick margarine, shortening, and cooking oils such as coconut or palm oil    · Foods and liquids high in sodium:      ? Packaged foods, such as frozen dinners, cookies, macaroni and cheese, and cereals with more than 300 mg of sodium per serving    ? Vegetables with added sodium, such as instant potatoes, vegetables with added sauces, or regular canned vegetables    ? Cured or smoked meats, such as hot dogs, duarte, and sausage    ? High-sodium ketchup, barbecue sauce, salad dressing, pickles, olives, soy sauce, or miso    · Foods and liquids high in sugar:      ? Candy, cake, cookies, pies, or doughnuts    ? Soft drinks (soda), sports drinks, or sweetened tea    ?  Canned or dry mixes for cakes, soups, sauces, or gravies    Other healthy heart guidelines:   · Do not smoke  Nicotine and other chemicals in cigarettes and cigars can cause lung and heart damage  Ask your healthcare provider for information if you currently smoke and need help to quit  E-cigarettes or smokeless tobacco still contain nicotine  Talk to your healthcare provider before you use these products  · Limit or do not drink alcohol as directed  Alcohol can damage your heart and raise your blood pressure  Your healthcare provider may give you specific daily and weekly limits  The general recommended limit is 1 drink a day for women 21 or older and for men 72 or older  Do not have more than 3 drinks in a day or 7 in a week  The recommended limit is 2 drinks a day for men 24to 59years of age  Do not have more than 4 drinks in a day or 14 in a week  A drink of alcohol is 12 ounces of beer, 5 ounces of wine, or 1½ ounces of liquor  · Exercise regularly  Exercise can help you maintain a healthy weight and improve your blood pressure and cholesterol levels  Regular exercise can also decrease your risk for heart problems  Ask your healthcare provider about the best exercise plan for you  Do not start an exercise program without asking your healthcare provider  Follow up with your doctor or cardiologist as directed:  Write down your questions so you remember to ask them during your visits  © [employer] 2022 Information is for End User's use only and may not be sold, redistributed or otherwise used for commercial purposes  All illustrations and images included in CareNotes® are the copyrighted property of A D A M , Inc  or Chyna Tinajero   The above information is an  only  It is not intended as medical advice for individual conditions or treatments  Talk to your doctor, nurse or pharmacist before following any medical regimen to see if it is safe and effective for you      Fall Prevention   AMBULATORY CARE:   Fall prevention  includes ways to make your home and other areas safer  It also includes ways you can move more carefully to prevent a fall  Health conditions that cause changes in your blood pressure, vision, or muscle strength and coordination may increase your risk for falls  Medicines may also increase your risk for falls if they make you dizzy, weak, or sleepy  Call 911 or have someone else call if:   · You have fallen and are unconscious  · You have fallen and cannot move part of your body  Contact your healthcare provider if:   · You have fallen and have pain or a headache  · You have questions or concerns about your condition or care  Fall prevention tips:   · Stand or sit up slowly  This may help you keep your balance and prevent falls  · Use assistive devices as directed  Your healthcare provider may suggest that you use a cane or walker to help you keep your balance  You may need to have grab bars put in your bathroom near the toilet or in the shower  · Wear shoes that fit well and have soles that   Wear shoes both inside and outside  Use slippers with good   Do not wear shoes with high heels  · Wear a personal alarm  This is a device that allows you to call 911 if you fall and need help  Ask your healthcare provider for more information  · Stay active  Exercise can help strengthen your muscles and improve your balance  Your healthcare provider may recommend water aerobics or walking  He or she may also recommend physical therapy to improve your coordination  Never start an exercise program without talking to your healthcare provider first          · Manage your medical conditions  Keep all appointments with your healthcare providers  Visit your eye doctor as directed  Home safety tips:       · Add items to prevent falls in the bathroom  Put nonslip strips on your bath or shower floor to prevent you from slipping   Use a bath mat if you do not have carpet in the bathroom  This will prevent you from falling when you step out of the bath or shower  Use a shower seat so you do not need to stand while you shower  Sit on the toilet or a chair in your bathroom to dry yourself and put on clothing  This will prevent you from losing your balance from drying or dressing yourself while you are standing  · Keep paths clear  Remove books, shoes, and other objects from walkways and stairs  Place cords for telephones and lamps out of the way so that you do not need to walk over them  Tape them down if you cannot move them  Remove small rugs  If you cannot remove a rug, secure it with double-sided tape  This will prevent you from tripping  · Install bright lights in your home  Use night lights to help light paths to the bathroom or kitchen  Always turn on the light before you start walking  · Keep items you use often on shelves within reach  Do not use a step stool to help you reach an item  · Paint or place reflective tape on the edges of your stairs  This will help you see the stairs better  Follow up with your doctor as directed:  Write down your questions so you remember to ask them during your visits  © Copyright [employer] 2022 Information is for End User's use only and may not be sold, redistributed or otherwise used for commercial purposes  All illustrations and images included in CareNotes® are the copyrighted property of [employer]  or Chyna Silva  The above information is an  only  It is not intended as medical advice for individual conditions or treatments  Talk to your doctor, nurse or pharmacist before following any medical regimen to see if it is safe and effective for you

## 2022-10-04 ENCOUNTER — APPOINTMENT (OUTPATIENT)
Dept: LAB | Facility: CLINIC | Age: 77
End: 2022-10-04
Payer: MEDICARE

## 2022-10-04 DIAGNOSIS — R79.9 ABNORMAL BLOOD CHEMISTRY: ICD-10-CM

## 2022-10-04 DIAGNOSIS — R79.9 ABNORMAL BLOOD CHEMISTRY: Primary | ICD-10-CM

## 2022-10-04 LAB
ANION GAP SERPL CALCULATED.3IONS-SCNC: 7 MMOL/L (ref 4–13)
BUN SERPL-MCNC: 21 MG/DL (ref 5–25)
CALCIUM SERPL-MCNC: 9.4 MG/DL (ref 8.3–10.1)
CHLORIDE SERPL-SCNC: 112 MMOL/L (ref 96–108)
CO2 SERPL-SCNC: 19 MMOL/L (ref 21–32)
CREAT SERPL-MCNC: 1.61 MG/DL (ref 0.6–1.3)
GFR SERPL CREATININE-BSD FRML MDRD: 40 ML/MIN/1.73SQ M
GLUCOSE SERPL-MCNC: 246 MG/DL (ref 65–140)
POTASSIUM SERPL-SCNC: 4.7 MMOL/L (ref 3.5–5.3)
SODIUM SERPL-SCNC: 138 MMOL/L (ref 135–147)

## 2022-10-04 PROCEDURE — 36415 COLL VENOUS BLD VENIPUNCTURE: CPT

## 2022-10-04 PROCEDURE — 80048 BASIC METABOLIC PNL TOTAL CA: CPT

## 2022-10-27 DIAGNOSIS — I10 HYPERTENSION, UNSPECIFIED TYPE: ICD-10-CM

## 2022-10-27 DIAGNOSIS — N18.32 TYPE 2 DIABETES MELLITUS WITH STAGE 3B CHRONIC KIDNEY DISEASE, WITHOUT LONG-TERM CURRENT USE OF INSULIN (HCC): ICD-10-CM

## 2022-10-27 DIAGNOSIS — I48.91 NEW ONSET A-FIB (HCC): ICD-10-CM

## 2022-10-27 DIAGNOSIS — F41.9 ANXIETY: ICD-10-CM

## 2022-10-27 DIAGNOSIS — E11.22 TYPE 2 DIABETES MELLITUS WITH STAGE 3B CHRONIC KIDNEY DISEASE, WITHOUT LONG-TERM CURRENT USE OF INSULIN (HCC): ICD-10-CM

## 2022-10-27 RX ORDER — METOPROLOL SUCCINATE 100 MG/1
100 TABLET, EXTENDED RELEASE ORAL DAILY
Qty: 90 TABLET | Refills: 1 | Status: SHIPPED | OUTPATIENT
Start: 2022-10-27

## 2022-10-27 RX ORDER — ALPRAZOLAM 0.5 MG/1
0.5 TABLET ORAL EVERY 8 HOURS PRN
Qty: 90 TABLET | Refills: 0 | Status: SHIPPED | OUTPATIENT
Start: 2022-10-27

## 2022-11-11 ENCOUNTER — RA CDI HCC (OUTPATIENT)
Dept: OTHER | Facility: HOSPITAL | Age: 77
End: 2022-11-11

## 2022-11-11 NOTE — PROGRESS NOTES
E11 65, e11 42  [facility]  coding opportunities          Chart Reviewed number of suggestions sent to Provider: 2     Patients Insurance     Medicare Insurance: [employer]

## 2022-11-29 DIAGNOSIS — R41.3 MEMORY DIFFICULTIES: ICD-10-CM

## 2022-11-29 RX ORDER — DONEPEZIL HYDROCHLORIDE 10 MG/1
TABLET, FILM COATED ORAL
Qty: 90 TABLET | Refills: 1 | Status: SHIPPED | OUTPATIENT
Start: 2022-11-29

## 2022-11-30 ENCOUNTER — APPOINTMENT (OUTPATIENT)
Dept: RADIOLOGY | Facility: CLINIC | Age: 77
End: 2022-11-30

## 2022-11-30 ENCOUNTER — OFFICE VISIT (OUTPATIENT)
Dept: INTERNAL MEDICINE CLINIC | Facility: CLINIC | Age: 77
End: 2022-11-30

## 2022-11-30 VITALS
TEMPERATURE: 97.8 F | BODY MASS INDEX: 31.99 KG/M2 | OXYGEN SATURATION: 98 % | HEART RATE: 122 BPM | SYSTOLIC BLOOD PRESSURE: 142 MMHG | HEIGHT: 71 IN | DIASTOLIC BLOOD PRESSURE: 78 MMHG | WEIGHT: 228.5 LBS

## 2022-11-30 DIAGNOSIS — J40 SINOBRONCHITIS: ICD-10-CM

## 2022-11-30 DIAGNOSIS — J32.9 SINOBRONCHITIS: Primary | ICD-10-CM

## 2022-11-30 DIAGNOSIS — J98.01 BRONCHOSPASM: ICD-10-CM

## 2022-11-30 DIAGNOSIS — J32.9 SINOBRONCHITIS: ICD-10-CM

## 2022-11-30 DIAGNOSIS — J40 SINOBRONCHITIS: Primary | ICD-10-CM

## 2022-11-30 RX ORDER — FLUTICASONE FUROATE AND VILANTEROL 200; 25 UG/1; UG/1
1 POWDER RESPIRATORY (INHALATION) DAILY
Qty: 3 EACH | Refills: 0 | Status: SHIPPED | OUTPATIENT
Start: 2022-11-30

## 2022-11-30 RX ORDER — IPRATROPIUM BROMIDE AND ALBUTEROL SULFATE 2.5; .5 MG/3ML; MG/3ML
3 SOLUTION RESPIRATORY (INHALATION) ONCE
Status: COMPLETED | OUTPATIENT
Start: 2022-11-30 | End: 2022-11-30

## 2022-11-30 RX ORDER — PREDNISONE 10 MG/1
TABLET ORAL
Qty: 42 TABLET | Refills: 0 | Status: SHIPPED | OUTPATIENT
Start: 2022-11-30

## 2022-11-30 RX ORDER — DEXAMETHASONE SODIUM PHOSPHATE 4 MG/ML
4 INJECTION, SOLUTION INTRA-ARTICULAR; INTRALESIONAL; INTRAMUSCULAR; INTRAVENOUS; SOFT TISSUE ONCE
Status: COMPLETED | OUTPATIENT
Start: 2022-11-30 | End: 2022-11-30

## 2022-11-30 RX ORDER — AMOXICILLIN AND CLAVULANATE POTASSIUM 875; 125 MG/1; MG/1
1 TABLET, FILM COATED ORAL EVERY 12 HOURS SCHEDULED
Qty: 20 TABLET | Refills: 0 | Status: SHIPPED | OUTPATIENT
Start: 2022-11-30 | End: 2022-12-10

## 2022-11-30 RX ORDER — GUAIFENESIN 600 MG/1
600 TABLET, EXTENDED RELEASE ORAL EVERY 12 HOURS SCHEDULED
Qty: 20 TABLET | Refills: 0 | Status: SHIPPED | OUTPATIENT
Start: 2022-11-30

## 2022-11-30 RX ORDER — GUAIFENESIN AND CODEINE PHOSPHATE 100; 10 MG/5ML; MG/5ML
5 SOLUTION ORAL 3 TIMES DAILY PRN
Qty: 120 ML | Refills: 0 | Status: SHIPPED | OUTPATIENT
Start: 2022-11-30 | End: 2022-12-07 | Stop reason: SDUPTHER

## 2022-11-30 RX ADMIN — DEXAMETHASONE SODIUM PHOSPHATE 4 MG: 4 INJECTION, SOLUTION INTRA-ARTICULAR; INTRALESIONAL; INTRAMUSCULAR; INTRAVENOUS; SOFT TISSUE at 15:45

## 2022-11-30 RX ADMIN — IPRATROPIUM BROMIDE AND ALBUTEROL SULFATE 3 ML: 2.5; .5 SOLUTION RESPIRATORY (INHALATION) at 15:46

## 2022-11-30 NOTE — PATIENT INSTRUCTIONS
Cold Symptoms   AMBULATORY CARE:   Cold symptoms  include sneezing, dry throat, a stuffy nose, headache, watery eyes, and a cough  Your cough may be dry, or you may cough up mucus  You may also have muscle aches, joint pain, and tiredness  Rarely, you may have a fever  Cold symptoms occur from inflammation in your upper respiratory system caused by a virus  Most colds go away without treatment  Seek care immediately if:   You have increased tiredness and weakness  You are unable to eat  Your heart is beating much faster than usual for you  You see white spots in the back of your throat and your neck is swollen and sore to the touch  You see pinpoint or larger reddish-purple dots on your skin  Contact your healthcare provider if:   You have a fever higher than 102°F (38 9°C)  You have new or worsening shortness of breath  You have thick nasal drainage for more than 2 days  Your symptoms do not improve or get worse within 5 days  You have questions or concerns about your condition or care  Treatment for cold symptoms  may include NSAIDS to decrease muscle aches and fever  Cold medicines may also be given to decrease coughing, nasal stuffiness, sneezing, and a runny nose  Manage your cold symptoms: The following may help relieve cold symptoms, such as a dry throat and congestion:  Gargle with mouthwash or warm salt water as directed  Suck on throat lozenges or hard candy  Use a cold or warm vaporizer or humidifier to ease your breathing  Rest for at least 2 days and then as needed to decrease tiredness and weakness  Use petroleum based jelly around your nostrils to decrease irritation from blowing your nose  Drink plenty of liquids  Liquids will help thin and loosen thick mucus so you can cough it up  Liquids will also keep you hydrated  Ask your healthcare provider which liquids are best for you and how much to drink each day      Prevent the spread of germs by washing your hands often  You can spread your cold germs to others for at least 3 days after your symptoms start  Do not share items, such as eating utensils  Cover your nose and mouth when you cough or sneeze using the crook of your elbow instead of your hands  Throw used tissues in the garbage  Do not smoke:  Smoking may worsen your symptoms and increase the length of time you feel sick  Talk with your healthcare provider if you need help to stop smoking  Follow up with your doctor as directed:  Write down your questions so you remember to ask them during your visits  © Copyright [employer] 2022 Information is for End User's use only and may not be sold, redistributed or otherwise used for commercial purposes  All illustrations and images included in CareNotes® are the copyrighted property of [employer]  or Chyna Silva  The above information is an  only  It is not intended as medical advice for individual conditions or treatments  Talk to your doctor, nurse or pharmacist before following any medical regimen to see if it is safe and effective for you

## 2022-11-30 NOTE — PROGRESS NOTES
Assessment/Plan:  Problem List Items Addressed This Visit    None  Visit Diagnoses     Sinobronchitis    -  Primary    Relevant Medications    amoxicillin-clavulanate (AUGMENTIN) 875-125 mg per tablet    guaiFENesin (Mucinex) 600 mg 12 hr tablet    fluticasone-vilanterol (Breo Ellipta) 200-25 mcg/actuation inhaler    guaifenesin-codeine (GUAIFENESIN AC) 100-10 MG/5ML liquid    predniSONE 10 mg tablet    dexamethasone (DECADRON) injection 4 mg (Completed)    ipratropium-albuterol (DUO-NEB) 0 5-2 5 mg/3 mL inhalation solution 3 mL (Completed)    Other Relevant Orders    XR chest pa & lateral (Completed)    Poct Covid 19 Rapid Antigen Test (Completed)    Mini neb    Bronchospasm        Relevant Medications    amoxicillin-clavulanate (AUGMENTIN) 875-125 mg per tablet    guaiFENesin (Mucinex) 600 mg 12 hr tablet    fluticasone-vilanterol (Breo Ellipta) 200-25 mcg/actuation inhaler    guaifenesin-codeine (GUAIFENESIN AC) 100-10 MG/5ML liquid    predniSONE 10 mg tablet    dexamethasone (DECADRON) injection 4 mg (Completed)    ipratropium-albuterol (DUO-NEB) 0 5-2 5 mg/3 mL inhalation solution 3 mL (Completed)    Other Relevant Orders    XR chest pa & lateral (Completed)    Poct Covid 19 Rapid Antigen Test (Completed)    Mini neb           Diagnoses and all orders for this visit:    Sinobronchitis  -     amoxicillin-clavulanate (AUGMENTIN) 875-125 mg per tablet; Take 1 tablet by mouth every 12 (twelve) hours for 10 days  -     guaiFENesin (Mucinex) 600 mg 12 hr tablet; Take 1 tablet (600 mg total) by mouth every 12 (twelve) hours  -     fluticasone-vilanterol (Breo Ellipta) 200-25 mcg/actuation inhaler; Inhale 1 puff daily  -     guaifenesin-codeine (GUAIFENESIN AC) 100-10 MG/5ML liquid;  Take 5 mL by mouth 3 (three) times a day as needed for cough  -     predniSONE 10 mg tablet; 60mg po q day x2, then 50mg x 2, then 40mg x 2, then 30mg x2, then 20mg x 2, and then 10mg x 2, then d/c   -     dexamethasone (DECADRON) injection 4 mg  -     ipratropium-albuterol (DUO-NEB) 0 5-2 5 mg/3 mL inhalation solution 3 mL  -     XR chest pa & lateral; Future  -     Poct Covid 19 Rapid Antigen Test  -     Cancel: Mini neb  -     Mini neb    Bronchospasm  -     amoxicillin-clavulanate (AUGMENTIN) 875-125 mg per tablet; Take 1 tablet by mouth every 12 (twelve) hours for 10 days  -     guaiFENesin (Mucinex) 600 mg 12 hr tablet; Take 1 tablet (600 mg total) by mouth every 12 (twelve) hours  -     fluticasone-vilanterol (Breo Ellipta) 200-25 mcg/actuation inhaler; Inhale 1 puff daily  -     guaifenesin-codeine (GUAIFENESIN AC) 100-10 MG/5ML liquid; Take 5 mL by mouth 3 (three) times a day as needed for cough  -     predniSONE 10 mg tablet; 60mg po q day x2, then 50mg x 2, then 40mg x 2, then 30mg x2, then 20mg x 2, and then 10mg x 2, then d/c   -     dexamethasone (DECADRON) injection 4 mg  -     ipratropium-albuterol (DUO-NEB) 0 5-2 5 mg/3 mL inhalation solution 3 mL  -     XR chest pa & lateral; Future  -     Poct Covid 19 Rapid Antigen Test  -     Cancel: Mini neb  -     Mini neb    Other orders  -     Mini neb  -     Cancel: Mini neb      No problem-specific Assessment & Plan notes found for this encounter  A/P: Stable  Rest and increase po fluids  Warm salt water gargles  OTC PRN motrin/tylenol  Will start abx, mucincex, INS, and cough med  Will check a CXR> rapid covid was negative    RTC one week for f/u  Subjective:      Patient ID: Himanshu Kelly is a 68 y o  male  Non-smoking WM vaccinated against covid and flu, presents with a several day h/o URI s/s  No travel, but recent local travel to family for [employer], no one else is ill, and no known covid exposures  No fever, but some chills  Some headaches  Notes nasal congestion with PND, but no sore throat  Productive cough with slight SOB with wheezing  No history of RAD        The following portions of the patient's history were reviewed and updated as appropriate:   He has a past medical history of Acute on chronic renal insufficiency, Allergic rhinitis, Anemia (6/11/2012), Atrial fibrillation ([facility] ), Cerebrovascular accident (CVA) ([facility] [address] ), Controlled type 2 diabetes mellitus without complication ([facility] ), Generalized anxiety disorder, GERD without esophagitis (6/11/2012), Hypertension, Memory difficulties (1/8/2019), Nephrolithiasis (3/17/2016), Obesity (6/11/2012), and Osteoarthritis (6/11/2012)  ,  does not have any pertinent problems on file  ,   has a past surgical history that includes Total hip arthroplasty; Knee surgery; Cataract extraction, bilateral; Total hip arthroplasty (Bilateral, 2011); pr cystourethroscopy,ureter catheter (Left, 11/8/2019); and Cystoscopy w/ laser lithotripsy (Left, 1/20/2020)  ,  family history includes Arthritis in his mother; No Known Problems in his father; Parkinsonism in his mother  ,   reports that he has never smoked  He has never used smokeless tobacco  He reports that he does not drink alcohol and does not use drugs  ,  is allergic to pollen extract     Current Outpatient Medications   Medication Sig Dispense Refill   • ALPRAZolam (XANAX) 0 5 mg tablet Take 1 tablet (0 5 mg total) by mouth every 8 (eight) hours as needed (AS NEEDED) 90 tablet 0   • amLODIPine (NORVASC) 10 mg tablet Take 1 tablet (10 mg total) by mouth daily 90 tablet 3   • amoxicillin-clavulanate (AUGMENTIN) 875-125 mg per tablet Take 1 tablet by mouth every 12 (twelve) hours for 10 days 20 tablet 0   • atorvastatin (LIPITOR) 20 mg tablet Take 1 tablet (20 mg total) by mouth daily 90 tablet 2   • Canagliflozin 300 MG TABS Take 1 tablet (300 mg total) by mouth daily before breakfast 90 tablet 1   • donepezil (ARICEPT) 10 mg tablet take 1 tablet by mouth at bedtime 90 tablet 1   • fluticasone-vilanterol (Breo Ellipta) 200-25 mcg/actuation inhaler Inhale 1 puff daily 3 each 0   • guaiFENesin (Mucinex) 600 mg 12 hr tablet Take 1 tablet (600 mg total) by mouth every 12 (twelve) hours 20 tablet 0   • guaifenesin-codeine (GUAIFENESIN AC) 100-10 MG/5ML liquid Take 5 mL by mouth 3 (three) times a day as needed for cough 120 mL 0   • metFORMIN (GLUCOPHAGE) 850 mg tablet Take 2 tablets (1,700 mg total) by mouth daily 180 tablet 1   • metoprolol succinate (TOPROL-XL) 100 mg 24 hr tablet Take 1 tablet (100 mg total) by mouth daily 90 tablet 1   • predniSONE 10 mg tablet 60mg po q day x2, then 50mg x 2, then 40mg x 2, then 30mg x2, then 20mg x 2, and then 10mg x 2, then d/c  42 tablet 0   • rivaroxaban (Xarelto) 15 mg tablet Take 1 tablet (15 mg total) by mouth daily with breakfast 90 tablet 1   • sitaGLIPtin (JANUVIA) 50 mg tablet Take 1 tablet (50 mg total) by mouth daily 90 tablet 3   • Blood Glucose Monitoring Suppl (ONE TOUCH ULTRA 2) w/Device KIT by Does not apply route     • Canagliflozin 300 MG TABS Take 1 tablet (300 mg total) by mouth daily 90 tablet 1   • glucose blood test strip 1 Squirt by In Vitro route daily     • methocarbamol (ROBAXIN) 500 mg tablet Take 1 tablet (500 mg total) by mouth 4 (four) times a day as needed for muscle spasms (Patient not taking: Reported on 11/30/2022) 90 tablet 0   • ONETOUCH DELICA LANCETS 75U MISC by Does not apply route daily       No current facility-administered medications for this visit  Review of Systems   Constitutional: Positive for activity change, appetite change and chills  Negative for diaphoresis, fatigue and fever  HENT: Positive for congestion, postnasal drip, rhinorrhea and sinus pressure  Negative for ear discharge, ear pain, facial swelling, sinus pain and sore throat  Respiratory: Positive for cough, shortness of breath and wheezing  Negative for chest tightness  Cardiovascular: Negative for chest pain, palpitations and leg swelling  Gastrointestinal: Negative for abdominal pain, constipation, diarrhea, nausea and vomiting  Genitourinary: Negative for difficulty urinating, dysuria and frequency     Musculoskeletal: Negative for arthralgias, gait problem and myalgias  Neurological: Positive for headaches  Negative for light-headedness  Psychiatric/Behavioral: Negative for confusion  The patient is not nervous/anxious  PHQ-2/9 Depression Screening          Objective:  Vitals:    11/30/22 0901   BP: 142/78   Pulse: (!) 122   Temp: 97 8 °F (36 6 °C)   SpO2: 98%   Weight: 104 kg (228 lb 8 oz)   Height: 5' 11" (1 803 m)     Body mass index is 31 87 kg/m²  Physical Exam  Vitals and nursing note reviewed  Constitutional:       General: He is not in acute distress  Appearance: Normal appearance  He is not ill-appearing  HENT:      Head: Normocephalic and atraumatic  Comments: Sinus tenderness with turbinates red and inflamed  Cobblestoning noted  Right Ear: Tympanic membrane, ear canal and external ear normal  There is no impacted cerumen  Left Ear: Tympanic membrane, ear canal and external ear normal  There is no impacted cerumen  Mouth/Throat:      Mouth: Mucous membranes are moist    Eyes:      Extraocular Movements: Extraocular movements intact  Conjunctiva/sclera: Conjunctivae normal       Pupils: Pupils are equal, round, and reactive to light  Cardiovascular:      Rate and Rhythm: Normal rate  Rhythm irregular  Heart sounds: Normal heart sounds  Comments: Irregular irregular with GVR  Pulmonary:      Effort: Pulmonary effort is normal  No respiratory distress  Breath sounds: Wheezing present  No rhonchi or rales  Comments: Decreased and coarse BS   Musculoskeletal:      Cervical back: Neck supple  Lymphadenopathy:      Cervical: No cervical adenopathy  Neurological:      General: No focal deficit present  Mental Status: He is alert and oriented to person, place, and time  Mental status is at baseline  Psychiatric:         Mood and Affect: Mood normal          Behavior: Behavior normal          Thought Content:  Thought content normal          Judgment: Judgment normal        Mini neb  Performed by: Ruth Hummel DO  Authorized by: Ruth Hummel DO   Universal Protocol:  Consent: Verbal consent obtained  Risks and benefits: risks, benefits and alternatives were discussed  Consent given by: patient  Time out: Immediately prior to procedure a "time out" was called to verify the correct patient, procedure, equipment, support staff and site/side marked as required  Patient understanding: patient states understanding of the procedure being performed  Required items: required blood products, implants, devices, and special equipment available  Patient identity confirmed: verbally with patient      Number of treatments:  1  Treatment 1:   Pre-Procedure     Symptoms:  Wheezing, cough and shortness of breath    Lung Sounds:  See H and P    HR:  See VSS    Medication Administered:  Duoneb - Albuterol 2 5 mg/Atrovent 0 5 mg  Post-Procedure     Symptoms:  Wheezing, cough and shortness of breath    Lung sounds:  Improved BS with increase wheezing  No r/r  Mini neb  Performed by: Ruth Hummel DO  Authorized by: Ruth Hummel DO   Universal Protocol:  Consent: Verbal consent obtained  Risks and benefits: risks, benefits and alternatives were discussed  Consent given by: patient  Time out: Immediately prior to procedure a "time out" was called to verify the correct patient, procedure, equipment, support staff and site/side marked as required    Patient understanding: patient states understanding of the procedure being performed  Required items: required blood products, implants, devices, and special equipment available  Patient identity confirmed: verbally with patient      Number of treatments:  1  Treatment 1:   Pre-Procedure     Symptoms:  Wheezing, cough and shortness of breath    Lung Sounds:  See H and P    HR:  See VSS    Medication Administered:  Duoneb - Albuterol 2 5 mg/Atrovent 0 5 mg  Post-Procedure     Symptoms:  Wheezing, cough and shortness of breath    Lung sounds:  Increased BS with increased wheezing  NO rales/rhonchi

## 2022-12-02 LAB
SARS-COV-2 AG UPPER RESP QL IA: NEGATIVE
VALID CONTROL: NORMAL

## 2022-12-07 ENCOUNTER — OFFICE VISIT (OUTPATIENT)
Dept: INTERNAL MEDICINE CLINIC | Facility: CLINIC | Age: 77
End: 2022-12-07

## 2022-12-07 VITALS
WEIGHT: 222.38 LBS | OXYGEN SATURATION: 99 % | BODY MASS INDEX: 31.13 KG/M2 | SYSTOLIC BLOOD PRESSURE: 124 MMHG | HEIGHT: 71 IN | HEART RATE: 86 BPM | DIASTOLIC BLOOD PRESSURE: 60 MMHG | TEMPERATURE: 97.1 F

## 2022-12-07 DIAGNOSIS — J98.01 BRONCHOSPASM: ICD-10-CM

## 2022-12-07 DIAGNOSIS — J32.9 SINOBRONCHITIS: Primary | ICD-10-CM

## 2022-12-07 DIAGNOSIS — J40 SINOBRONCHITIS: Primary | ICD-10-CM

## 2022-12-07 RX ORDER — GUAIFENESIN AND CODEINE PHOSPHATE 100; 10 MG/5ML; MG/5ML
5 SOLUTION ORAL 3 TIMES DAILY PRN
Qty: 120 ML | Refills: 0 | Status: SHIPPED | OUTPATIENT
Start: 2022-12-07

## 2022-12-07 NOTE — PROGRESS NOTES
Assessment/Plan:  Problem List Items Addressed This Visit    None  Visit Diagnoses     Sinobronchitis    -  Primary    Relevant Medications    guaifenesin-codeine (GUAIFENESIN AC) 100-10 MG/5ML liquid    Bronchospasm        Relevant Medications    guaifenesin-codeine (GUAIFENESIN AC) 100-10 MG/5ML liquid           Diagnoses and all orders for this visit:    Sinobronchitis  -     guaifenesin-codeine (GUAIFENESIN AC) 100-10 MG/5ML liquid; Take 5 mL by mouth 3 (three) times a day as needed for cough    Bronchospasm  -     guaifenesin-codeine (GUAIFENESIN AC) 100-10 MG/5ML liquid; Take 5 mL by mouth 3 (three) times a day as needed for cough      No problem-specific Assessment & Plan notes found for this encounter  A/P: Doing better  Lung sounds , but no wheezing  Finish meds  Hold on further testing like PFT's as s/s most likely due to acute infection  Continue current treatment otherwise  RTC as scheduled  Subjective:      Patient ID: Gogo Vargas is a 68 y o  male  WM RTC for f/u sinobronchitis with bronchospasm and d/c to home on abx, mucinex, and steroid wean  CXR was negative  Tolerating the meds  Feeling better  NO fever or chills  Cough is still present, but better  Breathing is good and no wheezing noted  The following portions of the patient's history were reviewed and updated as appropriate:   He has a past medical history of Acute on chronic renal insufficiency, Allergic rhinitis, Anemia (2012), Atrial fibrillation ([address] ), Cerebrovascular accident (CVA) ([facility] [address] Controlled type 2 diabetes mellitus without complication ([facility] [address] ), Generalized anxiety disorder, GERD without esophagitis (2012), Hypertension, Memory difficulties (2019), Nephrolithiasis (3/17/2016), Obesity (2012), and Osteoarthritis (2012)  ,  does not have any pertinent problems on file  ,   has a past surgical history that includes Total hip arthroplasty; Knee surgery;  Cataract extraction, bilateral; Total hip arthroplasty (Bilateral, 2011); pr cystourethroscopy,ureter catheter (Left, 11/8/2019); and Cystoscopy w/ laser lithotripsy (Left, 1/20/2020)  ,  family history includes Arthritis in his mother; No Known Problems in his father; Parkinsonism in his mother  ,   reports that he has never smoked  He has never used smokeless tobacco  He reports that he does not drink alcohol and does not use drugs  ,  is allergic to pollen extract     Current Outpatient Medications   Medication Sig Dispense Refill   • ALPRAZolam (XANAX) 0 5 mg tablet Take 1 tablet (0 5 mg total) by mouth every 8 (eight) hours as needed (AS NEEDED) 90 tablet 0   • amLODIPine (NORVASC) 10 mg tablet Take 1 tablet (10 mg total) by mouth daily 90 tablet 3   • amoxicillin-clavulanate (AUGMENTIN) 875-125 mg per tablet Take 1 tablet by mouth every 12 (twelve) hours for 10 days 20 tablet 0   • atorvastatin (LIPITOR) 20 mg tablet Take 1 tablet (20 mg total) by mouth daily 90 tablet 2   • Blood Glucose Monitoring Suppl (ONE TOUCH ULTRA 2) w/Device KIT by Does not apply route     • Canagliflozin 300 MG TABS Take 1 tablet (300 mg total) by mouth daily 90 tablet 1   • Canagliflozin 300 MG TABS Take 1 tablet (300 mg total) by mouth daily before breakfast 90 tablet 1   • donepezil (ARICEPT) 10 mg tablet take 1 tablet by mouth at bedtime 90 tablet 1   • fluticasone-vilanterol (Breo Ellipta) 200-25 mcg/actuation inhaler Inhale 1 puff daily 3 each 0   • glucose blood test strip 1 Squirt by In Vitro route daily     • guaiFENesin (Mucinex) 600 mg 12 hr tablet Take 1 tablet (600 mg total) by mouth every 12 (twelve) hours 20 tablet 0   • guaifenesin-codeine (GUAIFENESIN AC) 100-10 MG/5ML liquid Take 5 mL by mouth 3 (three) times a day as needed for cough 120 mL 0   • metFORMIN (GLUCOPHAGE) 850 mg tablet Take 2 tablets (1,700 mg total) by mouth daily 180 tablet 1   • metoprolol succinate (TOPROL-XL) 100 mg 24 hr tablet Take 1 tablet (100 mg total) by mouth daily 90 tablet 1   • ONETOUCH DELICA LANCETS 79C MISC by Does not apply route daily     • predniSONE 10 mg tablet 60mg po q day x2, then 50mg x 2, then 40mg x 2, then 30mg x2, then 20mg x 2, and then 10mg x 2, then d/c  42 tablet 0   • rivaroxaban (Xarelto) 15 mg tablet Take 1 tablet (15 mg total) by mouth daily with breakfast 90 tablet 1   • sitaGLIPtin (JANUVIA) 50 mg tablet Take 1 tablet (50 mg total) by mouth daily 90 tablet 3   • methocarbamol (ROBAXIN) 500 mg tablet Take 1 tablet (500 mg total) by mouth 4 (four) times a day as needed for muscle spasms (Patient not taking: Reported on 11/30/2022) 90 tablet 0     No current facility-administered medications for this visit  Review of Systems   Constitutional: Negative for activity change, chills, diaphoresis, fatigue and fever  Respiratory: Negative for cough, chest tightness, shortness of breath and wheezing  Cardiovascular: Negative for chest pain, palpitations and leg swelling  Gastrointestinal: Negative for abdominal pain, constipation, diarrhea, nausea and vomiting  Genitourinary: Negative for difficulty urinating, dysuria and frequency  Musculoskeletal: Negative for arthralgias, gait problem and myalgias  Neurological: Negative for dizziness, seizures, syncope, weakness, light-headedness and headaches  Psychiatric/Behavioral: Negative for confusion  The patient is not nervous/anxious  PHQ-2/9 Depression Screening          Objective:  Vitals:    12/07/22 1333   BP: 124/60   Pulse: 86   Temp: (!) 97 1 °F (36 2 °C)   SpO2: 99%   Weight: 101 kg (222 lb 6 oz)   Height: 5' 11" (1 803 m)     Body mass index is 31 02 kg/m²  Physical Exam  Vitals and nursing note reviewed  Constitutional:       General: He is not in acute distress  Appearance: Normal appearance  He is not ill-appearing  HENT:      Head: Normocephalic and atraumatic        Mouth/Throat:      Mouth: Mucous membranes are moist    Eyes:      Extraocular Movements: Extraocular movements intact  Conjunctiva/sclera: Conjunctivae normal       Pupils: Pupils are equal, round, and reactive to light  Cardiovascular:      Rate and Rhythm: Normal rate and regular rhythm  Heart sounds: Normal heart sounds  Pulmonary:      Effort: Pulmonary effort is normal  No respiratory distress  Breath sounds: Normal breath sounds  No wheezing, rhonchi or rales  Neurological:      General: No focal deficit present  Mental Status: He is alert and oriented to person, place, and time  Mental status is at baseline  Psychiatric:         Mood and Affect: Mood normal          Behavior: Behavior normal          Thought Content: Thought content normal          Judgment: Judgment normal        Scheduled Medication Review:  Pt's scheduled medication use was reviewed by myself/staff via the [employer] website  Pt's use has been found to be appropriate w/o any concerns for misuse by the patient  Pt's current conditions require continued scheduled medication use at this time  Future review for continued appropriate medication use and misuse will continue  Clothing

## 2022-12-23 DIAGNOSIS — E11.8 TYPE 2 DIABETES MELLITUS WITH COMPLICATION (HCC): ICD-10-CM

## 2023-02-08 ENCOUNTER — OFFICE VISIT (OUTPATIENT)
Dept: OBGYN CLINIC | Facility: CLINIC | Age: 78
End: 2023-02-08

## 2023-02-08 ENCOUNTER — OFFICE VISIT (OUTPATIENT)
Dept: INTERNAL MEDICINE CLINIC | Facility: CLINIC | Age: 78
End: 2023-02-08

## 2023-02-08 ENCOUNTER — APPOINTMENT (OUTPATIENT)
Dept: RADIOLOGY | Facility: CLINIC | Age: 78
End: 2023-02-08

## 2023-02-08 VITALS
BODY MASS INDEX: 32.92 KG/M2 | SYSTOLIC BLOOD PRESSURE: 123 MMHG | DIASTOLIC BLOOD PRESSURE: 70 MMHG | HEIGHT: 71 IN | HEART RATE: 81 BPM

## 2023-02-08 VITALS
HEART RATE: 59 BPM | SYSTOLIC BLOOD PRESSURE: 130 MMHG | WEIGHT: 236 LBS | DIASTOLIC BLOOD PRESSURE: 70 MMHG | HEIGHT: 71 IN | TEMPERATURE: 96.1 F | OXYGEN SATURATION: 98 % | BODY MASS INDEX: 33.04 KG/M2

## 2023-02-08 DIAGNOSIS — M19.91 PRIMARY OSTEOARTHRITIS, UNSPECIFIED SITE: ICD-10-CM

## 2023-02-08 DIAGNOSIS — M25.461 KNEE EFFUSION, RIGHT: ICD-10-CM

## 2023-02-08 DIAGNOSIS — M25.461 EFFUSION OF RIGHT KNEE: ICD-10-CM

## 2023-02-08 DIAGNOSIS — F32.9 REACTIVE DEPRESSION: ICD-10-CM

## 2023-02-08 DIAGNOSIS — M25.461 EFFUSION OF RIGHT KNEE: Primary | ICD-10-CM

## 2023-02-08 DIAGNOSIS — G89.4 CHRONIC PAIN SYNDROME: ICD-10-CM

## 2023-02-08 DIAGNOSIS — R26.9 GAIT ABNORMALITY: ICD-10-CM

## 2023-02-08 DIAGNOSIS — I48.20 CHRONIC ATRIAL FIBRILLATION (HCC): ICD-10-CM

## 2023-02-08 DIAGNOSIS — M77.42 METATARSALGIA OF BOTH FEET: ICD-10-CM

## 2023-02-08 DIAGNOSIS — M77.41 METATARSALGIA OF BOTH FEET: ICD-10-CM

## 2023-02-08 DIAGNOSIS — M21.061 ACQUIRED VALGUS DEFORMITY KNEE, RIGHT: ICD-10-CM

## 2023-02-08 DIAGNOSIS — M25.561 CHRONIC PAIN OF RIGHT KNEE: ICD-10-CM

## 2023-02-08 DIAGNOSIS — G89.29 CHRONIC PAIN OF RIGHT KNEE: ICD-10-CM

## 2023-02-08 DIAGNOSIS — N25.81 SECONDARY HYPERPARATHYROIDISM OF RENAL ORIGIN (HCC): ICD-10-CM

## 2023-02-08 DIAGNOSIS — N18.32 STAGE 3B CHRONIC KIDNEY DISEASE (HCC): ICD-10-CM

## 2023-02-08 DIAGNOSIS — E11.42 DIABETIC POLYNEUROPATHY ASSOCIATED WITH TYPE 2 DIABETES MELLITUS (HCC): Primary | Chronic | ICD-10-CM

## 2023-02-08 RX ORDER — DULOXETIN HYDROCHLORIDE 30 MG/1
30 CAPSULE, DELAYED RELEASE ORAL DAILY
Qty: 90 CAPSULE | Refills: 1 | Status: SHIPPED | OUTPATIENT
Start: 2023-02-08

## 2023-02-08 RX ORDER — GABAPENTIN 300 MG/1
CAPSULE ORAL
Qty: 270 CAPSULE | Refills: 0 | Status: SHIPPED | OUTPATIENT
Start: 2023-02-08

## 2023-02-08 NOTE — PATIENT INSTRUCTIONS
Gabapentin (By mouth)   Gabapentin (delfina-a-PEN-tin)  Treats seizures and pain caused by shingles  Brand Name(s): FusePaq Fanatrex, Neurontin   There may be other brand names for this medicine  When This Medicine Should Not Be Used: This medicine is not right for everyone  Do not use it if you had an allergic reaction to gabapentin  How to Use This Medicine:   Capsule, Liquid, Tablet  Take your medicine as directed  Your dose may need to be changed several times to find what works best for you  If you have epilepsy, do not allow more than 12 hours to pass between doses  Capsule: Swallow the capsule whole with plenty of water  Do not open, crush, or chew it  Gralise® tablet: Swallow the tablet whole   Do not crush, break, or chew it  Neurontin® tablet: If you break a tablet into 2 pieces, use the second half as your next dose  Do not use the half-tablet if the whole tablet has been cut or broken after 28 days  Oral liquid: Measure the oral liquid medicine with a marked measuring spoon, oral syringe, or medicine cup  This medicine should come with a Medication Guide  Ask your pharmacist for a copy if you do not have one  Missed dose: Take a dose as soon as you remember  If it is almost time for your next dose, wait until then and take a regular dose  Do not take extra medicine to make up for a missed dose  Store the medicine in a closed container at room temperature, away from heat, moisture, and direct light  Store the Neurontin® oral liquid in the refrigerator  Do not freeze  Drugs and Foods to Avoid:   Ask your doctor or pharmacist before using any other medicine, including over-the-counter medicines, vitamins, and herbal products  Some medicines can affect how gabapentin works  Tell your doctor if you also using hydrocodone or morphine  If you take an antacid, wait at least 2 hours before you take gabapentin  Do not drink alcohol while you are using this medicine    Tell your doctor if you use anything else that makes you sleepy  Some examples are allergy medicine, narcotic pain medicine, and alcohol  Tell your doctor if you are also using lorazepam, oxycodone, or zolpidem  Warnings While Using This Medicine:   Tell your doctor if you are pregnant or breastfeeding, or if you have kidney problems (including patients receiving dialysis) or lung problems  Tell your doctor if you have a history of depression or mental health problems  This medicine may cause the following problems:  Drug reaction with eosinophilia and systemic symptoms (DRESS) or multiorgan hypersensitivity, which may damage the liver, kidney, blood, heart, or muscles  Changes in mood or behavior, including suicidal thoughts or behavior  Respiratory depression (serious breathing problem that can be life-threatening), when used with narcotic pain medicines  Do not stop using this medicine suddenly  Your doctor will need to slowly decrease your dose before you stop it completely  This medicine may make you dizzy or drowsy  Do not drive or do anything else that could be dangerous until you know how this medicine affects you  Tell any doctor or dentist who treats you that you are using this medicine  This medicine may affect certain medical test results  Your doctor will check your progress and the effects of this medicine at regular visits  Keep all appointments  Keep all medicine out of the reach of children  Never share your medicine with anyone  Possible Side Effects While Using This Medicine:   Call your doctor right away if you notice any of these side effects:   Allergic reaction: Itching or hives, swelling in your face or hands, swelling or tingling in your mouth or throat, chest tightness, trouble breathing  Behavior problems, aggression, restlessness, trouble concentrating, moodiness (especially in children)  Blistering, peeling, red skin rash  Blue lips, fingernails, or skin, chest pain, fast heartbeat, trouble breathing  Change in how much or how often you urinate, bloody or cloudy urine  Dark urine or pale stools, nausea, vomiting, loss of appetite, stomach pain, yellow skin or eyes  Fever, chills, cough, sore throat, body aches  Problems with coordination, shakiness, unsteadiness, unusual eye movement  Rapid weight gain, swelling in your hands, ankles, or feet  Rash, swollen or tender glands in the neck, armpit, or groin  Unusual moods or behaviors, thoughts of hurting yourself, feeling depressed  If you notice these less serious side effects, talk with your doctor:   Dizziness, drowsiness, sleepiness, tiredness  If you notice other side effects that you think are caused by this medicine, tell your doctor  Call your doctor for medical advice about side effects  You may report side effects to FDA at 9-585-FDA-9406    © Copyright [employer] [facility] 2022 Information is for End User's use only and may not be sold, redistributed or otherwise used for commercial purposes  The above information is an  only  It is not intended as medical advice for individual conditions or treatments  Talk to your doctor, nurse or pharmacist before following any medical regimen to see if it is safe and effective for you

## 2023-02-10 NOTE — PROGRESS NOTES
Ortho Sports Medicine New Patient Visit     Assesment:   68 y o  male with bilateral knee osteoarthritis, Right more symptomatic     Plan: We would a long discussion regarding his knee degenerative joint disease including options for treatment  He was sent from primary care office to consider drainage of fluid off the knee  Patient states the swelling is chronic and stable  No acute worsening concerning for infection  Patient is not interested in knee replacements  Not interested in injections or physical therapy  I recommended against aspiration of the fluid today as I do not believe it will provide any significant relief of symptoms and would likely recollect in the very near future  I recommended that he stay as active as possible to maintain his strength and function  He is happy to continue dealing with the pain without further intervention  Follow up as needed in the future  Chief Complaint   Patient presents with   • Right Knee - Pain       History of Present Illness: The patient is a 68 y o  male with chronic knee pain, right worse than left  Has had a large degree of swelling for many months, possibly years  Denies any acute worsening of swelling  No erythema, warmth, or other signs of infection  He has become less active over the last several years  Still likes to try to exercise by walking or using exercise bike, but does this infrequently  Able to complete ADLs with help from family  Is not interested in injections or surgery         Past Medical, Social and Family History:  Past Medical History:   Diagnosis Date   • Acute on chronic renal insufficiency    • Allergic rhinitis    • Anemia 6/11/2012   • Atrial fibrillation ([address] )    • Cerebrovascular accident (CVA) ([address] )    • Controlled type 2 diabetes mellitus without complication ([address] )    • Generalized anxiety disorder    • GERD without esophagitis 6/11/2012   • Hypertension    • Memory difficulties 1/8/2019   • Nephrolithiasis 3/17/2016   • Obesity 6/11/2012   • Osteoarthritis 6/11/2012     Past Surgical History:   Procedure Laterality Date   • CATARACT EXTRACTION, BILATERAL     • CYSTOSCOPY W/ LASER LITHOTRIPSY Left 1/20/2020    Procedure: CYSTOSCOPY; RETROGRADE; URETEROSCOPY; STONE EXTRACTION; POSSIBLE HOLMIUM LASER LITHOTRIPSY;  Surgeon: Jarrell Mcmahon MD;  Location: [address] [facility] MAIN OR;  Service: Urology   • KNEE SURGERY     • UT CYSTO BLADDER W/URETERAL CATHETERIZATION Left 11/8/2019    Procedure: CYSTOSCOPY RETROGRADE PYELOGRAM WITH INSERTION STENT URETERAL;  Surgeon: Jarrell Mcmahon MD;  Location: [facility] MAIN OR;  Service: Urology   • TOTAL HIP ARTHROPLASTY     • TOTAL HIP ARTHROPLASTY Bilateral 2011     Allergies   Allergen Reactions   • Pollen Extract Itching     Runny nose, itchy eyes     Current Outpatient Medications on File Prior to Visit   Medication Sig Dispense Refill   • ALPRAZolam (XANAX) 0 5 mg tablet Take 1 tablet (0 5 mg total) by mouth every 8 (eight) hours as needed (AS NEEDED) 90 tablet 0   • amLODIPine (NORVASC) 10 mg tablet Take 1 tablet (10 mg total) by mouth daily 90 tablet 3   • atorvastatin (LIPITOR) 20 mg tablet Take 1 tablet (20 mg total) by mouth daily 90 tablet 2   • Blood Glucose Monitoring Suppl (ONE TOUCH ULTRA 2) w/Device KIT by Does not apply route     • Canagliflozin 300 MG TABS Take 1 tablet (300 mg total) by mouth daily 90 tablet 1   • Canagliflozin 300 MG TABS Take 1 tablet (300 mg total) by mouth daily before breakfast 90 tablet 1   • donepezil (ARICEPT) 10 mg tablet take 1 tablet by mouth at bedtime 90 tablet 1   • DULoxetine (CYMBALTA) 30 mg delayed release capsule Take 1 capsule (30 mg total) by mouth daily 90 capsule 1   • fluticasone-vilanterol (Breo Ellipta) 200-25 mcg/actuation inhaler Inhale 1 puff daily 3 each 0   • gabapentin (Neurontin) 300 mg capsule ONe po Q AM x 3 and then one po bid x 3 and then one po tid   270 capsule 0   • glucose blood test strip 1 Squirt by In Vitro route daily     • guaiFENesin (Mucinex) 600 mg 12 hr tablet Take 1 tablet (600 mg total) by mouth every 12 (twelve) hours 20 tablet 0   • guaifenesin-codeine (GUAIFENESIN AC) 100-10 MG/5ML liquid Take 5 mL by mouth 3 (three) times a day as needed for cough 120 mL 0   • metFORMIN (GLUCOPHAGE) 850 mg tablet take 2 tablets by mouth daily 180 tablet 1   • methocarbamol (ROBAXIN) 500 mg tablet Take 1 tablet (500 mg total) by mouth 4 (four) times a day as needed for muscle spasms 90 tablet 0   • metoprolol succinate (TOPROL-XL) 100 mg 24 hr tablet Take 1 tablet (100 mg total) by mouth daily 90 tablet 1   • ONETOUCH DELICA LANCETS 80G MISC by Does not apply route daily     • predniSONE 10 mg tablet 60mg po q day x2, then 50mg x 2, then 40mg x 2, then 30mg x2, then 20mg x 2, and then 10mg x 2, then d/c  42 tablet 0   • rivaroxaban (Xarelto) 15 mg tablet Take 1 tablet (15 mg total) by mouth daily with breakfast 90 tablet 1   • sitaGLIPtin (JANUVIA) 50 mg tablet Take 1 tablet (50 mg total) by mouth daily 90 tablet 3     No current facility-administered medications on file prior to visit       Social History     Socioeconomic History   • Marital status: /Civil Union     Spouse name: Not on file   • Number of children: Not on file   • Years of education: Not on file   • Highest education level: Not on file   Occupational History   • Occupation: Retired   Tobacco Use   • Smoking status: Never   • Smokeless tobacco: Never   Vaping Use   • Vaping Use: Never used   Substance and Sexual Activity   • Alcohol use: Never     Comment: socially    • Drug use: Never   • Sexual activity: Yes   Other Topics Concern   • Not on file   Social History Narrative    RETIRED     Social Determinants of Health     Financial Resource Strain: Not on file   Food Insecurity: Not on file   Transportation Needs: Not on file   Physical Activity: Not on file   Stress: Not on file   Social Connections: Not on file   Intimate Partner Violence: Not on file   Housing Stability: Not on file         I have reviewed the past medical, surgical, social and family history, medications and allergies as documented in the EMR  Review of systems: ROS is negative other than that noted in the HPI  Constitutional: Negative for fatigue and fever  HENT: Negative for sore throat  Respiratory: Negative for shortness of breath  Cardiovascular: Negative for chest pain  Gastrointestinal: Negative for abdominal pain  Endocrine: Negative for cold intolerance and heat intolerance  Genitourinary: Negative for flank pain  Musculoskeletal: Negative for back pain  Skin: Negative for rash  Allergic/Immunologic: Negative for immunocompromised state  Neurological: Negative for dizziness  Psychiatric/Behavioral: Negative for agitation  Physical Exam:    Blood pressure 123/70, pulse 81, height 5' 11" (1 803 m)  General/Constitutional: NAD, well developed, well nourished  HENT: Normocephalic, atraumatic  CV: Intact distal pulses, regular rate  Resp: No respiratory distress or labored breathing  Lymphatic: No lymphadenopathy palpated  Neuro: Alert and Oriented x 3, no focal deficits  Psych: Normal mood, normal affect  Skin: Warm, dry, no rashes, no erythema      Right Knee Exam:   No significant skin lesions  Valgus alignment  Large effusion  Range of motion from  to several degrees short of full extension to 110 flexion  Limited patellar mobility  + joint line tenderness Knee is stable to varus stress, valgus stress, Lachman, and posterior drawer  No warmth or erythema  No pain with micromotion  Neurovascularly intact distally      Knee Imaging    X-rays of the knee reviewed and interpreted today  These show no acute fractures   Severe degenerative changes with valgus malalignment

## 2023-03-07 ENCOUNTER — OFFICE VISIT (OUTPATIENT)
Dept: INTERNAL MEDICINE CLINIC | Facility: CLINIC | Age: 78
End: 2023-03-07

## 2023-03-07 ENCOUNTER — APPOINTMENT (OUTPATIENT)
Dept: LAB | Facility: CLINIC | Age: 78
End: 2023-03-07

## 2023-03-07 VITALS
HEIGHT: 71 IN | SYSTOLIC BLOOD PRESSURE: 124 MMHG | OXYGEN SATURATION: 98 % | WEIGHT: 223 LBS | DIASTOLIC BLOOD PRESSURE: 68 MMHG | HEART RATE: 88 BPM | BODY MASS INDEX: 31.22 KG/M2 | TEMPERATURE: 98 F

## 2023-03-07 DIAGNOSIS — R25.2 MUSCLE CRAMPS: ICD-10-CM

## 2023-03-07 DIAGNOSIS — E11.9 ENCOUNTER FOR DIABETIC FOOT EXAM (HCC): ICD-10-CM

## 2023-03-07 DIAGNOSIS — R63.4 ABNORMAL WEIGHT LOSS: ICD-10-CM

## 2023-03-07 DIAGNOSIS — E78.5 HYPERLIPIDEMIA, UNSPECIFIED HYPERLIPIDEMIA TYPE: ICD-10-CM

## 2023-03-07 DIAGNOSIS — R41.3 MEMORY DIFFICULTIES: ICD-10-CM

## 2023-03-07 DIAGNOSIS — I10 PRIMARY HYPERTENSION: Chronic | ICD-10-CM

## 2023-03-07 DIAGNOSIS — E55.9 VITAMIN D DEFICIENCY: ICD-10-CM

## 2023-03-07 DIAGNOSIS — G30.9 ALZHEIMER'S DISEASE, UNSPECIFIED (CODE) (HCC): ICD-10-CM

## 2023-03-07 DIAGNOSIS — F41.1 GENERALIZED ANXIETY DISORDER: ICD-10-CM

## 2023-03-07 DIAGNOSIS — D64.9 ANEMIA, UNSPECIFIED TYPE: ICD-10-CM

## 2023-03-07 DIAGNOSIS — Z00.00 MEDICARE ANNUAL WELLNESS VISIT, SUBSEQUENT: ICD-10-CM

## 2023-03-07 DIAGNOSIS — K21.9 GERD WITHOUT ESOPHAGITIS: ICD-10-CM

## 2023-03-07 DIAGNOSIS — N18.32 STAGE 3B CHRONIC KIDNEY DISEASE (HCC): ICD-10-CM

## 2023-03-07 DIAGNOSIS — E11.22 TYPE 2 DIABETES MELLITUS WITH STAGE 3B CHRONIC KIDNEY DISEASE, WITHOUT LONG-TERM CURRENT USE OF INSULIN (HCC): ICD-10-CM

## 2023-03-07 DIAGNOSIS — E53.8 DEFICIENCY OF OTHER SPECIFIED B GROUP VITAMINS: ICD-10-CM

## 2023-03-07 DIAGNOSIS — R25.1 TREMOR: ICD-10-CM

## 2023-03-07 DIAGNOSIS — M19.91 PRIMARY OSTEOARTHRITIS, UNSPECIFIED SITE: ICD-10-CM

## 2023-03-07 DIAGNOSIS — N18.32 TYPE 2 DIABETES MELLITUS WITH STAGE 3B CHRONIC KIDNEY DISEASE, WITHOUT LONG-TERM CURRENT USE OF INSULIN (HCC): Primary | ICD-10-CM

## 2023-03-07 DIAGNOSIS — E11.22 TYPE 2 DIABETES MELLITUS WITH STAGE 3B CHRONIC KIDNEY DISEASE, WITHOUT LONG-TERM CURRENT USE OF INSULIN (HCC): Primary | ICD-10-CM

## 2023-03-07 DIAGNOSIS — I48.20 CHRONIC ATRIAL FIBRILLATION (HCC): Chronic | ICD-10-CM

## 2023-03-07 DIAGNOSIS — N18.32 TYPE 2 DIABETES MELLITUS WITH STAGE 3B CHRONIC KIDNEY DISEASE, WITHOUT LONG-TERM CURRENT USE OF INSULIN (HCC): ICD-10-CM

## 2023-03-07 LAB
25(OH)D3 SERPL-MCNC: 24.7 NG/ML (ref 30–100)
ALBUMIN SERPL BCP-MCNC: 1.1 G/DL (ref 3.5–5)
ALP SERPL-CCNC: 66 U/L (ref 46–116)
ALT SERPL W P-5'-P-CCNC: 16 U/L (ref 12–78)
ANION GAP SERPL CALCULATED.3IONS-SCNC: 4 MMOL/L (ref 4–13)
AST SERPL W P-5'-P-CCNC: 9 U/L (ref 5–45)
BASOPHILS # BLD AUTO: 0.07 THOUSANDS/ÂΜL (ref 0–0.1)
BASOPHILS NFR BLD AUTO: 1 % (ref 0–1)
BILIRUB SERPL-MCNC: 0.65 MG/DL (ref 0.2–1)
BUN SERPL-MCNC: 30 MG/DL (ref 5–25)
CALCIUM ALBUM COR SERPL-MCNC: 11.6 MG/DL (ref 8.3–10.1)
CALCIUM SERPL-MCNC: 9.3 MG/DL (ref 8.3–10.1)
CHLORIDE SERPL-SCNC: 110 MMOL/L (ref 96–108)
CO2 SERPL-SCNC: 24 MMOL/L (ref 21–32)
CREAT SERPL-MCNC: 1.7 MG/DL (ref 0.6–1.3)
EOSINOPHIL # BLD AUTO: 0.12 THOUSAND/ÂΜL (ref 0–0.61)
EOSINOPHIL NFR BLD AUTO: 2 % (ref 0–6)
ERYTHROCYTE [DISTWIDTH] IN BLOOD BY AUTOMATED COUNT: 12.6 % (ref 11.6–15.1)
FOLATE SERPL-MCNC: 10.8 NG/ML (ref 3.1–17.5)
GFR SERPL CREATININE-BSD FRML MDRD: 38 ML/MIN/1.73SQ M
GLUCOSE P FAST SERPL-MCNC: 223 MG/DL (ref 65–99)
HCT VFR BLD AUTO: 46.2 % (ref 36.5–49.3)
HGB BLD-MCNC: 14.6 G/DL (ref 12–17)
IMM GRANULOCYTES # BLD AUTO: 0.02 THOUSAND/UL (ref 0–0.2)
IMM GRANULOCYTES NFR BLD AUTO: 0 % (ref 0–2)
LDLC SERPL DIRECT ASSAY-MCNC: 50 MG/DL (ref 0–100)
LYMPHOCYTES # BLD AUTO: 1.23 THOUSANDS/ÂΜL (ref 0.6–4.47)
LYMPHOCYTES NFR BLD AUTO: 18 % (ref 14–44)
MAGNESIUM SERPL-MCNC: 1.1 MG/DL (ref 1.6–2.6)
MCH RBC QN AUTO: 29.4 PG (ref 26.8–34.3)
MCHC RBC AUTO-ENTMCNC: 31.6 G/DL (ref 31.4–37.4)
MCV RBC AUTO: 93 FL (ref 82–98)
MONOCYTES # BLD AUTO: 0.51 THOUSAND/ÂΜL (ref 0.17–1.22)
MONOCYTES NFR BLD AUTO: 8 % (ref 4–12)
NEUTROPHILS # BLD AUTO: 4.75 THOUSANDS/ÂΜL (ref 1.85–7.62)
NEUTS SEG NFR BLD AUTO: 71 % (ref 43–75)
NRBC BLD AUTO-RTO: 0 /100 WBCS
PLATELET # BLD AUTO: 230 THOUSANDS/UL (ref 149–390)
PMV BLD AUTO: 10.6 FL (ref 8.9–12.7)
POTASSIUM SERPL-SCNC: 4.9 MMOL/L (ref 3.5–5.3)
PROT SERPL-MCNC: 7.3 G/DL (ref 6.4–8.4)
PTH-INTACT SERPL-MCNC: 64.9 PG/ML (ref 18.4–80.1)
RBC # BLD AUTO: 4.97 MILLION/UL (ref 3.88–5.62)
SL AMB POCT HEMOGLOBIN AIC: 7.8 (ref ?–6.5)
SODIUM SERPL-SCNC: 138 MMOL/L (ref 135–147)
TRIGL SERPL-MCNC: 140 MG/DL
TSH SERPL DL<=0.05 MIU/L-ACNC: 4.12 UIU/ML (ref 0.45–4.5)
URATE SERPL-MCNC: 6 MG/DL (ref 3.5–8.5)
VIT B12 SERPL-MCNC: 291 PG/ML (ref 100–900)
WBC # BLD AUTO: 6.7 THOUSAND/UL (ref 4.31–10.16)

## 2023-03-07 NOTE — PATIENT INSTRUCTIONS
Basic Carbohydrate Counting   AMBULATORY CARE:   Carbohydrate counting  is a way to plan your meals by counting the amount of carbohydrate in foods  Carbohydrates are the sugars, starches, and fiber found in fruit, grains, vegetables, and milk products  Carbohydrates increase your blood sugar levels  Carbohydrate counting can help you eat the right amount of carbohydrate to keep your blood sugar levels under control  What you need to know about planning meals using carbohydrate counting:  • A dietitian or healthcare provider will help you develop a healthy meal plan that works best for you  You will be taught how much carbohydrate to eat or drink for each meal and snack  Your meal plan will be based on your age, weight, usual food intake, and physical activity level  If you have diabetes, it will also include your blood sugar levels and diabetes medicine  Once you know how much carbohydrate you should eat, you can decide what type of food you want to eat  • You will need to know what foods contain carbohydrate and how much they contain  Keep track of the amount of carbohydrate in meals and snacks in order to follow your meal plan  Do not avoid carbohydrates or skip meals  Your blood sugar may fall too low if you do not eat enough carbohydrate or you skip meals  Foods that contain carbohydrate:   • Breads:  Each serving of food listed below contains about 15 g of carbohydrate   ? 1 slice of bread (1 ounce) or 1 flour or corn tortilla (6 inch)    ? ½ of a hamburger bun or ¼ of a large bagel (about 1 ounce)    ? 1 pancake (about 4 inches across and ¼ inch thick)    • Cereals and grains:  Serving sizes of ready-to-eat cereals vary  Look at the serving size and the total carbohydrate amount listed on the food label  Each serving of food listed below contains about 15 g of carbohydrate   ? ¾ cup of dry, unsweetened, ready-to-eat cereal or ¼ cup of low-fat granola     ?  ½ cup of oatmeal or other cooked cereal     ? ? cup of cooked rice or pasta    • Starchy vegetables and beans:  Each serving of food listed below contains about 15 g of carbohydrate   ? ½ cup of corn, green peas, sweet potatoes, or mashed potatoes    ? ¼ of a large baked potato    ? ½ cup of beans, lentils, and peas (garbanzo, wisdom, kidney, white, split, black-eyed)    • Crackers and snacks:  Each serving of food listed below contains about 15 g of carbohydrate   ? 3 derick cracker squares or 8 animal crackers     ? 6 saltine-type crackers    ? 3 cups of popcorn or ¾ ounce of pretzels, potato chips, or tortilla chips    • Fruit:  Each serving of food listed below contains about 15 g of carbohydrate   ? 1 small (4 ounce) piece of fresh fruit or ¾ to 1 cup of fresh fruit    ? ½ cup of canned or frozen fruit, packed in natural juice    ? ½ cup (4 ounces) of unsweetened fruit juice    ? 2 tablespoons of dried fruit    • Desserts or sugary foods:  Each serving of food listed below contains about 15 g of carbohydrate   ? 2-inch square unfrosted cake or brownie     ? 2 small cookies    ? ½ cup of ice cream, frozen yogurt, or nondairy frozen yogurt    ? ¼ cup of sherbet or sorbet    ? 1 tablespoon of regular syrup, jam, or jelly    ? 2 tablespoons of light syrup    • Milk and yogurt:  Foods from the milk group contain about 12 g of carbohydrate per serving  ? 1 cup of fat-free or low-fat milk    ? 1 cup of soy milk    ? ? cup of fat-free, yogurt sweetened with artificial sweetener    • Non-starchy vegetables:  Each serving contains about 5 g of carbohydrate   Three servings of non-starch vegetables count as 1 carbohydrate serving  ? ½ cup of cooked vegetables or 1 cup of raw vegetables  This includes beets, broccoli, cabbage, cauliflower, cucumber, mushrooms, tomatoes, and zucchini    ?  ½ cup of vegetable juice    How to use carbohydrate counting to plan meals:   • Count carbohydrate amounts using serving sizes:      ? Pasta dinner example: You plan to have pasta, tossed salad, and an 8-ounce glass of milk  Your healthcare provider tells you that you may have 4 carbohydrate servings for dinner  One carbohydrate serving of pasta is ? cup  One cup of pasta will equal 3 carbohydrate servings  An 8-ounce glass of milk will count as 1 carbohydrate serving  These amounts of food would equal 4 carbohydrate servings  One cup of tossed salad does not count toward your carbohydrate servings  • Count carbohydrate amounts using food labels:  Find the total amount of carbohydrate in a packaged food by reading the food label  Food labels tell you the serving size of the food and the total carbohydrate amount in each serving  Find the serving size on the food label and then decide how many servings you will eat  Multiply the number of servings you plan to eat by the carbohydrate amount per serving  ? Granola bar snack example: Your meal plan allows you to have 2 carbohydrate servings (30 grams) of carbohydrate for a snack  You plan to eat 1 package of granola bars, which contains 2 bars  According to the food label, the serving size of food in this package is 1 bar  Each serving (1 bar) contains 25 grams of carbohydrate  The total amount of carbohydrate in this package of granola bars would be 50 g  Based on your meal plan, you should eat only 1 bar  Follow up with your doctor as directed:  Write down your questions so you remember to ask them during your visits  © Copyright Hans [employer]alla 2022 Information is for End User's use only and may not be sold, redistributed or otherwise used for commercial purposes  The above information is an  only  It is not intended as medical advice for individual conditions or treatments  Talk to your doctor, nurse or pharmacist before following any medical regimen to see if it is safe and effective for you      Medicare Preventive Visit Patient Instructions  Thank you for completing your Welcome to Medicare Visit or Medicare Annual Wellness Visit today  Your next wellness visit will be due in one year (3/7/2024)  The screening/preventive services that you may require over the next 5-10 years are detailed below  Some tests may not apply to you based off risk factors and/or age  Screening tests ordered at today's visit but not completed yet may show as past due  Also, please note that scanned in results may not display below  Preventive Screenings:  Service Recommendations Previous Testing/Comments   Colorectal Cancer Screening  · Colonoscopy    · Fecal Occult Blood Test (FOBT)/Fecal Immunochemical Test (FIT)  · Fecal DNA/Cologuard Test  · Flexible Sigmoidoscopy Age: 39-70 years old   Colonoscopy: every 10 years (May be performed more frequently if at higher risk)  OR  FOBT/FIT: every 1 year  OR  Cologuard: every 3 years  OR  Sigmoidoscopy: every 5 years  Screening may be recommended earlier than age 39 if at higher risk for colorectal cancer  Also, an individualized decision between you and your healthcare provider will decide whether screening between the ages of 74-80 would be appropriate  Colonoscopy: 11/20/2019  FOBT/FIT: Not on file  Cologuard: Not on file  Sigmoidoscopy: Not on file          Prostate Cancer Screening Individualized decision between patient and health care provider in men between ages of 53-78   Medicare will cover every 12 months beginning on the day after your 50th birthday PSA: 4 0 ng/mL     Screening Not Indicated     Hepatitis C Screening Once for adults born between 1945 and 1965  More frequently in patients at high risk for Hepatitis C Hep C Antibody: 06/04/2018    Screening Current   Diabetes Screening 1-2 times per year if you're at risk for diabetes or have pre-diabetes Fasting glucose: 144 mg/dL (8/16/2022)  A1C: 7 5 (8/16/2022)  Screening Not Indicated  History Diabetes   Cholesterol Screening Once every 5 years if you don't have a lipid disorder   May order more often based on risk factors  Lipid panel: 08/16/2022  Screening Not Indicated  History Lipid Disorder      Other Preventive Screenings Covered by Medicare:  1  Abdominal Aortic Aneurysm (AAA) Screening: covered once if your at risk  You're considered to be at risk if you have a family history of AAA or a male between the age of 73-68 who smoking at least 100 cigarettes in your lifetime  2  Lung Cancer Screening: covers low dose CT scan once per year if you meet all of the following conditions: (1) Age 50-69; (2) No signs or symptoms of lung cancer; (3) Current smoker or have quit smoking within the last 15 years; (4) You have a tobacco smoking history of at least 20 pack years (packs per day x number of years you smoked); (5) You get a written order from a healthcare provider  3  Glaucoma Screening: covered annually if you're considered high risk: (1) You have diabetes OR (2) Family history of glaucoma OR (3)  aged 48 and older OR (3)  American aged 72 and older  3  Osteoporosis Screening: covered every 2 years if you meet one of the following conditions: (1) Have a vertebral abnormality; (2) On glucocorticoid therapy for more than 3 months; (3) Have primary hyperparathyroidism; (4) On osteoporosis medications and need to assess response to drug therapy  5  HIV Screening: covered annually if you're between the age of 12-76  Also covered annually if you are younger than 13 and older than 72 with risk factors for HIV infection  For pregnant patients, it is covered up to 3 times per pregnancy      Immunizations:  Immunization Recommendations   Influenza Vaccine Annual influenza vaccination during flu season is recommended for all persons aged >= 6 months who do not have contraindications   Pneumococcal Vaccine   * Pneumococcal conjugate vaccine = PCV13 (Prevnar 13), PCV15 (Vaxneuvance), PCV20 (Prevnar 20)  * Pneumococcal polysaccharide vaccine = PPSV23 (Pneumovax) Adults 25-60 years old: 1-3 doses may be recommended based on certain risk factors  Adults 72 years old: 1-2 doses may be recommended based off what pneumonia vaccine you previously received   Hepatitis B Vaccine 3 dose series if at intermediate or high risk (ex: diabetes, end stage renal disease, liver disease)   Tetanus (Td) Vaccine - COST NOT COVERED BY MEDICARE PART B Following completion of primary series, a booster dose should be given every 10 years to maintain immunity against tetanus  Td may also be given as tetanus wound prophylaxis  Tdap Vaccine - COST NOT COVERED BY MEDICARE PART B Recommended at least once for all adults  For pregnant patients, recommended with each pregnancy  Shingles Vaccine (Shingrix) - COST NOT COVERED BY MEDICARE PART B  2 shot series recommended in those aged 48 and above     Health Maintenance Due:      Topic Date Due   • Hepatitis C Screening  Completed   • Colorectal Cancer Screening  Discontinued     Immunizations Due:  There are no preventive care reminders to display for this patient  Advance Directives   What are advance directives? Advance directives are legal documents that state your wishes and plans for medical care  These plans are made ahead of time in case you lose your ability to make decisions for yourself  Advance directives can apply to any medical decision, such as the treatments you want, and if you want to donate organs  What are the types of advance directives? There are many types of advance directives, and each state has rules about how to use them  You may choose a combination of any of the following:  · Living will: This is a written record of the treatment you want  You can also choose which treatments you do not want, which to limit, and which to stop at a certain time  This includes surgery, medicine, IV fluid, and tube feedings  · Durable power of  for healthcare [place] [facility]):   This is a written record that states who you want to make healthcare choices for you when you are unable to make them for yourself  This person, called a proxy, is usually a family member or a friend  You may choose more than 1 proxy  · Do not resuscitate (DNR) order:  A DNR order is used in case your heart stops beating or you stop breathing  It is a request not to have certain forms of treatment, such as CPR  A DNR order may be included in other types of advance directives  · Medical directive: This covers the care that you want if you are in a coma, near death, or unable to make decisions for yourself  You can list the treatments you want for each condition  Treatment may include pain medicine, surgery, blood transfusions, dialysis, IV or tube feedings, and a ventilator (breathing machine)  · Values history: This document has questions about your views, beliefs, and how you feel and think about life  This information can help others choose the care that you would choose  Why are advance directives important? An advance directive helps you control your care  Although spoken wishes may be used, it is better to have your wishes written down  Spoken wishes can be misunderstood, or not followed  Treatments may be given even if you do not want them  An advance directive may make it easier for your family to make difficult choices about your care  Weight Management   Why it is important to manage your weight:  Being overweight increases your risk of health conditions such as heart disease, high blood pressure, type 2 diabetes, and certain types of cancer  It can also increase your risk for osteoarthritis, sleep apnea, and other respiratory problems  Aim for a slow, steady weight loss  Even a small amount of weight loss can lower your risk of health problems  How to lose weight safely:  A safe and healthy way to lose weight is to eat fewer calories and get regular exercise  You can lose up about 1 pound a week by decreasing the number of calories you eat by 500 calories each day     Healthy meal plan for weight management:  A healthy meal plan includes a variety of foods, contains fewer calories, and helps you stay healthy  A healthy meal plan includes the following:  · Eat whole-grain foods more often  A healthy meal plan should contain fiber  Fiber is the part of grains, fruits, and vegetables that is not broken down by your body  Whole-grain foods are healthy and provide extra fiber in your diet  Some examples of whole-grain foods are whole-wheat breads and pastas, oatmeal, brown rice, and bulgur  · Eat a variety of vegetables every day  Include dark, leafy greens such as spinach, kale, haroon greens, and mustard greens  Eat yellow and orange vegetables such as carrots, sweet potatoes, and winter squash  · Eat a variety of fruits every day  Choose fresh or canned fruit (canned in its own juice or light syrup) instead of juice  Fruit juice has very little or no fiber  · Eat low-fat dairy foods  Drink fat-free (skim) milk or 1% milk  Eat fat-free yogurt and low-fat cottage cheese  Try low-fat cheeses such as mozzarella and other reduced-fat cheeses  · Choose meat and other protein foods that are low in fat  Choose beans or other legumes such as split peas or lentils  Choose fish, skinless poultry (chicken or turkey), or lean cuts of red meat (beef or pork)  Before you cook meat or poultry, cut off any visible fat  · Use less fat and oil  Try baking foods instead of frying them  Add less fat, such as margarine, sour cream, regular salad dressing and mayonnaise to foods  Eat fewer high-fat foods  Some examples of high-fat foods include french fries, doughnuts, ice cream, and cakes  · Eat fewer sweets  Limit foods and drinks that are high in sugar  This includes candy, cookies, regular soda, and sweetened drinks  Exercise:  Exercise at least 30 minutes per day on most days of the week  Some examples of exercise include walking, biking, dancing, and swimming   You can also fit in more physical activity by taking the stairs instead of the elevator or parking farther away from stores  Ask your healthcare provider about the best exercise plan for you  © Copyright [employer] 2018 Information is for End User's use only and may not be sold, redistributed or otherwise used for commercial purposes  All illustrations and images included in CareNotes® are the copyrighted property of [employer]  or [facility] Preventive Visit Patient Instructions  Thank you for completing your Welcome to Medicare Visit or Medicare Annual Wellness Visit today  Your next wellness visit will be due in one year (3/7/2024)  The screening/preventive services that you may require over the next 5-10 years are detailed below  Some tests may not apply to you based off risk factors and/or age  Screening tests ordered at today's visit but not completed yet may show as past due  Also, please note that scanned in results may not display below  Preventive Screenings:  Service Recommendations Previous Testing/Comments   Colorectal Cancer Screening  · Colonoscopy    · Fecal Occult Blood Test (FOBT)/Fecal Immunochemical Test (FIT)  · Fecal DNA/Cologuard Test  · Flexible Sigmoidoscopy Age: 39-70 years old   Colonoscopy: every 10 years (May be performed more frequently if at higher risk)  OR  FOBT/FIT: every 1 year  OR  Cologuard: every 3 years  OR  Sigmoidoscopy: every 5 years  Screening may be recommended earlier than age 39 if at higher risk for colorectal cancer  Also, an individualized decision between you and your healthcare provider will decide whether screening between the ages of 74-80 would be appropriate   Colonoscopy: 11/20/2019  FOBT/FIT: Not on file  Cologuard: Not on file  Sigmoidoscopy: Not on file          Prostate Cancer Screening Individualized decision between patient and health care provider in men between ages of 53-78   Medicare will cover every 12 months beginning on the day after your 50th birthday PSA: 4 0 ng/mL     Screening Not Indicated     Hepatitis C Screening Once for adults born between 1945 and 1965  More frequently in patients at high risk for Hepatitis C Hep C Antibody: 06/04/2018    Screening Current   Diabetes Screening 1-2 times per year if you're at risk for diabetes or have pre-diabetes Fasting glucose: 144 mg/dL (8/16/2022)  A1C: 7 5 (8/16/2022)  Screening Not Indicated  History Diabetes   Cholesterol Screening Once every 5 years if you don't have a lipid disorder  May order more often based on risk factors  Lipid panel: 08/16/2022  Screening Not Indicated  History Lipid Disorder      Other Preventive Screenings Covered by Medicare:  6  Abdominal Aortic Aneurysm (AAA) Screening: covered once if your at risk  You're considered to be at risk if you have a family history of AAA or a male between the age of 73-68 who smoking at least 100 cigarettes in your lifetime  7  Lung Cancer Screening: covers low dose CT scan once per year if you meet all of the following conditions: (1) Age 50-69; (2) No signs or symptoms of lung cancer; (3) Current smoker or have quit smoking within the last 15 years; (4) You have a tobacco smoking history of at least 20 pack years (packs per day x number of years you smoked); (5) You get a written order from a healthcare provider  8  Glaucoma Screening: covered annually if you're considered high risk: (1) You have diabetes OR (2) Family history of glaucoma OR (3)  aged 48 and older OR (3)  American aged 72 and older  5  Osteoporosis Screening: covered every 2 years if you meet one of the following conditions: (1) Have a vertebral abnormality; (2) On glucocorticoid therapy for more than 3 months; (3) Have primary hyperparathyroidism; (4) On osteoporosis medications and need to assess response to drug therapy  10  HIV Screening: covered annually if you're between the age of 12-76   Also covered annually if you are younger than 13 and older than 72 with risk factors for HIV infection  For pregnant patients, it is covered up to 3 times per pregnancy  Immunizations:  Immunization Recommendations   Influenza Vaccine Annual influenza vaccination during flu season is recommended for all persons aged >= 6 months who do not have contraindications   Pneumococcal Vaccine   * Pneumococcal conjugate vaccine = PCV13 (Prevnar 13), PCV15 (Vaxneuvance), PCV20 (Prevnar 20)  * Pneumococcal polysaccharide vaccine = PPSV23 (Pneumovax) Adults 25-60 years old: 1-3 doses may be recommended based on certain risk factors  Adults 72 years old: 1-2 doses may be recommended based off what pneumonia vaccine you previously received   Hepatitis B Vaccine 3 dose series if at intermediate or high risk (ex: diabetes, end stage renal disease, liver disease)   Tetanus (Td) Vaccine - COST NOT COVERED BY MEDICARE PART B Following completion of primary series, a booster dose should be given every 10 years to maintain immunity against tetanus  Td may also be given as tetanus wound prophylaxis  Tdap Vaccine - COST NOT COVERED BY MEDICARE PART B Recommended at least once for all adults  For pregnant patients, recommended with each pregnancy  Shingles Vaccine (Shingrix) - COST NOT COVERED BY MEDICARE PART B  2 shot series recommended in those aged 48 and above     Health Maintenance Due:      Topic Date Due   • Hepatitis C Screening  Completed   • Colorectal Cancer Screening  Discontinued     Immunizations Due:  There are no preventive care reminders to display for this patient  Advance Directives   What are advance directives? Advance directives are legal documents that state your wishes and plans for medical care  These plans are made ahead of time in case you lose your ability to make decisions for yourself  Advance directives can apply to any medical decision, such as the treatments you want, and if you want to donate organs  What are the types of advance directives?   There are many types of advance directives, and each state has rules about how to use them  You may choose a combination of any of the following:  · Living will: This is a written record of the treatment you want  You can also choose which treatments you do not want, which to limit, and which to stop at a certain time  This includes surgery, medicine, IV fluid, and tube feedings  · Durable power of  for healthcare [facility]): This is a written record that states who you want to make healthcare choices for you when you are unable to make them for yourself  This person, called a proxy, is usually a family member or a friend  You may choose more than 1 proxy  · Do not resuscitate (DNR) order:  A DNR order is used in case your heart stops beating or you stop breathing  It is a request not to have certain forms of treatment, such as CPR  A DNR order may be included in other types of advance directives  · Medical directive: This covers the care that you want if you are in a coma, near death, or unable to make decisions for yourself  You can list the treatments you want for each condition  Treatment may include pain medicine, surgery, blood transfusions, dialysis, IV or tube feedings, and a ventilator (breathing machine)  · Values history: This document has questions about your views, beliefs, and how you feel and think about life  This information can help others choose the care that you would choose  Why are advance directives important? An advance directive helps you control your care  Although spoken wishes may be used, it is better to have your wishes written down  Spoken wishes can be misunderstood, or not followed  Treatments may be given even if you do not want them  An advance directive may make it easier for your family to make difficult choices about your care     Weight Management   Why it is important to manage your weight:  Being overweight increases your risk of health conditions such as heart disease, high blood pressure, type 2 diabetes, and certain types of cancer  It can also increase your risk for osteoarthritis, sleep apnea, and other respiratory problems  Aim for a slow, steady weight loss  Even a small amount of weight loss can lower your risk of health problems  How to lose weight safely:  A safe and healthy way to lose weight is to eat fewer calories and get regular exercise  You can lose up about 1 pound a week by decreasing the number of calories you eat by 500 calories each day  Healthy meal plan for weight management:  A healthy meal plan includes a variety of foods, contains fewer calories, and helps you stay healthy  A healthy meal plan includes the following:  · Eat whole-grain foods more often  A healthy meal plan should contain fiber  Fiber is the part of grains, fruits, and vegetables that is not broken down by your body  Whole-grain foods are healthy and provide extra fiber in your diet  Some examples of whole-grain foods are whole-wheat breads and pastas, oatmeal, brown rice, and bulgur  · Eat a variety of vegetables every day  Include dark, leafy greens such as spinach, kale, haroon greens, and mustard greens  Eat yellow and orange vegetables such as carrots, sweet potatoes, and winter squash  · Eat a variety of fruits every day  Choose fresh or canned fruit (canned in its own juice or light syrup) instead of juice  Fruit juice has very little or no fiber  · Eat low-fat dairy foods  Drink fat-free (skim) milk or 1% milk  Eat fat-free yogurt and low-fat cottage cheese  Try low-fat cheeses such as mozzarella and other reduced-fat cheeses  · Choose meat and other protein foods that are low in fat  Choose beans or other legumes such as split peas or lentils  Choose fish, skinless poultry (chicken or turkey), or lean cuts of red meat (beef or pork)  Before you cook meat or poultry, cut off any visible fat  · Use less fat and oil  Try baking foods instead of frying them   Add less fat, such as margarine, sour cream, regular salad dressing and mayonnaise to foods  Eat fewer high-fat foods  Some examples of high-fat foods include french fries, doughnuts, ice cream, and cakes  · Eat fewer sweets  Limit foods and drinks that are high in sugar  This includes candy, cookies, regular soda, and sweetened drinks  Exercise:  Exercise at least 30 minutes per day on most days of the week  Some examples of exercise include walking, biking, dancing, and swimming  You can also fit in more physical activity by taking the stairs instead of the elevator or parking farther away from stores  Ask your healthcare provider about the best exercise plan for you  © Copyright [address] 2018 Information is for End User's use only and may not be sold, redistributed or otherwise used for commercial purposes   All illustrations and images included in CareNotes® are the copyrighted property of [employer]  or [address] [place] [address]

## 2023-03-07 NOTE — PROGRESS NOTES
Assessment/Plan:  Problem List Items Addressed This Visit        Digestive    GERD without esophagitis       Endocrine    Type 2 diabetes mellitus with diabetic chronic kidney disease ([facility] ) - Primary    Relevant Orders    Comprehensive metabolic panel    LDL cholesterol, direct    Triglycerides    POCT hemoglobin A1c (Completed)       Cardiovascular and Mediastinum    Hypertension (Chronic)    Chronic atrial fibrillation (Chronic)       Musculoskeletal and Integument    Osteoarthritis       Genitourinary    Stage 3 chronic kidney disease (HCC)    Relevant Orders    Uric acid    PTH, intact       Other    Memory difficulties    Hyperlipidemia    Relevant Orders    Comprehensive metabolic panel    LDL cholesterol, direct    Triglycerides    Generalized anxiety disorder    Anemia   Other Visit Diagnoses     Medicare annual wellness visit, subsequent        Vitamin D deficiency        Relevant Orders    Vitamin D 25 hydroxy    Encounter for diabetic foot exam ([facility] )        Tremor        Relevant Orders    CBC and differential    Vitamin B12    Folate    Magnesium    TSH, 3rd generation with Free T4 reflex    Abnormal weight loss        Relevant Orders    CBC and differential    Vitamin B12    Folate    Magnesium    TSH, 3rd generation with Free T4 reflex    Muscle cramps        Relevant Orders    CBC and differential    Vitamin B12    Folate    Magnesium    TSH, 3rd generation with Free T4 reflex    Deficiency of other specified B group vitamins        Relevant Orders    Vitamin B12    Alzheimer's disease, unspecified (CODE) ([place] [id number] )        Relevant Orders    Folate           Diagnoses and all orders for this visit:    Type 2 diabetes mellitus with stage 3b chronic kidney disease, without long-term current use of insulin ([facility])  -     Comprehensive metabolic panel; Future  -     LDL cholesterol, direct; Future  -     Triglycerides;  Future  -     POCT hemoglobin A1c    GERD without esophagitis    Primary hypertension    Chronic atrial fibrillation    Primary osteoarthritis, unspecified site    Stage 3b chronic kidney disease (HCC)  -     Uric acid; Future  -     PTH, intact; Future    Anemia, unspecified type    Hyperlipidemia, unspecified hyperlipidemia type  -     Comprehensive metabolic panel; Future  -     LDL cholesterol, direct; Future  -     Triglycerides; Future    Generalized anxiety disorder    Memory difficulties    Medicare annual wellness visit, subsequent    Vitamin D deficiency  -     Vitamin D 25 hydroxy; Future    Encounter for diabetic foot exam ([facility] )    Tremor  -     CBC and differential; Future  -     Vitamin B12; Future  -     Folate; Future  -     Magnesium; Future  -     TSH, 3rd generation with Free T4 reflex; Future    Abnormal weight loss  -     CBC and differential; Future  -     Vitamin B12; Future  -     Folate; Future  -     Magnesium; Future  -     TSH, 3rd generation with Free T4 reflex; Future    Muscle cramps  -     CBC and differential; Future  -     Vitamin B12; Future  -     Folate; Future  -     Magnesium; Future  -     TSH, 3rd generation with Free T4 reflex; Future    Deficiency of other specified B group vitamins  -     Vitamin B12; Future    Alzheimer's disease, unspecified (CODE) ([facility])  -     Folate; Future      No problem-specific Assessment & Plan notes found for this encounter  A/P: Doing ok and will check labs  In office HgA1c was up at 7 8, but since pt loosing weight, will hold on med change since he is not too far off his goal and at risk of hypoglycemic events  ?tremors  NO cogwheeling, shuffled gait and good facial expression  MRI head 2019 with microvascular changes only  Will check labs  Request pt check sugar at time of the events  May need imaging or neuro eval  ?muscle cramps Will check labs  ?wt loss  Will check labs    Continue current treatment and RTC one month for f/u wt loss, tremors, and cramps        Subjective:      Patient ID: Nash Andrews III is a 68 y o  male   RTC for f/u DM, HTN, etc  Doing ok and no new issues except weight loss,  ?tremors of the hands, and muscle cramps of the legs  FHx of Parkinson's  No appetite change  No med change  No changes in diet  Tremors and cramps intermittently  Doesn't check sugars during the events  NO back pain  NO changes in bowel or bladder habits    Remains active w/o difficulty and no falls  Doesn't check sugars, but  no low sugar events  Chronic pain is manageable  Denies CP, SOB, palpitations, edema, orthopnea or PND  Remains on chronic anticoagulation and no bleeding events  Memory issues no worse and no safety concerns per wife  NO stroke like events  ALMA is controlled  Due for labs  The following portions of the patient's history were reviewed and updated as appropriate:   He has a past medical history of Acute on chronic renal insufficiency, Allergic rhinitis, Anemia (6/11/2012), Atrial fibrillation ([facility] ), Cerebrovascular accident (CVA) ([facility] ), Controlled type 2 diabetes mellitus without complication ([facility] ), Generalized anxiety disorder, GERD without esophagitis (6/11/2012), Hypertension, Memory difficulties (1/8/2019), Nephrolithiasis (3/17/2016), Obesity (6/11/2012), and Osteoarthritis (6/11/2012)  ,  does not have any pertinent problems on file  ,   has a past surgical history that includes Total hip arthroplasty; Knee surgery; Cataract extraction, bilateral; Total hip arthroplasty (Bilateral, 2011); pr cysto bladder w/ureteral catheterization (Left, 11/8/2019); and Cystoscopy w/ laser lithotripsy (Left, 1/20/2020)  ,  family history includes Arthritis in his mother; No Known Problems in his father; Parkinsonism in his mother  ,   reports that he has never smoked  He has never used smokeless tobacco  He reports that he does not drink alcohol and does not use drugs  ,  is allergic to pollen extract     Current Outpatient Medications   Medication Sig Dispense Refill   • ALPRAZolam (XANAX) 0 5 mg tablet Take 1 tablet (0 5 mg total) by mouth every 8 (eight) hours as needed (AS NEEDED) 90 tablet 0   • amLODIPine (NORVASC) 10 mg tablet Take 1 tablet (10 mg total) by mouth daily 90 tablet 3   • atorvastatin (LIPITOR) 20 mg tablet Take 1 tablet (20 mg total) by mouth daily 90 tablet 2   • Canagliflozin 300 MG TABS Take 1 tablet (300 mg total) by mouth daily 90 tablet 1   • donepezil (ARICEPT) 10 mg tablet take 1 tablet by mouth at bedtime 90 tablet 1   • DULoxetine (CYMBALTA) 30 mg delayed release capsule Take 1 capsule (30 mg total) by mouth daily 90 capsule 1   • metFORMIN (GLUCOPHAGE) 850 mg tablet take 2 tablets by mouth daily 180 tablet 1   • metoprolol succinate (TOPROL-XL) 100 mg 24 hr tablet Take 1 tablet (100 mg total) by mouth daily 90 tablet 1   • rivaroxaban (Xarelto) 15 mg tablet Take 1 tablet (15 mg total) by mouth daily with breakfast 90 tablet 1   • sitaGLIPtin (JANUVIA) 50 mg tablet Take 1 tablet (50 mg total) by mouth daily 90 tablet 3   • Blood Glucose Monitoring Suppl (ONE TOUCH ULTRA 2) w/Device KIT by Does not apply route     • glucose blood test strip 1 Squirt by In Vitro route daily       No current facility-administered medications for this visit  Review of Systems   Constitutional: Positive for unexpected weight change  Negative for activity change, chills, diaphoresis, fatigue and fever  HENT: Negative  Eyes: Negative for visual disturbance  Respiratory: Negative for cough, chest tightness, shortness of breath and wheezing  Cardiovascular: Negative for chest pain, palpitations and leg swelling  Gastrointestinal: Negative for abdominal pain, constipation, diarrhea, nausea and vomiting  Endocrine: Negative for cold intolerance and heat intolerance  Genitourinary: Negative for difficulty urinating, dysuria and frequency  Musculoskeletal: Negative for arthralgias, gait problem and myalgias  Muscle cramps   Neurological: Positive for tremors   Negative for dizziness, seizures, syncope, weakness, light-headedness and headaches  Psychiatric/Behavioral: Negative for confusion, dysphoric mood and sleep disturbance  The patient is not nervous/anxious  PHQ-2/9 Depression Screening    Little interest or pleasure in doing things: 0 - not at all  Feeling down, depressed, or hopeless: 0 - not at all  PHQ-2 Score: 0  PHQ-2 Interpretation: Negative depression screen        Objective:  Vitals:    03/07/23 1311   BP: 124/68   Pulse: 88   Temp: 98 °F (36 7 °C)   SpO2: 98%   Weight: 101 kg (223 lb)   Height: 5' 11" (1 803 m)     Body mass index is 31 1 kg/m²  Physical Exam  Vitals and nursing note reviewed  Constitutional:       General: He is not in acute distress  Appearance: Normal appearance  He is not ill-appearing  HENT:      Head: Normocephalic and atraumatic  Mouth/Throat:      Mouth: Mucous membranes are moist    Eyes:      Extraocular Movements: Extraocular movements intact  Conjunctiva/sclera: Conjunctivae normal       Pupils: Pupils are equal, round, and reactive to light  Neck:      Vascular: No carotid bruit  Cardiovascular:      Rate and Rhythm: Normal rate  Rhythm irregular  Pulses: no weak pulses          Dorsalis pedis pulses are 1+ on the right side and 1+ on the left side  Posterior tibial pulses are 1+ on the right side and 1+ on the left side  Heart sounds: Normal heart sounds  No murmur heard  No friction rub  Comments: Irregular irregular with GVR  Pulmonary:      Effort: Pulmonary effort is normal  No respiratory distress  Breath sounds: Normal breath sounds  No wheezing, rhonchi or rales  Abdominal:      General: Bowel sounds are normal  There is no distension  Palpations: Abdomen is soft  Tenderness: There is no abdominal tenderness  Musculoskeletal:      Cervical back: Neck supple  Right lower leg: No edema  Left lower leg: No edema     Feet:      Right foot:      Skin integrity: No ulcer, skin breakdown, erythema, warmth, callus or dry skin  Left foot:      Skin integrity: No ulcer, skin breakdown, erythema, warmth, callus or dry skin  Neurological:      General: No focal deficit present  Mental Status: He is alert and oriented to person, place, and time  Mental status is at baseline  Psychiatric:         Mood and Affect: Mood normal          Behavior: Behavior normal          Thought Content: Thought content normal          Judgment: Judgment normal            Patient's shoes and socks removed  Right Foot/Ankle   Right Foot Inspection  Skin Exam: skin normal and skin intact  No dry skin, no warmth, no callus, no erythema, no maceration, no abnormal color, no pre-ulcer, no ulcer and no callus  Toe Exam: ROM and strength within normal limits  No swelling, no tenderness, erythema and  no right toe deformity    Sensory   Monofilament testing: intact    Vascular  Capillary refills: elevated  The right DP pulse is 1+  The right PT pulse is 1+  Left Foot/Ankle  Left Foot Inspection  Skin Exam: skin normal and skin intact  No dry skin, no warmth, no erythema, no maceration, normal color, no pre-ulcer, no ulcer and no callus  Toe Exam: ROM and strength within normal limits  No swelling, no tenderness, no erythema and no left toe deformity  Sensory   Monofilament testing: intact    Vascular  Capillary refills: elevated  The left DP pulse is 1+  The left PT pulse is 1+       Assign Risk Category  No deformity present  No loss of protective sensation  No weak pulses  Risk: 0

## 2023-03-07 NOTE — PROGRESS NOTES
Assessment and Plan:     Problem List Items Addressed This Visit        Digestive    GERD without esophagitis       Endocrine    Type 2 diabetes mellitus with diabetic chronic kidney disease ([facility] ) - Primary    Relevant Orders    Comprehensive metabolic panel    LDL cholesterol, direct    Triglycerides    POCT hemoglobin A1c (Completed)       Cardiovascular and Mediastinum    Hypertension (Chronic)    Chronic atrial fibrillation (Chronic)       Musculoskeletal and Integument    Osteoarthritis       Genitourinary    Stage 3 chronic kidney disease (HCC)    Relevant Orders    Uric acid    PTH, intact       Other    Memory difficulties    Hyperlipidemia    Relevant Orders    Comprehensive metabolic panel    LDL cholesterol, direct    Triglycerides    Generalized anxiety disorder    Anemia   Other Visit Diagnoses     Medicare annual wellness visit, subsequent        Vitamin D deficiency        Relevant Orders    Vitamin D 25 hydroxy    Encounter for diabetic foot exam ([facility] )        Tremor        Relevant Orders    CBC and differential    Vitamin B12    Folate    Magnesium    TSH, 3rd generation with Free T4 reflex    Abnormal weight loss        Relevant Orders    CBC and differential    Vitamin B12    Folate    Magnesium    TSH, 3rd generation with Free T4 reflex    Muscle cramps        Relevant Orders    CBC and differential    Vitamin B12    Folate    Magnesium    TSH, 3rd generation with Free T4 reflex    Deficiency of other specified B group vitamins        Relevant Orders    Vitamin B12    Alzheimer's disease, unspecified (CODE) ([facility] )        Relevant Orders    Folate           Preventive health issues were discussed with patient, and age appropriate screening tests were ordered as noted in patient's After Visit Summary  Personalized health advice and appropriate referrals for health education or preventive services given if needed, as noted in patient's After Visit Summary       History of Present Illness: Patient presents for a Medicare Wellness Visit    HPI   Patient Care Team:  Amanda Dasilva DO as PCP - General (Internal Medicine)     Review of Systems:     Review of Systems     Problem List:     Patient Active Problem List   Diagnosis   • Anemia   • Diabetic polyneuropathy associated with type 2 diabetes mellitus ([address] )   • Eczema   • Erectile dysfunction of non-organic origin   • Generalized anxiety disorder   • GERD without esophagitis   • Hyperlipidemia   • Hypertension   • Nephrolithiasis   • Obesity   • Osteoarthritis   • Polyp of sigmoid colon   • Type 2 diabetes mellitus with diabetic chronic kidney disease ([address] )   • Primary osteoarthritis of right hip   • Mild concentric left ventricular hypertrophy (LVH)   • Chronic atrial fibrillation   • Chronic anticoagulation   • Memory difficulties   • History of TIA (transient ischemic attack)   • Stage 3 chronic kidney disease (HCC)   • Hydronephrosis with obstructing calculus   • Right Adrenal nodule    • Proteinuria   • Diabetic nephropathy associated with type 2 diabetes mellitus (HCC)   • Localized edema      Past Medical and Surgical History:     Past Medical History:   Diagnosis Date   • Acute on chronic renal insufficiency    • Allergic rhinitis    • Anemia 6/11/2012   • Atrial fibrillation ([address] )    • Cerebrovascular accident (CVA) ([facility] )    • Controlled type 2 diabetes mellitus without complication ([facility] )    • Generalized anxiety disorder    • GERD without esophagitis 6/11/2012   • Hypertension    • Memory difficulties 1/8/2019   • Nephrolithiasis 3/17/2016   • Obesity 6/11/2012   • Osteoarthritis 6/11/2012     Past Surgical History:   Procedure Laterality Date   • CATARACT EXTRACTION, BILATERAL     • CYSTOSCOPY W/ LASER LITHOTRIPSY Left 1/20/2020    Procedure: CYSTOSCOPY; RETROGRADE; URETEROSCOPY; STONE EXTRACTION; POSSIBLE HOLMIUM LASER LITHOTRIPSY;  Surgeon: Nilay Moctezuma MD;  Location: [facility] MAIN OR;  Service: Urology   • KNEE SURGERY     • CT CYSTO BLADDER W/URETERAL CATHETERIZATION Left 11/8/2019    Procedure: CYSTOSCOPY RETROGRADE PYELOGRAM WITH INSERTION STENT URETERAL;  Surgeon: Vivi Armas MD;  Location: [address] [place] [address] MAIN OR;  Service: Urology   • TOTAL HIP ARTHROPLASTY     • TOTAL HIP ARTHROPLASTY Bilateral 2011      Family History:     Family History   Problem Relation Age of Onset   • Arthritis Mother    • Parkinsonism Mother    • No Known Problems Father       Social History:     Social History     Socioeconomic History   • Marital status: /Civil Union     Spouse name: None   • Number of children: None   • Years of education: None   • Highest education level: None   Occupational History   • Occupation: Retired   Tobacco Use   • Smoking status: Never   • Smokeless tobacco: Never   Vaping Use   • Vaping Use: Never used   Substance and Sexual Activity   • Alcohol use: Never     Comment: socially    • Drug use: Never   • Sexual activity: Yes   Other Topics Concern   • None   Social History Narrative    RETIRED     Social Determinants of Health     Financial Resource Strain: Low Risk    • Difficulty of Paying Living Expenses: Not hard at all   Food Insecurity: Not on file   Transportation Needs: No Transportation Needs   • Lack of Transportation (Medical): No   • Lack of Transportation (Non-Medical):  No   Physical Activity: Not on file   Stress: Not on file   Social Connections: Not on file   Intimate Partner Violence: Not on file   Housing Stability: Not on file      Medications and Allergies:     Current Outpatient Medications   Medication Sig Dispense Refill   • ALPRAZolam (XANAX) 0 5 mg tablet Take 1 tablet (0 5 mg total) by mouth every 8 (eight) hours as needed (AS NEEDED) 90 tablet 0   • amLODIPine (NORVASC) 10 mg tablet Take 1 tablet (10 mg total) by mouth daily 90 tablet 3   • atorvastatin (LIPITOR) 20 mg tablet Take 1 tablet (20 mg total) by mouth daily 90 tablet 2   • Canagliflozin 300 MG TABS Take 1 tablet (300 mg total) by mouth daily 90 tablet 1 • donepezil (ARICEPT) 10 mg tablet take 1 tablet by mouth at bedtime 90 tablet 1   • DULoxetine (CYMBALTA) 30 mg delayed release capsule Take 1 capsule (30 mg total) by mouth daily 90 capsule 1   • metFORMIN (GLUCOPHAGE) 850 mg tablet take 2 tablets by mouth daily 180 tablet 1   • metoprolol succinate (TOPROL-XL) 100 mg 24 hr tablet Take 1 tablet (100 mg total) by mouth daily 90 tablet 1   • rivaroxaban (Xarelto) 15 mg tablet Take 1 tablet (15 mg total) by mouth daily with breakfast 90 tablet 1   • sitaGLIPtin (JANUVIA) 50 mg tablet Take 1 tablet (50 mg total) by mouth daily 90 tablet 3   • Blood Glucose Monitoring Suppl (ONE TOUCH ULTRA 2) w/Device KIT by Does not apply route     • glucose blood test strip 1 Squirt by In Vitro route daily       No current facility-administered medications for this visit  Allergies   Allergen Reactions   • Pollen Extract Itching     Runny nose, itchy eyes      Immunizations:     Immunization History   Administered Date(s) Administered   • COVID-19 Moderna Vac BIVALENT 12 Yr+ IM (BOOSTER ONLY) 0 5 ML 09/15/2022   • COVID-19 PFIZER VACCINE 0 3 ML IM 01/29/2021, 02/19/2021, 10/05/2021   • INFLUENZA 09/19/2020, 10/07/2021, 09/15/2022   • Influenza, high dose seasonal 0 7 mL 11/14/2019, 01/11/2021   • Pneumococcal Conjugate 13-Valent 03/21/2017   • Pneumococcal Polysaccharide PPV23 10/09/2020   • TD (adult) Preservative Free 09/14/2010   • Tuberculin Skin Test-PPD Intradermal 01/14/2015      Health Maintenance:         Topic Date Due   • Hepatitis C Screening  Completed   • Colorectal Cancer Screening  Discontinued     There are no preventive care reminders to display for this patient  Medicare Screening Tests and Risk Assessments:     Khanh Stone is here for his Subsequent Wellness visit  Last Medicare Wellness visit information reviewed, patient interviewed and updates made to the record today  Health Risk Assessment:   Patient rates overall health as good   Patient feels that their physical health rating is same  Patient is satisfied with their life  Eyesight was rated as same  Hearing was rated as same  Patient feels that their emotional and mental health rating is same  Patients states they are never, rarely angry  Patient states they are never, rarely unusually tired/fatigued  Pain experienced in the last 7 days has been none  Patient states that he has experienced no weight loss or gain in last 6 months  Depression Screening:   PHQ-2 Score: 0      Fall Risk Screening: In the past year, patient has experienced: no history of falling in past year      Home Safety:  Patient does not have trouble with stairs inside or outside of their home  Patient has working smoke alarms and has working carbon monoxide detector  Home safety hazards include: none  Nutrition:   Current diet is Regular and Diabetic  Medications:   Patient is currently taking over-the-counter supplements  OTC medications include: see medication list  Patient is not able to manage medications  Activities of Daily Living (ADLs)/Instrumental Activities of Daily Living (IADLs):   Walk and transfer into and out of bed and chair?: Yes  Dress and groom yourself?: Yes    Bathe or shower yourself?: Yes    Feed yourself? Yes  Do your laundry/housekeeping?: Yes  Manage your money, pay your bills and track your expenses?: Yes  Make your own meals?: Yes    Do your own shopping?: Yes    Previous Hospitalizations:   Any hospitalizations or ED visits within the last 12 months?: No      Advance Care Planning:   Living will: Yes    Durable POA for healthcare:  Yes    Advanced directive: Yes    Advanced directive counseling given: Yes    Five wishes given: No    Patient declined ACP directive: No    End of Life Decisions reviewed with patient: Yes    Provider agrees with end of life decisions: Yes      Cognitive Screening:   Provider or family/friend/caregiver concerned regarding cognition?: No    PREVENTIVE SCREENINGS Cardiovascular Screening:    General: Screening Not Indicated and History Lipid Disorder    Due for: Lipid Panel      Diabetes Screening:     General: Screening Not Indicated and History Diabetes    Due for: Blood Glucose      Colorectal Cancer Screening:     General: Screening Current      Prostate Cancer Screening:    General: Screening Not Indicated      Osteoporosis Screening:    General: Screening Not Indicated      Abdominal Aortic Aneurysm (AAA) Screening:        General: Screening Not Indicated      Lung Cancer Screening:     General: Screening Not Indicated      Hepatitis C Screening:    General: Screening Current    Screening, Brief Intervention, and Referral to Treatment (SBIRT)    Screening  Typical number of drinks in a day: 0  Typical number of drinks in a week: 0  Interpretation: Low risk drinking behavior  Single Item Drug Screening:  How often have you used an illegal drug (including marijuana) or a prescription medication for non-medical reasons in the past year? never    Single Item Drug Screen Score: 0  Interpretation: Negative screen for possible drug use disorder    Other Counseling Topics:   Car/seat belt/driving safety, sunscreen and regular weightbearing exercise and calcium and vitamin D intake  No results found  Physical Exam:     /68   Pulse 88   Temp 98 °F (36 7 °C)   Ht 5' 11" (1 803 m)   Wt 101 kg (223 lb)   SpO2 98%   BMI 31 10 kg/m²     Physical Exam   A/P: Doing well and no falls reported  Denies depression and feels safe at home  Diverse diet  No problems operating a MV and uses seat belts  Has a living will and POA  No DME or referrals needed today  RTC one year for medicare wellness       Billie Ferrell DO

## 2023-03-08 DIAGNOSIS — E83.42 HYPOMAGNESEMIA: ICD-10-CM

## 2023-03-08 DIAGNOSIS — E55.9 VITAMIN D DEFICIENCY: Primary | ICD-10-CM

## 2023-03-08 RX ORDER — CALCIUM CARBONATE/VITAMIN D3 500-10/5ML
400 LIQUID (ML) ORAL DAILY
Qty: 90 CAPSULE | Refills: 0 | Status: SHIPPED | OUTPATIENT
Start: 2023-03-08

## 2023-03-08 RX ORDER — ERGOCALCIFEROL 1.25 MG/1
50000 CAPSULE ORAL WEEKLY
Qty: 12 CAPSULE | Refills: 3 | Status: SHIPPED | OUTPATIENT
Start: 2023-03-08

## 2023-03-22 DIAGNOSIS — N18.32 TYPE 2 DIABETES MELLITUS WITH STAGE 3B CHRONIC KIDNEY DISEASE, WITHOUT LONG-TERM CURRENT USE OF INSULIN (HCC): Primary | ICD-10-CM

## 2023-03-22 DIAGNOSIS — E11.22 TYPE 2 DIABETES MELLITUS WITH STAGE 3B CHRONIC KIDNEY DISEASE, WITHOUT LONG-TERM CURRENT USE OF INSULIN (HCC): Primary | ICD-10-CM

## 2023-03-22 RX ORDER — BLOOD-GLUCOSE METER
1 KIT MISCELLANEOUS 2 TIMES DAILY
Qty: 1 EACH | Refills: 0 | Status: SHIPPED | OUTPATIENT
Start: 2023-03-22

## 2023-03-22 RX ORDER — LANCETS 28 GAUGE
EACH MISCELLANEOUS
Qty: 100 EACH | Refills: 3 | Status: SHIPPED | OUTPATIENT
Start: 2023-03-22

## 2023-03-22 RX ORDER — BLOOD-GLUCOSE METER
KIT MISCELLANEOUS
Qty: 100 EACH | Refills: 3 | Status: SHIPPED | OUTPATIENT
Start: 2023-03-22

## 2023-03-29 ENCOUNTER — RA CDI HCC (OUTPATIENT)
Dept: OTHER | Facility: HOSPITAL | Age: 78
End: 2023-03-29

## 2023-03-29 NOTE — PROGRESS NOTES
E11 65, E11 42  [facility]  coding opportunities          Chart Reviewed number of suggestions sent to Provider: 2     Patients Insurance     Medicare Insurance: Medicare

## 2023-04-05 ENCOUNTER — OFFICE VISIT (OUTPATIENT)
Dept: INTERNAL MEDICINE CLINIC | Facility: CLINIC | Age: 78
End: 2023-04-05

## 2023-04-05 VITALS
HEART RATE: 72 BPM | HEIGHT: 71 IN | OXYGEN SATURATION: 97 % | BODY MASS INDEX: 31.8 KG/M2 | TEMPERATURE: 97.2 F | WEIGHT: 227.13 LBS | DIASTOLIC BLOOD PRESSURE: 60 MMHG | SYSTOLIC BLOOD PRESSURE: 124 MMHG

## 2023-04-05 DIAGNOSIS — R63.4 ABNORMAL WEIGHT LOSS: ICD-10-CM

## 2023-04-05 DIAGNOSIS — R25.1 TREMOR: Primary | ICD-10-CM

## 2023-04-05 DIAGNOSIS — N18.32 TYPE 2 DIABETES MELLITUS WITH STAGE 3B CHRONIC KIDNEY DISEASE, WITHOUT LONG-TERM CURRENT USE OF INSULIN (HCC): ICD-10-CM

## 2023-04-05 DIAGNOSIS — E83.52 HYPERCALCEMIA: ICD-10-CM

## 2023-04-05 DIAGNOSIS — R25.2 MUSCLE CRAMPS: ICD-10-CM

## 2023-04-05 DIAGNOSIS — E88.09 SERUM ALBUMIN DECREASED: ICD-10-CM

## 2023-04-05 DIAGNOSIS — E11.22 TYPE 2 DIABETES MELLITUS WITH STAGE 3B CHRONIC KIDNEY DISEASE, WITHOUT LONG-TERM CURRENT USE OF INSULIN (HCC): ICD-10-CM

## 2023-04-05 DIAGNOSIS — E55.9 VITAMIN D DEFICIENCY: ICD-10-CM

## 2023-04-05 DIAGNOSIS — N17.9 ACUTE RENAL FAILURE SUPERIMPOSED ON STAGE 3B CHRONIC KIDNEY DISEASE, UNSPECIFIED ACUTE RENAL FAILURE TYPE (HCC): ICD-10-CM

## 2023-04-05 DIAGNOSIS — E83.42 HYPOMAGNESEMIA: ICD-10-CM

## 2023-04-05 DIAGNOSIS — N18.32 ACUTE RENAL FAILURE SUPERIMPOSED ON STAGE 3B CHRONIC KIDNEY DISEASE, UNSPECIFIED ACUTE RENAL FAILURE TYPE (HCC): ICD-10-CM

## 2023-04-05 PROBLEM — G30.9 ALZHEIMER'S DISEASE, UNSPECIFIED (CODE) (HCC): Status: ACTIVE | Noted: 2023-04-05

## 2023-04-05 NOTE — PROGRESS NOTES
Assessment/Plan:  Problem List Items Addressed This Visit        Endocrine    Type 2 diabetes mellitus with diabetic chronic kidney disease ([facility] )   Other Visit Diagnoses     Tremor    -  Primary    Muscle cramps        Abnormal weight loss        Hypomagnesemia        Relevant Orders    Magnesium    Vitamin D deficiency        Serum albumin decreased        Hypercalcemia        Relevant Orders    Comprehensive metabolic panel    Calcium, ionized    PTH, intact    Acute renal failure superimposed on stage 3b chronic kidney disease, unspecified acute renal failure type ([place] [address] )        Relevant Orders    Comprehensive metabolic panel           Diagnoses and all orders for this visit:    Tremor    Muscle cramps    Abnormal weight loss    Type 2 diabetes mellitus with stage 3b chronic kidney disease, without long-term current use of insulin ([facility])    Hypomagnesemia  -     Magnesium; Future    Vitamin D deficiency    Serum albumin decreased    Hypercalcemia  -     Comprehensive metabolic panel; Future  -     Calcium, ionized; Future  -     PTH, intact; Future    Acute renal failure superimposed on stage 3b chronic kidney disease, unspecified acute renal failure type ([place] [address] )  -     Comprehensive metabolic panel; Future      No problem-specific Assessment & Plan notes found for this encounter  A/P: Stable  Discussed labs  Wt is better after adjusting his diet  Pt with increased sugars and held off on med change due to all that was going on  Will continue current diabetic meds due to new issues  ?muscle cramps due to low mag, vit d, ?statin, elevated renal function or elevated calcium  ?calcium due to CKD and secondary PTH  Increase po fluids, restart statin, and will repeat labs  Will continue with vit d replacement  Will start mag replacement if needed  May need to see nephro if renal function remains elevated  CXR in 11/22 ok  ?tremors due to calcium  No other s/s of PD and ?familial  Might consider meds if becomes an issue  RTC one month for f/u above  Subjective:      Patient ID: Jay Perez is a 68 y o  male  WM RTC with his wife for f/u several issues including tremors of ?etiology, wt loss, and muscle cramps  Labs with low mag, low albumin, elevated renal function, increased calcium, and decreased vit d  Started on vit d and mag replacement  Reports tremors are still present, but controllable  Still with muscle cramps  Holding statin currently  The following portions of the patient's history were reviewed and updated as appropriate:   He has a past medical history of Acute on chronic renal insufficiency, Allergic rhinitis, Anemia (6/11/2012), Atrial fibrillation ([facility] ), Cerebrovascular accident (CVA) ([facility] ), Controlled type 2 diabetes mellitus without complication ([facility] ), Generalized anxiety disorder, GERD without esophagitis (6/11/2012), Hypertension, Memory difficulties (1/8/2019), Nephrolithiasis (3/17/2016), Obesity (6/11/2012), and Osteoarthritis (6/11/2012)  ,  does not have any pertinent problems on file  ,   has a past surgical history that includes Total hip arthroplasty; Knee surgery; Cataract extraction, bilateral; Total hip arthroplasty (Bilateral, 2011); pr cysto bladder w/ureteral catheterization (Left, 11/8/2019); and Cystoscopy w/ laser lithotripsy (Left, 1/20/2020)  ,  family history includes Arthritis in his mother; No Known Problems in his father; Parkinsonism in his mother  ,   reports that he has never smoked  He has never used smokeless tobacco  He reports that he does not drink alcohol and does not use drugs  ,  is allergic to pollen extract     Current Outpatient Medications   Medication Sig Dispense Refill   • ALPRAZolam (XANAX) 0 5 mg tablet Take 1 tablet (0 5 mg total) by mouth every 8 (eight) hours as needed (AS NEEDED) 90 tablet 0   • amLODIPine (NORVASC) 10 mg tablet Take 1 tablet (10 mg total) by mouth daily 90 tablet 3   • atorvastatin (LIPITOR) 20 mg tablet Take 1 tablet (20 mg total) by mouth daily 90 tablet 2   • Canagliflozin 300 MG TABS Take 1 tablet (300 mg total) by mouth daily 90 tablet 1   • donepezil (ARICEPT) 10 mg tablet take 1 tablet by mouth at bedtime 90 tablet 1   • DULoxetine (CYMBALTA) 30 mg delayed release capsule Take 1 capsule (30 mg total) by mouth daily 90 capsule 1   • ergocalciferol (VITAMIN D2) 50,000 units Take 1 capsule (50,000 Units total) by mouth once a week 12 capsule 3   • glucose blood (FREESTYLE LITE) test strip Test twice a day 100 each 3   • Magnesium Oxide 400 MG CAPS Take 1 tablet (400 mg total) by mouth in the morning 90 capsule 0   • metFORMIN (GLUCOPHAGE) 850 mg tablet take 2 tablets by mouth daily 180 tablet 1   • metoprolol succinate (TOPROL-XL) 100 mg 24 hr tablet Take 1 tablet (100 mg total) by mouth daily 90 tablet 1   • rivaroxaban (Xarelto) 15 mg tablet Take 1 tablet (15 mg total) by mouth daily with breakfast 90 tablet 1   • sitaGLIPtin (JANUVIA) 50 mg tablet Take 1 tablet (50 mg total) by mouth daily 90 tablet 3   • glucose monitoring kit (FREESTYLE) monitoring kit Use 1 each 2 (two) times a day Test twice  Day 1 each 0   • Lancets (freestyle) lancets Test twice a day 100 each 3     No current facility-administered medications for this visit  Review of Systems   Constitutional: Negative for activity change, chills, diaphoresis, fatigue and fever  Respiratory: Negative for cough, chest tightness, shortness of breath and wheezing  Cardiovascular: Negative for chest pain, palpitations and leg swelling  Gastrointestinal: Negative for abdominal pain, constipation, diarrhea, nausea and vomiting  Genitourinary: Negative for difficulty urinating, dysuria and frequency  Musculoskeletal: Negative for arthralgias, gait problem and myalgias  Neurological: Positive for tremors  Negative for dizziness, seizures, syncope, weakness, light-headedness and headaches  Psychiatric/Behavioral: Negative for confusion   The patient is not "nervous/anxious  PHQ-2/9 Depression Screening          Objective:  Vitals:    04/05/23 1506   BP: 124/60   Pulse: 72   Temp: (!) 97 2 °F (36 2 °C)   SpO2: 97%   Weight: 103 kg (227 lb 2 oz)   Height: 5' 11\" (1 803 m)     Body mass index is 31 68 kg/m²  Physical Exam  Vitals and nursing note reviewed  Constitutional:       General: He is not in acute distress  Appearance: Normal appearance  He is not ill-appearing  HENT:      Head: Normocephalic and atraumatic  Mouth/Throat:      Mouth: Mucous membranes are moist    Eyes:      Extraocular Movements: Extraocular movements intact  Conjunctiva/sclera: Conjunctivae normal       Pupils: Pupils are equal, round, and reactive to light  Cardiovascular:      Rate and Rhythm: Normal rate  Rhythm irregular  Heart sounds: Normal heart sounds  No murmur heard  Pulmonary:      Effort: Pulmonary effort is normal  No respiratory distress  Breath sounds: Normal breath sounds  No wheezing, rhonchi or rales  Abdominal:      General: Bowel sounds are normal  There is no distension  Palpations: Abdomen is soft  Tenderness: There is no abdominal tenderness  Musculoskeletal:      Right lower leg: Edema present  Left lower leg: Edema present  Neurological:      General: No focal deficit present  Mental Status: He is alert and oriented to person, place, and time  Mental status is at baseline  Cranial Nerves: No cranial nerve deficit  Motor: No weakness  Coordination: Coordination normal       Gait: Gait normal       Deep Tendon Reflexes: Reflexes normal    Psychiatric:         Mood and Affect: Mood normal          Behavior: Behavior normal          Thought Content:  Thought content normal          Judgment: Judgment normal          "

## 2023-04-19 ENCOUNTER — APPOINTMENT (OUTPATIENT)
Dept: LAB | Facility: CLINIC | Age: 78
End: 2023-04-19

## 2023-04-19 DIAGNOSIS — E83.52 HYPERCALCEMIA: ICD-10-CM

## 2023-04-19 DIAGNOSIS — E83.42 HYPOMAGNESEMIA: ICD-10-CM

## 2023-04-19 DIAGNOSIS — N18.32 ACUTE RENAL FAILURE SUPERIMPOSED ON STAGE 3B CHRONIC KIDNEY DISEASE, UNSPECIFIED ACUTE RENAL FAILURE TYPE (HCC): ICD-10-CM

## 2023-04-19 DIAGNOSIS — N17.9 ACUTE RENAL FAILURE SUPERIMPOSED ON STAGE 3B CHRONIC KIDNEY DISEASE, UNSPECIFIED ACUTE RENAL FAILURE TYPE (HCC): ICD-10-CM

## 2023-04-19 LAB
ALBUMIN SERPL BCP-MCNC: 3.3 G/DL (ref 3.5–5)
ALP SERPL-CCNC: 62 U/L (ref 46–116)
ALT SERPL W P-5'-P-CCNC: 18 U/L (ref 12–78)
ANION GAP SERPL CALCULATED.3IONS-SCNC: 5 MMOL/L (ref 4–13)
AST SERPL W P-5'-P-CCNC: 7 U/L (ref 5–45)
BILIRUB SERPL-MCNC: 0.88 MG/DL (ref 0.2–1)
BUN SERPL-MCNC: 21 MG/DL (ref 5–25)
CA-I BLD-SCNC: 1.14 MMOL/L (ref 1.12–1.32)
CALCIUM ALBUM COR SERPL-MCNC: 9.9 MG/DL (ref 8.3–10.1)
CALCIUM SERPL-MCNC: 9.3 MG/DL (ref 8.3–10.1)
CHLORIDE SERPL-SCNC: 106 MMOL/L (ref 96–108)
CO2 SERPL-SCNC: 25 MMOL/L (ref 21–32)
CREAT SERPL-MCNC: 1.61 MG/DL (ref 0.6–1.3)
GFR SERPL CREATININE-BSD FRML MDRD: 40 ML/MIN/1.73SQ M
GLUCOSE P FAST SERPL-MCNC: 219 MG/DL (ref 65–99)
MAGNESIUM SERPL-MCNC: 2.2 MG/DL (ref 1.6–2.6)
POTASSIUM SERPL-SCNC: 4.6 MMOL/L (ref 3.5–5.3)
PROT SERPL-MCNC: 7.4 G/DL (ref 6.4–8.4)
PTH-INTACT SERPL-MCNC: 133.3 PG/ML (ref 18.4–80.1)
SODIUM SERPL-SCNC: 136 MMOL/L (ref 135–147)

## 2023-04-30 DIAGNOSIS — I48.91 NEW ONSET A-FIB (HCC): ICD-10-CM

## 2023-04-30 DIAGNOSIS — E78.2 MIXED HYPERLIPIDEMIA: ICD-10-CM

## 2023-04-30 DIAGNOSIS — I10 HYPERTENSION, UNSPECIFIED TYPE: ICD-10-CM

## 2023-04-30 RX ORDER — METOPROLOL SUCCINATE 100 MG/1
TABLET, EXTENDED RELEASE ORAL
Qty: 90 TABLET | Refills: 1 | Status: SHIPPED | OUTPATIENT
Start: 2023-04-30

## 2023-04-30 RX ORDER — ATORVASTATIN CALCIUM 20 MG/1
TABLET, FILM COATED ORAL
Qty: 90 TABLET | Refills: 2 | Status: SHIPPED | OUTPATIENT
Start: 2023-04-30

## 2023-04-30 RX ORDER — AMLODIPINE BESYLATE 10 MG/1
TABLET ORAL
Qty: 90 TABLET | Refills: 3 | Status: SHIPPED | OUTPATIENT
Start: 2023-04-30

## 2023-05-01 DIAGNOSIS — I48.91 NEW ONSET A-FIB (HCC): ICD-10-CM

## 2023-05-01 RX ORDER — RIVAROXABAN 15 MG/1
TABLET, FILM COATED ORAL
Qty: 90 TABLET | Refills: 1 | Status: SHIPPED | OUTPATIENT
Start: 2023-05-01

## 2023-05-04 ENCOUNTER — RA CDI HCC (OUTPATIENT)
Dept: OTHER | Facility: HOSPITAL | Age: 78
End: 2023-05-04

## 2023-05-04 NOTE — PROGRESS NOTES
E11 65, E11 42  [facility]  coding opportunities          Chart Reviewed number of suggestions sent to Provider: 2     Patients Insurance     Medicare Insurance: Medicare

## 2023-05-23 ENCOUNTER — TELEPHONE (OUTPATIENT)
Dept: INTERNAL MEDICINE CLINIC | Facility: CLINIC | Age: 78
End: 2023-05-23

## 2023-05-23 DIAGNOSIS — E11.22 TYPE 2 DIABETES MELLITUS WITH STAGE 3B CHRONIC KIDNEY DISEASE, WITHOUT LONG-TERM CURRENT USE OF INSULIN (HCC): ICD-10-CM

## 2023-05-23 DIAGNOSIS — N18.32 TYPE 2 DIABETES MELLITUS WITH STAGE 3B CHRONIC KIDNEY DISEASE, WITHOUT LONG-TERM CURRENT USE OF INSULIN (HCC): ICD-10-CM

## 2023-05-23 NOTE — TELEPHONE ENCOUNTER
Patient wife called states they are going on vacation tomorrow and needs a refill on his invokana  states she called a couple days ago for this  Called pharmacy an it was not sent in  She wants to know if he is still on this? If so please refill      Thank you       [facility]  [address]

## 2023-06-04 DIAGNOSIS — E83.42 HYPOMAGNESEMIA: ICD-10-CM

## 2023-06-04 RX ORDER — LANOLIN ALCOHOL/MO/W.PET/CERES
CREAM (GRAM) TOPICAL
Qty: 90 TABLET | Refills: 0 | Status: SHIPPED | OUTPATIENT
Start: 2023-06-04

## 2023-06-08 NOTE — PATIENT INSTRUCTIONS
Basic Carbohydrate Counting   AMBULATORY CARE:   Carbohydrate counting  is a way to plan your meals by counting the amount of carbohydrate in foods  Carbohydrates are the sugars, starches, and fiber found in fruit, grains, vegetables, and milk products  Carbohydrates increase your blood sugar levels  Carbohydrate counting can help you eat the right amount of carbohydrate to keep your blood sugar levels under control  What you need to know about planning meals using carbohydrate counting:  • A dietitian or healthcare provider will help you develop a healthy meal plan that works best for you  You will be taught how much carbohydrate to eat or drink for each meal and snack  Your meal plan will be based on your age, weight, usual food intake, and physical activity level  If you have diabetes, it will also include your blood sugar levels and diabetes medicine  Once you know how much carbohydrate you should eat, you can decide what type of food you want to eat  • You will need to know what foods contain carbohydrate and how much they contain  Keep track of the amount of carbohydrate in meals and snacks in order to follow your meal plan  Do not avoid carbohydrates or skip meals  Your blood sugar may fall too low if you do not eat enough carbohydrate or you skip meals  Foods that contain carbohydrate:   • Breads:  Each serving of food listed below contains about 15 g of carbohydrate   ? 1 slice of bread (1 ounce) or 1 flour or corn tortilla (6 inch)    ? ½ of a hamburger bun or ¼ of a large bagel (about 1 ounce)    ? 1 pancake (about 4 inches across and ¼ inch thick)    • Cereals and grains:  Serving sizes of ready-to-eat cereals vary  Look at the serving size and the total carbohydrate amount listed on the food label  Each serving of food listed below contains about 15 g of carbohydrate   ? ¾ cup of dry, unsweetened, ready-to-eat cereal or ¼ cup of low-fat granola     ?  ½ cup of oatmeal or other cooked cereal     ? ? cup of cooked rice or pasta    • Starchy vegetables and beans:  Each serving of food listed below contains about 15 g of carbohydrate   ? ½ cup of corn, green peas, sweet potatoes, or mashed potatoes    ? ¼ of a large baked potato    ? ½ cup of beans, lentils, and peas (garbanzo, wisdom, kidney, white, split, black-eyed)    • Crackers and snacks:  Each serving of food listed below contains about 15 g of carbohydrate   ? 3 derick cracker squares or 8 animal crackers     ? 6 saltine-type crackers    ? 3 cups of popcorn or ¾ ounce of pretzels, potato chips, or tortilla chips    • Fruit:  Each serving of food listed below contains about 15 g of carbohydrate   ? 1 small (4 ounce) piece of fresh fruit or ¾ to 1 cup of fresh fruit    ? ½ cup of canned or frozen fruit, packed in natural juice    ? ½ cup (4 ounces) of unsweetened fruit juice    ? 2 tablespoons of dried fruit    • Desserts or sugary foods:  Each serving of food listed below contains about 15 g of carbohydrate   ? 2-inch square unfrosted cake or brownie     ? 2 small cookies    ? ½ cup of ice cream, frozen yogurt, or nondairy frozen yogurt    ? ¼ cup of sherbet or sorbet    ? 1 tablespoon of regular syrup, jam, or jelly    ? 2 tablespoons of light syrup    • Milk and yogurt:  Foods from the milk group contain about 12 g of carbohydrate per serving  ? 1 cup of fat-free or low-fat milk    ? 1 cup of soy milk    ? ? cup of fat-free, yogurt sweetened with artificial sweetener    • Non-starchy vegetables:  Each serving contains about 5 g of carbohydrate   Three servings of non-starch vegetables count as 1 carbohydrate serving  ? ½ cup of cooked vegetables or 1 cup of raw vegetables  This includes beets, broccoli, cabbage, cauliflower, cucumber, mushrooms, tomatoes, and zucchini    ?  ½ cup of vegetable juice    How to use carbohydrate counting to plan meals:   • Count carbohydrate amounts using serving sizes:      ? Pasta dinner example: You plan to have pasta, tossed salad, and an 8-ounce glass of milk  Your healthcare provider tells you that you may have 4 carbohydrate servings for dinner  One carbohydrate serving of pasta is ? cup  One cup of pasta will equal 3 carbohydrate servings  An 8-ounce glass of milk will count as 1 carbohydrate serving  These amounts of food would equal 4 carbohydrate servings  One cup of tossed salad does not count toward your carbohydrate servings  • Count carbohydrate amounts using food labels:  Find the total amount of carbohydrate in a packaged food by reading the food label  Food labels tell you the serving size of the food and the total carbohydrate amount in each serving  Find the serving size on the food label and then decide how many servings you will eat  Multiply the number of servings you plan to eat by the carbohydrate amount per serving  ? Granola bar snack example: Your meal plan allows you to have 2 carbohydrate servings (30 grams) of carbohydrate for a snack  You plan to eat 1 package of granola bars, which contains 2 bars  According to the food label, the serving size of food in this package is 1 bar  Each serving (1 bar) contains 25 grams of carbohydrate  The total amount of carbohydrate in this package of granola bars would be 50 g  Based on your meal plan, you should eat only 1 bar  Follow up with your doctor as directed:  Write down your questions so you remember to ask them during your visits  © Copyright [facility] [facility] 2022 Information is for End User's use only and may not be sold, redistributed or otherwise used for commercial purposes  The above information is an  only  It is not intended as medical advice for individual conditions or treatments  Talk to your doctor, nurse or pharmacist before following any medical regimen to see if it is safe and effective for you

## 2023-06-13 ENCOUNTER — OFFICE VISIT (OUTPATIENT)
Dept: INTERNAL MEDICINE CLINIC | Facility: CLINIC | Age: 78
End: 2023-06-13
Payer: MEDICARE

## 2023-06-13 VITALS
BODY MASS INDEX: 32.34 KG/M2 | HEIGHT: 71 IN | WEIGHT: 231 LBS | TEMPERATURE: 97.2 F | OXYGEN SATURATION: 96 % | DIASTOLIC BLOOD PRESSURE: 80 MMHG | HEART RATE: 72 BPM | SYSTOLIC BLOOD PRESSURE: 120 MMHG

## 2023-06-13 DIAGNOSIS — R25.1 TREMORS OF NERVOUS SYSTEM: Primary | ICD-10-CM

## 2023-06-13 DIAGNOSIS — E55.9 VITAMIN D DEFICIENCY: ICD-10-CM

## 2023-06-13 DIAGNOSIS — R79.89 ELEVATED PTHRP LEVEL: ICD-10-CM

## 2023-06-13 DIAGNOSIS — G30.9 ALZHEIMER'S DISEASE, UNSPECIFIED (CODE) (HCC): ICD-10-CM

## 2023-06-13 DIAGNOSIS — N18.32 TYPE 2 DIABETES MELLITUS WITH STAGE 3B CHRONIC KIDNEY DISEASE, WITHOUT LONG-TERM CURRENT USE OF INSULIN (HCC): ICD-10-CM

## 2023-06-13 DIAGNOSIS — R63.4 WEIGHT LOSS: ICD-10-CM

## 2023-06-13 DIAGNOSIS — E11.22 TYPE 2 DIABETES MELLITUS WITH STAGE 3B CHRONIC KIDNEY DISEASE, WITHOUT LONG-TERM CURRENT USE OF INSULIN (HCC): ICD-10-CM

## 2023-06-13 DIAGNOSIS — R25.2 MUSCLE CRAMPS: ICD-10-CM

## 2023-06-13 DIAGNOSIS — F41.1 GAD (GENERALIZED ANXIETY DISORDER): ICD-10-CM

## 2023-06-13 LAB — SL AMB POCT HEMOGLOBIN AIC: 8.4 (ref ?–6.5)

## 2023-06-13 PROCEDURE — 99214 OFFICE O/P EST MOD 30 MIN: CPT | Performed by: INTERNAL MEDICINE

## 2023-06-13 PROCEDURE — 83036 HEMOGLOBIN GLYCOSYLATED A1C: CPT | Performed by: INTERNAL MEDICINE

## 2023-06-13 NOTE — PROGRESS NOTES
Assessment/Plan:  Problem List Items Addressed This Visit        Endocrine    Type 2 diabetes mellitus with diabetic chronic kidney disease ([facility] )    Relevant Orders    POCT hemoglobin A1c (Completed)       Nervous and Auditory    Alzheimer's disease, unspecified (CODE) ([facility] )   Other Visit Diagnoses     Tremors of nervous system    -  Primary    Relevant Orders    PTH, intact    Calcium    Calcium, ionized    Basic metabolic panel    Ambulatory Referral to Orthopedic Surgery    Weight loss        Muscle cramps        Relevant Orders    PTH, intact    Calcium    Calcium, ionized    Basic metabolic panel    Ambulatory Referral to Orthopedic Surgery    ALMA (generalized anxiety disorder)        Vitamin D deficiency        Elevated PTHrP level        Relevant Orders    PTH, intact    Calcium    Calcium, ionized    Basic metabolic panel           Diagnoses and all orders for this visit:    Tremors of nervous system  -     PTH, intact; Future  -     Calcium; Future  -     Calcium, ionized; Future  -     Basic metabolic panel; Future  -     Cancel: Ambulatory referral to Orthopedic Surgery; Future  -     Ambulatory Referral to Orthopedic Surgery; Future    Weight loss    Muscle cramps  -     PTH, intact; Future  -     Calcium; Future  -     Calcium, ionized; Future  -     Basic metabolic panel; Future  -     Cancel: Ambulatory referral to Orthopedic Surgery; Future  -     Ambulatory Referral to Orthopedic Surgery; Future    Type 2 diabetes mellitus with stage 3b chronic kidney disease, without long-term current use of insulin (HCC)  -     POCT hemoglobin A1c    Alzheimer's disease, unspecified (CODE) (HCC)    ALMA (generalized anxiety disorder)    Vitamin D deficiency    Elevated PTHrP level  -     PTH, intact; Future  -     Calcium; Future  -     Calcium, ionized; Future  -     Basic metabolic panel; Future  -     Cancel: Ambulatory referral to Orthopedic Surgery;  Future        No problem-specific Assessment & Plan notes found for this encounter  A/P: Stable  Discussed labs  ?cause of the muscle cramps  Stopped the lipitor and no improvement as well  Started vit d replacement as well  ?? dehydrated  Sugars worse with HgA1c 8 4  Discussed adjusting meds and wife reports pt isn't taking his current meds as directed  ?increase sugars causing dehydration  Some of his muscle s/s may be due to worsening DJD and pt no longer exercising  ?recent PTH elevation playing a role  Will refer to sports med for second opinion and will hydrate  Will repeat his labs  ALMA and tremors are better and will monitor  RTC one month for f/u cramps, labs, and sugars  Restart his statin  Subjective:      Patient ID: Sarita Lagos is a 68 y o  male  WM RTC for f/u several issues  First, muscle cramps of ?cause  Labs showed continued low vit d and elevated PTH with normal calcium  No better  Next, uncontrolled sugars with med change not initiated due to muscle cramps and tremors  Third, ALMA and tremors  Not felt to be Parkinsons  Reports since not working any more, ALMA is better  No new issues  The following portions of the patient's history were reviewed and updated as appropriate:   He has a past medical history of Acute on chronic renal insufficiency, Allergic rhinitis, Anemia (6/11/2012), Atrial fibrillation ([address] ), Cerebrovascular accident (CVA) ([address] ), Controlled type 2 diabetes mellitus without complication ([address] ), Generalized anxiety disorder, GERD without esophagitis (6/11/2012), Hypertension, Memory difficulties (1/8/2019), Nephrolithiasis (3/17/2016), Obesity (6/11/2012), and Osteoarthritis (6/11/2012)  ,  does not have any pertinent problems on file  ,   has a past surgical history that includes Total hip arthroplasty; Knee surgery; Cataract extraction, bilateral; Total hip arthroplasty (Bilateral, 2011); pr cysto bladder w/ureteral catheterization (Left, 11/8/2019); and Cystoscopy w/ laser lithotripsy (Left, 1/20/2020)  ,  family history includes Arthritis in his mother; No Known Problems in his father; Parkinsonism in his mother  ,   reports that he has never smoked  He has never been exposed to tobacco smoke  He has never used smokeless tobacco  He reports that he does not drink alcohol and does not use drugs  ,  is allergic to pollen extract     Current Outpatient Medications   Medication Sig Dispense Refill   • ALPRAZolam (XANAX) 0 5 mg tablet Take 1 tablet (0 5 mg total) by mouth every 8 (eight) hours as needed (AS NEEDED) 90 tablet 0   • amLODIPine (NORVASC) 10 mg tablet take 1 tablet by mouth once daily 90 tablet 3   • atorvastatin (LIPITOR) 20 mg tablet take 1 tablet by mouth once daily 90 tablet 2   • Canagliflozin 300 MG TABS Take 1 tablet (300 mg total) by mouth daily 90 tablet 1   • donepezil (ARICEPT) 10 mg tablet take 1 tablet by mouth at bedtime 90 tablet 1   • DULoxetine (CYMBALTA) 30 mg delayed release capsule Take 1 capsule (30 mg total) by mouth daily 90 capsule 1   • ergocalciferol (VITAMIN D2) 50,000 units Take 1 capsule (50,000 Units total) by mouth once a week 12 capsule 3   • glucose blood (FREESTYLE LITE) test strip Test twice a day 100 each 3   • glucose monitoring kit (FREESTYLE) monitoring kit Use 1 each 2 (two) times a day Test twice  Day 1 each 0   • Lancets (freestyle) lancets Test twice a day 100 each 3   • magnesium Oxide (MAG-OX) 400 mg TABS take 1 tablet by mouth every morning 90 tablet 0   • metFORMIN (GLUCOPHAGE) 850 mg tablet take 2 tablets by mouth daily 180 tablet 1   • metoprolol succinate (TOPROL-XL) 100 mg 24 hr tablet take 1 tablet by mouth daily 90 tablet 1   • rivaroxaban (Xarelto) 15 mg tablet Take 1 tablet (15 mg total) by mouth daily with breakfast 90 tablet 1   • sitaGLIPtin (JANUVIA) 50 mg tablet Take 1 tablet (50 mg total) by mouth daily 90 tablet 3   • Xarelto 15 MG tablet take 1 tablet by mouth once daily with breakfast 90 tablet 1     No current facility-administered medications for this "visit  Review of Systems   Constitutional: Negative for activity change, chills, diaphoresis, fatigue and fever  Respiratory: Negative for cough, chest tightness, shortness of breath and wheezing  Cardiovascular: Negative for chest pain, palpitations and leg swelling  Gastrointestinal: Negative for abdominal pain, constipation, diarrhea, nausea and vomiting  Genitourinary: Negative for difficulty urinating, dysuria and frequency  Musculoskeletal: Positive for myalgias  Negative for arthralgias and gait problem  Neurological: Negative for dizziness, seizures, syncope, weakness, light-headedness and headaches  Psychiatric/Behavioral: Negative for confusion and dysphoric mood  The patient is not nervous/anxious  PHQ-2/9 Depression Screening          Objective:  Vitals:    06/13/23 1358   BP: 120/80   Pulse: 72   Temp: (!) 97 2 °F (36 2 °C)   SpO2: 96%   Weight: 105 kg (231 lb)   Height: 5' 11\" (1 803 m)     Body mass index is 32 22 kg/m²  Physical Exam  Vitals and nursing note reviewed  Constitutional:       General: He is not in acute distress  Appearance: Normal appearance  He is not ill-appearing  HENT:      Head: Normocephalic and atraumatic  Mouth/Throat:      Mouth: Mucous membranes are moist    Eyes:      Extraocular Movements: Extraocular movements intact  Conjunctiva/sclera: Conjunctivae normal       Pupils: Pupils are equal, round, and reactive to light  Cardiovascular:      Rate and Rhythm: Normal rate and regular rhythm  Heart sounds: Normal heart sounds  No murmur heard  Pulmonary:      Effort: Pulmonary effort is normal  No respiratory distress  Breath sounds: Normal breath sounds  No wheezing or rales  Musculoskeletal:         General: No swelling, tenderness or deformity  Normal range of motion  Right lower leg: No edema  Left lower leg: No edema  Neurological:      General: No focal deficit present        Mental Status: He is " alert and oriented to person, place, and time  Mental status is at baseline  Psychiatric:         Mood and Affect: Mood normal          Behavior: Behavior normal          Thought Content:  Thought content normal          Judgment: Judgment normal

## 2023-06-27 DIAGNOSIS — E11.8 TYPE 2 DIABETES MELLITUS WITH COMPLICATION (HCC): ICD-10-CM

## 2023-07-07 DIAGNOSIS — R41.3 MEMORY DIFFICULTIES: ICD-10-CM

## 2023-07-07 RX ORDER — DONEPEZIL HYDROCHLORIDE 10 MG/1
TABLET, FILM COATED ORAL
Qty: 90 TABLET | Refills: 1 | Status: SHIPPED | OUTPATIENT
Start: 2023-07-07

## 2023-07-11 ENCOUNTER — APPOINTMENT (OUTPATIENT)
Dept: LAB | Facility: CLINIC | Age: 78
End: 2023-07-11
Payer: MEDICARE

## 2023-07-11 ENCOUNTER — TELEPHONE (OUTPATIENT)
Dept: INTERNAL MEDICINE CLINIC | Facility: CLINIC | Age: 78
End: 2023-07-11

## 2023-07-11 DIAGNOSIS — R79.89 ELEVATED PTHRP LEVEL: ICD-10-CM

## 2023-07-11 DIAGNOSIS — R25.1 TREMORS OF NERVOUS SYSTEM: ICD-10-CM

## 2023-07-11 DIAGNOSIS — R79.9 ABNORMAL BLOOD FINDINGS: Primary | ICD-10-CM

## 2023-07-11 DIAGNOSIS — R25.2 MUSCLE CRAMPS: ICD-10-CM

## 2023-07-11 LAB
ANION GAP SERPL CALCULATED.3IONS-SCNC: 8 MMOL/L
BUN SERPL-MCNC: 21 MG/DL (ref 5–25)
CA-I BLD-SCNC: 1.2 MMOL/L (ref 1.12–1.32)
CALCIUM SERPL-MCNC: 9.2 MG/DL (ref 8.3–10.1)
CHLORIDE SERPL-SCNC: 110 MMOL/L (ref 96–108)
CO2 SERPL-SCNC: 22 MMOL/L (ref 21–32)
CREAT SERPL-MCNC: 1.9 MG/DL (ref 0.6–1.3)
GFR SERPL CREATININE-BSD FRML MDRD: 33 ML/MIN/1.73SQ M
GLUCOSE P FAST SERPL-MCNC: 177 MG/DL (ref 65–99)
POTASSIUM SERPL-SCNC: 4.2 MMOL/L (ref 3.5–5.3)
PTH-INTACT SERPL-MCNC: 85.2 PG/ML (ref 12–88)
SODIUM SERPL-SCNC: 140 MMOL/L (ref 135–147)

## 2023-07-11 PROCEDURE — 83970 ASSAY OF PARATHORMONE: CPT

## 2023-07-11 PROCEDURE — 82330 ASSAY OF CALCIUM: CPT

## 2023-07-11 PROCEDURE — 80048 BASIC METABOLIC PNL TOTAL CA: CPT

## 2023-07-11 PROCEDURE — 36415 COLL VENOUS BLD VENIPUNCTURE: CPT

## 2023-08-01 DIAGNOSIS — M77.42 METATARSALGIA OF BOTH FEET: ICD-10-CM

## 2023-08-01 DIAGNOSIS — G89.4 CHRONIC PAIN SYNDROME: ICD-10-CM

## 2023-08-01 DIAGNOSIS — M25.561 CHRONIC PAIN OF RIGHT KNEE: ICD-10-CM

## 2023-08-01 DIAGNOSIS — M77.41 METATARSALGIA OF BOTH FEET: ICD-10-CM

## 2023-08-01 DIAGNOSIS — F32.9 REACTIVE DEPRESSION: ICD-10-CM

## 2023-08-01 DIAGNOSIS — E11.42 DIABETIC POLYNEUROPATHY ASSOCIATED WITH TYPE 2 DIABETES MELLITUS (HCC): Chronic | ICD-10-CM

## 2023-08-01 DIAGNOSIS — G89.29 CHRONIC PAIN OF RIGHT KNEE: ICD-10-CM

## 2023-08-01 RX ORDER — DULOXETIN HYDROCHLORIDE 30 MG/1
30 CAPSULE, DELAYED RELEASE ORAL DAILY
Qty: 90 CAPSULE | Refills: 1 | Status: SHIPPED | OUTPATIENT
Start: 2023-08-01

## 2023-08-02 ENCOUNTER — TELEPHONE (OUTPATIENT)
Dept: OBGYN CLINIC | Facility: HOSPITAL | Age: 78
End: 2023-08-02

## 2023-08-02 NOTE — TELEPHONE ENCOUNTER
Caller: Patient's spouse     Doctor: PATT    Reason for call: Referred for tremors. Instructed spouse that ortho does not treat and to [place] back to PCP for guidance.     Call back#: n/a

## 2023-08-07 ENCOUNTER — TELEPHONE (OUTPATIENT)
Dept: INTERNAL MEDICINE CLINIC | Facility: CLINIC | Age: 78
End: 2023-08-07

## 2023-08-07 DIAGNOSIS — I10 HYPERTENSION, UNSPECIFIED TYPE: ICD-10-CM

## 2023-08-07 RX ORDER — METOPROLOL SUCCINATE 100 MG/1
100 TABLET, EXTENDED RELEASE ORAL DAILY
Qty: 90 TABLET | Refills: 1 | Status: SHIPPED | OUTPATIENT
Start: 2023-08-07

## 2023-08-08 ENCOUNTER — APPOINTMENT (OUTPATIENT)
Dept: NON INVASIVE DIAGNOSTICS | Facility: HOSPITAL | Age: 78
End: 2023-08-08
Payer: MEDICARE

## 2023-08-08 ENCOUNTER — HOSPITAL ENCOUNTER (OUTPATIENT)
Facility: HOSPITAL | Age: 78
Setting detail: OBSERVATION
Discharge: HOME | End: 2023-08-09
Attending: EMERGENCY MEDICINE | Admitting: INTERNAL MEDICINE
Payer: MEDICARE

## 2023-08-08 ENCOUNTER — APPOINTMENT (EMERGENCY)
Dept: RADIOLOGY | Facility: HOSPITAL | Age: 78
End: 2023-08-08
Payer: MEDICARE

## 2023-08-08 DIAGNOSIS — R07.9 CHEST PAIN: Primary | ICD-10-CM

## 2023-08-08 DIAGNOSIS — R06.09 DYSPNEA ON EXERTION: ICD-10-CM

## 2023-08-08 DIAGNOSIS — I48.91 NEW ONSET A-FIB (HCC): ICD-10-CM

## 2023-08-08 LAB
2HR DELTA HS TROPONIN: 1 NG/L
4HR DELTA HS TROPONIN: 0 NG/L
ALBUMIN SERPL BCP-MCNC: 3.8 G/DL (ref 3.5–5)
ALP SERPL-CCNC: 53 U/L (ref 34–104)
ALT SERPL W P-5'-P-CCNC: 8 U/L (ref 7–52)
ANION GAP SERPL CALCULATED.3IONS-SCNC: 9 MMOL/L
AORTIC ROOT: 4.6 CM
APICAL FOUR CHAMBER EJECTION FRACTION: 66 %
ASCENDING AORTA: 4.2 CM
AST SERPL W P-5'-P-CCNC: 11 U/L (ref 13–39)
ATRIAL RATE: 258 BPM
ATRIAL RATE: 41 BPM
ATRIAL RATE: 72 BPM
ATRIAL RATE: 77 BPM
AV LVOT MEAN GRADIENT: 1 MMHG
AV LVOT PEAK GRADIENT: 2 MMHG
BASOPHILS # BLD AUTO: 0.04 THOUSANDS/ÂΜL (ref 0–0.1)
BASOPHILS NFR BLD AUTO: 1 % (ref 0–1)
BILIRUB SERPL-MCNC: 0.38 MG/DL (ref 0.2–1)
BNP SERPL-MCNC: 306 PG/ML (ref 0–100)
BUN SERPL-MCNC: 26 MG/DL (ref 5–25)
CALCIUM SERPL-MCNC: 8.8 MG/DL (ref 8.4–10.2)
CARDIAC TROPONIN I PNL SERPL HS: 6 NG/L
CARDIAC TROPONIN I PNL SERPL HS: 6 NG/L
CARDIAC TROPONIN I PNL SERPL HS: 7 NG/L
CHLORIDE SERPL-SCNC: 107 MMOL/L (ref 96–108)
CO2 SERPL-SCNC: 20 MMOL/L (ref 21–32)
CREAT SERPL-MCNC: 1.68 MG/DL (ref 0.6–1.3)
DOP CALC LVOT PEAK VEL VTI: 18.99 CM
DOP CALC LVOT PEAK VEL: 0.74 M/S
EOSINOPHIL # BLD AUTO: 0.17 THOUSAND/ÂΜL (ref 0–0.61)
EOSINOPHIL NFR BLD AUTO: 3 % (ref 0–6)
ERYTHROCYTE [DISTWIDTH] IN BLOOD BY AUTOMATED COUNT: 13.4 % (ref 11.6–15.1)
FRACTIONAL SHORTENING: 24 (ref 28–44)
GFR SERPL CREATININE-BSD FRML MDRD: 38 ML/MIN/1.73SQ M
GLUCOSE SERPL-MCNC: 148 MG/DL (ref 65–140)
GLUCOSE SERPL-MCNC: 184 MG/DL (ref 65–140)
GLUCOSE SERPL-MCNC: 270 MG/DL (ref 65–140)
HCT VFR BLD AUTO: 40.6 % (ref 36.5–49.3)
HGB BLD-MCNC: 13 G/DL (ref 12–17)
IMM GRANULOCYTES # BLD AUTO: 0.01 THOUSAND/UL (ref 0–0.2)
IMM GRANULOCYTES NFR BLD AUTO: 0 % (ref 0–2)
INTERVENTRICULAR SEPTUM IN DIASTOLE (PARASTERNAL SHORT AXIS VIEW): 1 CM
INTERVENTRICULAR SEPTUM: 1 CM (ref 0.6–1.1)
LAAS-AP2: 22.6 CM2
LAAS-AP4: 26.4 CM2
LEFT ATRIUM SIZE: 5.6 CM
LEFT ATRIUM VOLUME (MOD BIPLANE): 81 ML
LEFT INTERNAL DIMENSION IN SYSTOLE: 4.2 CM (ref 2.1–4)
LEFT VENTRICULAR INTERNAL DIMENSION IN DIASTOLE: 5.5 CM (ref 3.5–6)
LEFT VENTRICULAR POSTERIOR WALL IN END DIASTOLE: 1 CM
LEFT VENTRICULAR STROKE VOLUME: 67 ML
LVSV (TEICH): 67 ML
LYMPHOCYTES # BLD AUTO: 1.16 THOUSANDS/ÂΜL (ref 0.6–4.47)
LYMPHOCYTES NFR BLD AUTO: 22 % (ref 14–44)
MCH RBC QN AUTO: 31 PG (ref 26.8–34.3)
MCHC RBC AUTO-ENTMCNC: 32 G/DL (ref 31.4–37.4)
MCV RBC AUTO: 97 FL (ref 82–98)
MONOCYTES # BLD AUTO: 0.48 THOUSAND/ÂΜL (ref 0.17–1.22)
MONOCYTES NFR BLD AUTO: 9 % (ref 4–12)
NEUTROPHILS # BLD AUTO: 3.32 THOUSANDS/ÂΜL (ref 1.85–7.62)
NEUTS SEG NFR BLD AUTO: 65 % (ref 43–75)
NRBC BLD AUTO-RTO: 0 /100 WBCS
PLATELET # BLD AUTO: 204 THOUSANDS/UL (ref 149–390)
PMV BLD AUTO: 10.2 FL (ref 8.9–12.7)
POTASSIUM SERPL-SCNC: 4.6 MMOL/L (ref 3.5–5.3)
PROT SERPL-MCNC: 6.9 G/DL (ref 6.4–8.4)
QRS AXIS: -18 DEGREES
QRS AXIS: 13 DEGREES
QRS AXIS: 14 DEGREES
QRS AXIS: 78 DEGREES
QRSD INTERVAL: 74 MS
QRSD INTERVAL: 74 MS
QRSD INTERVAL: 76 MS
QRSD INTERVAL: 76 MS
QT INTERVAL: 384 MS
QT INTERVAL: 394 MS
QT INTERVAL: 406 MS
QT INTERVAL: 420 MS
QTC INTERVAL: 406 MS
QTC INTERVAL: 415 MS
QTC INTERVAL: 423 MS
QTC INTERVAL: 438 MS
RA PRESSURE ESTIMATED: 10 MMHG
RBC # BLD AUTO: 4.19 MILLION/UL (ref 3.88–5.62)
RIGHT ATRIUM AREA SYSTOLE A4C: 24 CM2
RIGHT VENTRICLE ID DIMENSION: 4.1 CM
RV PSP: 55 MMHG
SL CV LEFT ATRIUM LENGTH A2C: 5.8 CM
SL CV LV EF: 55
SL CV PED ECHO LEFT VENTRICLE DIASTOLIC VOLUME (MOD BIPLANE) 2D: 147 ML
SL CV PED ECHO LEFT VENTRICLE SYSTOLIC VOLUME (MOD BIPLANE) 2D: 80 ML
SODIUM SERPL-SCNC: 136 MMOL/L (ref 135–147)
T WAVE AXIS: -5 DEGREES
T WAVE AXIS: 11 DEGREES
T WAVE AXIS: 33 DEGREES
T WAVE AXIS: 49 DEGREES
TR MAX PG: 45 MMHG
TR PEAK VELOCITY: 3.3 M/S
TRICUSPID ANNULAR PLANE SYSTOLIC EXCURSION: 1.8 CM
TRICUSPID VALVE PEAK REGURGITATION VELOCITY: 3.34 M/S
VENTRICULAR RATE: 59 BPM
VENTRICULAR RATE: 64 BPM
VENTRICULAR RATE: 70 BPM
VENTRICULAR RATE: 73 BPM
WBC # BLD AUTO: 5.18 THOUSAND/UL (ref 4.31–10.16)

## 2023-08-08 PROCEDURE — 82948 REAGENT STRIP/BLOOD GLUCOSE: CPT

## 2023-08-08 PROCEDURE — 93010 ELECTROCARDIOGRAM REPORT: CPT | Performed by: INTERNAL MEDICINE

## 2023-08-08 PROCEDURE — 84484 ASSAY OF TROPONIN QUANT: CPT | Performed by: INTERNAL MEDICINE

## 2023-08-08 PROCEDURE — 93005 ELECTROCARDIOGRAM TRACING: CPT

## 2023-08-08 PROCEDURE — 93306 TTE W/DOPPLER COMPLETE: CPT | Performed by: INTERNAL MEDICINE

## 2023-08-08 PROCEDURE — 99223 1ST HOSP IP/OBS HIGH 75: CPT | Performed by: INTERNAL MEDICINE

## 2023-08-08 PROCEDURE — 93306 TTE W/DOPPLER COMPLETE: CPT

## 2023-08-08 PROCEDURE — 83880 ASSAY OF NATRIURETIC PEPTIDE: CPT

## 2023-08-08 PROCEDURE — 99285 EMERGENCY DEPT VISIT HI MDM: CPT

## 2023-08-08 PROCEDURE — 36415 COLL VENOUS BLD VENIPUNCTURE: CPT

## 2023-08-08 PROCEDURE — 84484 ASSAY OF TROPONIN QUANT: CPT

## 2023-08-08 PROCEDURE — 80053 COMPREHEN METABOLIC PANEL: CPT

## 2023-08-08 PROCEDURE — 71045 X-RAY EXAM CHEST 1 VIEW: CPT

## 2023-08-08 PROCEDURE — 85025 COMPLETE CBC W/AUTO DIFF WBC: CPT

## 2023-08-08 RX ORDER — DONEPEZIL HYDROCHLORIDE 10 MG/1
10 TABLET, FILM COATED ORAL
Status: DISCONTINUED | OUTPATIENT
Start: 2023-08-08 | End: 2023-08-09 | Stop reason: HOSPADM

## 2023-08-08 RX ORDER — AMLODIPINE BESYLATE 10 MG/1
10 TABLET ORAL DAILY
Status: DISCONTINUED | OUTPATIENT
Start: 2023-08-08 | End: 2023-08-09 | Stop reason: HOSPADM

## 2023-08-08 RX ORDER — ATORVASTATIN CALCIUM 20 MG/1
20 TABLET, FILM COATED ORAL DAILY
Status: DISCONTINUED | OUTPATIENT
Start: 2023-08-08 | End: 2023-08-09 | Stop reason: HOSPADM

## 2023-08-08 RX ORDER — SODIUM CHLORIDE 9 MG/ML
3 INJECTION INTRAVENOUS
Status: DISCONTINUED | OUTPATIENT
Start: 2023-08-08 | End: 2023-08-09 | Stop reason: HOSPADM

## 2023-08-08 RX ORDER — GABAPENTIN 300 MG/1
300 CAPSULE ORAL 3 TIMES DAILY
COMMUNITY

## 2023-08-08 RX ORDER — INSULIN LISPRO 100 [IU]/ML
1-6 INJECTION, SOLUTION INTRAVENOUS; SUBCUTANEOUS
Status: DISCONTINUED | OUTPATIENT
Start: 2023-08-08 | End: 2023-08-09 | Stop reason: HOSPADM

## 2023-08-08 RX ORDER — ALPRAZOLAM 0.5 MG/1
0.5 TABLET ORAL EVERY 8 HOURS PRN
Status: DISCONTINUED | OUTPATIENT
Start: 2023-08-08 | End: 2023-08-09 | Stop reason: HOSPADM

## 2023-08-08 RX ORDER — ONDANSETRON 2 MG/ML
4 INJECTION INTRAMUSCULAR; INTRAVENOUS EVERY 6 HOURS PRN
Status: DISCONTINUED | OUTPATIENT
Start: 2023-08-08 | End: 2023-08-09 | Stop reason: HOSPADM

## 2023-08-08 RX ORDER — ACETAMINOPHEN 325 MG/1
650 TABLET ORAL EVERY 6 HOURS PRN
Status: DISCONTINUED | OUTPATIENT
Start: 2023-08-08 | End: 2023-08-09 | Stop reason: HOSPADM

## 2023-08-08 RX ORDER — DULOXETIN HYDROCHLORIDE 30 MG/1
30 CAPSULE, DELAYED RELEASE ORAL DAILY
Status: DISCONTINUED | OUTPATIENT
Start: 2023-08-08 | End: 2023-08-09 | Stop reason: HOSPADM

## 2023-08-08 RX ORDER — METOPROLOL SUCCINATE 50 MG/1
100 TABLET, EXTENDED RELEASE ORAL DAILY
Status: DISCONTINUED | OUTPATIENT
Start: 2023-08-08 | End: 2023-08-09 | Stop reason: HOSPADM

## 2023-08-08 RX ADMIN — INSULIN LISPRO 4 UNITS: 100 INJECTION, SOLUTION INTRAVENOUS; SUBCUTANEOUS at 21:26

## 2023-08-08 RX ADMIN — DONEPEZIL HYDROCHLORIDE 10 MG: 10 TABLET ORAL at 21:26

## 2023-08-08 RX ADMIN — ATORVASTATIN CALCIUM 20 MG: 20 TABLET, FILM COATED ORAL at 21:26

## 2023-08-08 NOTE — ED PROVIDER NOTES
History  Chief Complaint   Patient presents with   • Chest Pain     Described as a tightness worsening over the last few days   • Shortness of Breath     Worse with exertion   • Medical Problem     Cramping of bilateral legs and shoulders     HPI     Patient is a 69 y/o M with PMH DM, a fib on xarelto, HTN presenting with chest pain. Pt describes progressively worsening hx of SMALLS and substernal chest pain, now short of breath with minimal activity over the past 3 days. Denies pleuritic chest pain, cough, fever, hemoptysis, radiating pain, new neurologic symptoms. Never had these symptoms before. Chest pain resolves with rest. No positional component. States he has associated cramps in his feet and neck. Prior to Admission Medications   Prescriptions Last Dose Informant Patient Reported? Taking?    ALPRAZolam (XANAX) 0.5 mg tablet   No No   Sig: Take 1 tablet (0.5 mg total) by mouth every 8 (eight) hours as needed (AS NEEDED)   Canagliflozin 300 MG TABS   No No   Sig: Take 1 tablet (300 mg total) by mouth daily   DULoxetine (CYMBALTA) 30 mg delayed release capsule   No No   Sig: take 1 capsule by mouth once daily   Lancets (freestyle) lancets   No No   Sig: Test twice a day   Xarelto 15 MG tablet   No No   Sig: take 1 tablet by mouth once daily with breakfast   amLODIPine (NORVASC) 10 mg tablet   No No   Sig: take 1 tablet by mouth once daily   atorvastatin (LIPITOR) 20 mg tablet   No No   Sig: take 1 tablet by mouth once daily   donepezil (ARICEPT) 10 mg tablet   No No   Sig: take 1 tablet by mouth at bedtime   ergocalciferol (VITAMIN D2) 50,000 units   No No   Sig: Take 1 capsule (50,000 Units total) by mouth once a week   glucose blood (FREESTYLE LITE) test strip   No No   Sig: Test twice a day   glucose monitoring kit (FREESTYLE) monitoring kit   No No   Sig: Use 1 each 2 (two) times a day Test twice  Day   magnesium Oxide (MAG-OX) 400 mg TABS   No No   Sig: take 1 tablet by mouth every morning   metFORMIN (GLUCOPHAGE) 850 mg tablet   No No   Sig: swallow 2 tablets once daily   metoprolol succinate (TOPROL-XL) 100 mg 24 hr tablet   No No   Sig: Take 1 tablet (100 mg total) by mouth daily   rivaroxaban (Xarelto) 15 mg tablet   No No   Sig: Take 1 tablet (15 mg total) by mouth daily with breakfast   sitaGLIPtin (JANUVIA) 50 mg tablet   No No   Sig: Take 1 tablet (50 mg total) by mouth daily      Facility-Administered Medications: None       Past Medical History:   Diagnosis Date   • Acute on chronic renal insufficiency    • Allergic rhinitis    • Anemia 6/11/2012   • Atrial fibrillation (HCC)    • Cerebrovascular accident (CVA) ([address])    • Controlled type 2 diabetes mellitus without complication ([address])    • Generalized anxiety disorder    • GERD without esophagitis 6/11/2012   • Hypertension    • Memory difficulties 1/8/2019   • Nephrolithiasis 3/17/2016   • Obesity 6/11/2012   • Osteoarthritis 6/11/2012       Past Surgical History:   Procedure Laterality Date   • CATARACT EXTRACTION, BILATERAL     • CYSTOSCOPY W/ LASER LITHOTRIPSY Left 1/20/2020    Procedure: CYSTOSCOPY; RETROGRADE; URETEROSCOPY; STONE EXTRACTION; POSSIBLE HOLMIUM LASER LITHOTRIPSY;  Surgeon: Carol Nichols MD;  Location: [facility] MAIN OR;  Service: Urology   • KNEE SURGERY     • TN CYSTO BLADDER W/URETERAL CATHETERIZATION Left 11/8/2019    Procedure: CYSTOSCOPY RETROGRADE PYELOGRAM WITH INSERTION STENT URETERAL;  Surgeon: Carol iNchols MD;  Location: [facility] MAIN OR;  Service: Urology   • TOTAL HIP ARTHROPLASTY     • TOTAL HIP ARTHROPLASTY Bilateral 2011       Family History   Problem Relation Age of Onset   • Arthritis Mother    • Parkinsonism Mother    • No Known Problems Father      I have reviewed and agree with the history as documented.     E-Cigarette/Vaping   • E-Cigarette Use Never User      E-Cigarette/Vaping Substances   • Nicotine No    • THC No    • CBD No    • Flavoring No    • Other No    • Unknown No      Social History     Tobacco Use   • Smoking status: Never     Passive exposure: Never   • Smokeless tobacco: Never   Vaping Use   • Vaping Use: Never used   Substance Use Topics   • Alcohol use: Never     Comment: socially    • Drug use: Never        Review of Systems   Constitutional: Negative for chills and fever. HENT: Negative for ear pain and sore throat. Eyes: Negative for pain and visual disturbance. Respiratory: Positive for shortness of breath. Negative for cough. Cardiovascular: Positive for chest pain. Negative for palpitations. Gastrointestinal: Negative for abdominal pain and vomiting. Genitourinary: Negative for dysuria and hematuria. Musculoskeletal: Negative for arthralgias and back pain. Skin: Negative for color change and rash. Neurological: Negative for seizures and syncope. All other systems reviewed and are negative. Physical Exam  ED Triage Vitals   Temperature Pulse Respirations Blood Pressure SpO2   08/08/23 1026 08/08/23 1026 08/08/23 1026 08/08/23 1030 08/08/23 1026   98.5 °F (36.9 °C) 75 16 132/78 98 %      Temp src Heart Rate Source Patient Position - Orthostatic VS BP Location FiO2 (%)   -- 08/08/23 1026 08/08/23 1030 08/08/23 1030 --    Monitor Lying Right arm       Pain Score       08/08/23 1023       6             Orthostatic Vital Signs  Vitals:    08/08/23 1030 08/08/23 1100 08/08/23 1130 08/08/23 1200   BP: 132/78 120/56 123/65 119/65   Pulse: 78 72 65 60   Patient Position - Orthostatic VS: Lying Lying  Lying       Physical Exam  Vitals and nursing note reviewed. Constitutional:       General: He is not in acute distress. Appearance: He is well-developed. He is not toxic-appearing. HENT:      Head: Normocephalic and atraumatic. Right Ear: External ear normal.      Left Ear: External ear normal.      Nose: Nose normal.      Mouth/Throat:      Pharynx: Oropharynx is clear. No oropharyngeal exudate or posterior oropharyngeal erythema.    Eyes:      Extraocular Movements: Extraocular movements intact. Conjunctiva/sclera: Conjunctivae normal.      Pupils: Pupils are equal, round, and reactive to light. Cardiovascular:      Rate and Rhythm: Normal rate. Rhythm irregular. Pulses: Normal pulses. Heart sounds: Normal heart sounds. No murmur heard. No friction rub. No gallop. Pulmonary:      Effort: Tachypnea present. No respiratory distress. Breath sounds: Normal breath sounds. No wheezing, rhonchi or rales. Abdominal:      General: Abdomen is flat. Palpations: Abdomen is soft. Tenderness: There is no abdominal tenderness. There is no guarding or rebound. Musculoskeletal:         General: Normal range of motion. Cervical back: Normal range of motion. No rigidity. Right lower leg: No edema. Left lower leg: Edema present. Comments: Pt states chronic LLE edema unchanged   Skin:     General: Skin is warm and dry. Capillary Refill: Capillary refill takes less than 2 seconds. Neurological:      General: No focal deficit present. Mental Status: He is alert. Cranial Nerves: No cranial nerve deficit.    Psychiatric:         Mood and Affect: Mood normal.         ED Medications  Medications   sodium chloride (PF) 0.9 % injection 3 mL (has no administration in time range)   ALPRAZolam (XANAX) tablet 0.5 mg (has no administration in time range)   amLODIPine (NORVASC) tablet 10 mg (has no administration in time range)   atorvastatin (LIPITOR) tablet 20 mg (has no administration in time range)   donepezil (ARICEPT) tablet 10 mg (has no administration in time range)   DULoxetine (CYMBALTA) delayed release capsule 30 mg (has no administration in time range)   metoprolol succinate (TOPROL-XL) 24 hr tablet 100 mg (has no administration in time range)   rivaroxaban (XARELTO) tablet 15 mg (has no administration in time range)   insulin lispro (HumaLOG) 100 units/mL subcutaneous injection 1-6 Units (has no administration in time range)   insulin lispro (HumaLOG) 100 units/mL subcutaneous injection 1-6 Units (has no administration in time range)   acetaminophen (TYLENOL) tablet 650 mg (has no administration in time range)   ondansetron (ZOFRAN) injection 4 mg (has no administration in time range)       Diagnostic Studies  Results Reviewed     Procedure Component Value Units Date/Time    HS Troponin I 2hr [[id number]] Collected: 08/08/23 1254    Lab Status:  In process Specimen: Blood from Arm, Left Updated: 08/08/23 1259    HS Troponin I 4hr [[id number]]     Lab Status: No result Specimen: Blood     B-Type Natriuretic Peptide(BNP) [[id number]]  (Abnormal) Collected: 08/08/23 1055    Lab Status: Final result Specimen: Blood from Arm, Left Updated: 08/08/23 1148      pg/mL     HS Troponin 0hr (reflex protocol) [[id number]]  (Normal) Collected: 08/08/23 1055    Lab Status: Final result Specimen: Blood from Arm, Left Updated: 08/08/23 1141     hs TnI 0hr 6 ng/L     Comprehensive metabolic panel [[id number]]  (Abnormal) Collected: 08/08/23 1055    Lab Status: Final result Specimen: Blood from Arm, Left Updated: 08/08/23 1138     Sodium 136 mmol/L      Potassium 4.6 mmol/L      Chloride 107 mmol/L      CO2 20 mmol/L      ANION GAP 9 mmol/L      BUN 26 mg/dL      Creatinine 1.68 mg/dL      Glucose 184 mg/dL      Calcium 8.8 mg/dL      AST 11 U/L      ALT 8 U/L      Alkaline Phosphatase 53 U/L      Total Protein 6.9 g/dL      Albumin 3.8 g/dL      Total Bilirubin 0.38 mg/dL      eGFR 38 ml/min/1.73sq m     Narrative:      [place] guidelines for Chronic Kidney Disease (CKD):   •  Stage 1 with normal or high GFR (GFR > 90 mL/min/1.73 square meters)  •  Stage 2 Mild CKD (GFR = 60-89 mL/min/1.73 square meters)  •  Stage 3A Moderate CKD (GFR = 45-59 mL/min/1.73 square meters)  •  Stage 3B Moderate CKD (GFR = 30-44 mL/min/1.73 square meters)  •  Stage 4 Severe CKD (GFR = 15-29 mL/min/1.73 square meters)  •  Stage 5 End Stage CKD (GFR <15 mL/min/1.73 square meters)  Note: GFR calculation is accurate only with a steady state creatinine    CBC and differential [[id number]] Collected: 08/08/23 1055    Lab Status: Final result Specimen: Blood from Arm, Left Updated: 08/08/23 1107     WBC 5.18 Thousand/uL      RBC 4.19 Million/uL      Hemoglobin 13.0 g/dL      Hematocrit 40.6 %      MCV 97 fL      MCH 31.0 pg      MCHC 32.0 g/dL      RDW 13.4 %      MPV 10.2 fL      Platelets 567 Thousands/uL      nRBC 0 /100 WBCs      Neutrophils Relative 65 %      Immat GRANS % 0 %      Lymphocytes Relative 22 %      Monocytes Relative 9 %      Eosinophils Relative 3 %      Basophils Relative 1 %      Neutrophils Absolute 3.32 Thousands/µL      Immature Grans Absolute 0.01 Thousand/uL      Lymphocytes Absolute 1.16 Thousands/µL      Monocytes Absolute 0.48 Thousand/µL      Eosinophils Absolute 0.17 Thousand/µL      Basophils Absolute 0.04 Thousands/µL                  X-ray chest 1 view portable   ED Interpretation by Carlotta Valencia MD (08/08 1213)   No acute cardiopulmonary disease per my interpretation       Final Result by Veena Urias MD (08/08 1256)      No acute disease in the chest.                  Workstation performed: [id number]               Procedures  POC Cardiac US    Date/Time: 8/8/2023 10:39 AM    Performed by: Carlotta Valencia MD  Authorized by: Carlotta Valencia MD    Patient location:  ED  Procedure details:     Exam Type:  Diagnostic    Indications: chest pain      Assessment / Evaluation for: pericardial effusion      Exam Type: initial exam      Image quality: limited diagnostic      Image availability:  Images available in PACS  Patient Details:     Cardiac Rhythm:  Irregular  Cardiac findings:     Echo technique: limited 2D      Views obtained: parasternal long axis, parasternal short axis, subcostal and apical      Pericardial effusion: trace      Tamponade physiology: absent            ED Course  ED Course as of 08/08/23 1310   Tue Aug 08, 2023   1152 hs TnI 0hr: 6   1202 ECG 12 lead  Procedure Note: EKG  Date/Time: 08/08/23 12:02 PM   Interpreted by: Nata Alonzo MD  Indications / Diagnosis: CP  ECG reviewed by me, the ED Provider: yes   The EKG demonstrates:  Rhythm: rate controlled a fib  Intervals: normal intervals  Axis: normal axis  QRS/Blocks: normal QRS  ST Changes: No acute ST Changes, no STD/SABINA. HEART Risk Score    Flowsheet Row Most Recent Value   Heart Score Risk Calculator    History 2 Filed at: 08/08/2023 1303   ECG 0 Filed at: 08/08/2023 1303   Age 2 Filed at: 08/08/2023 1303   Risk Factors 2 Filed at: 08/08/2023 1303   Troponin 0 Filed at: 08/08/2023 1303   HEART Score 6 Filed at: 08/08/2023 1303                      SBIRT 20yo+    Flowsheet Row Most Recent Value   Initial Alcohol Screen: US AUDIT-C     1. How often do you have a drink containing alcohol? 0 Filed at: 08/08/2023 1026   2. How many drinks containing alcohol do you have on a typical day you are drinking? 0 Filed at: 08/08/2023 1026   3a. Male UNDER 65: How often do you have five or more drinks on one occasion? 0 Filed at: 08/08/2023 1026   3b. FEMALE Any Age, or MALE 65+: How often do you have 4 or more drinks on one occassion? 0 Filed at: 08/08/2023 1026   Audit-C Score 0 Filed at: 08/08/2023 1026   ISABELLA: How many times in the past year have you. .. Used an illegal drug or used a prescription medication for non-medical reasons?  Never Filed at: 08/08/2023 1026        RYANN Risk Score    Flowsheet Row Most Recent Value   Age >= 72 1 Filed at: 08/08/2023 1233   Known CAD (stenosis >= 50%) 0 Filed at: 08/08/2023 1233   Recent (<=24 hrs) Service Angina 0 Filed at: 08/08/2023 1233   ST Deviation >= 0.5 mm 0 Filed at: 08/08/2023 1233   3+ CAD Risk Factors (FHx, HTN, HLP, DM, Smoker) 1 Filed at: 08/08/2023 1233   Aspirin Use Past 7 Days 0 Filed at: 08/08/2023 1233   Elevated Cardiac Markers 0 Filed at: 08/08/2023 1233   RYANN Risk Score (Calculated) 2 Filed at: 08/08/2023 1233              Medical Decision Making  67 y/o M presenting with chest pain and SOB. VSS. Pt tachypnic but in NAD. Exam nonfocal. Concerning history for developing unstable angina. EKG nonischemic. Initial troponin not elevated. Baseline CKD on labs. HEART score of 6. D/w patient admission for cardiac w/u and pt and wife agreeable. Amount and/or Complexity of Data Reviewed  Labs: ordered. Decision-making details documented in ED Course. Radiology: ordered and independent interpretation performed. ECG/medicine tests:  Decision-making details documented in ED Course. Risk  Decision regarding hospitalization. Disposition  Final diagnoses:   Chest pain   Dyspnea on exertion     Time reflects when diagnosis was documented in both MDM as applicable and the Disposition within this note     Time User Action Codes Description Comment    8/8/2023 12:30 PM Nessa Lezama Add [R07.9] Chest pain     8/8/2023 12:30 PM Nessa Lezama Add [R06.09] Dyspnea on exertion       ED Disposition     ED Disposition   Admit    Condition   Stable    Date/Time   Tue Aug 8, 2023 12:30 PM    Comment   Case was discussed with Dr. Don Glover and the patient's admission status was agreed to be Admission Status: observation status to the service of Dr. Suzette De La Torre . Follow-up Information    None         Patient's Medications   Discharge Prescriptions    No medications on file     No discharge procedures on file. PDMP Review       Value Time User    PDMP Reviewed  Yes 12/7/2022  1:39 PM Refugio Guajardo DO           ED Provider  Attending physically available and evaluated Margo Prado III. I managed the patient along with the ED Attending.     Electronically Signed by         Nessa Lezama MD  08/08/23 9821

## 2023-08-08 NOTE — ASSESSMENT & PLAN NOTE
Lab Results   Component Value Date    HGBA1C 8.4 (A) 06/13/2023       No results for input(s): "POCGLU" in the last 72 hours.     Blood Sugar Average: Last 72 hrs:     · Sliding scale insulin while inpatient with Accu-Cheks  · Renal function is stable  · Trend BMP

## 2023-08-08 NOTE — PLAN OF CARE
Problem: PAIN - ADULT  Goal: Verbalizes/displays adequate comfort level or baseline comfort level  Description: Interventions:  - Encourage patient to monitor pain and request assistance  - Assess pain using appropriate pain scale  - Administer analgesics based on type and severity of pain and evaluate response  - Implement non-pharmacological measures as appropriate and evaluate response  - Consider cultural and social influences on pain and pain management  - Notify physician/advanced practitioner if interventions unsuccessful or patient reports new pain  Outcome: Progressing     Problem: DISCHARGE PLANNING  Goal: Discharge to home or other facility with appropriate resources  Description: INTERVENTIONS:  - Identify barriers to discharge w/patient and caregiver  - Arrange for needed discharge resources and transportation as appropriate  - Identify discharge learning needs (meds, wound care, etc.)  - Arrange for interpretive services to assist at discharge as needed  - Refer to Case Management Department for coordinating discharge planning if the patient needs post-hospital services based on physician/advanced practitioner order or complex needs related to functional status, cognitive ability, or social support system  Outcome: Progressing     Problem: Knowledge Deficit  Goal: Patient/family/caregiver demonstrates understanding of disease process, treatment plan, medications, and discharge instructions  Description: Complete learning assessment and assess knowledge base.   Interventions:  - Provide teaching at level of understanding  - Provide teaching via preferred learning methods  Outcome: Progressing     Problem: CARDIOVASCULAR - ADULT  Goal: Maintains optimal cardiac output and hemodynamic stability  Description: INTERVENTIONS:  - Monitor I/O, vital signs and rhythm  - Monitor for S/S and trends of decreased cardiac output  - Administer and titrate ordered vasoactive medications to optimize hemodynamic stability  - Assess quality of pulses, skin color and temperature  - Assess for signs of decreased coronary artery perfusion  - Instruct patient to report change in severity of symptoms  Outcome: Progressing  Goal: Absence of cardiac dysrhythmias or at baseline rhythm  Description: INTERVENTIONS:  - Continuous cardiac monitoring, vital signs, obtain 12 lead EKG if ordered  - Administer antiarrhythmic and heart rate control medications as ordered  - Monitor electrolytes and administer replacement therapy as ordered  Outcome: Progressing

## 2023-08-08 NOTE — ASSESSMENT & PLAN NOTE
· Patient presents with progressively worsening dyspnea on exertion and substernal chest pain  · No prior history of coronary artery disease  · RYANN score of 2  · Hemodynamically stable and saturating well on room air  · EKG with no signs of active ischemia  · Troponin I 6  · Assigned to observation  · Trend troponin I  · Obtain 2D echocardiogram  · Monitor for anginal symptoms  · Continue home statin therapy

## 2023-08-08 NOTE — H&P
[address]  H&P  Name: Arina Escobedo III 68 y.o. male I MRN: [id number]  Unit/Bed#: ED 05 I Date of Admission: 8/8/2023   Date of Service: 8/8/2023 I Hospital Day: 0      Assessment/Plan   * Chest pain  Assessment & Plan  · Patient presents with progressively worsening dyspnea on exertion and substernal chest pain  · No prior history of coronary artery disease  · RYANN score of 2  · Hemodynamically stable and saturating well on room air  · EKG with no signs of active ischemia  · Troponin I 6  · Assigned to observation  · Trend troponin I  · Obtain 2D echocardiogram  · Monitor for anginal symptoms  · Continue home statin therapy    Atrial fibrillation (HCC)  Assessment & Plan  · Rate controlled on beta-blocker  · Continue Xarelto for CVA prophylaxis    Hypertension  Assessment & Plan  · Blood pressure stable  · Continue home amlodipine 10 mg daily  · Monitor blood pressures    Type 2 diabetes mellitus with diabetic chronic kidney disease ([address])  Assessment & Plan  Lab Results   Component Value Date    HGBA1C 8.4 (A) 06/13/2023       No results for input(s): "POCGLU" in the last 72 hours. Blood Sugar Average: Last 72 hrs:     · Sliding scale insulin while inpatient with Accu-Cheks  · Renal function is stable  · Trend BMP         VTE Prophylaxis: Xarelto  Code Status: Level 1 full code    Anticipated Length of Stay:  Patient will be admitted on an Observation basis with an anticipated length of stay of less than 2 midnights.    Justification for Hospital Stay: Chest pain    Total Time for Visit, including Counseling / Coordination of Care: I have spent a total time of 45 minutes on 08/08/23 in caring for this patient including Diagnostic results, Prognosis, Risks and benefits of tx options, Instructions for management, Patient and family education, Importance of tx compliance, Risk factor reductions, Impressions, Counseling / Coordination of care, Documenting in the medical record, Reviewing / ordering tests, medicine, procedures  , Obtaining or reviewing history   and Communicating with other healthcare professionals . Chief Complaint:   Chest pain    History of Present Illness:    Wesley Helms is a 68 y.o. male with a past medical history significant for atrial fibrillation on Xarelto, hypertension, type 2 diabetes mellitus, CKD who presents with progressively worsening dyspnea on exertion and shortness of breath. In the ED, all vital signs were found to be stable. Saturating well on room air. Laboratory analysis grossly unremarkable. RYANN score of 2. The patient was assigned observation for chest pain rule out and a high risk individual and to obtain 2D echocardiogram.    Review of Systems:    Review of Systems   Constitutional: Negative for chills and fever. HENT: Negative for ear pain and sore throat. Eyes: Negative for pain and visual disturbance. Respiratory: Positive for shortness of breath. Negative for cough. Cardiovascular: Positive for chest pain. Negative for palpitations. Gastrointestinal: Negative for abdominal pain and vomiting. Genitourinary: Negative for dysuria and hematuria. Musculoskeletal: Negative for arthralgias and back pain. Skin: Negative for color change and rash. Neurological: Negative for seizures and syncope. All other systems reviewed and are negative.       Past Medical and Surgical History:     Past Medical History:   Diagnosis Date   • Acute on chronic renal insufficiency    • Allergic rhinitis    • Anemia 6/11/2012   • Atrial fibrillation ([address])    • Cerebrovascular accident (CVA) ([address])    • Controlled type 2 diabetes mellitus without complication ([address])    • Generalized anxiety disorder    • GERD without esophagitis 6/11/2012   • Hypertension    • Memory difficulties 1/8/2019   • Nephrolithiasis 3/17/2016   • Obesity 6/11/2012   • Osteoarthritis 6/11/2012       Past Surgical History:   Procedure Laterality Date   • CATARACT EXTRACTION, BILATERAL     • CYSTOSCOPY W/ LASER LITHOTRIPSY Left 1/20/2020    Procedure: CYSTOSCOPY; RETROGRADE; URETEROSCOPY; STONE EXTRACTION; POSSIBLE HOLMIUM LASER LITHOTRIPSY;  Surgeon: Yolanda Becker MD;  Location: [facility] MAIN OR;  Service: Urology   • KNEE SURGERY     • IL CYSTO BLADDER W/URETERAL CATHETERIZATION Left 11/8/2019    Procedure: CYSTOSCOPY RETROGRADE PYELOGRAM WITH INSERTION STENT URETERAL;  Surgeon: Yolanda Becker MD;  Location: [facility] MAIN OR;  Service: Urology   • TOTAL HIP ARTHROPLASTY     • TOTAL HIP ARTHROPLASTY Bilateral 2011       Meds/Allergies:    Prior to Admission medications    Medication Sig Start Date End Date Taking?  Authorizing Provider   ALPRAZolam Irving Reach) 0.5 mg tablet Take 1 tablet (0.5 mg total) by mouth every 8 (eight) hours as needed (AS NEEDED) 10/27/22   Alejandrina Tinsley,    amLODIPine (NORVASC) 10 mg tablet take 1 tablet by mouth once daily 4/30/23   Alejandrina Tinsley, DO   atorvastatin (LIPITOR) 20 mg tablet take 1 tablet by mouth once daily 4/30/23   Alejandrina Tinsley, DO   Canagliflozin 300 MG TABS Take 1 tablet (300 mg total) by mouth daily 5/23/23   Alejandrina Tinsley, DO   donepezil (ARICEPT) 10 mg tablet take 1 tablet by mouth at bedtime 7/7/23   Alejandrina Tinsley,    DULoxetine (CYMBALTA) 30 mg delayed release capsule take 1 capsule by mouth once daily 8/1/23   Alejandrina Tinsley,    ergocalciferol (VITAMIN D2) 50,000 units Take 1 capsule (50,000 Units total) by mouth once a week 3/8/23   Alejandrina Tinsley DO   glucose blood (FREESTYLE LITE) test strip Test twice a day 3/22/23   Alejandrina Tinsley DO   glucose monitoring kit (FREESTYLE) monitoring kit Use 1 each 2 (two) times a day Test twice  Day 3/22/23   Alejandrina Tinsley DO   Lancets (freestyle) lancets Test twice a day 3/22/23   Alejandrina Tinsley DO   magnesium Oxide (MAG-OX) 400 mg TABS take 1 tablet by mouth every morning 6/4/23   Alejandrina Tinsley DO   metFORMIN (GLUCOPHAGE) 850 mg tablet swallow 2 tablets once daily 6/27/23   Alejandrina Tinsley DO metoprolol succinate (TOPROL-XL) 100 mg 24 hr tablet Take 1 tablet (100 mg total) by mouth daily 8/7/23   Lise Tang, DO   rivaroxaban (Xarelto) 15 mg tablet Take 1 tablet (15 mg total) by mouth daily with breakfast 5/1/23   Lise Kerrer, DO   sitaGLIPtin (JANUVIA) 50 mg tablet Take 1 tablet (50 mg total) by mouth daily 10/21/21   Lise Tang, DO   Xarelto 15 MG tablet take 1 tablet by mouth once daily with breakfast 5/1/23   Lise Kerrer, DO       Allergies: Allergies   Allergen Reactions   • Pollen Extract Itching     Runny nose, itchy eyes       Social History:     Marital Status: /Civil Union   Substance Use History:   Social History     Substance and Sexual Activity   Alcohol Use Never    Comment: socially      Social History     Tobacco Use   Smoking Status Never   • Passive exposure: Never   Smokeless Tobacco Never     Social History     Substance and Sexual Activity   Drug Use Never       Family History:    Pertinent family history reviewed    Physical Exam:     Vitals:   Blood Pressure: 119/65 (08/08/23 1200)  Pulse: 60 (08/08/23 1200)  Temperature: 98.5 °F (36.9 °C) (08/08/23 1026)  Respirations: 15 (08/08/23 1200)  SpO2: 97 % (08/08/23 1200)    Physical Exam  Vitals and nursing note reviewed. Constitutional:       General: He is not in acute distress. Appearance: He is well-developed. HENT:      Head: Normocephalic and atraumatic. Eyes:      Conjunctiva/sclera: Conjunctivae normal.   Cardiovascular:      Rate and Rhythm: Normal rate and regular rhythm. Heart sounds: No murmur heard. Pulmonary:      Effort: Pulmonary effort is normal. No respiratory distress. Breath sounds: Normal breath sounds. Abdominal:      Palpations: Abdomen is soft. Tenderness: There is no abdominal tenderness. Musculoskeletal:         General: No swelling. Cervical back: Neck supple. Skin:     General: Skin is warm and dry.       Capillary Refill: Capillary refill takes less than 2 seconds. Neurological:      Mental Status: He is alert. Psychiatric:         Mood and Affect: Mood normal.          Additional Data:     Lab Results: I have reviewed pertinent results     Results from last 7 days   Lab Units 08/08/23  1055   WBC Thousand/uL 5.18   HEMOGLOBIN g/dL 13.0   HEMATOCRIT % 40.6   PLATELETS Thousands/uL 204   NEUTROS PCT % 65   LYMPHS PCT % 22   MONOS PCT % 9   EOS PCT % 3     Results from last 7 days   Lab Units 08/08/23  1055   SODIUM mmol/L 136   POTASSIUM mmol/L 4.6   CHLORIDE mmol/L 107   CO2 mmol/L 20*   BUN mg/dL 26*   CREATININE mg/dL 1.68*   ANION GAP mmol/L 9   CALCIUM mg/dL 8.8   ALBUMIN g/dL 3.8   TOTAL BILIRUBIN mg/dL 0.38   ALK PHOS U/L 53   ALT U/L 8   AST U/L 11*   GLUCOSE RANDOM mg/dL 184*                       Imaging: I have reviewed pertinent imaging     X-ray chest 1 view portable   ED Interpretation by Wilver Melton MD (08/08 1213)   No acute cardiopulmonary disease per my interpretation           EKG, Pathology, and Other Studies Reviewed on Admission:   · EKG: NSR    Allscripts / Epic Records Reviewed    ** Please Note: This note has been constructed using a voice recognition system.  **

## 2023-08-09 ENCOUNTER — TRANSITIONAL CARE MANAGEMENT (OUTPATIENT)
Dept: INTERNAL MEDICINE CLINIC | Facility: CLINIC | Age: 78
End: 2023-08-09

## 2023-08-09 VITALS
HEIGHT: 71 IN | BODY MASS INDEX: 31.42 KG/M2 | TEMPERATURE: 97.8 F | HEART RATE: 74 BPM | RESPIRATION RATE: 20 BRPM | SYSTOLIC BLOOD PRESSURE: 111 MMHG | WEIGHT: 224.43 LBS | OXYGEN SATURATION: 96 % | DIASTOLIC BLOOD PRESSURE: 63 MMHG

## 2023-08-09 LAB
ANION GAP SERPL CALCULATED.3IONS-SCNC: 7 MMOL/L
BASOPHILS # BLD AUTO: 0.03 THOUSANDS/ÂΜL (ref 0–0.1)
BASOPHILS NFR BLD AUTO: 1 % (ref 0–1)
BUN SERPL-MCNC: 23 MG/DL (ref 5–25)
CALCIUM SERPL-MCNC: 8.7 MG/DL (ref 8.4–10.2)
CHLORIDE SERPL-SCNC: 109 MMOL/L (ref 96–108)
CO2 SERPL-SCNC: 23 MMOL/L (ref 21–32)
CREAT SERPL-MCNC: 1.6 MG/DL (ref 0.6–1.3)
EOSINOPHIL # BLD AUTO: 0.17 THOUSAND/ÂΜL (ref 0–0.61)
EOSINOPHIL NFR BLD AUTO: 3 % (ref 0–6)
ERYTHROCYTE [DISTWIDTH] IN BLOOD BY AUTOMATED COUNT: 13.4 % (ref 11.6–15.1)
GFR SERPL CREATININE-BSD FRML MDRD: 40 ML/MIN/1.73SQ M
GLUCOSE P FAST SERPL-MCNC: 109 MG/DL (ref 65–99)
GLUCOSE SERPL-MCNC: 109 MG/DL (ref 65–140)
GLUCOSE SERPL-MCNC: 116 MG/DL (ref 65–140)
GLUCOSE SERPL-MCNC: 274 MG/DL (ref 65–140)
HCT VFR BLD AUTO: 37.6 % (ref 36.5–49.3)
HGB BLD-MCNC: 12.2 G/DL (ref 12–17)
IMM GRANULOCYTES # BLD AUTO: 0.02 THOUSAND/UL (ref 0–0.2)
IMM GRANULOCYTES NFR BLD AUTO: 0 % (ref 0–2)
LYMPHOCYTES # BLD AUTO: 1.44 THOUSANDS/ÂΜL (ref 0.6–4.47)
LYMPHOCYTES NFR BLD AUTO: 27 % (ref 14–44)
MAGNESIUM SERPL-MCNC: 1.9 MG/DL (ref 1.9–2.7)
MCH RBC QN AUTO: 31.4 PG (ref 26.8–34.3)
MCHC RBC AUTO-ENTMCNC: 32.4 G/DL (ref 31.4–37.4)
MCV RBC AUTO: 97 FL (ref 82–98)
MONOCYTES # BLD AUTO: 0.48 THOUSAND/ÂΜL (ref 0.17–1.22)
MONOCYTES NFR BLD AUTO: 9 % (ref 4–12)
NEUTROPHILS # BLD AUTO: 3.11 THOUSANDS/ÂΜL (ref 1.85–7.62)
NEUTS SEG NFR BLD AUTO: 60 % (ref 43–75)
NRBC BLD AUTO-RTO: 0 /100 WBCS
PHOSPHATE SERPL-MCNC: 3.9 MG/DL (ref 2.3–4.1)
PLATELET # BLD AUTO: 172 THOUSANDS/UL (ref 149–390)
PMV BLD AUTO: 9.6 FL (ref 8.9–12.7)
POTASSIUM SERPL-SCNC: 4.7 MMOL/L (ref 3.5–5.3)
RBC # BLD AUTO: 3.88 MILLION/UL (ref 3.88–5.62)
SODIUM SERPL-SCNC: 139 MMOL/L (ref 135–147)
WBC # BLD AUTO: 5.25 THOUSAND/UL (ref 4.31–10.16)

## 2023-08-09 PROCEDURE — 99239 HOSP IP/OBS DSCHRG MGMT >30: CPT | Performed by: INTERNAL MEDICINE

## 2023-08-09 PROCEDURE — 85025 COMPLETE CBC W/AUTO DIFF WBC: CPT | Performed by: INTERNAL MEDICINE

## 2023-08-09 PROCEDURE — 84100 ASSAY OF PHOSPHORUS: CPT | Performed by: INTERNAL MEDICINE

## 2023-08-09 PROCEDURE — 80048 BASIC METABOLIC PNL TOTAL CA: CPT | Performed by: INTERNAL MEDICINE

## 2023-08-09 PROCEDURE — 82948 REAGENT STRIP/BLOOD GLUCOSE: CPT

## 2023-08-09 PROCEDURE — 83735 ASSAY OF MAGNESIUM: CPT | Performed by: INTERNAL MEDICINE

## 2023-08-09 RX ADMIN — METOPROLOL SUCCINATE 100 MG: 50 TABLET, EXTENDED RELEASE ORAL at 08:12

## 2023-08-09 RX ADMIN — RIVAROXABAN 15 MG: 15 TABLET, FILM COATED ORAL at 08:12

## 2023-08-09 RX ADMIN — DULOXETINE HYDROCHLORIDE 30 MG: 30 CAPSULE, DELAYED RELEASE ORAL at 08:12

## 2023-08-09 RX ADMIN — INSULIN LISPRO 4 UNITS: 100 INJECTION, SOLUTION INTRAVENOUS; SUBCUTANEOUS at 11:54

## 2023-08-09 RX ADMIN — AMLODIPINE BESYLATE 10 MG: 10 TABLET ORAL at 08:13

## 2023-08-09 NOTE — DISCHARGE SUMMARY
[id number] [facility]  Discharge- Miesha Deshaun III 1945, 68 y.o. male MRN: [id number]  Unit/Bed#: [facility] [place] Encounter: [id number]  Primary Care Provider: Pushpa Palomino DO   Date and time admitted to hospital: 8/8/2023 10:19 AM    * Chest pain  Assessment & Plan  · Patient presents with progressively worsening dyspnea on exertion and substernal chest pain  · No prior history of coronary artery disease  · RYANN score of 2  · Hemodynamically stable and saturating well on room air  · EKG with no signs of active ischemia  · Troponin I 6  · Assigned to observation  · Trend troponin I  · Obtain 2D echocardiogram ; within normal limits  · Monitor for anginal symptoms  · Continue home statin therapy  · Obtain cardiologist    Atrial fibrillation ([address])  Assessment & Plan  · Rate controlled on beta-blocker  · Continue Xarelto for CVA prophylaxis    Hypertension  Assessment & Plan  · Blood pressure stable  · Continue home amlodipine 10 mg daily  · Monitor blood pressures    Type 2 diabetes mellitus with diabetic chronic kidney disease ([address])  Assessment & Plan  Lab Results   Component Value Date    HGBA1C 8.4 (A) 06/13/2023       Recent Labs     08/08/23  1606 08/08/23  2107 08/09/23  0742   POCGLU 148* 270* 116       Blood Sugar Average: Last 72 hrs:  (P) 178   · Sliding scale insulin while inpatient with Accu-Cheks  · Renal function is stable  · Trend BMP      Discharging Physician / Practitioner: Ana Donato DO  PCP: Pushpa Palomino DO  Admission Date:   Admission Orders (From admission, onward)     Ordered        08/08/23 1231  Place in Observation  Once                      Discharge Date: 08/09/23    Medical Problems     Resolved Problems  Date Reviewed: 8/9/2023   None         Consultations During Hospital Stay:   None    Procedures Performed: 2D echocardiogram    Significant Findings / Test Results:     X-ray chest 1 view portable    Result Date: 8/8/2023  Impression: No acute disease in the chest. Workstation performed: [id number]       Incidental Findings: Not applicable    Test Results Pending at Discharge (will require follow up): Not applicable     Outpatient Tests Requested: Obtain cardiologist    Reason for Admission: Chest pain    Hospital Course: Keira Hennessy is a 68 y.o. male with a past medical history significant for atrial fibrillation on Xarelto, hypertension, type 2 diabetes mellitus, CKD who presents with progressively worsening dyspnea on exertion and shortness of breath. In the ED, all vital signs were found to be stable. Saturating well on room air. Laboratory analysis grossly unremarkable. RYANN score of 2. The patient was assigned observation for chest pain rule out and a high risk individual and to obtain 2D echocardiogram.    2D echocardiogram within normal limits. The patient has remained hemodynamically stable and saturating well on room air throughout hospital course. He was strongly encouraged to obtain a cardiologist as the patient has atrial fibrillation on Xarelto. He was also encouraged to resume all preadmission medications at all preadmission dosages. He was strongly encouraged to follow-up with his PCP within 7-14 days. Condition at Discharge: stable     Discharge Day Visit / Exam:     Subjective:  Patient seen and examined at bedside. No acute events overnight. Denies chest pain, SOB, diaphoresis, nausea/vomiting/diarrhea, fevers/chills. Vitals: Blood Pressure: 111/63 (08/09/23 0752)  Pulse: 74 (08/09/23 0752)  Temperature: 97.8 °F (36.6 °C) (08/09/23 0300)  Temp Source: Oral (08/09/23 0752)  Respirations: 20 (08/09/23 0752)  Height: 5' 11" (180.3 cm) (08/08/23 1321)  Weight - Scale: 102 kg (224 lb 6.9 oz) (08/08/23 1355)  SpO2: 96 % (08/09/23 0752)     Exam:   Physical Exam  Vitals and nursing note reviewed. Constitutional:       General: He is not in acute distress. Appearance: He is well-developed.    HENT:      Head: Normocephalic and atraumatic. Eyes:      Conjunctiva/sclera: Conjunctivae normal.   Cardiovascular:      Rate and Rhythm: Normal rate and regular rhythm. Heart sounds: No murmur heard. Pulmonary:      Effort: Pulmonary effort is normal. No respiratory distress. Breath sounds: Normal breath sounds. Abdominal:      Palpations: Abdomen is soft. Tenderness: There is no abdominal tenderness. Musculoskeletal:         General: No swelling. Cervical back: Neck supple. Skin:     General: Skin is warm and dry. Capillary Refill: Capillary refill takes less than 2 seconds. Neurological:      Mental Status: He is alert. Psychiatric:         Mood and Affect: Mood normal.           Discharge instructions/Information to patient and family:   See after visit summary for information provided to patient and family. Provisions for Follow-Up Care:  See after visit summary for information related to follow-up care and any pertinent home health orders. Disposition:     Home with family support  Obtain cardiologist  Continue home medications  Outpatient follow-up with PCP     Discharge Statement:  I spent 60 minutes discharging the patient. This time was spent on the day of discharge. I had direct contact with the patient on the day of discharge. Greater than 50% of the total time was spent examining patient, answering all patient questions, arranging and discussing plan of care with patient as well as directly providing post-discharge instructions. Additional time then spent on discharge activities. Discharge Medications:  See after visit summary for reconciled discharge medications provided to patient and family.       ** Please Note: This note has been constructed using a voice recognition system **

## 2023-08-09 NOTE — ASSESSMENT & PLAN NOTE
· Patient presents with progressively worsening dyspnea on exertion and substernal chest pain  · No prior history of coronary artery disease  · RYANN score of 2  · Hemodynamically stable and saturating well on room air  · EKG with no signs of active ischemia  · Troponin I 6  · Assigned to observation  · Trend troponin I  · Obtain 2D echocardiogram ; within normal limits  · Monitor for anginal symptoms  · Continue home statin therapy  · Obtain cardiologist

## 2023-08-09 NOTE — PLAN OF CARE
Problem: PAIN - ADULT  Goal: Verbalizes/displays adequate comfort level or baseline comfort level  Description: Interventions:  - Encourage patient to monitor pain and request assistance  - Assess pain using appropriate pain scale  - Administer analgesics based on type and severity of pain and evaluate response  - Implement non-pharmacological measures as appropriate and evaluate response  - Consider cultural and social influences on pain and pain management  - Notify physician/advanced practitioner if interventions unsuccessful or patient reports new pain  Outcome: Completed     Problem: DISCHARGE PLANNING  Goal: Discharge to home or other facility with appropriate resources  Description: INTERVENTIONS:  - Identify barriers to discharge w/patient and caregiver  - Arrange for needed discharge resources and transportation as appropriate  - Identify discharge learning needs (meds, wound care, etc.)  - Arrange for interpretive services to assist at discharge as needed  - Refer to Case Management Department for coordinating discharge planning if the patient needs post-hospital services based on physician/advanced practitioner order or complex needs related to functional status, cognitive ability, or social support system  Outcome: Completed     Problem: Knowledge Deficit  Goal: Patient/family/caregiver demonstrates understanding of disease process, treatment plan, medications, and discharge instructions  Description: Complete learning assessment and assess knowledge base.   Interventions:  - Provide teaching at level of understanding  - Provide teaching via preferred learning methods  Outcome: Completed     Problem: CARDIOVASCULAR - ADULT  Goal: Maintains optimal cardiac output and hemodynamic stability  Description: INTERVENTIONS:  - Monitor I/O, vital signs and rhythm  - Monitor for S/S and trends of decreased cardiac output  - Administer and titrate ordered vasoactive medications to optimize hemodynamic stability  - Assess quality of pulses, skin color and temperature  - Assess for signs of decreased coronary artery perfusion  - Instruct patient to report change in severity of symptoms  Outcome: Completed  Goal: Absence of cardiac dysrhythmias or at baseline rhythm  Description: INTERVENTIONS:  - Continuous cardiac monitoring, vital signs, obtain 12 lead EKG if ordered  - Administer antiarrhythmic and heart rate control medications as ordered  - Monitor electrolytes and administer replacement therapy as ordered  Outcome: Completed

## 2023-08-09 NOTE — ASSESSMENT & PLAN NOTE
Lab Results   Component Value Date    HGBA1C 8.4 (A) 06/13/2023       Recent Labs     08/08/23  1606 08/08/23  2107 08/09/23  0742   POCGLU 148* 270* 116       Blood Sugar Average: Last 72 hrs:  (P) 178   · Sliding scale insulin while inpatient with Accu-Cheks  · Renal function is stable  · Trend BMP

## 2023-08-10 NOTE — ED ATTENDING ATTESTATION
8/8/2023  IRae MD, saw and evaluated the patient. I have discussed the patient with the resident/non-physician practitioner and agree with the resident's/non-physician practitioner's findings, Plan of Care, and MDM as documented in the resident's/non-physician practitioner's note, except where noted. All available labs and Radiology studies were reviewed. I was present for key portions of any procedure(s) performed by the resident/non-physician practitioner and I was immediately available to provide assistance. At this point I agree with the current assessment done in the Emergency Department. I have conducted an independent evaluation of this patient a history and physical is as follows:    Subjective: Patient is a 66-year-old male presents with ongoing exertional dyspnea and exertional chest pain resolved by rest    Objective: Benign exam    Assessment and plan: Patient with chest pain concerning for possible unstable angina. Troponin negative, discussed with cardiology plan to admit to the hospital for further workup and management.     ED Course  ED Course as of 08/09/23 2130   Tue Aug 08, 2023   1140 Creatinine(!): 1.68  Baseline         Critical Care Time  Procedures

## 2023-08-25 ENCOUNTER — RA CDI HCC (OUTPATIENT)
Dept: OTHER | Facility: HOSPITAL | Age: 78
End: 2023-08-25

## 2023-08-25 NOTE — PROGRESS NOTES
[address] [facility] coding opportunities          Chart Reviewed number of suggestions sent to Provider: 2     Patients Insurance     Medicare Insurance: [employer]

## 2023-09-07 DIAGNOSIS — E11.8 TYPE 2 DIABETES MELLITUS WITH COMPLICATION (HCC): ICD-10-CM

## 2023-09-15 ENCOUNTER — HOSPITAL ENCOUNTER (OUTPATIENT)
Dept: NON INVASIVE DIAGNOSTICS | Facility: CLINIC | Age: 78
Discharge: HOME | End: 2023-09-15
Payer: MEDICARE

## 2023-09-15 ENCOUNTER — OFFICE VISIT (OUTPATIENT)
Dept: CARDIOLOGY CLINIC | Facility: CLINIC | Age: 78
End: 2023-09-15
Payer: MEDICARE

## 2023-09-15 VITALS
BODY MASS INDEX: 31.5 KG/M2 | WEIGHT: 225 LBS | HEART RATE: 72 BPM | DIASTOLIC BLOOD PRESSURE: 68 MMHG | HEIGHT: 71 IN | SYSTOLIC BLOOD PRESSURE: 112 MMHG

## 2023-09-15 DIAGNOSIS — I48.91 ATRIAL FIBRILLATION, UNSPECIFIED TYPE (HCC): ICD-10-CM

## 2023-09-15 DIAGNOSIS — R06.00 DYSPNEA, UNSPECIFIED TYPE: ICD-10-CM

## 2023-09-15 DIAGNOSIS — R07.9 CHEST PAIN, UNSPECIFIED TYPE: ICD-10-CM

## 2023-09-15 DIAGNOSIS — E11.22 TYPE 2 DIABETES MELLITUS WITH STAGE 3B CHRONIC KIDNEY DISEASE, WITHOUT LONG-TERM CURRENT USE OF INSULIN (HCC): ICD-10-CM

## 2023-09-15 DIAGNOSIS — I10 HYPERTENSION, UNSPECIFIED TYPE: ICD-10-CM

## 2023-09-15 DIAGNOSIS — E78.2 MIXED HYPERLIPIDEMIA: ICD-10-CM

## 2023-09-15 DIAGNOSIS — R07.9 CHEST PAIN, UNSPECIFIED TYPE: Primary | ICD-10-CM

## 2023-09-15 DIAGNOSIS — N18.32 TYPE 2 DIABETES MELLITUS WITH STAGE 3B CHRONIC KIDNEY DISEASE, WITHOUT LONG-TERM CURRENT USE OF INSULIN (HCC): ICD-10-CM

## 2023-09-15 DIAGNOSIS — I10 PRIMARY HYPERTENSION: Chronic | ICD-10-CM

## 2023-09-15 PROCEDURE — 93226 XTRNL ECG REC<48 HR SCAN A/R: CPT

## 2023-09-15 PROCEDURE — 99204 OFFICE O/P NEW MOD 45 MIN: CPT | Performed by: INTERNAL MEDICINE

## 2023-09-15 PROCEDURE — 93225 XTRNL ECG REC<48 HRS REC: CPT

## 2023-09-15 RX ORDER — AMLODIPINE BESYLATE 10 MG/1
5 TABLET ORAL DAILY
Qty: 90 TABLET | Refills: 3 | Status: SHIPPED | OUTPATIENT
Start: 2023-09-15

## 2023-09-15 RX ORDER — FUROSEMIDE 20 MG/1
TABLET ORAL
Qty: 15 TABLET | Refills: 5 | Status: SHIPPED | OUTPATIENT
Start: 2023-09-15

## 2023-09-15 NOTE — PATIENT INSTRUCTIONS
Chest Pain   AMBULATORY CARE:   Chest pain  can be caused by a range of conditions, from not serious to life-threatening. It is important to follow up with your healthcare provider to find the cause of your chest pain. Common symptoms you may have with chest pain:   Fever or sweating    Nausea or vomiting    Shortness of breath    Discomfort or pressure that spreads from your chest to your back, jaw, or arm    A racing or slow heartbeat    Feeling weak, tired, or faint    Call your local emergency number (911 in the [address]) or have someone call if:   You have any of the following signs of a heart attack:      Squeezing, pressure, or pain in your chest    You may  also have any of the following:     Discomfort or pain in your back, neck, jaw, stomach, or arm    Shortness of breath    Nausea or vomiting    Lightheadedness or a sudden cold sweat      Seek care immediately if:   You have chest discomfort that gets worse, even with medicine. You cough or vomit blood. Your bowel movements are black or bloody. You cannot stop vomiting, or it hurts to swallow. Call your doctor if:   You have questions or concerns about your condition or care. Treatment for chest pain  may include medicine to treat your symptoms while your healthcare provider finds the cause of your chest pain. Medicines  may be given to treat the cause of your chest pain. Examples include pain medicine, anxiety medicine, or medicines to increase blood flow to your heart. Do not take certain medicines without asking your healthcare provider first.  These include NSAIDs, herbal or vitamin supplements, and hormones, such as estrogen or progestin. One or more stents  may need to be placed in your heart if pain was caused by blockage. A stent is a wire mesh tube that helps hold your artery open. Healthy living tips: If the cause of your chest pain is known, your healthcare provider will give you specific guidelines to follow.  The following are general healthy guidelines:  Do not smoke. Nicotine and other chemicals in cigarettes and cigars can cause lung and heart damage. Ask your healthcare provider for information if you currently smoke and need help to quit. E-cigarettes or smokeless tobacco still contain nicotine. Talk to your healthcare provider before you use these products. Choose a variety of healthy foods as often as possible. Include fresh, frozen, or canned fruits and vegetables. Also include low-fat dairy products, fish, chicken (without skin), and lean meats. Your healthcare provider or a dietitian can help you create meal plans. You may need to avoid certain foods or drinks if your pain is caused by a digestion problem. Lower your sodium (salt) intake. Limit foods that are high in sodium, such as canned foods, salty snacks, and cold cuts. If you add salt when you cook food, do not add more at the table. Choose low-sodium canned foods as much as possible. Drink plenty of water every day. Water helps your body to control your temperature and blood pressure. Ask your healthcare provider how much water you should drink every day. Ask about activity. Your healthcare provider will tell you which activities to limit or avoid. Ask when you can drive, return to work, and have sex. Ask about the best exercise plan for you. Maintain a healthy weight. Ask your healthcare provider what a healthy weight is for you. Ask him or her to help you create a safe weight loss plan if you are overweight. Ask about vaccines you may need. Your healthcare provider can tell you which vaccines you need, and when to get them. The following vaccines help prevent diseases that can become serious for a person with a heart condition:    The influenza (flu) vaccine is given each year. Get a flu vaccine as soon as recommended, usually in September or October. The pneumonia vaccine is usually given every 5 years.   Your healthcare provider may recommend the pneumonia vaccine if you are 72 or older. COVID-19 vaccines are given to adults as a shot in 1 or 2 doses. Vaccination is recommended for all adults. A booster (additional) dose is also recommended for all adults. A second booster is recommended for all adults 48 or older and for immunocompromised adults 25 or older. The second booster is also recommended for adults who received the 1-dose vaccine for the first and booster doses. Your healthcare provider can tell you when to get one or both boosters. Follow up with your doctor within 72 hours, or as directed: You may need to return for more tests to find the cause of your chest pain. You may be referred to a specialist, such as a cardiologist or gastroenterologist. Write down your questions so you remember to ask them during your visits. © Copyright [employer] 2022 Information is for End User's use only and may not be sold, redistributed or otherwise used for commercial purposes. The above information is an  only. It is not intended as medical advice for individual conditions or treatments. Talk to your doctor, nurse or pharmacist before following any medical regimen to see if it is safe and effective for you.

## 2023-09-15 NOTE — PROGRESS NOTES
Subjective:        Patient ID: Arianna Darby is a 68 y.o. male. Chief Complaint:  Mr. Candice Chino is here after recent hospitalization for some atypical chest pain, MI ruled out, he says because he was told he needs a heart doctor. He has chronic hypertension and chronic atrial fibrillation on chronic DOAC therapy. He has chronic mild dyspnea on exertion, maybe notices a little bit more than usual lately. He does have some mild renal insufficiency. Diabetes and dyslipidemia. He has chronic ankle edema left greater than right ever since his hip surgery. He denies any palpitations presyncope or syncope. No major orthopnea nor PND. He gets cramping in his ankles and tightness in his shoulders on occasion but not clearly precipitated, particular the latter, with physical exertion. He cannot really tell me when it occurs. Echo revealed preserved LV function and wall motion with mild to moderate MR. Chest x-ray without heart failure findings but his BNP was mildly elevated. The following portions of the patient's history were reviewed and updated as appropriate: allergies, current medications, past family history, past medical history, past social history, past surgical history and problem list.  Review of Systems   Constitutional: Negative for chills, diaphoresis, malaise/fatigue and weight gain. HENT: Negative for nosebleeds and stridor. Eyes: Negative for double vision, vision loss in left eye, vision loss in right eye and visual disturbance. Cardiovascular: Positive for dyspnea on exertion and leg swelling. Negative for chest pain, claudication, cyanosis, irregular heartbeat, near-syncope, orthopnea, palpitations, paroxysmal nocturnal dyspnea and syncope. Respiratory: Negative for cough, shortness of breath, snoring and wheezing. Endocrine: Negative for polydipsia, polyphagia and polyuria. Hematologic/Lymphatic: Negative for bleeding problem. Does not bruise/bleed easily. Skin: Negative for flushing and rash. Musculoskeletal: Positive for arthritis and stiffness. Negative for falls and myalgias. Gastrointestinal: Negative for abdominal pain, heartburn, hematemesis, hematochezia, melena and nausea. Genitourinary: Negative for hematuria. Neurological: Negative for brief paralysis, dizziness, focal weakness, headaches, light-headedness, loss of balance and vertigo. Psychiatric/Behavioral: Positive for memory loss. Negative for altered mental status and substance abuse. Allergic/Immunologic: Negative for hives. Objective:      /68   Pulse 72   Ht 5' 11" (1.803 m)   Wt 102 kg (225 lb)   BMI 31.38 kg/m²   Physical Exam  Constitutional:       General: He is not in acute distress. Appearance: He is well-developed. He is not diaphoretic. HENT:      Head: Normocephalic and atraumatic. Eyes:      General: No scleral icterus. Pupils: Pupils are equal, round, and reactive to light. Neck:      Thyroid: No thyromegaly. Vascular: No carotid bruit or JVD. Cardiovascular:      Rate and Rhythm: Normal rate. Rhythm irregular. Heart sounds: Normal heart sounds. No murmur heard. No friction rub. No gallop. Pulmonary:      Effort: Pulmonary effort is normal. No respiratory distress. Breath sounds: Normal breath sounds. No stridor. No wheezing or rales. Abdominal:      General: Bowel sounds are normal. There is no distension. Palpations: Abdomen is soft. There is no mass. Tenderness: There is no abdominal tenderness. Musculoskeletal:      Cervical back: Normal range of motion and neck supple. Right lower leg: Edema present. Left lower leg: Edema present. Comments: Bilateral mild ankle edema left slightly greater than right. Skin:     General: Skin is warm and dry. Coloration: Skin is not pale. Findings: No erythema.    Neurological:      Mental Status: He is alert and oriented to person, place, and time.      Coordination: Coordination normal.   Psychiatric:         Mood and Affect: Mood normal.         Behavior: Behavior normal.         Lab Review:   Admission on 08/08/2023, Discharged on 08/09/2023   Component Date Value   • Ventricular Rate 08/08/2023 73    • Atrial Rate 08/08/2023 258    • QRSD Interval 08/08/2023 76    • QT Interval 08/08/2023 384    • QTC Interval 08/08/2023 423    • QRS [place] 08/08/2023 14    • T Wave Axis 08/08/2023 33    • WBC 08/08/2023 5.18    • RBC 08/08/2023 4.19    • Hemoglobin 08/08/2023 13.0    • Hematocrit 08/08/2023 40.6    • MCV 08/08/2023 97    • MCH 08/08/2023 31.0    • MCHC 08/08/2023 32.0    • RDW 08/08/2023 13.4    • MPV 08/08/2023 10.2    • Platelets 03/73/4314 204    • nRBC 08/08/2023 0    • Neutrophils Relative 08/08/2023 65    • Immat GRANS % 08/08/2023 0    • Lymphocytes Relative 08/08/2023 22    • Monocytes Relative 08/08/2023 9    • Eosinophils Relative 08/08/2023 3    • Basophils Relative 08/08/2023 1    • Neutrophils Absolute 08/08/2023 3.32    • Immature Grans Absolute 08/08/2023 0.01    • Lymphocytes Absolute 08/08/2023 1.16    • Monocytes Absolute 08/08/2023 0.48    • Eosinophils Absolute 08/08/2023 0.17    • Basophils Absolute 08/08/2023 0.04    • hs TnI 0hr 08/08/2023 6    • Sodium 08/08/2023 136    • Potassium 08/08/2023 4.6    • Chloride 08/08/2023 107    • CO2 08/08/2023 20 (L)    • ANION GAP 08/08/2023 9    • BUN 08/08/2023 26 (H)    • Creatinine 08/08/2023 1.68 (H)    • Glucose 08/08/2023 184 (H)    • Calcium 08/08/2023 8.8    • AST 08/08/2023 11 (L)    • ALT 08/08/2023 8    • Alkaline Phosphatase 08/08/2023 53    • Total Protein 08/08/2023 6.9    • Albumin 08/08/2023 3.8    • Total Bilirubin 08/08/2023 0.38    • eGFR 08/08/2023 38    • BNP 08/08/2023 306 (H)    • hs TnI 2hr 08/08/2023 7    • Delta 2hr hsTnI 08/08/2023 1    • hs TnI 4hr 08/08/2023 6    • Delta 4hr hsTnI 08/08/2023 0    • Ventricular Rate 08/08/2023 70    • Atrial Rate 08/08/2023 77 • QRSD Interval 08/08/2023 76    • QT Interval 08/08/2023 406    • QTC Interval 08/08/2023 438    • QRS [place] 08/08/2023 13    • T Wave Axis 08/08/2023 49    • A4C EF 08/08/2023 66    • LVIDd 08/08/2023 5.50    • LVIDS 08/08/2023 4.20    • IVSd 08/08/2023 1.00    • LVPWd 08/08/2023 1.00    • FS 08/08/2023 24    • AV LVOT peak gradient 08/08/2023 2    • LVOT peak VTI 08/08/2023 18.99    • LVOT peak wolfgang 08/08/2023 0.74    • RVID d 08/08/2023 4.1    • Tricuspid annular plane * 08/08/2023 1.80    • LA size 08/08/2023 5.6    • LA length (A2C) 08/08/2023 5.80    • LA volume (BP) 08/08/2023 81    • RAA A4C 08/08/2023 24    • LVOT mn grad 08/08/2023 1.0    • TR Peak [place] 08/08/2023 3.3    • Triscuspid Valve Regurgi* 08/08/2023 45.0    • Ao root 08/08/2023 4.60    • Asc Ao 08/08/2023 4.2    • Tricuspid valve peak reg* 08/08/2023 3.34    • Left ventricular stroke * 08/08/2023 67.00    • IVS 08/08/2023 1    • LEFT VENTRICLE SYSTOLIC * 84/53/6302 80    • LV DIASTOLIC VOLUME (MOD* 10/74/3621 147    • Left Atrium Area-systoli* 08/08/2023 26.4    • Left Atrium Area-systoli* 08/08/2023 22.6    • LVSV, 2D 08/08/2023 67    • LV EF 08/08/2023 55    • Est. RA pres 08/08/2023 10.0    • Right Ventricular Peak S* 08/08/2023 55.00    • Ventricular Rate 08/08/2023 64    • Atrial Rate 08/08/2023 72    • QRSD Interval 08/08/2023 74    • QT Interval 08/08/2023 394    • QTC Interval 08/08/2023 406    • QRS [place] 08/08/2023 -18    • T Wave Axis 08/08/2023 11    • POC Glucose 08/08/2023 148 (H)    • Ventricular Rate 08/08/2023 59    • Atrial Rate 08/08/2023 41    • QRSD Interval 08/08/2023 74    • QT Interval 08/08/2023 420    • QTC Interval 08/08/2023 415    • QRS Axis 08/08/2023 78    • T Wave Axis 08/08/2023 -5    • POC Glucose 08/08/2023 270 (H)    • WBC 08/09/2023 5.25    • RBC 08/09/2023 3.88    • Hemoglobin 08/09/2023 12.2    • Hematocrit 08/09/2023 37.6    • MCV 08/09/2023 97    • MCH 08/09/2023 31.4    • MCHC 08/09/2023 32.4    • RDW 08/09/2023 13.4    • MPV 08/09/2023 9.6    • Platelets 66/68/1786 172    • nRBC 08/09/2023 0    • Neutrophils Relative 08/09/2023 60    • Immat GRANS % 08/09/2023 0    • Lymphocytes Relative 08/09/2023 27    • Monocytes Relative 08/09/2023 9    • Eosinophils Relative 08/09/2023 3    • Basophils Relative 08/09/2023 1    • Neutrophils Absolute 08/09/2023 3.11    • Immature Grans Absolute 08/09/2023 0.02    • Lymphocytes Absolute 08/09/2023 1.44    • Monocytes Absolute 08/09/2023 0.48    • Eosinophils Absolute 08/09/2023 0.17    • Basophils Absolute 08/09/2023 0.03    • Sodium 08/09/2023 139    • Potassium 08/09/2023 4.7    • Chloride 08/09/2023 109 (H)    • CO2 08/09/2023 23    • ANION GAP 08/09/2023 7    • BUN 08/09/2023 23    • Creatinine 08/09/2023 1.60 (H)    • Glucose 08/09/2023 109    • Glucose, Fasting 08/09/2023 109 (H)    • Calcium 08/09/2023 8.7    • eGFR 08/09/2023 40    • Magnesium 08/09/2023 1.9    • Phosphorus 08/09/2023 3.9    • POC Glucose 08/09/2023 116    • POC Glucose 08/09/2023 274 (H)      No results found. Assessment:       1. Chest pain, unspecified type  Holter monitor    NM myocardial perfusion spect (rx stress and/or rest)      2. Primary hypertension  Holter monitor    NM myocardial perfusion spect (rx stress and/or rest)      3. Dyspnea, unspecified type  Holter monitor    furosemide (LASIX) 20 mg tablet    Basic metabolic panel    B-Type Natriuretic Peptide(BNP)      4. Atrial fibrillation, unspecified type (HCC)  Holter monitor    NM myocardial perfusion spect (rx stress and/or rest)      5. Type 2 diabetes mellitus with stage 3b chronic kidney disease, without long-term current use of insulin (HCC)  NM myocardial perfusion spect (rx stress and/or rest)    Basic metabolic panel      6. Hypertension, unspecified type  amLODIPine (NORVASC) 10 mg tablet      7. Mixed hyperlipidemia             Plan:        With his CAD risk factors and recent complaints and ongoing upper back discomfort further testing seems prudent. Watching him walk out the office today I do think there is a balance issue, so I have ordered a [address] [place] [address] nuclear stress test for further investigation. I would like to assess his overall heart rate control I think Toprol is ideal for such, I did order a 24-hour Holter monitor to make sure were not dealing with any uncontrolled ventricular rate issues with respect to his dyspnea. With his symptoms, little bit of edema and elevated BNP I do think he has a little bit of diastolic dysfunction/diastolic heart failure, I am going to cut his amlodipine in half as you know this can contribute to lower extremity edema and volume excess and in turn start him on low-dose furosemide 20 mg just 3 days/week. I ordered a follow-up BMP and BNP in 1 month's time. Think the Xarelto is an excellent choice for stroke prevention. His stroke risk score is obviously quite high. Fortunately he has not had any recent falls. With his diabetes atorvastatin excellent choice. Malachi Cardenas see him back in a month to review his stress test, Holter monitor and see how he is doing on my medication changes. We will come up with a long-term plan at that visit.

## 2023-09-19 ENCOUNTER — APPOINTMENT (OUTPATIENT)
Dept: LAB | Facility: CLINIC | Age: 78
End: 2023-09-19
Payer: MEDICARE

## 2023-09-19 ENCOUNTER — OFFICE VISIT (OUTPATIENT)
Dept: INTERNAL MEDICINE CLINIC | Facility: CLINIC | Age: 78
End: 2023-09-19
Payer: MEDICARE

## 2023-09-19 VITALS
HEART RATE: 81 BPM | OXYGEN SATURATION: 97 % | DIASTOLIC BLOOD PRESSURE: 78 MMHG | HEIGHT: 71 IN | WEIGHT: 222.8 LBS | TEMPERATURE: 96.3 F | SYSTOLIC BLOOD PRESSURE: 126 MMHG | BODY MASS INDEX: 31.19 KG/M2

## 2023-09-19 DIAGNOSIS — Z12.5 SCREENING FOR PROSTATE CANCER: ICD-10-CM

## 2023-09-19 DIAGNOSIS — E11.22 TYPE 2 DIABETES MELLITUS WITH STAGE 3B CHRONIC KIDNEY DISEASE, WITHOUT LONG-TERM CURRENT USE OF INSULIN (HCC): ICD-10-CM

## 2023-09-19 DIAGNOSIS — R07.89 ATYPICAL CHEST PAIN: Primary | ICD-10-CM

## 2023-09-19 DIAGNOSIS — R25.1 TREMORS OF NERVOUS SYSTEM: ICD-10-CM

## 2023-09-19 DIAGNOSIS — M79.10 MYALGIA: ICD-10-CM

## 2023-09-19 DIAGNOSIS — R06.00 DYSPNEA, UNSPECIFIED TYPE: ICD-10-CM

## 2023-09-19 DIAGNOSIS — R79.9 ABNORMAL BLOOD FINDINGS: ICD-10-CM

## 2023-09-19 DIAGNOSIS — G47.9 SLEEP DISTURBANCE: ICD-10-CM

## 2023-09-19 DIAGNOSIS — R25.2 MUSCLE CRAMPS: ICD-10-CM

## 2023-09-19 DIAGNOSIS — E11.8 TYPE 2 DIABETES MELLITUS WITH COMPLICATION (HCC): ICD-10-CM

## 2023-09-19 DIAGNOSIS — M54.2 CERVICALGIA: ICD-10-CM

## 2023-09-19 DIAGNOSIS — N18.32 TYPE 2 DIABETES MELLITUS WITH STAGE 3B CHRONIC KIDNEY DISEASE, WITHOUT LONG-TERM CURRENT USE OF INSULIN (HCC): ICD-10-CM

## 2023-09-19 PROCEDURE — 99214 OFFICE O/P EST MOD 30 MIN: CPT | Performed by: INTERNAL MEDICINE

## 2023-09-19 PROCEDURE — 83880 ASSAY OF NATRIURETIC PEPTIDE: CPT

## 2023-09-19 PROCEDURE — 80048 BASIC METABOLIC PNL TOTAL CA: CPT

## 2023-09-19 PROCEDURE — 36415 COLL VENOUS BLD VENIPUNCTURE: CPT

## 2023-09-19 RX ORDER — METHOCARBAMOL 500 MG/1
500 TABLET, FILM COATED ORAL 4 TIMES DAILY
Qty: 90 TABLET | Refills: 0 | Status: SHIPPED | OUTPATIENT
Start: 2023-09-19

## 2023-09-19 RX ORDER — CARBIDOPA/LEVODOPA 25MG-250MG
1 TABLET ORAL 3 TIMES DAILY
Qty: 270 TABLET | Refills: 1 | Status: SHIPPED | OUTPATIENT
Start: 2023-09-19

## 2023-09-19 NOTE — PROGRESS NOTES
Assessment/Plan:  Problem List Items Addressed This Visit    None  Visit Diagnoses     Atypical chest pain    -  Primary    Myalgia        Relevant Orders    CBC and differential    CURRY w/Reflex    C-reactive protein    Lyme Total AB W Reflex to IGM/IGG    TSH, 3rd generation with Free T4 reflex    RF Screen w/ Reflex to Titer    RPR-Syphilis Screening (Total Syphilis IGG/IGM)    XR spine cervical complete 4 or 5 vw non injury    Magnesium    TSH, 3rd generation with Free T4 reflex    UA w Reflex to Microscopic w Reflex to Culture    Cervicalgia        Relevant Medications    methocarbamol (ROBAXIN) 500 mg tablet    Other Relevant Orders    XR spine cervical complete 4 or 5 vw non injury    UA w Reflex to Microscopic w Reflex to Culture    Muscle cramps        Tremors of nervous system        Relevant Medications    carbidopa-levodopa (SINEMET)  mg per tablet    Type 2 diabetes mellitus with complication (HCC)        Relevant Orders    Lipid Panel with Direct LDL reflex    Hemoglobin A1C    TSH, 3rd generation with Free T4 reflex    Albumin / creatinine urine ratio    Hepatic function panel    Screening for prostate cancer        Relevant Orders    PSA, Total Screen    Sleep disturbance        Relevant Orders    UA w Reflex to Microscopic w Reflex to Culture           Diagnoses and all orders for this visit:    Atypical chest pain    Myalgia  -     CBC and differential; Future  -     CURRY w/Reflex; Future  -     C-reactive protein; Future  -     Lyme Total AB W Reflex to IGM/IGG; Future  -     TSH, 3rd generation with Free T4 reflex; Future  -     RF Screen w/ Reflex to Titer; Future  -     RPR-Syphilis Screening (Total Syphilis IGG/IGM); Future  -     XR spine cervical complete 4 or 5 vw non injury; Future  -     Magnesium; Future  -     TSH, 3rd generation with Free T4 reflex; Future  -     UA w Reflex to Microscopic w Reflex to Culture    Cervicalgia  -     methocarbamol (ROBAXIN) 500 mg tablet;  Take 1 tablet (500 mg total) by mouth 4 (four) times a day  -     XR spine cervical complete 4 or 5 vw non injury; Future  -     UA w Reflex to Microscopic w Reflex to Culture    Muscle cramps    Tremors of nervous system  -     carbidopa-levodopa (SINEMET)  mg per tablet; Take 1 tablet by mouth 3 (three) times a day    Type 2 diabetes mellitus with complication (HCC)  -     Lipid Panel with Direct LDL reflex; Future  -     Hemoglobin A1C; Future  -     TSH, 3rd generation with Free T4 reflex; Future  -     Albumin / creatinine urine ratio  -     Hepatic function panel; Future    Screening for prostate cancer  -     PSA, Total Screen; Future    Sleep disturbance  -     UA w Reflex to Microscopic w Reflex to Culture        No problem-specific Assessment & Plan notes found for this encounter. A/P: Wife very distraught and is having problems caring for the pt. PE with new s/s and given past dementia, FHx of PD, ??if he is starting as well. Will try sinemet. Will check labs. May need repeat head imaging and see neuro. ??agitation with PD. ?rexulti trial. ?add namenda. ?MS s/s. ?CTD, Lyme's, etc. Will check labs. ?PMR. Will hold lipitor. Will add muscle relaxant short term and continue roxy/SNRI, but may need an increase. Consider PT. Given the Afib, ?silent CVA. Will most likely need MRI. ?steroid trial for the pain, but pt is diabetic. Keep f/u with cards and for testing. RTC two weeks for f/u and ?routine if time. Subjective:      Patient ID: Wild Lewis is a 68 y.o. male. WM presents for f/u admission several weeks ago for CP with his wife, but has seen cards and wife reports more pressing issues to discuss. Pt and wife reports months of overall decline with pt experience diffuse muscle aches, cramping, and joint pain. Notes neck and shoulder pain. Notes a severe tremor of the right hand only. Difficulty eating. No head tremor or tremors else where. Gait worsening. Wife reports sleep is a mess.  Pt will stay up for almost a day and then crash for almost a day. Will go to bed at all times of the day. No new meds or dietary changes. Denies PMH or FHX of CTD, lymes'/insect bites, gout, etc, but strong FHx of Parkinson's. Pt denies any MS change, but wife reports some forgetfulness. No joint or muscle redness, swelling, or increase temp. The following portions of the patient's history were reviewed and updated as appropriate:   He has a past medical history of Acute on chronic renal insufficiency, Allergic rhinitis, Anemia (6/11/2012), Atrial fibrillation ([address]), Cerebrovascular accident (CVA) ([address]), Controlled type 2 diabetes mellitus without complication ([address]), Generalized anxiety disorder, GERD without esophagitis (6/11/2012), Hypertension, Memory difficulties (1/8/2019), Nephrolithiasis (3/17/2016), Obesity (6/11/2012), and Osteoarthritis (6/11/2012). ,  does not have any pertinent problems on file. ,   has a past surgical history that includes Total hip arthroplasty; Knee surgery; Cataract extraction, bilateral; Total hip arthroplasty (Bilateral, 2011); pr cysto bladder w/ureteral catheterization (Left, 11/8/2019); and Cystoscopy w/ laser lithotripsy (Left, 1/20/2020). ,  family history includes Arthritis in his mother; No Known Problems in his father; Parkinsonism in his mother. ,   reports that he has never smoked. He has never been exposed to tobacco smoke. He has never used smokeless tobacco. He reports that he does not drink alcohol and does not use drugs. ,  is allergic to pollen extract. .  Current Outpatient Medications   Medication Sig Dispense Refill   • ALPRAZolam (XANAX) 0.5 mg tablet Take 1 tablet (0.5 mg total) by mouth every 8 (eight) hours as needed (AS NEEDED) 90 tablet 0   • amLODIPine (NORVASC) 10 mg tablet Take 0.5 tablets (5 mg total) by mouth daily 90 tablet 3   • atorvastatin (LIPITOR) 20 mg tablet take 1 tablet by mouth once daily 90 tablet 2   • Canagliflozin 300 MG TABS Take 1 tablet (300 mg total) by mouth daily 90 tablet 1   • carbidopa-levodopa (SINEMET)  mg per tablet Take 1 tablet by mouth 3 (three) times a day 270 tablet 1   • donepezil (ARICEPT) 10 mg tablet take 1 tablet by mouth at bedtime 90 tablet 1   • DULoxetine (CYMBALTA) 30 mg delayed release capsule take 1 capsule by mouth once daily 90 capsule 1   • ergocalciferol (VITAMIN D2) 50,000 units Take 1 capsule (50,000 Units total) by mouth once a week 12 capsule 3   • furosemide (LASIX) 20 mg tablet Take one 3 days a week (MWF or T/Thu/Sat) 15 tablet 5   • gabapentin (NEURONTIN) 300 mg capsule Take 300 mg by mouth 3 (three) times a day 300mg PO x 3 days, 300mg PO 2 tabs daily for 3 days, 300mg 3 tabs daily after loadint titration     • glucose blood (FREESTYLE LITE) test strip Test twice a day 100 each 3   • glucose monitoring kit (FREESTYLE) monitoring kit Use 1 each 2 (two) times a day Test twice  Day 1 each 0   • Lancets (freestyle) lancets Test twice a day 100 each 3   • magnesium Oxide (MAG-OX) 400 mg TABS take 1 tablet by mouth every morning 90 tablet 0   • metFORMIN (GLUCOPHAGE) 850 mg tablet Take 1 tablet (850 mg total) by mouth 2 (two) times a day 180 tablet 1   • methocarbamol (ROBAXIN) 500 mg tablet Take 1 tablet (500 mg total) by mouth 4 (four) times a day 90 tablet 0   • metoprolol succinate (TOPROL-XL) 100 mg 24 hr tablet Take 1 tablet (100 mg total) by mouth daily 90 tablet 1   • rivaroxaban (Xarelto) 15 mg tablet Take 1 tablet (15 mg total) by mouth daily with breakfast 90 tablet 0     No current facility-administered medications for this visit. Review of Systems   Constitutional: Positive for activity change. Negative for chills, diaphoresis, fatigue and fever. HENT: Negative. Eyes: Negative for visual disturbance. Respiratory: Negative for cough, chest tightness, shortness of breath and wheezing. Cardiovascular: Negative for chest pain, palpitations and leg swelling.    Gastrointestinal: Negative for abdominal pain, constipation, diarrhea, nausea and vomiting. Endocrine: Negative for cold intolerance and heat intolerance. Genitourinary: Negative for difficulty urinating, dysuria and frequency. Musculoskeletal: Positive for arthralgias, gait problem, myalgias, neck pain and neck stiffness. Negative for joint swelling. Neurological: Positive for tremors. Negative for weakness, light-headedness, numbness and headaches. Psychiatric/Behavioral: Positive for sleep disturbance. Negative for confusion and dysphoric mood. The patient is not nervous/anxious. PHQ-2/9 Depression Screening          Objective:  Vitals:    09/19/23 1436   BP: 126/78   Pulse: 81   Temp: (!) 96.3 °F (35.7 °C)   SpO2: 97%   Weight: 101 kg (222 lb 12.8 oz)   Height: 5' 11" (1.803 m)     Body mass index is 31.07 kg/m². Physical Exam  Vitals and nursing note reviewed. Constitutional:       General: He is not in acute distress. Appearance: Normal appearance. He is not ill-appearing. HENT:      Head: Normocephalic and atraumatic. Mouth/Throat:      Mouth: Mucous membranes are moist.   Eyes:      Extraocular Movements: Extraocular movements intact. Conjunctiva/sclera: Conjunctivae normal.      Pupils: Pupils are equal, round, and reactive to light. Cardiovascular:      Rate and Rhythm: Normal rate. Rhythm irregular. Heart sounds: Normal heart sounds. Pulmonary:      Effort: Pulmonary effort is normal. No respiratory distress. Breath sounds: Normal breath sounds. No wheezing, rhonchi or rales. Abdominal:      General: Bowel sounds are normal. There is no distension. Palpations: Abdomen is soft. Tenderness: There is no abdominal tenderness. Musculoskeletal:         General: Tenderness and deformity present. No swelling. Right lower leg: No edema. Left lower leg: No edema.       Comments: Multiple joints w/o any gross deformities, increase temp, erythema, or swelling except knees showing advance DJD changes. Knee crepitus. No effusions or ballotment. Joint integrity intact. No nodes. NO ulnar deviation. NO [place] neck deformities. ROM wnl. Tenderness diffusely. C Spine w/o gross deformities, increase temp, erythema, swelling, or lesions. Tenderness midline C3-C6 and bilat traps. ROM wnl. Spasms ?mild. UE strength 5/5 with tone/ROM WNL. DTR 2/4. Grasp wnl  . Neurological:      General: No focal deficit present. Mental Status: He is alert and oriented to person, place, and time. Mental status is at baseline. Cranial Nerves: No cranial nerve deficit. Motor: No weakness. Coordination: Coordination abnormal.      Gait: Gait abnormal.      Deep Tendon Reflexes: Reflexes normal.      Comments: Tremor of the right hand that stops with distraction. No other tremors. No shuffled gait, but knee DJD makes gait abnormal. No cogwheeling and facial expression present, but ?decreased. Psychiatric:         Mood and Affect: Mood normal.         Behavior: Behavior normal.      Comments: Some memory issues. Pt though content seems off a little, but ?if pt is more in denial of his worsening health.

## 2023-09-20 ENCOUNTER — APPOINTMENT (OUTPATIENT)
Dept: LAB | Facility: CLINIC | Age: 78
End: 2023-09-20
Payer: MEDICARE

## 2023-09-20 ENCOUNTER — APPOINTMENT (OUTPATIENT)
Dept: RADIOLOGY | Facility: CLINIC | Age: 78
End: 2023-09-20
Payer: MEDICARE

## 2023-09-20 DIAGNOSIS — M54.2 CERVICALGIA: ICD-10-CM

## 2023-09-20 DIAGNOSIS — M79.10 MYALGIA: ICD-10-CM

## 2023-09-20 DIAGNOSIS — Z12.5 SCREENING FOR PROSTATE CANCER: ICD-10-CM

## 2023-09-20 DIAGNOSIS — E11.8 TYPE 2 DIABETES MELLITUS WITH COMPLICATION (HCC): ICD-10-CM

## 2023-09-20 LAB
ANION GAP SERPL CALCULATED.3IONS-SCNC: 10 MMOL/L
BNP SERPL-MCNC: 201 PG/ML (ref 0–100)
BUN SERPL-MCNC: 19 MG/DL (ref 5–25)
CALCIUM SERPL-MCNC: 9.1 MG/DL (ref 8.4–10.2)
CHLORIDE SERPL-SCNC: 105 MMOL/L (ref 96–108)
CO2 SERPL-SCNC: 23 MMOL/L (ref 21–32)
CREAT SERPL-MCNC: 1.7 MG/DL (ref 0.6–1.3)
EST. AVERAGE GLUCOSE BLD GHB EST-MCNC: 209 MG/DL
GFR SERPL CREATININE-BSD FRML MDRD: 38 ML/MIN/1.73SQ M
GLUCOSE SERPL-MCNC: 251 MG/DL (ref 65–140)
HBA1C MFR BLD: 8.9 %
POTASSIUM SERPL-SCNC: 4.8 MMOL/L (ref 3.5–5.3)
PSA SERPL-MCNC: 5.22 NG/ML (ref 0–4)
SODIUM SERPL-SCNC: 138 MMOL/L (ref 135–147)
TREPONEMA PALLIDUM IGG+IGM AB [PRESENCE] IN SERUM OR PLASMA BY IMMUNOASSAY: NORMAL

## 2023-09-20 PROCEDURE — 81001 URINALYSIS AUTO W/SCOPE: CPT | Performed by: INTERNAL MEDICINE

## 2023-09-20 PROCEDURE — 86039 ANTINUCLEAR ANTIBODIES (ANA): CPT

## 2023-09-20 PROCEDURE — G0103 PSA SCREENING: HCPCS

## 2023-09-20 PROCEDURE — 72050 X-RAY EXAM NECK SPINE 4/5VWS: CPT

## 2023-09-20 PROCEDURE — 86618 LYME DISEASE ANTIBODY: CPT

## 2023-09-20 PROCEDURE — 82570 ASSAY OF URINE CREATININE: CPT | Performed by: INTERNAL MEDICINE

## 2023-09-20 PROCEDURE — 86780 TREPONEMA PALLIDUM: CPT

## 2023-09-20 PROCEDURE — 86038 ANTINUCLEAR ANTIBODIES: CPT

## 2023-09-20 PROCEDURE — 83036 HEMOGLOBIN GLYCOSYLATED A1C: CPT

## 2023-09-20 PROCEDURE — 86140 C-REACTIVE PROTEIN: CPT

## 2023-09-20 PROCEDURE — 82043 UR ALBUMIN QUANTITATIVE: CPT | Performed by: INTERNAL MEDICINE

## 2023-09-20 PROCEDURE — 86430 RHEUMATOID FACTOR TEST QUAL: CPT

## 2023-09-20 PROCEDURE — 83735 ASSAY OF MAGNESIUM: CPT

## 2023-09-20 PROCEDURE — 86235 NUCLEAR ANTIGEN ANTIBODY: CPT

## 2023-09-20 PROCEDURE — 80076 HEPATIC FUNCTION PANEL: CPT

## 2023-09-20 PROCEDURE — 86225 DNA ANTIBODY NATIVE: CPT

## 2023-09-20 PROCEDURE — 84443 ASSAY THYROID STIM HORMONE: CPT

## 2023-09-20 PROCEDURE — 80061 LIPID PANEL: CPT

## 2023-09-20 PROCEDURE — 85025 COMPLETE CBC W/AUTO DIFF WBC: CPT

## 2023-09-20 PROCEDURE — 36415 COLL VENOUS BLD VENIPUNCTURE: CPT

## 2023-09-21 LAB
ALBUMIN SERPL BCP-MCNC: 4.4 G/DL (ref 3.5–5)
ALP SERPL-CCNC: 62 U/L (ref 34–104)
ALT SERPL W P-5'-P-CCNC: 3 U/L (ref 7–52)
ANA SER QL IA: POSITIVE
AST SERPL W P-5'-P-CCNC: 13 U/L (ref 13–39)
B BURGDOR IGG+IGM SER QL IA: NEGATIVE
BACTERIA UR QL AUTO: ABNORMAL /HPF
BASOPHILS # BLD AUTO: 0.06 THOUSANDS/ÂΜL (ref 0–0.1)
BASOPHILS NFR BLD AUTO: 1 % (ref 0–1)
BILIRUB DIRECT SERPL-MCNC: 0.17 MG/DL (ref 0–0.2)
BILIRUB SERPL-MCNC: 0.82 MG/DL (ref 0.2–1)
BILIRUB UR QL STRIP: NEGATIVE
CHOLEST SERPL-MCNC: 139 MG/DL
CLARITY UR: CLEAR
COLOR UR: YELLOW
CREAT UR-MCNC: 131.6 MG/DL
CRP SERPL QL: 3.6 MG/L
EOSINOPHIL # BLD AUTO: 0.16 THOUSAND/ÂΜL (ref 0–0.61)
EOSINOPHIL NFR BLD AUTO: 2 % (ref 0–6)
ERYTHROCYTE [DISTWIDTH] IN BLOOD BY AUTOMATED COUNT: 13 % (ref 11.6–15.1)
GLUCOSE UR STRIP-MCNC: ABNORMAL MG/DL
HCT VFR BLD AUTO: 46 % (ref 36.5–49.3)
HDLC SERPL-MCNC: 46 MG/DL
HGB BLD-MCNC: 14.5 G/DL (ref 12–17)
HGB UR QL STRIP.AUTO: NEGATIVE
HYALINE CASTS #/AREA URNS LPF: ABNORMAL /LPF
IMM GRANULOCYTES # BLD AUTO: 0.02 THOUSAND/UL (ref 0–0.2)
IMM GRANULOCYTES NFR BLD AUTO: 0 % (ref 0–2)
KETONES UR STRIP-MCNC: NEGATIVE MG/DL
LDLC SERPL CALC-MCNC: 50 MG/DL (ref 0–100)
LEUKOCYTE ESTERASE UR QL STRIP: ABNORMAL
LYMPHOCYTES # BLD AUTO: 1.27 THOUSANDS/ÂΜL (ref 0.6–4.47)
LYMPHOCYTES NFR BLD AUTO: 17 % (ref 14–44)
MAGNESIUM SERPL-MCNC: 2 MG/DL (ref 1.9–2.7)
MCH RBC QN AUTO: 29.7 PG (ref 26.8–34.3)
MCHC RBC AUTO-ENTMCNC: 31.5 G/DL (ref 31.4–37.4)
MCV RBC AUTO: 94 FL (ref 82–98)
MICROALBUMIN UR-MCNC: 362.1 MG/L
MICROALBUMIN/CREAT 24H UR: 275 MG/G CREATININE (ref 0–30)
MONOCYTES # BLD AUTO: 0.54 THOUSAND/ÂΜL (ref 0.17–1.22)
MONOCYTES NFR BLD AUTO: 7 % (ref 4–12)
MUCOUS THREADS UR QL AUTO: ABNORMAL
NEUTROPHILS # BLD AUTO: 5.5 THOUSANDS/ÂΜL (ref 1.85–7.62)
NEUTS SEG NFR BLD AUTO: 73 % (ref 43–75)
NITRITE UR QL STRIP: NEGATIVE
NON-SQ EPI CELLS URNS QL MICRO: ABNORMAL /HPF
NRBC BLD AUTO-RTO: 0 /100 WBCS
PH UR STRIP.AUTO: 6 [PH]
PLATELET # BLD AUTO: 223 THOUSANDS/UL (ref 149–390)
PMV BLD AUTO: 10.9 FL (ref 8.9–12.7)
PROT SERPL-MCNC: 7.6 G/DL (ref 6.4–8.4)
PROT UR STRIP-MCNC: ABNORMAL MG/DL
RBC # BLD AUTO: 4.89 MILLION/UL (ref 3.88–5.62)
RBC #/AREA URNS AUTO: ABNORMAL /HPF
RHEUMATOID FACT SER QL LA: NEGATIVE
SP GR UR STRIP.AUTO: 1.03 (ref 1–1.03)
TRIGL SERPL-MCNC: 214 MG/DL
TSH SERPL DL<=0.05 MIU/L-ACNC: 2.52 UIU/ML (ref 0.45–4.5)
UROBILINOGEN UR STRIP-ACNC: <2 MG/DL
WBC # BLD AUTO: 7.55 THOUSAND/UL (ref 4.31–10.16)
WBC #/AREA URNS AUTO: ABNORMAL /HPF

## 2023-09-22 LAB
ANA HOMOGEN SER QL IF: NORMAL
ANA HOMOGEN TITR SER: NORMAL {TITER}
DSDNA AB SER-ACNC: 7 IU/ML
ENA RNP AB SER IA-ACNC: NEGATIVE
ENA SM AB SER IA-ACNC: NEGATIVE
ENA SM+RNP IGG SER-ACNC: NEGATIVE
ENA SS-A AB SER IA-ACNC: POSITIVE
ENA SS-B AB SER IA-ACNC: NEGATIVE

## 2023-09-26 ENCOUNTER — HOSPITAL ENCOUNTER (OUTPATIENT)
Dept: NUCLEAR MEDICINE | Facility: HOSPITAL | Age: 78
Discharge: HOME | End: 2023-09-26
Attending: INTERNAL MEDICINE
Payer: MEDICARE

## 2023-09-26 ENCOUNTER — HOSPITAL ENCOUNTER (OUTPATIENT)
Dept: NON INVASIVE DIAGNOSTICS | Facility: HOSPITAL | Age: 78
Discharge: HOME | End: 2023-09-26
Attending: INTERNAL MEDICINE
Payer: MEDICARE

## 2023-09-26 VITALS
OXYGEN SATURATION: 99 % | RESPIRATION RATE: 20 BRPM | HEART RATE: 70 BPM | SYSTOLIC BLOOD PRESSURE: 114 MMHG | HEIGHT: 71 IN | DIASTOLIC BLOOD PRESSURE: 60 MMHG | WEIGHT: 222 LBS | BODY MASS INDEX: 31.08 KG/M2

## 2023-09-26 DIAGNOSIS — R07.9 CHEST PAIN, UNSPECIFIED TYPE: ICD-10-CM

## 2023-09-26 DIAGNOSIS — E11.22 TYPE 2 DIABETES MELLITUS WITH STAGE 3B CHRONIC KIDNEY DISEASE, WITHOUT LONG-TERM CURRENT USE OF INSULIN (HCC): ICD-10-CM

## 2023-09-26 DIAGNOSIS — I10 PRIMARY HYPERTENSION: Chronic | ICD-10-CM

## 2023-09-26 DIAGNOSIS — N18.32 TYPE 2 DIABETES MELLITUS WITH STAGE 3B CHRONIC KIDNEY DISEASE, WITHOUT LONG-TERM CURRENT USE OF INSULIN (HCC): ICD-10-CM

## 2023-09-26 DIAGNOSIS — I48.91 ATRIAL FIBRILLATION, UNSPECIFIED TYPE (HCC): ICD-10-CM

## 2023-09-26 LAB
CHEST PAIN STATEMENT: NORMAL
MAX DIASTOLIC BP: 76 MMHG
MAX HEART RATE: 130 BPM
MAX PREDICTED HEART RATE: 143 BPM
MAX. SYSTOLIC BP: 122 MMHG
NUC STRESS EJECTION FRACTION: 75 %
PROTOCOL NAME: NORMAL
RATE PRESSURE PRODUCT: NORMAL
REASON FOR TERMINATION: NORMAL
SL CV REST NUCLEAR ISOTOPE DOSE: 11 MCI
SL CV STRESS NUCLEAR ISOTOPE DOSE: 33 MCI
SL CV STRESS RECOVERY BP: NORMAL MMHG
SL CV STRESS RECOVERY HR: 86 BPM
SL CV STRESS RECOVERY O2 SAT: 99 %
STRESS ANGINA INDEX: 0
STRESS BASELINE BP: NORMAL MMHG
STRESS BASELINE HR: 70 BPM
STRESS O2 SAT REST: 99 %
STRESS PEAK HR: 106 BPM
STRESS POST O2 SAT PEAK: 99 %
STRESS POST PEAK BP: 106 MMHG
STRESS/REST PERFUSION RATIO: 0.94
TARGET HR FORMULA: NORMAL
TEST INDICATION: NORMAL
TIME IN EXERCISE PHASE: NORMAL

## 2023-09-26 PROCEDURE — 93016 CV STRESS TEST SUPVJ ONLY: CPT | Performed by: INTERNAL MEDICINE

## 2023-09-26 PROCEDURE — G1004 CDSM NDSC: HCPCS

## 2023-09-26 PROCEDURE — 93018 CV STRESS TEST I&R ONLY: CPT | Performed by: INTERNAL MEDICINE

## 2023-09-26 PROCEDURE — A9502 TC99M TETROFOSMIN: HCPCS

## 2023-09-26 PROCEDURE — 93017 CV STRESS TEST TRACING ONLY: CPT

## 2023-09-26 PROCEDURE — 78452 HT MUSCLE IMAGE SPECT MULT: CPT | Performed by: INTERNAL MEDICINE

## 2023-09-26 PROCEDURE — 78452 HT MUSCLE IMAGE SPECT MULT: CPT

## 2023-09-26 RX ORDER — REGADENOSON 0.08 MG/ML
0.4 INJECTION, SOLUTION INTRAVENOUS ONCE
Status: COMPLETED | OUTPATIENT
Start: 2023-09-26 | End: 2023-09-26

## 2023-09-26 RX ADMIN — REGADENOSON 0.4 MG: 0.08 INJECTION, SOLUTION INTRAVENOUS at 10:11

## 2023-10-04 ENCOUNTER — OFFICE VISIT (OUTPATIENT)
Dept: INTERNAL MEDICINE CLINIC | Facility: CLINIC | Age: 78
End: 2023-10-04
Payer: MEDICARE

## 2023-10-04 VITALS
WEIGHT: 223 LBS | DIASTOLIC BLOOD PRESSURE: 60 MMHG | TEMPERATURE: 98.9 F | BODY MASS INDEX: 31.22 KG/M2 | OXYGEN SATURATION: 98 % | HEIGHT: 71 IN | SYSTOLIC BLOOD PRESSURE: 134 MMHG | HEART RATE: 128 BPM

## 2023-10-04 DIAGNOSIS — R25.2 MUSCLE CRAMPS: ICD-10-CM

## 2023-10-04 DIAGNOSIS — N18.32 TYPE 2 DIABETES MELLITUS WITH STAGE 3B CHRONIC KIDNEY DISEASE, WITHOUT LONG-TERM CURRENT USE OF INSULIN (HCC): ICD-10-CM

## 2023-10-04 DIAGNOSIS — R76.8 POSITIVE ANA (ANTINUCLEAR ANTIBODY): ICD-10-CM

## 2023-10-04 DIAGNOSIS — R97.20 ELEVATED PSA: ICD-10-CM

## 2023-10-04 DIAGNOSIS — M48.02 SPINAL STENOSIS OF CERVICAL REGION: ICD-10-CM

## 2023-10-04 DIAGNOSIS — R79.82 ELEVATED C-REACTIVE PROTEIN (CRP): ICD-10-CM

## 2023-10-04 DIAGNOSIS — G47.9 SLEEP DISTURBANCE: ICD-10-CM

## 2023-10-04 DIAGNOSIS — M79.10 MYALGIA: Primary | ICD-10-CM

## 2023-10-04 DIAGNOSIS — Z23 ENCOUNTER FOR VACCINATION: ICD-10-CM

## 2023-10-04 DIAGNOSIS — G30.9 ALZHEIMER'S DISEASE, UNSPECIFIED (CODE) (HCC): ICD-10-CM

## 2023-10-04 DIAGNOSIS — R25.1 TREMORS OF NERVOUS SYSTEM: ICD-10-CM

## 2023-10-04 DIAGNOSIS — E11.22 TYPE 2 DIABETES MELLITUS WITH STAGE 3B CHRONIC KIDNEY DISEASE, WITHOUT LONG-TERM CURRENT USE OF INSULIN (HCC): ICD-10-CM

## 2023-10-04 DIAGNOSIS — R41.3 MEMORY DIFFICULTIES: ICD-10-CM

## 2023-10-04 DIAGNOSIS — M54.2 CERVICALGIA: ICD-10-CM

## 2023-10-04 DIAGNOSIS — M47.812 CERVICAL FACET SYNDROME: ICD-10-CM

## 2023-10-04 PROCEDURE — G0008 ADMIN INFLUENZA VIRUS VAC: HCPCS

## 2023-10-04 PROCEDURE — 99214 OFFICE O/P EST MOD 30 MIN: CPT | Performed by: INTERNAL MEDICINE

## 2023-10-04 PROCEDURE — 90662 IIV NO PRSV INCREASED AG IM: CPT

## 2023-10-04 RX ORDER — MEMANTINE HYDROCHLORIDE 5 MG-10 MG
KIT ORAL
Qty: 49 TABLET | Refills: 0 | Status: SHIPPED | OUTPATIENT
Start: 2023-10-04

## 2023-10-04 RX ORDER — GLIMEPIRIDE 2 MG/1
2 TABLET ORAL
Qty: 90 TABLET | Refills: 3 | Status: SHIPPED | OUTPATIENT
Start: 2023-10-04 | End: 2024-09-28

## 2023-10-04 NOTE — PROGRESS NOTES
Assessment/Plan:  Problem List Items Addressed This Visit        Endocrine    Type 2 diabetes mellitus with diabetic chronic kidney disease ([address])    Relevant Medications    glimepiride (AMARYL) 2 mg tablet       Nervous and Auditory    Alzheimer's disease, unspecified (CODE) ([address])    Relevant Medications    memantine (NAMENDA TITRATION PACK)    Other Relevant Orders    Ambulatory Referral to Neurology       Other    Memory difficulties    Relevant Medications    memantine ([address])    Other Relevant Orders    Ambulatory referral to Rheumatology    Ambulatory Referral to Neurology    Ambulatory Referral to Urology   Other Visit Diagnoses     Myalgia    -  Primary    Relevant Orders    Ambulatory referral to Rheumatology    Cervicalgia        Relevant Orders    Ambulatory referral to Rheumatology    Muscle cramps        Relevant Orders    Ambulatory referral to Rheumatology    Tremors of nervous system        Relevant Orders    Ambulatory referral to Rheumatology    Ambulatory Referral to Neurology    Sleep disturbance        Elevated C-reactive protein (CRP)        Relevant Orders    Ambulatory referral to Rheumatology    Ambulatory Referral to Neurology    Positive CURRY (antinuclear antibody)        Relevant Orders    Ambulatory referral to Rheumatology    Ambulatory Referral to Neurology    Cervical facet syndrome        Spinal stenosis of cervical region        Encounter for vaccination        Relevant Orders    influenza vaccine, high-dose, PF 0.5 mL    Elevated PSA        Relevant Orders    Ambulatory Referral to Urology           Diagnoses and all orders for this visit:    Myalgia  -     Ambulatory referral to Rheumatology; Future    Cervicalgia  -     Ambulatory referral to Rheumatology; Future    Muscle cramps  -     Ambulatory referral to Rheumatology; Future    Tremors of nervous system  -     Ambulatory referral to Rheumatology; Future  -     Ambulatory Referral to Neurology;  Future    Sleep disturbance    Alzheimer's disease, unspecified (CODE) ([address])  -     Ambulatory Referral to Neurology; Future  -     memantine (NAMENDA TITRATION PACK); Follow package directions. Memory difficulties  -     Ambulatory referral to Rheumatology; Future  -     Ambulatory Referral to Neurology; Future  -     memantine (NAMENDA TITRATION PACK); Follow package directions. -     Ambulatory Referral to Urology; Future    Elevated C-reactive protein (CRP)  -     Ambulatory referral to Rheumatology; Future  -     Ambulatory Referral to Neurology; Future    Positive CURRY (antinuclear antibody)  -     Ambulatory referral to Rheumatology; Future  -     Ambulatory Referral to Neurology; Future    Type 2 diabetes mellitus with stage 3b chronic kidney disease, without long-term current use of insulin (HCC)  -     glimepiride (AMARYL) 2 mg tablet; Take 1 tablet (2 mg total) by mouth daily with breakfast    Cervical facet syndrome    Spinal stenosis of cervical region    Encounter for vaccination  -     influenza vaccine, high-dose, PF 0.5 mL    Elevated PSA  -     Ambulatory Referral to Urology; Future        No problem-specific Assessment & Plan notes found for this encounter. A/P: Stable and cramps and some muscle s/s improved with muscle relaxants. Discussed the labs. ?MS and Neuro s/s due to CTD/vasculitis. Recommend MRI, but wife reports pt claustrophobic and probably won't get it so defers. Tremors a little better on sinemet. Sugars are up. Recommend GLP-1, but defers due to insurance cost and defers insulin. Will add amaryl. Will refer to rheum and Neuro. Await cards input. Continue current treatment otherwise. ?? refer to pain management for the stenosis as it is probably contributing to some of his s/s. Suspect PSA is due to BPH, but given MS changes, will refer to  for a second opinion since MRI deferred. RTC one month for f/u. Will update his flu vaccine.      Subjective:      Patient ID: Richa Christie III is a 68 y.o. male.  RTC for f/u multiple issues. Firs, pt with atypical CP and seen by cards. Reports nuclear stress was relatively normal with ongoing Afib. Dayami Inch Next, pt with difficulty walking and c/o diffuse joint pain, muscle pain, and cramps. Labs with elevated CRP and positive CURRY. C spine xray with DDD and stenosis. Some improvement with muscle relaxant. Next, memory/dementia slightly worse and pt now with some tremors. Strong FHX of PD. Started on sinemet. Reports some improvement in tremors. . Labs with worsening sugars and slightly worse PSA. Dayami Inch The following portions of the patient's history were reviewed and updated as appropriate:   He has a past medical history of Acute on chronic renal insufficiency, Allergic rhinitis, Anemia (06/11/2012), Atrial fibrillation ([address]), Cerebrovascular accident (CVA) ([address]), Chest pain, Controlled type 2 diabetes mellitus without complication ([address]), SMALLS (dyspnea on exertion), Generalized anxiety disorder, GERD without esophagitis (06/11/2012), Hypertension, Memory difficulties (01/08/2019), Nephrolithiasis (03/17/2016), Obesity (06/11/2012), and Osteoarthritis (06/11/2012). ,  does not have any pertinent problems on file. ,   has a past surgical history that includes Total hip arthroplasty; Knee surgery; Cataract extraction, bilateral; Total hip arthroplasty (Bilateral, 2011); pr cysto bladder w/ureteral catheterization (Left, 11/8/2019); and Cystoscopy w/ laser lithotripsy (Left, 1/20/2020). ,  family history includes Arthritis in his mother; No Known Problems in his father; Parkinsonism in his mother. ,   reports that he has never smoked. He has never been exposed to tobacco smoke. He has never used smokeless tobacco. He reports that he does not drink alcohol and does not use drugs. ,  is allergic to pollen extract. .  Current Outpatient Medications   Medication Sig Dispense Refill   • glimepiride (AMARYL) 2 mg tablet Take 1 tablet (2 mg total) by mouth daily with breakfast 90 tablet 3   • memantine (NAMENDA TITRATION PACK) Follow package directions.  49 tablet 0   • ALPRAZolam (XANAX) 0.5 mg tablet Take 1 tablet (0.5 mg total) by mouth every 8 (eight) hours as needed (AS NEEDED) 90 tablet 0   • amLODIPine (NORVASC) 10 mg tablet Take 0.5 tablets (5 mg total) by mouth daily 90 tablet 3   • atorvastatin (LIPITOR) 20 mg tablet take 1 tablet by mouth once daily 90 tablet 2   • Canagliflozin 300 MG TABS Take 1 tablet (300 mg total) by mouth daily 90 tablet 1   • carbidopa-levodopa (SINEMET)  mg per tablet Take 1 tablet by mouth 3 (three) times a day 270 tablet 1   • donepezil (ARICEPT) 10 mg tablet take 1 tablet by mouth at bedtime 90 tablet 1   • DULoxetine (CYMBALTA) 30 mg delayed release capsule take 1 capsule by mouth once daily 90 capsule 1   • ergocalciferol (VITAMIN D2) 50,000 units Take 1 capsule (50,000 Units total) by mouth once a week 12 capsule 3   • furosemide (LASIX) 20 mg tablet Take one 3 days a week (MWF or T/Thu/Sat) 15 tablet 5   • gabapentin (NEURONTIN) 300 mg capsule Take 300 mg by mouth 3 (three) times a day 300mg PO x 3 days, 300mg PO 2 tabs daily for 3 days, 300mg 3 tabs daily after loadint titration     • glucose blood (FREESTYLE LITE) test strip Test twice a day 100 each 3   • glucose monitoring kit (FREESTYLE) monitoring kit Use 1 each 2 (two) times a day Test twice  Day 1 each 0   • Lancets (freestyle) lancets Test twice a day 100 each 3   • magnesium Oxide (MAG-OX) 400 mg TABS take 1 tablet by mouth every morning 90 tablet 0   • metFORMIN (GLUCOPHAGE) 850 mg tablet Take 1 tablet (850 mg total) by mouth 2 (two) times a day 180 tablet 1   • methocarbamol (ROBAXIN) 500 mg tablet Take 1 tablet (500 mg total) by mouth 4 (four) times a day 90 tablet 0   • metoprolol succinate (TOPROL-XL) 100 mg 24 hr tablet Take 1 tablet (100 mg total) by mouth daily 90 tablet 1   • rivaroxaban (Xarelto) 15 mg tablet Take 1 tablet (15 mg total) by mouth daily with breakfast 90 tablet 0     No current facility-administered medications for this visit. Review of Systems   Constitutional: Positive for activity change. Negative for chills, diaphoresis, fatigue and fever. Respiratory: Negative for cough, chest tightness, shortness of breath and wheezing. Cardiovascular: Negative for chest pain, palpitations and leg swelling. Gastrointestinal: Negative for abdominal pain, constipation, diarrhea, nausea and vomiting. Genitourinary: Negative for difficulty urinating, dysuria and frequency. Musculoskeletal: Positive for arthralgias, neck pain and neck stiffness. Negative for gait problem and myalgias. Neurological: Positive for tremors. Negative for dizziness, seizures, facial asymmetry, light-headedness, numbness and headaches. Psychiatric/Behavioral: Negative for confusion. The patient is not nervous/anxious. PHQ-2/9 Depression Screening    Little interest or pleasure in doing things: 0 - not at all  Feeling down, depressed, or hopeless: 0 - not at all  PHQ-2 Score: 0  PHQ-2 Interpretation: Negative depression screen        Objective:  Vitals:    10/04/23 1431   BP: 134/60   Pulse: (!) 128   Temp: 98.9 °F (37.2 °C)   SpO2: 98%   Weight: 101 kg (223 lb)   Height: 5' 11" (1.803 m)     Body mass index is 31.1 kg/m². Physical Exam  Vitals and nursing note reviewed. Constitutional:       General: He is not in acute distress. Appearance: Normal appearance. He is not ill-appearing. HENT:      Head: Atraumatic. Mouth/Throat:      Mouth: Mucous membranes are moist.   Eyes:      Extraocular Movements: Extraocular movements intact. Conjunctiva/sclera: Conjunctivae normal.      Pupils: Pupils are equal, round, and reactive to light. Cardiovascular:      Rate and Rhythm: Normal rate. Rhythm irregular. Heart sounds: Normal heart sounds. No murmur heard. Pulmonary:      Effort: Pulmonary effort is normal. No respiratory distress.       Breath sounds: Normal breath sounds. No wheezing, rhonchi or rales. Musculoskeletal:         General: No swelling or tenderness. Right lower leg: No edema. Left lower leg: No edema. Neurological:      General: No focal deficit present. Mental Status: He is alert. Mental status is at baseline. Comments: Some short term memory issues. Tremors right hand with distraction. Psychiatric:         Mood and Affect: Mood normal.         Thought Content:  Thought content normal.

## 2023-10-11 ENCOUNTER — OFFICE VISIT (OUTPATIENT)
Dept: CARDIOLOGY CLINIC | Facility: CLINIC | Age: 78
End: 2023-10-11
Payer: MEDICARE

## 2023-10-11 VITALS
WEIGHT: 226 LBS | SYSTOLIC BLOOD PRESSURE: 112 MMHG | HEART RATE: 84 BPM | HEIGHT: 71 IN | BODY MASS INDEX: 31.64 KG/M2 | DIASTOLIC BLOOD PRESSURE: 68 MMHG

## 2023-10-11 DIAGNOSIS — E78.2 MIXED HYPERLIPIDEMIA: ICD-10-CM

## 2023-10-11 DIAGNOSIS — I10 PRIMARY HYPERTENSION: Primary | Chronic | ICD-10-CM

## 2023-10-11 DIAGNOSIS — I48.91 ATRIAL FIBRILLATION, UNSPECIFIED TYPE (HCC): ICD-10-CM

## 2023-10-11 PROCEDURE — 99214 OFFICE O/P EST MOD 30 MIN: CPT | Performed by: NURSE PRACTITIONER

## 2023-10-11 NOTE — PATIENT INSTRUCTIONS
Have blood work done in 1 week.   Does not have to be fasting RECEIVED REPORT FROM MAISHA SEYMOUR RN

## 2023-10-11 NOTE — PROGRESS NOTES
Patient ID: Savanna Ortega is a 68 y.o. male. Plan:      Hypertension  Well-controlled on current regimen  Continue amlodipine 5 mg daily, Lasix 20 mg 3 times per week    Atrial fibrillation (HCC)  Rate controlled; metoprolol 100 mg daily  Xarelto 15 mg for stroke risk reduction    Hyperlipidemia  Continue Lipitor       Follow up Plan/Summary Comments: We reviewed the results of cardiac testing today. No concerns for ischemia on stress test.  Rate control is adequate. He is doing well on his new blood pressure medication regimen with reduced amlodipine and addition of furosemide. He had blood work performed, however this was shortly after initiating the Lasix. I did ask for them to have a repeat BMP in 1 week to monitor renal function and electrolytes now that he has been on this regimen for about a month. I have not recommended any medication changes or additional testing at this time. Follow-up in the office in 1 year or sooner if needed. HPI: Megan Pacheco is seen in the office today for a follow-up visit. Megan Pacheco is a pleasant 70-year-old male evaluated 1 month ago as a new patient for a hospital follow-up visit. Cardiac history is significant for chronic hypertension and chronic atrial fibrillation. He is maintained on [address] Megan Pacheco has been feeling well from a cardiac perspective. He denies any chest discomfort suggestive of angina. He does get some "cramping" in his shoulders and occasionally his ankle. There have been no changes in mild exertional dyspnea which is attributed to deconditioning. At his last office visit, amlodipine dosing was reduced and furosemide added 3 times weekly. He notes improvement in his chronic right lower extremity edema on this regimen and blood pressure readings have been well controlled. There have been no new symptoms suggestive of volume overload or arrhythmia.   His wife informed me that he was recently diagnosed with Parkinson's and started on a new medication for that. There is no report of bleeding problems on oral anticoagulation. He underwent recommended testing including Holter monitor and nuclear stress test; see results below. Review of Systems   10  point ROS  was otherwise non pertinent or negative except as per HPI or as below. Gait: Slightly ataxic    Most recent or relevant cardiac/vascular testing:    Echocardiogram-8/2023  EF 55%  Moderate LAE  Moderately dilated right ventricle with reduced systolic function  Mild AI  Mild to moderate MR  Mild TR with elevated pulmonary pressures, 55 mmHg  Moderately dilated aortic root, 4.6 cm and ascending aorta, 4.2 cm    Nuclear stress test 9/26/2023  Cannot exclude small fixed apical defect, no significant reversibility on perfusion imaging    Holter monitor 9/15/2023  IMPRESSION:  The patient had Afib throughout. Heart rate varied from 45 bpm to 117 bpm.  The patient’s average heart rate was 72 bpm.    The patient had a holter monitor tracing done for 23 hours and 59 minutes. The patient had rare ventricular ectopic activity versus Paul Phenomenon. No VT. The patient had no additional supraventricular ectopy. The longest R/R interval was 2.3 seconds. No other major arrhythmia was noted. No diary sx attached. Objective:     /68   Pulse 84   Ht 5' 11" (1.803 m)   Wt 103 kg (226 lb)   BMI 31.52 kg/m²     PHYSICAL EXAM:    General:  Normal appearance, no acute distress  Eyes:  Anicteric. Oral mucosa:  Moist.  Neck:  No JVD. Carotid upstrokes are brisk without bruits. No masses. Chest:  Clear to auscultation   Cardiac: Irregularly irregular. No palpable PMI. Normal S1 and S2. No murmur gallop or rub. Abdomen:  Soft and nontender. No palpable organomegaly or aortic enlargement. Extremities:  No peripheral edema. Musculoskeletal:  Symmetric. Vascular:  Neuro:  Grossly symmetric. Psych:  Alert and oriented x3.     Allergies   Allergen Reactions Pollen Extract Itching     Runny nose, itchy eyes       Current Outpatient Medications:     ALPRAZolam (XANAX) 0.5 mg tablet, Take 1 tablet (0.5 mg total) by mouth every 8 (eight) hours as needed (AS NEEDED), Disp: 90 tablet, Rfl: 0    amLODIPine (NORVASC) 10 mg tablet, Take 0.5 tablets (5 mg total) by mouth daily, Disp: 90 tablet, Rfl: 3    atorvastatin (LIPITOR) 20 mg tablet, take 1 tablet by mouth once daily, Disp: 90 tablet, Rfl: 2    Canagliflozin 300 MG TABS, Take 1 tablet (300 mg total) by mouth daily, Disp: 90 tablet, Rfl: 1    carbidopa-levodopa (SINEMET)  mg per tablet, Take 1 tablet by mouth 3 (three) times a day, Disp: 270 tablet, Rfl: 1    donepezil (ARICEPT) 10 mg tablet, take 1 tablet by mouth at bedtime, Disp: 90 tablet, Rfl: 1    DULoxetine (CYMBALTA) 30 mg delayed release capsule, take 1 capsule by mouth once daily, Disp: 90 capsule, Rfl: 1    ergocalciferol (VITAMIN D2) 50,000 units, Take 1 capsule (50,000 Units total) by mouth once a week, Disp: 12 capsule, Rfl: 3    furosemide (LASIX) 20 mg tablet, Take one 3 days a week (MWF or T/Thu/Sat), Disp: 15 tablet, Rfl: 5    gabapentin (NEURONTIN) 300 mg capsule, Take 300 mg by mouth 3 (three) times a day 300mg PO x 3 days, 300mg PO 2 tabs daily for 3 days, 300mg 3 tabs daily after loadint titration, Disp: , Rfl:     glimepiride (AMARYL) 2 mg tablet, Take 1 tablet (2 mg total) by mouth daily with breakfast, Disp: 90 tablet, Rfl: 3    glucose blood (FREESTYLE LITE) test strip, Test twice a day, Disp: 100 each, Rfl: 3    glucose monitoring kit (FREESTYLE) monitoring kit, Use 1 each 2 (two) times a day Test twice  Day, Disp: 1 each, Rfl: 0    Lancets (freestyle) lancets, Test twice a day, Disp: 100 each, Rfl: 3    magnesium Oxide (MAG-OX) 400 mg TABS, take 1 tablet by mouth every morning, Disp: 90 tablet, Rfl: 0    memantine (NAMENDA TITRATION PACK), Follow package directions. , Disp: 49 tablet, Rfl: 0    metFORMIN (GLUCOPHAGE) 850 mg tablet, Take 1 tablet (850 mg total) by mouth 2 (two) times a day, Disp: 180 tablet, Rfl: 1    methocarbamol (ROBAXIN) 500 mg tablet, Take 1 tablet (500 mg total) by mouth 4 (four) times a day, Disp: 90 tablet, Rfl: 0    metoprolol succinate (TOPROL-XL) 100 mg 24 hr tablet, Take 1 tablet (100 mg total) by mouth daily, Disp: 90 tablet, Rfl: 1    rivaroxaban (Xarelto) 15 mg tablet, Take 1 tablet (15 mg total) by mouth daily with breakfast, Disp: 90 tablet, Rfl: 0  Past Medical History:   Diagnosis Date    Acute on chronic renal insufficiency     Allergic rhinitis     Anemia 06/11/2012    Atrial fibrillation (HCC)     Cerebrovascular accident (CVA) ([address])     Chest pain     Controlled type 2 diabetes mellitus without complication ([address])     SMALLS (dyspnea on exertion)     Generalized anxiety disorder     GERD without esophagitis 06/11/2012    Hypertension     Memory difficulties 01/08/2019    Nephrolithiasis 03/17/2016    Obesity 06/11/2012    Osteoarthritis 06/11/2012     Past Surgical History:   Procedure Laterality Date    CATARACT EXTRACTION, BILATERAL      CYSTOSCOPY W/ LASER LITHOTRIPSY Left 1/20/2020    Procedure: CYSTOSCOPY; RETROGRADE; URETEROSCOPY; STONE EXTRACTION; POSSIBLE HOLMIUM LASER LITHOTRIPSY;  Surgeon: Camille Wong MD;  Location: [address] [place] [address] OR;  Service: Urology    KNEE SURGERY      MN CYSTO BLADDER W/URETERAL CATHETERIZATION Left 11/8/2019    Procedure: CYSTOSCOPY RETROGRADE PYELOGRAM WITH INSERTION STENT URETERAL;  Surgeon: Camille Wong MD;  Location: [address] [place] [address];  Service: Urology    TOTAL HIP ARTHROPLASTY      TOTAL HIP ARTHROPLASTY Bilateral 2011       CMP:   Lab Results   Component Value Date     09/30/2014    K 4.8 09/19/2023    K 5.1 09/30/2014     09/19/2023     09/30/2014    CO2 23 09/19/2023    CO2 22.2 09/30/2014    BUN 19 09/19/2023    BUN 28 (H) 09/30/2014    CREATININE 1.70 (H) 09/19/2023    CREATININE 1.27 09/30/2014    GLUCOSE 150 (H) 09/30/2014    EGFR 38 09/19/2023     Lipid Profile: Lab Results   Component Value Date    CHOL 179 09/30/2014    TRIG 214 (H) 09/20/2023    TRIG 85 09/30/2014    HDL 46 09/20/2023    HDL 61 09/30/2014         Social History     Tobacco Use   Smoking Status Never    Passive exposure: Never   Smokeless Tobacco Never

## 2023-11-06 DIAGNOSIS — G30.9 ALZHEIMER'S DISEASE, UNSPECIFIED (CODE) (HCC): ICD-10-CM

## 2023-11-06 DIAGNOSIS — R41.3 MEMORY DIFFICULTIES: ICD-10-CM

## 2023-11-06 RX ORDER — MEMANTINE HYDROCHLORIDE 5 MG-10 MG
KIT ORAL
Qty: 49 TABLET | Refills: 0 | Status: SHIPPED | OUTPATIENT
Start: 2023-11-06

## 2023-11-13 DIAGNOSIS — E11.22 TYPE 2 DIABETES MELLITUS WITH STAGE 3B CHRONIC KIDNEY DISEASE, WITHOUT LONG-TERM CURRENT USE OF INSULIN (HCC): ICD-10-CM

## 2023-11-13 DIAGNOSIS — N18.32 TYPE 2 DIABETES MELLITUS WITH STAGE 3B CHRONIC KIDNEY DISEASE, WITHOUT LONG-TERM CURRENT USE OF INSULIN (HCC): ICD-10-CM

## 2023-11-14 RX ORDER — CANAGLIFLOZIN 300 MG/1
300 TABLET, FILM COATED ORAL
Qty: 90 TABLET | Refills: 1 | Status: SHIPPED | OUTPATIENT
Start: 2023-11-14

## 2023-11-20 ENCOUNTER — OFFICE VISIT (OUTPATIENT)
Dept: INTERNAL MEDICINE CLINIC | Facility: CLINIC | Age: 78
End: 2023-11-20
Payer: MEDICARE

## 2023-11-20 VITALS
DIASTOLIC BLOOD PRESSURE: 74 MMHG | OXYGEN SATURATION: 99 % | WEIGHT: 231 LBS | HEIGHT: 71 IN | HEART RATE: 90 BPM | TEMPERATURE: 97.6 F | BODY MASS INDEX: 32.34 KG/M2 | SYSTOLIC BLOOD PRESSURE: 132 MMHG

## 2023-11-20 DIAGNOSIS — M79.10 MYALGIA: ICD-10-CM

## 2023-11-20 DIAGNOSIS — R76.8 POSITIVE ANA (ANTINUCLEAR ANTIBODY): ICD-10-CM

## 2023-11-20 DIAGNOSIS — R25.2 MUSCLE CRAMPS: ICD-10-CM

## 2023-11-20 DIAGNOSIS — G47.9 SLEEP DISTURBANCE: ICD-10-CM

## 2023-11-20 DIAGNOSIS — R26.9 GAIT ABNORMALITY: ICD-10-CM

## 2023-11-20 DIAGNOSIS — R97.20 ELEVATED PSA: ICD-10-CM

## 2023-11-20 DIAGNOSIS — N18.32 TYPE 2 DIABETES MELLITUS WITH STAGE 3B CHRONIC KIDNEY DISEASE, WITHOUT LONG-TERM CURRENT USE OF INSULIN (HCC): Primary | ICD-10-CM

## 2023-11-20 DIAGNOSIS — M48.02 SPINAL STENOSIS OF CERVICAL REGION: ICD-10-CM

## 2023-11-20 DIAGNOSIS — G30.9 ALZHEIMER'S DISEASE, UNSPECIFIED (CODE) (HCC): ICD-10-CM

## 2023-11-20 DIAGNOSIS — E11.22 TYPE 2 DIABETES MELLITUS WITH STAGE 3B CHRONIC KIDNEY DISEASE, WITHOUT LONG-TERM CURRENT USE OF INSULIN (HCC): Primary | ICD-10-CM

## 2023-11-20 DIAGNOSIS — R25.1 TREMORS OF NERVOUS SYSTEM: ICD-10-CM

## 2023-11-20 DIAGNOSIS — R79.82 ELEVATED C-REACTIVE PROTEIN (CRP): ICD-10-CM

## 2023-11-20 PROCEDURE — 99214 OFFICE O/P EST MOD 30 MIN: CPT | Performed by: INTERNAL MEDICINE

## 2023-11-20 NOTE — PROGRESS NOTES
Assessment/Plan:  Problem List Items Addressed This Visit        Endocrine    Type 2 diabetes mellitus with diabetic chronic kidney disease ([address]) - Primary       Nervous and Auditory    Alzheimer's disease, unspecified (CODE) ([address])       Other    Sleep disturbance   Other Visit Diagnoses     Myalgia        Muscle cramps        Tremors of nervous system        Relevant Orders    Ambulatory referral to Physical Therapy    Positive CURRY (antinuclear antibody)        Elevated C-reactive protein (CRP)        Elevated PSA        Spinal stenosis of cervical region        Gait abnormality        Relevant Orders    Ambulatory referral to Physical Therapy           Diagnoses and all orders for this visit:    Type 2 diabetes mellitus with stage 3b chronic kidney disease, without long-term current use of insulin (HCC)    Alzheimer's disease, unspecified (CODE) ([address])    Myalgia    Muscle cramps    Tremors of nervous system  -     Ambulatory referral to Physical Therapy; Future    Positive CURRY (antinuclear antibody)    Elevated C-reactive protein (CRP)    Elevated PSA    Spinal stenosis of cervical region    Gait abnormality  -     Ambulatory referral to Physical Therapy; Future    Sleep disturbance        No problem-specific Assessment & Plan notes found for this encounter. A/P: Stable. Tolerating meds. Appreciate cards input. Sugars, pain, and tremors better. Will continue current meds. Hold on increasing namenda. Discussed sleep issues and appears to bother the wife more than the pt. Pt wants to stay up late. Discussed sleep hygiene and sleep affecting his other comorbidity. To start OTC melatonin. If persists, consider trazodone or possible seroquel. Refer to PT for his gait and tremors. RTC one month for routine. Subjective:      Patient ID: Michela Bo is a 68 y.o. male. WM RTC for f/u multiple issues. First, uncontrolled sugars after pt deferred insulin and newer OAD. Amaryl started.  Reports sugars are doing better and no low sugar events. . Next muscle pain and cramping that was improving with muscle relaxants and roxy. Also, c spine stenosis with pain and radicular s/s. Referred to pain management. Reports not being seen yet, but roxy helping the pain. . Third, dementia with pt declining MRI. Added namenda and reports no worsening. Tremors doing much better on sinemet and roxy. Finally, elevated CTD markers and referred to neuro and rheum, but has yet to get in. Still with sleep issues with pt staying up late and sleeping in late. The following portions of the patient's history were reviewed and updated as appropriate:   He has a past medical history of Acute on chronic renal insufficiency, Allergic rhinitis, Anemia (06/11/2012), Atrial fibrillation ([address]), Cerebrovascular accident (CVA) ([address]), Chest pain, Controlled type 2 diabetes mellitus without complication ([address]), SMALLS (dyspnea on exertion), Generalized anxiety disorder, GERD without esophagitis (06/11/2012), Hypertension, Memory difficulties (01/08/2019), Nephrolithiasis (03/17/2016), Obesity (06/11/2012), and Osteoarthritis (06/11/2012). ,  does not have any pertinent problems on file. ,   has a past surgical history that includes Total hip arthroplasty; Knee surgery; Cataract extraction, bilateral; Total hip arthroplasty (Bilateral, 2011); pr cysto bladder w/ureteral catheterization (Left, 11/8/2019); and Cystoscopy w/ laser lithotripsy (Left, 1/20/2020). ,  family history includes Arthritis in his mother; No Known Problems in his father; Parkinsonism in his mother. ,   reports that he has never smoked. He has never been exposed to tobacco smoke. He has never used smokeless tobacco. He reports that he does not drink alcohol and does not use drugs. ,  is allergic to pollen extract. .  Current Outpatient Medications   Medication Sig Dispense Refill   • ALPRAZolam (XANAX) 0.5 mg tablet Take 1 tablet (0.5 mg total) by mouth every 8 (eight) hours as needed (AS NEEDED) 90 tablet 0   • amLODIPine (NORVASC) 10 mg tablet Take 0.5 tablets (5 mg total) by mouth daily 90 tablet 3   • atorvastatin (LIPITOR) 20 mg tablet take 1 tablet by mouth once daily 90 tablet 2   • carbidopa-levodopa (SINEMET)  mg per tablet Take 1 tablet by mouth 3 (three) times a day 270 tablet 1   • donepezil (ARICEPT) 10 mg tablet take 1 tablet by mouth at bedtime 90 tablet 1   • DULoxetine (CYMBALTA) 30 mg delayed release capsule take 1 capsule by mouth once daily 90 capsule 1   • ergocalciferol (VITAMIN D2) 50,000 units Take 1 capsule (50,000 Units total) by mouth once a week 12 capsule 3   • furosemide (LASIX) 20 mg tablet Take one 3 days a week (MWF or T/Thu/Sat) 15 tablet 5   • gabapentin (NEURONTIN) 300 mg capsule Take 300 mg by mouth 3 (three) times a day 300mg PO x 3 days, 300mg PO 2 tabs daily for 3 days, 300mg 3 tabs daily after loadint titration     • glimepiride (AMARYL) 2 mg tablet Take 1 tablet (2 mg total) by mouth daily with breakfast 90 tablet 3   • glucose blood (FREESTYLE LITE) test strip Test twice a day 100 each 3   • glucose monitoring kit (FREESTYLE) monitoring kit Use 1 each 2 (two) times a day Test twice  Day 1 each 0   • Invokana 300 MG TABS take 1 tablet by mouth daily BEFORE THE FIRST MEAL OF THE DAY 90 tablet 1   • Lancets (freestyle) lancets Test twice a day 100 each 3   • magnesium Oxide (MAG-OX) 400 mg TABS take 1 tablet by mouth every morning 90 tablet 0   • memantine (NAMENDA TITRATION PACK) Follow package directions.  49 tablet 0   • metFORMIN (GLUCOPHAGE) 850 mg tablet Take 1 tablet (850 mg total) by mouth 2 (two) times a day 180 tablet 1   • methocarbamol (ROBAXIN) 500 mg tablet Take 1 tablet (500 mg total) by mouth 4 (four) times a day 90 tablet 0   • metoprolol succinate (TOPROL-XL) 100 mg 24 hr tablet Take 1 tablet (100 mg total) by mouth daily 90 tablet 1   • rivaroxaban (Xarelto) 15 mg tablet Take 1 tablet (15 mg total) by mouth daily with breakfast 90 tablet 0     No current facility-administered medications for this visit. Review of Systems   Constitutional:  Negative for activity change, chills, diaphoresis, fatigue and fever. HENT: Negative. Eyes:  Negative for visual disturbance. Respiratory:  Negative for cough, chest tightness, shortness of breath and wheezing. Cardiovascular:  Negative for chest pain, palpitations and leg swelling. Gastrointestinal:  Negative for abdominal pain, constipation, diarrhea, nausea and vomiting. Endocrine: Negative for cold intolerance and heat intolerance. Genitourinary:  Negative for difficulty urinating, dysuria and frequency. Musculoskeletal:  Positive for gait problem. Negative for arthralgias and myalgias. Neurological:  Negative for dizziness, seizures, syncope, weakness, light-headedness and headaches. Memory issues. Psychiatric/Behavioral:  Negative for confusion, dysphoric mood and sleep disturbance. The patient is not nervous/anxious. PHQ-2/9 Depression Screening          Objective:  Vitals:    11/20/23 0833   BP: 132/74   Pulse: 90   Temp: 97.6 °F (36.4 °C)   SpO2: 99%   Weight: 105 kg (231 lb)   Height: 5' 11" (1.803 m)     Body mass index is 32.22 kg/m². Physical Exam  Vitals and nursing note reviewed. Constitutional:       General: He is not in acute distress. Appearance: Normal appearance. He is not ill-appearing. HENT:      Head: Normocephalic and atraumatic. Mouth/Throat:      Mouth: Mucous membranes are moist.   Eyes:      Extraocular Movements: Extraocular movements intact. Conjunctiva/sclera: Conjunctivae normal.      Pupils: Pupils are equal, round, and reactive to light. Cardiovascular:      Rate and Rhythm: Normal rate. Rhythm irregular. Heart sounds: Normal heart sounds. No murmur heard. Pulmonary:      Effort: Pulmonary effort is normal. No respiratory distress. Breath sounds: Normal breath sounds. No wheezing, rhonchi or rales. Musculoskeletal:      Right lower leg: No edema. Left lower leg: No edema. Neurological:      General: No focal deficit present. Mental Status: He is alert and oriented to person, place, and time. Mental status is at baseline. Psychiatric:         Mood and Affect: Mood normal.         Behavior: Behavior normal.         Thought Content:  Thought content normal.         Judgment: Judgment normal.

## 2023-12-11 ENCOUNTER — EVALUATION (OUTPATIENT)
Dept: PHYSICAL THERAPY | Facility: CLINIC | Age: 78
End: 2023-12-11
Payer: MEDICARE

## 2023-12-11 DIAGNOSIS — G30.9 ALZHEIMER'S DISEASE, UNSPECIFIED (CODE) (HCC): ICD-10-CM

## 2023-12-11 DIAGNOSIS — R26.9 GAIT ABNORMALITY: ICD-10-CM

## 2023-12-11 DIAGNOSIS — R41.3 MEMORY DIFFICULTIES: ICD-10-CM

## 2023-12-11 DIAGNOSIS — R25.1 TREMORS OF NERVOUS SYSTEM: ICD-10-CM

## 2023-12-11 PROCEDURE — 97162 PT EVAL MOD COMPLEX 30 MIN: CPT | Performed by: PHYSICAL THERAPIST

## 2023-12-11 PROCEDURE — 97112 NEUROMUSCULAR REEDUCATION: CPT | Performed by: PHYSICAL THERAPIST

## 2023-12-11 RX ORDER — MEMANTINE HYDROCHLORIDE 10 MG/1
10 TABLET ORAL 2 TIMES DAILY
Qty: 180 TABLET | Refills: 1 | Status: SHIPPED | OUTPATIENT
Start: 2023-12-11

## 2023-12-11 NOTE — PROGRESS NOTES
PT Evaluation     Today's date: 2023  Patient name: Abbie Light III  : 1945  MRN: [id number]  Referring provider: Eulalio Rodríguez DO  Dx:   Encounter Diagnosis     ICD-10-CM    1. Tremors of nervous system  R25.1 Ambulatory referral to Physical Therapy      2. Gait abnormality  R26.9 Ambulatory referral to Physical Therapy                     Assessment  Assessment details: Pt is a 66 YOM referred to OPPT for gait and tremors. Pt demo decreased B LE strength, impaired balance, and is considered a high fall risk. He also has chronic R knee pain which contributes to gait dysfunction and has poor activity tolerance with steps. Due to above impairments, pt demo difficulty with stairs and ambulation. Pt would benefit from skilled PT to maximize functional mobility, decrease fall risk, improve strength, balance, and endurance, and improve QOL. He likely will respond well to boxing for HI exercise. Goals  ST weeks  Independent with HEP  5 x STS with min A or less to demo improved LE strength and balance     LT weeks  FGA improve by 5 points or > to demo improved balance and decreased fall risk   Independent with updated HEP to self manage and maintain progress outside of PT  Start going to gym to carry over exercises       Plan  Planned therapy interventions: patient education, balance, neuromuscular re-education, coordination, strengthening, stretching, therapeutic activities, therapeutic exercise, gait training, flexibility and home exercise program  Frequency: 2x week  Duration in weeks: 8  Plan of Care beginning date: 2023  Plan of Care expiration date: 3/11/2024  Treatment plan discussed with: PTA and patient        Subjective Evaluation    History of Present Illness  Mechanism of injury: Pt is a 66 YOM referred to OPPT for tremors and gait. Has intermittent R UE tremors and feels like he has internal tremors. Was told that he may have the initial sxs of PD.  Does have family hx of PD. Has not seen neurologist yet due to difficulty getting in but is on carbo/levo. Feels like his walking and balance is pretty good. Has flat feet and foot is collapsing down. R foot turns out due to knee and has knee pain. Gets some n/t in R hand. R hand tremors is not all the time. Used to do [employer] and enjoyed doing the speed bag. The gym stopped offering it but really enjoyed speed bag. Used to wrestle in high school.    Patient Goals  Patient goal: no goals  Pain  Current pain ratin  Location: LBP, R knee          Objective      Balance Test    6 Minute Walk Test (ft):    FGA    Gait Speed (ft/s): 0.67 m/s    5x Sit To Stand (s): Unable    TUG: 10.59           Coordination Left Right   Heel To Ayon WNL WNL   Finger To Nose     Rapid Alternating Movement     UE     LE WNL WNL       Sensation Left Right   Kinesthesia     Light Touch intact intact   Sharp/Dull     2 Point Discrimination         Manual Muscle Testing - Hip Left Right   Flexion 4 4   Extension     Abduction     External Rotation       Manual Muscle Testing - Knee Left Right   Flexion 5 5   Extension 5 5     Manual Muscle Testing - Ankle Left Right   Doriflexion 5 5   Plantarflexion          Gait Assessment: forward flexed posture, no R push off, R genu valugs, R LE ER          Re-eval Date: 24    Date        Visit Count 1       FOTO        Pain In        Pain Out        Vitals             Precautions HTN, DM, hx of CVA, afib, chronic R knee pain         Manuals                                        Neuro Re-Ed         Boxing         HKM         Amb with HT/HN         Hurdles        Sidestepping        STS - raised chair                 Education  On PD, HIGT, boxing 10 min        Ther Ex        Nustep                                                                 Ther Activity                        Gait Training        Big walking                 Modalities

## 2023-12-14 ENCOUNTER — OFFICE VISIT (OUTPATIENT)
Dept: PHYSICAL THERAPY | Facility: CLINIC | Age: 78
End: 2023-12-14
Payer: MEDICARE

## 2023-12-14 DIAGNOSIS — R26.9 GAIT ABNORMALITY: ICD-10-CM

## 2023-12-14 DIAGNOSIS — R25.1 TREMORS OF NERVOUS SYSTEM: Primary | ICD-10-CM

## 2023-12-14 PROCEDURE — 97112 NEUROMUSCULAR REEDUCATION: CPT | Performed by: PHYSICAL THERAPIST

## 2023-12-14 PROCEDURE — 97110 THERAPEUTIC EXERCISES: CPT | Performed by: PHYSICAL THERAPIST

## 2023-12-14 NOTE — PROGRESS NOTES
Daily Note     Today's date: 2023  Patient name: Jason Gonzalez III  : 1945  MRN: [id number]  Referring provider: Francis Irwin DO  Dx:   Encounter Diagnosis     ICD-10-CM    1. Tremors of nervous system  R25.1       2. Gait abnormality  R26.9                      Subjective: No new complaints. Objective: See treatment diary below      Assessment: Initiated POC which pt tolerated fair. During LSVT - BIG standard exercises, he was limited due to B shoulder pain and B knee pain and needed rest breaks between each exercise for soreness. He did demo some instability during sideways step and reach. Reviewed HEP with his wife Libra at the end of the session. Recommended pt perform MDE 1x/day for now. Patient would benefit from continued PT      Plan: Progress treatment as tolerated.        Re-eval Date: 24    Date       Visit Count 1 2      FOTO        Pain In        Pain Out        Vitals             Precautions HTN, DM, hx of CVA, afib, chronic R knee pain         Manuals                                        Neuro Re-Ed         Boxing         HKM         Amb with HT/HN         Hurdles        Sidestepping        STS - raised chair         LSVT - BIG   Standard MDE  10x       Education  On PD, HIGT, boxing 10 min        Ther Ex        Nustep for CV endurance   L3, 10 min for CV endurance                                                              Ther Activity                        Gait Training        Big walking   50' x 4              Modalities

## 2023-12-18 ENCOUNTER — OFFICE VISIT (OUTPATIENT)
Dept: PHYSICAL THERAPY | Facility: CLINIC | Age: 78
End: 2023-12-18
Payer: MEDICARE

## 2023-12-18 DIAGNOSIS — R25.1 TREMORS OF NERVOUS SYSTEM: ICD-10-CM

## 2023-12-18 DIAGNOSIS — R26.9 GAIT ABNORMALITY: Primary | ICD-10-CM

## 2023-12-18 PROCEDURE — 97112 NEUROMUSCULAR REEDUCATION: CPT | Performed by: PHYSICAL THERAPIST

## 2023-12-18 PROCEDURE — 97110 THERAPEUTIC EXERCISES: CPT | Performed by: PHYSICAL THERAPIST

## 2023-12-18 NOTE — PROGRESS NOTES
Daily Note     Today's date: 2023  Patient name: Kaleb Ching III  : 1945  MRN: [id number]  Referring provider: Wilfrid Mills DO  Dx:   Encounter Diagnosis     ICD-10-CM    1. Gait abnormality  R26.9       2. Tremors of nervous system  R25.1                      Subjective: Knees are killing him today. Forgot his papers in [place] since he did with his daughter.       Objective: See treatment diary below      Assessment: Required occasional shaping and frequent VC to perform MDE correctly. Had one posterior LOB onto mat during sideways step and reach. He fatigued easily so required multiple rest breaks throughout the session. Patient would benefit from continued PT      Plan: Progress treatment as tolerated.       Re-eval Date: 24    Date      Visit Count 1 2 3     FOTO        Pain In        Pain Out        Vitals             Precautions HTN, DM, hx of CVA, afib, chronic R knee pain         Manuals                                        Neuro Re-Ed         Boxing         HKM         Amb with HT/HN         Hurdles        Sidestepping        STS - raised chair         LSVT - BIG   Standard MDE  10x  10x     Education  On PD, HIGT, boxing 10 min        Ther Ex        Nustep for CV endurance   L3, 10 min for CV endurance L2, 10 min for CV endurance                                                             Ther Activity                        Gait Training        Big walking   50' x 4              Modalities

## 2023-12-19 ENCOUNTER — APPOINTMENT (OUTPATIENT)
Dept: PHYSICAL THERAPY | Facility: CLINIC | Age: 78
End: 2023-12-19
Payer: MEDICARE

## 2023-12-19 NOTE — PATIENT INSTRUCTIONS
Basic Carbohydrate Counting   AMBULATORY CARE:   Carbohydrate counting  is a way to plan your meals by counting the amount of carbohydrate in foods. Carbohydrates are the sugars, starches, and fiber found in fruit, grains, vegetables, and milk products. Carbohydrates increase your blood sugar levels. Carbohydrate counting can help you eat the right amount of carbohydrate to keep your blood sugar levels under control.   What you need to know about planning meals using carbohydrate counting:  A dietitian or healthcare provider will help you develop a healthy meal plan that works best for you. You will be taught how much carbohydrate to eat or drink for each meal and snack. Your meal plan will be based on your age, weight, usual food intake, and physical activity level. If you have diabetes, it will also include your blood sugar levels and diabetes medicine. Once you know how much carbohydrate you should eat, you can decide what type of food you want to eat.    You will need to know what foods contain carbohydrate and how much they contain. Keep track of the amount of carbohydrate in meals and snacks in order to follow your meal plan. Do not avoid carbohydrates or skip meals. Your blood sugar may fall too low if you do not eat enough carbohydrate or you skip meals.    Foods that contain carbohydrate:   Breads:  Each serving of food listed below contains about 15 g of carbohydrate .    1 slice of bread (1 ounce) or 1 flour or corn tortilla (6 inch)    ½ of a hamburger bun or ¼ of a large bagel (about 1 ounce)    1 pancake (about 4 inches across and ¼ inch thick)    Cereals and grains:  Serving sizes of ready-to-eat cereals vary. Look at the serving size and the total carbohydrate amount listed on the food label. Each serving of food listed below contains about 15 g of carbohydrate .    ¾ cup of dry, unsweetened, ready-to-eat cereal or ¼ cup of low-fat granola     ½ cup of oatmeal or other cooked cereal     ? cup of  cooked rice or pasta    Starchy vegetables and beans:  Each serving of food listed below contains about 15 g of carbohydrate .    ½ cup of corn, green peas, sweet potatoes, or mashed potatoes    ¼ of a large baked potato    ½ cup of beans, lentils, and peas (garbanzo, wisdom, kidney, white, split, black-eyed)    Crackers and snacks:  Each serving of food listed below contains about 15 g of carbohydrate .    3 derick cracker squares or 8 animal crackers     6 saltine-type crackers    3 cups of popcorn or ¾ ounce of pretzels, potato chips, or tortilla chips    Fruit:  Each serving of food listed below contains about 15 g of carbohydrate .    1 small (4 ounce) piece of fresh fruit or ¾ to 1 cup of fresh fruit    ½ cup of canned or frozen fruit, packed in natural juice    ½ cup (4 ounces) of unsweetened fruit juice    2 tablespoons of dried fruit    Desserts or sugary foods:  Each serving of food listed below contains about 15 g of carbohydrate .    2-inch square unfrosted cake or brownie     2 small cookies    ½ cup of ice cream, frozen yogurt, or nondairy frozen yogurt    ¼ cup of sherbet or sorbet    1 tablespoon of regular syrup, jam, or jelly    2 tablespoons of light syrup    Milk and yogurt:  Foods from the milk group contain about 12 g of carbohydrate per serving.    1 cup of fat-free or low-fat milk    1 cup of soy milk    ? cup of fat-free, yogurt sweetened with artificial sweetener    Non-starchy vegetables:  Each serving contains about 5 g of carbohydrate . Three servings of non-starch vegetables count as 1 carbohydrate serving.     ½ cup of cooked vegetables or 1 cup of raw vegetables. This includes beets, broccoli, cabbage, cauliflower, cucumber, mushrooms, tomatoes, and zucchini    ½ cup of vegetable juice    How to use carbohydrate counting to plan meals:   Count carbohydrate amounts using serving sizes:      Pasta dinner example:  You plan to have pasta, tossed salad, and an 8-ounce glass of milk. Your  healthcare provider tells you that you may have 4 carbohydrate servings for dinner. One carbohydrate serving of pasta is ? cup. One cup of pasta will equal 3 carbohydrate servings. An 8-ounce glass of milk will count as 1 carbohydrate serving. These amounts of food would equal 4 carbohydrate servings. One cup of tossed salad does not count toward your carbohydrate servings.       Count carbohydrate amounts using food labels:  Find the total amount of carbohydrate in a packaged food by reading the food label. Food labels tell you the serving size of the food and the total carbohydrate amount in each serving. Find the serving size on the food label and then decide how many servings you will eat. Multiply the number of servings you plan to eat by the carbohydrate amount per serving.     Granola bar snack example:  Your meal plan allows you to have 2 carbohydrate servings (30 grams) of carbohydrate for a snack. You plan to eat 1 package of granola bars, which contains 2 bars. According to the food label, the serving size of food in this package is 1 bar. Each serving (1 bar) contains 25 grams of carbohydrate. The total amount of carbohydrate in this package of granola bars would be 50 g. Based on your meal plan, you should eat only 1 bar.      Follow up with your doctor as directed:  Write down your questions so you remember to ask them during your visits.  © Copyright [employer] 2023 Information is for End User's use only and may not be sold, redistributed or otherwise used for commercial purposes.  The above information is an  only. It is not intended as medical advice for individual conditions or treatments. Talk to your doctor, nurse or pharmacist before following any medical regimen to see if it is safe and effective for you.

## 2023-12-21 ENCOUNTER — APPOINTMENT (OUTPATIENT)
Dept: PHYSICAL THERAPY | Facility: CLINIC | Age: 78
End: 2023-12-21
Payer: MEDICARE

## 2023-12-25 NOTE — PATIENT INSTRUCTIONS
Kidney Stones   WHAT YOU NEED TO KNOW:   What is a kidney stone? Kidney stones form in the urinary system when the water and waste in your urine are out of balance  When this happens, certain types of waste crystals separate from the urine  The crystals build up and form kidney stones  Kidney stones can be made of uric acid, calcium, phosphate, or oxalate crystals  You may have 1 or more kidney stones  What increases my risk for kidney stones? · You do not drink enough liquids (especially water) each day  · You have urinary tract infections often  · You follow a certain type of diet  For example, people who eat a diet high in meat or salt may be at higher risk for kidney stones  People who eat foods high in oxalate may also be at higher risk  Foods that are high in oxalate include nuts, chocolate, coffee, and green leafy vegetables  · You take certain medicines such as diuretics, steroids, and antacids  · A family member has had kidney stones  · You were born with a kidney or bowel disorder, or you have other medical problems such as gout  What are the signs and symptoms of kidney stones? · Pain in the middle of your back that moves across to your side or that may spread to your groin    · Nausea and vomiting    · Urge to urinate often, burning feeling when you urinate, or pink or red urine    · Tenderness in your lower back, side, or stomach  How are kidney stones diagnosed? Your healthcare provider will ask about your health, diet, and lifestyle  He may refer you to a urologist  Shannan Null may need tests to find out what type of kidney stones you have  Tests can show the size of your kidney stones and where they are in your urinary system  You may have one or more of the following:  · Urine tests  may show if you have blood in your urine  They may also show high amounts of the substances that form kidney stones, such as uric acid  · Blood tests  show how well your kidneys are working   They may also be used to check the levels of calcium or uric acid in your blood  · A noncontrast helical CT scan  is a type of x-ray that uses a computer to take pictures of your kidneys  Healthcare providers use the pictures to check for kidney stones or other problems  · X-rays  of your kidneys, bladder, and ureters may be done  You may be given a dye before the pictures are taken to help healthcare providers see the pictures better  You may need to have more than one x-ray  Tell the healthcare provider if you have ever had an allergic reaction to contrast dye  · An abdominal ultrasound  uses sound waves to show pictures of your abdomen on a monitor  How are kidney stones treated? · NSAIDs , such as ibuprofen, help decrease swelling, pain, and fever  This medicine is available with or without a doctor's order  NSAIDs can cause stomach bleeding or kidney problems in certain people  If you take blood thinner medicine, always ask your healthcare provider if NSAIDs are safe for you  Always read the medicine label and follow directions  · Prescription medicine  may be given  Ask how to take this medicine safely  · Medicines  to balance your electrolytes may be needed  · A procedure or surgery  to remove the kidney stones may be needed if they do not pass on their own  Your treatment will depend on the size and location of your kidney stones  How can I manage my symptoms? · Drink plenty of liquids  Your healthcare provider may tell you to drink at least 8 to 12 (eight-ounce) cups of liquids each day  This helps flush out the kidney stones when you urinate  Water is the best liquid to drink  · Strain your urine every time you go to the bathroom  Urinate through a strainer or a piece of thin cloth to catch the stones  Take the stones to your healthcare provider so they can be sent to the lab for tests  This will help your healthcare providers plan the best treatment for you             · Eat a variety of healthy foods  Healthy foods include fruits, vegetables, whole-grain breads, low-fat dairy products, beans, and fish  You may need to limit how much sodium (salt) or protein you eat  Ask for information about the best foods for you  · Exercise regularly  Your stones may pass more easily if you stay active  Ask about the best activities for you  When should I seek immediate care? · You have vomiting that is not relieved by medicine  When should I contact my healthcare provider? · You have a fever  · You have trouble passing urine  · You see blood in your urine  · You have severe pain  · You have any questions or concerns about your condition or care  CARE AGREEMENT:   You have the right to help plan your care  Learn about your health condition and how it may be treated  Discuss treatment options with your caregivers to decide what care you want to receive  You always have the right to refuse treatment  The above information is an  only  It is not intended as medical advice for individual conditions or treatments  Talk to your doctor, nurse or pharmacist before following any medical regimen to see if it is safe and effective for you  © 2017 [employer] [employer] Information is for End User's use only and may not be sold, redistributed or otherwise used for commercial purposes  All illustrations and images included in CareNotes® are the copyrighted property of A D [employer] M [employer]  or Pradip Spivey  Negative

## 2023-12-27 ENCOUNTER — OFFICE VISIT (OUTPATIENT)
Dept: PHYSICAL THERAPY | Facility: CLINIC | Age: 78
End: 2023-12-27
Payer: MEDICARE

## 2023-12-27 DIAGNOSIS — R26.9 GAIT ABNORMALITY: Primary | ICD-10-CM

## 2023-12-27 PROCEDURE — 97112 NEUROMUSCULAR REEDUCATION: CPT | Performed by: PHYSICAL THERAPIST

## 2023-12-27 PROCEDURE — 97110 THERAPEUTIC EXERCISES: CPT | Performed by: PHYSICAL THERAPIST

## 2023-12-27 NOTE — PROGRESS NOTES
Daily Note     Today's date: 2023  Patient name: Kaleb Ching III  : 1945  MRN: [id number]  Referring provider: Wilfrid Mills DO  Dx:   Encounter Diagnosis     ICD-10-CM    1. Gait abnormality  R26.9                      Subjective: Feeling shaky today. Was crawling on hands and knees with 1.5 year old granddaughter so his knees are sore.       Objective: See treatment diary below      Assessment: Trialed boxing today which pt tolerated well, but he fatigued quickly and needed rest breaks due to LBP and SOB. He demo good power with his punches. Patient would benefit from continued PT      Plan: Progress treatment as tolerated.       Re-eval Date: 24    Date     Visit Count 1 2 3 4    FOTO        Pain In        Pain Out        Vitals             Precautions HTN, DM, hx of CVA, afib, chronic R knee pain         Manuals                                        Neuro Re-Ed         Boxing     Jab 20x  Cross punch 20x  Upper cut 20x  Hook 20x    Jab cross Jab 10x    30 min      HKM         Amb with HT/HN         Hurdles        Sidestepping        STS - raised chair         LSVT - BIG   Standard MDE  10x  10x     Education  On PD, HIGT, boxing 10 min        Ther Ex        Nustep for CV endurance   L3, 10 min for CV endurance L2, 10 min for CV endurance L3, 10 min for CV endurance                                                             Ther Activity                        Gait Training        Big walking   50' x 4              Modalities

## 2023-12-28 ENCOUNTER — OFFICE VISIT (OUTPATIENT)
Dept: INTERNAL MEDICINE CLINIC | Facility: CLINIC | Age: 78
End: 2023-12-28
Payer: MEDICARE

## 2023-12-28 ENCOUNTER — OFFICE VISIT (OUTPATIENT)
Dept: PHYSICAL THERAPY | Facility: CLINIC | Age: 78
End: 2023-12-28
Payer: MEDICARE

## 2023-12-28 VITALS
HEART RATE: 79 BPM | OXYGEN SATURATION: 99 % | HEIGHT: 71 IN | TEMPERATURE: 97.5 F | SYSTOLIC BLOOD PRESSURE: 100 MMHG | DIASTOLIC BLOOD PRESSURE: 58 MMHG | BODY MASS INDEX: 32.34 KG/M2 | WEIGHT: 231 LBS

## 2023-12-28 DIAGNOSIS — I10 PRIMARY HYPERTENSION: Chronic | ICD-10-CM

## 2023-12-28 DIAGNOSIS — N18.32 TYPE 2 DIABETES MELLITUS WITH STAGE 3B CHRONIC KIDNEY DISEASE, WITHOUT LONG-TERM CURRENT USE OF INSULIN (HCC): Primary | ICD-10-CM

## 2023-12-28 DIAGNOSIS — N18.32 STAGE 3B CHRONIC KIDNEY DISEASE (HCC): ICD-10-CM

## 2023-12-28 DIAGNOSIS — R25.1 TREMORS OF NERVOUS SYSTEM: ICD-10-CM

## 2023-12-28 DIAGNOSIS — R41.3 MEMORY DIFFICULTIES: ICD-10-CM

## 2023-12-28 DIAGNOSIS — M19.91 PRIMARY OSTEOARTHRITIS, UNSPECIFIED SITE: ICD-10-CM

## 2023-12-28 DIAGNOSIS — Z79.01 CHRONIC ANTICOAGULATION: ICD-10-CM

## 2023-12-28 DIAGNOSIS — D64.9 ANEMIA, UNSPECIFIED TYPE: ICD-10-CM

## 2023-12-28 DIAGNOSIS — K21.9 GERD WITHOUT ESOPHAGITIS: ICD-10-CM

## 2023-12-28 DIAGNOSIS — F41.1 GENERALIZED ANXIETY DISORDER: ICD-10-CM

## 2023-12-28 DIAGNOSIS — I48.91 ATRIAL FIBRILLATION, UNSPECIFIED TYPE (HCC): ICD-10-CM

## 2023-12-28 DIAGNOSIS — G30.9 ALZHEIMER'S DISEASE, UNSPECIFIED (CODE) (HCC): ICD-10-CM

## 2023-12-28 DIAGNOSIS — R26.9 GAIT ABNORMALITY: Primary | ICD-10-CM

## 2023-12-28 DIAGNOSIS — E11.22 TYPE 2 DIABETES MELLITUS WITH STAGE 3B CHRONIC KIDNEY DISEASE, WITHOUT LONG-TERM CURRENT USE OF INSULIN (HCC): Primary | ICD-10-CM

## 2023-12-28 PROBLEM — R07.9 CHEST PAIN: Status: RESOLVED | Noted: 2023-08-08 | Resolved: 2023-12-28

## 2023-12-28 LAB — SL AMB POCT HEMOGLOBIN AIC: 6.3 (ref ?–6.5)

## 2023-12-28 PROCEDURE — 83036 HEMOGLOBIN GLYCOSYLATED A1C: CPT | Performed by: INTERNAL MEDICINE

## 2023-12-28 PROCEDURE — 99214 OFFICE O/P EST MOD 30 MIN: CPT | Performed by: INTERNAL MEDICINE

## 2023-12-28 PROCEDURE — 97112 NEUROMUSCULAR REEDUCATION: CPT

## 2023-12-28 PROCEDURE — 97110 THERAPEUTIC EXERCISES: CPT

## 2023-12-28 RX ORDER — DONEPEZIL HYDROCHLORIDE 10 MG/1
TABLET, FILM COATED ORAL
Qty: 90 TABLET | Refills: 1 | Status: SHIPPED | OUTPATIENT
Start: 2023-12-28

## 2023-12-28 RX ORDER — BUSPIRONE HYDROCHLORIDE 5 MG/1
5 TABLET ORAL 2 TIMES DAILY
Qty: 180 TABLET | Refills: 3 | Status: SHIPPED | OUTPATIENT
Start: 2023-12-28

## 2023-12-28 NOTE — PROGRESS NOTES
Depression Screening and Follow-up Plan: Patient was screened for depression during today's encounter. They screened negative with a PHQ-2 score of 0.    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    Relevant Orders    POCT hemoglobin A1c (Completed)    Comprehensive metabolic panel       Cardiovascular and Mediastinum    Hypertension (Chronic)    Atrial fibrillation (HCC)       Nervous and Auditory    Alzheimer's disease, unspecified (CODE) ([facility])    Relevant Medications    busPIRone (BUSPAR) 5 mg tablet    Other Relevant Orders    Ambulatory Referral to Neurology       Musculoskeletal and Integument    Osteoarthritis       Genitourinary    Stage 3 chronic kidney disease (HCC)       Other    Generalized anxiety disorder    Relevant Medications    busPIRone (BUSPAR) 5 mg tablet    Chronic anticoagulation    Anemia   Other Visit Diagnoses     Tremors of nervous system        Relevant Orders    Ambulatory Referral to Neurology           Diagnoses and all orders for this visit:    Type 2 diabetes mellitus with stage 3b chronic kidney disease, without long-term current use of insulin (HCC)  -     POCT hemoglobin A1c  -     Comprehensive metabolic panel; Future    Primary hypertension    Atrial fibrillation, unspecified type (HCC)    GERD without esophagitis    Primary osteoarthritis, unspecified site    Stage 3b chronic kidney disease (HCC)    Anemia, unspecified type    Chronic anticoagulation    Generalized anxiety disorder  -     busPIRone (BUSPAR) 5 mg tablet; Take 1 tablet (5 mg total) by mouth 2 (two) times a day    Tremors of nervous system  -     Ambulatory Referral to Neurology; Future    Alzheimer's disease, unspecified (CODE) ([facility])  -     Ambulatory Referral to Neurology; Future        No problem-specific Assessment & Plan notes found for this  encounter.    A/P: Doing ok and will check labs. In office HgA1c was good at 6.3. Appreciate PT input.. Discussed vaccines and is up to date with his flu vaccine. Will refer to neuro for the tremors. Will add buspar for the ALMA.. Continue current treatment and RTC four weeks for f/u labs and ALMA..     Subjective:      Patient ID: Kaleb Ching III is a 78 y.o. male.    WM RTC with his wife for f/u DM, HTN, etc. Doing ok and no new issues. Remains active w/o difficulty and no falls. No reflux. Sugars less than 140 and no low sugar events. Denies CP, SOB, edema, palpitations, orthopnea or PND. SDAT no worse and no safety concerns. Tremors continue despite sinemet and seeing PT.  Chronic pain is manageable. Remains on chronic anticoagulation and no bleeding events. No stroke like events. Due for labs. ALMA is a little worse.         The following portions of the patient's history were reviewed and updated as appropriate:   He has a past medical history of Acute on chronic renal insufficiency, Allergic rhinitis, Anemia (06/11/2012), Atrial fibrillation (HCC), Cerebrovascular accident (CVA) ([facility]), Chest pain, Controlled type 2 diabetes mellitus without complication (HCC), SMALLS (dyspnea on exertion), Generalized anxiety disorder, GERD without esophagitis (06/11/2012), Hypertension, Memory difficulties (01/08/2019), Nephrolithiasis (03/17/2016), Obesity (06/11/2012), and Osteoarthritis (06/11/2012).,  does not have any pertinent problems on file.,   has a past surgical history that includes Total hip arthroplasty; Knee surgery; Cataract extraction, bilateral; Total hip arthroplasty (Bilateral, 2011); pr cysto bladder w/ureteral catheterization (Left, 11/8/2019); and Cystoscopy w/ laser lithotripsy (Left, 1/20/2020).,  family history includes Arthritis in his mother; No Known Problems in his father; Parkinsonism in his mother.,   reports that he has never smoked. He has never been exposed to tobacco smoke. He has never  used smokeless tobacco. He reports that he does not drink alcohol and does not use drugs.,  is allergic to pollen extract..  Current Outpatient Medications   Medication Sig Dispense Refill   • ALPRAZolam (XANAX) 0.5 mg tablet Take 1 tablet (0.5 mg total) by mouth every 8 (eight) hours as needed (AS NEEDED) 90 tablet 0   • amLODIPine (NORVASC) 10 mg tablet Take 0.5 tablets (5 mg total) by mouth daily 90 tablet 3   • busPIRone (BUSPAR) 5 mg tablet Take 1 tablet (5 mg total) by mouth 2 (two) times a day 180 tablet 3   • carbidopa-levodopa (SINEMET)  mg per tablet Take 1 tablet by mouth 3 (three) times a day 270 tablet 1   • donepezil (ARICEPT) 10 mg tablet take 1 tablet by mouth at bedtime 90 tablet 1   • DULoxetine (CYMBALTA) 30 mg delayed release capsule take 1 capsule by mouth once daily 90 capsule 1   • ergocalciferol (VITAMIN D2) 50,000 units Take 1 capsule (50,000 Units total) by mouth once a week 12 capsule 3   • furosemide (LASIX) 20 mg tablet Take one 3 days a week (MWF or T/Thu/Sat) 15 tablet 5   • gabapentin (NEURONTIN) 300 mg capsule Take 300 mg by mouth 3 (three) times a day 300mg PO x 3 days, 300mg PO 2 tabs daily for 3 days, 300mg 3 tabs daily after loadint titration     • glimepiride (AMARYL) 2 mg tablet Take 1 tablet (2 mg total) by mouth daily with breakfast 90 tablet 3   • glucose blood (FREESTYLE LITE) test strip Test twice a day 100 each 3   • glucose monitoring kit (FREESTYLE) monitoring kit Use 1 each 2 (two) times a day Test twice  Day 1 each 0   • Invokana 300 MG TABS take 1 tablet by mouth daily BEFORE THE FIRST MEAL OF THE DAY 90 tablet 1   • Lancets (freestyle) lancets Test twice a day 100 each 3   • memantine (Namenda) 10 mg tablet Take 1 tablet (10 mg total) by mouth 2 (two) times a day 180 tablet 1   • metFORMIN (GLUCOPHAGE) 850 mg tablet Take 1 tablet (850 mg total) by mouth 2 (two) times a day 180 tablet 1   • methocarbamol (ROBAXIN) 500 mg tablet Take 1 tablet (500 mg total) by  "mouth 4 (four) times a day 90 tablet 0   • metoprolol succinate (TOPROL-XL) 100 mg 24 hr tablet Take 1 tablet (100 mg total) by mouth daily 90 tablet 1   • rivaroxaban (Xarelto) 15 mg tablet Take 1 tablet (15 mg total) by mouth daily with breakfast 90 tablet 0   • magnesium Oxide (MAG-OX) 400 mg TABS take 1 tablet by mouth every morning (Patient not taking: Reported on 12/28/2023) 90 tablet 0     No current facility-administered medications for this visit.       Review of Systems   Constitutional:  Negative for activity change, chills, diaphoresis, fatigue and fever.   HENT: Negative.     Eyes:  Negative for visual disturbance.   Respiratory:  Negative for cough, chest tightness, shortness of breath and wheezing.    Cardiovascular:  Negative for chest pain, palpitations and leg swelling.   Gastrointestinal:  Negative for abdominal pain, constipation, diarrhea, nausea and vomiting.   Endocrine: Negative for cold intolerance and heat intolerance.   Genitourinary:  Negative for difficulty urinating, dysuria and frequency.   Musculoskeletal:  Negative for arthralgias, gait problem and myalgias.   Neurological:  Negative for dizziness, seizures, syncope, weakness, light-headedness and headaches.   Psychiatric/Behavioral:  Negative for confusion, dysphoric mood and sleep disturbance. The patient is not nervous/anxious.        PHQ-2/9 Depression Screening    Little interest or pleasure in doing things: 0 - not at all  Feeling down, depressed, or hopeless: 0 - not at all  PHQ-2 Score: 0  PHQ-2 Interpretation: Negative depression screen        Objective:  Vitals:    12/28/23 1026   BP: 100/58   Pulse: 79   Temp: 97.5 °F (36.4 °C)   TempSrc: Tympanic   SpO2: 99%   Weight: 105 kg (231 lb)   Height: 5' 11\" (1.803 m)     Body mass index is 32.22 kg/m².     Physical Exam  Constitutional:       General: He is not in acute distress.     Appearance: Normal appearance. He is not ill-appearing.   HENT:      Head: Normocephalic and " atraumatic.      Mouth/Throat:      Mouth: Mucous membranes are moist.   Eyes:      Extraocular Movements: Extraocular movements intact.      Conjunctiva/sclera: Conjunctivae normal.      Pupils: Pupils are equal, round, and reactive to light.   Neck:      Vascular: No carotid bruit.   Cardiovascular:      Rate and Rhythm: Normal rate and regular rhythm.      Heart sounds: Normal heart sounds. No murmur heard.  Pulmonary:      Effort: Pulmonary effort is normal. No respiratory distress.      Breath sounds: Normal breath sounds. No wheezing, rhonchi or rales.   Abdominal:      General: Bowel sounds are normal. There is no distension.      Palpations: Abdomen is soft.      Tenderness: There is no abdominal tenderness.   Musculoskeletal:      Cervical back: Neck supple.      Right lower leg: No edema.      Left lower leg: No edema.   Neurological:      General: No focal deficit present.      Mental Status: He is alert and oriented to person, place, and time. Mental status is at baseline.   Psychiatric:         Mood and Affect: Mood normal.         Behavior: Behavior normal.         Thought Content: Thought content normal.         Judgment: Judgment normal.

## 2023-12-28 NOTE — PROGRESS NOTES
Daily Note     Today's date: 2023  Patient name: Kaleb Ching III  : 1945  MRN: [id number]  Referring provider: Wilfrid Mills DO  Dx:   Encounter Diagnosis     ICD-10-CM    1. Gait abnormality  R26.9       2. Tremors of nervous system  R25.1                      Subjective: Tremors R hand come and go  No falls  Have a lot of back pain today        Objective: See treatment diary below      Assessment: Tolerated treatment fair  provided brief seated rest 2* LBP, > SOB  Fatigues quickly  Pt limited with progression of exer 2* co's of LPB/overall fatigue   < trunk flex with step back/bow exer, encourage > step stride, WS  Patient would benefit from continued PT      Plan: Continue per plan of care.      Re-eval Date: 24    Date    Visit Count 1 2 3 4 5   FOTO        Pain In        Pain Out        Vitals             Precautions HTN, DM, hx of CVA, afib, chronic R knee pain         Manuals                                        Neuro Re-Ed         Boxing     Jab 20x  Cross punch 20x  Upper cut 20x  Hook 20x    Jab cross Jab 10x    30 min   Jab 20x  Cross punch 20x    Brief seated rest between sets   HKM         Amb with HT/HN         Hurdles        Sidestepping        STS - raised chair         LSVT - BIG   Standard MDE  10x  10x  30 min  Voice/hand boosts   Education  On PD, HIGT, boxing 10 min        Ther Ex        Nustep for CV endurance   L3, 10 min for CV endurance L2, 10 min for CV endurance L3, 10 min for CV endurance  L3, 10 min for CV endurance                                                            Ther Activity                        Gait Training        Big walking   50' x 4   4x50'           Modalities             Review LSVT BiG exer importance with carryover as melany, pt modfied 2* LBP with bend fwd

## 2024-01-02 ENCOUNTER — OFFICE VISIT (OUTPATIENT)
Dept: PHYSICAL THERAPY | Facility: CLINIC | Age: 79
End: 2024-01-02
Payer: MEDICARE

## 2024-01-02 DIAGNOSIS — R25.1 TREMORS OF NERVOUS SYSTEM: Primary | ICD-10-CM

## 2024-01-02 PROCEDURE — 97110 THERAPEUTIC EXERCISES: CPT

## 2024-01-02 PROCEDURE — 97112 NEUROMUSCULAR REEDUCATION: CPT

## 2024-01-02 NOTE — PROGRESS NOTES
Daily Note     Today's date: 2024  Patient name: Kaleb Ching III  : 1945  MRN: [id number]  Referring provider: Wilfrid Mills DO  Dx:   Encounter Diagnosis     ICD-10-CM    1. Tremors of nervous system  R25.1                      Subjective: My knees are hurting, think because of the weather      Objective: See treatment diary below      Assessment: Tolerated treatment fair  Provided several seated rest 2* > SOB/LBP  Pt struggles with WS with step fwd/retro. Patient would benefit from continued PT      Plan: Continue per plan of care.      Re-eval Date: 24    Date        Visit Count 6       FOTO        Pain In        Pain Out        Vitals             Precautions HTN, DM, hx of CVA, afib, chronic R knee pain         Manuals                                        Neuro Re-Ed         Boxing  Jab 20x  Cross punch 20x    Brief seated rest between sets       HKM         Amb with HT/HN         Hurdles        Sidestepping        STS - raised chair         LSVT - BIG   Standard MDE 30 min  Voice/hand boosts  Occ seated rest 2* > LBP pt self stretch       Education         Ther Ex        Nustep for CV endurance  L3, 10 min for CV endurance                                                                Ther Activity                        Gait Training        Big walking  4x50'               Modalities         Review LSVT BiG exer importance with carryover as melany, pt modfied 2* LBP with bend fwd

## 2024-01-04 ENCOUNTER — APPOINTMENT (OUTPATIENT)
Dept: PHYSICAL THERAPY | Facility: CLINIC | Age: 79
End: 2024-01-04
Payer: MEDICARE

## 2024-01-09 ENCOUNTER — EVALUATION (OUTPATIENT)
Dept: PHYSICAL THERAPY | Facility: CLINIC | Age: 79
End: 2024-01-09
Payer: MEDICARE

## 2024-01-09 DIAGNOSIS — R25.1 TREMORS OF NERVOUS SYSTEM: Primary | ICD-10-CM

## 2024-01-09 DIAGNOSIS — R26.9 GAIT ABNORMALITY: ICD-10-CM

## 2024-01-09 PROCEDURE — 97112 NEUROMUSCULAR REEDUCATION: CPT | Performed by: PHYSICAL THERAPIST

## 2024-01-09 PROCEDURE — 97110 THERAPEUTIC EXERCISES: CPT | Performed by: PHYSICAL THERAPIST

## 2024-01-09 NOTE — PROGRESS NOTES
Progress Note     Today's date: 2024  Patient name: Kaleb Ching III  : 1945  MRN: [id number]  Referring provider: Wilfrid Mills DO  Dx:   Encounter Diagnosis     ICD-10-CM    1. Tremors of nervous system  R25.1       2. Gait abnormality  R26.9                      Subjective: Today he is feeling tired. Feels like balance is better since doing PT. Has B knee pain due to OA but no pain currently.     Pt's goal: no goals       Objective: See treatment diary below      Assessment: Progress note completed this session. Pt had subjective report of improved balance since starting PT. Compared to IE, pt demo some improvement in balance per FGA. Due to chronic B Knee pain, pt is unable to complete 5 x STS without UE support. He also continues to be a fall risk per FGA. At this time, pt is making good progress with PT and would benefit from continued PT to maximize functional mobility and decrease fall risk. However, he will be on hold for a couple weeks since he is going away to visit his daughter and is not sure when he will be returning. Plan to resume POC when he returns to PT.        Goals  ST weeks  Independent with HEP - MET   5 x STS with min A or less to demo improved LE strength and balance - NOT MET     LT weeks  FGA improve by 5 points or > to demo improved balance and decreased fall risk   Independent with updated HEP to self manage and maintain progress outside of PT  Start going to gym to carry over exercises         Plan - Hold until pt returns from trip   Planned therapy interventions: patient education, balance, neuromuscular re-education, coordination, strengthening, stretching, therapeutic activities, therapeutic exercise, gait training, flexibility and home exercise program  Frequency: 2x week  Duration in weeks: 4  Plan of Care beginning date: 2023  Plan of Care expiration date: 3/11/2024  Treatment plan discussed with: PTA and patient         Balance Test    6  Minute Walk Test (ft):      FGA 18/30 21/30   Gait Speed (ft/s): 0.67 m/s  0.7 m/s    5x Sit To Stand (s): Unable  unable   TUG: 10.59             Re-eval Date: 2/9/24    Date 1/2 1/9      Visit Count 6 7      FOTO        Pain In        Pain Out        Vitals             Precautions HTN, DM, hx of CVA, afib, chronic R knee pain         Manuals                                        Neuro Re-Ed         Boxing  Jab 20x  Cross punch 20x    Brief seated rest between sets       HKM         Amb with HT/HN         Hurdles        Sidestepping        STS - raised chair         LSVT - BIG   Standard MDE  Floor to ceiling  Side to side  Forward step and reach   Sideways step and reach   Backward step and reach   Rock and reach   Sideways rock and reach 30 min  Voice/hand boosts  Occ seated rest 2* > LBP pt self stretch 40 min occasional seated breaks due to B shoulder, B knee pain, and LBP       Education         Ther Ex        Nustep for CV endurance  L3, 10 min for CV endurance  L3, 10 min for CV endurance                                                               Ther Activity                        Gait Training        Big walking  4x50'               Modalities         Review LSVT BiG exer importance with carryover as melany, pt modfied 2* LBP with bend fwd

## 2024-01-11 ENCOUNTER — APPOINTMENT (OUTPATIENT)
Dept: PHYSICAL THERAPY | Facility: CLINIC | Age: 79
End: 2024-01-11
Payer: MEDICARE

## 2024-01-16 ENCOUNTER — APPOINTMENT (OUTPATIENT)
Dept: PHYSICAL THERAPY | Facility: CLINIC | Age: 79
End: 2024-01-16
Payer: MEDICARE

## 2024-01-18 ENCOUNTER — APPOINTMENT (OUTPATIENT)
Dept: PHYSICAL THERAPY | Facility: CLINIC | Age: 79
End: 2024-01-18
Payer: MEDICARE

## 2024-01-23 ENCOUNTER — APPOINTMENT (OUTPATIENT)
Dept: PHYSICAL THERAPY | Facility: CLINIC | Age: 79
End: 2024-01-23
Payer: MEDICARE

## 2024-01-24 ENCOUNTER — APPOINTMENT (OUTPATIENT)
Dept: LAB | Facility: CLINIC | Age: 79
End: 2024-01-24
Payer: MEDICARE

## 2024-01-24 DIAGNOSIS — N18.32 TYPE 2 DIABETES MELLITUS WITH STAGE 3B CHRONIC KIDNEY DISEASE, WITHOUT LONG-TERM CURRENT USE OF INSULIN (HCC): ICD-10-CM

## 2024-01-24 DIAGNOSIS — E11.22 TYPE 2 DIABETES MELLITUS WITH STAGE 3B CHRONIC KIDNEY DISEASE, WITHOUT LONG-TERM CURRENT USE OF INSULIN (HCC): ICD-10-CM

## 2024-01-24 LAB
ALBUMIN SERPL BCP-MCNC: 4 G/DL (ref 3.5–5)
ALP SERPL-CCNC: 60 U/L (ref 34–104)
ALT SERPL W P-5'-P-CCNC: 3 U/L (ref 7–52)
ANION GAP SERPL CALCULATED.3IONS-SCNC: 8 MMOL/L
AST SERPL W P-5'-P-CCNC: 10 U/L (ref 13–39)
BILIRUB SERPL-MCNC: 0.71 MG/DL (ref 0.2–1)
BUN SERPL-MCNC: 28 MG/DL (ref 5–25)
CALCIUM SERPL-MCNC: 9.7 MG/DL (ref 8.4–10.2)
CHLORIDE SERPL-SCNC: 102 MMOL/L (ref 96–108)
CO2 SERPL-SCNC: 28 MMOL/L (ref 21–32)
CREAT SERPL-MCNC: 1.77 MG/DL (ref 0.6–1.3)
GFR SERPL CREATININE-BSD FRML MDRD: 35 ML/MIN/1.73SQ M
GLUCOSE P FAST SERPL-MCNC: 207 MG/DL (ref 65–99)
POTASSIUM SERPL-SCNC: 5.1 MMOL/L (ref 3.5–5.3)
PROT SERPL-MCNC: 7.2 G/DL (ref 6.4–8.4)
SODIUM SERPL-SCNC: 138 MMOL/L (ref 135–147)

## 2024-01-24 PROCEDURE — 80053 COMPREHEN METABOLIC PANEL: CPT

## 2024-01-24 PROCEDURE — 36415 COLL VENOUS BLD VENIPUNCTURE: CPT

## 2024-01-25 ENCOUNTER — APPOINTMENT (OUTPATIENT)
Dept: PHYSICAL THERAPY | Facility: CLINIC | Age: 79
End: 2024-01-25
Payer: MEDICARE

## 2024-01-26 DIAGNOSIS — G89.29 CHRONIC PAIN OF RIGHT KNEE: ICD-10-CM

## 2024-01-26 DIAGNOSIS — G89.4 CHRONIC PAIN SYNDROME: ICD-10-CM

## 2024-01-26 DIAGNOSIS — F32.9 REACTIVE DEPRESSION: ICD-10-CM

## 2024-01-26 DIAGNOSIS — E11.42 DIABETIC POLYNEUROPATHY ASSOCIATED WITH TYPE 2 DIABETES MELLITUS (HCC): Chronic | ICD-10-CM

## 2024-01-26 DIAGNOSIS — M25.561 CHRONIC PAIN OF RIGHT KNEE: ICD-10-CM

## 2024-01-26 DIAGNOSIS — M77.41 METATARSALGIA OF BOTH FEET: ICD-10-CM

## 2024-01-26 DIAGNOSIS — M77.42 METATARSALGIA OF BOTH FEET: ICD-10-CM

## 2024-01-26 RX ORDER — DULOXETIN HYDROCHLORIDE 30 MG/1
30 CAPSULE, DELAYED RELEASE ORAL DAILY
Qty: 90 CAPSULE | Refills: 1 | Status: SHIPPED | OUTPATIENT
Start: 2024-01-26

## 2024-01-28 DIAGNOSIS — E55.9 VITAMIN D DEFICIENCY: ICD-10-CM

## 2024-01-29 RX ORDER — ERGOCALCIFEROL 1.25 MG/1
50000 CAPSULE ORAL WEEKLY
Qty: 12 CAPSULE | Refills: 1 | Status: SHIPPED | OUTPATIENT
Start: 2024-01-29 | End: 2024-02-01 | Stop reason: SDUPTHER

## 2024-01-30 ENCOUNTER — APPOINTMENT (OUTPATIENT)
Dept: PHYSICAL THERAPY | Facility: CLINIC | Age: 79
End: 2024-01-30
Payer: MEDICARE

## 2024-02-01 DIAGNOSIS — E55.9 VITAMIN D DEFICIENCY: ICD-10-CM

## 2024-02-01 RX ORDER — ERGOCALCIFEROL 1.25 MG/1
50000 CAPSULE ORAL WEEKLY
Qty: 12 CAPSULE | Refills: 1 | Status: SHIPPED | OUTPATIENT
Start: 2024-02-01

## 2024-02-13 ENCOUNTER — TELEPHONE (OUTPATIENT)
Dept: NEUROLOGY | Facility: CLINIC | Age: 79
End: 2024-02-13

## 2024-02-13 NOTE — TELEPHONE ENCOUNTER
----- Message from Pinky Grijalva sent at 2/13/2024  2:55 PM EST -----  Regarding: Transportation  [place],      Wife Paola called in due to receiving a letter about referral. She stated no way to get patient to any of our locations. I advised that I would send a message to our social working team to see if their is anything at all that they can do to assist.   Wife understood and stated that she will hold off on completing the Triage for now. If there is anything they can do to assist she will proceed. Patient also has an upcoming appt with his PCP. Paola will talk with the PCP as well.      Thank you for any assistance in this matter

## 2024-02-14 NOTE — TELEPHONE ENCOUNTER
[facility] message from patient's spouse-    Michael harper, this is Paola Li returning your phone call in reference to finding a neurologist closer to home in [place]. I will speak to Doctor Elizondo about it and run them by it. About going to one at the [facility] and if need be I will get back to you about it. I want to thank you very much for returning the phone call. I appreciate it. Have a good afternoon and again, thank you very much.    MARY will monitor for conversation with PCP and assist as needed with future plans.  SW remains available.

## 2024-02-15 ENCOUNTER — TELEPHONE (OUTPATIENT)
Dept: INTERNAL MEDICINE CLINIC | Facility: CLINIC | Age: 79
End: 2024-02-15

## 2024-02-15 DIAGNOSIS — I48.91 ATRIAL FIBRILLATION, UNSPECIFIED TYPE (HCC): ICD-10-CM

## 2024-02-15 DIAGNOSIS — N18.32 TYPE 2 DIABETES MELLITUS WITH STAGE 3B CHRONIC KIDNEY DISEASE, WITHOUT LONG-TERM CURRENT USE OF INSULIN (HCC): Primary | ICD-10-CM

## 2024-02-15 DIAGNOSIS — N18.32 STAGE 3B CHRONIC KIDNEY DISEASE (HCC): ICD-10-CM

## 2024-02-15 DIAGNOSIS — E11.22 TYPE 2 DIABETES MELLITUS WITH STAGE 3B CHRONIC KIDNEY DISEASE, WITHOUT LONG-TERM CURRENT USE OF INSULIN (HCC): Primary | ICD-10-CM

## 2024-02-15 NOTE — TELEPHONE ENCOUNTER
Spoke to Libra, his wife. She said she will see if the jardiance and eliquis are cheaper. Will call back if it's not.

## 2024-02-15 NOTE — TELEPHONE ENCOUNTER
Patient's wife called in and said Invokanna is $750 and xarelto is $700. She would like to know if there is any other medication that can be substituted for these so that they are affordable. Wife is unsure of what to do because she cannot afford those. He still has 2 weeks left of invokanna left.

## 2024-02-19 DIAGNOSIS — R06.00 DYSPNEA, UNSPECIFIED TYPE: ICD-10-CM

## 2024-02-19 RX ORDER — FUROSEMIDE 20 MG/1
TABLET ORAL
Qty: 15 TABLET | Refills: 5 | Status: SHIPPED | OUTPATIENT
Start: 2024-02-19

## 2024-02-21 NOTE — TELEPHONE ENCOUNTER
Spouse returned call.  Patient not sure he wants to see a neurologist.  Spouse is waiting until she has an appointment with the family doctor and will run it by the PCP.  Will let SW know if decide to go to a closer neurologist with another health system or needs further help.  SW remains available.

## 2024-02-22 DIAGNOSIS — E11.42 DIABETIC POLYNEUROPATHY ASSOCIATED WITH TYPE 2 DIABETES MELLITUS (HCC): Primary | ICD-10-CM

## 2024-02-22 RX ORDER — GABAPENTIN 300 MG/1
300 CAPSULE ORAL 3 TIMES DAILY
Qty: 270 CAPSULE | Refills: 0 | Status: SHIPPED | OUTPATIENT
Start: 2024-02-22 | End: 2024-02-23 | Stop reason: SDUPTHER

## 2024-02-23 DIAGNOSIS — E11.42 DIABETIC POLYNEUROPATHY ASSOCIATED WITH TYPE 2 DIABETES MELLITUS (HCC): ICD-10-CM

## 2024-02-23 RX ORDER — GABAPENTIN 300 MG/1
300 CAPSULE ORAL 3 TIMES DAILY
Qty: 270 CAPSULE | Refills: 0 | Status: SHIPPED | OUTPATIENT
Start: 2024-02-23

## 2024-02-26 DIAGNOSIS — E11.8 TYPE 2 DIABETES MELLITUS WITH COMPLICATION (HCC): ICD-10-CM

## 2024-03-07 DIAGNOSIS — R25.1 TREMORS OF NERVOUS SYSTEM: ICD-10-CM

## 2024-03-07 RX ORDER — CARBIDOPA/LEVODOPA 25MG-250MG
1 TABLET ORAL 3 TIMES DAILY
Qty: 270 TABLET | Refills: 3 | Status: SHIPPED | OUTPATIENT
Start: 2024-03-07

## 2024-04-04 ENCOUNTER — APPOINTMENT (OUTPATIENT)
Dept: LAB | Facility: CLINIC | Age: 79
End: 2024-04-04
Payer: MEDICARE

## 2024-04-04 ENCOUNTER — OFFICE VISIT (OUTPATIENT)
Dept: INTERNAL MEDICINE CLINIC | Facility: CLINIC | Age: 79
End: 2024-04-04
Payer: MEDICARE

## 2024-04-04 VITALS
DIASTOLIC BLOOD PRESSURE: 72 MMHG | OXYGEN SATURATION: 99 % | BODY MASS INDEX: 32.48 KG/M2 | HEART RATE: 72 BPM | TEMPERATURE: 49.6 F | SYSTOLIC BLOOD PRESSURE: 124 MMHG | WEIGHT: 232 LBS | HEIGHT: 71 IN

## 2024-04-04 DIAGNOSIS — N18.32 STAGE 3B CHRONIC KIDNEY DISEASE (HCC): ICD-10-CM

## 2024-04-04 DIAGNOSIS — Z86.73 HISTORY OF TIA (TRANSIENT ISCHEMIC ATTACK): ICD-10-CM

## 2024-04-04 DIAGNOSIS — E78.2 MIXED HYPERLIPIDEMIA: ICD-10-CM

## 2024-04-04 DIAGNOSIS — E27.8 ADRENAL NODULE (HCC): ICD-10-CM

## 2024-04-04 DIAGNOSIS — R97.20 ELEVATED PSA: ICD-10-CM

## 2024-04-04 DIAGNOSIS — E11.22 TYPE 2 DIABETES MELLITUS WITH STAGE 3B CHRONIC KIDNEY DISEASE, WITHOUT LONG-TERM CURRENT USE OF INSULIN (HCC): Primary | ICD-10-CM

## 2024-04-04 DIAGNOSIS — G30.9 ALZHEIMER'S DISEASE, UNSPECIFIED (CODE) (HCC): ICD-10-CM

## 2024-04-04 DIAGNOSIS — K21.9 GERD WITHOUT ESOPHAGITIS: ICD-10-CM

## 2024-04-04 DIAGNOSIS — I10 PRIMARY HYPERTENSION: Chronic | ICD-10-CM

## 2024-04-04 DIAGNOSIS — I48.91 ATRIAL FIBRILLATION, UNSPECIFIED TYPE (HCC): ICD-10-CM

## 2024-04-04 DIAGNOSIS — D64.9 ANEMIA, UNSPECIFIED TYPE: ICD-10-CM

## 2024-04-04 DIAGNOSIS — E11.9 ENCOUNTER FOR DIABETIC FOOT EXAM (HCC): ICD-10-CM

## 2024-04-04 DIAGNOSIS — F41.1 GENERALIZED ANXIETY DISORDER: ICD-10-CM

## 2024-04-04 DIAGNOSIS — Z00.00 MEDICARE ANNUAL WELLNESS VISIT, SUBSEQUENT: ICD-10-CM

## 2024-04-04 DIAGNOSIS — E11.22 TYPE 2 DIABETES MELLITUS WITH STAGE 3B CHRONIC KIDNEY DISEASE, WITHOUT LONG-TERM CURRENT USE OF INSULIN (HCC): ICD-10-CM

## 2024-04-04 DIAGNOSIS — N18.32 TYPE 2 DIABETES MELLITUS WITH STAGE 3B CHRONIC KIDNEY DISEASE, WITHOUT LONG-TERM CURRENT USE OF INSULIN (HCC): Primary | ICD-10-CM

## 2024-04-04 DIAGNOSIS — Z79.01 CHRONIC ANTICOAGULATION: ICD-10-CM

## 2024-04-04 DIAGNOSIS — N18.32 TYPE 2 DIABETES MELLITUS WITH STAGE 3B CHRONIC KIDNEY DISEASE, WITHOUT LONG-TERM CURRENT USE OF INSULIN (HCC): ICD-10-CM

## 2024-04-04 DIAGNOSIS — M19.91 PRIMARY OSTEOARTHRITIS, UNSPECIFIED SITE: ICD-10-CM

## 2024-04-04 PROBLEM — R41.3 MEMORY DIFFICULTIES: Status: RESOLVED | Noted: 2019-01-08 | Resolved: 2024-04-04

## 2024-04-04 LAB
ANION GAP SERPL CALCULATED.3IONS-SCNC: 12 MMOL/L (ref 4–13)
BUN SERPL-MCNC: 23 MG/DL (ref 5–25)
CALCIUM SERPL-MCNC: 8.9 MG/DL (ref 8.4–10.2)
CHLORIDE SERPL-SCNC: 105 MMOL/L (ref 96–108)
CO2 SERPL-SCNC: 23 MMOL/L (ref 21–32)
CREAT SERPL-MCNC: 1.62 MG/DL (ref 0.6–1.3)
GFR SERPL CREATININE-BSD FRML MDRD: 40 ML/MIN/1.73SQ M
GLUCOSE P FAST SERPL-MCNC: 167 MG/DL (ref 65–99)
LDLC SERPL DIRECT ASSAY-MCNC: 136 MG/DL (ref 0–100)
POTASSIUM SERPL-SCNC: 4.8 MMOL/L (ref 3.5–5.3)
PSA SERPL-MCNC: 5.79 NG/ML (ref 0–4)
SL AMB POCT HEMOGLOBIN AIC: 6.2 (ref ?–6.5)
SODIUM SERPL-SCNC: 140 MMOL/L (ref 135–147)
TRIGL SERPL-MCNC: 185 MG/DL

## 2024-04-04 PROCEDURE — 99214 OFFICE O/P EST MOD 30 MIN: CPT | Performed by: INTERNAL MEDICINE

## 2024-04-04 PROCEDURE — 36415 COLL VENOUS BLD VENIPUNCTURE: CPT

## 2024-04-04 PROCEDURE — G0439 PPPS, SUBSEQ VISIT: HCPCS | Performed by: INTERNAL MEDICINE

## 2024-04-04 PROCEDURE — 84478 ASSAY OF TRIGLYCERIDES: CPT

## 2024-04-04 PROCEDURE — 84153 ASSAY OF PSA TOTAL: CPT

## 2024-04-04 PROCEDURE — 83036 HEMOGLOBIN GLYCOSYLATED A1C: CPT | Performed by: INTERNAL MEDICINE

## 2024-04-04 PROCEDURE — 80048 BASIC METABOLIC PNL TOTAL CA: CPT

## 2024-04-04 PROCEDURE — 83721 ASSAY OF BLOOD LIPOPROTEIN: CPT

## 2024-04-04 NOTE — PROGRESS NOTES
Assessment/Plan:  Problem List Items Addressed This Visit          Cardiovascular and Mediastinum    Hypertension (Chronic)    Atrial fibrillation (HCC)       Digestive    GERD without esophagitis       Endocrine    Type 2 diabetes mellitus with diabetic chronic kidney disease ([facility]) - Primary    Relevant Orders    POCT hemoglobin A1c    Basic metabolic panel    LDL cholesterol, direct    Triglycerides       Nervous and Auditory    Alzheimer's disease, unspecified (CODE) ([facility])       Musculoskeletal and Integument    Osteoarthritis       Genitourinary    Stage 3 chronic kidney disease ([facility])       Behavioral Health    Generalized anxiety disorder       Blood    Anemia       Neurology/Sleep    History of TIA (transient ischemic attack)       Other    Right Adrenal nodule  (Chronic)    Hyperlipidemia    Relevant Orders    LDL cholesterol, direct    Triglycerides    Chronic anticoagulation     Other Visit Diagnoses       Encounter for diabetic foot exam ([facility])        Medicare annual wellness visit, subsequent        Elevated PSA        Relevant Orders    PSA Total, Diagnostic             Diagnoses and all orders for this visit:    Type 2 diabetes mellitus with stage 3b chronic kidney disease, without long-term current use of insulin ([facility])  -     POCT hemoglobin A1c  -     Basic metabolic panel; Future  -     LDL cholesterol, direct; Future  -     Triglycerides; Future    Primary hypertension    Atrial fibrillation, unspecified type (HCC)    GERD without esophagitis    Alzheimer's disease, unspecified (CODE) ([facility])    Primary osteoarthritis, unspecified site    Stage 3b chronic kidney disease (HCC)    Generalized anxiety disorder    Anemia, unspecified type    History of TIA (transient ischemic attack)    Mixed hyperlipidemia  -     LDL cholesterol, direct; Future  -     Triglycerides; Future    Chronic anticoagulation    Encounter for diabetic foot exam ([facility])    Medicare annual wellness visit, subsequent    Elevated PSA  -      PSA Total, Diagnostic; Future    Adrenal nodule (HCC)        No problem-specific Assessment & Plan notes found for this encounter.    A/P: Doing ok and will check labs. Will check PSA due to rising. In office HgA1c was good at 6.2. Continue current treatment and RTC three months for routine.     Subjective:      Patient ID: Kaleb Ching III is a 78 y.o. male.    WM RTC for f/u DM, HTN, etc. Doing ok and no new issuses. Remains active w/o difficulty and no falls. SDAT slowly worsening, but no safety concerns per wife. Denies CP, SOB, edema, orthopnea or PND. Remains on chronic anticoagulation and no bleeding events. Sugars less than 140 and no low sugar events. Chronic pain is manageable. ALMA is controlled. Due for labs.         The following portions of the patient's history were reviewed and updated as appropriate:   He has a past medical history of Acute on chronic renal insufficiency, Allergic rhinitis, Anemia (06/11/2012), Atrial fibrillation (HCC), Cerebrovascular accident (CVA) ([facility]), Chest pain, Controlled type 2 diabetes mellitus without complication (HCC), SMALLS (dyspnea on exertion), Generalized anxiety disorder, GERD without esophagitis (06/11/2012), Hypertension, Memory difficulties (01/08/2019), Nephrolithiasis (03/17/2016), Obesity (06/11/2012), and Osteoarthritis (06/11/2012).,  does not have any pertinent problems on file.,   has a past surgical history that includes Total hip arthroplasty; Knee surgery; Cataract extraction, bilateral; Total hip arthroplasty (Bilateral, 2011); pr cysto bladder w/ureteral catheterization (Left, 11/8/2019); and Cystoscopy w/ laser lithotripsy (Left, 1/20/2020).,  family history includes Arthritis in his mother; No Known Problems in his father; Parkinsonism in his mother.,   reports that he has never smoked. He has never been exposed to tobacco smoke. He has never used smokeless tobacco. He reports that he does not drink alcohol and does not use drugs.,  is  allergic to pollen extract..  Current Outpatient Medications   Medication Sig Dispense Refill    gabapentin (NEURONTIN) 300 mg capsule Take 1 capsule (300 mg total) by mouth 3 (three) times a day 300mg PO x 3 days, 300mg PO 2 tabs daily for 3 days, 300mg 3 tabs daily after loadint titration 270 capsule 0    ALPRAZolam (XANAX) 0.5 mg tablet Take 1 tablet (0.5 mg total) by mouth every 8 (eight) hours as needed (AS NEEDED) 90 tablet 0    amLODIPine (NORVASC) 10 mg tablet Take 0.5 tablets (5 mg total) by mouth daily 90 tablet 3    apixaban (ELIQUIS) 5 mg Take 1 tablet (5 mg total) by mouth 2 (two) times a day 180 tablet 3    busPIRone (BUSPAR) 5 mg tablet Take 1 tablet (5 mg total) by mouth 2 (two) times a day 180 tablet 3    carbidopa-levodopa (SINEMET)  mg per tablet take 1 tablet by mouth three times a day 270 tablet 3    donepezil (ARICEPT) 10 mg tablet take 1 tablet by mouth at bedtime 90 tablet 1    DULoxetine (CYMBALTA) 30 mg delayed release capsule take 1 capsule by mouth once daily 90 capsule 1    ergocalciferol (VITAMIN D2) 50,000 units Take 1 capsule (50,000 Units total) by mouth once a week 12 capsule 1    furosemide (LASIX) 20 mg tablet take 1 tablet by mouth three times a week EITHER ON MONDAY, WEDNESDAY,AND FRIDAY OR THUESDAY, THRUSDAY, AND SATURDAY 15 tablet 5    glimepiride (AMARYL) 2 mg tablet Take 1 tablet (2 mg total) by mouth daily with breakfast 90 tablet 3    glucose blood (FREESTYLE LITE) test strip Test twice a day 100 each 3    glucose monitoring kit (FREESTYLE) monitoring kit Use 1 each 2 (two) times a day Test twice  Day 1 each 0    Lancets (freestyle) lancets Test twice a day 100 each 3    memantine (Namenda) 10 mg tablet Take 1 tablet (10 mg total) by mouth 2 (two) times a day 180 tablet 1    metFORMIN (GLUCOPHAGE) 850 mg tablet take 1 tablet by mouth twice a day 180 tablet 1    methocarbamol (ROBAXIN) 500 mg tablet Take 1 tablet (500 mg total) by mouth 4 (four) times a day 90 tablet  "0    metoprolol succinate (TOPROL-XL) 100 mg 24 hr tablet Take 1 tablet (100 mg total) by mouth daily 90 tablet 1     No current facility-administered medications for this visit.       Review of Systems   Constitutional:  Negative for activity change, chills, diaphoresis, fatigue and fever.   HENT: Negative.     Eyes:  Negative for visual disturbance.   Respiratory:  Negative for cough, chest tightness, shortness of breath and wheezing.    Cardiovascular:  Negative for chest pain, palpitations and leg swelling.   Gastrointestinal:  Negative for abdominal pain, constipation, diarrhea, nausea and vomiting.   Endocrine: Negative for cold intolerance and heat intolerance.   Genitourinary:  Negative for difficulty urinating, dysuria and frequency.   Musculoskeletal:  Negative for arthralgias, gait problem and myalgias.   Neurological:  Negative for dizziness, seizures, syncope, weakness, light-headedness and headaches.   Psychiatric/Behavioral:  Positive for confusion. Negative for agitation, behavioral problems, decreased concentration, dysphoric mood, hallucinations and sleep disturbance. The patient is not nervous/anxious.        PHQ-2/9 Depression Screening            Objective:  Vitals:    04/04/24 1331   BP: 124/72   Pulse: 72   Temp: (!) 49.6 °F (9.8 °C)   SpO2: 99%   Weight: 105 kg (232 lb)   Height: 5' 11\" (1.803 m)     Body mass index is 32.36 kg/m².     Physical Exam  Constitutional:       General: He is not in acute distress.     Appearance: Normal appearance. He is not ill-appearing.   HENT:      Head: Normocephalic and atraumatic.      Mouth/Throat:      Mouth: Mucous membranes are moist.   Eyes:      Extraocular Movements: Extraocular movements intact.      Conjunctiva/sclera: Conjunctivae normal.      Pupils: Pupils are equal, round, and reactive to light.   Neck:      Vascular: No carotid bruit.   Cardiovascular:      Rate and Rhythm: Normal rate and regular rhythm.      Pulses: no weak pulses.           " Dorsalis pedis pulses are 1+ on the right side and 1+ on the left side.        Posterior tibial pulses are 1+ on the right side and 1+ on the left side.      Heart sounds: Normal heart sounds. No murmur heard.  Pulmonary:      Effort: Pulmonary effort is normal. No respiratory distress.      Breath sounds: Normal breath sounds. No wheezing, rhonchi or rales.   Abdominal:      General: Bowel sounds are normal. There is no distension.      Palpations: Abdomen is soft.      Tenderness: There is no abdominal tenderness.   Musculoskeletal:      Cervical back: Neck supple.      Right lower leg: No edema.      Left lower leg: No edema.   Feet:      Right foot:      Skin integrity: No ulcer, skin breakdown, erythema, warmth, callus or dry skin.      Left foot:      Skin integrity: No ulcer, skin breakdown, erythema, warmth, callus or dry skin.   Neurological:      General: No focal deficit present.      Mental Status: He is alert and oriented to person, place, and time. Mental status is at baseline.   Psychiatric:         Mood and Affect: Mood normal.         Behavior: Behavior normal.         Thought Content: Thought content normal.         Judgment: Judgment normal.       Patient's shoes and socks removed.    Right Foot/Ankle   Right Foot Inspection  Skin Exam: skin normal and skin intact. No dry skin, no warmth, no callus, no erythema, no maceration, no abnormal color, no pre-ulcer, no ulcer and no callus.     Toe Exam: ROM and strength within normal limits. No swelling, no tenderness, erythema and  no right toe deformity    Sensory   Monofilament testing: intact    Vascular  Capillary refills: < 3 seconds  The right DP pulse is 1+. The right PT pulse is 1+.     Left Foot/Ankle  Left Foot Inspection  Skin Exam: skin normal and skin intact. No dry skin, no warmth, no erythema, no maceration, normal color, no pre-ulcer, no ulcer and no callus.     Toe Exam: ROM and strength within normal limits. No swelling, no tenderness,  no erythema and no left toe deformity.     Sensory   Monofilament testing: intact    Vascular  Capillary refills: < 3 seconds  The left DP pulse is 1+. The left PT pulse is 1+.     Assign Risk Category  No deformity present  No loss of protective sensation  No weak pulses  Risk: 0

## 2024-04-04 NOTE — PATIENT INSTRUCTIONS
Basic Carbohydrate Counting   AMBULATORY CARE:   Carbohydrate counting  is a way to plan your meals by counting the amount of carbohydrate in foods. Carbohydrates are the sugars, starches, and fiber found in fruit, grains, vegetables, and milk products. Carbohydrates increase your blood sugar levels. Carbohydrate counting can help you eat the right amount of carbohydrate to keep your blood sugar levels under control.   What you need to know about planning meals using carbohydrate counting:  A dietitian or healthcare provider will help you develop a healthy meal plan that works best for you. You will be taught how much carbohydrate to eat or drink for each meal and snack. Your meal plan will be based on your age, weight, usual food intake, and physical activity level. If you have diabetes, it will also include your blood sugar levels and diabetes medicine. Once you know how much carbohydrate you should eat, you can decide what type of food you want to eat.    You will need to know what foods contain carbohydrate and how much they contain. Keep track of the amount of carbohydrate in meals and snacks in order to follow your meal plan. Do not avoid carbohydrates or skip meals. Your blood sugar may fall too low if you do not eat enough carbohydrate or you skip meals.    Foods that contain carbohydrate:   Breads:  Each serving of food listed below contains about 15 g of carbohydrate .    1 slice of bread (1 ounce) or 1 flour or corn tortilla (6 inch)    ½ of a hamburger bun or ¼ of a large bagel (about 1 ounce)    1 pancake (about 4 inches across and ¼ inch thick)    Cereals and grains:  Serving sizes of ready-to-eat cereals vary. Look at the serving size and the total carbohydrate amount listed on the food label. Each serving of food listed below contains about 15 g of carbohydrate .    ¾ cup of dry, unsweetened, ready-to-eat cereal or ¼ cup of low-fat granola     ½ cup of oatmeal or other cooked cereal     ? cup of  cooked rice or pasta    Starchy vegetables and beans:  Each serving of food listed below contains about 15 g of carbohydrate .    ½ cup of corn, green peas, sweet potatoes, or mashed potatoes    ¼ of a large baked potato    ½ cup of beans, lentils, and peas (garbanzo, wisdom, kidney, white, split, black-eyed)    Crackers and snacks:  Each serving of food listed below contains about 15 g of carbohydrate .    3 derick cracker squares or 8 animal crackers     6 saltine-type crackers    3 cups of popcorn or ¾ ounce of pretzels, potato chips, or tortilla chips    Fruit:  Each serving of food listed below contains about 15 g of carbohydrate .    1 small (4 ounce) piece of fresh fruit or ¾ to 1 cup of fresh fruit    ½ cup of canned or frozen fruit, packed in natural juice    ½ cup (4 ounces) of unsweetened fruit juice    2 tablespoons of dried fruit    Desserts or sugary foods:  Each serving of food listed below contains about 15 g of carbohydrate .    2-inch square unfrosted cake or brownie     2 small cookies    ½ cup of ice cream, frozen yogurt, or nondairy frozen yogurt    ¼ cup of sherbet or sorbet    1 tablespoon of regular syrup, jam, or jelly    2 tablespoons of light syrup    Milk and yogurt:  Foods from the milk group contain about 12 g of carbohydrate per serving.    1 cup of fat-free or low-fat milk    1 cup of soy milk    ? cup of fat-free, yogurt sweetened with artificial sweetener    Non-starchy vegetables:  Each serving contains about 5 g of carbohydrate . Three servings of non-starch vegetables count as 1 carbohydrate serving.     ½ cup of cooked vegetables or 1 cup of raw vegetables. This includes beets, broccoli, cabbage, cauliflower, cucumber, mushrooms, tomatoes, and zucchini    ½ cup of vegetable juice    How to use carbohydrate counting to plan meals:   Count carbohydrate amounts using serving sizes:      Pasta dinner example:  You plan to have pasta, tossed salad, and an 8-ounce glass of milk. Your  healthcare provider tells you that you may have 4 carbohydrate servings for dinner. One carbohydrate serving of pasta is ? cup. One cup of pasta will equal 3 carbohydrate servings. An 8-ounce glass of milk will count as 1 carbohydrate serving. These amounts of food would equal 4 carbohydrate servings. One cup of tossed salad does not count toward your carbohydrate servings.       Count carbohydrate amounts using food labels:  Find the total amount of carbohydrate in a packaged food by reading the food label. Food labels tell you the serving size of the food and the total carbohydrate amount in each serving. Find the serving size on the food label and then decide how many servings you will eat. Multiply the number of servings you plan to eat by the carbohydrate amount per serving.     Granola bar snack example:  Your meal plan allows you to have 2 carbohydrate servings (30 grams) of carbohydrate for a snack. You plan to eat 1 package of granola bars, which contains 2 bars. According to the food label, the serving size of food in this package is 1 bar. Each serving (1 bar) contains 25 grams of carbohydrate. The total amount of carbohydrate in this package of granola bars would be 50 g. Based on your meal plan, you should eat only 1 bar.      Follow up with your doctor as directed:  Write down your questions so you remember to ask them during your visits.  © Copyright [employer] 2023 Information is for End User's use only and may not be sold, redistributed or otherwise used for commercial purposes.  The above information is an  only. It is not intended as medical advice for individual conditions or treatments. Talk to your doctor, nurse or pharmacist before following any medical regimen to see if it is safe and effective for you.    Medicare Preventive Visit Patient Instructions  Thank you for completing your Welcome to Medicare Visit or Medicare Annual Wellness Visit today. Your next wellness visit will  be due in one year (4/5/2025).  The screening/preventive services that you may require over the next 5-10 years are detailed below. Some tests may not apply to you based off risk factors and/or age. Screening tests ordered at today's visit but not completed yet may show as past due. Also, please note that scanned in results may not display below.  Preventive Screenings:  Service Recommendations Previous Testing/Comments   Colorectal Cancer Screening  Colonoscopy    Fecal Occult Blood Test (FOBT)/Fecal Immunochemical Test (FIT)  Fecal DNA/Cologuard Test  Flexible Sigmoidoscopy Age: 45-75 years old   Colonoscopy: every 10 years (May be performed more frequently if at higher risk)  OR  FOBT/FIT: every 1 year  OR  Cologuard: every 3 years  OR  Sigmoidoscopy: every 5 years  Screening may be recommended earlier than age 45 if at higher risk for colorectal cancer. Also, an individualized decision between you and your healthcare provider will decide whether screening between the ages of 76-85 would be appropriate. Colonoscopy: 11/20/2019  FOBT/FIT: Not on file  Cologuard: Not on file  Sigmoidoscopy: Not on file    Screening Current     Prostate Cancer Screening Individualized decision between patient and health care provider in men between ages of 55-69   Medicare will cover every 12 months beginning on the day after your 50th birthday PSA: 5.22 ng/mL     Screening Not Indicated     Hepatitis C Screening Once for adults born between 1945 and 1965  More frequently in patients at high risk for Hepatitis C Hep C Antibody: 06/04/2018    Screening Current   Diabetes Screening 1-2 times per year if you're at risk for diabetes or have pre-diabetes Fasting glucose: 207 mg/dL (1/24/2024)  A1C: 6.3 (12/28/2023)  Screening Not Indicated  History Diabetes  Due for Blood Glucose   Cholesterol Screening Once every 5 years if you don't have a lipid disorder. May order more often based on risk factors. Lipid panel: 09/20/2023  Screening  Not Indicated  History Lipid Disorder  Due for Lipid Panel      Other Preventive Screenings Covered by Medicare:  Abdominal Aortic Aneurysm (AAA) Screening: covered once if your at risk. You're considered to be at risk if you have a family history of AAA or a male between the age of 65-75 who smoking at least 100 cigarettes in your lifetime.  Lung Cancer Screening: covers low dose CT scan once per year if you meet all of the following conditions: (1) Age 55-77; (2) No signs or symptoms of lung cancer; (3) Current smoker or have quit smoking within the last 15 years; (4) You have a tobacco smoking history of at least 20 pack years (packs per day x number of years you smoked); (5) You get a written order from a healthcare provider.  Glaucoma Screening: covered annually if you're considered high risk: (1) You have diabetes OR (2) Family history of glaucoma OR (3)  aged 50 and older OR (4)  American aged 65 and older  Osteoporosis Screening: covered every 2 years if you meet one of the following conditions: (1) Have a vertebral abnormality; (2) On glucocorticoid therapy for more than 3 months; (3) Have primary hyperparathyroidism; (4) On osteoporosis medications and need to assess response to drug therapy.  HIV Screening: covered annually if you're between the age of 15-65. Also covered annually if you are younger than 15 and older than 65 with risk factors for HIV infection. For pregnant patients, it is covered up to 3 times per pregnancy.    Immunizations:  Immunization Recommendations   Influenza Vaccine Annual influenza vaccination during flu season is recommended for all persons aged >= 6 months who do not have contraindications   Pneumococcal Vaccine   * Pneumococcal conjugate vaccine = PCV13 (Prevnar 13), PCV15 (Vaxneuvance), PCV20 (Prevnar 20)  * Pneumococcal polysaccharide vaccine = PPSV23 (Pneumovax) Adults 19-63 yo with certain risk factors or if 65+ yo  If never received any pneumonia  vaccine: recommend Prevnar 20 (PCV20)  Give PCV20 if previously received 1 dose of PCV13 or PPSV23   Hepatitis B Vaccine 3 dose series if at intermediate or high risk (ex: diabetes, end stage renal disease, liver disease)   Respiratory syncytial virus (RSV) Vaccine - COVERED BY MEDICARE PART D  * RSVPreF3 (Arexvy) CDC recommends that adults 60 years of age and older may receive a single dose of RSV vaccine using shared clinical decision-making (SCDM)   Tetanus (Td) Vaccine - COST NOT COVERED BY MEDICARE PART B Following completion of primary series, a booster dose should be given every 10 years to maintain immunity against tetanus. Td may also be given as tetanus wound prophylaxis.   Tdap Vaccine - COST NOT COVERED BY MEDICARE PART B Recommended at least once for all adults. For pregnant patients, recommended with each pregnancy.   Shingles Vaccine (Shingrix) - COST NOT COVERED BY MEDICARE PART B  2 shot series recommended in those 19 years and older who have or will have weakened immune systems or those 50 years and older     Health Maintenance Due:      Topic Date Due   • Hepatitis C Screening  Completed   • Colorectal Cancer Screening  Discontinued     Immunizations Due:      Topic Date Due   • COVID-19 Vaccine (5 - 2023-24 season) 09/01/2023     Advance Directives   What are advance directives?  Advance directives are legal documents that state your wishes and plans for medical care. These plans are made ahead of time in case you lose your ability to make decisions for yourself. Advance directives can apply to any medical decision, such as the treatments you want, and if you want to donate organs.   What are the types of advance directives?  There are many types of advance directives, and each state has rules about how to use them. You may choose a combination of any of the following:  Living will:  This is a written record of the treatment you want. You can also choose which treatments you do not want, which to  limit, and which to stop at a certain time. This includes surgery, medicine, IV fluid, and tube feedings.   Durable power of  for healthcare (DPAHC):  This is a written record that states who you want to make healthcare choices for you when you are unable to make them for yourself. This person, called a proxy, is usually a family member or a friend. You may choose more than 1 proxy.  Do not resuscitate (DNR) order:  A DNR order is used in case your heart stops beating or you stop breathing. It is a request not to have certain forms of treatment, such as CPR. A DNR order may be included in other types of advance directives.  Medical directive:  This covers the care that you want if you are in a coma, near death, or unable to make decisions for yourself. You can list the treatments you want for each condition. Treatment may include pain medicine, surgery, blood transfusions, dialysis, IV or tube feedings, and a ventilator (breathing machine).  Values history:  This document has questions about your views, beliefs, and how you feel and think about life. This information can help others choose the care that you would choose.  Why are advance directives important?  An advance directive helps you control your care. Although spoken wishes may be used, it is better to have your wishes written down. Spoken wishes can be misunderstood, or not followed. Treatments may be given even if you do not want them. An advance directive may make it easier for your family to make difficult choices about your care.   Weight Management   Why it is important to manage your weight:  Being overweight increases your risk of health conditions such as heart disease, high blood pressure, type 2 diabetes, and certain types of cancer. It can also increase your risk for osteoarthritis, sleep apnea, and other respiratory problems. Aim for a slow, steady weight loss. Even a small amount of weight loss can lower your risk of health  problems.  How to lose weight safely:  A safe and healthy way to lose weight is to eat fewer calories and get regular exercise. You can lose up about 1 pound a week by decreasing the number of calories you eat by 500 calories each day.   Healthy meal plan for weight management:  A healthy meal plan includes a variety of foods, contains fewer calories, and helps you stay healthy. A healthy meal plan includes the following:  Eat whole-grain foods more often.  A healthy meal plan should contain fiber. Fiber is the part of grains, fruits, and vegetables that is not broken down by your body. Whole-grain foods are healthy and provide extra fiber in your diet. Some examples of whole-grain foods are whole-wheat breads and pastas, oatmeal, brown rice, and bulgur.  Eat a variety of vegetables every day.  Include dark, leafy greens such as spinach, kale, haroon greens, and mustard greens. Eat yellow and orange vegetables such as carrots, sweet potatoes, and winter squash.   Eat a variety of fruits every day.  Choose fresh or canned fruit (canned in its own juice or light syrup) instead of juice. Fruit juice has very little or no fiber.  Eat low-fat dairy foods.  Drink fat-free (skim) milk or 1% milk. Eat fat-free yogurt and low-fat cottage cheese. Try low-fat cheeses such as mozzarella and other reduced-fat cheeses.  Choose meat and other protein foods that are low in fat.  Choose beans or other legumes such as split peas or lentils. Choose fish, skinless poultry (chicken or turkey), or lean cuts of red meat (beef or pork). Before you cook meat or poultry, cut off any visible fat.   Use less fat and oil.  Try baking foods instead of frying them. Add less fat, such as margarine, sour cream, regular salad dressing and mayonnaise to foods. Eat fewer high-fat foods. Some examples of high-fat foods include french fries, doughnuts, ice cream, and cakes.  Eat fewer sweets.  Limit foods and drinks that are high in sugar. This  includes candy, cookies, regular soda, and sweetened drinks.  Exercise:  Exercise at least 30 minutes per day on most days of the week. Some examples of exercise include walking, biking, dancing, and swimming. You can also fit in more physical activity by taking the stairs instead of the elevator or parking farther away from stores. Ask your healthcare provider about the best exercise plan for you.   Narcotic (Opioid) Safety    Use narcotics safely:  Take prescribed narcotics exactly as directed  Do not give narcotics to others or take narcotics that belong to someone else  Do not mix narcotics without medicines or alcohol  Do not drive or operate heavy machinery after you take the narcotic  Monitor for side effects and notify your healthcare provider if you experienced side effects such as nausea, sleepiness, itching, or trouble thinking clearly.    Manage constipation:    Constipation is the most common side effect of narcotic medicine. Constipation is when you have hard, dry bowel movements, or you go longer than usual between bowel movements. Tell your healthcare provider about all changes in your bowel movements while you are taking narcotics. He or she may recommend laxative medicine to help you have a bowel movement. He or she may also change the kind of narcotic you are taking, or change when you take it. The following are more ways you can prevent or relieve constipation:    Drink liquids as directed.  You may need to drink extra liquids to help soften and move your bowels. Ask how much liquid to drink each day and which liquids are best for you.  Eat high-fiber foods.  This may help decrease constipation by adding bulk to your bowel movements. High-fiber foods include fruits, vegetables, whole-grain breads and cereals, and beans. Your healthcare provider or dietitian can help you create a high-fiber meal plan. Your provider may also recommend a fiber supplement if you cannot get enough fiber from  food.  Exercise regularly.  Regular physical activity can help stimulate your intestines. Walking is a good exercise to prevent or relieve constipation. Ask which exercises are best for you.  Schedule a time each day to have a bowel movement.  This may help train your body to have regular bowel movements. Bend forward while you are on the toilet to help move the bowel movement out. Sit on the toilet for at least 10 minutes, even if you do not have a bowel movement.    Store narcotics safely:   Store narcotics where others cannot easily get them.  Keep them in a locked cabinet or secure area. Do not  keep them in a purse or other bag you carry with you. A person may be looking for something else and find the narcotics.  Make sure narcotics are stored out of the reach of children.  A child can easily overdose on narcotics. Narcotics may look like candy to a small child.    The best way to dispose of narcotics:      The laws vary by country and area. In the [place], the best way is to return the narcotics through a take-back program. This program is offered by the [place] Drug Enforcement Agency (DENISE). The following are options for using the program:  Take the narcotics to a [place]ISE collection site.  The site is often a law enforcement center. Call your local law enforcement center for scheduled take-back days in your area. You will be given information on where to go if the collection site is in a different location.  Take the narcotics to an approved pharmacy or hospital.  A pharmacy or hospital may be set up as a collection site. You will need to ask if it is a DENISE collection site if you were not directed there. A pharmacy or doctor's office may not be able to take back narcotics unless it is a DENISE site.  Use a mail-back system.  This means you are given containers to put the narcotics into. You will then mail them in the containers.  Use a take-back drop box.  This is a place to leave the narcotics at any time. People  and animals will not be able to get into the box. Your local law enforcement agency can tell you where to find a drop box in your area.    Other ways to manage pain:   Ask your healthcare provider about non-narcotic medicines to control pain.  Nonprescription medicines include NSAIDs (such as ibuprofen) and acetaminophen. Prescription medicines include muscle relaxers, antidepressants, and steroids.  Pain may be managed without any medicines.  Some ways to relieve pain include massage, aromatherapy, or meditation. Physical or occupational therapy may also help.    For more information:   Drug Enforcement Administration  [address] [place] [address]  Phone: 2- 409 - 792-3146  Web Address: https://www.deadiversion.[employer].com.gov/drug_disposal/    [place] Food and Drug Administration  [address] [place] [address]  Phone: 0- 665 - 021-6831  Web Address: http://www.fda.gov     © Copyright [employer] 2018 Information is for End User's use only and may not be sold, redistributed or otherwise used for commercial purposes. All illustrations and images included in CareNotes® are the copyrighted property of [employer] or [employer]    Medicare Preventive Visit Patient Instructions  Thank you for completing your Welcome to Medicare Visit or Medicare Annual Wellness Visit today. Your next wellness visit will be due in one year (4/5/2025).  The screening/preventive services that you may require over the next 5-10 years are detailed below. Some tests may not apply to you based off risk factors and/or age. Screening tests ordered at today's visit but not completed yet may show as past due. Also, please note that scanned in results may not display below.  Preventive Screenings:  Service Recommendations Previous Testing/Comments   Colorectal Cancer Screening  Colonoscopy    Fecal Occult Blood Test (FOBT)/Fecal Immunochemical Test (FIT)  Fecal DNA/Cologuard Test  Flexible Sigmoidoscopy  Age: 45-75 years old   Colonoscopy: every 10 years (May be performed more frequently if at higher risk)  OR  FOBT/FIT: every 1 year  OR  Cologuard: every 3 years  OR  Sigmoidoscopy: every 5 years  Screening may be recommended earlier than age 45 if at higher risk for colorectal cancer. Also, an individualized decision between you and your healthcare provider will decide whether screening between the ages of 76-85 would be appropriate. Colonoscopy: 11/20/2019  FOBT/FIT: Not on file  Cologuard: Not on file  Sigmoidoscopy: Not on file    Screening Current     Prostate Cancer Screening Individualized decision between patient and health care provider in men between ages of 55-69   Medicare will cover every 12 months beginning on the day after your 50th birthday PSA: 5.22 ng/mL     Screening Not Indicated     Hepatitis C Screening Once for adults born between 1945 and 1965  More frequently in patients at high risk for Hepatitis C Hep C Antibody: 06/04/2018    Screening Current   Diabetes Screening 1-2 times per year if you're at risk for diabetes or have pre-diabetes Fasting glucose: 207 mg/dL (1/24/2024)  A1C: 6.3 (12/28/2023)  Screening Not Indicated  History Diabetes  Due for Blood Glucose   Cholesterol Screening Once every 5 years if you don't have a lipid disorder. May order more often based on risk factors. Lipid panel: 09/20/2023  Screening Not Indicated  History Lipid Disorder  Due for Lipid Panel      Other Preventive Screenings Covered by Medicare:  Abdominal Aortic Aneurysm (AAA) Screening: covered once if your at risk. You're considered to be at risk if you have a family history of AAA or a male between the age of 65-75 who smoking at least 100 cigarettes in your lifetime.  Lung Cancer Screening: covers low dose CT scan once per year if you meet all of the following conditions: (1) Age 55-77; (2) No signs or symptoms of lung cancer; (3) Current smoker or have quit smoking within the last 15 years; (4) You have  a tobacco smoking history of at least 20 pack years (packs per day x number of years you smoked); (5) You get a written order from a healthcare provider.  Glaucoma Screening: covered annually if you're considered high risk: (1) You have diabetes OR (2) Family history of glaucoma OR (3)  aged 50 and older OR (4)  American aged 65 and older  Osteoporosis Screening: covered every 2 years if you meet one of the following conditions: (1) Have a vertebral abnormality; (2) On glucocorticoid therapy for more than 3 months; (3) Have primary hyperparathyroidism; (4) On osteoporosis medications and need to assess response to drug therapy.  HIV Screening: covered annually if you're between the age of 15-65. Also covered annually if you are younger than 15 and older than 65 with risk factors for HIV infection. For pregnant patients, it is covered up to 3 times per pregnancy.    Immunizations:  Immunization Recommendations   Influenza Vaccine Annual influenza vaccination during flu season is recommended for all persons aged >= 6 months who do not have contraindications   Pneumococcal Vaccine   * Pneumococcal conjugate vaccine = PCV13 (Prevnar 13), PCV15 (Vaxneuvance), PCV20 (Prevnar 20)  * Pneumococcal polysaccharide vaccine = PPSV23 (Pneumovax) Adults 19-65 yo with certain risk factors or if 65+ yo  If never received any pneumonia vaccine: recommend Prevnar 20 (PCV20)  Give PCV20 if previously received 1 dose of PCV13 or PPSV23   Hepatitis B Vaccine 3 dose series if at intermediate or high risk (ex: diabetes, end stage renal disease, liver disease)   Respiratory syncytial virus (RSV) Vaccine - COVERED BY MEDICARE PART D  * RSVPreF3 (Arexvy) CDC recommends that adults 60 years of age and older may receive a single dose of RSV vaccine using shared clinical decision-making (SCDM)   Tetanus (Td) Vaccine - COST NOT COVERED BY MEDICARE PART B Following completion of primary series, a booster dose should be  given every 10 years to maintain immunity against tetanus. Td may also be given as tetanus wound prophylaxis.   Tdap Vaccine - COST NOT COVERED BY MEDICARE PART B Recommended at least once for all adults. For pregnant patients, recommended with each pregnancy.   Shingles Vaccine (Shingrix) - COST NOT COVERED BY MEDICARE PART B  2 shot series recommended in those 19 years and older who have or will have weakened immune systems or those 50 years and older     Health Maintenance Due:      Topic Date Due   • Hepatitis C Screening  Completed   • Colorectal Cancer Screening  Discontinued     Immunizations Due:      Topic Date Due   • COVID-19 Vaccine (5 - 2023-24 season) 09/01/2023     Advance Directives   What are advance directives?  Advance directives are legal documents that state your wishes and plans for medical care. These plans are made ahead of time in case you lose your ability to make decisions for yourself. Advance directives can apply to any medical decision, such as the treatments you want, and if you want to donate organs.   What are the types of advance directives?  There are many types of advance directives, and each state has rules about how to use them. You may choose a combination of any of the following:  Living will:  This is a written record of the treatment you want. You can also choose which treatments you do not want, which to limit, and which to stop at a certain time. This includes surgery, medicine, IV fluid, and tube feedings.   Durable power of  for healthcare (DPAHC):  This is a written record that states who you want to make healthcare choices for you when you are unable to make them for yourself. This person, called a proxy, is usually a family member or a friend. You may choose more than 1 proxy.  Do not resuscitate (DNR) order:  A DNR order is used in case your heart stops beating or you stop breathing. It is a request not to have certain forms of treatment, such as CPR. A DNR  order may be included in other types of advance directives.  Medical directive:  This covers the care that you want if you are in a coma, near death, or unable to make decisions for yourself. You can list the treatments you want for each condition. Treatment may include pain medicine, surgery, blood transfusions, dialysis, IV or tube feedings, and a ventilator (breathing machine).  Values history:  This document has questions about your views, beliefs, and how you feel and think about life. This information can help others choose the care that you would choose.  Why are advance directives important?  An advance directive helps you control your care. Although spoken wishes may be used, it is better to have your wishes written down. Spoken wishes can be misunderstood, or not followed. Treatments may be given even if you do not want them. An advance directive may make it easier for your family to make difficult choices about your care.   Weight Management   Why it is important to manage your weight:  Being overweight increases your risk of health conditions such as heart disease, high blood pressure, type 2 diabetes, and certain types of cancer. It can also increase your risk for osteoarthritis, sleep apnea, and other respiratory problems. Aim for a slow, steady weight loss. Even a small amount of weight loss can lower your risk of health problems.  How to lose weight safely:  A safe and healthy way to lose weight is to eat fewer calories and get regular exercise. You can lose up about 1 pound a week by decreasing the number of calories you eat by 500 calories each day.   Healthy meal plan for weight management:  A healthy meal plan includes a variety of foods, contains fewer calories, and helps you stay healthy. A healthy meal plan includes the following:  Eat whole-grain foods more often.  A healthy meal plan should contain fiber. Fiber is the part of grains, fruits, and vegetables that is not broken down by your  body. Whole-grain foods are healthy and provide extra fiber in your diet. Some examples of whole-grain foods are whole-wheat breads and pastas, oatmeal, brown rice, and bulgur.  Eat a variety of vegetables every day.  Include dark, leafy greens such as spinach, kale, haroon greens, and mustard greens. Eat yellow and orange vegetables such as carrots, sweet potatoes, and winter squash.   Eat a variety of fruits every day.  Choose fresh or canned fruit (canned in its own juice or light syrup) instead of juice. Fruit juice has very little or no fiber.  Eat low-fat dairy foods.  Drink fat-free (skim) milk or 1% milk. Eat fat-free yogurt and low-fat cottage cheese. Try low-fat cheeses such as mozzarella and other reduced-fat cheeses.  Choose meat and other protein foods that are low in fat.  Choose beans or other legumes such as split peas or lentils. Choose fish, skinless poultry (chicken or turkey), or lean cuts of red meat (beef or pork). Before you cook meat or poultry, cut off any visible fat.   Use less fat and oil.  Try baking foods instead of frying them. Add less fat, such as margarine, sour cream, regular salad dressing and mayonnaise to foods. Eat fewer high-fat foods. Some examples of high-fat foods include french fries, doughnuts, ice cream, and cakes.  Eat fewer sweets.  Limit foods and drinks that are high in sugar. This includes candy, cookies, regular soda, and sweetened drinks.  Exercise:  Exercise at least 30 minutes per day on most days of the week. Some examples of exercise include walking, biking, dancing, and swimming. You can also fit in more physical activity by taking the stairs instead of the elevator or parking farther away from stores. Ask your healthcare provider about the best exercise plan for you.   Narcotic (Opioid) Safety    Use narcotics safely:  Take prescribed narcotics exactly as directed  Do not give narcotics to others or take narcotics that belong to someone else  Do not mix  narcotics without medicines or alcohol  Do not drive or operate heavy machinery after you take the narcotic  Monitor for side effects and notify your healthcare provider if you experienced side effects such as nausea, sleepiness, itching, or trouble thinking clearly.    Manage constipation:    Constipation is the most common side effect of narcotic medicine. Constipation is when you have hard, dry bowel movements, or you go longer than usual between bowel movements. Tell your healthcare provider about all changes in your bowel movements while you are taking narcotics. He or she may recommend laxative medicine to help you have a bowel movement. He or she may also change the kind of narcotic you are taking, or change when you take it. The following are more ways you can prevent or relieve constipation:    Drink liquids as directed.  You may need to drink extra liquids to help soften and move your bowels. Ask how much liquid to drink each day and which liquids are best for you.  Eat high-fiber foods.  This may help decrease constipation by adding bulk to your bowel movements. High-fiber foods include fruits, vegetables, whole-grain breads and cereals, and beans. Your healthcare provider or dietitian can help you create a high-fiber meal plan. Your provider may also recommend a fiber supplement if you cannot get enough fiber from food.  Exercise regularly.  Regular physical activity can help stimulate your intestines. Walking is a good exercise to prevent or relieve constipation. Ask which exercises are best for you.  Schedule a time each day to have a bowel movement.  This may help train your body to have regular bowel movements. Bend forward while you are on the toilet to help move the bowel movement out. Sit on the toilet for at least 10 minutes, even if you do not have a bowel movement.    Store narcotics safely:   Store narcotics where others cannot easily get them.  Keep them in a locked cabinet or secure area. Do  not  keep them in a purse or other bag you carry with you. A person may be looking for something else and find the narcotics.  Make sure narcotics are stored out of the reach of children.  A child can easily overdose on narcotics. Narcotics may look like candy to a small child.    The best way to dispose of narcotics:      The laws vary by country and area. In the [place], the best way is to return the narcotics through a take-back program. This program is offered by the [place] Drug Enforcement Agency (DENISE). The following are options for using the program:  Take the narcotics to a [place]ISE collection site.  The site is often a law enforcement center. Call your local law enforcement center for scheduled take-back days in your area. You will be given information on where to go if the collection site is in a different location.  Take the narcotics to an approved pharmacy or hospital.  A pharmacy or hospital may be set up as a collection site. You will need to ask if it is a [place]ISE collection site if you were not directed there. A pharmacy or doctor's office may not be able to take back narcotics unless it is a [place]ISE site.  Use a mail-back system.  This means you are given containers to put the narcotics into. You will then mail them in the containers.  Use a take-back drop box.  This is a place to leave the narcotics at any time. People and animals will not be able to get into the box. Your local law enforcement agency can tell you where to find a drop box in your area.    Other ways to manage pain:   Ask your healthcare provider about non-narcotic medicines to control pain.  Nonprescription medicines include NSAIDs (such as ibuprofen) and acetaminophen. Prescription medicines include muscle relaxers, antidepressants, and steroids.  Pain may be managed without any medicines.  Some ways to relieve pain include massage, aromatherapy, or meditation. Physical or occupational therapy may also help.    For more information:   Drug  [facility]  [address] [place] [id number]  Phone: [phone]  Web Address: https://www.deadiversion.[email or web address] Cent[facility].gov/drug_disposal/    [place] Food and Drug Administration  [address] [place] [address]  Phone: 6- 259 - [phone]  Web Address: http://www.fda.gov     © Copyright [employer] 2018 Information is for End User's use only and may not be sold, redistributed or otherwise used for commercial purposes. All illustrations and images included in CareNotes® are the copyrighted property of [employer] or [employer]

## 2024-04-04 NOTE — PROGRESS NOTES
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    Relevant Orders    POCT hemoglobin A1c    Basic metabolic panel    LDL cholesterol, direct    Triglycerides       Nervous and Auditory    Alzheimer's disease, unspecified (CODE) (HCC)       Musculoskeletal and Integument    Osteoarthritis       Genitourinary    Stage 3 chronic kidney disease (HCC)       Behavioral Health    Generalized anxiety disorder       Blood    Anemia       Neurology/Sleep    History of TIA (transient ischemic attack)       Other    Right Adrenal nodule  (Chronic)    Hyperlipidemia    Relevant Orders    LDL cholesterol, direct    Triglycerides    Chronic anticoagulation     Other Visit Diagnoses       Encounter for diabetic foot exam (HCC)        Medicare annual wellness visit, subsequent        Elevated PSA        Relevant Orders    PSA Total, Diagnostic             Preventive health issues were discussed with patient, and age appropriate screening tests were ordered as noted in patient's After Visit Summary.  Personalized health advice and appropriate referrals for health education or preventive services given if needed, as noted in patient's After Visit Summary.     History of Present Illness:     Patient presents for a Medicare Wellness Visit    HPI   Patient Care Team:  Wilfrid Mills DO as PCP - General (Internal Medicine)     Review of Systems:     Review of Systems     Problem List:     Patient Active Problem List   Diagnosis    Anemia    Diabetic polyneuropathy associated with type 2 diabetes mellitus (HCC)    Eczema    Erectile dysfunction of non-organic origin    Generalized anxiety disorder    GERD without esophagitis    Hyperlipidemia    Hypertension    Nephrolithiasis    Obesity     Osteoarthritis    Polyp of sigmoid colon    Type 2 diabetes mellitus with diabetic chronic kidney disease (HCC)    Primary osteoarthritis of right hip    Mild concentric left ventricular hypertrophy (LVH)    Atrial fibrillation (HCC)    Chronic anticoagulation    History of TIA (transient ischemic attack)    Stage 3 chronic kidney disease (HCC)    Hydronephrosis with obstructing calculus    Right Adrenal nodule     Proteinuria    Diabetic nephropathy associated with type 2 diabetes mellitus (HCC)    Localized edema    Alzheimer's disease, unspecified (CODE) ([facility])    Sleep disturbance      Past Medical and Surgical History:     Past Medical History:   Diagnosis Date    Acute on chronic renal insufficiency     Allergic rhinitis     Anemia 06/11/2012    Atrial fibrillation (HCC)     Cerebrovascular accident (CVA) ([facility])     Chest pain     Controlled type 2 diabetes mellitus without complication (HCC)     SMALLS (dyspnea on exertion)     Generalized anxiety disorder     GERD without esophagitis 06/11/2012    Hypertension     Memory difficulties 01/08/2019    Nephrolithiasis 03/17/2016    Obesity 06/11/2012    Osteoarthritis 06/11/2012     Past Surgical History:   Procedure Laterality Date    CATARACT EXTRACTION, BILATERAL      CYSTOSCOPY W/ LASER LITHOTRIPSY Left 1/20/2020    Procedure: CYSTOSCOPY; RETROGRADE; URETEROSCOPY; STONE EXTRACTION; POSSIBLE HOLMIUM LASER LITHOTRIPSY;  Surgeon: Carlos Moore MD;  Location: [facility] OR;  Service: Urology    KNEE SURGERY      IL CYSTO BLADDER W/URETERAL CATHETERIZATION Left 11/8/2019    Procedure: CYSTOSCOPY RETROGRADE PYELOGRAM WITH INSERTION STENT URETERAL;  Surgeon: Carlos Moore MD;  Location: [facility] OR;  Service: Urology    TOTAL HIP ARTHROPLASTY      TOTAL HIP ARTHROPLASTY Bilateral 2011      Family History:     Family History   Problem Relation Age of Onset    Arthritis Mother     Parkinsonism Mother     No Known Problems Father       Social History:     Social History      Socioeconomic History    Marital status: /Civil Union     Spouse name: None    Number of children: None    Years of education: None    Highest education level: None   Occupational History    Occupation: Retired   Tobacco Use    Smoking status: Never     Passive exposure: Never    Smokeless tobacco: Never   Vaping Use    Vaping status: Never Used   Substance and Sexual Activity    Alcohol use: Never     Comment: socially     Drug use: Never    Sexual activity: Yes   Other Topics Concern    None   Social History Narrative    RETIRED     Social Determinants of Health     Financial Resource Strain: Low Risk  (3/7/2023)    Overall Financial Resource Strain (CARDIA)     Difficulty of Paying Living Expenses: Not hard at all   Food Insecurity: No Food Insecurity (4/4/2024)    Hunger Vital Sign     Worried About Running Out of Food in the Last Year: Never true     Ran Out of Food in the Last Year: Never true   Transportation Needs: No Transportation Needs (4/4/2024)    PRAPARE - Transportation     Lack of Transportation (Medical): No     Lack of Transportation (Non-Medical): No   Physical Activity: Not on file   Stress: Not on file   Social Connections: Not on file   Intimate Partner Violence: Not on file   Housing Stability: Low Risk  (4/4/2024)    Housing Stability Vital Sign     Unable to Pay for Housing in the Last Year: No     Number of Places Lived in the Last Year: 0     Unstable Housing in the Last Year: No      Medications and Allergies:     Current Outpatient Medications   Medication Sig Dispense Refill    gabapentin (NEURONTIN) 300 mg capsule Take 1 capsule (300 mg total) by mouth 3 (three) times a day 300mg PO x 3 days, 300mg PO 2 tabs daily for 3 days, 300mg 3 tabs daily after loadint titration 270 capsule 0    ALPRAZolam (XANAX) 0.5 mg tablet Take 1 tablet (0.5 mg total) by mouth every 8 (eight) hours as needed (AS NEEDED) 90 tablet 0    amLODIPine (NORVASC) 10 mg tablet Take 0.5 tablets (5 mg total)  by mouth daily 90 tablet 3    apixaban (ELIQUIS) 5 mg Take 1 tablet (5 mg total) by mouth 2 (two) times a day 180 tablet 3    busPIRone (BUSPAR) 5 mg tablet Take 1 tablet (5 mg total) by mouth 2 (two) times a day 180 tablet 3    carbidopa-levodopa (SINEMET)  mg per tablet take 1 tablet by mouth three times a day 270 tablet 3    donepezil (ARICEPT) 10 mg tablet take 1 tablet by mouth at bedtime 90 tablet 1    DULoxetine (CYMBALTA) 30 mg delayed release capsule take 1 capsule by mouth once daily 90 capsule 1    ergocalciferol (VITAMIN D2) 50,000 units Take 1 capsule (50,000 Units total) by mouth once a week 12 capsule 1    furosemide (LASIX) 20 mg tablet take 1 tablet by mouth three times a week EITHER ON MONDAY, WEDNESDAY,AND FRIDAY OR THUESDAY, THRUSDAY, AND SATURDAY 15 tablet 5    glimepiride (AMARYL) 2 mg tablet Take 1 tablet (2 mg total) by mouth daily with breakfast 90 tablet 3    glucose blood (FREESTYLE LITE) test strip Test twice a day 100 each 3    glucose monitoring kit (FREESTYLE) monitoring kit Use 1 each 2 (two) times a day Test twice  Day 1 each 0    Lancets (freestyle) lancets Test twice a day 100 each 3    memantine (Namenda) 10 mg tablet Take 1 tablet (10 mg total) by mouth 2 (two) times a day 180 tablet 1    metFORMIN (GLUCOPHAGE) 850 mg tablet take 1 tablet by mouth twice a day 180 tablet 1    methocarbamol (ROBAXIN) 500 mg tablet Take 1 tablet (500 mg total) by mouth 4 (four) times a day 90 tablet 0    metoprolol succinate (TOPROL-XL) 100 mg 24 hr tablet Take 1 tablet (100 mg total) by mouth daily 90 tablet 1     No current facility-administered medications for this visit.     Allergies   Allergen Reactions    Pollen Extract Itching     Runny nose, itchy eyes      Immunizations:     Immunization History   Administered Date(s) Administered    COVID-19 Moderna Vac BIVALENT 12 Yr+ IM 0.5 ML 09/15/2022    COVID-19 PFIZER VACCINE 0.3 ML IM 01/29/2021, 02/19/2021, 10/05/2021    INFLUENZA  09/19/2020, 10/07/2021, 09/15/2022    Influenza, high dose seasonal 0.7 mL 11/14/2019, 01/11/2021, 10/04/2023    Pneumococcal Conjugate 13-Valent 03/21/2017    Pneumococcal Polysaccharide PPV23 10/09/2020    TD (adult) Preservative Free 09/14/2010    Tuberculin Skin Test-PPD Intradermal 01/14/2015      Health Maintenance:         Topic Date Due    Hepatitis C Screening  Completed    Colorectal Cancer Screening  Discontinued         Topic Date Due    COVID-19 Vaccine (5 - 2023-24 season) 09/01/2023      Medicare Screening Tests and Risk Assessments:     Kaleb is here for his Subsequent Wellness visit. Last Medicare Wellness visit information reviewed, patient interviewed and updates made to the record today.      Health Risk Assessment:   Patient rates overall health as good. Patient feels that their physical health rating is same. Patient is satisfied with their life. Eyesight was rated as same. Hearing was rated as same. Patient feels that their emotional and mental health rating is same. Patients states they are never, rarely angry. Patient states they are never, rarely unusually tired/fatigued. Pain experienced in the last 7 days has been none. Patient states that he has experienced no weight loss or gain in last 6 months.     Fall Risk Screening:   In the past year, patient has experienced: no history of falling in past year      Home Safety:  Patient does not have trouble with stairs inside or outside of their home. Patient has working smoke alarms and has working carbon monoxide detector. Home safety hazards include: none.     Nutrition:   Current diet is Regular and Diabetic.     Medications:   Patient is currently taking over-the-counter supplements. OTC medications include: see medication list. Patient is not able to manage medications.     Activities of Daily Living (ADLs)/Instrumental Activities of Daily Living (IADLs):   Walk and transfer into and out of bed and chair?: Yes  Dress and groom yourself?:  Yes    Bathe or shower yourself?: Yes    Feed yourself? Yes  Do your laundry/housekeeping?: Yes  Manage your money, pay your bills and track your expenses?: Yes  Make your own meals?: Yes    Do your own shopping?: Yes    Previous Hospitalizations:   Any hospitalizations or ED visits within the last 12 months?: No      Advance Care Planning:   Living will: Yes    Durable POA for healthcare: Yes    Advanced directive: Yes    Advanced directive counseling given: Yes    Five wishes given: No    Patient declined ACP directive: No    End of Life Decisions reviewed with patient: Yes    Provider agrees with end of life decisions: Yes      Cognitive Screening:   Provider or family/friend/caregiver concerned regarding cognition?: Yes    Cognition Comments: Pt with SDAT. Wife accompanying pt and has POA.    PREVENTIVE SCREENINGS      Cardiovascular Screening:    General: Screening Not Indicated and History Lipid Disorder    Due for: Lipid Panel      Diabetes Screening:     General: Screening Not Indicated and History Diabetes    Due for: Blood Glucose      Colorectal Cancer Screening:     General: Screening Current      Prostate Cancer Screening:    General: Screening Not Indicated      Osteoporosis Screening:    General: Screening Not Indicated      Abdominal Aortic Aneurysm (AAA) Screening:        General: Screening Not Indicated      Lung Cancer Screening:     General: Screening Not Indicated      Hepatitis C Screening:    General: Screening Current    Screening, Brief Intervention, and Referral to Treatment (SBIRT)    Screening  Typical number of drinks in a day: 0  Typical number of drinks in a week: 0  Interpretation: Low risk drinking behavior.    Single Item Drug Screening:  How often have you used an illegal drug (including marijuana) or a prescription medication for non-medical reasons in the past year? never    Single Item Drug Screen Score: 0  Interpretation: Negative screen for possible drug use disorder    Review  "of Current Opioid Use  Opioid Risk Tool (ORT) Score: 0  Opioid Risk Tool (ORT) Interpretation: Score 0-3: Low risk for opioid misuse    Other Counseling Topics:   Car/seat belt/driving safety, sunscreen and regular weightbearing exercise and calcium and vitamin D intake.     No results found.     Physical Exam:     /72   Pulse 72   Temp (!) 49.6 °F (9.8 °C)   Ht 5' 11\" (1.803 m)   Wt 105 kg (232 lb)   SpO2 99%   BMI 32.36 kg/m²     Physical Exam   A/P: Doing well and no falls reported. Denies depression and feels safe at home. Diverse diet. No problems operating a MV and uses seat belts. Has a living will and POA. No DME or referrals needed today. RTC one year for medicare wellness.     Wilfrid Mills, DO  "

## 2024-04-05 ENCOUNTER — TELEPHONE (OUTPATIENT)
Dept: INTERNAL MEDICINE CLINIC | Facility: CLINIC | Age: 79
End: 2024-04-05

## 2024-04-05 DIAGNOSIS — R97.20 ELEVATED PSA: Primary | ICD-10-CM

## 2024-04-05 RX ORDER — ATORVASTATIN CALCIUM 20 MG/1
20 TABLET, FILM COATED ORAL DAILY
COMMUNITY

## 2024-04-05 NOTE — TELEPHONE ENCOUNTER
Spoke with patients wife she states that they would like to see a urologist. And she states that he was previously on 20 mg of atorvastatin but isn't sure  who discontinued him from it but she still has 2 bottles of atorvastatin 20mg would you like him to start this?

## 2024-04-05 NOTE — TELEPHONE ENCOUNTER
----- Message from Wilfrid Mills DO sent at 4/5/2024  6:03 AM EDT -----  Call wife, PSA is still elevated and may be related to his age. See if they would like to see . Labs ok except for elevated LDL and TG. See if they want meds.

## 2024-04-05 NOTE — TELEPHONE ENCOUNTER
Patient's wife called and stated that patient has a Urologist already, Dr. Moore. Wife will make an appt with the Urologist

## 2024-04-24 ENCOUNTER — TELEPHONE (OUTPATIENT)
Age: 79
End: 2024-04-24

## 2024-04-24 NOTE — TELEPHONE ENCOUNTER
Aime wife rajesh called ask her  aime was taken off the vitamin d3 pharmacy call medication is ready should she still  the medication from pharmacy can some one from office call her back.

## 2024-04-29 NOTE — TELEPHONE ENCOUNTER
Patient wife has been notified and is going to continue the vitamin d as she does not remember if it was stopped or not.

## 2024-05-02 DIAGNOSIS — I10 HYPERTENSION, UNSPECIFIED TYPE: ICD-10-CM

## 2024-05-04 RX ORDER — METOPROLOL SUCCINATE 100 MG/1
100 TABLET, EXTENDED RELEASE ORAL DAILY
Qty: 90 TABLET | Refills: 1 | Status: SHIPPED | OUTPATIENT
Start: 2024-05-04

## 2024-05-08 ENCOUNTER — APPOINTMENT (OUTPATIENT)
Dept: LAB | Facility: CLINIC | Age: 79
End: 2024-05-08
Payer: MEDICARE

## 2024-05-08 DIAGNOSIS — R97.20 ELEVATED PROSTATE SPECIFIC ANTIGEN (PSA): ICD-10-CM

## 2024-05-08 LAB
BUN SERPL-MCNC: 21 MG/DL (ref 5–25)
CREAT SERPL-MCNC: 1.72 MG/DL (ref 0.6–1.3)
GFR SERPL CREATININE-BSD FRML MDRD: 37 ML/MIN/1.73SQ M

## 2024-05-08 PROCEDURE — 84520 ASSAY OF UREA NITROGEN: CPT

## 2024-05-08 PROCEDURE — 84153 ASSAY OF PSA TOTAL: CPT

## 2024-05-08 PROCEDURE — 84154 ASSAY OF PSA FREE: CPT

## 2024-05-08 PROCEDURE — 36415 COLL VENOUS BLD VENIPUNCTURE: CPT

## 2024-05-08 PROCEDURE — 82565 ASSAY OF CREATININE: CPT

## 2024-05-09 ENCOUNTER — HOSPITAL ENCOUNTER (EMERGENCY)
Facility: HOSPITAL | Age: 79
Discharge: HOME | End: 2024-05-09
Attending: FAMILY MEDICINE
Payer: MEDICARE

## 2024-05-09 ENCOUNTER — APPOINTMENT (EMERGENCY)
Dept: CT IMAGING | Facility: HOSPITAL | Age: 79
End: 2024-05-09
Payer: MEDICARE

## 2024-05-09 VITALS
RESPIRATION RATE: 18 BRPM | OXYGEN SATURATION: 96 % | SYSTOLIC BLOOD PRESSURE: 129 MMHG | BODY MASS INDEX: 30.8 KG/M2 | HEART RATE: 67 BPM | WEIGHT: 220 LBS | DIASTOLIC BLOOD PRESSURE: 60 MMHG | TEMPERATURE: 96.5 F | HEIGHT: 71 IN
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
## 2024-05-09 LAB
ANION GAP SERPL CALCULATED.3IONS-SCNC: 14 MMOL/L (ref 4–13)
BASOPHILS # BLD AUTO: 0.04 THOUSANDS/ÂΜL (ref 0–0.1)
BASOPHILS NFR BLD AUTO: 1 % (ref 0–1)
BUN SERPL-MCNC: 22 MG/DL (ref 5–25)
CALCIUM SERPL-MCNC: 9.3 MG/DL (ref 8.4–10.2)
CHLORIDE SERPL-SCNC: 102 MMOL/L (ref 96–108)
CO2 SERPL-SCNC: 19 MMOL/L (ref 21–32)
CREAT SERPL-MCNC: 1.79 MG/DL (ref 0.6–1.3)
EOSINOPHIL # BLD AUTO: 0.12 THOUSAND/ÂΜL (ref 0–0.61)
EOSINOPHIL NFR BLD AUTO: 2 % (ref 0–6)
ERYTHROCYTE [DISTWIDTH] IN BLOOD BY AUTOMATED COUNT: 13.4 % (ref 11.6–15.1)
GFR SERPL CREATININE-BSD FRML MDRD: 35 ML/MIN/1.73SQ M
GLUCOSE SERPL-MCNC: 226 MG/DL (ref 65–140)
HCT VFR BLD AUTO: 41.8 % (ref 36.5–49.3)
HGB BLD-MCNC: 13.2 G/DL (ref 12–17)
IMM GRANULOCYTES # BLD AUTO: 0.04 THOUSAND/UL (ref 0–0.2)
IMM GRANULOCYTES NFR BLD AUTO: 1 % (ref 0–2)
LYMPHOCYTES # BLD AUTO: 1.03 THOUSANDS/ÂΜL (ref 0.6–4.47)
LYMPHOCYTES NFR BLD AUTO: 13 % (ref 14–44)
MCH RBC QN AUTO: 30.3 PG (ref 26.8–34.3)
MCHC RBC AUTO-ENTMCNC: 31.6 G/DL (ref 31.4–37.4)
MCV RBC AUTO: 96 FL (ref 82–98)
MONOCYTES # BLD AUTO: 0.56 THOUSAND/ÂΜL (ref 0.17–1.22)
MONOCYTES NFR BLD AUTO: 7 % (ref 4–12)
NEUTROPHILS # BLD AUTO: 6.2 THOUSANDS/ÂΜL (ref 1.85–7.62)
NEUTS SEG NFR BLD AUTO: 76 % (ref 43–75)
NRBC BLD AUTO-RTO: 0 /100 WBCS
PLATELET # BLD AUTO: 240 THOUSANDS/UL (ref 149–390)
PMV BLD AUTO: 9.9 FL (ref 8.9–12.7)
POTASSIUM SERPL-SCNC: 5.1 MMOL/L (ref 3.5–5.3)
PSA FREE MFR SERPL: 8.8 %
PSA FREE SERPL-MCNC: 0.5 NG/ML
PSA SERPL-MCNC: 5.7 NG/ML (ref 0–4)
RBC # BLD AUTO: 4.36 MILLION/UL (ref 3.88–5.62)
SODIUM SERPL-SCNC: 135 MMOL/L (ref 135–147)
WBC # BLD AUTO: 7.99 THOUSAND/UL (ref 4.31–10.16)

## 2024-05-09 PROCEDURE — 70450 CT HEAD/BRAIN W/O DYE: CPT

## 2024-05-09 PROCEDURE — 36415 COLL VENOUS BLD VENIPUNCTURE: CPT | Performed by: FAMILY MEDICINE

## 2024-05-09 PROCEDURE — 99284 EMERGENCY DEPT VISIT MOD MDM: CPT

## 2024-05-09 PROCEDURE — 85025 COMPLETE CBC W/AUTO DIFF WBC: CPT | Performed by: FAMILY MEDICINE

## 2024-05-09 PROCEDURE — 80048 BASIC METABOLIC PNL TOTAL CA: CPT | Performed by: FAMILY MEDICINE

## 2024-05-09 PROCEDURE — 96360 HYDRATION IV INFUSION INIT: CPT

## 2024-05-09 PROCEDURE — 99284 EMERGENCY DEPT VISIT MOD MDM: CPT | Performed by: FAMILY MEDICINE

## 2024-05-09 PROCEDURE — 96361 HYDRATE IV INFUSION ADD-ON: CPT

## 2024-05-09 RX ADMIN — SODIUM CHLORIDE 1000 ML: 0.9 INJECTION, SOLUTION INTRAVENOUS at 16:20

## 2024-05-09 NOTE — ED PROVIDER NOTES
History  Chief Complaint   Patient presents with    Fall     Pt reports walking with his wheelbarrow and falling backwards onto the grass, no head strike, no LOC       Fall  Associated symptoms: no abdominal pain, no back pain, no chest pain, no headaches, no nausea and no vomiting    This is a 78-year-old male presented to ED with complaint of fall.  Patient states that he was walking with his wheelbarrow when fell backward on the grass.  Denies any head strike.  Patient states that he lost his balance and fell.  Denies any headache blurry vision double vision.  Denying chest pain shortness of breath.  Patient otherwise stable awake alert oriented times uses 15 wife is at bedside who also provided with the history.  States he is going to follow-up with his neurologist for weakness.    Prior to Admission Medications   Prescriptions Last Dose Informant Patient Reported? Taking?   ALPRAZolam (XANAX) 0.5 mg tablet   No No   Sig: Take 1 tablet (0.5 mg total) by mouth every 8 (eight) hours as needed (AS NEEDED)   Canagliflozin 300 MG TABS   No No   Sig: Take 1 tablet (300 mg total) by mouth daily   DULoxetine (CYMBALTA) 30 mg delayed release capsule   No No   Sig: take 1 capsule by mouth once daily   Lancets (freestyle) lancets   No No   Sig: Test twice a day   amLODIPine (NORVASC) 10 mg tablet   No No   Sig: Take 0.5 tablets (5 mg total) by mouth daily   apixaban (ELIQUIS) 5 mg   No No   Sig: Take 1 tablet (5 mg total) by mouth 2 (two) times a day   atorvastatin (LIPITOR) 20 mg tablet   Yes No   Sig: Take 20 mg by mouth daily   busPIRone (BUSPAR) 5 mg tablet   No No   Sig: Take 1 tablet (5 mg total) by mouth 2 (two) times a day   carbidopa-levodopa (SINEMET)  mg per tablet   No No   Sig: take 1 tablet by mouth three times a day   donepezil (ARICEPT) 10 mg tablet   No No   Sig: take 1 tablet by mouth at bedtime   ergocalciferol (VITAMIN D2) 50,000 units   No No   Sig: Take 1 capsule (50,000 Units total) by mouth  once a week   furosemide (LASIX) 20 mg tablet   No No   Sig: take 1 tablet by mouth three times a week EITHER ON MONDAY, WEDNESDAY,AND FRIDAY OR THUESDAY, THRUSDAY, AND SATURDAY   gabapentin (NEURONTIN) 300 mg capsule   No No   Sig: Take 1 capsule (300 mg total) by mouth 3 (three) times a day 300mg PO x 3 days, 300mg PO 2 tabs daily for 3 days, 300mg 3 tabs daily after loadint titration   glimepiride (AMARYL) 2 mg tablet   No No   Sig: Take 1 tablet (2 mg total) by mouth daily with breakfast   glucose blood (FREESTYLE LITE) test strip   No No   Sig: Test twice a day   glucose monitoring kit (FREESTYLE) monitoring kit   No No   Sig: Use 1 each 2 (two) times a day Test twice  Day   memantine (Namenda) 10 mg tablet   No No   Sig: Take 1 tablet (10 mg total) by mouth 2 (two) times a day   metFORMIN (GLUCOPHAGE) 850 mg tablet   No No   Sig: take 1 tablet by mouth twice a day   methocarbamol (ROBAXIN) 500 mg tablet   No No   Sig: Take 1 tablet (500 mg total) by mouth 4 (four) times a day   metoprolol succinate (TOPROL-XL) 100 mg 24 hr tablet   No No   Sig: take 1 tablet by mouth once daily      Facility-Administered Medications: None       Past Medical History:   Diagnosis Date    Acute on chronic renal insufficiency     Allergic rhinitis     Anemia 06/11/2012    Atrial fibrillation (HCC)     Cerebrovascular accident (CVA) (HCC)     Chest pain     Controlled type 2 diabetes mellitus without complication (HCC)     SMALLS (dyspnea on exertion)     Generalized anxiety disorder     GERD without esophagitis 06/11/2012    Hypertension     Memory difficulties 01/08/2019    Nephrolithiasis 03/17/2016    Obesity 06/11/2012    Osteoarthritis 06/11/2012       Past Surgical History:   Procedure Laterality Date    CATARACT EXTRACTION, BILATERAL      CYSTOSCOPY W/ LASER LITHOTRIPSY Left 1/20/2020    Procedure: CYSTOSCOPY; RETROGRADE; URETEROSCOPY; STONE EXTRACTION; POSSIBLE HOLMIUM LASER LITHOTRIPSY;  Surgeon: Carlos Moore MD;  Location:   MAIN OR;  Service: Urology    KNEE SURGERY      [place] CYSTO BLADDER W/URETERAL CATHETERIZATION Left 11/8/2019    Procedure: CYSTOSCOPY RETROGRADE PYELOGRAM WITH INSERTION STENT URETERAL;  Surgeon: Carlos Moore MD;  Location:  MAIN OR;  Service: Urology    TOTAL HIP ARTHROPLASTY      TOTAL HIP ARTHROPLASTY Bilateral 2011       Family History   Problem Relation Age of Onset    Arthritis Mother     Parkinsonism Mother     No Known Problems Father      I have reviewed and agree with the history as documented.    E-Cigarette/Vaping    E-Cigarette Use Never User      E-Cigarette/Vaping Substances    Nicotine No     THC No     CBD No     Flavoring No     Other No     Unknown No      Social History     Tobacco Use    Smoking status: Never     Passive exposure: Never    Smokeless tobacco: Never   Vaping Use    Vaping status: Never Used   Substance Use Topics    Alcohol use: Never     Comment: socially     Drug use: Never       Review of Systems   Constitutional:  Negative for chills and fever.   HENT:  Negative for rhinorrhea and sore throat.    Eyes:  Negative for visual disturbance.   Respiratory:  Negative for cough and shortness of breath.    Cardiovascular:  Negative for chest pain and leg swelling.   Gastrointestinal:  Negative for abdominal pain, diarrhea, nausea and vomiting.   Genitourinary:  Negative for dysuria.   Musculoskeletal:  Negative for back pain and myalgias.   Skin:  Negative for rash.   Neurological:  Positive for weakness. Negative for dizziness and headaches.   Psychiatric/Behavioral:  Negative for confusion.    All other systems reviewed and are negative.      Physical Exam  Physical Exam  Vitals and nursing note reviewed.   Constitutional:       General: He is not in acute distress.     Appearance: He is well-developed. He is not diaphoretic.   HENT:      Head: Normocephalic and atraumatic.      Right Ear: External ear normal.      Left Ear: External ear normal.      Nose: Nose normal.   Eyes:       Conjunctiva/sclera: Conjunctivae normal.      Pupils: Pupils are equal, round, and reactive to light.   Cardiovascular:      Rate and Rhythm: Normal rate and regular rhythm.   Pulmonary:      Effort: Pulmonary effort is normal. No respiratory distress.      Breath sounds: Normal breath sounds. No wheezing.   Abdominal:      General: Bowel sounds are normal. There is no distension.      Palpations: Abdomen is soft.      Tenderness: There is no abdominal tenderness.   Musculoskeletal:      Cervical back: Normal range of motion and neck supple.   Lymphadenopathy:      Cervical: No cervical adenopathy.   Skin:     General: Skin is warm and dry.      Capillary Refill: Capillary refill takes less than 2 seconds.   Neurological:      Mental Status: He is alert and oriented to person, place, and time.   Psychiatric:         Behavior: Behavior normal.         Vital Signs  ED Triage Vitals [05/09/24 1606]   Temperature Pulse Respirations Blood Pressure SpO2   (!) 96.5 °F (35.8 °C) 67 18 129/60 96 %      Temp Source Heart Rate Source Patient Position - Orthostatic VS BP Location FiO2 (%)   Tympanic Monitor Lying Left arm --      Pain Score       No Pain           Vitals:    05/09/24 1606   BP: 129/60   Pulse: 67   Patient Position - Orthostatic VS: Lying         Visual Acuity  Visual Acuity      Flowsheet Row Most Recent Value   L Pupil Size (mm) 2   R Pupil Size (mm) 2            ED Medications  Medications   sodium chloride 0.9 % bolus 1,000 mL (0 mL Intravenous Stopped 5/9/24 1844)       Diagnostic Studies  Results Reviewed       Procedure Component Value Units Date/Time    Basic metabolic panel [[id number]]  (Abnormal) Collected: 05/09/24 1620    Lab Status: Final result Specimen: Blood from Arm, Left Updated: 05/09/24 1648     Sodium 135 mmol/L      Potassium 5.1 mmol/L      Chloride 102 mmol/L      CO2 19 mmol/L      ANION GAP 14 mmol/L      BUN 22 mg/dL      Creatinine 1.79 mg/dL      Glucose 226 mg/dL      Calcium 9.3  mg/dL      eGFR 35 ml/min/1.73sq m     Narrative:      National Kidney Disease Foundation guidelines for Chronic Kidney Disease (CKD):     Stage 1 with normal or high GFR (GFR > 90 mL/min/1.73 square meters)    Stage 2 Mild CKD (GFR = 60-89 mL/min/1.73 square meters)    Stage 3A Moderate CKD (GFR = 45-59 mL/min/1.73 square meters)    Stage 3B Moderate CKD (GFR = 30-44 mL/min/1.73 square meters)    Stage 4 Severe CKD (GFR = 15-29 mL/min/1.73 square meters)    Stage 5 End Stage CKD (GFR <15 mL/min/1.73 square meters)  Note: GFR calculation is accurate only with a steady state creatinine    CBC and differential [[id number]]  (Abnormal) Collected: 05/09/24 1620    Lab Status: Final result Specimen: Blood from Arm, Left Updated: 05/09/24 1628     WBC 7.99 Thousand/uL      RBC 4.36 Million/uL      Hemoglobin 13.2 g/dL      Hematocrit 41.8 %      MCV 96 fL      MCH 30.3 pg      MCHC 31.6 g/dL      RDW 13.4 %      MPV 9.9 fL      Platelets 240 Thousands/uL      nRBC 0 /100 WBCs      Segmented % 76 %      Immature Grans % 1 %      Lymphocytes % 13 %      Monocytes % 7 %      Eosinophils Relative 2 %      Basophils Relative 1 %      Absolute Neutrophils 6.20 Thousands/µL      Absolute Immature Grans 0.04 Thousand/uL      Absolute Lymphocytes 1.03 Thousands/µL      Absolute Monocytes 0.56 Thousand/µL      Eosinophils Absolute 0.12 Thousand/µL      Basophils Absolute 0.04 Thousands/µL                    CT head wo contrast   Final Result by Jose Marquez DO (05/09 1756)      No acute intracranial abnormality.      There is a small focal area of cortical encephalomalacia within the right postcentral gyrus on series 2 image 34 consistent with small, chronic cortical infarct. This appears new compared to prior studies from 2019.      No signs of acute ischemia.                  Workstation performed: [id number]                    Procedures  Procedures         ED Course                               SBIRT 20yo+       Flowsheet Row Most Recent Value   Initial Alcohol Screen: US AUDIT-C     1. How often do you have a drink containing alcohol? 0 Filed at: 05/09/2024 1608   2. How many drinks containing alcohol do you have on a typical day you are drinking?  0 Filed at: 05/09/2024 1608   3b. FEMALE Any Age, or MALE 65+: How often do you have 4 or more drinks on one occassion? 0 Filed at: 05/09/2024 1608   Audit-C Score 0 Filed at: 05/09/2024 1608   ISABELLA: How many times in the past year have you...    Used an illegal drug or used a prescription medication for non-medical reasons? Never Filed at: 05/09/2024 1608                      Medical Decision Making  This is a 78-year-old male presented to ED with complaint of fall.  Patient states that he was walking with his wheelbarrow when fell backward on the grass.  Denies any head strike.  Patient states that he lost his balance and fell.  Denies any headache blurry vision double vision.  Denying chest pain shortness of breath.  Patient otherwise stable awake alert oriented times uses 15 wife is at bedside who also provided with the history.  States he is going to follow-up with his neurologist for weakness.  History taken from patient and wife was at bedside  Differential diagnoses include electrolyte abnormality/Parkinson's/seizure/arrhythmia dehydration/renal failure  Plan will obtain labs CT head patient started on IV fluid.  Exam within normal limits  Lab reviewed creatinine stable  Pending CT.  Labs reviewed  CT reviewed finding discussed with patient and his wife was at bedside  Disposition discharge home with the strict precaution to return to the ED if notice any worsening symptoms patient has a physical therapy and has an appointment with the neurology.  Wife feels comfortable taking him home.    Amount and/or Complexity of Data Reviewed  Labs: ordered.  Radiology: ordered.             Disposition  Final diagnoses:   Fall, initial encounter     Time reflects when diagnosis  was documented in both MDM as applicable and the Disposition within this note       Time User Action Codes Description Comment    5/9/2024  6:30 PM [email or web address] [email or web address] Add [W19.XXXA] Fall, initial encounter           ED Disposition       ED Disposition   Discharge    Condition   Stable    Date/Time   Thu May 9, 2024 1830    Comment   Kaleb Ching III discharge to home/self care.                   Follow-up Information       Follow up With Specialties Details Why Contact Info    Wilfrid Mills DO Internal Medicine Call today  [address] [place] [address]  [address]  [facility] [address] [phone]              Discharge Medication List as of 5/9/2024  6:46 PM        CONTINUE these medications which have NOT CHANGED    Details   gabapentin (NEURONTIN) 300 mg capsule Take 1 capsule (300 mg total) by mouth 3 (three) times a day 300mg PO x 3 days, 300mg PO 2 tabs daily for 3 days, 300mg 3 tabs daily after loadint titration, Starting Fri 2/23/2024, Normal      ALPRAZolam (XANAX) 0.5 mg tablet Take 1 tablet (0.5 mg total) by mouth every 8 (eight) hours as needed (AS NEEDED), Starting Thu 10/27/2022, Normal      amLODIPine (NORVASC) 10 mg tablet Take 0.5 tablets (5 mg total) by mouth daily, Starting Fri 9/15/2023, Normal      apixaban (ELIQUIS) 5 mg Take 1 tablet (5 mg total) by mouth 2 (two) times a day, Starting u 2/15/2024, Until Sun 2/9/2025, Normal      atorvastatin (LIPITOR) 20 mg tablet Take 20 mg by mouth daily, Historical Med      busPIRone (BUSPAR) 5 mg tablet Take 1 tablet (5 mg total) by mouth 2 (two) times a day, Starting Thu 12/28/2023, Normal      Canagliflozin 300 MG TABS Take 1 tablet (300 mg total) by mouth daily, Starting Thu 4/4/2024, Normal      carbidopa-levodopa (SINEMET)  mg per tablet take 1 tablet by mouth three times a day, Starting u 3/7/2024, Normal      donepezil (ARICEPT) 10 mg tablet take 1 tablet by mouth at bedtime, Normal      DULoxetine (CYMBALTA) 30 mg delayed release capsule take 1  capsule by mouth once daily, Starting Fri 1/26/2024, Normal      ergocalciferol (VITAMIN D2) 50,000 units Take 1 capsule (50,000 Units total) by mouth once a week, Starting Thu 2/1/2024, Normal      furosemide (LASIX) 20 mg tablet take 1 tablet by mouth three times a week EITHER ON MONDAY, WEDNESDAY,AND FRIDAY OR THUESDAY, THRUSDAY, AND SATURDAY, Normal      glimepiride (AMARYL) 2 mg tablet Take 1 tablet (2 mg total) by mouth daily with breakfast, Starting Wed 10/4/2023, Until Sat 9/28/2024, Normal      glucose blood (FREESTYLE LITE) test strip Test twice a day, Normal      glucose monitoring kit (FREESTYLE) monitoring kit Use 1 each 2 (two) times a day Test twice  Day, Starting Wed 3/22/2023, Normal      Lancets (freestyle) lancets Test twice a day, Normal      memantine (Namenda) 10 mg tablet Take 1 tablet (10 mg total) by mouth 2 (two) times a day, Starting Mon 12/11/2023, Normal      metFORMIN (GLUCOPHAGE) 850 mg tablet take 1 tablet by mouth twice a day, Starting Mon 2/26/2024, Normal      methocarbamol (ROBAXIN) 500 mg tablet Take 1 tablet (500 mg total) by mouth 4 (four) times a day, Starting Tue 9/19/2023, Normal      metoprolol succinate (TOPROL-XL) 100 mg 24 hr tablet take 1 tablet by mouth once daily, Starting Sat 5/4/2024, Normal             No discharge procedures on file.    PDMP Review         Value Time User    PDMP Reviewed  Yes 12/7/2022  1:39 PM Wilfrid Mills DO            ED Provider  Electronically Signed by             Rafael Adames MD  05/09/24 4063

## 2024-05-10 ENCOUNTER — VBI (OUTPATIENT)
Dept: INTERNAL MEDICINE CLINIC | Facility: CLINIC | Age: 79
End: 2024-05-10

## 2024-05-10 NOTE — TELEPHONE ENCOUNTER
05/10/24 8:53 AM    Patient contacted post ED visit, first outreach attempt made. Message was left for patient to return a call to the VBI Department at [facility]: Phone [phone].    Thank you.  RICHARD FELIX  [facility]

## 2024-05-13 NOTE — TELEPHONE ENCOUNTER
05/13/24 9:56 AM    Patient contacted post ED visit, second outreach attempt made. Message was left for patient to return a call to the VBI Department at [facility]: Phone [phone].    Thank you.  RICHARD FELIX  [facility]

## 2024-05-14 NOTE — TELEPHONE ENCOUNTER
05/14/24 9:48 AM    Patient contacted post ED visit, VBI department spoke with patient/caregiver and outreach was successful.    Thank you.  RICHARD FELIX  [facility]

## 2024-05-17 ENCOUNTER — TELEPHONE (OUTPATIENT)
Age: 79
End: 2024-05-17

## 2024-05-17 NOTE — TELEPHONE ENCOUNTER
Call wife, PSA is still elevated and may be related to his age. See if they would like to see . Labs ok except for elevated LDL and TG. See if they want meds.

## 2024-06-04 DIAGNOSIS — G30.9 ALZHEIMER'S DISEASE, UNSPECIFIED (CODE) (HCC): ICD-10-CM

## 2024-06-04 RX ORDER — MEMANTINE HYDROCHLORIDE 10 MG/1
10 TABLET ORAL 2 TIMES DAILY
Qty: 180 TABLET | Refills: 1 | Status: SHIPPED | OUTPATIENT
Start: 2024-06-04

## 2024-06-23 DIAGNOSIS — G30.9 ALZHEIMER'S DISEASE, UNSPECIFIED (CODE) (HCC): ICD-10-CM

## 2024-06-23 NOTE — TELEPHONE ENCOUNTER
Medication Refill Request     Name   memantine (NAMENDA) 10 mg tablet    Dose/Frequency 10 mg 2 times daily   Quantity 180 tablet  Verified pharmacy   [x]  Verified ordering Provider   [x]  Does patient have enough for the next 3 days? Yes [] No [x]    Pt's wife did not want a message sent to triage nurses for a 7 day supply.

## 2024-06-24 RX ORDER — MEMANTINE HYDROCHLORIDE 10 MG/1
10 TABLET ORAL 2 TIMES DAILY
Qty: 180 TABLET | Refills: 0 | Status: SHIPPED | OUTPATIENT
Start: 2024-06-24

## 2024-06-24 NOTE — TELEPHONE ENCOUNTER
Pts wife called refill line to check on the status of the Memantine. I informed her that it was sent to the pharmacy about an hour ago. She verbalized understanding and will reach out to the pharmacy.

## 2024-06-26 ENCOUNTER — RA CDI HCC (OUTPATIENT)
Dept: OTHER | Facility: HOSPITAL | Age: 79
End: 2024-06-26

## 2024-06-26 NOTE — PROGRESS NOTES
E11.65, e11.42  HCC coding opportunities          Chart Reviewed number of suggestions sent to Provider: 2     Patients Insurance     Medicare Insurance: Medicare           Nutrition Education/Meals and Snack

## 2024-06-27 ENCOUNTER — TELEPHONE (OUTPATIENT)
Age: 79
End: 2024-06-27
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
## 2024-07-01 ENCOUNTER — TELEPHONE (OUTPATIENT)
Age: 79
End: 2024-07-01
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
## 2024-07-08 DIAGNOSIS — N18.32 TYPE 2 DIABETES MELLITUS WITH STAGE 3B CHRONIC KIDNEY DISEASE, WITHOUT LONG-TERM CURRENT USE OF INSULIN (HCC): Primary | ICD-10-CM

## 2024-07-08 DIAGNOSIS — E11.22 TYPE 2 DIABETES MELLITUS WITH STAGE 3B CHRONIC KIDNEY DISEASE, WITHOUT LONG-TERM CURRENT USE OF INSULIN (HCC): Primary | ICD-10-CM

## 2024-07-08 DIAGNOSIS — E78.2 MIXED HYPERLIPIDEMIA: ICD-10-CM

## 2024-07-08 NOTE — TELEPHONE ENCOUNTER
Reason for call:   [x] Refill   [] Prior Auth  [] Other:     Office:   [x] PCP/Provider -   [] Specialty/Provider -     Medication: atorvastatin (LIPITOR) 20 mg     Dose/Frequency: Take 20 mg by mouth daily     Quantity: 100    Pharmacy: [id number] - [place] [facility] [facility] - [address] [place] [address]     Does the patient have enough for 3 days?   [x] Yes   [] No - Send as HP to POD

## 2024-07-09 RX ORDER — ATORVASTATIN CALCIUM 20 MG/1
20 TABLET, FILM COATED ORAL DAILY
Qty: 100 TABLET | Refills: 1 | Status: SHIPPED | OUTPATIENT
Start: 2024-07-09

## 2024-07-17 ENCOUNTER — OFFICE VISIT (OUTPATIENT)
Dept: INTERNAL MEDICINE CLINIC | Facility: CLINIC | Age: 79
End: 2024-07-17
Payer: MEDICARE

## 2024-07-17 VITALS
BODY MASS INDEX: 30.38 KG/M2 | WEIGHT: 217 LBS | HEART RATE: 71 BPM | SYSTOLIC BLOOD PRESSURE: 134 MMHG | OXYGEN SATURATION: 99 % | HEIGHT: 71 IN | TEMPERATURE: 97.4 F | DIASTOLIC BLOOD PRESSURE: 88 MMHG

## 2024-07-17 DIAGNOSIS — E78.2 MIXED HYPERLIPIDEMIA: ICD-10-CM

## 2024-07-17 DIAGNOSIS — Z13.5 SCREENING FOR DIABETIC RETINOPATHY: ICD-10-CM

## 2024-07-17 DIAGNOSIS — G20.A2 PARKINSON'S DISEASE WITHOUT DYSKINESIA, WITH FLUCTUATING MANIFESTATIONS: ICD-10-CM

## 2024-07-17 DIAGNOSIS — M79.10 MYALGIA: ICD-10-CM

## 2024-07-17 DIAGNOSIS — F41.1 GENERALIZED ANXIETY DISORDER: ICD-10-CM

## 2024-07-17 DIAGNOSIS — E11.22 TYPE 2 DIABETES MELLITUS WITH STAGE 3B CHRONIC KIDNEY DISEASE, WITHOUT LONG-TERM CURRENT USE OF INSULIN (HCC): Primary | ICD-10-CM

## 2024-07-17 DIAGNOSIS — M19.91 PRIMARY OSTEOARTHRITIS, UNSPECIFIED SITE: ICD-10-CM

## 2024-07-17 DIAGNOSIS — R25.1 TREMORS OF NERVOUS SYSTEM: ICD-10-CM

## 2024-07-17 DIAGNOSIS — R26.9 GAIT ABNORMALITY: ICD-10-CM

## 2024-07-17 DIAGNOSIS — N18.32 TYPE 2 DIABETES MELLITUS WITH STAGE 3B CHRONIC KIDNEY DISEASE, WITHOUT LONG-TERM CURRENT USE OF INSULIN (HCC): Primary | ICD-10-CM

## 2024-07-17 DIAGNOSIS — G30.9 ALZHEIMER'S DISEASE, UNSPECIFIED (CODE) (HCC): ICD-10-CM

## 2024-07-17 DIAGNOSIS — I48.91 ATRIAL FIBRILLATION, UNSPECIFIED TYPE (HCC): ICD-10-CM

## 2024-07-17 DIAGNOSIS — R13.10 DYSPHAGIA, UNSPECIFIED TYPE: ICD-10-CM

## 2024-07-17 DIAGNOSIS — G24.9 DYSTONIA: ICD-10-CM

## 2024-07-17 DIAGNOSIS — N18.32 STAGE 3B CHRONIC KIDNEY DISEASE (HCC): ICD-10-CM

## 2024-07-17 DIAGNOSIS — D64.9 ANEMIA, UNSPECIFIED TYPE: ICD-10-CM

## 2024-07-17 DIAGNOSIS — R49.0 HYPOPHONIA WITH HOARSENESS: ICD-10-CM

## 2024-07-17 DIAGNOSIS — Z79.01 CHRONIC ANTICOAGULATION: ICD-10-CM

## 2024-07-17 DIAGNOSIS — R25.2 MUSCLE CRAMPS: ICD-10-CM

## 2024-07-17 DIAGNOSIS — I10 PRIMARY HYPERTENSION: Chronic | ICD-10-CM

## 2024-07-17 DIAGNOSIS — R63.4 WEIGHT LOSS, ABNORMAL: ICD-10-CM

## 2024-07-17 LAB — SL AMB POCT HEMOGLOBIN AIC: 5.9 (ref ?–6.5)

## 2024-07-17 PROCEDURE — 83036 HEMOGLOBIN GLYCOSYLATED A1C: CPT | Performed by: INTERNAL MEDICINE

## 2024-07-17 PROCEDURE — 99214 OFFICE O/P EST MOD 30 MIN: CPT | Performed by: INTERNAL MEDICINE

## 2024-07-17 RX ORDER — BACLOFEN 5 MG/1
5 TABLET ORAL 3 TIMES DAILY
Qty: 270 TABLET | Refills: 0 | Status: SHIPPED | OUTPATIENT
Start: 2024-07-17

## 2024-07-17 RX ORDER — CLONAZEPAM 0.5 MG/1
0.5 TABLET ORAL 2 TIMES DAILY
Qty: 180 TABLET | Refills: 0 | Status: SHIPPED | OUTPATIENT
Start: 2024-07-17

## 2024-07-17 NOTE — PROGRESS NOTES
Ambulatory Visit  Name: Kaleb Ching III      : 1945      MRN: [id number]  Encounter Provider: Wilfrid Mills DO  Encounter Date: 2024   Encounter department: [facility]    Assessment & Plan   1. Type 2 diabetes mellitus with stage 3b chronic kidney disease, without long-term current use of insulin (HCC)  -     POCT hemoglobin A1c  -     Comprehensive metabolic panel; Future  2. Screening for diabetic retinopathy  3. Primary hypertension  4. Atrial fibrillation, unspecified type (HCC)  5. Alzheimer's disease, unspecified (CODE) (HCC)  6. Primary osteoarthritis, unspecified site  7. Stage 3b chronic kidney disease (HCC)  8. Generalized anxiety disorder  -     [facility] Prescribed Meds and Special Instructions  -     Amphetamines, Methamphetamines  -     Butalbital  -     Phenobarbital  -     Secobarbital  -     Alprazolam  -     Clonazepam  -     Diazepam  -     Lorazepam  -     Gabapentin  -     Pregabalin  -     Cocaine  -     Heroin  -     Buprenorphine  -     Levorphanol  -     Meperidine  -     Naltrexone  -     Fentanyl  -     Methadone  -     Oxycodone  -     Tapentadol  -     THC  -     Tramadol  -     Codeine, Hydrocodone, Hydropmorphone, Morphine  -     Bath Salts  -     Ethyl Glucuronide/Ethyl Sulfate  -     Kratom  -     Spice  -     Methylphenidate  -     Phentermine  -     Validity Oxidant  -     Validity Creatinine  -     Validity pH  -     Validity Specific  -     Xylazine Definitive Test  9. Anemia, unspecified type  10. Mixed hyperlipidemia  11. Chronic anticoagulation  A/P: Overall decline noted. Has finally been able to get an appt with neuro in two weeks. HgA1c was lower at 5.9 and will stop the amaryl as we don't want hypoglycemia. May need to stop additional meds. Fall risk is high. May need to stop DOAC. Will refer to PT for the gait.Check swallowing study and refer to OT. Dystonia affecting QOL. Will try ATC baclofen since PRN robaxin helps at times.  "Will also add klonopin for dystonia and ALMA> ?stopping the namenda for possible causing agitation. Will check labs to r/o other contributing factors. Will get LSW and  involved to see if pt could use any DME. RTC 3-4 weeks for f/u labs, sugars, dystonia.      History of Present Illness     WM RTC with his wife for f/u DM, SDAT, etc. Doing poorly. SDAT advancing and increased confusion and pt having more difficulty with gait. Tremors worsening and c/o muscle cramps.  Wife having more issues taking care of the pt. Reports some difficulty with swallowing and voice is hoarse.Not checking sugars, but no low sugar events. Denies CP, SOB, edema, palpitations, orthopnea or PND. Remains on chronic anticoagulation and no bleeding events. NO reflux. Chronic pain is manageable. ALMA/MDD are controlled. Due for labs.         Review of Systems   Constitutional:  Positive for activity change. Negative for chills, diaphoresis, fatigue and fever.   HENT:  Positive for trouble swallowing and voice change.    Eyes:  Negative for visual disturbance.   Respiratory:  Negative for cough, chest tightness, shortness of breath and wheezing.    Cardiovascular:  Negative for chest pain, palpitations and leg swelling.   Gastrointestinal:  Negative for abdominal pain, constipation, diarrhea, nausea and vomiting.   Endocrine: Negative for cold intolerance and heat intolerance.   Genitourinary:  Negative for difficulty urinating, dysuria and frequency.   Musculoskeletal:  Positive for gait problem and myalgias. Negative for arthralgias.   Neurological:  Positive for tremors and weakness. Negative for dizziness, seizures, syncope, light-headedness and headaches.   Psychiatric/Behavioral:  Positive for agitation and confusion. Negative for behavioral problems, dysphoric mood and sleep disturbance. The patient is not nervous/anxious.        Objective     /88   Pulse 71   Temp (!) 97.4 °F (36.3 °C)   Ht 5' 11\" (1.803 m)   Wt 98.4 " kg (217 lb)   SpO2 99%   BMI 30.27 kg/m²     Physical Exam  Vitals and nursing note reviewed.   Constitutional:       General: He is not in acute distress.     Appearance: Normal appearance. He is not ill-appearing.   HENT:      Head: Normocephalic and atraumatic.      Mouth/Throat:      Mouth: Mucous membranes are moist.   Eyes:      Extraocular Movements: Extraocular movements intact.      Conjunctiva/sclera: Conjunctivae normal.      Pupils: Pupils are equal, round, and reactive to light.   Neck:      Vascular: No carotid bruit.   Cardiovascular:      Rate and Rhythm: Normal rate and regular rhythm.      Heart sounds: Normal heart sounds. No murmur heard.  Pulmonary:      Effort: Pulmonary effort is normal. No respiratory distress.      Breath sounds: Normal breath sounds. No wheezing, rhonchi or rales.   Abdominal:      General: Bowel sounds are normal. There is no distension.      Palpations: Abdomen is soft.      Tenderness: There is no abdominal tenderness.   Musculoskeletal:         General: No tenderness.      Cervical back: Neck supple.      Right lower leg: No edema.      Left lower leg: No edema.   Neurological:      General: No focal deficit present.      Mental Status: He is alert and oriented to person, place, and time. Mental status is at baseline.      Motor: Weakness present.      Coordination: Coordination abnormal.      Gait: Gait abnormal.   Psychiatric:         Mood and Affect: Mood normal.         Behavior: Behavior normal.         Thought Content: Thought content normal.      Comments: Some confusion and short term memory poor. Easily frustrated/agitated.        Administrative Statements

## 2024-07-17 NOTE — PATIENT INSTRUCTIONS
"Patient Education     High blood pressure in adults   The Basics   Written by the doctors and editors at [facility]   What is high blood pressure? -- High blood pressure is a condition that puts you at risk for heart attack, stroke, and kidney disease. It does not usually cause symptoms. But it can be serious.  When your doctor or nurse tells you your blood pressure, they say 2 numbers. For instance, your doctor or nurse might say that your blood pressure is \"130 over 80.\" The top number is the pressure inside your arteries when your heart is fausto. The bottom number is the pressure inside your arteries when your heart is relaxed.  \"Elevated blood pressure\" is a term doctors or nurses use as a warning. People with elevated blood pressure do not yet have high blood pressure. But their blood pressure is not as low as it should be for good health.  Many experts define high, elevated, and normal blood pressure as follows:   High - Top number of 130 or above and/or bottom number of 80 or above.   Elevated - Top number between 120 and 129 and bottom number of 79 or below.   Normal - Top number of 119 or below and bottom number of 79 or below.  This information is also in the table (table 1).  How can I lower my blood pressure? -- If your doctor or nurse prescribed blood pressure medicine, the most important thing you can do is to take it. If it causes side effects, do not just stop taking it. Instead, talk to your doctor or nurse about the problems it causes. They might be able to lower your dose or switch you to another medicine. If cost is a problem, mention that, too. They might be able to put you on a less expensive medicine. Taking your blood pressure medicine can keep you from having a heart attack or stroke, and it can save your life!  Can I do anything on my own? -- You have a lot of control over your blood pressure. To lower it:   Lose weight (if you are overweight).   Choose a diet low in fat and rich in " "fruits, vegetables, and low-fat dairy products.   Eat less salt.   Do something active for at least 30 minutes a day on most days of the week.   Drink less alcohol (if you drink more than 2 alcoholic drinks per day).  It's also a good idea to get a home blood pressure meter. People who check their own blood pressure at home do better at keeping it low and can sometimes even reduce the amount of medicine they take.  All topics are updated as new evidence becomes available and our peer review process is complete.  This topic retrieved from [employer] on: Feb 26, 2024.  Topic 83098 Version 23.0  Release: 32.2.4 - C32.56  © 2024 UpToDate, Inc. and/or its affiliates. All rights reserved.  table 1: Definition of normal and high blood pressure  Level  Top number  Bottom number    High 130 or above 80 or above   Elevated 120 to 129 79 or below   Normal 119 or below 79 or below   These definitions are from the American College of Cardiology/American Heart Association. Other expert groups might use slightly different definitions.  \"Elevated blood pressure\" is a term doctor or nurses use as a warning. It means you do not yet have high blood pressure, but your blood pressure is not as low as it should be for good health.  Graphic 38824 Version 6.0  Consumer Information Use and Disclaimer   Disclaimer: This generalized information is a limited summary of diagnosis, treatment, and/or medication information. It is not meant to be comprehensive and should be used as a tool to help the user understand and/or assess potential diagnostic and treatment options. It does NOT include all information about conditions, treatments, medications, side effects, or risks that may apply to a specific patient. It is not intended to be medical advice or a substitute for the medical advice, diagnosis, or treatment of a health care provider based on the health care provider's examination and assessment of a patient's specific and unique circumstances. " Patients must speak with a health care provider for complete information about their health, medical questions, and treatment options, including any risks or benefits regarding use of medications. This information does not endorse any treatments or medications as safe, effective, or approved for treating a specific patient. UpToDate, Inc. and its affiliates disclaim any warranty or liability relating to this information or the use thereof.The use of this information is governed by the Terms of Use, available at https://www.woltersRV IDuwer.com/en/know/clinical-effectiveness-terms. 2024© UpToDate, Inc. and its affiliates and/or licensors. All rights reserved.  Copyright   © 2024 UpToDate, Inc. and/or its affiliates. All rights reserved.

## 2024-07-18 ENCOUNTER — PATIENT OUTREACH (OUTPATIENT)
Dept: CASE MANAGEMENT | Facility: OTHER | Age: 79
End: 2024-07-18

## 2024-07-18 ENCOUNTER — PATIENT OUTREACH (OUTPATIENT)
Dept: INTERNAL MEDICINE CLINIC | Facility: CLINIC | Age: 79
End: 2024-07-18

## 2024-07-18 DIAGNOSIS — R06.00 DYSPNEA, UNSPECIFIED TYPE: ICD-10-CM

## 2024-07-18 NOTE — PROGRESS NOTES
OPCM SW received referral for assistance with DME.  OPCM SW reviewed patient's chart.  OPCM SW spoke with patient's spouse-Libra regarding the above.  Per our discussion, there is no current need for SW involvement.  She does not feel patient needs any additional DME at this time and is aware a referral was also placed to OPCM RN.  Spouse inquired about the financial process of when and if patient transitions into a SNF.  OPCM SW encouraged her to discuss with an eldercare  ways to keep her assets separate when it comes time to make that decision.  Spouse appreciative.  Referral will be closed, please re-consult if needed

## 2024-07-19 ENCOUNTER — PATIENT OUTREACH (OUTPATIENT)
Dept: CASE MANAGEMENT | Facility: OTHER | Age: 79
End: 2024-07-19

## 2024-07-19 RX ORDER — FUROSEMIDE 20 MG/1
TABLET ORAL
Qty: 15 TABLET | Refills: 3 | Status: SHIPPED | OUTPATIENT
Start: 2024-07-19

## 2024-07-19 NOTE — PROGRESS NOTES
Attempted outreach left message with my contact information on home phone number Cell phone number did not  and no voicemail set up

## 2024-07-19 NOTE — PROGRESS NOTES
Paola patients wife returned my call. Explained why I was calling . She said right now she does not have any concerns or needs for Kaleb. He is walking better. She reports he was not doing well the other day at PCP appointment because he did not want to eat and his blood sugar dropped. She will keep my contact information is needed in future

## 2024-07-30 DIAGNOSIS — E11.42 DIABETIC POLYNEUROPATHY ASSOCIATED WITH TYPE 2 DIABETES MELLITUS (HCC): Chronic | ICD-10-CM

## 2024-07-30 DIAGNOSIS — G89.29 CHRONIC PAIN OF RIGHT KNEE: ICD-10-CM

## 2024-07-30 DIAGNOSIS — M77.41 METATARSALGIA OF BOTH FEET: ICD-10-CM

## 2024-07-30 DIAGNOSIS — M25.561 CHRONIC PAIN OF RIGHT KNEE: ICD-10-CM

## 2024-07-30 DIAGNOSIS — F32.9 REACTIVE DEPRESSION: ICD-10-CM

## 2024-07-30 DIAGNOSIS — M77.42 METATARSALGIA OF BOTH FEET: ICD-10-CM

## 2024-07-30 DIAGNOSIS — G89.4 CHRONIC PAIN SYNDROME: ICD-10-CM

## 2024-07-30 RX ORDER — DULOXETIN HYDROCHLORIDE 30 MG/1
30 CAPSULE, DELAYED RELEASE ORAL DAILY
Qty: 90 CAPSULE | Refills: 1 | Status: SHIPPED | OUTPATIENT
Start: 2024-07-30

## 2024-07-31 ENCOUNTER — TELEPHONE (OUTPATIENT)
Dept: INTERNAL MEDICINE CLINIC | Facility: CLINIC | Age: 79
End: 2024-07-31

## 2024-07-31 NOTE — TELEPHONE ENCOUNTER
----- Message from Wilfrid Mills DO sent at 7/17/2024 12:17 PM EDT -----  Call wife in two weeks and get up date on the muscle and tremor issues.

## 2024-08-12 ENCOUNTER — APPOINTMENT (OUTPATIENT)
Dept: LAB | Facility: CLINIC | Age: 79
End: 2024-08-12
Payer: MEDICARE

## 2024-08-12 DIAGNOSIS — N18.32 TYPE 2 DIABETES MELLITUS WITH STAGE 3B CHRONIC KIDNEY DISEASE, WITHOUT LONG-TERM CURRENT USE OF INSULIN (HCC): ICD-10-CM

## 2024-08-12 DIAGNOSIS — R97.20 ELEVATED PROSTATE SPECIFIC ANTIGEN (PSA): ICD-10-CM

## 2024-08-12 DIAGNOSIS — N18.32 STAGE 3B CHRONIC KIDNEY DISEASE (HCC): ICD-10-CM

## 2024-08-12 DIAGNOSIS — R25.2 MUSCLE CRAMPS: ICD-10-CM

## 2024-08-12 DIAGNOSIS — E11.22 TYPE 2 DIABETES MELLITUS WITH STAGE 3B CHRONIC KIDNEY DISEASE, WITHOUT LONG-TERM CURRENT USE OF INSULIN (HCC): ICD-10-CM

## 2024-08-12 LAB
25(OH)D3 SERPL-MCNC: 55 NG/ML (ref 30–100)
ALBUMIN SERPL BCG-MCNC: 3.8 G/DL (ref 3.5–5)
ALP SERPL-CCNC: 70 U/L (ref 34–104)
ALT SERPL W P-5'-P-CCNC: 3 U/L (ref 7–52)
ANION GAP SERPL CALCULATED.3IONS-SCNC: 15 MMOL/L (ref 4–13)
AST SERPL W P-5'-P-CCNC: 9 U/L (ref 13–39)
BASOPHILS # BLD AUTO: 0.05 THOUSANDS/ÂΜL (ref 0–0.1)
BASOPHILS NFR BLD AUTO: 1 % (ref 0–1)
BILIRUB SERPL-MCNC: 0.83 MG/DL (ref 0.2–1)
BUN SERPL-MCNC: 26 MG/DL (ref 5–25)
CALCIUM SERPL-MCNC: 9.2 MG/DL (ref 8.4–10.2)
CHLORIDE SERPL-SCNC: 102 MMOL/L (ref 96–108)
CO2 SERPL-SCNC: 23 MMOL/L (ref 21–32)
CREAT SERPL-MCNC: 1.61 MG/DL (ref 0.6–1.3)
EOSINOPHIL # BLD AUTO: 0.13 THOUSAND/ÂΜL (ref 0–0.61)
EOSINOPHIL NFR BLD AUTO: 2 % (ref 0–6)
ERYTHROCYTE [DISTWIDTH] IN BLOOD BY AUTOMATED COUNT: 13.6 % (ref 11.6–15.1)
GFR SERPL CREATININE-BSD FRML MDRD: 40 ML/MIN/1.73SQ M
GLUCOSE P FAST SERPL-MCNC: 199 MG/DL (ref 65–99)
HCT VFR BLD AUTO: 40.4 % (ref 36.5–49.3)
HGB BLD-MCNC: 13.3 G/DL (ref 12–17)
IMM GRANULOCYTES # BLD AUTO: 0.02 THOUSAND/UL (ref 0–0.2)
IMM GRANULOCYTES NFR BLD AUTO: 0 % (ref 0–2)
LYMPHOCYTES # BLD AUTO: 1.29 THOUSANDS/ÂΜL (ref 0.6–4.47)
LYMPHOCYTES NFR BLD AUTO: 19 % (ref 14–44)
MAGNESIUM SERPL-MCNC: 1.6 MG/DL (ref 1.9–2.7)
MCH RBC QN AUTO: 32 PG (ref 26.8–34.3)
MCHC RBC AUTO-ENTMCNC: 32.9 G/DL (ref 31.4–37.4)
MCV RBC AUTO: 97 FL (ref 82–98)
MONOCYTES # BLD AUTO: 0.59 THOUSAND/ÂΜL (ref 0.17–1.22)
MONOCYTES NFR BLD AUTO: 9 % (ref 4–12)
NEUTROPHILS # BLD AUTO: 4.78 THOUSANDS/ÂΜL (ref 1.85–7.62)
NEUTS SEG NFR BLD AUTO: 69 % (ref 43–75)
NRBC BLD AUTO-RTO: 0 /100 WBCS
PLATELET # BLD AUTO: 210 THOUSANDS/UL (ref 149–390)
PMV BLD AUTO: 11 FL (ref 8.9–12.7)
POTASSIUM SERPL-SCNC: 4.4 MMOL/L (ref 3.5–5.3)
PROT SERPL-MCNC: 6.8 G/DL (ref 6.4–8.4)
PSA SERPL-MCNC: 6.4 NG/ML (ref 0–4)
RBC # BLD AUTO: 4.16 MILLION/UL (ref 3.88–5.62)
SODIUM SERPL-SCNC: 140 MMOL/L (ref 135–147)
WBC # BLD AUTO: 6.86 THOUSAND/UL (ref 4.31–10.16)

## 2024-08-12 PROCEDURE — 85025 COMPLETE CBC W/AUTO DIFF WBC: CPT

## 2024-08-12 PROCEDURE — 83735 ASSAY OF MAGNESIUM: CPT

## 2024-08-12 PROCEDURE — 84153 ASSAY OF PSA TOTAL: CPT

## 2024-08-12 PROCEDURE — 80053 COMPREHEN METABOLIC PANEL: CPT

## 2024-08-12 PROCEDURE — 36415 COLL VENOUS BLD VENIPUNCTURE: CPT

## 2024-08-12 PROCEDURE — 82306 VITAMIN D 25 HYDROXY: CPT

## 2024-08-13 ENCOUNTER — TELEPHONE (OUTPATIENT)
Dept: INTERNAL MEDICINE CLINIC | Facility: CLINIC | Age: 79
End: 2024-08-13

## 2024-08-13 DIAGNOSIS — E83.42 HYPOMAGNESEMIA: Primary | ICD-10-CM

## 2024-08-13 RX ORDER — CALCIUM CARBONATE/VITAMIN D3 500-10/5ML
1 LIQUID (ML) ORAL
Qty: 90 CAPSULE | Refills: 1 | Status: SHIPPED | OUTPATIENT
Start: 2024-08-13

## 2024-08-13 NOTE — TELEPHONE ENCOUNTER
----- Message from Wilfrid Mills DO sent at 8/13/2024  6:10 AM EDT -----  Call pt labs ok except for low mag and elevated PSA. Keep f/u with urology. Start mag replacement. Script sent in .

## 2024-08-14 ENCOUNTER — HOSPITAL ENCOUNTER (OUTPATIENT)
Dept: MRI IMAGING | Facility: HOSPITAL | Age: 79
Discharge: HOME | End: 2024-08-14
Payer: MEDICARE

## 2024-08-14 ENCOUNTER — APPOINTMENT (OUTPATIENT)
Dept: RADIOLOGY | Facility: HOSPITAL | Age: 79
End: 2024-08-14
Attending: INTERNAL MEDICINE
Payer: MEDICARE

## 2024-08-14 DIAGNOSIS — M48.00 SPINAL STENOSIS, SITE UNSPECIFIED: ICD-10-CM

## 2024-08-14 PROCEDURE — 72148 MRI LUMBAR SPINE W/O DYE: CPT

## 2024-08-14 PROCEDURE — 72141 MRI NECK SPINE W/O DYE: CPT

## 2024-08-19 ENCOUNTER — TELEPHONE (OUTPATIENT)
Dept: OTHER | Facility: OTHER | Age: 79
End: 2024-08-19

## 2024-08-20 NOTE — TELEPHONE ENCOUNTER
Patient is calling regarding cancelling an appointment.     Date/Time: 08/20/24 at 1:30 pm     Patient was rescheduled: YES [ ] NO [ X]     Patient requesting call back to reschedule: YES [ ] NO [X ]    Pt has a stomach flu. Pt's spouse will call back to r/s.

## 2024-08-26 DIAGNOSIS — R41.3 MEMORY DIFFICULTIES: ICD-10-CM

## 2024-08-26 NOTE — TELEPHONE ENCOUNTER
Pt would like this filled today if possible.    Reason for call:   [x] Refill   [] Prior Auth  [] Other:     Office:   [x] PCP/Provider -   [] Specialty/Provider -     Medication:       Does the patient have enough for 3 days?   [] Yes   [] No - Send as HP to POD

## 2024-08-27 DIAGNOSIS — R41.3 MEMORY DIFFICULTIES: ICD-10-CM

## 2024-08-27 DIAGNOSIS — E11.8 TYPE 2 DIABETES MELLITUS WITH COMPLICATION (HCC): ICD-10-CM

## 2024-08-27 RX ORDER — DONEPEZIL HYDROCHLORIDE 10 MG/1
TABLET, FILM COATED ORAL
Qty: 90 TABLET | Refills: 1 | Status: SHIPPED | OUTPATIENT
Start: 2024-08-27

## 2024-08-27 RX ORDER — DONEPEZIL HYDROCHLORIDE 10 MG/1
10 TABLET, FILM COATED ORAL
Qty: 90 TABLET | Refills: 1 | OUTPATIENT
Start: 2024-08-27

## 2024-09-01 NOTE — PATIENT INSTRUCTIONS
"Patient Education     Carb counting for adults with diabetes   The Basics   Written by the doctors and editors at [facility]   What is carb counting? -- This is a type of meal planning that many people with diabetes use. It is a way to figure out how many carbohydrates, or \"carbs,\" you eat.  The body breaks down the food we eat into 3 main types of nutrients: carbs, proteins, and fats. Carbs are sugars and starches that come from food. The body uses carbs for energy.  Why do I need to count carbs? -- People with diabetes need to pay attention to how many carbs they eat. This is because carbs raise your blood sugar level.  Carb counting helps you:   Choose the right amount of insulin to take before meals and snacks - If you take insulin before meals, the dose depends on several things, including how many carbs you plan to eat. (It also depends on how much you plan to exercise and your blood sugar level.)   Plan your meals and snacks for the day - You can use carb counting to figure out how many carbs to eat at each meal and snack. This helps you make sure that you eat the right amount over the entire day.   Keep your blood sugar levels well managed - Spreading out the carbs you eat over a whole day can help keep your blood sugar from getting too high. If you take insulin or another diabetes medicine that can cause low blood sugar, eating about the same amount of carbs at each meal every day also helps keep your blood sugar from getting too low. Reducing the amount of carbs you eat can help you manage your diabetes better and prevent medical problems that diabetes can cause.  Your doctor, nurse, or dietitian (food expert) can help you figure out how many carbs to try to eat each day. This will depend on your eating habits, weight, activity level, and which diabetes medicines you take.  People who take insulin before meals might need to be very careful when they count the carbs in every meal and snack. This is so they " "can give themselves the right amount of insulin. If the insulin dose doesn't match the amount of carbs, their blood sugar might get too low or too high. Other people might be able to be a little more flexible as long as they get about the same amount of carbs at each meal or throughout the day.  Which foods have carbs? -- Foods with a lot of carbs include:   Grains - These include bread, pasta, rice, and cereal.   Fruits and starchy vegetables - Starchy vegetables include potatoes, corn, and squash.   Milk and other dairy products - Dairy products include cheese and yogurt.   Foods with added sugar - These include sweets and baked goods likes cookies and cakes, as well as sugary drinks like juice and soda.  It is best to get most of your carbs from fruits, vegetables, whole grains (like whole-wheat bread, whole-grain cereals, and brown rice), and low-fat milk and dairy products.  How do I count carbs? -- To count carbs in packaged foods, check the food's nutrition label (if it has one).  On the label (figure 1), check for:   \"Total Carbohydrate\" number - This tells you how many carbs are in 1 serving size of the food. If you eat 1 serving, then the number of carbs you eat is the same as the number of total carbohydrates.   \"Serving size\" - This tells you how much food is in 1 serving. If you have 2 servings, the number of carbs will be 2 times the number of carbohydrates listed.   \"Dietary Fiber\" - Fiber is a carb that is not digested, which means that it does not raise blood sugar. Foods with a lot of fiber can help manage your blood sugar. If a food has more than 5 grams (g) of fiber, you need less insulin to cover the total carbs in that food. So, if you are calculating an insulin dose, only count the carbs that are not from fiber (figure 1).  What is exchange planning? -- Exchange planning, or the \"exchange system,\" is a way for people to plan their meals without reading labels. This can be helpful since many " "foods don't come with a nutrition label.  The exchange system involves knowing how much of different foods have about 15 grams of carbs (table 1 and table 2 and table 3). Your doctor, nurse, or dietitian gives you a certain number of \"carb choices\" to eat with each meal and snack (table 4). Each \"choice\" is a portion of food that has about 15 grams of carbs. Knowing your options makes it easier to \"exchange\" 1 carb choice for another as you plan your meals and snacks. For example, 1 small apple could be exchanged for 1/3 cup of pasta.  How can I plan my meals? -- First, make sure that you know how many carbs you should be eating each day. Ask your doctor, nurse, or dietitian if you are not sure.  Here are some tips that might help:   Spread out your carbs over 4 to 6 small meals each day instead of 3 big ones.   Eat a similar number of carbs at each meal, for example, at each dinner.   Eat your meals at a similar time each day.   Plan your meals ahead of time.   Use the \"plate method.\" This is a simpler way to make sure that you get a good balance of carbs and other nutrients with each meal. It is not as exact as counting all of your carbs, but it can be helpful for people who prefer a simpler approach. If you take insulin before meals, it is generally better to adjust your insulin dose by counting how many carbs you plan to eat or using the exchange planning strategy.  For the plate method, you start with a plate about 9 inches (23 cm) across. Fill it with (figure 2):   1/2 non-starchy vegetables   1/4 protein   1/4 carbs   Follow your doctor's instructions for how and when to check your blood sugar. This can help you learn how certain foods affect your blood sugar.   Keep track of your meals and blood sugar levels. Show this to your doctor or nurse so they can adjust your treatment if needed. If you take insulin, you will also need to keep track of your exercise patterns and how much insulin you give yourself with " "each dose.   If you take insulin, make sure that you understand how to use it. This includes knowing how to adjust the dose based on your blood sugar level and what you plan to eat. Foods that have a lot of protein or fat also can affect your blood sugar level. Some people need to adjust their insulin doses when they eat these foods.   Remember that other things besides carbs can raise or lower your blood sugar level. These things can include exercise, getting sick, drinking alcohol, traveling, and stress. If you take insulin, make sure that you know how and when to adjust your dose in these situations.  If you are having trouble counting carbs or managing your blood sugar, talk to your doctor or nurse. They can help. A dietitian can also help you plan specific menus that will give you the right amount of carbs each day.  For more information, you can also get a book on counting carbs or check the American Diabetes Association website (www.diabetes.org).  All topics are updated as new evidence becomes available and our peer review process is complete.  This topic retrieved from [employer] on: Mar 27, 2024.  Topic 39208 Version 11.0  Release: 32.2.4 - C32.85  © 2024 UpToDate, Inc. and/or its affiliates. All rights reserved.  figure 1: Counting carbohydrates     To figure out the \"carb count\" in 1 serving, start with the number of grams of total carbohydrates (46 grams), then subtract the number of grams of dietary fiber (7 grams). It's also important to look at the serving size. In this example, the carb count is 39 grams. You can use this number when counting carbs for your insulin dose.  Graphic 41253 Version 8.0  table 1: Bread and grains with 15 grams of carbs*  Bread    Food  Serving size    Bagel 1/4 large bagel (1 oz)   Biscuit 1 biscuit (2.5 inches across)   Bread, reduced calorie, light 2 slices (1.5 oz)   Cornbread 1.75 inch cube (1.5 oz)   English muffin 1/2 muffin   Hot dog or hamburger bun 1/2 bun (3/4 oz) " "  Naan, chapati, or roti 1 oz   Pancake 1 pancake (4 inches across, 1/4 inch thick)   Phoebe (6 inches across) 1/2 phoebe   Tortilla, corn 1 small tortilla (6 inches across)   Tortilla, flour (white or whole wheat) 1 small tortilla (6 inches across) or 1/3 large tortilla (10 inches across)   Waffle 1 waffle (4-inch square or 4 inches across)   Cereals and grains (including pasta and rice)    Food  Serving size (cooked)    Barley, couscous, millet, pasta (white or whole wheat, all shapes and sizes), polenta, quinoa (all colors), or rice (white, brown, and other colors and types) 1/3 cup   Bran cereal (twigs, buds, or flakes), shredded wheat (plain), or sugar-coated cereal 1/2 cup   Bulgur, kasha, tabbouleh (tabouli), or wild rice 1/2 cup   Granola cereal 1/4 cup   Hot cereal (oats, oatmeal, grits) 1/2 cup   Unsweetened, ready-to-eat cereal 3/4 cup   * For bread and grains, 15 grams of carbs is considered 1 serving or \"choice\" for people who need to count carbs.  Graphic 243757 Version 1.0  table 2: Fruits with 15 grams of carbs*  Food  Serving size    Applesauce, unsweetened 1/2 cup   Banana 1 extra small banana, about 4 inches long (4 oz)   Blueberries 3/4 cup   Dried fruits (blueberries, cherries, cranberries, mixed fruit, raisins) 2 tbsp   Fruit, canned 1/2 cup   Fruit, whole, small (apple) 1 small fruit (4 oz)   Fruit, whole, medium (nectarine, orange, pear, tangerine) 1 medium fruit (6 oz)   Fruit juice, unsweetened 1/2 cup   Grapes 17 small grapes (3 oz)   Melon, diced 1 cup   Strawberries, whole 1 and 1/4 cups   When listed, weight (oz) includes skin and seeds. If you are not sure if your fruit is the right size for 1 serving, you can use a food scale to check the weight.  * For fruits, 15 grams of carbs is considered 1 serving or \"choice\" for people who need to count carbs.  Graphic 790415 Version 1.0  table 3: Starchy vegetables with 15 grams of carbs*  Food  Serving size (cooked)    Cassava, dasheen, or " "plantain 1/3 cup   Corn, green peas, mixed vegetables, or parsnips 1/2 cup   Marinara, pasta, or spaghetti sauce 1/2 cup   Mixed vegetables (with corn or peas) 1 cup   Potato, baked with skin 1/4 large (3 oz)   Potato, Kiswahili-fried (oven-baked) 1 cup (2 oz)   Potato, mashed with milk and fat 1/2 cup   Squash, winter (acorn, butternut) 1 cup   Yam or sweet potato, plain 1/2 cup (3 and 1/2 oz)   If you are not sure if your vegetable is the right size for 1 serving, you can use a food scale to check the weight.  * For starchy vegetables, 15 grams of carbs is considered 1 serving or \"choice\" for people who need to count carbs.  Graphic 476891 Version 1.0  table 4: Sample exchange system meal plan  Time  Exchange pattern  Sample menu  Carbohydrate count (g)    8 am 3 carbohydrate group    2 starch 1 English muffin 30    1 fruit 1 1/4 c strawberries 15    1 protein group 1/4 c cottage cheese -    1 fat group 1 tsp margarine -      Total: 45    12 noon 4 carbohydrate group    2 starch 2 slices of bread 30    1 fruit 1 orange 15    1 vegetable 1 c salad -    1 milk 8 oz skim milk 12    3 protein group 3 oz chicken -    1 fat group 1 tbsp low fat hernandez -      Total: 57    3 pm 1 carbohydrate group    1 fruit or 1 starch 1 apple or 6 crackers 15      Total: 15    6 pm 4 carbohydrate group    2 starch 1 c potato 30    1 fruit 1/2 c fruit salad 15    1 vegetable 1 c salad -    1 milk 8 oz skim milk 12    6 protein group 6 oz fish -    1 fat group 2 tbsp low fat salad dressing -      Total: 57    9 pm 1 carbohydrate group    1 starch 6 crackers 15    1 protein 2 tbsp peanut butter -      Total: 15    Graphic 67353 Version 3.0  figure 2: The \"plate method\"     For the plate method, you start with a plate about 9 inches (23 cm) across. Then fill it with 1/2 non-starchy vegetables, 1/4 protein, and 1/4 carbs.  Graphic 424811 Version 2.0  Consumer Information Use and Disclaimer   Disclaimer: This generalized information is a limited " summary of diagnosis, treatment, and/or medication information. It is not meant to be comprehensive and should be used as a tool to help the user understand and/or assess potential diagnostic and treatment options. It does NOT include all information about conditions, treatments, medications, side effects, or risks that may apply to a specific patient. It is not intended to be medical advice or a substitute for the medical advice, diagnosis, or treatment of a health care provider based on the health care provider's examination and assessment of a patient's specific and unique circumstances. Patients must speak with a health care provider for complete information about their health, medical questions, and treatment options, including any risks or benefits regarding use of medications. This information does not endorse any treatments or medications as safe, effective, or approved for treating a specific patient. UpToDate, Inc. and its affiliates disclaim any warranty or liability relating to this information or the use thereof.The use of this information is governed by the Terms of Use, available at https://www.wolterskluwer.com/en/know/clinical-effectiveness-terms. 2024© UpToDate, Inc. and its affiliates and/or licensors. All rights reserved.  Copyright   © 2024 UpToDate, Inc. and/or its affiliates. All rights reserved.

## 2024-09-11 ENCOUNTER — OFFICE VISIT (OUTPATIENT)
Dept: INTERNAL MEDICINE CLINIC | Facility: CLINIC | Age: 79
End: 2024-09-11
Payer: MEDICARE

## 2024-09-11 VITALS
BODY MASS INDEX: 30.15 KG/M2 | DIASTOLIC BLOOD PRESSURE: 62 MMHG | WEIGHT: 215.38 LBS | TEMPERATURE: 97.2 F | HEART RATE: 82 BPM | SYSTOLIC BLOOD PRESSURE: 114 MMHG | HEIGHT: 71 IN

## 2024-09-11 DIAGNOSIS — D64.9 ANEMIA, UNSPECIFIED TYPE: ICD-10-CM

## 2024-09-11 DIAGNOSIS — E11.22 TYPE 2 DIABETES MELLITUS WITH STAGE 3B CHRONIC KIDNEY DISEASE, WITHOUT LONG-TERM CURRENT USE OF INSULIN (HCC): ICD-10-CM

## 2024-09-11 DIAGNOSIS — G30.9 ALZHEIMER'S DISEASE, UNSPECIFIED (CODE) (HCC): ICD-10-CM

## 2024-09-11 DIAGNOSIS — N18.32 STAGE 3B CHRONIC KIDNEY DISEASE (HCC): ICD-10-CM

## 2024-09-11 DIAGNOSIS — Z79.01 CHRONIC ANTICOAGULATION: ICD-10-CM

## 2024-09-11 DIAGNOSIS — E78.2 MIXED HYPERLIPIDEMIA: ICD-10-CM

## 2024-09-11 DIAGNOSIS — N18.32 TYPE 2 DIABETES MELLITUS WITH STAGE 3B CHRONIC KIDNEY DISEASE, WITHOUT LONG-TERM CURRENT USE OF INSULIN (HCC): ICD-10-CM

## 2024-09-11 DIAGNOSIS — K21.9 GERD WITHOUT ESOPHAGITIS: ICD-10-CM

## 2024-09-11 DIAGNOSIS — E66.09 CLASS 1 OBESITY DUE TO EXCESS CALORIES WITH SERIOUS COMORBIDITY AND BODY MASS INDEX (BMI) OF 32.0 TO 32.9 IN ADULT: ICD-10-CM

## 2024-09-11 DIAGNOSIS — M19.91 PRIMARY OSTEOARTHRITIS, UNSPECIFIED SITE: ICD-10-CM

## 2024-09-11 DIAGNOSIS — G20.A1 PARKINSON'S DISEASE WITHOUT DYSKINESIA OR FLUCTUATING MANIFESTATIONS: ICD-10-CM

## 2024-09-11 DIAGNOSIS — Z13.5 SCREENING FOR DIABETIC RETINOPATHY: ICD-10-CM

## 2024-09-11 DIAGNOSIS — F41.1 GENERALIZED ANXIETY DISORDER: ICD-10-CM

## 2024-09-11 DIAGNOSIS — I10 PRIMARY HYPERTENSION: Chronic | ICD-10-CM

## 2024-09-11 DIAGNOSIS — I48.91 ATRIAL FIBRILLATION, UNSPECIFIED TYPE (HCC): ICD-10-CM

## 2024-09-11 DIAGNOSIS — E11.8 TYPE 2 DIABETES MELLITUS WITH COMPLICATION (HCC): Primary | ICD-10-CM

## 2024-09-11 DIAGNOSIS — Z86.73 HISTORY OF TIA (TRANSIENT ISCHEMIC ATTACK): ICD-10-CM

## 2024-09-11 DIAGNOSIS — R25.1 TREMORS OF NERVOUS SYSTEM: ICD-10-CM

## 2024-09-11 LAB — SL AMB POCT HEMOGLOBIN AIC: 7.7 (ref ?–6.5)

## 2024-09-11 PROCEDURE — 99214 OFFICE O/P EST MOD 30 MIN: CPT | Performed by: INTERNAL MEDICINE

## 2024-09-11 PROCEDURE — 83036 HEMOGLOBIN GLYCOSYLATED A1C: CPT | Performed by: INTERNAL MEDICINE

## 2024-09-11 RX ORDER — CARBIDOPA AND LEVODOPA 25; 100 MG/1; MG/1
1 TABLET, EXTENDED RELEASE ORAL 2 TIMES DAILY
Start: 2024-09-11

## 2024-09-11 NOTE — PROGRESS NOTES
Ambulatory Visit  Name: Kaleb Ching III      : 1945      MRN: [id number]  Encounter Provider: Wilfrid Mills DO  Encounter Date: 2024   Encounter department: [facility]    Assessment & Plan  Type 2 diabetes mellitus with complication (HCC)    Lab Results   Component Value Date    HGBA1C 5.9 2024       Orders:    POCT hemoglobin A1c    Screening for diabetic retinopathy         Type 2 diabetes mellitus with stage 3b chronic kidney disease, without long-term current use of insulin (HCC)    Lab Results   Component Value Date    HGBA1C 5.9 2024            Primary hypertension         Atrial fibrillation, unspecified type (HCC)         GERD without esophagitis         Alzheimer's disease, unspecified (CODE) (HCC)         Primary osteoarthritis, unspecified site         Stage 3b chronic kidney disease (HCC)  Lab Results   Component Value Date    EGFR 40 2024    EGFR 35 2024    EGFR 37 2024    CREATININE 1.61 (H) 2024    CREATININE 1.79 (H) 2024    CREATININE 1.72 (H) 2024            Generalized anxiety disorder         Anemia, unspecified type         History of TIA (transient ischemic attack)         Chronic anticoagulation         Mixed hyperlipidemia         Class 1 obesity due to excess calories with serious comorbidity and body mass index (BMI) of 32.0 to 32.9 in adult       A/P: Doing ok and labs are up to date except of HgA1c which was higher at 7.7. Suspect some of the increase is due to increase appetite and recent epidurals. Will hold on adjusting for now and see if they improve with diet and steroid wean. . Appreciate neuro and pain management input. Continue with epidurals. Tolerating meds. Pt to continue with wearing a c collar. Discussed PT and defers. . Will continue current treatment and RTC three months for routine.      History of Present Illness     WM RTC for f/u DM, HTN, etc. Doing ok and no new issues. Seen by  neuro and MRI and EMG done. Pt confirmed PD and dementia with severe spinal stenosis with radicular s/s. Seen by pain management and injections started. Noted a syncope episode and seen in the ER just after having the EMG. W/u was negative and felt to be due to hypotension. Nothing since. . Doing ok and no new issues. No more syncopal events. Denies CP, SOB, edema, orthopnea or PND. NO reflux. Sugars less than 140 and no low sugar events. Chronic pain is manageable. Dementia is no worse. Remains on chronic anticoagulation and no bleeding events. Neuro adjusted meds for dementia and PD. Due for labs.           Review of Systems   Constitutional:  Negative for activity change, chills, diaphoresis, fatigue and fever.   HENT: Negative.     Eyes:  Negative for visual disturbance.   Respiratory:  Negative for cough, chest tightness, shortness of breath and wheezing.    Cardiovascular:  Negative for chest pain, palpitations and leg swelling.   Gastrointestinal:  Negative for abdominal pain, constipation, diarrhea, nausea and vomiting.   Endocrine: Negative for cold intolerance and heat intolerance.   Genitourinary:  Negative for difficulty urinating, dysuria and frequency.   Musculoskeletal:  Positive for neck pain and neck stiffness. Negative for arthralgias, gait problem and myalgias.   Neurological:  Positive for tremors. Negative for dizziness, seizures, syncope, weakness, light-headedness and headaches.   Psychiatric/Behavioral:  Positive for confusion. Negative for agitation, behavioral problems, dysphoric mood and sleep disturbance. The patient is not nervous/anxious.            Objective     There were no vitals taken for this visit.    Physical Exam  Vitals and nursing note reviewed.   Constitutional:       General: He is not in acute distress.     Appearance: Normal appearance. He is not ill-appearing.   HENT:      Head: Normocephalic and atraumatic.      Mouth/Throat:      Mouth: Mucous membranes are moist.    Eyes:      Extraocular Movements: Extraocular movements intact.      Conjunctiva/sclera: Conjunctivae normal.      Pupils: Pupils are equal, round, and reactive to light.   Neck:      Vascular: No carotid bruit.   Cardiovascular:      Rate and Rhythm: Normal rate and regular rhythm.      Heart sounds: Normal heart sounds. No murmur heard.  Pulmonary:      Effort: No respiratory distress.      Breath sounds: Normal breath sounds. No wheezing, rhonchi or rales.   Abdominal:      General: Bowel sounds are normal. There is no distension.      Palpations: Abdomen is soft.      Tenderness: There is no abdominal tenderness.   Musculoskeletal:      Cervical back: Neck supple.      Right lower leg: No edema.      Left lower leg: No edema.   Neurological:      General: No focal deficit present.      Mental Status: He is alert. Mental status is at baseline.      Gait: Gait abnormal.      Comments: Confusion and forgetfulness.    Psychiatric:         Mood and Affect: Mood normal.         Behavior: Behavior normal.         Thought Content: Thought content normal.         Judgment: Judgment normal.

## 2024-09-11 NOTE — ASSESSMENT & PLAN NOTE
Lab Results   Component Value Date    EGFR 40 08/12/2024    EGFR 35 05/09/2024    EGFR 37 05/08/2024    CREATININE 1.61 (H) 08/12/2024    CREATININE 1.79 (H) 05/09/2024    CREATININE 1.72 (H) 05/08/2024

## 2024-09-11 NOTE — ASSESSMENT & PLAN NOTE
A/P: Doing ok and labs are up to date except of HgA1c which was higher at 7.7. Suspect some of the increase is due to increase appetite and recent epidurals. Will hold on adjusting for now and see if they improve with diet and steroid wean. . Appreciate neuro and pain management input. Continue with epidurals. Tolerating meds. Pt to continue with wearing a c collar. Discussed PT and defers. . Will continue current treatment and RTC three months for routine.

## 2024-09-12 DIAGNOSIS — E55.9 VITAMIN D DEFICIENCY: ICD-10-CM

## 2024-09-12 DIAGNOSIS — R06.00 DYSPNEA, UNSPECIFIED TYPE: ICD-10-CM

## 2024-09-12 RX ORDER — ERGOCALCIFEROL 1.25 MG/1
50000 CAPSULE, LIQUID FILLED ORAL WEEKLY
Qty: 12 CAPSULE | Refills: 1 | Status: SHIPPED | OUTPATIENT
Start: 2024-09-12

## 2024-09-12 RX ORDER — FUROSEMIDE 20 MG
TABLET ORAL
Qty: 45 TABLET | Refills: 1 | Status: SHIPPED | OUTPATIENT
Start: 2024-09-12

## 2024-09-16 DIAGNOSIS — G30.9 ALZHEIMER'S DISEASE, UNSPECIFIED (CODE) (HCC): ICD-10-CM

## 2024-09-16 RX ORDER — MEMANTINE HYDROCHLORIDE 10 MG/1
10 TABLET ORAL 2 TIMES DAILY
Qty: 180 TABLET | Refills: 0 | Status: SHIPPED | OUTPATIENT
Start: 2024-09-16

## 2024-09-18 ENCOUNTER — TELEPHONE (OUTPATIENT)
Age: 79
End: 2024-09-18

## 2024-09-18 NOTE — TELEPHONE ENCOUNTER
Patient's wife called. She is requesting a copy of both MRI results from her [facility].     She would like a call once they are ready to be picked up.     Please advise, thank you.

## 2024-09-18 NOTE — TELEPHONE ENCOUNTER
Spoke with pt wife let her know because we are not the ordering providers we can not give her these results she does have to go through the ordering provider

## 2024-09-19 NOTE — TELEPHONE ENCOUNTER
Patient called requesting refill for Memantine 10 mg. Patient made aware medication was refilled on 9/16/24 for 180 with 0 refills to [facility] pharmacy. Patient instructed to contact the pharmacy to obtain refills of medication. Patient verbalized understanding.

## 2024-09-19 NOTE — TELEPHONE ENCOUNTER
Patient called back stating that she called the pharmacy to check on the refill from 09/16/24 for memantine. Patient stated that she got the automated system and the system could not find the refill. I advised the patient to speak to a person from the pharmacy. Patient agreed to call the pharmacy and speak to someone. I informed the patient to call back if they do not have the refill. Patient verbalized understanding.

## 2024-09-25 DIAGNOSIS — N18.32 STAGE 3B CHRONIC KIDNEY DISEASE (HCC): ICD-10-CM

## 2024-09-25 DIAGNOSIS — E11.22 TYPE 2 DIABETES MELLITUS WITH STAGE 3B CHRONIC KIDNEY DISEASE, WITHOUT LONG-TERM CURRENT USE OF INSULIN (HCC): ICD-10-CM

## 2024-09-25 DIAGNOSIS — N18.32 TYPE 2 DIABETES MELLITUS WITH STAGE 3B CHRONIC KIDNEY DISEASE, WITHOUT LONG-TERM CURRENT USE OF INSULIN (HCC): ICD-10-CM

## 2024-09-25 DIAGNOSIS — I10 PRIMARY HYPERTENSION: Chronic | ICD-10-CM

## 2024-09-25 RX ORDER — CANAGLIFLOZIN 300 MG/1
300 TABLET, FILM COATED ORAL DAILY
Qty: 90 TABLET | Refills: 1 | Status: SHIPPED | OUTPATIENT
Start: 2024-09-25

## 2024-10-01 DIAGNOSIS — E11.8 TYPE 2 DIABETES MELLITUS WITH COMPLICATION (HCC): ICD-10-CM

## 2024-10-01 NOTE — TELEPHONE ENCOUNTER
Reason for call:   [x] Refill   [] Prior Auth  [] Other:     Office:   [x] PCP/Provider - Wilfrid Mills,    [] Specialty/Provider -     Medication: metFORMIN (GLUCOPHAGE) 850 mg tablet / take 1 tablet by mouth twice a day     [id number] [address] [place] [address]     Does the patient have enough for 3 days?   [x] Yes   [] No - Send as HP to POD

## 2024-10-02 DIAGNOSIS — I10 HYPERTENSION, UNSPECIFIED TYPE: ICD-10-CM

## 2024-10-02 RX ORDER — AMLODIPINE BESYLATE 10 MG/1
5 TABLET ORAL DAILY
Qty: 30 TABLET | Refills: 0 | Status: SHIPPED | OUTPATIENT
Start: 2024-10-02

## 2024-10-25 DIAGNOSIS — I10 HYPERTENSION, UNSPECIFIED TYPE: ICD-10-CM

## 2024-10-25 RX ORDER — METOPROLOL SUCCINATE 100 MG/1
100 TABLET, EXTENDED RELEASE ORAL DAILY
Qty: 90 TABLET | Refills: 1 | Status: SHIPPED | OUTPATIENT
Start: 2024-10-25

## 2024-11-01 DIAGNOSIS — I10 HYPERTENSION, UNSPECIFIED TYPE: ICD-10-CM

## 2024-11-01 RX ORDER — AMLODIPINE BESYLATE 10 MG/1
5 TABLET ORAL DAILY
Qty: 30 TABLET | Refills: 0 | Status: SHIPPED | OUTPATIENT
Start: 2024-11-01 | End: 2024-11-07

## 2024-11-05 ENCOUNTER — HOSPITAL ENCOUNTER (INPATIENT)
Facility: HOSPITAL | Age: 79
LOS: 1 days | Discharge: HOME | DRG: 057 | End: 2024-11-07
Attending: EMERGENCY MEDICINE | Admitting: INTERNAL MEDICINE
Payer: MEDICARE

## 2024-11-05 ENCOUNTER — APPOINTMENT (EMERGENCY)
Dept: RADIOLOGY | Facility: HOSPITAL | Age: 79
DRG: 057 | End: 2024-11-05
Payer: MEDICARE

## 2024-11-05 ENCOUNTER — APPOINTMENT (EMERGENCY)
Dept: CT IMAGING | Facility: HOSPITAL | Age: 79
DRG: 057 | End: 2024-11-05
Payer: MEDICARE

## 2024-11-05 DIAGNOSIS — R06.00 DYSPNEA, UNSPECIFIED TYPE: ICD-10-CM

## 2024-11-05 DIAGNOSIS — R55 SYNCOPE: Primary | ICD-10-CM

## 2024-11-05 DIAGNOSIS — G20.A1 PARKINSON'S DISEASE WITHOUT DYSKINESIA OR FLUCTUATING MANIFESTATIONS (HCC): ICD-10-CM

## 2024-11-05 LAB
2HR DELTA HS TROPONIN: 0 NG/L
4HR DELTA HS TROPONIN: -1 NG/L
ALBUMIN SERPL BCG-MCNC: 3.7 G/DL (ref 3.5–5)
ALP SERPL-CCNC: 58 U/L (ref 34–104)
ALT SERPL W P-5'-P-CCNC: 4 U/L (ref 7–52)
ANION GAP SERPL CALCULATED.3IONS-SCNC: 12 MMOL/L (ref 4–13)
APTT PPP: 29 SECONDS (ref 23–34)
AST SERPL W P-5'-P-CCNC: 7 U/L (ref 13–39)
BACTERIA UR QL AUTO: ABNORMAL /HPF
BASOPHILS # BLD AUTO: 0.06 THOUSANDS/ÂΜL (ref 0–0.1)
BASOPHILS NFR BLD AUTO: 1 % (ref 0–1)
BILIRUB SERPL-MCNC: 0.88 MG/DL (ref 0.2–1)
BILIRUB UR QL STRIP: NEGATIVE
BUN SERPL-MCNC: 17 MG/DL (ref 5–25)
CALCIUM SERPL-MCNC: 8.9 MG/DL (ref 8.4–10.2)
CARDIAC TROPONIN I PNL SERPL HS: 8 NG/L
CARDIAC TROPONIN I PNL SERPL HS: 9 NG/L
CARDIAC TROPONIN I PNL SERPL HS: 9 NG/L
CHLORIDE SERPL-SCNC: 105 MMOL/L (ref 96–108)
CLARITY UR: CLEAR
CO2 SERPL-SCNC: 22 MMOL/L (ref 21–32)
COLOR UR: YELLOW
CREAT SERPL-MCNC: 1.51 MG/DL (ref 0.6–1.3)
EOSINOPHIL # BLD AUTO: 0.09 THOUSAND/ÂΜL (ref 0–0.61)
EOSINOPHIL NFR BLD AUTO: 2 % (ref 0–6)
ERYTHROCYTE [DISTWIDTH] IN BLOOD BY AUTOMATED COUNT: 13.2 % (ref 11.6–15.1)
GFR SERPL CREATININE-BSD FRML MDRD: 43 ML/MIN/1.73SQ M
GLUCOSE SERPL-MCNC: 183 MG/DL (ref 65–140)
GLUCOSE SERPL-MCNC: 228 MG/DL (ref 65–140)
GLUCOSE SERPL-MCNC: 242 MG/DL (ref 65–140)
GLUCOSE UR STRIP-MCNC: ABNORMAL MG/DL
HCT VFR BLD AUTO: 39.8 % (ref 36.5–49.3)
HGB BLD-MCNC: 12.6 G/DL (ref 12–17)
HGB UR QL STRIP.AUTO: ABNORMAL
HYALINE CASTS #/AREA URNS LPF: ABNORMAL /LPF
IMM GRANULOCYTES # BLD AUTO: 0.03 THOUSAND/UL (ref 0–0.2)
IMM GRANULOCYTES NFR BLD AUTO: 1 % (ref 0–2)
INR PPP: 1.05 (ref 0.85–1.19)
KETONES UR STRIP-MCNC: NEGATIVE MG/DL
LEUKOCYTE ESTERASE UR QL STRIP: NEGATIVE
LYMPHOCYTES # BLD AUTO: 1.36 THOUSANDS/ÂΜL (ref 0.6–4.47)
LYMPHOCYTES NFR BLD AUTO: 25 % (ref 14–44)
MAGNESIUM SERPL-MCNC: 1.6 MG/DL (ref 1.9–2.7)
MCH RBC QN AUTO: 31 PG (ref 26.8–34.3)
MCHC RBC AUTO-ENTMCNC: 31.7 G/DL (ref 31.4–37.4)
MCV RBC AUTO: 98 FL (ref 82–98)
MONOCYTES # BLD AUTO: 0.42 THOUSAND/ÂΜL (ref 0.17–1.22)
MONOCYTES NFR BLD AUTO: 8 % (ref 4–12)
MUCOUS THREADS UR QL AUTO: ABNORMAL
NEUTROPHILS # BLD AUTO: 3.44 THOUSANDS/ÂΜL (ref 1.85–7.62)
NEUTS SEG NFR BLD AUTO: 63 % (ref 43–75)
NITRITE UR QL STRIP: NEGATIVE
NON-SQ EPI CELLS URNS QL MICRO: ABNORMAL /HPF
NRBC BLD AUTO-RTO: 0 /100 WBCS
PH UR STRIP.AUTO: 6 [PH]
PHOSPHATE SERPL-MCNC: 4.2 MG/DL (ref 2.3–4.1)
PLATELET # BLD AUTO: 196 THOUSANDS/UL (ref 149–390)
PMV BLD AUTO: 10.8 FL (ref 8.9–12.7)
POTASSIUM SERPL-SCNC: 4.4 MMOL/L (ref 3.5–5.3)
PROT SERPL-MCNC: 6.5 G/DL (ref 6.4–8.4)
PROT UR STRIP-MCNC: ABNORMAL MG/DL
PROTHROMBIN TIME: 14.3 SECONDS (ref 12.3–15)
RBC # BLD AUTO: 4.06 MILLION/UL (ref 3.88–5.62)
RBC #/AREA URNS AUTO: ABNORMAL /HPF
SODIUM SERPL-SCNC: 139 MMOL/L (ref 135–147)
SP GR UR STRIP.AUTO: 1.02
UROBILINOGEN UR QL STRIP.AUTO: 0.2 E.U./DL
WBC # BLD AUTO: 5.4 THOUSAND/UL (ref 4.31–10.16)
WBC #/AREA URNS AUTO: ABNORMAL /HPF

## 2024-11-05 PROCEDURE — 72125 CT NECK SPINE W/O DYE: CPT

## 2024-11-05 PROCEDURE — 93005 ELECTROCARDIOGRAM TRACING: CPT

## 2024-11-05 PROCEDURE — 71045 X-RAY EXAM CHEST 1 VIEW: CPT

## 2024-11-05 PROCEDURE — 84484 ASSAY OF TROPONIN QUANT: CPT | Performed by: EMERGENCY MEDICINE

## 2024-11-05 PROCEDURE — 82948 REAGENT STRIP/BLOOD GLUCOSE: CPT

## 2024-11-05 PROCEDURE — 85610 PROTHROMBIN TIME: CPT | Performed by: EMERGENCY MEDICINE

## 2024-11-05 PROCEDURE — 99285 EMERGENCY DEPT VISIT HI MDM: CPT | Performed by: EMERGENCY MEDICINE

## 2024-11-05 PROCEDURE — 36415 COLL VENOUS BLD VENIPUNCTURE: CPT | Performed by: EMERGENCY MEDICINE

## 2024-11-05 PROCEDURE — 84100 ASSAY OF PHOSPHORUS: CPT | Performed by: EMERGENCY MEDICINE

## 2024-11-05 PROCEDURE — 70450 CT HEAD/BRAIN W/O DYE: CPT

## 2024-11-05 PROCEDURE — 80053 COMPREHEN METABOLIC PANEL: CPT | Performed by: EMERGENCY MEDICINE

## 2024-11-05 PROCEDURE — 99223 1ST HOSP IP/OBS HIGH 75: CPT | Performed by: INTERNAL MEDICINE

## 2024-11-05 PROCEDURE — 85025 COMPLETE CBC W/AUTO DIFF WBC: CPT | Performed by: EMERGENCY MEDICINE

## 2024-11-05 PROCEDURE — 85730 THROMBOPLASTIN TIME PARTIAL: CPT | Performed by: EMERGENCY MEDICINE

## 2024-11-05 PROCEDURE — 83735 ASSAY OF MAGNESIUM: CPT | Performed by: EMERGENCY MEDICINE

## 2024-11-05 PROCEDURE — 99285 EMERGENCY DEPT VISIT HI MDM: CPT

## 2024-11-05 PROCEDURE — 81001 URINALYSIS AUTO W/SCOPE: CPT | Performed by: EMERGENCY MEDICINE

## 2024-11-05 PROCEDURE — 84484 ASSAY OF TROPONIN QUANT: CPT | Performed by: INTERNAL MEDICINE

## 2024-11-05 RX ORDER — MAGNESIUM SULFATE HEPTAHYDRATE 40 MG/ML
2 INJECTION, SOLUTION INTRAVENOUS ONCE
Status: COMPLETED | OUTPATIENT
Start: 2024-11-05 | End: 2024-11-05

## 2024-11-05 RX ORDER — DULOXETIN HYDROCHLORIDE 30 MG/1
30 CAPSULE, DELAYED RELEASE ORAL DAILY
Status: DISCONTINUED | OUTPATIENT
Start: 2024-11-06 | End: 2024-11-07 | Stop reason: HOSPADM

## 2024-11-05 RX ORDER — ACETAMINOPHEN 325 MG/1
650 TABLET ORAL EVERY 6 HOURS PRN
Status: DISCONTINUED | OUTPATIENT
Start: 2024-11-05 | End: 2024-11-07 | Stop reason: HOSPADM

## 2024-11-05 RX ORDER — INSULIN LISPRO 100 [IU]/ML
1-6 INJECTION, SOLUTION INTRAVENOUS; SUBCUTANEOUS
Status: DISCONTINUED | OUTPATIENT
Start: 2024-11-05 | End: 2024-11-07 | Stop reason: HOSPADM

## 2024-11-05 RX ORDER — DONEPEZIL HYDROCHLORIDE 5 MG/1
10 TABLET, FILM COATED ORAL
Status: DISCONTINUED | OUTPATIENT
Start: 2024-11-05 | End: 2024-11-07 | Stop reason: HOSPADM

## 2024-11-05 RX ORDER — AMLODIPINE BESYLATE 5 MG/1
5 TABLET ORAL DAILY
Status: DISCONTINUED | OUTPATIENT
Start: 2024-11-06 | End: 2024-11-06

## 2024-11-05 RX ORDER — ATORVASTATIN CALCIUM 10 MG/1
20 TABLET, FILM COATED ORAL DAILY
Status: DISCONTINUED | OUTPATIENT
Start: 2024-11-06 | End: 2024-11-07 | Stop reason: HOSPADM

## 2024-11-05 RX ORDER — ONDANSETRON 2 MG/ML
4 INJECTION INTRAMUSCULAR; INTRAVENOUS EVERY 6 HOURS PRN
Status: DISCONTINUED | OUTPATIENT
Start: 2024-11-05 | End: 2024-11-07 | Stop reason: HOSPADM

## 2024-11-05 RX ORDER — CARBIDOPA AND LEVODOPA 25; 100 MG/1; MG/1
1 TABLET ORAL 3 TIMES DAILY
Status: DISCONTINUED | OUTPATIENT
Start: 2024-11-05 | End: 2024-11-07 | Stop reason: HOSPADM

## 2024-11-05 RX ORDER — METOPROLOL SUCCINATE 50 MG/1
100 TABLET, EXTENDED RELEASE ORAL DAILY
Status: DISCONTINUED | OUTPATIENT
Start: 2024-11-06 | End: 2024-11-07 | Stop reason: HOSPADM

## 2024-11-05 RX ORDER — SODIUM CHLORIDE, SODIUM GLUCONATE, SODIUM ACETATE, POTASSIUM CHLORIDE, MAGNESIUM CHLORIDE, SODIUM PHOSPHATE, DIBASIC, AND POTASSIUM PHOSPHATE .53; .5; .37; .037; .03; .012; .00082 G/100ML; G/100ML; G/100ML; G/100ML; G/100ML; G/100ML; G/100ML
75 INJECTION, SOLUTION INTRAVENOUS ONCE
Status: COMPLETED | OUTPATIENT
Start: 2024-11-05 | End: 2024-11-05

## 2024-11-05 RX ORDER — INSULIN LISPRO 100 [IU]/ML
1-5 INJECTION, SOLUTION INTRAVENOUS; SUBCUTANEOUS
Status: DISCONTINUED | OUTPATIENT
Start: 2024-11-05 | End: 2024-11-07 | Stop reason: HOSPADM

## 2024-11-05 RX ORDER — MEMANTINE HYDROCHLORIDE 5 MG/1
10 TABLET ORAL 2 TIMES DAILY
Status: DISCONTINUED | OUTPATIENT
Start: 2024-11-05 | End: 2024-11-07 | Stop reason: HOSPADM

## 2024-11-05 RX ADMIN — MEMANTINE 10 MG: 5 TABLET ORAL at 18:21

## 2024-11-05 RX ADMIN — MAGNESIUM SULFATE HEPTAHYDRATE 2 G: 40 INJECTION, SOLUTION INTRAVENOUS at 18:20

## 2024-11-05 RX ADMIN — DONEPEZIL HYDROCHLORIDE 10 MG: 5 TABLET ORAL at 21:34

## 2024-11-05 RX ADMIN — APIXABAN 5 MG: 5 TABLET, FILM COATED ORAL at 18:21

## 2024-11-05 RX ADMIN — SODIUM CHLORIDE, SODIUM GLUCONATE, SODIUM ACETATE, POTASSIUM CHLORIDE, MAGNESIUM CHLORIDE, SODIUM PHOSPHATE, DIBASIC, AND POTASSIUM PHOSPHATE 75 ML/HR: .53; .5; .37; .037; .03; .012; .00082 INJECTION, SOLUTION INTRAVENOUS at 18:21

## 2024-11-05 RX ADMIN — CARBIDOPA AND LEVODOPA 1 TABLET: 25; 100 TABLET ORAL at 21:34

## 2024-11-05 RX ADMIN — INSULIN LISPRO 2 UNITS: 100 INJECTION, SOLUTION INTRAVENOUS; SUBCUTANEOUS at 21:35

## 2024-11-05 RX ADMIN — INSULIN LISPRO 1 UNITS: 100 INJECTION, SOLUTION INTRAVENOUS; SUBCUTANEOUS at 18:30

## 2024-11-05 NOTE — H&P
"H&P - Hospitalist   Name: Kaleb Ching III 78 y.o. male I MRN: [id number]  Unit/Bed#: -01 I Date of Admission: 11/5/2024   Date of Service: 11/5/2024 I Hospital Day: 0     Assessment & Plan  Syncope  Unclear etiology  Initial troponin negative, will follow-up trend  Monitor on telemetry  Magnesium level low, will supplement with IV magnesium  Consult cardiology  No obvious signs of infection at this time  Check orthostatics  IV fluids  Atrial fibrillation (HCC)  Currently rate controlled.  Continue Toprol with hold parameters.  Eliquis for anticoagulation  Hypertension  BP currently stable  Continue home medications with hold parameters  Monitor orthostatics  Type 2 diabetes mellitus with diabetic chronic kidney disease ([facility])  Lab Results   Component Value Date    HGBA1C 7.7 (A) 09/11/2024       No results for input(s): \"POCGLU\" in the last 72 hours.    Blood Sugar Average: Last 72 hrs:    Hold oral diabetic medications for now  Insulin sliding scale  Diabetic diet  Alzheimer's disease, unspecified (CODE) ([facility])  Supportive care.  Continue donepezil and Namenda  Parkinson's disease without dyskinesia or fluctuating manifestations ([facility])  Supportive care  Continue home Sinemet      VTE Pharmacologic Prophylaxis:   Moderate Risk (Score 3-4) - Pharmacological DVT Prophylaxis Ordered: apixaban (Eliquis).  Code Status: Level 1 - Full Code   Discussion with family: Updated  (wife) at bedside.    Anticipated Length of Stay: Patient will be admitted on an observation basis with an anticipated length of stay of less than 2 midnights secondary to syncope.    History of Present Illness   Chief Complaint: Syncope    Kaleb Ching III is a 78 y.o. male with a PMH of Parkinson's disease, Alzheimer's dementia, diabetes mellitus type 2, atrial fibrillation on Eliquis who presents with syncope.  History is obtained from patient's wife given patient's dementia.  Wife states that she was helping patient get out " of the shower.  He sat down so she could dry him up.  While he was seated his head went back and he became unresponsive.  Patient was still breathing however not responding appropriately.  Episode lasted for 2 to 3 minutes and then patient gradually returned to baseline.  EMS was called and patient was brought to the ER for further evaluation.  No recent change in medications.  Patient denies any chest pain or shortness of breath prior to episode.    Review of Systems   Constitutional:  Negative for chills and fever.   Respiratory:  Negative for cough and shortness of breath.    Cardiovascular:  Negative for chest pain.   Neurological:  Positive for syncope.   All other systems reviewed and are negative.      Historical Information   Past Medical History:   Diagnosis Date    Acute on chronic renal insufficiency     Allergic rhinitis     Anemia 06/11/2012    Atrial fibrillation (HCC)     Cerebrovascular accident (CVA) (HCC)     Chest pain     Controlled type 2 diabetes mellitus without complication (HCC)     SMALLS (dyspnea on exertion)     Generalized anxiety disorder     GERD without esophagitis 06/11/2012    Hypertension     Memory difficulties 01/08/2019    Nephrolithiasis 03/17/2016    Obesity 06/11/2012    Osteoarthritis 06/11/2012     Past Surgical History:   Procedure Laterality Date    CATARACT EXTRACTION, BILATERAL      CYSTOSCOPY W/ LASER LITHOTRIPSY Left 1/20/2020    Procedure: CYSTOSCOPY; RETROGRADE; URETEROSCOPY; STONE EXTRACTION; POSSIBLE HOLMIUM LASER LITHOTRIPSY;  Surgeon: Carlos Moore MD;  Location:  MAIN OR;  Service: Urology    KNEE SURGERY      CO CYSTO BLADDER W/URETERAL CATHETERIZATION Left 11/8/2019    Procedure: CYSTOSCOPY RETROGRADE PYELOGRAM WITH INSERTION STENT URETERAL;  Surgeon: Carlos Moore MD;  Location:  MAIN OR;  Service: Urology    TOTAL HIP ARTHROPLASTY      TOTAL HIP ARTHROPLASTY Bilateral 2011     Social History     Tobacco Use    Smoking status: Never     Passive exposure:  Never    Smokeless tobacco: Never   Vaping Use    Vaping status: Never Used   Substance and Sexual Activity    Alcohol use: Never     Comment: socially     Drug use: Never    Sexual activity: Yes     E-Cigarette/Vaping    E-Cigarette Use Never User      E-Cigarette/Vaping Substances    Nicotine No     THC No     CBD No     Flavoring No     Other No     Unknown No      Family History   Problem Relation Age of Onset    Arthritis Mother     Parkinsonism Mother     No Known Problems Father      Social History:  Marital Status: /Civil Union   Occupation: none  Patient Pre-hospital Living Situation: Home  Patient Pre-hospital Level of Mobility: walks with walker  Patient Pre-hospital Diet Restrictions: none    Meds/Allergies   I have reviewed home medications with patient family member.  Prior to Admission medications    Medication Sig Start Date End Date Taking? Authorizing Provider   amLODIPine (NORVASC) 10 mg tablet take 1/2 tablet by mouth once daily 11/1/24   Bobo Rangel,    apixaban (ELIQUIS) 5 mg Take 1 tablet (5 mg total) by mouth 2 (two) times a day 2/15/24 2/9/25  Wilfrid Mills DO   atorvastatin (LIPITOR) 20 mg tablet Take 1 tablet (20 mg total) by mouth daily 7/9/24   Wilfrid Mills DO   carbidopa-levodopa (SINEMET CR)  mg TBCR per ER tablet Take 1 tablet by mouth 2 (two) times a day 9/11/24   Wilfrid Milsl DO   clonazePAM (KlonoPIN) 0.5 mg tablet Take 1 tablet (0.5 mg total) by mouth 2 (two) times a day 7/17/24   Wilfrid Mills DO   donepezil (ARICEPT) 10 mg tablet take 1 tablet by mouth at bedtime 8/27/24   Wilfrid Mills DO   DULoxetine (CYMBALTA) 30 mg delayed release capsule take 1 capsule by mouth once daily 7/30/24   Wilfrid Mills DO   ergocalciferol (VITAMIN D2) 50,000 units take 1 capsule by mouth every week 9/12/24   Wilfrid Mills DO   furosemide (LASIX) 20 mg tablet take 1 tablet by mouth three times a week EITHER ON MONDAY, WEDNESDAY,AND FRIDAY OR TUESDAY, THURSDAY, AND SATURDAY  9/12/24   Bobo Rangel,    glucose blood (FREESTYLE LITE) test strip Test twice a day 3/22/23   Wilfrid Mills DO   glucose monitoring kit (FREESTYLE) monitoring kit Use 1 each 2 (two) times a day Test twice  Day 3/22/23   Wilfrid Mills DO   Invokana 300 MG TABS take 1 tablet by mouth once daily 9/25/24   Wilfrid Mills DO   Lancets (freestyle) lancets Test twice a day 3/22/23   Wilfrid Mills DO   Magnesium Oxide 400 MG CAPS Take 1 tablet (400 mg total) by mouth Daily at 2am  Patient not taking: Reported on 9/11/2024 8/13/24   Wilfrid Mills DO   memantine (NAMENDA) 10 mg tablet take 1 tablet by mouth twice a day 9/16/24   Eusebio Spann,    metFORMIN (GLUCOPHAGE) 850 mg tablet Take 1 tablet (850 mg total) by mouth 2 (two) times a day 10/1/24   Wilfrid Mills DO   metoprolol succinate (TOPROL-XL) 100 mg 24 hr tablet take 1 tablet by mouth once daily 10/25/24   Wilfrid Mills DO     Allergies   Allergen Reactions    Pollen Extract Itching     Runny nose, itchy eyes       Objective :  Temp:  [97.7 °F (36.5 °C)] 97.7 °F (36.5 °C)  HR:  [77-81] 81  BP: (124-128)/(66-79) 128/75  Resp:  [17-18] 17  SpO2:  [95 %-100 %] 95 %  O2 Device: None (Room air)    Physical Exam  Constitutional:       General: He is not in acute distress.  HENT:      Head: Normocephalic and atraumatic.      Nose: Nose normal.      Mouth/Throat:      Mouth: Mucous membranes are moist.   Eyes:      Extraocular Movements: Extraocular movements intact.      Conjunctiva/sclera: Conjunctivae normal.   Cardiovascular:      Rate and Rhythm: Normal rate and regular rhythm.   Pulmonary:      Effort: Pulmonary effort is normal. No respiratory distress.   Abdominal:      Palpations: Abdomen is soft.      Tenderness: There is no abdominal tenderness.   Musculoskeletal:         General: Normal range of motion.      Cervical back: Normal range of motion and neck supple.   Skin:     General: Skin is warm and dry.   Neurological:      Mental Status: He is alert.       Comments: Patient is awake and alert.  Moving all 4 extremities.  Following simple commands   Psychiatric:         Mood and Affect: Mood normal.         Behavior: Behavior normal.      Comments: Baseline dementia          Lines/Drains:        Lab Results: I have reviewed the following results:  Results from last 7 days   Lab Units 11/05/24  1533   WBC Thousand/uL 5.40   HEMOGLOBIN g/dL 12.6   HEMATOCRIT % 39.8   PLATELETS Thousands/uL 196   SEGS PCT % 63   LYMPHO PCT % 25   MONO PCT % 8   EOS PCT % 2     Results from last 7 days   Lab Units 11/05/24  1533   SODIUM mmol/L 139   POTASSIUM mmol/L 4.4   CHLORIDE mmol/L 105   CO2 mmol/L 22   BUN mg/dL 17   CREATININE mg/dL 1.51*   ANION GAP mmol/L 12   CALCIUM mg/dL 8.9   ALBUMIN g/dL 3.7   TOTAL BILIRUBIN mg/dL 0.88   ALK PHOS U/L 58   ALT U/L 4*   AST U/L 7*   GLUCOSE RANDOM mg/dL 228*     Results from last 7 days   Lab Units 11/05/24  1533   INR  1.05         Lab Results   Component Value Date    HGBA1C 7.7 (A) 09/11/2024    HGBA1C 5.9 07/17/2024    HGBA1C 6.2 04/04/2024           Imaging Results Review: I reviewed radiology reports from this admission including: CT head.  Other Study Results Review: No additional pertinent studies reviewed.    Administrative Statements       ** Please Note: This note has been constructed using a voice recognition system. **

## 2024-11-05 NOTE — ASSESSMENT & PLAN NOTE
Unclear etiology  Initial troponin negative, will follow-up trend  Monitor on telemetry  Magnesium level low, will supplement with IV magnesium  Consult cardiology  No obvious signs of infection at this time  Check orthostatics  IV fluids

## 2024-11-05 NOTE — ED PROVIDER NOTES
Time reflects when diagnosis was documented in both MDM as applicable and the Disposition within this note       Time User Action Codes Description Comment    11/5/2024  5:36 PM Aj Whitehead Add [R55] Syncope           ED Disposition       ED Disposition   Admit    Condition   Stable    Date/Time   Tue Nov 5, 2024  5:37 PM    Comment   Case was discussed with Dr. Yen and the patient's admission status was agreed to be Admission Status: observation status to the service of Dr. Yen .               Assessment & Plan       Medical Decision Making  78-year-old male presents emergency department secondary to syncope which is apparently has been the second time in the last few weeks that this is occurred.  The patient denies any fever chills headache or shortness of breath or chest pain, and does not relate feeling ill prior to passing out.  He does not really recall the situation, but seems demented as he does not relate the appropriate year.  Patient's wife came and is able to add to the story who notes that this occurred after taking a shower and it is the second time this is occurred in the past few weeks.  The patient has a history of atrial fibrillation.  Differential diagnosis based on my evaluation is organ failure arrhythmia vasovagal syncope or potentially vasodilatation due to hot shower.  The patient maintained regular vital signs and lab work is reassuring.  CT scans are negative.  Patient will be observed overnight for possible arrhythmia.    Amount and/or Complexity of Data Reviewed  Labs: ordered.  Radiology: ordered and independent interpretation performed.    Risk  Decision regarding hospitalization.             Medications   magnesium sulfate 2 g/50 mL IVPB (premix) 2 g (has no administration in time range)       ED Risk Strat Scores                           SBIRT 20yo+      Flowsheet Row Most Recent Value   Initial Alcohol Screen: US AUDIT-C     1. How often do you have a drink containing  alcohol? 0 Filed at: 11/05/2024 1459   2. How many drinks containing alcohol do you have on a typical day you are drinking?  0 Filed at: 11/05/2024 1459   3a. Male UNDER 65: How often do you have five or more drinks on one occasion? 0 Filed at: 11/05/2024 1459   3b. FEMALE Any Age, or MALE 65+: How often do you have 4 or more drinks on one occassion? 0 Filed at: 11/05/2024 1459   Audit-C Score 0 Filed at: 11/05/2024 1459   ISABELLA: How many times in the past year have you...    Used an illegal drug or used a prescription medication for non-medical reasons? Never Filed at: 11/05/2024 1459                            History of Present Illness       Chief Complaint   Patient presents with    Syncope     Patient presents after a syncopal episode after getting out of the shower. Patient was lowered to the toilet by his wife.          Past Medical History:   Diagnosis Date    Acute on chronic renal insufficiency     Allergic rhinitis     Anemia 06/11/2012    Atrial fibrillation (HCC)     Cerebrovascular accident (CVA) (HCC)     Chest pain     Controlled type 2 diabetes mellitus without complication (HCC)     SMALLS (dyspnea on exertion)     Generalized anxiety disorder     GERD without esophagitis 06/11/2012    Hypertension     Memory difficulties 01/08/2019    Nephrolithiasis 03/17/2016    Obesity 06/11/2012    Osteoarthritis 06/11/2012      Past Surgical History:   Procedure Laterality Date    CATARACT EXTRACTION, BILATERAL      CYSTOSCOPY W/ LASER LITHOTRIPSY Left 1/20/2020    Procedure: CYSTOSCOPY; RETROGRADE; URETEROSCOPY; STONE EXTRACTION; POSSIBLE HOLMIUM LASER LITHOTRIPSY;  Surgeon: Carlos Moore MD;  Location:  MAIN OR;  Service: Urology    KNEE SURGERY      MO CYSTO BLADDER W/URETERAL CATHETERIZATION Left 11/8/2019    Procedure: CYSTOSCOPY RETROGRADE PYELOGRAM WITH INSERTION STENT URETERAL;  Surgeon: Carlos Moore MD;  Location:  MAIN OR;  Service: Urology    TOTAL HIP ARTHROPLASTY      TOTAL HIP ARTHROPLASTY  Bilateral 2011      Family History   Problem Relation Age of Onset    Arthritis Mother     Parkinsonism Mother     No Known Problems Father       Social History     Tobacco Use    Smoking status: Never     Passive exposure: Never    Smokeless tobacco: Never   Vaping Use    Vaping status: Never Used   Substance Use Topics    Alcohol use: Never     Comment: socially     Drug use: Never      E-Cigarette/Vaping    E-Cigarette Use Never User       E-Cigarette/Vaping Substances    Nicotine No     THC No     CBD No     Flavoring No     Other No     Unknown No       I have reviewed and agree with the history as documented.     78-year-old male presents emergency department secondary to syncopal episode.  The patient appears to have no recollection of the event and feels that the last thing he remembers is going to bed yesterday, however the patient has no history of chest pain shortness of breath headache nausea vomiting or abdominal discomfort.  It appears that he may have had a syncopal episode after shower today as as noted by the triage note.  The patient's wife is not at the bedside.  According to the triage note it relates that the patient was lowered to the floor or toilet by his wife.        Review of Systems   Constitutional:  Positive for activity change. Negative for chills and fever.   HENT:  Negative for ear pain and sore throat.    Eyes:  Negative for pain and visual disturbance.   Respiratory:  Negative for cough and shortness of breath.    Cardiovascular:  Negative for chest pain and palpitations.   Gastrointestinal:  Negative for abdominal pain and vomiting.   Genitourinary:  Negative for dysuria and hematuria.   Musculoskeletal:  Negative for arthralgias and back pain.   Skin:  Negative for color change and rash.   Neurological:  Positive for syncope. Negative for seizures.   Psychiatric/Behavioral:  Positive for confusion.    All other systems reviewed and are negative.          Objective       ED Triage  Vitals [11/05/24 1459]   Temperature Pulse Blood Pressure Respirations SpO2 Patient Position - Orthostatic VS   97.7 °F (36.5 °C) 77 124/66 18 100 % Sitting      Temp Source Heart Rate Source BP Location FiO2 (%) Pain Score    Oral Monitor Right arm -- No Pain      Vitals      Date and Time Temp Pulse SpO2 Resp BP Pain Score FACES Pain Rating User   11/06/24 1028 -- 67 -- -- 129/81 -- -- MO   11/06/24 0841 -- -- -- -- -- No Pain -- LB   11/06/24 0840 -- -- -- -- -- No Pain -- SO   11/06/24 0813 -- 73 100 % -- 129/81 -- -- DII   11/06/24 0749 97.8 °F (36.6 °C) -- -- -- -- -- -- YR   11/06/24 0749 -- 80 96 % 16 101/58 -- -- DII   11/06/24 0252 98.8 °F (37.1 °C) 80 97 % 20 120/73 -- -- DII   11/05/24 2218 98 °F (36.7 °C) -- -- 18 -- -- -- CGP   11/05/24 2218 -- 88 97 % -- 120/62 -- -- DII   11/05/24 2023 -- 70 93 % -- 86/47 -- -- JY   11/05/24 2020 -- 100 96 % -- 90/56 -- -- JY   11/05/24 2018 -- 77 97 % -- 120/68 -- -- JY   11/05/24 1908 -- -- -- -- -- No Pain -- CGP   11/05/24 1908 -- 86 95 % -- -- -- -- JY   11/05/24 1900 98.5 °F (36.9 °C) 70 100 % 20 97/71 -- -- JY   11/05/24 1800 -- 81 95 % -- 128/75 -- -- EM   11/05/24 1756 -- -- -- -- -- No Pain -- EM   11/05/24 1751 -- 77 100 % 17 124/79 No Pain -- EM   11/05/24 1459 97.7 °F (36.5 °C) 77 100 % 18 124/66 No Pain -- RC            Physical Exam  Constitutional:       General: He is not in acute distress.     Appearance: Normal appearance. He is normal weight. He is not ill-appearing.      Comments: Patient somewhat confused.  Hx of Rafia - thinks its 1977.   HENT:      Head: Normocephalic and atraumatic.      Right Ear: External ear normal.      Left Ear: External ear normal.      Nose: Nose normal.      Mouth/Throat:      Mouth: Mucous membranes are moist.   Eyes:      Conjunctiva/sclera: Conjunctivae normal.   Cardiovascular:      Rate and Rhythm: Normal rate and regular rhythm.      Pulses: Normal pulses.      Heart sounds: Normal heart sounds.    Pulmonary:      Effort: Pulmonary effort is normal.      Breath sounds: Normal breath sounds.   Abdominal:      General: Abdomen is flat. There is no distension.      Palpations: Abdomen is soft. There is no mass.   Musculoskeletal:         General: No swelling, tenderness or deformity. Normal range of motion.      Cervical back: Normal range of motion.   Skin:     General: Skin is warm and dry.      Capillary Refill: Capillary refill takes 2 to 3 seconds.      Coloration: Skin is pale.   Neurological:      General: No focal deficit present.      Mental Status: He is alert and oriented to person, place, and time. Mental status is at baseline.   Psychiatric:         Mood and Affect: Mood normal.         Results Reviewed       Procedure Component Value Units Date/Time    Basic metabolic panel [[id number]]  (Abnormal) Collected: 11/06/24 0507    Lab Status: Final result Specimen: Blood from Arm, Left Updated: 11/06/24 0630     Sodium 137 mmol/L      Potassium 3.9 mmol/L      Chloride 104 mmol/L      CO2 24 mmol/L      ANION GAP 9 mmol/L      BUN 23 mg/dL      Creatinine 1.46 mg/dL      Glucose 288 mg/dL      Calcium 8.5 mg/dL      eGFR 45 ml/min/1.73sq m     Narrative:      National Kid[employer] Foundation guidelines for Chronic Kidney Disease (CKD):     Stage 1 with normal or high GFR (GFR > 90 mL/min/1.73 square meters)    Stage 2 Mild CKD (GFR = 60-89 mL/min/1.73 square meters)    Stage 3A Moderate CKD (GFR = 45-59 mL/min/1.73 square meters)    Stage 3B Moderate CKD (GFR = 30-44 mL/min/1.73 square meters)    Stage 4 Severe CKD (GFR = 15-29 mL/min/1.73 square meters)    Stage 5 End Stage CKD (GFR <15 mL/min/1.73 square meters)  Note: GFR calculation is accurate only with a steady state creatinine    Magnesium [[id number]]  (Normal) Collected: 11/06/24 0507    Lab Status: Final result Specimen: Blood from Arm, Left Updated: 11/06/24 0630     Magnesium 2.1 mg/dL     CBC and differential [[id number]]  (Abnormal)  Collected: 11/06/24 0507    Lab Status: Final result Specimen: Blood from Arm, Left Updated: 11/06/24 0558     WBC 5.69 Thousand/uL      RBC 3.85 Million/uL      Hemoglobin 11.9 g/dL      Hematocrit 37.5 %      MCV 97 fL      MCH 30.9 pg      MCHC 31.7 g/dL      RDW 13.3 %      MPV 10.9 fL      Platelets 188 Thousands/uL      nRBC 0 /100 WBCs      Segmented % 58 %      Immature Grans % 0 %      Lymphocytes % 29 %      Monocytes % 10 %      Eosinophils Relative 2 %      Basophils Relative 1 %      Absolute Neutrophils 3.30 Thousands/µL      Absolute Immature Grans 0.02 Thousand/uL      Absolute Lymphocytes 1.65 Thousands/µL      Absolute Monocytes 0.59 Thousand/µL      Eosinophils Absolute 0.10 Thousand/µL      Basophils Absolute 0.03 Thousands/µL     HS Troponin I 4hr [[id number]]  (Normal) Collected: 11/05/24 2011    Lab Status: Final result Specimen: Blood from Arm, Right Updated: 11/05/24 2039     hs TnI 4hr 8 ng/L      Delta 4hr hsTnI -1 ng/L     HS Troponin I 2hr [[id number]]  (Normal) Collected: 11/05/24 1743    Lab Status: Final result Specimen: Blood from Arm, Left Updated: 11/05/24 1814     hs TnI 2hr 9 ng/L      Delta 2hr hsTnI 0 ng/L     Urine Microscopic [[id number]]  (Abnormal) Collected: 11/05/24 1708    Lab Status: Final result Specimen: Urine, Clean Catch Updated: 11/05/24 1741     RBC, UA 0-5 /hpf      WBC, UA 0-1 /hpf      Epithelial Cells Occasional /hpf      Bacteria, UA None Seen /hpf      Hyaline Casts, UA 1-2 /lpf      MUCUS THREADS Occasional    UA w Reflex to Microscopic w Reflex to Culture [[id number]]  (Abnormal) Collected: 11/05/24 1708    Lab Status: Final result Specimen: Urine, Clean Catch Updated: 11/05/24 1716     Color, UA Yellow     Clarity, UA Clear     Specific Gravity, UA 1.020     pH, UA 6.0     Leukocytes, UA Negative     Nitrite, UA Negative     Protein, UA 1+ mg/dl      Glucose, UA 3+ mg/dl      Ketones, UA Negative mg/dl      Urobilinogen, UA 0.2 E.U./dl      Bilirubin, UA  Negative     Occult Blood, UA Trace-Intact    HS Troponin 0hr (reflex protocol) [[id number]]  (Normal) Collected: 11/05/24 1533    Lab Status: Final result Specimen: Blood from Arm, Left Updated: 11/05/24 1604     hs TnI 0hr 9 ng/L     Comprehensive metabolic panel [[id number]]  (Abnormal) Collected: 11/05/24 1533    Lab Status: Final result Specimen: Blood from Arm, Left Updated: 11/05/24 1559     Sodium 139 mmol/L      Potassium 4.4 mmol/L      Chloride 105 mmol/L      CO2 22 mmol/L      ANION GAP 12 mmol/L      BUN 17 mg/dL      Creatinine 1.51 mg/dL      Glucose 228 mg/dL      Calcium 8.9 mg/dL      AST 7 U/L      ALT 4 U/L      Alkaline Phosphatase 58 U/L      Total Protein 6.5 g/dL      Albumin 3.7 g/dL      Total Bilirubin 0.88 mg/dL      eGFR 43 ml/min/1.73sq m     Narrative:      National Kidney Disease Foundation guidelines for Chronic Kidney Disease (CKD):     Stage 1 with normal or high GFR (GFR > 90 mL/min/1.73 square meters)    Stage 2 Mild CKD (GFR = 60-89 mL/min/1.73 square meters)    Stage 3A Moderate CKD (GFR = 45-59 mL/min/1.73 square meters)    Stage 3B Moderate CKD (GFR = 30-44 mL/min/1.73 square meters)    Stage 4 Severe CKD (GFR = 15-29 mL/min/1.73 square meters)    Stage 5 End Stage CKD (GFR <15 mL/min/1.73 square meters)  Note: GFR calculation is accurate only with a steady state creatinine    Phosphorus [[id number]]  (Abnormal) Collected: 11/05/24 1533    Lab Status: Final result Specimen: Blood from Arm, Left Updated: 11/05/24 1559     Phosphorus 4.2 mg/dL     Magnesium [[id number]]  (Abnormal) Collected: 11/05/24 1533    Lab Status: Final result Specimen: Blood from Arm, Left Updated: 11/05/24 1559     Magnesium 1.6 mg/dL     Protime-INR [[id number]]  (Normal) Collected: 11/05/24 1533    Lab Status: Final result Specimen: Blood from Arm, Left Updated: 11/05/24 1556     Protime 14.3 seconds      INR 1.05    Narrative:      INR Therapeutic Range    Indication                                              INR Range      Atrial Fibrillation                                               2.0-3.0  Hypercoagulable State                                    2.0.2.3  Left Ventricular Asist Device                            2.0-3.0  Mechanical Heart Valve                                  -    Aortic(with afib, MI, embolism, HF, LA enlargement,    and/or coagulopathy)                                     2.0-3.0 (2.5-3.5)     Mitral                                                             2.5-3.5  Prosthetic/Bioprosthetic Heart Valve               2.0-3.0  Venous thromboembolism (VTE: VT, PE        2.0-3.0    APTT [[id number]]  (Normal) Collected: 11/05/24 1533    Lab Status: Final result Specimen: Blood from Arm, Left Updated: 11/05/24 1556     PTT 29 seconds     CBC and differential [[id number]] Collected: 11/05/24 1533    Lab Status: Final result Specimen: Blood from Arm, Left Updated: 11/05/24 1540     WBC 5.40 Thousand/uL      RBC 4.06 Million/uL      Hemoglobin 12.6 g/dL      Hematocrit 39.8 %      MCV 98 fL      MCH 31.0 pg      MCHC 31.7 g/dL      RDW 13.2 %      MPV 10.8 fL      Platelets 196 Thousands/uL      nRBC 0 /100 WBCs      Segmented % 63 %      Immature Grans % 1 %      Lymphocytes % 25 %      Monocytes % 8 %      Eosinophils Relative 2 %      Basophils Relative 1 %      Absolute Neutrophils 3.44 Thousands/µL      Absolute Immature Grans 0.03 Thousand/uL      Absolute Lymphocytes 1.36 Thousands/µL      Absolute Monocytes 0.42 Thousand/µL      Eosinophils Absolute 0.09 Thousand/µL      Basophils Absolute 0.06 Thousands/µL             CT head without contrast   Final Interpretation by Lars Durbin DO (11/05 1605)      No acute intracranial abnormality.                  Workstation performed: [id number]         CT spine cervical without contrast   Final Interpretation by Lars Durbin DO (11/05 1607)      No cervical spine fracture or traumatic malalignment.                  Workstation  performed: [id number]         XR chest 1 view portable   ED Interpretation by Aj Whitehead Jr., DO (11/05 1602)   No definitive acute pulmonary pathology.      Final Interpretation by Allan Loza DO (11/06 0318)      No acute cardiopulmonary disease.            Workstation performed: [id number]             ECG 12 Lead Documentation Only    Date/Time: 11/5/2024 3:22 PM    Performed by: Aj Whitehead Jr., DO  Authorized by: Aj Whitehead Jr., DO    ECG reviewed by me, the ED Provider: yes    Patient location:  ED  Comments:      EKG appears to be atrial fibrillation with a rate of 68 beats a minute.  There is a normal axis.  There is no definitive acute ST or T wave changes present.      ED Medication and Procedure Management   Prior to Admission Medications   Prescriptions Last Dose Informant Patient Reported? Taking?   DULoxetine (CYMBALTA) 30 mg delayed release capsule   No No   Sig: take 1 capsule by mouth once daily   Invokana 300 MG TABS   No No   Sig: take 1 tablet by mouth once daily   Lancets (freestyle) lancets   No No   Sig: Test twice a day   Magnesium Oxide 400 MG CAPS   No No   Sig: Take 1 tablet (400 mg total) by mouth Daily at 2am   Patient not taking: Reported on 9/11/2024   amLODIPine (NORVASC) 10 mg tablet   No No   Sig: take 1/2 tablet by mouth once daily   apixaban (ELIQUIS) 5 mg 11/5/2024  No Yes   Sig: Take 1 tablet (5 mg total) by mouth 2 (two) times a day   atorvastatin (LIPITOR) 20 mg tablet   No No   Sig: Take 1 tablet (20 mg total) by mouth daily   carbidopa-levodopa (SINEMET CR)  mg TBCR per ER tablet   No No   Sig: Take 1 tablet by mouth 2 (two) times a day   clonazePAM (KlonoPIN) 0.5 mg tablet   No No   Sig: Take 1 tablet (0.5 mg total) by mouth 2 (two) times a day   donepezil (ARICEPT) 10 mg tablet   No No   Sig: take 1 tablet by mouth at bedtime   ergocalciferol (VITAMIN D2) 50,000 units   No No   Sig: take 1 capsule by mouth every week   furosemide (LASIX) 20  mg tablet   No No   Sig: take 1 tablet by mouth three times a week EITHER ON MONDAY, WEDNESDAY,AND FRIDAY OR TUESDAY, THURSDAY, AND SATURDAY   glucose blood (FREESTYLE LITE) test strip   No No   Sig: Test twice a day   glucose monitoring kit (FREESTYLE) monitoring kit   No No   Sig: Use 1 each 2 (two) times a day Test twice  Day   memantine (NAMENDA) 10 mg tablet 11/5/2024  No Yes   Sig: take 1 tablet by mouth twice a day   metFORMIN (GLUCOPHAGE) 850 mg tablet   No No   Sig: Take 1 tablet (850 mg total) by mouth 2 (two) times a day   metoprolol succinate (TOPROL-XL) 100 mg 24 hr tablet   No No   Sig: take 1 tablet by mouth once daily      Facility-Administered Medications: None     Current Discharge Medication List        CONTINUE these medications which have NOT CHANGED    Details   apixaban (ELIQUIS) 5 mg Take 1 tablet (5 mg total) by mouth 2 (two) times a day  Qty: 180 tablet, Refills: 3    Associated Diagnoses: Atrial fibrillation, unspecified type ([facility])      memantine (NAMENDA) 10 mg tablet take 1 tablet by mouth twice a day  Qty: 180 tablet, Refills: 0    Associated Diagnoses: Alzheimer's disease, unspecified (CODE) ([facility])      amLODIPine (NORVASC) 10 mg tablet take 1/2 tablet by mouth once daily  Qty: 30 tablet, Refills: 0    Associated Diagnoses: Hypertension, unspecified type      atorvastatin (LIPITOR) 20 mg tablet Take 1 tablet (20 mg total) by mouth daily  Qty: 100 tablet, Refills: 1    Associated Diagnoses: Type 2 diabetes mellitus with stage 3b chronic kidney disease, without long-term current use of insulin ([facility]); Mixed hyperlipidemia      carbidopa-levodopa (SINEMET CR)  mg TBCR per ER tablet Take 1 tablet by mouth 2 (two) times a day    Associated Diagnoses: Parkinson's disease without dyskinesia or fluctuating manifestations ([facility])      clonazePAM (KlonoPIN) 0.5 mg tablet Take 1 tablet (0.5 mg total) by mouth 2 (two) times a day  Qty: 180 tablet, Refills: 0    Associated Diagnoses: Dystonia;  Gait abnormality; Myalgia; Muscle cramps; Tremors of nervous system      donepezil (ARICEPT) 10 mg tablet take 1 tablet by mouth at bedtime  Qty: 90 tablet, Refills: 1    Associated Diagnoses: Memory difficulties      DULoxetine (CYMBALTA) 30 mg delayed release capsule take 1 capsule by mouth once daily  Qty: 90 capsule, Refills: 1    Associated Diagnoses: Diabetic polyneuropathy associated with type 2 diabetes mellitus (HCC); Chronic pain of right knee; Metatarsalgia of both feet; Reactive depression; Chronic pain syndrome      ergocalciferol (VITAMIN D2) 50,000 units take 1 capsule by mouth every week  Qty: 12 capsule, Refills: 1    Associated Diagnoses: Vitamin D deficiency      furosemide (LASIX) 20 mg tablet take 1 tablet by mouth three times a week EITHER ON MONDAY, WEDNESDAY,AND FRIDAY OR TUESDAY, THURSDAY, AND SATURDAY  Qty: 45 tablet, Refills: 1    Associated Diagnoses: Dyspnea, unspecified type      glucose blood (FREESTYLE LITE) test strip Test twice a day  Qty: 100 each, Refills: 3    Associated Diagnoses: Type 2 diabetes mellitus with stage 3b chronic kidney disease, without long-term current use of insulin ([facility])      glucose monitoring kit (FREESTYLE) monitoring kit Use 1 each 2 (two) times a day Test twice  Day  Qty: 1 each, Refills: 0    Associated Diagnoses: Type 2 diabetes mellitus with stage 3b chronic kidney disease, without long-term current use of insulin ([facility])      Invokana 300 MG TABS take 1 tablet by mouth once daily  Qty: 90 tablet, Refills: 1    Associated Diagnoses: Type 2 diabetes mellitus with stage 3b chronic kidney disease, without long-term current use of insulin ([facility]); Primary hypertension; Stage 3b chronic kidney disease ([facility])      Lancets (freestyle) lancets Test twice a day  Qty: 100 each, Refills: 3    Associated Diagnoses: Type 2 diabetes mellitus with stage 3b chronic kidney disease, without long-term current use of insulin ([facility])      Magnesium Oxide 400 MG CAPS Take 1 tablet  (400 mg total) by mouth Daily at 2am  Qty: 90 capsule, Refills: 1    Associated Diagnoses: Hypomagnesemia      metFORMIN (GLUCOPHAGE) 850 mg tablet Take 1 tablet (850 mg total) by mouth 2 (two) times a day  Qty: 180 tablet, Refills: 1    Associated Diagnoses: Type 2 diabetes mellitus with complication (HCC)      metoprolol succinate (TOPROL-XL) 100 mg 24 hr tablet take 1 tablet by mouth once daily  Qty: 90 tablet, Refills: 1    Associated Diagnoses: Hypertension, unspecified type           No discharge procedures on file.  ED SEPSIS DOCUMENTATION   Time reflects when diagnosis was documented in both MDM as applicable and the Disposition within this note       Time User Action Codes Description Comment    11/5/2024  5:36 PM Aj Whitehead Add [R55] Syncope                  Aj Whitehead Jr., DO  11/06/24 140

## 2024-11-05 NOTE — ASSESSMENT & PLAN NOTE
"Lab Results   Component Value Date    HGBA1C 7.7 (A) 09/11/2024       No results for input(s): \"POCGLU\" in the last 72 hours.    Blood Sugar Average: Last 72 hrs:    Hold oral diabetic medications for now  Insulin sliding scale  Diabetic diet  "

## 2024-11-06 ENCOUNTER — APPOINTMENT (OUTPATIENT)
Dept: NON INVASIVE DIAGNOSTICS | Facility: HOSPITAL | Age: 79
DRG: 057 | End: 2024-11-06
Payer: MEDICARE

## 2024-11-06 PROBLEM — I27.20 PULMONARY HTN (HCC): Status: ACTIVE | Noted: 2024-11-06

## 2024-11-06 LAB
ANION GAP SERPL CALCULATED.3IONS-SCNC: 9 MMOL/L (ref 4–13)
AORTIC ROOT: 4.4 CM
AORTIC VALVE MEAN VELOCITY: 6.3 M/S
APICAL FOUR CHAMBER EJECTION FRACTION: 65 %
ASCENDING AORTA: 4.2 CM
ATRIAL RATE: 468 BPM
ATRIAL RATE: 76 BPM
AV AREA BY CONTINUOUS VTI: 2.6 CM2
AV AREA PEAK VELOCITY: 2.4 CM2
AV LVOT MEAN GRADIENT: 1 MMHG
AV LVOT PEAK GRADIENT: 2 MMHG
AV MEAN GRADIENT: 2 MMHG
AV PEAK GRADIENT: 3 MMHG
AV VALVE AREA: 2.6 CM2
AV VELOCITY RATIO: 0.77
BASOPHILS # BLD AUTO: 0.03 THOUSANDS/ÂΜL (ref 0–0.1)
BASOPHILS NFR BLD AUTO: 1 % (ref 0–1)
BSA FOR ECHO PROCEDURE: 2.14 M2
BUN SERPL-MCNC: 23 MG/DL (ref 5–25)
CALCIUM SERPL-MCNC: 8.5 MG/DL (ref 8.4–10.2)
CHLORIDE SERPL-SCNC: 104 MMOL/L (ref 96–108)
CO2 SERPL-SCNC: 24 MMOL/L (ref 21–32)
CREAT SERPL-MCNC: 1.46 MG/DL (ref 0.6–1.3)
DOP CALC AO PEAK VEL: 0.86 M/S
DOP CALC AO VTI: 19.46 CM
DOP CALC LVOT AREA: 3.14 CM2
DOP CALC LVOT CARDIAC INDEX: 1.73 L/MIN/M2
DOP CALC LVOT CARDIAC OUTPUT: 3.71 L/MIN
DOP CALC LVOT DIAMETER: 2 CM
DOP CALC LVOT PEAK VEL VTI: 16.12 CM
DOP CALC LVOT PEAK VEL: 0.66 M/S
DOP CALC LVOT STROKE INDEX: 24.7 ML/M2
DOP CALC LVOT STROKE VOLUME: 53
DOP CALC MV VTI: 31.35 CM
EOSINOPHIL # BLD AUTO: 0.1 THOUSAND/ÂΜL (ref 0–0.61)
EOSINOPHIL NFR BLD AUTO: 2 % (ref 0–6)
ERYTHROCYTE [DISTWIDTH] IN BLOOD BY AUTOMATED COUNT: 13.3 % (ref 11.6–15.1)
FRACTIONAL SHORTENING: 31 (ref 28–44)
GFR SERPL CREATININE-BSD FRML MDRD: 45 ML/MIN/1.73SQ M
GLUCOSE SERPL-MCNC: 219 MG/DL (ref 65–140)
GLUCOSE SERPL-MCNC: 223 MG/DL (ref 65–140)
GLUCOSE SERPL-MCNC: 228 MG/DL (ref 65–140)
GLUCOSE SERPL-MCNC: 262 MG/DL (ref 65–140)
GLUCOSE SERPL-MCNC: 288 MG/DL (ref 65–140)
HCT VFR BLD AUTO: 37.5 % (ref 36.5–49.3)
HGB BLD-MCNC: 11.9 G/DL (ref 12–17)
IMM GRANULOCYTES # BLD AUTO: 0.02 THOUSAND/UL (ref 0–0.2)
IMM GRANULOCYTES NFR BLD AUTO: 0 % (ref 0–2)
INTERVENTRICULAR SEPTUM IN DIASTOLE (PARASTERNAL SHORT AXIS VIEW): 1.3 CM
INTERVENTRICULAR SEPTUM: 1.3 CM (ref 0.6–1.1)
LAAS-AP2: 33.3 CM2
LAAS-AP4: 26.4 CM2
LEFT ATRIUM SIZE: 5.1 CM
LEFT ATRIUM VOLUME (MOD BIPLANE): 107 ML
LEFT ATRIUM VOLUME INDEX (MOD BIPLANE): 49.8 ML/M2
LEFT INTERNAL DIMENSION IN SYSTOLE: 3.5 CM (ref 2.1–4)
LEFT VENTRICULAR INTERNAL DIMENSION IN DIASTOLE: 5.1 CM (ref 3.5–6)
LEFT VENTRICULAR POSTERIOR WALL IN END DIASTOLE: 1.3 CM
LEFT VENTRICULAR STROKE VOLUME: 73 ML
LVSV (TEICH): 73 ML
LYMPHOCYTES # BLD AUTO: 1.65 THOUSANDS/ÂΜL (ref 0.6–4.47)
LYMPHOCYTES NFR BLD AUTO: 29 % (ref 14–44)
MAGNESIUM SERPL-MCNC: 2.1 MG/DL (ref 1.9–2.7)
MCH RBC QN AUTO: 30.9 PG (ref 26.8–34.3)
MCHC RBC AUTO-ENTMCNC: 31.7 G/DL (ref 31.4–37.4)
MCV RBC AUTO: 97 FL (ref 82–98)
MONOCYTES # BLD AUTO: 0.59 THOUSAND/ÂΜL (ref 0.17–1.22)
MONOCYTES NFR BLD AUTO: 10 % (ref 4–12)
MV MEAN GRADIENT: 2 MMHG
MV PEAK GRADIENT: 8 MMHG
MV STENOSIS PRESSURE HALF TIME: 108 MS
MV VALVE AREA BY CONTINUITY EQUATION: 1.61 CM2
MV VALVE AREA P 1/2 METHOD: 2
NEUTROPHILS # BLD AUTO: 3.3 THOUSANDS/ÂΜL (ref 1.85–7.62)
NEUTS SEG NFR BLD AUTO: 58 % (ref 43–75)
NRBC BLD AUTO-RTO: 0 /100 WBCS
PLATELET # BLD AUTO: 188 THOUSANDS/UL (ref 149–390)
PMV BLD AUTO: 10.9 FL (ref 8.9–12.7)
POTASSIUM SERPL-SCNC: 3.9 MMOL/L (ref 3.5–5.3)
QRS AXIS: -18 DEGREES
QRS AXIS: -2 DEGREES
QRSD INTERVAL: 74 MS
QRSD INTERVAL: 78 MS
QT INTERVAL: 398 MS
QT INTERVAL: 410 MS
QTC INTERVAL: 435 MS
QTC INTERVAL: 473 MS
RA PRESSURE ESTIMATED: 10 MMHG
RBC # BLD AUTO: 3.85 MILLION/UL (ref 3.88–5.62)
RIGHT ATRIUM AREA SYSTOLE A4C: 16.7 CM2
RIGHT VENTRICLE ID DIMENSION: 3.2 CM
RV PSP: 47 MMHG
SINOTUBULAR JUNCTION: 3.2 CM
SL CV LEFT ATRIUM LENGTH A2C: 6.6 CM
SL CV LV EF: 60
SL CV PED ECHO LEFT VENTRICLE DIASTOLIC VOLUME (MOD BIPLANE) 2D: 125 ML
SL CV PED ECHO LEFT VENTRICLE SYSTOLIC VOLUME (MOD BIPLANE) 2D: 51 ML
SL CV SINUS OF VALSALVA 2D: 4.4 CM
SODIUM SERPL-SCNC: 137 MMOL/L (ref 135–147)
STJ: 3.2 CM
T WAVE AXIS: 34 DEGREES
T WAVE AXIS: 38 DEGREES
TR MAX PG: 37 MMHG
TR PEAK VELOCITY: 3.1 M/S
TRICUSPID ANNULAR PLANE SYSTOLIC EXCURSION: 1.9 CM
TRICUSPID VALVE PEAK REGURGITATION VELOCITY: 3.05 M/S
VENTRICULAR RATE: 68 BPM
VENTRICULAR RATE: 85 BPM
WBC # BLD AUTO: 5.69 THOUSAND/UL (ref 4.31–10.16)

## 2024-11-06 PROCEDURE — 97163 PT EVAL HIGH COMPLEX 45 MIN: CPT

## 2024-11-06 PROCEDURE — 82948 REAGENT STRIP/BLOOD GLUCOSE: CPT

## 2024-11-06 PROCEDURE — 83735 ASSAY OF MAGNESIUM: CPT | Performed by: INTERNAL MEDICINE

## 2024-11-06 PROCEDURE — 93010 ELECTROCARDIOGRAM REPORT: CPT | Performed by: INTERNAL MEDICINE

## 2024-11-06 PROCEDURE — 97166 OT EVAL MOD COMPLEX 45 MIN: CPT

## 2024-11-06 PROCEDURE — 85025 COMPLETE CBC W/AUTO DIFF WBC: CPT | Performed by: INTERNAL MEDICINE

## 2024-11-06 PROCEDURE — 99232 SBSQ HOSP IP/OBS MODERATE 35: CPT | Performed by: INTERNAL MEDICINE

## 2024-11-06 PROCEDURE — 80048 BASIC METABOLIC PNL TOTAL CA: CPT | Performed by: INTERNAL MEDICINE

## 2024-11-06 PROCEDURE — 93306 TTE W/DOPPLER COMPLETE: CPT | Performed by: INTERNAL MEDICINE

## 2024-11-06 PROCEDURE — 99222 1ST HOSP IP/OBS MODERATE 55: CPT | Performed by: INTERNAL MEDICINE

## 2024-11-06 PROCEDURE — 93306 TTE W/DOPPLER COMPLETE: CPT

## 2024-11-06 RX ADMIN — ATORVASTATIN CALCIUM 20 MG: 10 TABLET, FILM COATED ORAL at 08:15

## 2024-11-06 RX ADMIN — INSULIN LISPRO 2 UNITS: 100 INJECTION, SOLUTION INTRAVENOUS; SUBCUTANEOUS at 21:01

## 2024-11-06 RX ADMIN — APIXABAN 5 MG: 5 TABLET, FILM COATED ORAL at 17:18

## 2024-11-06 RX ADMIN — INSULIN LISPRO 2 UNITS: 100 INJECTION, SOLUTION INTRAVENOUS; SUBCUTANEOUS at 11:33

## 2024-11-06 RX ADMIN — CARBIDOPA AND LEVODOPA 1 TABLET: 25; 100 TABLET ORAL at 08:16

## 2024-11-06 RX ADMIN — CARBIDOPA AND LEVODOPA 1 TABLET: 25; 100 TABLET ORAL at 21:01

## 2024-11-06 RX ADMIN — INSULIN LISPRO 2 UNITS: 100 INJECTION, SOLUTION INTRAVENOUS; SUBCUTANEOUS at 17:18

## 2024-11-06 RX ADMIN — METOPROLOL SUCCINATE 100 MG: 50 TABLET, EXTENDED RELEASE ORAL at 08:16

## 2024-11-06 RX ADMIN — DONEPEZIL HYDROCHLORIDE 10 MG: 5 TABLET ORAL at 21:01

## 2024-11-06 RX ADMIN — MEMANTINE 10 MG: 5 TABLET ORAL at 17:18

## 2024-11-06 RX ADMIN — DULOXETINE HYDROCHLORIDE 30 MG: 30 CAPSULE, DELAYED RELEASE ORAL at 08:15

## 2024-11-06 RX ADMIN — CARBIDOPA AND LEVODOPA 1 TABLET: 25; 100 TABLET ORAL at 17:17

## 2024-11-06 RX ADMIN — INSULIN LISPRO 2 UNITS: 100 INJECTION, SOLUTION INTRAVENOUS; SUBCUTANEOUS at 08:19

## 2024-11-06 RX ADMIN — MEMANTINE 10 MG: 5 TABLET ORAL at 08:15

## 2024-11-06 RX ADMIN — APIXABAN 5 MG: 5 TABLET, FILM COATED ORAL at 08:16

## 2024-11-06 NOTE — ASSESSMENT & PLAN NOTE
Troponins have been negative  No significant telemetry events noted  Magnesium supplemented  No obvious signs of infection at this time  Orthostatics positive.  Amlodipine discontinued  Patient received 1 L IV fluids  Cardiology input appreciated  Echo performed

## 2024-11-06 NOTE — PROGRESS NOTES
Progress Note - Hospitalist   Name: Kaleb Ching III 78 y.o. male I MRN: [id number]  Unit/Bed#: -01 I Date of Admission: 11/5/2024   Date of Service: 11/6/2024 I Hospital Day: 0    Assessment & Plan  Syncope  Troponins have been negative  No significant telemetry events noted  Magnesium supplemented  No obvious signs of infection at this time  Orthostatics positive.  Amlodipine discontinued  Patient received 1 L IV fluids  Cardiology input appreciated  Echo performed  Atrial fibrillation (HCC)  Currently rate controlled.  Continue Toprol with hold parameters.  Eliquis for anticoagulation  Hypertension  Orthostatics positive  Amlodipine discontinued per cardiology recommendation  Continue Toprol with hold parameters  Will adjust meds as needed  Type 2 diabetes mellitus with diabetic chronic kidney disease ([facility])  Lab Results   Component Value Date    HGBA1C 7.7 (A) 09/11/2024       Recent Labs     11/05/24  1829 11/05/24 2014 11/06/24  0748 11/06/24  1042   POCGLU 183* 242* 223* 228*       Blood Sugar Average: Last 72 hrs:  (P) 219  Hold oral diabetic medications for now  Insulin sliding scale  Diabetic diet  Alzheimer's disease, unspecified (CODE) ([facility])  Supportive care.  Continue donepezil and Namenda  Parkinson's disease without dyskinesia or fluctuating manifestations ([facility])  Supportive care  Continue home Sinemet  Pulmonary HTN ([facility])      VTE Pharmacologic Prophylaxis: VTE Score: 4 Moderate Risk (Score 3-4) - Pharmacological DVT Prophylaxis Ordered: apixaban (Eliquis).    Mobility:   Basic Mobility Inpatient Raw Score: 18  JH-HLM Goal: 6: Walk 10 steps or more  JH-HLM Achieved: 7: Walk 25 feet or more  JH-HLM Goal achieved. Continue to encourage appropriate mobility.    Patient Centered Rounds: I performed bedside rounds with nursing staff today.   Discussions with Specialists or Other Care Team Provider: yes    Education and Discussions with Family / Patient:  Will update significant other at bedside  once available.     Current Length of Stay: 0 day(s)  Current Patient Status: Inpatient   Certification Statement: The patient will continue to require additional inpatient hospital stay due to syncope  Discharge Plan: Anticipate discharge tomorrow to home.    Code Status: Level 1 - Full Code    Subjective   No overnight events noted.  Orthostatics were positive.    Objective :  Temp:  [97.8 °F (36.6 °C)-98.8 °F (37.1 °C)] 97.8 °F (36.6 °C)  HR:  [] 69  BP: ()/(47-81) 124/64  Resp:  [16-20] 16  SpO2:  [93 %-100 %] 98 %  O2 Device: None (Room air)    Body mass index is 28.73 kg/m².     Input and Output Summary (last 24 hours):     Intake/Output Summary (Last 24 hours) at 11/6/2024 1516  Last data filed at 11/6/2024 1200  Gross per 24 hour   Intake 960 ml   Output 600 ml   Net 360 ml       Physical Exam  Constitutional:       General: He is not in acute distress.  HENT:      Head: Normocephalic and atraumatic.      Nose: Nose normal.      Mouth/Throat:      Mouth: Mucous membranes are moist.   Eyes:      Extraocular Movements: Extraocular movements intact.      Conjunctiva/sclera: Conjunctivae normal.   Cardiovascular:      Rate and Rhythm: Normal rate and regular rhythm.   Pulmonary:      Effort: Pulmonary effort is normal. No respiratory distress.   Abdominal:      Palpations: Abdomen is soft.      Tenderness: There is no abdominal tenderness.   Musculoskeletal:         General: Normal range of motion.      Cervical back: Normal range of motion and neck supple.      Comments: Generalized weakness   Skin:     General: Skin is warm and dry.   Neurological:      General: No focal deficit present.      Mental Status: He is alert. Mental status is at baseline.      Cranial Nerves: No cranial nerve deficit.   Psychiatric:         Mood and Affect: Mood normal.         Behavior: Behavior normal.           Lines/Drains:        Telemetry:  Telemetry Orders (From admission, onward)               24 Hour Telemetry  Monitoring  Continuous x 24 Hours (Telem)        Question:  Reason for 24 Hour Telemetry  Answer:  Syncope suspected to be cardiac in origin                                Lab Results: I have reviewed the following results:   Results from last 7 days   Lab Units 11/06/24  0507   WBC Thousand/uL 5.69   HEMOGLOBIN g/dL 11.9*   HEMATOCRIT % 37.5   PLATELETS Thousands/uL 188   SEGS PCT % 58   LYMPHO PCT % 29   MONO PCT % 10   EOS PCT % 2     Results from last 7 days   Lab Units 11/06/24  0507 11/05/24  1533   SODIUM mmol/L 137 139   POTASSIUM mmol/L 3.9 4.4   CHLORIDE mmol/L 104 105   CO2 mmol/L 24 22   BUN mg/dL 23 17   CREATININE mg/dL 1.46* 1.51*   ANION GAP mmol/L 9 12   CALCIUM mg/dL 8.5 8.9   ALBUMIN g/dL  --  3.7   TOTAL BILIRUBIN mg/dL  --  0.88   ALK PHOS U/L  --  58   ALT U/L  --  4*   AST U/L  --  7*   GLUCOSE RANDOM mg/dL 288* 228*     Results from last 7 days   Lab Units 11/05/24  1533   INR  1.05     Results from last 7 days   Lab Units 11/06/24  1042 11/06/24  0748 11/05/24  2014 11/05/24  1829   POC GLUCOSE mg/dl 228* 223* 242* 183*               Recent Cultures (last 7 days):         Imaging Results Review: No pertinent imaging studies reviewed.  Other Study Results Review: No additional pertinent studies reviewed.    Last 24 Hours Medication List:     Current Facility-Administered Medications:     acetaminophen (TYLENOL) tablet 650 mg, Q6H PRN    apixaban (ELIQUIS) tablet 5 mg, BID    atorvastatin (LIPITOR) tablet 20 mg, Daily    carbidopa-levodopa (SINEMET)  mg per tablet 1 tablet, TID    donepezil (ARICEPT) tablet 10 mg, HS    DULoxetine (CYMBALTA) delayed release capsule 30 mg, Daily    insulin lispro (HumALOG/ADMELOG) 100 units/mL subcutaneous injection 1-5 Units, HS    insulin lispro (HumALOG/ADMELOG) 100 units/mL subcutaneous injection 1-6 Units, TID AC    memantine (NAMENDA) tablet 10 mg, BID    metoprolol succinate (TOPROL-XL) 24 hr tablet 100 mg, Daily    ondansetron (ZOFRAN) injection 4  mg, Q6H PRN    Administrative Statements   Today, Patient Was Seen By: Connor Yen MD      **Please Note: This note may have been constructed using a voice recognition system.**

## 2024-11-06 NOTE — ASSESSMENT & PLAN NOTE
Lab Results   Component Value Date    HGBA1C 7.7 (A) 09/11/2024       Recent Labs     11/05/24  1829 11/05/24 2014 11/06/24  0748 11/06/24  1042   POCGLU 183* 242* 223* 228*       Blood Sugar Average: Last 72 hrs:  (P) 219  Hold oral diabetic medications for now  Insulin sliding scale  Diabetic diet

## 2024-11-06 NOTE — ASSESSMENT & PLAN NOTE
-On oral diuretic therapy in the outpatient setting twice weekly  -Diuretics currently held due to concern for orthostatic hypotension and syncope  -Will check transthoracic echocardiogram given history of pulmonary hypertension and RV dysfunction with additional recommendations pending results.

## 2024-11-06 NOTE — CASE MANAGEMENT
Case Management Assessment & Discharge Planning Note    Patient name Kaleb Ching III  Location /-01 MRN [id number]  : 1945 Date 2024       Current Admission Date: 2024  Current Admission Diagnosis:Syncope   Patient Active Problem List    Diagnosis Date Noted Date Diagnosed    Pulmonary HTN (HCC) 2024     Syncope 2024     Parkinson's disease without dyskinesia or fluctuating manifestations (HCC) 2024     Sleep disturbance 2023     Alzheimer's disease, unspecified (CODE) ([facility]) 2023     Localized edema 2021     Diabetic nephropathy associated with type 2 diabetes mellitus ([facility]) 12/10/2020     Proteinuria 2020     Hydronephrosis with obstructing calculus 2019     Right Adrenal nodule  2019     Stage 3 chronic kidney disease ([facility]) 2019     History of TIA (transient ischemic attack)      Chronic anticoagulation 2019     Mild concentric left ventricular hypertrophy (LVH) 2018     Atrial fibrillation ([facility]) 2018     Primary osteoarthritis of right hip 10/30/2018     Type 2 diabetes mellitus with diabetic chronic kidney disease ([facility]) 2017     Nephrolithiasis 2016     Diabetic polyneuropathy associated with type 2 diabetes mellitus ([facility]) 2016     Hyperlipidemia 2016     Eczema 2013     Anemia 2012     Erectile dysfunction of non-organic origin 2012     Generalized anxiety disorder 2012     GERD without esophagitis 2012     Hypertension 2012     Obesity 2012     Osteoarthritis 2012     Polyp of sigmoid colon 2012       LOS (days): 0  Geometric Mean LOS (GMLOS) (days):   Days to GMLOS:     OBJECTIVE:       Current admission status: Observation    Preferred Pharmacy:   [id number] - [place], [place] - [address] [place] [id number]  Phone: [phone] Fax: [phone]    Primary Care Provider:  Wilfrid Mills DO    Primary Insurance: MEDICARE  Secondary Insurance: AARP    ASSESSMENT:  Active Health Care Proxies    There are no active Health Care Proxies on file.       Advance Directives  Does patient have a Health Care POA?: Yes  Does patient have Advance Directives?: Yes  Advance Directives: Living will  Primary Contact: Libra Ching wife    Readmission Root Cause  30 Day Readmission: No    Patient Information  Admitted from:: Home  Mental Status: Alert  During Assessment patient was accompanied by: Not accompanied during assessment  Assessment information provided by:: Patient  Primary Caregiver: Self  Support Systems: Spouse/significant other  County of Residence: [facility] do you live in?: [place]  Home entry access options. Select all that apply.: No steps to enter home  Type of Current Residence: [facility]  Living Arrangements: Lives w/ Spouse/significant other  Is patient a ?: No    Activities of Daily Living Prior to Admission  Functional Status: Independent  Completes ADLs independently?: Yes  Ambulates independently?: Yes  Does patient use assisted devices?: No  Does patient currently own DME?: Yes  What DME does the patient currently own?: Straight Cane, Walker  Does patient have a history of Outpatient Therapy (PT/OT)?: Yes  Does the patient have a history of Short-Term Rehab?: Yes  Does patient have a history of HHC?: Yes  Does patient currently have HHC?: No    Patient Information Continued  Income Source: Pension/longterm  Does patient have prescription coverage?: Yes ([facility])  Does patient receive dialysis treatments?: No  Does patient have a history of substance abuse?: No  Does patient have a history of Mental Health Diagnosis?: No    Means of Transportation  Means of Transport to Appts:: Drives Self    DISCHARGE DETAILS:    Discharge planning discussed with:: Pt  Contacts  Patient Contacts: Libra Ching wife  Relationship to Patient:: Family  Contact Method: Phone  Phone  Number: [phone] [place] [phone] cell  Reason/Outcome: Emergency Contact  Pt indicated he was IPA and drives.  Pt has a CARDS CX. Do not anticiapte any care needs.  Wife will transport home upon DC.                        Treatment Team Recommendation: Home  Discharge Destination Plan:: Home  Transport at Discharge : Family

## 2024-11-06 NOTE — ASSESSMENT & PLAN NOTE
-Possibly related to orthostatic hypotension and after discussion with patient's wife appears this is the second time and similar scenario of standing up and getting lightheaded along with then loss of consciousness for short time in the last 2.5-3 months.  -Given orthostatic hypotension we will discontinue amlodipine therapy at this time and monitor  -Would recommend discussion with neurology about potential Sinemet dosing as well and even potential for autonomic issues given Parkinson's disease  -Would continue to monitor on telemetry for at least another 24 hours and patient will likely benefit in the outpatient setting from ambulatory event monitor  -Will repeat transthoracic echocardiogram given history of pulmonary hypertension and RV dysfunction to evaluate overall cardiac structure and function at this time to determine if significant contributing factor.

## 2024-11-06 NOTE — PLAN OF CARE
Problem: PHYSICAL THERAPY ADULT  Goal: Performs mobility at highest level of function for planned discharge setting.  See evaluation for individualized goals.  Description: Treatment/Interventions: Functional transfer training, LE strengthening/ROM, Elevations, Therapeutic exercise, Endurance training, Gait training          See flowsheet documentation for full assessment, interventions and recommendations.  Outcome: Progressing  Note: Prognosis: Good  Problem List: Decreased strength, Decreased endurance, Impaired balance, Decreased mobility, Obesity  Assessment: Pt is 78 y.o. male seen for PT evaluation s/p admit to [facility] on 11/5/2024 w/ Syncope. PT consulted to assess pt's functional mobility and d/c needs. Order placed for PT eval and tx, w/ up and OOB as tolerated order. Pt agreeable to PT  session upon arrival, pt found supine in bed.  PTA, pt was independent w/ all functional mobility w/ Rw and lives w/ spouse in 1 level home .  Pt to benefit from continued PT tx to address deficits and maximize level of functional independent mobility and consistency. Upon conclusion pt  seated in recliner, with all needs in reach, and chair alarm intact. Complexity: Comorbidities affecting pt's physical performance at time of assessment include: DM, htn, a fib, and parkinsons, and syncope . Personal factors affecting pt at time of IE include: advanced age, limited mobility, history of falls, ambulating with assistive device, and steps to enter home. Please find objective findings from PT assessment regarding body systems outlined above with impairments and limitations including weakness, impaired balance, impaired coordination, gait deviations, decreased functional mobility tolerance, and fall risk.  Pt's clinical presentation is currently unstable/unpredictable seen in pt's presentation of hypertension, abnormal H&H, abnormal blood sugar levels, and abnormal RBC count . The patient's AM-PAC Basic Mobility  Inpatient Short Form Raw Score is 18 .  Based on patient presentations and impairments, pt would most appropriately benefit from Level 3 resource intensity upon discharge. Please also refer to the recommendation of the Physical Therapist for safe discharge planning. RN verbalized pt appropriate for PT session.        Rehab Resource Intensity Level, PT: III (Minimum Resource Intensity)    See flowsheet documentation for full assessment.

## 2024-11-06 NOTE — PLAN OF CARE
Problem: OCCUPATIONAL THERAPY ADULT  Goal: Performs self-care activities at highest level of function for planned discharge setting.  See evaluation for individualized goals.  Description: Treatment Interventions: ADL retraining, Functional transfer training, UE strengthening/ROM, Endurance training, Patient/family training, Equipment evaluation/education, Activityengagement, Energy conservation          See flowsheet documentation for full assessment, interventions and recommendations.   Outcome: Progressing  Note: Limitation: Decreased ADL status, Decreased UE strength, Decreased Safe judgement during ADL, Decreased endurance, Decreased self-care trans, Decreased high-level ADLs  Prognosis: Good  Assessment: Pt is a 78 y.o. male who was admitted to  [facility]  on 11/5/2024 w/ Syncope. Pt has a past medical history of GERD, SMALLS, anemia, aFib, DM, chest pain, and HTN. Pt lives with his wife in a 1 story home with 1 SABINA. At baseline, pt was completing ADLs independently and functional mobility and transfers independently with use of RW. Currently, pt requires supervision overall for ADLs and supervision for mobility and transfers. Pt presents with deficits in the following categories: difficulty performing ADLS, difficulty performing IADLS , and limited insight into deficits activity tolerance, endurance, and standing balance/tolerance. These deficits, along with pt's  functional standing tolerance limit the pt's ability to safely engage in areas of occupation such as: bathing/shower, toilet hygiene, dressing, health maintenance, functional mobility, community mobility, and household maintenance. Pt would benefit from continued skilled acute OT in order to maximize independence and safety in ADLs, mobility, and transfers. he patient's raw score on the -PAC Daily Activity Inpatient Short Form is 20. A raw score of greater than or equal to 19 suggests the patient may benefit from discharge to home. Discharge  recommendation at this time is level III.  Pt benefited from co-evaluation of skilled OT and PT therapists in order to most appropriately address functional deficits d/t extensive assistance required for safe functional mobility, decreased activity tolerance, and regression from functioning level prior to admission and/or onset of present illness. OT/PT objectives were addressed separately; please see PT note for specific goal areas targeted.     Rehab Resource Intensity Level, OT: III (Minimum Resource Intensity)

## 2024-11-06 NOTE — OCCUPATIONAL THERAPY NOTE
Occupational Therapy Evaluation     Patient Name: Kaleb Ching III  Today's Date: 11/6/2024  Problem List  Principal Problem:    Syncope  Active Problems:    Hypertension    Type 2 diabetes mellitus with diabetic chronic kidney disease (HCC)    Atrial fibrillation (HCC)    Alzheimer's disease, unspecified (CODE) (HCC)    Parkinson's disease without dyskinesia or fluctuating manifestations (HCC)    Pulmonary HTN (HCC)    Past Medical History  Past Medical History:   Diagnosis Date    Acute on chronic renal insufficiency     Allergic rhinitis     Anemia 06/11/2012    Atrial fibrillation (HCC)     Cerebrovascular accident (CVA) (HCC)     Chest pain     Controlled type 2 diabetes mellitus without complication (HCC)     SMALLS (dyspnea on exertion)     Generalized anxiety disorder     GERD without esophagitis 06/11/2012    Hypertension     Memory difficulties 01/08/2019    Nephrolithiasis 03/17/2016    Obesity 06/11/2012    Osteoarthritis 06/11/2012     Past Surgical History  Past Surgical History:   Procedure Laterality Date    CATARACT EXTRACTION, BILATERAL      CYSTOSCOPY W/ LASER LITHOTRIPSY Left 1/20/2020    Procedure: CYSTOSCOPY; RETROGRADE; URETEROSCOPY; STONE EXTRACTION; POSSIBLE HOLMIUM LASER LITHOTRIPSY;  Surgeon: Carlos Moore MD;  Location:  MAIN OR;  Service: Urology    KNEE SURGERY      IA CYSTO BLADDER W/URETERAL CATHETERIZATION Left 11/8/2019    Procedure: CYSTOSCOPY RETROGRADE PYELOGRAM WITH INSERTION STENT URETERAL;  Surgeon: Carlos Moore MD;  Location:  MAIN OR;  Service: Urology    TOTAL HIP ARTHROPLASTY      TOTAL HIP ARTHROPLASTY Bilateral 2011 11/06/24 0840   OT Last Visit   OT Visit Date 11/06/24   Note Type   Note type Evaluation   Pain Assessment   Pain Assessment Tool 0-10   Pain Score No Pain   Restrictions/Precautions   Weight Bearing Precautions Per Order No   Other Precautions Chair Alarm;Bed Alarm;Fall Risk;Multiple lines   Home Living   Type of Home House   Home Layout  "One level;Stairs to enter without rails;Able to live on main level with bedroom/bathroom  (1 SABINA; no basement)   Bathroom Shower/Tub Tub/shower unit   Bathroom Toilet Raised   Bathroom Equipment Grab bars in shower;Shower chair;Grab bars around toilet   Bathroom Accessibility Accessible   Home Equipment Walker;Cane   Prior Function   Level of [place] Independent with ADLs;Independent with functional mobility;Needs assistance with IADLS   Lives With Spouse   Receives Help From Family   IADLs Independent with driving;Independent with meal prep;Family/Friend/Other provides medication management   Falls in the last 6 months 1 to 4   Vocational Retired   Lifestyle   Autonomy PTA pt reports independence with self care and mobility with use of RW. Pt's wife assists with all IADLs. Pt drives at baseline. Frequent falls at home.   Reciprocal Relationships Supportive wife.   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   Intrinsic Gratification Active PTA. Pt likes sports and joking around. Good sense of humor.   Subjective   Subjective \"My grandson's name is Sergei\"   ADL   Eating Assistance 7  Independent   Grooming Assistance 7  Independent   UB Bathing Assistance 5  Supervision/Setup   LB Bathing Assistance 5  Supervision/Setup   UB Dressing Assistance 5  Supervision/Setup   LB Dressing Assistance 5  Supervision/Setup   LB Dressing Deficit Don/doff R sock;Don/doff L sock   Toileting Assistance  5  Supervision/Setup   Bed Mobility   Supine to Sit 5  Supervision   Additional items Assist x 1;Verbal cues;Impulsive   Transfers   Sit to Stand 5  Supervision   Additional items Assist x 1;Impulsive;Verbal cues   Stand to Sit 5  Supervision   Additional items Assist x 1;Verbal cues;Impulsive   Stand pivot 5  Supervision   Additional items Assist x 1;Impulsive;Verbal cues   Functional Mobility   Functional Mobility 5  Supervision   Additional Comments Use of IV pole to steady self as needed - no " LOBs but minimal cues for safety due to some impulsivity   Balance   Static Sitting Normal   Dynamic Sitting Good   Static Standing Fair +   Dynamic Standing Fair -   Ambulatory Fair -   Activity Tolerance   Activity Tolerance Patient tolerated treatment well   Medical Staff Made Aware SHYANN Bautista   RUE Assessment   RUE Assessment WFL   LUE Assessment   LUE Assessment WFL   Hand Function   Gross Motor Coordination Functional   Fine Motor Coordination Functional   Psychosocial   Psychosocial (WDL) WDL   Cognition   Overall Cognitive Status WFL   Arousal/Participation Alert;Responsive;Cooperative   Attention Within functional limits   Orientation Level Oriented X4   Memory Within functional limits   Following Commands Follows one step commands without difficulty   Assessment   Limitation Decreased ADL status;Decreased UE strength;Decreased Safe judgement during ADL;Decreased endurance;Decreased self-care trans;Decreased high-level ADLs   Prognosis Good   Assessment Pt is a 78 y.o. male who was admitted to  [facility]  on 11/5/2024 w/ Syncope. Pt has a past medical history of GERD, SMALLS, anemia, aFib, DM, chest pain, and HTN. Pt lives with his wife in a 1 story home with 1 SABINA. At baseline, pt was completing ADLs independently and functional mobility and transfers independently with use of RW. Currently, pt requires supervision overall for ADLs and supervision for mobility and transfers. Pt presents with deficits in the following categories: difficulty performing ADLS, difficulty performing IADLS , and limited insight into deficits activity tolerance, endurance, and standing balance/tolerance. These deficits, along with pt's  functional standing tolerance limit the pt's ability to safely engage in areas of occupation such as: bathing/shower, toilet hygiene, dressing, health maintenance, functional mobility, community mobility, and household maintenance. Pt would benefit from continued skilled acute OT in order to maximize  independence and safety in ADLs, mobility, and transfers. he patient's raw score on the AM-PAC Daily Activity Inpatient Short Form is 20. A raw score of greater than or equal to 19 suggests the patient may benefit from discharge to home. Discharge recommendation at this time is level III.  Pt benefited from co-evaluation of skilled OT and PT therapists in order to most appropriately address functional deficits d/t extensive assistance required for safe functional mobility, decreased activity tolerance, and regression from functioning level prior to admission and/or onset of present illness. OT/PT objectives were addressed separately; please see PT note for specific goal areas targeted.   Goals   Patient Goals to return home   LTG Time Frame 10-14   Long Term Goal #1 refer to established goals   Plan   Treatment Interventions ADL retraining;Functional transfer training;UE strengthening/ROM;Endurance training;Patient/family training;Equipment evaluation/education;Activityengagement;Energy conservation   Goal Expiration Date 11/20/24   OT Frequency 3-5x/wk   Discharge Recommendation   Rehab Resource Intensity Level, OT III (Minimum Resource Intensity)   AM-PAC Daily Activity Inpatient   Lower Body Dressing 3   Bathing 3   Toileting 3   Upper Body Dressing 3   Grooming 4   Eating 4   Daily Activity Raw Score 20   Daily Activity Standardized Score (Calc for Raw Score >=11) 42.03   AM-[facility] Applied Cognition Inpatient   Following a Speech/Presentation 4   Understanding Ordinary Conversation 4   Taking Medications 4   Remembering Where Things Are Placed or Put Away 3   Remembering List of 4-5 Errands 3   Taking Care of Complicated Tasks 3   Applied Cognition Raw Score 21   Applied Cognition Standardized Score 44.3       OCCUPATIONAL THERAPY GOALS:  Mod I/I with all UB and LB ADLs with AD prn   Mod I/I with all functional mobility and transfers while demonstrating good safety and judgement  Mod I/I with toileting and clothing  management   Increase activity tolerance to 40-45 minutes in order to participate in ADLs and leisure activities   Demonstrate good carryover of body mechanics and energy conservation techniques in order to participate in functional mobility, transfers, ADLs, IADLs, and leisure exploration   Assess DME needs      Awa Barton OTR/L

## 2024-11-06 NOTE — PLAN OF CARE
Problem: PAIN - ADULT  Goal: Verbalizes/displays adequate comfort level or baseline comfort level  Description: Interventions:  - Encourage patient to monitor pain and request assistance  - Assess pain using appropriate pain scale  - Administer analgesics based on type and severity of pain and evaluate response  - Implement non-pharmacological measures as appropriate and evaluate response  - Consider cultural and social influences on pain and pain management  - Notify physician/advanced practitioner if interventions unsuccessful or patient reports new pain  Outcome: Progressing     Problem: INFECTION - ADULT  Goal: Absence or prevention of progression during hospitalization  Description: INTERVENTIONS:  - Assess and monitor for signs and symptoms of infection  - Monitor lab/diagnostic results  - Monitor all insertion sites, i.e. indwelling lines, tubes, and drains  - Monitor endotracheal if appropriate and nasal secretions for changes in amount and color  - [place] appropriate cooling/warming therapies per order  - Administer medications as ordered  - Instruct and encourage patient and family to use good hand hygiene technique  - Identify and instruct in appropriate isolation precautions for identified infection/condition  Outcome: Progressing  Goal: Absence of fever/infection during neutropenic period  Description: INTERVENTIONS:  - Monitor WBC    Outcome: Progressing     Problem: SAFETY ADULT  Goal: Patient will remain free of falls  Description: INTERVENTIONS:  - Educate patient/family on patient safety including physical limitations  - Instruct patient to call for assistance with activity   - Consult OT/PT to assist with strengthening/mobility   - Keep Call bell within reach  - Keep bed low and locked with side rails adjusted as appropriate  - Keep care items and personal belongings within reach  - Initiate and maintain comfort rounds  - Make Fall Risk Sign visible to staff  - Offer Toileting every 1 Hours,  in advance of need  - Initiate/Maintain bed alarm  - Obtain necessary fall risk management equipment: bed alarm  - Apply yellow socks and bracelet for high fall risk patients  - Consider moving patient to room near nurses station  Outcome: Progressing  Goal: Maintain or return to baseline ADL function  Description: INTERVENTIONS:  -  Assess patient's ability to carry out ADLs; assess patient's baseline for ADL function and identify physical deficits which impact ability to perform ADLs (bathing, care of mouth/teeth, toileting, grooming, dressing, etc.)  - Assess/evaluate cause of self-care deficits   - Assess range of motion  - Assess patient's mobility; develop plan if impaired  - Assess patient's need for assistive devices and provide as appropriate  - Encourage maximum independence but intervene and supervise when necessary  - Involve family in performance of ADLs  - Assess for home care needs following discharge   - Consider OT consult to assist with ADL evaluation and planning for discharge  - Provide patient education as appropriate  Outcome: Progressing  Goal: Maintains/Returns to pre admission functional level  Description: INTERVENTIONS:  - Perform AM-PAC 6 Click Basic Mobility/ Daily Activity assessment daily.  - Set and communicate daily mobility goal to care team and patient/family/caregiver.   - Collaborate with rehabilitation services on mobility goals if consulted  - Perform Range of Motion 3 times a day.  - Reposition patient every 2 hours.  - Dangle patient 3 times a day  - Stand patient 3 times a day  - Ambulate patient 3 times a day  - Out of bed to chair 3 times a day   - Out of bed for meals 3 times a day  - Out of bed for toileting  - Record patient progress and toleration of activity level   Outcome: Progressing     Problem: DISCHARGE PLANNING  Goal: Discharge to home or other facility with appropriate resources  Description: INTERVENTIONS:  - Identify barriers to discharge w/patient and  caregiver  - Arrange for needed discharge resources and transportation as appropriate  - Identify discharge learning needs (meds, wound care, etc.)  - Arrange for interpretive services to assist at discharge as needed  - Refer to Case Management Department for coordinating discharge planning if the patient needs post-hospital services based on physician/advanced practitioner order or complex needs related to functional status, cognitive ability, or social support system  Outcome: Progressing     Problem: Knowledge Deficit  Goal: Patient/family/caregiver demonstrates understanding of disease process, treatment plan, medications, and discharge instructions  Description: Complete learning assessment and assess knowledge base.  Interventions:  - Provide teaching at level of understanding  - Provide teaching via preferred learning methods  Outcome: Progressing     Problem: MUSCULOSKELETAL - ADULT  Goal: Maintain or return mobility to safest level of function  Description: INTERVENTIONS:  - Assess patient's ability to carry out ADLs; assess patient's baseline for ADL function and identify physical deficits which impact ability to perform ADLs (bathing, care of mouth/teeth, toileting, grooming, dressing, etc.)  - Assess/evaluate cause of self-care deficits   - Assess range of motion  - Assess patient's mobility  - Assess patient's need for assistive devices and provide as appropriate  - Encourage maximum independence but intervene and supervise when necessary  - Involve family in performance of ADLs  - Assess for home care needs following discharge   - Consider OT consult to assist with ADL evaluation and planning for discharge  - Provide patient education as appropriate  Outcome: Progressing  Goal: Maintain proper alignment of affected body part  Description: INTERVENTIONS:  - Support, maintain and protect limb and body alignment  - Provide patient/ family with appropriate education  Outcome: Progressing

## 2024-11-06 NOTE — ASSESSMENT & PLAN NOTE
-Appears rate controlled at this time  -Continue to monitor on telemetry for any significant arrhythmias, pauses or other issue which could have led to syncopal event  -Continue metoprolol succinate 100 mg daily  -Can continue oral anticoagulation with Eliquis 5 mg twice daily for stroke risk reduction however if patient continues to have syncopal episodes or falls will likely need to discuss alternatives at that time.

## 2024-11-06 NOTE — ASSESSMENT & PLAN NOTE
-Patient with orthostatic hypotension during admission  -Will continue metoprolol succinate 100 mg daily at this time  -As blood pressure still appears significantly elevated will discontinue amlodipine and monitor  -Counseled patient and his wife on dietary and lifestyle modifications.

## 2024-11-06 NOTE — CONSULTS
Consultation - Cardiology   Name: Kaleb Ching III 78 y.o. male I MRN: [id number]  Unit/Bed#: -01 I Date of Admission: 11/5/2024   Date of Service: 11/6/2024 I Hospital Day: 0   Inpatient consult to Cardiology  Consult performed by: Sam Casas DO  Consult ordered by: Connor Yen MD        Physician Requesting Evaluation: Connor Yen, *   Reason for Evaluation / Principal Problem: Syncope    Assessment & Plan  Syncope  -Possibly related to orthostatic hypotension and after discussion with patient's wife appears this is the second time and similar scenario of standing up and getting lightheaded along with then loss of consciousness for short time in the last 2.5-3 months.  -Given orthostatic hypotension we will discontinue amlodipine therapy at this time and monitor  -Would recommend discussion with neurology about potential Sinemet dosing as well and even potential for autonomic issues given Parkinson's disease  -Would continue to monitor on telemetry for at least another 24 hours and patient will likely benefit in the outpatient setting from ambulatory event monitor  -Will repeat transthoracic echocardiogram given history of pulmonary hypertension and RV dysfunction to evaluate overall cardiac structure and function at this time to determine if significant contributing factor.  Pulmonary HTN (HCC)  -On oral diuretic therapy in the outpatient setting twice weekly  -Diuretics currently held due to concern for orthostatic hypotension and syncope  -Will check transthoracic echocardiogram given history of pulmonary hypertension and RV dysfunction with additional recommendations pending results.  Hypertension  -Patient with orthostatic hypotension during admission  -Will continue metoprolol succinate 100 mg daily at this time  -As blood pressure still appears significantly elevated will discontinue amlodipine and monitor  -Counseled patient and his wife on dietary and lifestyle  modifications.  Type 2 diabetes mellitus with diabetic chronic kidney disease (HCC)  Lab Results   Component Value Date    HGBA1C 7.7 (A) 09/11/2024       Recent Labs     11/05/24  1829 11/05/24 2014 11/06/24  0748   POCGLU 183* 242* 223*     -Counseled patient and wife on dietary and lifestyle modifications  -Plan of care per primary team.    Blood Sugar Average: Last 72 hrs:  (P) 216    Atrial fibrillation (HCC)  -Appears rate controlled at this time  -Continue to monitor on telemetry for any significant arrhythmias, pauses or other issue which could have led to syncopal event  -Continue metoprolol succinate 100 mg daily  -Can continue oral anticoagulation with Eliquis 5 mg twice daily for stroke risk reduction however if patient continues to have syncopal episodes or falls will likely need to discuss alternatives at that time.  Parkinson's disease without dyskinesia or fluctuating manifestations (HCC)  -On Sinemet in the outpatient setting which can contribute to orthostatic hypotension  -Will defer adjustment to this medication to primary care team and neurology  -Continue to monitor    Other summary comments:   -Given concern with orthostatic hypotension we will discontinue amlodipine at this time and monitor patient's blood pressure response  -Would recommend reinitiation of at least twice weekly diuretic therapy given patient's pulmonary hypertension however will check transthoracic echocardiogram to evaluate further.  -Would also recommend further evaluation by neurology team in the setting of concern for polypharmacy which could contribute and potential autonomic dysfunction  -Would continue to monitor patient for at least 24 hours further on telemetry to evaluate for any significant acute arrhythmias and then patient will likely also benefit from ambulatory event monitoring.  -Patient did have issue with low magnesium on admission however this appears to have improved.  Continue to monitor.    Outpatient  Cardiologist: Dr. Rangel next appointment 11/20/24      HPI and review of systems limited due to patient's dementia  HPI: Kaleb Ching III is a 78 y.o. year old male documented Parkinson's disease and Alzheimer's dementia along with hypertension, atrial fibrillation on oral anticoagulation along with diabetes mellitus who presents to [facility] with syncopal episode.  -I was able to call and speak with patient's wife who notes that syncopal episode is second time this has happened in shortly less than 3 months and similar scenarios where patient was standing up felt lightheaded and then had loss of consciousness.  She notes she was even hospitalized up near the [facility] for the first event but was told that this was likely related to blood pressure reduction with standing.  Patient's wife notes the patient was standing to get out of the shower after she was bathing him and had sat on chair to be dried when he lost consciousness.  She states that by the time EMS arrived he had regained consciousness but given overall symptoms was seen and evaluated and admitted for evaluation.  -Currently patient denies any significant symptoms other than some shakiness which she attributes to his Parkinson's disease and patient's wife agrees that this is usually his only significant complaint.  She denies him telling her of any significant issue prior to loss of consciousness event.      EKG:   Currently rate controlled atrial fibrillation on telemetry    MOST  RECENT CARDIAC IMAGING:   -Transthoracic echocardiogram 8/8/2023 showing left ventricular systolic function normal estimated LVEF 55% with moderately dilated right ventricle and mild-moderately reduced right ventricular systolic function with mild to moderate mitral regurgitation with eccentrically directed jet, mild tricuspid regurgitation with elevated RVSP estimated at 55 mmHg with dilated aortic root and ascending aorta measuring 4.6 cm in largest  diameter with mild aortic regurgitation and moderately dilated left atrium.      Review of Systems:  Review of Systems   Constitutional:  Negative for chills, diaphoresis, fatigue and fever.   HENT:  Negative for trouble swallowing and voice change.    Eyes:  Negative for pain and redness.   Respiratory:  Negative for shortness of breath and wheezing.    Cardiovascular:  Negative for chest pain, palpitations and leg swelling.   Gastrointestinal:  Negative for constipation, diarrhea, nausea and vomiting.   Genitourinary:  Negative for dysuria.   Musculoskeletal:  Negative for neck pain and neck stiffness.   Skin:  Negative for rash.   Neurological:  Positive for tremors. Negative for dizziness, light-headedness and headaches.   Psychiatric/Behavioral:  Positive for confusion (intermittent per patient). Negative for agitation.           Historical Information   Past Medical History:   Diagnosis Date    Acute on chronic renal insufficiency     Allergic rhinitis     Anemia 06/11/2012    Atrial fibrillation (HCC)     Cerebrovascular accident (CVA) (HCC)     Chest pain     Controlled type 2 diabetes mellitus without complication (HCC)     SMALLS (dyspnea on exertion)     Generalized anxiety disorder     GERD without esophagitis 06/11/2012    Hypertension     Memory difficulties 01/08/2019    Nephrolithiasis 03/17/2016    Obesity 06/11/2012    Osteoarthritis 06/11/2012     Past Surgical History:   Procedure Laterality Date    CATARACT EXTRACTION, BILATERAL      CYSTOSCOPY W/ LASER LITHOTRIPSY Left 1/20/2020    Procedure: CYSTOSCOPY; RETROGRADE; URETEROSCOPY; STONE EXTRACTION; POSSIBLE HOLMIUM LASER LITHOTRIPSY;  Surgeon: Carlos Moore MD;  Location:  MAIN OR;  Service: Urology    KNEE SURGERY      NV CYSTO BLADDER W/URETERAL CATHETERIZATION Left 11/8/2019    Procedure: CYSTOSCOPY RETROGRADE PYELOGRAM WITH INSERTION STENT URETERAL;  Surgeon: Carlos Moore MD;  Location:  MAIN OR;  Service: Urology    TOTAL HIP ARTHROPLASTY       TOTAL HIP ARTHROPLASTY Bilateral      Social History     Substance and Sexual Activity   Alcohol Use Never    Comment: socially      Social History     Substance and Sexual Activity   Drug Use Never     Social History     Tobacco Use   Smoking Status Never    Passive exposure: Never   Smokeless Tobacco Never       Family History:   Family History   Problem Relation Age of Onset    Arthritis Mother     Parkinsonism Mother     No Known Problems Father         Meds/Allergies   all current active meds have been reviewed    Medications Prior to Admission:     apixaban (ELIQUIS) 5 mg    memantine (NAMENDA) 10 mg tablet    amLODIPine (NORVASC) 10 mg tablet    atorvastatin (LIPITOR) 20 mg tablet    carbidopa-levodopa (SINEMET CR)  mg TBCR per ER tablet    clonazePAM (KlonoPIN) 0.5 mg tablet    donepezil (ARICEPT) 10 mg tablet    DULoxetine (CYMBALTA) 30 mg delayed release capsule    ergocalciferol (VITAMIN D2) 50,000 units    furosemide (LASIX) 20 mg tablet    glucose blood (FREESTYLE LITE) test strip    glucose monitoring kit (FREESTYLE) monitoring kit    Invokana 300 MG TABS    Lancets (freestyle) lancets    Magnesium Oxide 400 MG CAPS    metFORMIN (GLUCOPHAGE) 850 mg tablet    metoprolol succinate (TOPROL-XL) 100 mg 24 hr tablet    Allergies   Allergen Reactions    Pollen Extract Itching     Runny nose, itchy eyes       Objective   Vitals: Blood pressure 129/81, pulse 73, temperature 98.8 °F (37.1 °C), temperature source Oral, resp. rate 16, weight 94.5 kg (208 lb 5.4 oz), SpO2 100%., Body mass index is 29.06 kg/m².,   Orthostatic Blood Pressures      Flowsheet Row Most Recent Value   Blood Pressure 129/81 filed at 2024 0813   Patient Position - Orthostatic VS Lying filed at 2024 0252            Systolic (24hrs), Av , Min:86 , Max:129     Diastolic (24hrs), Av, Min:47, Max:81      Physical Exam:  Physical Exam  Vitals reviewed.   Constitutional:       General: He is not in acute  distress.     Appearance: Normal appearance. He is not diaphoretic.   HENT:      Head: Normocephalic and atraumatic.   Eyes:      General:         Right eye: No discharge.         Left eye: No discharge.   Neck:      Comments: Trachea midline, mild JVD appreciated  Cardiovascular:      Heart sounds: Murmur (NIKOLE) heard.      Comments: Irregularly irregular rate and rhythm  Pulmonary:      Effort: No respiratory distress.      Breath sounds: No wheezing.   Chest:      Chest wall: No tenderness.   Abdominal:      General: Bowel sounds are normal.      Palpations: Abdomen is soft.      Tenderness: There is no abdominal tenderness. There is no rebound.   Musculoskeletal:      Right lower leg: Edema (trace) present.      Left lower leg: Edema (trace) present.   Skin:     General: Skin is warm and dry.   Neurological:      Mental Status: He is alert.      Comments: Awake, alert, able to answer simple questions oriented to self and somewhat place but not time, able to move extremities bilaterally.   Psychiatric:      Comments: Pleasant mood, pleasant affect        Lab Results:   Labs reviewed and prominent abnormalities reviewed above and/or below.    Troponins:    Results from last 7 days   Lab Units 11/05/24 2011 11/05/24  1743 11/05/24  1533   HS TNI 0HR ng/L  --   --  9   HS TNI 2HR ng/L  --  9  --    HSTNI D2 ng/L  --  0  --    HS TNI 4HR ng/L 8  --   --    HSTNI D4 ng/L -1  --   --      BNP:

## 2024-11-06 NOTE — ASSESSMENT & PLAN NOTE
Lab Results   Component Value Date    HGBA1C 7.7 (A) 09/11/2024       Recent Labs     11/05/24  1829 11/05/24 2014 11/06/24  0748   POCGLU 183* 242* 223*     -Counseled patient and wife on dietary and lifestyle modifications  -Plan of care per primary team.    Blood Sugar Average: Last 72 hrs:  (P) 216

## 2024-11-06 NOTE — PLAN OF CARE
Problem: PAIN - ADULT  Goal: Verbalizes/displays adequate comfort level or baseline comfort level  Description: Interventions:  - Encourage patient to monitor pain and request assistance  - Assess pain using appropriate pain scale  - Administer analgesics based on type and severity of pain and evaluate response  - Implement non-pharmacological measures as appropriate and evaluate response  - Consider cultural and social influences on pain and pain management  - Notify physician/advanced practitioner if interventions unsuccessful or patient reports new pain  Outcome: Progressing     Problem: INFECTION - ADULT  Goal: Absence or prevention of progression during hospitalization  Description: INTERVENTIONS:  - Assess and monitor for signs and symptoms of infection  - Monitor lab/diagnostic results  - Monitor all insertion sites, i.e. indwelling lines, tubes, and drains  - Monitor endotracheal if appropriate and nasal secretions for changes in amount and color  - [place] appropriate cooling/warming therapies per order  - Administer medications as ordered  - Instruct and encourage patient and family to use good hand hygiene technique  - Identify and instruct in appropriate isolation precautions for identified infection/condition  Outcome: Progressing  Goal: Absence of fever/infection during neutropenic period  Description: INTERVENTIONS:  - Monitor WBC    Outcome: Progressing     Problem: SAFETY ADULT  Goal: Patient will remain free of falls  Description: INTERVENTIONS:  - Educate patient/family on patient safety including physical limitations  - Instruct patient to call for assistance with activity   - Consult OT/PT to assist with strengthening/mobility   - Keep Call bell within reach  - Keep bed low and locked with side rails adjusted as appropriate  - Keep care items and personal belongings within reach  - Initiate and maintain comfort rounds  - Make Fall Risk Sign visible to staff  - Offer Toileting every 2 Hours,  in advance of need  - Initiate/Maintain bed alarm  - Obtain necessary fall risk management equipment: walker   - Apply yellow socks and bracelet for high fall risk patients  - Consider moving patient to room near nurses station  Outcome: Progressing  Goal: Maintain or return to baseline ADL function  Description: INTERVENTIONS:  -  Assess patient's ability to carry out ADLs; assess patient's baseline for ADL function and identify physical deficits which impact ability to perform ADLs (bathing, care of mouth/teeth, toileting, grooming, dressing, etc.)  - Assess/evaluate cause of self-care deficits   - Assess range of motion  - Assess patient's mobility; develop plan if impaired  - Assess patient's need for assistive devices and provide as appropriate  - Encourage maximum independence but intervene and supervise when necessary  - Involve family in performance of ADLs  - Assess for home care needs following discharge   - Consider OT consult to assist with ADL evaluation and planning for discharge  - Provide patient education as appropriate  Outcome: Progressing  Goal: Maintains/Returns to pre admission functional level  Description: INTERVENTIONS:  - Perform AM-PAC 6 Click Basic Mobility/ Daily Activity assessment daily.  - Set and communicate daily mobility goal to care team and patient/family/caregiver.   - Collaborate with rehabilitation services on mobility goals if consulted  - Perform Range of Motion 3 times a day.  - Reposition patient every 2 hours.  - Dangle patient 3 times a day  - Stand patient 3 times a day  - Ambulate patient 3 times a day  - Out of bed to chair 3 times a day   - Out of bed for meals 3 times a day  - Out of bed for toileting  - Record patient progress and toleration of activity level   Outcome: Progressing     Problem: DISCHARGE PLANNING  Goal: Discharge to home or other facility with appropriate resources  Description: INTERVENTIONS:  - Identify barriers to discharge w/patient and  caregiver  - Arrange for needed discharge resources and transportation as appropriate  - Identify discharge learning needs (meds, wound care, etc.)  - Arrange for interpretive services to assist at discharge as needed  - Refer to Case Management Department for coordinating discharge planning if the patient needs post-hospital services based on physician/advanced practitioner order or complex needs related to functional status, cognitive ability, or social support system  Outcome: Progressing     Problem: Knowledge Deficit  Goal: Patient/family/caregiver demonstrates understanding of disease process, treatment plan, medications, and discharge instructions  Description: Complete learning assessment and assess knowledge base.  Interventions:  - Provide teaching at level of understanding  - Provide teaching via preferred learning methods  Outcome: Progressing     Problem: MUSCULOSKELETAL - ADULT  Goal: Maintain or return mobility to safest level of function  Description: INTERVENTIONS:  - Assess patient's ability to carry out ADLs; assess patient's baseline for ADL function and identify physical deficits which impact ability to perform ADLs (bathing, care of mouth/teeth, toileting, grooming, dressing, etc.)  - Assess/evaluate cause of self-care deficits   - Assess range of motion  - Assess patient's mobility  - Assess patient's need for assistive devices and provide as appropriate  - Encourage maximum independence but intervene and supervise when necessary  - Involve family in performance of ADLs  - Assess for home care needs following discharge   - Consider OT consult to assist with ADL evaluation and planning for discharge  - Provide patient education as appropriate  Outcome: Progressing  Goal: Maintain proper alignment of affected body part  Description: INTERVENTIONS:  - Support, maintain and protect limb and body alignment  - Provide patient/ family with appropriate education  Outcome: Progressing

## 2024-11-06 NOTE — ASSESSMENT & PLAN NOTE
Orthostatics positive  Amlodipine discontinued per cardiology recommendation  Continue Toprol with hold parameters  Will adjust meds as needed

## 2024-11-06 NOTE — PHYSICAL THERAPY NOTE
PHYSICAL THERAPY EVALUATION  NAME:  Kaleb Ching III  DATE: 11/06/24    AGE:   78 y.o.  Mrn:   [id number]  ADMIT DX:  Syncope [R55]  Problem List:   Patient Active Problem List   Diagnosis    Anemia    Diabetic polyneuropathy associated with type 2 diabetes mellitus (HCC)    Eczema    Erectile dysfunction of non-organic origin    Generalized anxiety disorder    GERD without esophagitis    Hyperlipidemia    Hypertension    Nephrolithiasis    Obesity    Osteoarthritis    Polyp of sigmoid colon    Type 2 diabetes mellitus with diabetic chronic kidney disease (HCC)    Primary osteoarthritis of right hip    Mild concentric left ventricular hypertrophy (LVH)    Atrial fibrillation (HCC)    Chronic anticoagulation    History of TIA (transient ischemic attack)    Stage 3 chronic kidney disease (HCC)    Hydronephrosis with obstructing calculus    Right Adrenal nodule     Proteinuria    Diabetic nephropathy associated with type 2 diabetes mellitus (HCC)    Localized edema    Alzheimer's disease, unspecified (CODE) (HCC)    Sleep disturbance    Parkinson's disease without dyskinesia or fluctuating manifestations (HCC)    Syncope    Pulmonary HTN (HCC)       Past Medical History  Past Medical History:   Diagnosis Date    Acute on chronic renal insufficiency     Allergic rhinitis     Anemia 06/11/2012    Atrial fibrillation (HCC)     Cerebrovascular accident (CVA) (HCC)     Chest pain     Controlled type 2 diabetes mellitus without complication (HCC)     SMALLS (dyspnea on exertion)     Generalized anxiety disorder     GERD without esophagitis 06/11/2012    Hypertension     Memory difficulties 01/08/2019    Nephrolithiasis 03/17/2016    Obesity 06/11/2012    Osteoarthritis 06/11/2012       Past Surgical History  Past Surgical History:   Procedure Laterality Date    CATARACT EXTRACTION, BILATERAL      CYSTOSCOPY W/ LASER LITHOTRIPSY Left 1/20/2020    Procedure: CYSTOSCOPY; RETROGRADE; URETEROSCOPY; STONE EXTRACTION; POSSIBLE  HOLMIUM LASER LITHOTRIPSY;  Surgeon: Carlos Moore MD;  Location:  MAIN OR;  Service: Urology    KNEE SURGERY      WY CYSTO BLADDER W/URETERAL CATHETERIZATION Left 11/8/2019    Procedure: CYSTOSCOPY RETROGRADE PYELOGRAM WITH INSERTION STENT URETERAL;  Surgeon: Carlos Moore MD;  Location:  MAIN OR;  Service: Urology    TOTAL HIP ARTHROPLASTY      TOTAL HIP ARTHROPLASTY Bilateral 2011       Length Of Stay: 0  Performed at least 2 patient identifiers during session: Name and Epic photo       11/06/24 0841   PT Last Visit   PT Visit Date 11/06/24   Note Type   Note type Evaluation   Pain Assessment   Pain Assessment Tool 0-10   Pain Score No Pain   Restrictions/Precautions   Weight Bearing Precautions Per Order No   Other Precautions Chair Alarm;Bed Alarm;Fall Risk;Multiple lines   Home Living   Type of Home House   Home Layout One level;Able to live on main level with bedroom/bathroom;Performs ADLs on one level  (1 rajani)   Bathroom Shower/Tub Tub/shower unit   Bathroom Toilet Raised   Bathroom Equipment Grab bars in shower;Grab bars around toilet   Bathroom Accessibility Accessible   Home Equipment Walker;Cane  (rw all time)   Prior Function   Level of [place] Independent with ADLs;Independent with functional mobility;Independent with IADLS   Lives With Spouse   Receives Help From Family   IADLs Independent with driving;Independent with meal prep;Family/Friend/Other provides medication management   Falls in the last 6 months 1 to 4  (3)   Vocational Retired  (teacher and )   Cognition   Overall Cognitive Status WFL   Arousal/Participation Alert   Attention Within functional limits   Orientation Level Oriented X4   Memory Within functional limits   Following Commands Follows one step commands without difficulty   Subjective   Subjective Pt agreeable to eval   RLE Assessment   RLE Assessment   (generalized weakness)   LLE Assessment   LLE Assessment   (generalized weakness)   Vision-Basic Assessment   Current  Vision No visual deficits   Coordination   Sensation WFL   Bed Mobility   Supine to Sit 5  Supervision   Additional items Assist x 1;Verbal cues   Transfers   Sit to Stand 5  Supervision   Additional items Assist x 1;Impulsive;Verbal cues   Stand to Sit 5  Supervision   Additional items Assist x 1;Verbal cues;Impulsive;Increased time required   Ambulation/Elevation   Gait pattern Improper Weight shift;Forward Flexion;Decreased foot clearance   Gait Assistance 5  Supervision   Additional items Verbal cues   Assistive Device Rolling walker   Distance 35 ft   Balance   Static Sitting Normal   Dynamic Sitting Good   Static Standing Fair +   Dynamic Standing Fair -   Ambulatory Fair -   Activity Tolerance   Activity Tolerance Patient tolerated treatment well   Medical Staff Made Aware Billigns Ot   Assessment   Prognosis Good   Problem List Decreased strength;Decreased endurance;Impaired balance;Decreased mobility;Obesity   Assessment Pt is 78 y.o. male seen for PT evaluation s/p admit to [facility] on 11/5/2024 w/ Syncope. PT consulted to assess pt's functional mobility and d/c needs. Order placed for PT eval and tx, w/ up and OOB as tolerated order. Pt agreeable to PT  session upon arrival, pt found supine in bed.  PTA, pt was independent w/ all functional mobility w/ Rw and lives w/ spouse in 1 level home .  Pt to benefit from continued PT tx to address deficits and maximize level of functional independent mobility and consistency. Upon conclusion pt  seated in recliner, with all needs in reach, and chair alarm intact. Complexity: Comorbidities affecting pt's physical performance at time of assessment include: DM, htn, a fib, and parkinsons, and syncope . Personal factors affecting pt at time of IE include: advanced age, limited mobility, history of falls, ambulating with assistive device, and steps to enter home. Please find objective findings from PT assessment regarding body systems outlined above with  impairments and limitations including weakness, impaired balance, impaired coordination, gait deviations, decreased functional mobility tolerance, and fall risk.  Pt's clinical presentation is currently unstable/unpredictable seen in pt's presentation of hypertension, abnormal H&H, abnormal blood sugar levels, and abnormal RBC count . The patient's AM-[facility] Basic Mobility Inpatient Short Form Raw Score is 18 .  Based on patient presentations and impairments, pt would most appropriately benefit from Level 3 resource intensity upon discharge. Please also refer to the recommendation of the Physical Therapist for safe discharge planning. RN verbalized pt appropriate for PT session.   Goals   Patient Goals To go home   LTG Expiration Date 11/16/24   Long Term Goal #1 Perform transfers to modified I to increase Indep in home environment, decrease risk for falls, and improve ease of transfers. Perform ambulation with rw for community distances to  increase Indep in home environment, decrease risk for falls, improve activity tolerance, and improve gait quality. Increase dynamic standing balance to F+ to decrease fall risk.   Increase OOB activity tolerance to 10 minutes without s/s of exertion to decrease fall risk.   PT Treatment Day 0   Plan   Treatment/Interventions Functional transfer training;LE strengthening/ROM;Elevations;Therapeutic exercise;Endurance training;Gait training   PT Frequency 3-5x/wk   Discharge Recommendation   Rehab Resource Intensity Level, PT III (Minimum Resource Intensity)   AM-PAC Basic Mobility Inpatient   Turning in Flat Bed Without Bedrails 3   Lying on Back to Sitting on Edge of Flat Bed Without Bedrails 3   Moving Bed to Chair 3   Standing Up From Chair Using Arms 3   Walk in Room 3   Climb 3-5 Stairs With Railing 3   Basic Mobility Inpatient Raw Score 18   Basic Mobility Standardized Score 41.05   [facility] Highest Level Of Mobility   -HLM Goal 6: Walk 10 steps or more   -HLM Achieved  7: Walk 25 feet or more     Pt seen as a co-eval with OT due to the patient's co-morbidities, clinically unstable presentation, and present impairments which are a regression from the patient's baseline.       Time In: 0835  Time Out: 0852  Total Evaluation Minutes: 17    Michele Hankins, PT

## 2024-11-07 ENCOUNTER — TRANSITIONAL CARE MANAGEMENT (OUTPATIENT)
Dept: INTERNAL MEDICINE CLINIC | Facility: CLINIC | Age: 79
End: 2024-11-07

## 2024-11-07 VITALS
HEART RATE: 91 BPM | WEIGHT: 206 LBS | BODY MASS INDEX: 28.84 KG/M2 | DIASTOLIC BLOOD PRESSURE: 70 MMHG | TEMPERATURE: 98.1 F | OXYGEN SATURATION: 97 % | RESPIRATION RATE: 16 BRPM | HEIGHT: 71 IN | SYSTOLIC BLOOD PRESSURE: 125 MMHG

## 2024-11-07 DIAGNOSIS — R55 SYNCOPE, UNSPECIFIED SYNCOPE TYPE: Primary | ICD-10-CM

## 2024-11-07 PROBLEM — G90.3 NEUROGENIC ORTHOSTATIC HYPOTENSION (HCC): Status: ACTIVE | Noted: 2024-11-07

## 2024-11-07 LAB
ANION GAP SERPL CALCULATED.3IONS-SCNC: 7 MMOL/L (ref 4–13)
BUN SERPL-MCNC: 20 MG/DL (ref 5–25)
CALCIUM SERPL-MCNC: 8.2 MG/DL (ref 8.4–10.2)
CHLORIDE SERPL-SCNC: 107 MMOL/L (ref 96–108)
CO2 SERPL-SCNC: 25 MMOL/L (ref 21–32)
CREAT SERPL-MCNC: 1.4 MG/DL (ref 0.6–1.3)
ERYTHROCYTE [DISTWIDTH] IN BLOOD BY AUTOMATED COUNT: 13.2 % (ref 11.6–15.1)
GFR SERPL CREATININE-BSD FRML MDRD: 47 ML/MIN/1.73SQ M
GLUCOSE SERPL-MCNC: 196 MG/DL (ref 65–140)
GLUCOSE SERPL-MCNC: 213 MG/DL (ref 65–140)
GLUCOSE SERPL-MCNC: 301 MG/DL (ref 65–140)
HCT VFR BLD AUTO: 36.7 % (ref 36.5–49.3)
HGB BLD-MCNC: 11.5 G/DL (ref 12–17)
MCH RBC QN AUTO: 30.2 PG (ref 26.8–34.3)
MCHC RBC AUTO-ENTMCNC: 31.3 G/DL (ref 31.4–37.4)
MCV RBC AUTO: 96 FL (ref 82–98)
PLATELET # BLD AUTO: 161 THOUSANDS/UL (ref 149–390)
PMV BLD AUTO: 10.1 FL (ref 8.9–12.7)
POTASSIUM SERPL-SCNC: 4.1 MMOL/L (ref 3.5–5.3)
RBC # BLD AUTO: 3.81 MILLION/UL (ref 3.88–5.62)
SODIUM SERPL-SCNC: 139 MMOL/L (ref 135–147)
WBC # BLD AUTO: 6.3 THOUSAND/UL (ref 4.31–10.16)

## 2024-11-07 PROCEDURE — 85027 COMPLETE CBC AUTOMATED: CPT | Performed by: INTERNAL MEDICINE

## 2024-11-07 PROCEDURE — 80048 BASIC METABOLIC PNL TOTAL CA: CPT | Performed by: INTERNAL MEDICINE

## 2024-11-07 PROCEDURE — 99232 SBSQ HOSP IP/OBS MODERATE 35: CPT | Performed by: PHYSICIAN ASSISTANT

## 2024-11-07 PROCEDURE — 97530 THERAPEUTIC ACTIVITIES: CPT

## 2024-11-07 PROCEDURE — 99239 HOSP IP/OBS DSCHRG MGMT >30: CPT | Performed by: INTERNAL MEDICINE

## 2024-11-07 PROCEDURE — 82948 REAGENT STRIP/BLOOD GLUCOSE: CPT

## 2024-11-07 PROCEDURE — 97535 SELF CARE MNGMENT TRAINING: CPT

## 2024-11-07 PROCEDURE — 97110 THERAPEUTIC EXERCISES: CPT

## 2024-11-07 RX ORDER — FUROSEMIDE 20 MG/1
TABLET ORAL
Start: 2024-11-07 | End: 2024-11-18

## 2024-11-07 RX ADMIN — APIXABAN 5 MG: 5 TABLET, FILM COATED ORAL at 08:28

## 2024-11-07 RX ADMIN — ATORVASTATIN CALCIUM 20 MG: 10 TABLET, FILM COATED ORAL at 08:27

## 2024-11-07 RX ADMIN — INSULIN LISPRO 4 UNITS: 100 INJECTION, SOLUTION INTRAVENOUS; SUBCUTANEOUS at 12:30

## 2024-11-07 RX ADMIN — DULOXETINE HYDROCHLORIDE 30 MG: 30 CAPSULE, DELAYED RELEASE ORAL at 08:28

## 2024-11-07 RX ADMIN — CARBIDOPA AND LEVODOPA 1 TABLET: 25; 100 TABLET ORAL at 08:28

## 2024-11-07 RX ADMIN — METOPROLOL SUCCINATE 100 MG: 50 TABLET, EXTENDED RELEASE ORAL at 08:28

## 2024-11-07 RX ADMIN — MEMANTINE 10 MG: 5 TABLET ORAL at 08:28

## 2024-11-07 RX ADMIN — INSULIN LISPRO 2 UNITS: 100 INJECTION, SOLUTION INTRAVENOUS; SUBCUTANEOUS at 07:55

## 2024-11-07 NOTE — OCCUPATIONAL THERAPY NOTE
"     11/07/24 1003   OT Last Visit   OT Visit Date 11/07/24   Note Type   Note Type Treatment   Pain Assessment   Pain Assessment Tool 0-10   Pain Score No Pain   Restrictions/Precautions   Weight Bearing Precautions Per Order No   Other Precautions Chair Alarm;Bed Alarm;Fall Risk   Lifestyle   Reciprocal Relationships spoke of wife   Intrinsic Gratification watched [employer] football   ADL   Where Assessed Edge of bed   Grooming Assistance 5  Supervision/Setup   Grooming Comments applied deodorant, brushed teeth, and combed hair with good results   UB Bathing Assistance 4  Minimal Assistance   UB Bathing Deficit Setup;Supervision/safety;Increased time to complete   LB Bathing Assistance 2  Maximal Assistance   LB Bathing Comments *with bathing lower legs and feet this session   LB Dressing Assistance 2  Maximal Assistance   LB Dressing Comments *with doff/don socks this session   Bed Mobility   Supine to Sit 5  Supervision   Additional items Assist x 1;Increased time required   Sit to Supine 5  Supervision   Additional Comments *patient declined sitting in recliner - \"my back feels better lying down\"   Therapeutic Excerise-Strength   UE Strength Yes   Right Upper Extremity- Strength   R Shoulder Flexion;Extension;Horizontal ABduction;Other (Comment)  (horizontal adduction;  Pro/re-traction)   R Elbow Elbow flexion;Elbow extension  (in forward and reverse;  Also: supin/pron-ation)   R Weight/Reps/Sets 20 reps each exercise > pro/re-traction and elbow exers. done With 2 pound weight   Left Upper Extremity-Strength   L Weights/Reps/Sets all exers. same as RUE above   Coordination   Gross Motor WFL   Dexterity appears WFL   Subjective   Subjective \"I don't know how I got to the hospital, and there was no warning when I passed out\"   Cognition   Overall Cognitive Status WFL   Arousal/Participation Alert;Responsive;Cooperative   Attention Within functional limits   Orientation Level Oriented X4   Memory Within functional " limits   Following Commands Follows one step commands without difficulty   Activity Tolerance   Activity Tolerance Patient limited by fatigue;Patient limited by pain  (*to a mild degree)   Assessment   Assessment Patient participated in Skilled OT session this date with interventions consisting of ADL re training with the use of correct body mechnaics, safety awareness and fall prevention techniques, therapeutic exercise to: increase functional use of BUEs, increase BUE muscle strength , and  therapeutic activities to: increase activity tolerance . Patient agreeable to OT treatment session, upon arrival patient was found seated in bed (back supported).  In comparison to previous session, patient with improvements in self-care accomplishment and activity tolerance. Patient requiring verbal cues for pacing thru activity steps and ocassional safety reminders, occasional rest periods, occasional cues for correct technique, and self-care assistance as noted in flow sheet/AM-PAC.  Patient continues to be functioning below baseline level, occupational performance remains limited secondary to factors listed above and increased risk for falls and injury.   Patient to benefit from continued Occupational Therapy treatment while in the hospital to address deficits as defined above and maximize level of functional independence with ADLs and functional mobility.   Plan   Treatment Interventions ADL retraining;Functional transfer training;UE strengthening/ROM;Patient/family training;Activityengagement;Energy conservation  (**Requires Prompts to take rest periods)   Goal Expiration Date 11/20/24   OT Treatment Day 1   Discharge Recommendation   Rehab Resource Intensity Level, OT III (Minimum Resource Intensity)   Additional Comments 2 The patient's raw score on the AM-PAC Daily Activity Inpatient Short Form is 21. A raw score of greater than or equal to 19 suggests the patient may benefit from discharge to home. Please refer to the  recommendation of the Occupational Therapist for safe discharge planning.   AM-PAC Daily Activity Inpatient   Lower Body Dressing 3   Bathing 3   Toileting 3   Upper Body Dressing 4   Grooming 4   Eating 4   Daily Activity Raw Score 21   Daily Activity Standardized Score (Calc for Raw Score >=11) 44.27   AM-PAC Applied Cognition Inpatient   Following a Speech/Presentation 4   Understanding Ordinary Conversation 4   Taking Medications 4   Remembering Where Things Are Placed or Put Away 3   Remembering List of 4-5 Errands 3   Taking Care of Complicated Tasks 3   Applied Cognition Raw Score 21   Applied Cognition Standardized Score 44.3     JJ Felipe/MARILY

## 2024-11-07 NOTE — ASSESSMENT & PLAN NOTE
Troponins have been negative  No significant telemetry events noted  Magnesium supplemented  No obvious signs of infection at this time  Orthostatics positive.  Amlodipine discontinued  Patient received 1 L IV fluids  Cardiology input appreciated  Echo performed  Stable for discharge home.  Cardiology will arrange for outpatient Zio patch

## 2024-11-07 NOTE — DISCHARGE SUMMARY
Discharge Summary - Hospitalist   Name: Kaleb Ching III 78 y.o. male I MRN: [id number]  Unit/Bed#: -01 I Date of Admission: 11/5/2024   Date of Service: 11/7/2024 I Hospital Day: 1     Assessment & Plan  Syncope  Troponins have been negative  No significant telemetry events noted  Magnesium supplemented  No obvious signs of infection at this time  Orthostatics positive.  Amlodipine discontinued  Patient received 1 L IV fluids  Cardiology input appreciated  Echo performed  Stable for discharge home.  Cardiology will arrange for outpatient Zio patch  Atrial fibrillation (HCC)  Currently rate controlled.  Continue Toprol with hold parameters.  Eliquis for anticoagulation  Hypertension  Orthostatics positive  Amlodipine discontinued per cardiology recommendation  Continue Toprol with hold parameters  BP has been stable since the above adjustment  Follow-up with PCP as outpatient for further management  Lasix also changed to as needed on discharge  Type 2 diabetes mellitus with diabetic chronic kidney disease (HCC)  Lab Results   Component Value Date    HGBA1C 7.7 (A) 09/11/2024       Recent Labs     11/06/24  1042 11/06/24  1555 11/06/24 2007 11/07/24  0739   POCGLU 228* 219* 262* 213*       Blood Sugar Average: Last 72 hrs:  (P) [id number]  Resume home diabetic regimen  Alzheimer's disease, unspecified (CODE) (HCC)  Supportive care.  Continue donepezil and Namenda  Parkinson's disease without dyskinesia or fluctuating manifestations (HCC)  Supportive care  Continue home Sinemet  Neurogenic orthostatic hypotension (HCC)  Possibly secondary to patient's underlying Parkinson's disease.  Patient is doing better today after discontinuing amlodipine.  Follow-up with cardiology as outpatient     Medical Problems       Resolved Problems  Date Reviewed: 11/7/2024   None       Discharging Physician / Practitioner: Connor Yen MD  PCP: Wilfrid Mills DO  Admission Date:   Admission Orders (From  "admission, onward)       Ordered        11/06/24 1456  INPATIENT ADMISSION  Once            11/05/24 1737  Place in Observation  Once                          Discharge Date: 11/07/24    Consultations During Hospital Stay:  Cardiology    Procedures Performed:   None    Significant Findings / Test Results:   Syncope, neurogenic orthostatic hypotension    Incidental Findings:   None     Test Results Pending at Discharge (will require follow up):   none     Outpatient Tests Requested:  Routine labs with PCP as outpatient    Complications: None    Reason for Admission: Syncope    Hospital Course:   Kaleb Ching III is a 78 y.o. male patient who originally presented to the hospital on 11/5/2024 due to syncope.  Patient was admitted and monitored on telemetry.  Cardiology was consulted.  Echocardiogram was performed.  No significant LE events noted.  Patient was found to be positive for orthostatic hypotension.  Possible neurogenic component given patient's underlying Parkinson's disease.  Amlodipine was discontinued by cardiology.  Blood pressure has remained stable.  Patient advised to take Lasix as needed as opposed to twice weekly which she was taking at home prior to admission.  No obvious signs of infection.  Patient stable for discharge home with outpatient follow-up.  Advised to return with any worsening symptoms.    Please see above list of diagnoses and related plan for additional information.     Condition at Discharge: stable    Discharge Day Visit / Exam:   Subjective: No complaints at this time  Vitals: Blood Pressure: 125/70 (11/07/24 1050)  Pulse: 91 (11/07/24 1050)  Temperature: 98.1 °F (36.7 °C) (11/07/24 1050)  Temp Source: Tympanic (11/07/24 1050)  Respirations: 16 (11/07/24 1050)  Height: 5' 11\" (180.3 cm) (11/06/24 1028)  Weight - Scale: 93.4 kg (206 lb) (11/06/24 1028)  SpO2: 97 % (11/07/24 1050)  Physical Exam  Constitutional:       General: He is not in acute distress.  HENT:      Head: " Normocephalic and atraumatic.      Nose: Nose normal.      Mouth/Throat:      Mouth: Mucous membranes are moist.   Eyes:      Extraocular Movements: Extraocular movements intact.      Conjunctiva/sclera: Conjunctivae normal.   Cardiovascular:      Rate and Rhythm: Normal rate and regular rhythm.   Pulmonary:      Effort: Pulmonary effort is normal. No respiratory distress.   Abdominal:      Palpations: Abdomen is soft.      Tenderness: There is no abdominal tenderness.   Musculoskeletal:         General: Normal range of motion.      Cervical back: Normal range of motion and neck supple.   Skin:     General: Skin is warm and dry.   Neurological:      General: No focal deficit present.      Mental Status: He is alert. Mental status is at baseline.      Cranial Nerves: No cranial nerve deficit.   Psychiatric:         Mood and Affect: Mood normal.         Behavior: Behavior normal.          Discussion with Family: Updated  (wife) via phone.    Discharge instructions/Information to patient and family:   See after visit summary for information provided to patient and family.      Provisions for Follow-Up Care:  See after visit summary for information related to follow-up care and any pertinent home health orders.      Mobility at time of Discharge:   Basic Mobility Inpatient Raw Score: 18  JH-HLM Goal: 6: Walk 10 steps or more  JH-HLM Achieved: 7: Walk 25 feet or more  HLM Goal achieved. Continue to encourage appropriate mobility.     Disposition:   Home    Planned Readmission: no    Discharge Medications:  See after visit summary for reconciled discharge medications provided to patient and/or family.      Administrative Statements   Discharge Statement:  I have spent a total time of 35 minutes in caring for this patient on the day of the visit/encounter. >30 minutes of time was spent on: Patient and family education, Counseling / Coordination of care, Documenting in the medical record, Reviewing / ordering  tests, medicine, procedures  , and Communicating with other healthcare professionals .    **Please Note: This note may have been constructed using a voice recognition system**

## 2024-11-07 NOTE — ASSESSMENT & PLAN NOTE
Presented with orthostatic hypotension   Improved with discontinuation of amlodipine and diuretic   Can resume diuretic on a as needed basis for weight greater than 210 lbs    No bradycardia or pauses noted on telemetry    Will follow-up in the office with a 2-week ZIO monitor and visit as appropriate

## 2024-11-07 NOTE — ASSESSMENT & PLAN NOTE
Possibly secondary to patient's underlying Parkinson's disease.  Patient is doing better today after discontinuing amlodipine.  Follow-up with cardiology as outpatient

## 2024-11-07 NOTE — ASSESSMENT & PLAN NOTE
Presented with syncope and orthostatic hypotension    Better controlled off amlodipine    Diuretic can be restarted only on an as needed basis   Continue metoprolol 100 mg daily

## 2024-11-07 NOTE — PROGRESS NOTES
"Progress Note - Cardiology   Name: Kaleb Ching III 78 y.o. male I MRN: [id number]  Unit/Bed#: -01 I Date of Admission: 11/5/2024   Date of Service: 11/7/2024 I Hospital Day: 1    Assessment & Plan  Syncope  Presented with orthostatic hypotension   Improved with discontinuation of amlodipine and diuretic   Can resume diuretic on a as needed basis for weight greater than 210 lbs    No bradycardia or pauses noted on telemetry    Will follow-up in the office with a 2-week ZIO monitor and visit as appropriate  Pulmonary HTN (HCC)  Stable   Ok to resume diuretic on an as needed basis for weight greater than 210 lbs    Hypertension  Presented with syncope and orthostatic hypotension    Better controlled off amlodipine    Diuretic can be restarted only on an as needed basis   Continue metoprolol 100 mg daily   Atrial fibrillation (HCC)  Controlled rate   Continue metoprolol 100 mg daily   On Eliquis 5 mg BID for stroke risk prevention     No episodes of bradycardia or profound pauses noted on telemetry  Summary Comments:  Agree with discontinuation of amlodipine.  Resume diuretic on a as needed basis for weight greater than 210 pounds.  Will arrange for outpatient follow-up in 2 weeks ZIO monitor as appropriate.    Outpatient Cardiologist: Dr. Rangel    Subjective:  Patient seen and examined at the bedside.  No events overnight.  Feeling better. No chest pain dyspnea, or palpitations. No edema.     Objective:   Vitals: Blood pressure 133/73, pulse 76, temperature 98.5 °F (36.9 °C), resp. rate 18, height 5' 11\" (1.803 m), weight 93.4 kg (206 lb), SpO2 98%.,   Orthostatic Blood Pressures      Flowsheet Row Most Recent Value   Blood Pressure 133/73 filed at 11/07/2024 0719   Patient Position - Orthostatic VS Sitting filed at 11/06/2024 2208            Telemetry/ECG/Cardiac Testing:   Telemetry atrial fibrillation at a controlled rate, no pauses no bradycardia    Echo 11/6/2024: Normal LVEF 60% no WMA moderate LAE " mild EFRA mild AI mild MAC mild TR PASP 47 mmHg aortic root 4.4 cm ascending aorta 4.2 cm    Physical Exam:  Physical Exam  Vitals and nursing note reviewed.   Constitutional:       Appearance: He is well-developed.   HENT:      Head: Normocephalic and atraumatic.      Mouth/Throat:      Mouth: Mucous membranes are moist.   Eyes:      General: No scleral icterus.     Conjunctiva/sclera: Conjunctivae normal.   Neck:      Thyroid: No thyromegaly.      Vascular: No JVD.   Cardiovascular:      Rate and Rhythm: Normal rate and regular rhythm.      Heart sounds: Normal heart sounds, S1 normal and S2 normal. No murmur heard.     No friction rub. No gallop.   Pulmonary:      Effort: No respiratory distress.      Breath sounds: No wheezing or rales.   Abdominal:      General: Bowel sounds are normal. There is no distension.      Palpations: Abdomen is soft.      Tenderness: There is no abdominal tenderness.      Comments: Aorta not palpable   Musculoskeletal:         General: No tenderness or deformity. Normal range of motion.      Cervical back: Normal range of motion and neck supple.      Right lower leg: No edema.      Left lower leg: No edema.   Skin:     General: Skin is warm and dry.   Neurological:      General: No focal deficit present.      Mental Status: He is alert and oriented to person, place, and time.   Psychiatric:         Mood and Affect: Mood normal.         Behavior: Behavior normal.         Judgment: Judgment normal.         Medications:    Current Facility-Administered Medications:     acetaminophen (TYLENOL) tablet 650 mg, 650 mg, Oral, Q6H PRN, Connor Yen MD    apixaban (ELIQUIS) tablet 5 mg, 5 mg, Oral, BID, Connor Yen MD, 5 mg at 11/07/24 0828    atorvastatin (LIPITOR) tablet 20 mg, 20 mg, Oral, Daily, Connor Yen MD, 20 mg at 11/07/24 0827    carbidopa-levodopa (SINEMET)  mg per tablet 1 tablet, 1 tablet, Oral, TID, Connor Yen MD, 1 tablet at  11/07/24 0828    donepezil (ARICEPT) tablet 10 mg, 10 mg, Oral, HS, Connor Yen MD, 10 mg at 11/06/24 2101    DULoxetine (CYMBALTA) delayed release capsule 30 mg, 30 mg, Oral, Daily, Connor Yen MD, 30 mg at 11/07/24 0828    insulin lispro (HumALOG/ADMELOG) 100 units/mL subcutaneous injection 1-5 Units, 1-5 Units, Subcutaneous, HS, Connor Yen MD, 2 Units at 11/06/24 2101    insulin lispro (HumALOG/ADMELOG) 100 units/mL subcutaneous injection 1-6 Units, 1-6 Units, Subcutaneous, TID AC, Connor Yen MD, 2 Units at 11/07/24 0755    memantine (NAMENDA) tablet 10 mg, 10 mg, Oral, BID, Connor Yen MD, 10 mg at 11/07/24 0828    metoprolol succinate (TOPROL-XL) 24 hr tablet 100 mg, 100 mg, Oral, Daily, Connor Yen MD, 100 mg at 11/07/24 0828    ondansetron (ZOFRAN) injection 4 mg, 4 mg, Intravenous, Q6H PRN, Connor Yen MD    Labs & Results:      Results from last 7 days   Lab Units 11/07/24  0436   WBC Thousand/uL 6.30   HEMOGLOBIN g/dL 11.5*   HEMATOCRIT % 36.7   PLATELETS Thousands/uL 161         Results from last 7 days   Lab Units 11/07/24  0436 11/06/24  0507   POTASSIUM mmol/L 4.1 3.9   CHLORIDE mmol/L 107 104   CO2 mmol/L 25 24   BUN mg/dL 20 23   CREATININE mg/dL 1.40* 1.46*   MAGNESIUM mg/dL  --  2.1     Results from last 7 days   Lab Units 11/05/24  1533   INR  1.05

## 2024-11-07 NOTE — PLAN OF CARE
Problem: OCCUPATIONAL THERAPY ADULT  Goal: Performs self-care activities at highest level of function for planned discharge setting.  See evaluation for individualized goals.  Description: Treatment Interventions: ADL retraining, Functional transfer training, UE strengthening/ROM, Endurance training, Patient/family training, Equipment evaluation/education, Activityengagement, Energy conservation          See flowsheet documentation for full assessment, interventions and recommendations.   Outcome: Progressing  Note: Limitation: Decreased ADL status, Decreased UE strength, Decreased Safe judgement during ADL, Decreased endurance, Decreased self-care trans, Decreased high-level ADLs  Prognosis: Good  Assessment: Patient participated in Skilled OT session this date with interventions consisting of ADL re training with the use of correct body mechnaics, safety awareness and fall prevention techniques, therapeutic exercise to: increase functional use of BUEs, increase BUE muscle strength , and  therapeutic activities to: increase activity tolerance . Patient agreeable to OT treatment session, upon arrival patient was found seated in bed (back supported).  In comparison to previous session, patient with improvements in self-care accomplishment and activity tolerance. Patient requiring verbal cues for pacing thru activity steps and ocassional safety reminders, occasional rest periods, occasional cues for correct technique, and self-care assistance as noted in flow sheet/AM-PAC.  Patient continues to be functioning below baseline level, occupational performance remains limited secondary to factors listed above and increased risk for falls and injury.   Patient to benefit from continued Occupational Therapy treatment while in the hospital to address deficits as defined above and maximize level of functional independence with ADLs and functional mobility.     Rehab Resource Intensity Level, OT: III (Minimum Resource  Intensity)     JJ Felipe/MARILY

## 2024-11-07 NOTE — ASSESSMENT & PLAN NOTE
Orthostatics positive  Amlodipine discontinued per cardiology recommendation  Continue Toprol with hold parameters  BP has been stable since the above adjustment  Follow-up with PCP as outpatient for further management  Lasix also changed to as needed on discharge

## 2024-11-07 NOTE — ASSESSMENT & PLAN NOTE
Controlled rate   Continue metoprolol 100 mg daily   On Eliquis 5 mg BID for stroke risk prevention     No episodes of bradycardia or profound pauses noted on telemetry

## 2024-11-07 NOTE — ASSESSMENT & PLAN NOTE
Lab Results   Component Value Date    HGBA1C 7.7 (A) 09/11/2024       Recent Labs     11/06/24  1042 11/06/24  1555 11/06/24 2007 11/07/24  0739   POCGLU 228* 219* 262* 213*       Blood Sugar Average: Last 72 hrs:  (P) [id number]  Resume home diabetic regimen

## 2024-11-07 NOTE — PLAN OF CARE
Problem: PAIN - ADULT  Goal: Verbalizes/displays adequate comfort level or baseline comfort level  Description: Interventions:  - Encourage patient to monitor pain and request assistance  - Assess pain using appropriate pain scale  - Administer analgesics based on type and severity of pain and evaluate response  - Implement non-pharmacological measures as appropriate and evaluate response  - Consider cultural and social influences on pain and pain management  - Notify physician/advanced practitioner if interventions unsuccessful or patient reports new pain  Outcome: Progressing     Problem: INFECTION - ADULT  Goal: Absence or prevention of progression during hospitalization  Description: INTERVENTIONS:  - Assess and monitor for signs and symptoms of infection  - Monitor lab/diagnostic results  - Monitor all insertion sites, i.e. indwelling lines, tubes, and drains  - Monitor endotracheal if appropriate and nasal secretions for changes in amount and color  - [place] appropriate cooling/warming therapies per order  - Administer medications as ordered  - Instruct and encourage patient and family to use good hand hygiene technique  - Identify and instruct in appropriate isolation precautions for identified infection/condition  Outcome: Progressing  Goal: Absence of fever/infection during neutropenic period  Description: INTERVENTIONS:  - Monitor WBC    Outcome: Progressing     Problem: SAFETY ADULT  Goal: Patient will remain free of falls  Description: INTERVENTIONS:  - Educate patient/family on patient safety including physical limitations  - Instruct patient to call for assistance with activity   - Consult OT/PT to assist with strengthening/mobility   - Keep Call bell within reach  - Keep bed low and locked with side rails adjusted as appropriate  - Keep care items and personal belongings within reach  - Initiate and maintain comfort rounds  - Make Fall Risk Sign visible to staff  - Offer Toileting every 2 Hours,  in advance of need  - Initiate/Maintain bed/chair alarm  - Obtain necessary fall risk management equipment: yellow socks  - Apply yellow socks and bracelet for high fall risk patients  - Consider moving patient to room near nurses station  Outcome: Progressing  Goal: Maintain or return to baseline ADL function  Description: INTERVENTIONS:  -  Assess patient's ability to carry out ADLs; assess patient's baseline for ADL function and identify physical deficits which impact ability to perform ADLs (bathing, care of mouth/teeth, toileting, grooming, dressing, etc.)  - Assess/evaluate cause of self-care deficits   - Assess range of motion  - Assess patient's mobility; develop plan if impaired  - Assess patient's need for assistive devices and provide as appropriate  - Encourage maximum independence but intervene and supervise when necessary  - Involve family in performance of ADLs  - Assess for home care needs following discharge   - Consider OT consult to assist with ADL evaluation and planning for discharge  - Provide patient education as appropriate  Outcome: Progressing  Goal: Maintains/Returns to pre admission functional level  Description: INTERVENTIONS:  - Perform AM-PAC 6 Click Basic Mobility/ Daily Activity assessment daily.  - Set and communicate daily mobility goal to care team and patient/family/caregiver.   - Collaborate with rehabilitation services on mobility goals if consulted  - Perform Range of Motion 3 times a day.  - Reposition patient every 2 hours.  - Dangle patient 3 times a day  - Stand patient 3 times a day  - Ambulate patient 3 times a day  - Out of bed to chair 3 times a day   - Out of bed for meals 3 times a day  - Out of bed for toileting  - Record patient progress and toleration of activity level   Outcome: Progressing     Problem: DISCHARGE PLANNING  Goal: Discharge to home or other facility with appropriate resources  Description: INTERVENTIONS:  - Identify barriers to discharge w/patient  and caregiver  - Arrange for needed discharge resources and transportation as appropriate  - Identify discharge learning needs (meds, wound care, etc.)  - Arrange for interpretive services to assist at discharge as needed  - Refer to Case Management Department for coordinating discharge planning if the patient needs post-hospital services based on physician/advanced practitioner order or complex needs related to functional status, cognitive ability, or social support system  Outcome: Progressing     Problem: Knowledge Deficit  Goal: Patient/family/caregiver demonstrates understanding of disease process, treatment plan, medications, and discharge instructions  Description: Complete learning assessment and assess knowledge base.  Interventions:  - Provide teaching at level of understanding  - Provide teaching via preferred learning methods  Outcome: Progressing     Problem: MUSCULOSKELETAL - ADULT  Goal: Maintain or return mobility to safest level of function  Description: INTERVENTIONS:  - Assess patient's ability to carry out ADLs; assess patient's baseline for ADL function and identify physical deficits which impact ability to perform ADLs (bathing, care of mouth/teeth, toileting, grooming, dressing, etc.)  - Assess/evaluate cause of self-care deficits   - Assess range of motion  - Assess patient's mobility  - Assess patient's need for assistive devices and provide as appropriate  - Encourage maximum independence but intervene and supervise when necessary  - Involve family in performance of ADLs  - Assess for home care needs following discharge   - Consider OT consult to assist with ADL evaluation and planning for discharge  - Provide patient education as appropriate  Outcome: Progressing  Goal: Maintain proper alignment of affected body part  Description: INTERVENTIONS:  - Support, maintain and protect limb and body alignment  - Provide patient/ family with appropriate education  Outcome: Progressing

## 2024-11-18 ENCOUNTER — RESULTS FOLLOW-UP (OUTPATIENT)
Dept: INTERNAL MEDICINE CLINIC | Facility: CLINIC | Age: 79
End: 2024-11-18

## 2024-11-18 ENCOUNTER — OFFICE VISIT (OUTPATIENT)
Dept: INTERNAL MEDICINE CLINIC | Facility: CLINIC | Age: 79
End: 2024-11-18
Payer: MEDICARE

## 2024-11-18 VITALS
WEIGHT: 207.2 LBS | OXYGEN SATURATION: 96 % | SYSTOLIC BLOOD PRESSURE: 112 MMHG | TEMPERATURE: 97.6 F | BODY MASS INDEX: 29.01 KG/M2 | HEIGHT: 71 IN | HEART RATE: 91 BPM | DIASTOLIC BLOOD PRESSURE: 64 MMHG

## 2024-11-18 DIAGNOSIS — Z01.00 DIABETIC EYE EXAM (HCC): ICD-10-CM

## 2024-11-18 DIAGNOSIS — Z23 ENCOUNTER FOR IMMUNIZATION: ICD-10-CM

## 2024-11-18 DIAGNOSIS — E83.51 HYPOCALCEMIA: ICD-10-CM

## 2024-11-18 DIAGNOSIS — N18.32 TYPE 2 DIABETES MELLITUS WITH STAGE 3B CHRONIC KIDNEY DISEASE, WITHOUT LONG-TERM CURRENT USE OF INSULIN (HCC): ICD-10-CM

## 2024-11-18 DIAGNOSIS — R55 SYNCOPE, UNSPECIFIED SYNCOPE TYPE: Primary | ICD-10-CM

## 2024-11-18 DIAGNOSIS — G90.3 NEUROGENIC ORTHOSTATIC HYPOTENSION (HCC): ICD-10-CM

## 2024-11-18 DIAGNOSIS — E11.9 DIABETIC EYE EXAM (HCC): ICD-10-CM

## 2024-11-18 DIAGNOSIS — E11.8 TYPE 2 DIABETES MELLITUS WITH COMPLICATION (HCC): ICD-10-CM

## 2024-11-18 DIAGNOSIS — G20.A1 PARKINSON'S DISEASE WITHOUT DYSKINESIA OR FLUCTUATING MANIFESTATIONS (HCC): ICD-10-CM

## 2024-11-18 DIAGNOSIS — I48.91 ATRIAL FIBRILLATION, UNSPECIFIED TYPE (HCC): ICD-10-CM

## 2024-11-18 DIAGNOSIS — D63.1 ANEMIA DUE TO CHRONIC KIDNEY DISEASE, UNSPECIFIED CKD STAGE: ICD-10-CM

## 2024-11-18 DIAGNOSIS — N18.9 ANEMIA DUE TO CHRONIC KIDNEY DISEASE, UNSPECIFIED CKD STAGE: ICD-10-CM

## 2024-11-18 DIAGNOSIS — E83.42 HYPOMAGNESEMIA: ICD-10-CM

## 2024-11-18 DIAGNOSIS — E11.22 TYPE 2 DIABETES MELLITUS WITH STAGE 3B CHRONIC KIDNEY DISEASE, WITHOUT LONG-TERM CURRENT USE OF INSULIN (HCC): ICD-10-CM

## 2024-11-18 DIAGNOSIS — I51.7 BIATRIAL ENLARGEMENT: ICD-10-CM

## 2024-11-18 PROBLEM — M32.13: Status: ACTIVE | Noted: 2024-11-18

## 2024-11-18 LAB
LEFT EYE DIABETIC RETINOPATHY: ABNORMAL
LEFT EYE IMAGE QUALITY: ABNORMAL
LEFT EYE MACULAR EDEMA: ABNORMAL
LEFT EYE OTHER RETINOPATHY: ABNORMAL
RIGHT EYE DIABETIC RETINOPATHY: ABNORMAL
RIGHT EYE IMAGE QUALITY: ABNORMAL
RIGHT EYE MACULAR EDEMA: ABNORMAL
RIGHT EYE OTHER RETINOPATHY: ABNORMAL
SEVERITY (EYE EXAM): ABNORMAL

## 2024-11-18 PROCEDURE — G0008 ADMIN INFLUENZA VIRUS VAC: HCPCS

## 2024-11-18 PROCEDURE — 90662 IIV NO PRSV INCREASED AG IM: CPT

## 2024-11-18 PROCEDURE — 99495 TRANSJ CARE MGMT MOD F2F 14D: CPT | Performed by: INTERNAL MEDICINE

## 2024-11-18 NOTE — PROGRESS NOTES
Transition of Care Visit  Name: Kaleb Ching III      : 1945      MRN: [id number]  Encounter Provider: Wilfird Mills DO  Encounter Date: 2024   Encounter department: [facility]    Assessment & Plan  Type 2 diabetes mellitus with complication (HCC)    Lab Results   Component Value Date    HGBA1C 7.7 (A) 2024       Orders:    IRIS Diabetic eye exam    Encounter for immunization    Orders:    influenza vaccine, high-dose, PF 0.5 mL (Fluzone High Dose)    Syncope, unspecified syncope type         Neurogenic orthostatic hypotension (HCC)         Atrial fibrillation, unspecified type (HCC)         Type 2 diabetes mellitus with stage 3b chronic kidney disease, without long-term current use of insulin (HCC)    Lab Results   Component Value Date    HGBA1C 7.7 (A) 2024       Orders:    Basic metabolic panel; Future    Parkinson's disease without dyskinesia or fluctuating manifestations (HCC)         Biatrial enlargement         Hypocalcemia    Orders:    Basic metabolic panel; Future    Hypomagnesemia    Orders:    Basic metabolic panel; Future    Magnesium; Future    Anemia due to chronic kidney disease, unspecified CKD stage    Orders:    Hemoglobin and hematocrit, blood; Future    Diabetic eye exam (HCC)            A/P: Doing better. Tolerating diet, meds,and activity. BP still good off lasix and norvasc. Will recheck labs. Keep f/u with cards for [facility]. Continue current meds and RTC one month for routine.   History of Present Illness     Transitional Care Management Review:   Kaleb Ching III is a 78 y.o. male here for TCM follow up.     During the TCM phone call patient stated:  TCM Call       Date and time call was made  2024  2:10 PM    Hospital care reviewed  Records reviewed    Patient was hospitial[facility] at  [facility]    Date of Admission  24    Date of discharge  24    Diagnosis  Syncope    Disposition  Home    Were the patients  medications reviewed and updated  Yes    Current Symptoms  None          TCM Call       Post hospital issues  None    Should patient be enrolled in anticoag monitoring?  No    Scheduled for follow up?  Yes    Patients specialists  Urologist    Urologist name  Carlos Moore     Urologist contact #  [phone]    Referrals needed  N/A    Did you obtain your prescribed medications  Yes    Do you need help managing your prescriptions or medications  No    Is transportation to your appointment needed  No    I have advised the patient to call PCP with any new or worsening symptoms  jorge bourgeois    Living Arrangements  Spouse or Significiant other    Support System  Home care staff    The type of support provided  Physical    Do you have social support  Yes, as much as I need    Are you recieving any outpatient services  No    Are you recieving home care services  No    Types of home care services  Home PT; Nurse visit    Are you using any community resources  No    Current waiver services  No    Have you fallen in the last 12 months  No    Interperter language line needed  No    Counseling  Patient    Counseling topics  Diagnostic results; instructions for management; Risk factor reduction; patient and family education; Prognosis; Activities of daily living; Importance of RX compliance          WM with h/o Afib, DM, parkinson's, etc, presents for brief admission after passing out. Pt admitted and r/o for CNS or cardiac issues. Echo done showed bilat atrial enlargement and some mild valvular disease.. Felt to have orthostatic hypotension and possibly autonomic dysfunction due to DM and Parkinson. Lasix and norvasc held. IMproved and d/c to home. D/c labs with some anemia, low calcium and low mag. Since d/c, doing better. No more syncope. Activity level increased. Appetite is good. Tolerating the meds. No new issues. Dementia unchanged.       Review of Systems   Constitutional:  Negative for activity change, chills,  "diaphoresis, fatigue and fever.   HENT: Negative.     Eyes:  Negative for visual disturbance.   Respiratory:  Negative for cough, chest tightness, shortness of breath and wheezing.    Cardiovascular:  Negative for chest pain, palpitations and leg swelling.   Gastrointestinal:  Negative for abdominal pain, constipation, diarrhea, nausea and vomiting.   Endocrine: Negative for cold intolerance and heat intolerance.   Genitourinary:  Negative for difficulty urinating, dysuria and frequency.   Musculoskeletal:  Negative for arthralgias, gait problem and myalgias.   Neurological:  Negative for dizziness, seizures, syncope, weakness, light-headedness and headaches.   Psychiatric/Behavioral:  Positive for confusion. Negative for dysphoric mood and sleep disturbance. The patient is not nervous/anxious.      Objective   /64   Pulse 91   Temp 97.6 °F (36.4 °C)   Ht 5' 11\" (1.803 m)   Wt 94 kg (207 lb 3.2 oz)   SpO2 96%   BMI 28.90 kg/m²     Physical Exam  Vitals and nursing note reviewed.   Constitutional:       General: He is not in acute distress.     Appearance: Normal appearance. He is not ill-appearing.   HENT:      Head: Normocephalic and atraumatic.      Mouth/Throat:      Mouth: Mucous membranes are moist.   Eyes:      Extraocular Movements: Extraocular movements intact.      Conjunctiva/sclera: Conjunctivae normal.      Pupils: Pupils are equal, round, and reactive to light.   Neck:      Vascular: No carotid bruit.   Cardiovascular:      Rate and Rhythm: Normal rate. Rhythm irregular.      Heart sounds: Normal heart sounds. No murmur heard.  Pulmonary:      Effort: Pulmonary effort is normal. No respiratory distress.      Breath sounds: Normal breath sounds. No wheezing, rhonchi or rales.   Abdominal:      General: Bowel sounds are normal. There is no distension.      Palpations: Abdomen is soft.      Tenderness: There is no abdominal tenderness.   Musculoskeletal:      Cervical back: Neck supple.      " Right lower leg: No edema.      Left lower leg: No edema.   Neurological:      General: No focal deficit present.      Mental Status: He is alert and oriented to person, place, and time. Mental status is at baseline.   Psychiatric:         Mood and Affect: Mood normal.         Behavior: Behavior normal.         Thought Content: Thought content normal.         Judgment: Judgment normal.       Medications have been reviewed by provider in current encounter

## 2024-11-18 NOTE — ASSESSMENT & PLAN NOTE
Lab Results   Component Value Date    HGBA1C 7.7 (A) 09/11/2024       Orders:    Basic metabolic panel; Future

## 2024-11-20 ENCOUNTER — OFFICE VISIT (OUTPATIENT)
Dept: CARDIOLOGY CLINIC | Facility: CLINIC | Age: 79
End: 2024-11-20
Payer: MEDICARE

## 2024-11-20 VITALS
WEIGHT: 209 LBS | HEART RATE: 80 BPM | SYSTOLIC BLOOD PRESSURE: 118 MMHG | HEIGHT: 71 IN | BODY MASS INDEX: 29.26 KG/M2 | DIASTOLIC BLOOD PRESSURE: 80 MMHG

## 2024-11-20 DIAGNOSIS — E78.2 MIXED HYPERLIPIDEMIA: ICD-10-CM

## 2024-11-20 DIAGNOSIS — I10 PRIMARY HYPERTENSION: Chronic | ICD-10-CM

## 2024-11-20 DIAGNOSIS — G90.3 NEUROGENIC ORTHOSTATIC HYPOTENSION (HCC): ICD-10-CM

## 2024-11-20 DIAGNOSIS — I48.91 ATRIAL FIBRILLATION, UNSPECIFIED TYPE (HCC): ICD-10-CM

## 2024-11-20 DIAGNOSIS — I27.20 PULMONARY HTN (HCC): Primary | ICD-10-CM

## 2024-11-20 DIAGNOSIS — I77.810 DILATED AORTIC ROOT (HCC): ICD-10-CM

## 2024-11-20 DIAGNOSIS — R55 SYNCOPE: ICD-10-CM

## 2024-11-20 PROCEDURE — 99213 OFFICE O/P EST LOW 20 MIN: CPT | Performed by: INTERNAL MEDICINE

## 2024-12-09 ENCOUNTER — APPOINTMENT (OUTPATIENT)
Dept: LAB | Facility: CLINIC | Age: 79
End: 2024-12-09
Payer: MEDICARE

## 2024-12-09 DIAGNOSIS — D63.1 ANEMIA DUE TO CHRONIC KIDNEY DISEASE, UNSPECIFIED CKD STAGE: ICD-10-CM

## 2024-12-09 DIAGNOSIS — E83.51 HYPOCALCEMIA: ICD-10-CM

## 2024-12-09 DIAGNOSIS — E11.22 TYPE 2 DIABETES MELLITUS WITH STAGE 3B CHRONIC KIDNEY DISEASE, WITHOUT LONG-TERM CURRENT USE OF INSULIN (HCC): ICD-10-CM

## 2024-12-09 DIAGNOSIS — N18.9 ANEMIA DUE TO CHRONIC KIDNEY DISEASE, UNSPECIFIED CKD STAGE: ICD-10-CM

## 2024-12-09 DIAGNOSIS — N18.32 TYPE 2 DIABETES MELLITUS WITH STAGE 3B CHRONIC KIDNEY DISEASE, WITHOUT LONG-TERM CURRENT USE OF INSULIN (HCC): ICD-10-CM

## 2024-12-09 DIAGNOSIS — E83.42 HYPOMAGNESEMIA: ICD-10-CM

## 2024-12-09 LAB
ANION GAP SERPL CALCULATED.3IONS-SCNC: 12 MMOL/L (ref 4–13)
BUN SERPL-MCNC: 23 MG/DL (ref 5–25)
CALCIUM SERPL-MCNC: 9.3 MG/DL (ref 8.4–10.2)
CHLORIDE SERPL-SCNC: 108 MMOL/L (ref 96–108)
CO2 SERPL-SCNC: 19 MMOL/L (ref 21–32)
CREAT SERPL-MCNC: 1.36 MG/DL (ref 0.6–1.3)
GFR SERPL CREATININE-BSD FRML MDRD: 49 ML/MIN/1.73SQ M
GLUCOSE P FAST SERPL-MCNC: 155 MG/DL (ref 65–99)
HCT VFR BLD AUTO: 41.8 % (ref 36.5–49.3)
HGB BLD-MCNC: 13 G/DL (ref 12–17)
MAGNESIUM SERPL-MCNC: 1.7 MG/DL (ref 1.9–2.7)
POTASSIUM SERPL-SCNC: 4.7 MMOL/L (ref 3.5–5.3)
SODIUM SERPL-SCNC: 139 MMOL/L (ref 135–147)

## 2024-12-09 PROCEDURE — 36415 COLL VENOUS BLD VENIPUNCTURE: CPT

## 2024-12-09 PROCEDURE — 85018 HEMOGLOBIN: CPT

## 2024-12-09 PROCEDURE — 80048 BASIC METABOLIC PNL TOTAL CA: CPT

## 2024-12-09 PROCEDURE — 83735 ASSAY OF MAGNESIUM: CPT

## 2024-12-09 PROCEDURE — 85014 HEMATOCRIT: CPT

## 2024-12-10 DIAGNOSIS — G30.9 ALZHEIMER'S DISEASE, UNSPECIFIED (CODE) (HCC): ICD-10-CM

## 2024-12-11 RX ORDER — MEMANTINE HYDROCHLORIDE 10 MG/1
10 TABLET ORAL 2 TIMES DAILY
Qty: 180 TABLET | Refills: 0 | Status: SHIPPED | OUTPATIENT
Start: 2024-12-11

## 2024-12-29 NOTE — PATIENT INSTRUCTIONS
"Patient Education     Carb counting for adults with diabetes   The Basics   Written by the doctors and editors at [facility]   What is carb counting? -- This is a type of meal planning that many people with diabetes use. It is a way to figure out how many carbohydrates, or \"carbs,\" you eat.  The body breaks down the food we eat into 3 main types of nutrients: carbs, proteins, and fats. Carbs are sugars and starches that come from food. The body uses carbs for energy.  Why do I need to count carbs? -- People with diabetes need to pay attention to how many carbs they eat. This is because carbs raise your blood sugar level.  Carb counting helps you:   Choose the right amount of insulin to take before meals and snacks - If you take insulin before meals, the dose depends on several things, including how many carbs you plan to eat. (It also depends on how much you plan to exercise and your blood sugar level.)   Plan your meals and snacks for the day - You can use carb counting to figure out how many carbs to eat at each meal and snack. This helps you make sure that you eat the right amount over the entire day.   Keep your blood sugar levels well managed - Spreading out the carbs you eat over a whole day can help keep your blood sugar from getting too high. If you take insulin or another diabetes medicine that can cause low blood sugar, eating about the same amount of carbs at each meal every day also helps keep your blood sugar from getting too low. Reducing the amount of carbs you eat can help you manage your diabetes better and prevent medical problems that diabetes can cause.  Your doctor, nurse, or dietitian (food expert) can help you figure out how many carbs to try to eat each day. This will depend on your eating habits, weight, activity level, and which diabetes medicines you take.  People who take insulin before meals might need to be very careful when they count the carbs in every meal and snack. This is so they " "can give themselves the right amount of insulin. If the insulin dose doesn't match the amount of carbs, their blood sugar might get too low or too high. Other people might be able to be a little more flexible as long as they get about the same amount of carbs at each meal or throughout the day.  Which foods have carbs? -- Foods with a lot of carbs include:   Grains - These include bread, pasta, rice, and cereal.   Fruits and starchy vegetables - Starchy vegetables include potatoes, corn, and squash.   Milk and other dairy products - Dairy products include cheese and yogurt.   Foods with added sugar - These include sweets and baked goods likes cookies and cakes, as well as sugary drinks like juice and soda.  It is best to get most of your carbs from fruits, vegetables, whole grains (like whole-wheat bread, whole-grain cereals, and brown rice), and low-fat milk and dairy products.  How do I count carbs? -- To count carbs in packaged foods, check the food's nutrition label (if it has one).  On the label (figure 1), check for:   \"Total Carbohydrate\" number - This tells you how many carbs are in 1 serving size of the food. If you eat 1 serving, then the number of carbs you eat is the same as the number of total carbohydrates.   \"Serving size\" - This tells you how much food is in 1 serving. If you have 2 servings, the number of carbs will be 2 times the number of carbohydrates listed.   \"Dietary Fiber\" - Fiber is a carb that is not digested, which means that it does not raise blood sugar. Foods with a lot of fiber can help manage your blood sugar. If a food has more than 5 grams (g) of fiber, you need less insulin to cover the total carbs in that food. So, if you are calculating an insulin dose, only count the carbs that are not from fiber (figure 1).  What is exchange planning? -- Exchange planning, or the \"exchange system,\" is a way for people to plan their meals without reading labels. This can be helpful since many " "foods don't come with a nutrition label.  The exchange system involves knowing how much of different foods have about 15 grams of carbs (table 1 and table 2 and table 3). Your doctor, nurse, or dietitian gives you a certain number of \"carb choices\" to eat with each meal and snack (table 4). Each \"choice\" is a portion of food that has about 15 grams of carbs. Knowing your options makes it easier to \"exchange\" 1 carb choice for another as you plan your meals and snacks. For example, 1 small apple could be exchanged for 1/3 cup of pasta.  How can I plan my meals? -- First, make sure that you know how many carbs you should be eating each day. Ask your doctor, nurse, or dietitian if you are not sure.  Here are some tips that might help:   Spread out your carbs over 4 to 6 small meals each day instead of 3 big ones.   Eat a similar number of carbs at each meal, for example, at each dinner.   Eat your meals at a similar time each day.   Plan your meals ahead of time.   Use the \"plate method.\" This is a simpler way to make sure that you get a good balance of carbs and other nutrients with each meal. It is not as exact as counting all of your carbs, but it can be helpful for people who prefer a simpler approach. If you take insulin before meals, it is generally better to adjust your insulin dose by counting how many carbs you plan to eat or using the exchange planning strategy.  For the plate method, you start with a plate about 9 inches (23 cm) across. Fill it with (figure 2):   1/2 non-starchy vegetables   1/4 protein   1/4 carbs   Follow your doctor's instructions for how and when to check your blood sugar. This can help you learn how certain foods affect your blood sugar.   Keep track of your meals and blood sugar levels. Show this to your doctor or nurse so they can adjust your treatment if needed. If you take insulin, you will also need to keep track of your exercise patterns and how much insulin you give yourself with " "each dose.   If you take insulin, make sure that you understand how to use it. This includes knowing how to adjust the dose based on your blood sugar level and what you plan to eat. Foods that have a lot of protein or fat also can affect your blood sugar level. Some people need to adjust their insulin doses when they eat these foods.   Remember that other things besides carbs can raise or lower your blood sugar level. These things can include exercise, getting sick, drinking alcohol, traveling, and stress. If you take insulin, make sure that you know how and when to adjust your dose in these situations.  If you are having trouble counting carbs or managing your blood sugar, talk to your doctor or nurse. They can help. A dietitian can also help you plan specific menus that will give you the right amount of carbs each day.  For more information, you can also get a book on counting carbs or check the American Diabetes Association website (www.diabetes.org).  All topics are updated as new evidence becomes available and our peer review process is complete.  This topic retrieved from [employer] on: Mar 27, 2024.  Topic 29135 Version 11.0  Release: 32.2.4 - C32.85  © 2024 UpToDate, Inc. and/or its affiliates. All rights reserved.  figure 1: Counting carbohydrates     To figure out the \"carb count\" in 1 serving, start with the number of grams of total carbohydrates (46 grams), then subtract the number of grams of dietary fiber (7 grams). It's also important to look at the serving size. In this example, the carb count is 39 grams. You can use this number when counting carbs for your insulin dose.  Graphic 73103 Version 8.0  table 1: Bread and grains with 15 grams of carbs*  Bread    Food  Serving size    Bagel 1/4 large bagel (1 oz)   Biscuit 1 biscuit (2.5 inches across)   Bread, reduced calorie, light 2 slices (1.5 oz)   Cornbread 1.75 inch cube (1.5 oz)   English muffin 1/2 muffin   Hot dog or hamburger bun 1/2 bun (3/4 oz) " "  Naan, chapati, or roti 1 oz   Pancake 1 pancake (4 inches across, 1/4 inch thick)   Phoebe (6 inches across) 1/2 phoebe   Tortilla, corn 1 small tortilla (6 inches across)   Tortilla, flour (white or whole wheat) 1 small tortilla (6 inches across) or 1/3 large tortilla (10 inches across)   Waffle 1 waffle (4-inch square or 4 inches across)   Cereals and grains (including pasta and rice)    Food  Serving size (cooked)    Barley, couscous, millet, pasta (white or whole wheat, all shapes and sizes), polenta, quinoa (all colors), or rice (white, brown, and other colors and types) 1/3 cup   Bran cereal (twigs, buds, or flakes), shredded wheat (plain), or sugar-coated cereal 1/2 cup   Bulgur, kasha, tabbouleh (tabouli), or wild rice 1/2 cup   Granola cereal 1/4 cup   Hot cereal (oats, oatmeal, grits) 1/2 cup   Unsweetened, ready-to-eat cereal 3/4 cup   * For bread and grains, 15 grams of carbs is considered 1 serving or \"choice\" for people who need to count carbs.  Graphic 245988 Version 1.0  table 2: Fruits with 15 grams of carbs*  Food  Serving size    Applesauce, unsweetened 1/2 cup   Banana 1 extra small banana, about 4 inches long (4 oz)   Blueberries 3/4 cup   Dried fruits (blueberries, cherries, cranberries, mixed fruit, raisins) 2 tbsp   Fruit, canned 1/2 cup   Fruit, whole, small (apple) 1 small fruit (4 oz)   Fruit, whole, medium (nectarine, orange, pear, tangerine) 1 medium fruit (6 oz)   Fruit juice, unsweetened 1/2 cup   Grapes 17 small grapes (3 oz)   Melon, diced 1 cup   Strawberries, whole 1 and 1/4 cups   When listed, weight (oz) includes skin and seeds. If you are not sure if your fruit is the right size for 1 serving, you can use a food scale to check the weight.  * For fruits, 15 grams of carbs is considered 1 serving or \"choice\" for people who need to count carbs.  Graphic 803004 Version 1.0  table 3: Starchy vegetables with 15 grams of carbs*  Food  Serving size (cooked)    Cassava, dasheen, or " "plantain 1/3 cup   Corn, green peas, mixed vegetables, or parsnips 1/2 cup   Marinara, pasta, or spaghetti sauce 1/2 cup   Mixed vegetables (with corn or peas) 1 cup   Potato, baked with skin 1/4 large (3 oz)   Potato, Croatian-fried (oven-baked) 1 cup (2 oz)   Potato, mashed with milk and fat 1/2 cup   Squash, winter (acorn, butternut) 1 cup   Yam or sweet potato, plain 1/2 cup (3 and 1/2 oz)   If you are not sure if your vegetable is the right size for 1 serving, you can use a food scale to check the weight.  * For starchy vegetables, 15 grams of carbs is considered 1 serving or \"choice\" for people who need to count carbs.  Graphic 197872 Version 1.0  table 4: Sample exchange system meal plan  Time  Exchange pattern  Sample menu  Carbohydrate count (g)    8 am 3 carbohydrate group    2 starch 1 English muffin 30    1 fruit 1 1/4 c strawberries 15    1 protein group 1/4 c cottage cheese -    1 fat group 1 tsp margarine -      Total: 45    12 noon 4 carbohydrate group    2 starch 2 slices of bread 30    1 fruit 1 orange 15    1 vegetable 1 c salad -    1 milk 8 oz skim milk 12    3 protein group 3 oz chicken -    1 fat group 1 tbsp low fat hernandez -      Total: 57    3 pm 1 carbohydrate group    1 fruit or 1 starch 1 apple or 6 crackers 15      Total: 15    6 pm 4 carbohydrate group    2 starch 1 c potato 30    1 fruit 1/2 c fruit salad 15    1 vegetable 1 c salad -    1 milk 8 oz skim milk 12    6 protein group 6 oz fish -    1 fat group 2 tbsp low fat salad dressing -      Total: 57    9 pm 1 carbohydrate group    1 starch 6 crackers 15    1 protein 2 tbsp peanut butter -      Total: 15    Graphic 96059 Version 3.0  figure 2: The \"plate method\"     For the plate method, you start with a plate about 9 inches (23 cm) across. Then fill it with 1/2 non-starchy vegetables, 1/4 protein, and 1/4 carbs.  Graphic 643209 Version 2.0  Consumer Information Use and Disclaimer   Disclaimer: This generalized information is a limited " summary of diagnosis, treatment, and/or medication information. It is not meant to be comprehensive and should be used as a tool to help the user understand and/or assess potential diagnostic and treatment options. It does NOT include all information about conditions, treatments, medications, side effects, or risks that may apply to a specific patient. It is not intended to be medical advice or a substitute for the medical advice, diagnosis, or treatment of a health care provider based on the health care provider's examination and assessment of a patient's specific and unique circumstances. Patients must speak with a health care provider for complete information about their health, medical questions, and treatment options, including any risks or benefits regarding use of medications. This information does not endorse any treatments or medications as safe, effective, or approved for treating a specific patient. UpToDate, Inc. and its affiliates disclaim any warranty or liability relating to this information or the use thereof.The use of this information is governed by the Terms of Use, available at https://www.wolterskluwer.com/en/know/clinical-effectiveness-terms. 2024© UpToDate, Inc. and its affiliates and/or licensors. All rights reserved.  Copyright   © 2024 UpToDate, Inc. and/or its affiliates. All rights reserved.

## 2025-01-02 DIAGNOSIS — I10 HYPERTENSION, UNSPECIFIED TYPE: ICD-10-CM

## 2025-01-02 DIAGNOSIS — E11.8 TYPE 2 DIABETES MELLITUS WITH COMPLICATION (HCC): ICD-10-CM

## 2025-01-02 NOTE — TELEPHONE ENCOUNTER
Reason for call:   [x] Refill   [] Prior Auth  [] Other:     Office:   [x] PCP/Provider - [facility]- Wilfrid Mills DO   [] Specialty/Provider -     Medication: metFORMIN (GLUCOPHAGE) 850 mg tablet     Dose/Frequency: Take 1 tablet (850 mg total) by mouth 2 (two) times a day     Quantity: 180 tablet    Pharmacy: [id number] [address] [phone]    Does the patient have enough for 3 days?   [x] Yes   [] No - Send as HP to POD

## 2025-01-08 ENCOUNTER — APPOINTMENT (OUTPATIENT)
Dept: LAB | Facility: CLINIC | Age: 80
End: 2025-01-08
Payer: MEDICARE

## 2025-01-08 ENCOUNTER — OFFICE VISIT (OUTPATIENT)
Dept: INTERNAL MEDICINE CLINIC | Facility: CLINIC | Age: 80
End: 2025-01-08
Payer: MEDICARE

## 2025-01-08 VITALS
OXYGEN SATURATION: 96 % | HEIGHT: 70 IN | BODY MASS INDEX: 30.06 KG/M2 | SYSTOLIC BLOOD PRESSURE: 112 MMHG | DIASTOLIC BLOOD PRESSURE: 68 MMHG | WEIGHT: 210 LBS | HEART RATE: 91 BPM | TEMPERATURE: 96.5 F

## 2025-01-08 DIAGNOSIS — I77.810 DILATED AORTIC ROOT (HCC): ICD-10-CM

## 2025-01-08 DIAGNOSIS — I48.91 ATRIAL FIBRILLATION, UNSPECIFIED TYPE (HCC): ICD-10-CM

## 2025-01-08 DIAGNOSIS — F41.1 GENERALIZED ANXIETY DISORDER: ICD-10-CM

## 2025-01-08 DIAGNOSIS — D64.9 ANEMIA, UNSPECIFIED TYPE: ICD-10-CM

## 2025-01-08 DIAGNOSIS — E78.2 MIXED HYPERLIPIDEMIA: ICD-10-CM

## 2025-01-08 DIAGNOSIS — Z99.89 DEPENDENCE ON CANE: ICD-10-CM

## 2025-01-08 DIAGNOSIS — G30.9 ALZHEIMER'S DISEASE, UNSPECIFIED (CODE) (HCC): ICD-10-CM

## 2025-01-08 DIAGNOSIS — G20.A1 PARKINSON'S DISEASE WITHOUT DYSKINESIA OR FLUCTUATING MANIFESTATIONS (HCC): ICD-10-CM

## 2025-01-08 DIAGNOSIS — I10 PRIMARY HYPERTENSION: Chronic | ICD-10-CM

## 2025-01-08 DIAGNOSIS — Z13.5 SCREENING FOR DIABETIC RETINOPATHY: ICD-10-CM

## 2025-01-08 DIAGNOSIS — E11.22 TYPE 2 DIABETES MELLITUS WITH STAGE 3B CHRONIC KIDNEY DISEASE, WITHOUT LONG-TERM CURRENT USE OF INSULIN (HCC): Primary | ICD-10-CM

## 2025-01-08 DIAGNOSIS — R97.20 ELEVATED PROSTATE SPECIFIC ANTIGEN (PSA): ICD-10-CM

## 2025-01-08 DIAGNOSIS — R25.2 MUSCLE CRAMPS: ICD-10-CM

## 2025-01-08 DIAGNOSIS — I27.20 PULMONARY HTN (HCC): ICD-10-CM

## 2025-01-08 DIAGNOSIS — M32.13 SYSTEMIC LUPUS ERYTHEMATOSUS WITH LUNG INVOLVEMENT, UNSPECIFIED SLE TYPE (HCC): ICD-10-CM

## 2025-01-08 DIAGNOSIS — E27.9 ADRENAL NODULE (HCC): ICD-10-CM

## 2025-01-08 DIAGNOSIS — M19.91 PRIMARY OSTEOARTHRITIS, UNSPECIFIED SITE: ICD-10-CM

## 2025-01-08 DIAGNOSIS — K21.9 GERD WITHOUT ESOPHAGITIS: ICD-10-CM

## 2025-01-08 DIAGNOSIS — R26.9 GAIT ABNORMALITY: ICD-10-CM

## 2025-01-08 DIAGNOSIS — N18.32 STAGE 3B CHRONIC KIDNEY DISEASE (HCC): ICD-10-CM

## 2025-01-08 DIAGNOSIS — Z79.01 CHRONIC ANTICOAGULATION: ICD-10-CM

## 2025-01-08 DIAGNOSIS — N18.32 TYPE 2 DIABETES MELLITUS WITH STAGE 3B CHRONIC KIDNEY DISEASE, WITHOUT LONG-TERM CURRENT USE OF INSULIN (HCC): Primary | ICD-10-CM

## 2025-01-08 LAB
PSA SERPL-MCNC: 5.83 NG/ML (ref 0–4)
SL AMB POCT HEMOGLOBIN AIC: 7.9 (ref ?–6.5)

## 2025-01-08 PROCEDURE — 84153 ASSAY OF PSA TOTAL: CPT

## 2025-01-08 PROCEDURE — 36415 COLL VENOUS BLD VENIPUNCTURE: CPT

## 2025-01-08 PROCEDURE — 83036 HEMOGLOBIN GLYCOSYLATED A1C: CPT | Performed by: INTERNAL MEDICINE

## 2025-01-08 PROCEDURE — 99214 OFFICE O/P EST MOD 30 MIN: CPT | Performed by: INTERNAL MEDICINE

## 2025-01-08 RX ORDER — GLIMEPIRIDE 2 MG/1
2 TABLET ORAL
Qty: 100 TABLET | Refills: 3 | Status: SHIPPED | OUTPATIENT
Start: 2025-01-08

## 2025-01-08 NOTE — PROGRESS NOTES
Name: Kaleb Ching III      : 1945      MRN: [id number]  Encounter Provider: Wilfrid Mills DO  Encounter Date: 2025   Encounter department: [facility], [facility] within [facility]  :  Assessment & Plan  Screening for diabetic retinopathy         Type 2 diabetes mellitus with stage 3b chronic kidney disease, without long-term current use of insulin (HCC)    Lab Results   Component Value Date    HGBA1C 7.7 (A) 2024       Orders:    POCT hemoglobin A1c    Albumin / creatinine urine ratio; Future    Hepatic function panel; Future    Lipid Panel with Direct LDL reflex; Future    glimepiride (AMARYL) 2 mg tablet; Take 1 tablet (2 mg total) by mouth daily with breakfast    Atrial fibrillation, unspecified type (HCC)    Orders:    TSH, 3rd generation; Future    Primary hypertension    Orders:    Hepatic function panel; Future    Lipid Panel with Direct LDL reflex; Future    Pulmonary HTN (HCC)         Systemic lupus erythematosus with lung involvement, unspecified SLE type (HCC)         GERD without esophagitis         Alzheimer's disease, unspecified (CODE) (HCC)         Parkinson's disease without dyskinesia or fluctuating manifestations (HCC)    Orders:    Ambulatory referral to Physical Therapy; Future    Primary osteoarthritis, unspecified site    Orders:    Ambulatory referral to Physical Therapy; Future    Stage 3b chronic kidney disease (HCC)  Lab Results   Component Value Date    EGFR 49 2024    EGFR 47 2024    EGFR 45 2024    CREATININE 1.36 (H) 2024    CREATININE 1.40 (H) 2024    CREATININE 1.46 (H) 2024       Orders:    Hepatic function panel; Future    Lipid Panel with Direct LDL reflex; Future    TSH, 3rd generation; Future    Uric acid; Future    PTH, intact; Future    Generalized anxiety disorder         Anemia, unspecified type    Orders:    TSH, 3rd generation; Future    Chronic anticoagulation         Mixed hyperlipidemia    Orders:    Hepatic function  panel; Future    Lipid Panel with Direct LDL reflex; Future    Dilated aortic root (HCC)         Adrenal nodule (HCC)         Gait abnormality    Orders:    Ambulatory referral to Physical Therapy; Future    Muscle cramps    Orders:    Ambulatory referral to Physical Therapy; Future    Dependence on cane         A/P; Doing ok, but slow overall decline noted. Check labs. In office HgA1c was elevated at 7.9. Cost is a factor and on SGLT2 and metformin. Will add low dose amaryl. Gait is unsteady and some muscle stiffness. Refer to PT. . Discussed vaccines and is up to date. Sees DPM. Continue current treatment and RTC six weeks for f/u DM, labs, and gait/muscle.        History of Present Illness     WM RTC with his wife for f/u SDAT, DM< etc. Doing ok and no new issues. Remains as active as his memory and DJD will allow with no falls recently. Reports sugars less than 175 and no low sugar events. SDAT and parkinson's slowly worsening, but no safety concerns per wife. Chronic pain and SLE ae manageable. No reflux. NO stroke like events. Remains on chronic anticoagulation and no bleeding issues. Denies CP, SOB, edema, palpitations, orthopnea or PND. Due for labs and vaccines.       Review of Systems   Constitutional:  Negative for activity change, chills, diaphoresis, fatigue and fever.   HENT: Negative.     Eyes:  Negative for visual disturbance.   Respiratory:  Negative for cough, chest tightness, shortness of breath and wheezing.    Cardiovascular:  Negative for chest pain, palpitations and leg swelling.   Gastrointestinal:  Negative for abdominal pain, constipation, diarrhea, nausea and vomiting.   Endocrine: Negative for cold intolerance and heat intolerance.   Genitourinary:  Negative for difficulty urinating, dysuria and frequency.   Musculoskeletal:  Positive for arthralgias and myalgias. Negative for gait problem.   Neurological:  Negative for dizziness, seizures, syncope, weakness, light-headedness and  "headaches.   Psychiatric/Behavioral:  Positive for confusion. Negative for dysphoric mood and sleep disturbance. The patient is not nervous/anxious.        Objective   /68 (Patient Position: Sitting, Cuff Size: Large)   Pulse 91   Temp (!) 96.5 °F (35.8 °C) (Tympanic)   Ht 5' 10\" (1.778 m)   Wt 95.3 kg (210 lb)   SpO2 96%   BMI 30.13 kg/m²      Physical Exam  Vitals and nursing note reviewed.   Constitutional:       General: He is not in acute distress.     Appearance: Normal appearance. He is not ill-appearing.   HENT:      Head: Normocephalic and atraumatic.      Mouth/Throat:      Mouth: Mucous membranes are moist.   Eyes:      Extraocular Movements: Extraocular movements intact.      Conjunctiva/sclera: Conjunctivae normal.      Pupils: Pupils are equal, round, and reactive to light.   Neck:      Vascular: No carotid bruit.   Cardiovascular:      Rate and Rhythm: Normal rate. Rhythm irregular.      Heart sounds: Normal heart sounds. No murmur heard.  Pulmonary:      Effort: Pulmonary effort is normal. No respiratory distress.      Breath sounds: Normal breath sounds. No wheezing, rhonchi or rales.   Abdominal:      General: Bowel sounds are normal. There is no distension.      Palpations: Abdomen is soft.      Tenderness: There is no abdominal tenderness.   Musculoskeletal:      Cervical back: Neck supple.      Right lower leg: No edema.      Left lower leg: No edema.   Neurological:      General: No focal deficit present.      Mental Status: He is alert and oriented to person, place, and time. Mental status is at baseline.   Psychiatric:         Mood and Affect: Mood normal.         Behavior: Behavior normal.      Comments: Repeats self. Short term memory off. Tremors noted.          "

## 2025-01-08 NOTE — ASSESSMENT & PLAN NOTE
Lab Results   Component Value Date    EGFR 49 12/09/2024    EGFR 47 11/07/2024    EGFR 45 11/06/2024    CREATININE 1.36 (H) 12/09/2024    CREATININE 1.40 (H) 11/07/2024    CREATININE 1.46 (H) 11/06/2024       Orders:    Hepatic function panel; Future    Lipid Panel with Direct LDL reflex; Future    TSH, 3rd generation; Future    Uric acid; Future    PTH, intact; Future

## 2025-01-08 NOTE — ASSESSMENT & PLAN NOTE
Lab Results   Component Value Date    HGBA1C 7.7 (A) 09/11/2024       Orders:    POCT hemoglobin A1c    Albumin / creatinine urine ratio; Future    Hepatic function panel; Future    Lipid Panel with Direct LDL reflex; Future    glimepiride (AMARYL) 2 mg tablet; Take 1 tablet (2 mg total) by mouth daily with breakfast

## 2025-01-20 DIAGNOSIS — M25.561 CHRONIC PAIN OF RIGHT KNEE: ICD-10-CM

## 2025-01-20 DIAGNOSIS — G89.29 CHRONIC PAIN OF RIGHT KNEE: ICD-10-CM

## 2025-01-20 DIAGNOSIS — M77.42 METATARSALGIA OF BOTH FEET: ICD-10-CM

## 2025-01-20 DIAGNOSIS — E11.42 DIABETIC POLYNEUROPATHY ASSOCIATED WITH TYPE 2 DIABETES MELLITUS (HCC): Chronic | ICD-10-CM

## 2025-01-20 DIAGNOSIS — M77.41 METATARSALGIA OF BOTH FEET: ICD-10-CM

## 2025-01-20 DIAGNOSIS — F32.9 REACTIVE DEPRESSION: ICD-10-CM

## 2025-01-20 DIAGNOSIS — G89.4 CHRONIC PAIN SYNDROME: ICD-10-CM

## 2025-01-20 RX ORDER — DULOXETIN HYDROCHLORIDE 30 MG/1
30 CAPSULE, DELAYED RELEASE ORAL DAILY
Qty: 90 CAPSULE | Refills: 1 | Status: SHIPPED | OUTPATIENT
Start: 2025-01-20

## 2025-01-23 DIAGNOSIS — N18.32 TYPE 2 DIABETES MELLITUS WITH STAGE 3B CHRONIC KIDNEY DISEASE, WITHOUT LONG-TERM CURRENT USE OF INSULIN (HCC): ICD-10-CM

## 2025-01-23 DIAGNOSIS — E78.2 MIXED HYPERLIPIDEMIA: ICD-10-CM

## 2025-01-23 DIAGNOSIS — E11.22 TYPE 2 DIABETES MELLITUS WITH STAGE 3B CHRONIC KIDNEY DISEASE, WITHOUT LONG-TERM CURRENT USE OF INSULIN (HCC): ICD-10-CM

## 2025-01-23 RX ORDER — ATORVASTATIN CALCIUM 20 MG/1
20 TABLET, FILM COATED ORAL DAILY
Qty: 30 TABLET | Refills: 0 | Status: SHIPPED | OUTPATIENT
Start: 2025-01-23

## 2025-01-23 NOTE — TELEPHONE ENCOUNTER
Patient needs updated blood work and has previously placed orders. Please contact patient to go for labs. Courtesy refill provided.    Pt needs full Lipid Panel done

## 2025-01-27 NOTE — PROGRESS NOTES
PT Evaluation     Today's date: 25  Patient name: Kaleb Ching III  : 1945  MRN: [id number]  Referring provider: Wilfrid Mills DO  Dx:   Encounter Diagnosis     ICD-10-CM    1. Parkinson's disease, unspecified whether dyskinesia present, unspecified whether manifestations fluctuate ([facility])  G20.A1                      Assessment  Impairments: abnormal gait, abnormal or restricted ROM, activity intolerance, impaired balance, impaired physical strength, lacks appropriate home exercise program, pain with function and safety issue  Symptom irritability: moderate    Assessment details: Kaleb is a 79 year old patient presenting to OPPT for evaluation regarding decreased functional mobility and endurance. Upon evaluation they show impairments in the following areas: cognition with STM loss,  B hip strength, B hamstring strength, balance, endurance and functional mobility. These impairments limit their ability to perform daily activities as well as their previous level of function causing decreased quality of life.  Patient requires continued skilled PT services in order to improve aforementioned impairments so that they are able to return to highest level of function possible. Without initiation of skilled PT services, patient will continue to have decreased functional mobility and endurance.     Barriers to therapy: SMALLS  Understanding of Dx/Px/POC: good     Prognosis: good    Goals  STG (4 weeks)   Improve (reduce) TUG score by 4 seconds indicating meaningful improvement in fall risk   Improve TUG score by 5 seconds indicating meaningful improvement in fall risk   Improve/Reduce 5x STS score by 5 seconds indicating meaningful improvement in fall risk and power generation   Improve distance for 2 min walk test by 20 feet       LTG (8 weeks)   Improve (reduce) TUG score to 10.5 seconds indicating meaningful improvement in fall risk   Improve/Reduce 5x STS score to < 10 seconds seconds indicating  "meaningful improvement in fall risk and power generation   Improve FOTO score to exceed predicted value to prove functional gains  Improve distance for 2 min walk test by 50 feet      Plan  Patient would benefit from: skilled physical therapy  Referral necessary: No  Planned modality interventions: cryotherapy and thermotherapy: hydrocollator packs    Planned therapy interventions: manual therapy, neuromuscular re-education, postural training, self care, stretching, strengthening, therapeutic activities, therapeutic exercise, home exercise program, flexibility, gait training and balance    Frequency: 2x week  Duration in weeks: 12  Plan of Care beginning date: 1/28/2025  Plan of Care expiration date: 4/28/2025  Treatment plan discussed with: patient        Subjective Evaluation    History of Present Illness  Mechanism of injury: Kaleb is a 79 year old patient presenting to OPPT for evaluation regarding increased fall risk, in setting of parkinson's disease. Had bout of syncope that left patient in hospital.     Per EMR: \"WM RTC with his wife for f/u SDAT, DM< etc. Doing ok and no new issues. Remains as active as his memory and DJD will allow with no falls recently. Reports sugars less than 175 and no low sugar events. SDAT and parkinson's slowly worsening, but no safety concerns per wife. Chronic pain and SLE ae manageable. No reflux. NO stroke like events. Remains on chronic anticoagulation and no bleeding issues. Denies CP, SOB, edema, palpitations, orthopnea or PND. Due for labs and vaccines. \"     1/28/25 Pt  notes he had a syncopal episode and was hospitalized. Pt notes at home he wall walks/furniture walks and uses SPC if he goes out. Pt states he has not been exercising at home. Would like to go to the gym to hit the speed bag but was advised against it by his wife. Pt notes he has occ joint pain but none if he is warm. Notes stiffness in the R knee and cramping behind both knees.  Patient Goals  Patient " goals for therapy: improved balance, increased strength, independence with ADLs/IADLs and return to sport/leisure activities  Patient goal: go to gym  Pain  No pain reported    Social Support  Steps to enter house: yes  2  Stairs in house: no   Lives in: one-story house  Lives with: spouse    Employment status: not working  Hand dominance: right    Treatments  Previous treatment: physical therapy  Current treatment: physical therapy        Objective      Balance Test    6 Minute Walk Test (ft):    2 Minute Walk Test (ft):  W/SPC  240'   Gait Speed (ft/s):    5x Sit To Stand (s): 21.9 sec (R knee pain)   FGA:    ABC:     4 Square Step Test     Gait Disorientation Test:     TUG: W/SPC 28 sec         MCTSIB Number of Seconds   Feet Together, Eyes Open      Feet Together, Eyes Closed    Feet Together, Eyes Open Foam    Feet Together, Eyes Closed Foam        Coordination Left Right   Heel To Shin normal normal   Finger To Nose Normal  EO/EC Normal  EO/EC   Rapid Alternating Movement normal normal   UE     LE         Sensation Left Right   Kinesthesia     Light Touch     Sharp/Dull     Graphesthesia      2 Point Discrimination         Muscle Tone Left Right   Modified Celsa Scale     Hamstring     Gastroc     Quad       Deep Tendon Reflexes  Left Right   Patellar (L3/4)      Achilles (S1/2)     Brachioradialis (C5/6)     Biceps (C5/6)     Triceps (C7/8)       Pathological Reflexes  Left Right   Sood      Babinski     Clonus        Manual Muscle Testing - Hip Left Right   Flexion 4- 4-   Extension 4 4   Abduction 4- 4-   External Rotation       Manual Muscle Testing - Knee Left Right   Flexion 4 4   Extension 4+ 4+     Manual Muscle Testing - Ankle Left Right   Doriflexion 5 5   Plantarflexion 5 5        Transfers    Sit To Stand Independent   W/C To Bed Independent   Sit To Supine Independent   Roll Independent           Daily Treatment Diary     Precautions: Fall risk, anemia , SMALLS, B THR's  CO-MORBIDITIES: T2DM,  HTN, OA, A-Fib, Diabetic Neuropathy, Alzheimer's , CVA   HEP ACCESS CODE:   FOTO Completed On: 1/28/25    POC Expires Reeval for Medicare to be completed  Unit Limit Auth Expiration Date PT/OT/STVisit Limit   4/28/25 By visit 10 6 OT/PT same code N/a BOMN    Completed on visit                    Auth Status DATE 1/28        NA Visit # 1         Remaining         MANUAL THERAPY                                                               THERAPEUTIC EXERCISE HEP 1/28        Nustep  L2  10'        Knee ext mach          Knee flex mach          Leg press                                                                                                                        NEUROMUSCULAR REEDUCATION           Static Balance           Dynamic Balance           Amb with head turns           HKM           Bwd amb           Multidirectional stepping           SLS progressions           Ham stretch          Calf stretch          Low hurdles                                                  THERAPEUTIC ACTIVITY                                                  GAIT TRAINING                                                  MODALITIES

## 2025-01-28 ENCOUNTER — EVALUATION (OUTPATIENT)
Dept: PHYSICAL THERAPY | Facility: CLINIC | Age: 80
End: 2025-01-28
Payer: MEDICARE

## 2025-01-28 ENCOUNTER — APPOINTMENT (OUTPATIENT)
Dept: LAB | Facility: CLINIC | Age: 80
End: 2025-01-28
Payer: MEDICARE

## 2025-01-28 DIAGNOSIS — I10 PRIMARY HYPERTENSION: Chronic | ICD-10-CM

## 2025-01-28 DIAGNOSIS — N18.32 STAGE 3B CHRONIC KIDNEY DISEASE (HCC): ICD-10-CM

## 2025-01-28 DIAGNOSIS — G20.A1 PARKINSON'S DISEASE, UNSPECIFIED WHETHER DYSKINESIA PRESENT, UNSPECIFIED WHETHER MANIFESTATIONS FLUCTUATE (HCC): Primary | ICD-10-CM

## 2025-01-28 DIAGNOSIS — N18.32 TYPE 2 DIABETES MELLITUS WITH STAGE 3B CHRONIC KIDNEY DISEASE, WITHOUT LONG-TERM CURRENT USE OF INSULIN (HCC): ICD-10-CM

## 2025-01-28 DIAGNOSIS — D64.9 ANEMIA, UNSPECIFIED TYPE: ICD-10-CM

## 2025-01-28 DIAGNOSIS — E78.2 MIXED HYPERLIPIDEMIA: ICD-10-CM

## 2025-01-28 DIAGNOSIS — I48.91 ATRIAL FIBRILLATION, UNSPECIFIED TYPE (HCC): ICD-10-CM

## 2025-01-28 DIAGNOSIS — E11.22 TYPE 2 DIABETES MELLITUS WITH STAGE 3B CHRONIC KIDNEY DISEASE, WITHOUT LONG-TERM CURRENT USE OF INSULIN (HCC): ICD-10-CM

## 2025-01-28 LAB
ALBUMIN SERPL BCG-MCNC: 4.3 G/DL (ref 3.5–5)
ALP SERPL-CCNC: 62 U/L (ref 34–104)
ALT SERPL W P-5'-P-CCNC: 3 U/L (ref 7–52)
AST SERPL W P-5'-P-CCNC: 9 U/L (ref 13–39)
BILIRUB DIRECT SERPL-MCNC: 0.13 MG/DL (ref 0–0.2)
BILIRUB SERPL-MCNC: 0.6 MG/DL (ref 0.2–1)
CHOLEST SERPL-MCNC: 112 MG/DL (ref ?–200)
HDLC SERPL-MCNC: 45 MG/DL
LDLC SERPL CALC-MCNC: 40 MG/DL (ref 0–100)
PROT SERPL-MCNC: 7 G/DL (ref 6.4–8.4)
PTH-INTACT SERPL-MCNC: 93.9 PG/ML (ref 12–88)
TRIGL SERPL-MCNC: 137 MG/DL (ref ?–150)
TSH SERPL DL<=0.05 MIU/L-ACNC: 5.55 UIU/ML (ref 0.45–4.5)
URATE SERPL-MCNC: 6.1 MG/DL (ref 3.5–8.5)

## 2025-01-28 PROCEDURE — 84443 ASSAY THYROID STIM HORMONE: CPT

## 2025-01-28 PROCEDURE — 97110 THERAPEUTIC EXERCISES: CPT | Performed by: PHYSICAL MEDICINE & REHABILITATION

## 2025-01-28 PROCEDURE — 97162 PT EVAL MOD COMPLEX 30 MIN: CPT | Performed by: PHYSICAL MEDICINE & REHABILITATION

## 2025-01-28 PROCEDURE — 36415 COLL VENOUS BLD VENIPUNCTURE: CPT

## 2025-01-28 PROCEDURE — 80076 HEPATIC FUNCTION PANEL: CPT

## 2025-01-28 PROCEDURE — 83970 ASSAY OF PARATHORMONE: CPT

## 2025-01-28 PROCEDURE — 80061 LIPID PANEL: CPT

## 2025-01-28 PROCEDURE — 84550 ASSAY OF BLOOD/URIC ACID: CPT

## 2025-01-29 ENCOUNTER — RESULTS FOLLOW-UP (OUTPATIENT)
Dept: INTERNAL MEDICINE CLINIC | Facility: CLINIC | Age: 80
End: 2025-01-29

## 2025-01-31 ENCOUNTER — APPOINTMENT (OUTPATIENT)
Dept: PHYSICAL THERAPY | Facility: CLINIC | Age: 80
End: 2025-01-31
Payer: MEDICARE

## 2025-02-04 ENCOUNTER — APPOINTMENT (OUTPATIENT)
Dept: LAB | Facility: CLINIC | Age: 80
End: 2025-02-04
Payer: MEDICARE

## 2025-02-04 ENCOUNTER — OFFICE VISIT (OUTPATIENT)
Dept: PHYSICAL THERAPY | Facility: CLINIC | Age: 80
End: 2025-02-04
Payer: MEDICARE

## 2025-02-04 DIAGNOSIS — G20.A1 PARKINSON'S DISEASE, UNSPECIFIED WHETHER DYSKINESIA PRESENT, UNSPECIFIED WHETHER MANIFESTATIONS FLUCTUATE (HCC): Primary | ICD-10-CM

## 2025-02-04 LAB
CREAT UR-MCNC: 89 MG/DL
MICROALBUMIN UR-MCNC: 122.1 MG/L
MICROALBUMIN/CREAT 24H UR: 137 MG/G CREATININE (ref 0–30)

## 2025-02-04 PROCEDURE — 97110 THERAPEUTIC EXERCISES: CPT | Performed by: PHYSICAL MEDICINE & REHABILITATION

## 2025-02-04 PROCEDURE — 82570 ASSAY OF URINE CREATININE: CPT

## 2025-02-04 PROCEDURE — 97112 NEUROMUSCULAR REEDUCATION: CPT | Performed by: PHYSICAL MEDICINE & REHABILITATION

## 2025-02-04 PROCEDURE — 82043 UR ALBUMIN QUANTITATIVE: CPT

## 2025-02-04 NOTE — PROGRESS NOTES
"Daily Note     Today's date: 2025  Patient name: Kaleb Ching III  : 1945  MRN: [id number]  Referring provider: Wilfrid Mills DO  Dx:   Encounter Diagnosis     ICD-10-CM    1. Parkinson's disease, unspecified whether dyskinesia present, unspecified whether manifestations fluctuate (HCC)  G20.A1                      Subjective: Pt states he is not feeling good today. States all of his joints hurt and he's shaky.      Objective: See treatment diary below      Assessment: Tolerated treatment fair. Patient demonstrated fatigue post treatment, exhibited good technique with therapeutic exercises, and would benefit from continued PT  Pt req'd frequent rests during ex program.  Fatigues quickly. Amb with SPC and sup in clinic.      Plan: Continue per plan of care.      Daily Treatment Diary     Precautions: Fall risk, anemia , SMALLS, B THR's  CO-MORBIDITIES: T2DM, HTN, OA, A-Fib, Diabetic Neuropathy, Alzheimer's , CVA   HEP ACCESS CODE:   FOTO Completed On: 25    POC Expires Reeval for Medicare to be completed  Unit Limit Auth Expiration Date PT/OT/STVisit Limit   25 By visit 10 6 OT/PT same code N/a BOMN    Completed on visit                    Auth Status DATE        NA Visit # 1 2        Remaining         MANUAL THERAPY                                                               THERAPEUTIC EXERCISE HEP        Nustep  L2  10' L2  20'       Knee ext mach   22#  30x       Knee flex mach   22#  30x       Leg press   50#  30x       UBE                                                                                                              NEUROMUSCULAR REEDUCATION           Static Balance           Dynamic Balance           Amb with head turns           HKM    20x       Bwd amb    4 x 20feet  2 HHA       Multidirectional stepping           SLS progressions           Ham stretch   Manual 2 x 1min B       Calf stretch   Incline  3 x 30\"       Low hurdles          sidestepping   5 " laps @ mirror                                     THERAPEUTIC ACTIVITY                                                  GAIT TRAINING                                                  MODALITIES

## 2025-02-06 ENCOUNTER — APPOINTMENT (OUTPATIENT)
Dept: PHYSICAL THERAPY | Facility: CLINIC | Age: 80
End: 2025-02-06
Payer: MEDICARE

## 2025-02-07 ENCOUNTER — APPOINTMENT (OUTPATIENT)
Dept: PHYSICAL THERAPY | Facility: CLINIC | Age: 80
End: 2025-02-07
Payer: MEDICARE

## 2025-02-11 ENCOUNTER — APPOINTMENT (OUTPATIENT)
Dept: PHYSICAL THERAPY | Facility: CLINIC | Age: 80
End: 2025-02-11
Payer: MEDICARE

## 2025-02-13 ENCOUNTER — APPOINTMENT (OUTPATIENT)
Dept: PHYSICAL THERAPY | Facility: CLINIC | Age: 80
End: 2025-02-13
Payer: MEDICARE

## 2025-02-18 ENCOUNTER — APPOINTMENT (OUTPATIENT)
Dept: PHYSICAL THERAPY | Facility: CLINIC | Age: 80
End: 2025-02-18
Payer: MEDICARE

## 2025-02-19 ENCOUNTER — OFFICE VISIT (OUTPATIENT)
Dept: INTERNAL MEDICINE CLINIC | Facility: CLINIC | Age: 80
End: 2025-02-19
Payer: MEDICARE

## 2025-02-19 VITALS
WEIGHT: 209 LBS | OXYGEN SATURATION: 98 % | SYSTOLIC BLOOD PRESSURE: 136 MMHG | TEMPERATURE: 95.9 F | BODY MASS INDEX: 29.92 KG/M2 | HEART RATE: 90 BPM | HEIGHT: 70 IN | DIASTOLIC BLOOD PRESSURE: 74 MMHG

## 2025-02-19 DIAGNOSIS — F41.1 GENERALIZED ANXIETY DISORDER: ICD-10-CM

## 2025-02-19 DIAGNOSIS — E11.22 TYPE 2 DIABETES MELLITUS WITH STAGE 3B CHRONIC KIDNEY DISEASE, WITHOUT LONG-TERM CURRENT USE OF INSULIN (HCC): Primary | ICD-10-CM

## 2025-02-19 DIAGNOSIS — E03.9 ACQUIRED HYPOTHYROIDISM: ICD-10-CM

## 2025-02-19 DIAGNOSIS — Z99.89 DEPENDENCE ON CANE: ICD-10-CM

## 2025-02-19 DIAGNOSIS — R26.9 GAIT ABNORMALITY: ICD-10-CM

## 2025-02-19 DIAGNOSIS — E08.65 DIABETES MELLITUS DUE TO UNDERLYING CONDITION WITH HYPERGLYCEMIA, WITHOUT LONG-TERM CURRENT USE OF INSULIN (HCC): ICD-10-CM

## 2025-02-19 DIAGNOSIS — N18.32 TYPE 2 DIABETES MELLITUS WITH STAGE 3B CHRONIC KIDNEY DISEASE, WITHOUT LONG-TERM CURRENT USE OF INSULIN (HCC): Primary | ICD-10-CM

## 2025-02-19 DIAGNOSIS — R79.89 ABNORMAL THYROID BLOOD TEST: ICD-10-CM

## 2025-02-19 DIAGNOSIS — R25.2 MUSCLE CRAMPS: ICD-10-CM

## 2025-02-19 DIAGNOSIS — F41.0 PANIC ATTACK: ICD-10-CM

## 2025-02-19 PROCEDURE — G2211 COMPLEX E/M VISIT ADD ON: HCPCS | Performed by: INTERNAL MEDICINE

## 2025-02-19 PROCEDURE — 99214 OFFICE O/P EST MOD 30 MIN: CPT | Performed by: INTERNAL MEDICINE

## 2025-02-19 RX ORDER — ALPRAZOLAM 0.25 MG/1
0.25 TABLET ORAL 3 TIMES DAILY PRN
Qty: 30 TABLET | Refills: 0 | Status: SHIPPED | OUTPATIENT
Start: 2025-02-19

## 2025-02-19 NOTE — PROGRESS NOTES
Name: Kaleb Ching III      : 1945      MRN: [id number]  Encounter Provider: Wilfrid Mills DO  Encounter Date: 2025   Encounter department: [facility]  :  Assessment & Plan  Type 2 diabetes mellitus with stage 3b chronic kidney disease, without long-term current use of insulin (HCC)    Lab Results   Component Value Date    HGBA1C 7.9 (A) 2025            Muscle cramps         Gait abnormality         Generalized anxiety disorder         Acquired hypothyroidism         Panic attack    Orders:    ALPRAZolam (XANAX) 0.25 mg tablet; Take 1 tablet (0.25 mg total) by mouth 3 (three) times a day as needed for anxiety    Dependence on cane         A/P: Stable and PE unimpressive. Sugars better and will continue the OAD. GAit improving and appreciate PT input and will continue. ?muscle relaxants. Discussed the labs. Will repeat TSH in several weeks and replace if needed. Suspect panic attack. S/s not frequent enough to warrant med change. Is on SSNRI. Will add prn benzo, but if increased use, re-eval treatment and consider buspar. RTC one month for f/u gait, sugars, Panic attacks and thyroid.        History of Present Illness   WM RTC for f/u 2 weeks for uncontrolled DM after adding amaryl, gait abnormality, muscle cramps,  and labs. Labs ok except elevated TSH. Tolerating the amaryl and sugars are down w/o any low sugar events. Cramps a little better. Gait still an issues, but now going to PT and now using a cane. New ALMA/panic attack last evening. Remote h/o same. Nothing new. Wife was able to calm him down. Started late last night after going to the bathroom after awakening. TSH was elevated. No h/o thyroid disease.      Review of Systems   Constitutional:  Positive for activity change. Negative for chills, diaphoresis, fatigue and fever.   Respiratory:  Negative for cough, chest tightness, shortness of breath and wheezing.    Cardiovascular:  Negative for chest pain,  "palpitations and leg swelling.   Gastrointestinal:  Negative for abdominal pain, constipation, diarrhea, nausea and vomiting.   Genitourinary:  Negative for difficulty urinating, dysuria and frequency.   Musculoskeletal:  Positive for gait problem. Negative for arthralgias and myalgias.   Neurological:  Negative for light-headedness and headaches.   Psychiatric/Behavioral:  Negative for confusion and dysphoric mood. The patient is nervous/anxious.        Objective   /74   Pulse 90   Temp (!) 95.9 °F (35.5 °C) (Tympanic)   Ht 5' 10\" (1.778 m)   Wt 94.8 kg (209 lb)   SpO2 98%   BMI 29.99 kg/m²      Physical Exam  Vitals and nursing note reviewed.   Constitutional:       General: He is not in acute distress.     Appearance: Normal appearance. He is not ill-appearing.   HENT:      Head: Normocephalic and atraumatic.      Mouth/Throat:      Mouth: Mucous membranes are moist.   Eyes:      Extraocular Movements: Extraocular movements intact.      Conjunctiva/sclera: Conjunctivae normal.      Pupils: Pupils are equal, round, and reactive to light.   Cardiovascular:      Rate and Rhythm: Normal rate and regular rhythm.      Heart sounds: Normal heart sounds. No murmur heard.  Pulmonary:      Effort: Pulmonary effort is normal. No respiratory distress.      Breath sounds: Normal breath sounds. No wheezing, rhonchi or rales.   Neurological:      General: No focal deficit present.      Mental Status: He is alert and oriented to person, place, and time. Mental status is at baseline.   Psychiatric:         Mood and Affect: Mood normal.         Behavior: Behavior normal.         Thought Content: Thought content normal.         Judgment: Judgment normal.         Scheduled Medication Review:  Pt's scheduled medication use was reviewed by myself/staff via the PDMP website. Pt's use has been found to be appropriate w/o any concerns for misuse by the patient. Pt's current conditions require continued scheduled medication " use at this time. Future review for continued appropriate medication use and misuse will continue.

## 2025-02-19 NOTE — ASSESSMENT & PLAN NOTE
Orders:    ALPRAZolam (XANAX) 0.25 mg tablet; Take 1 tablet (0.25 mg total) by mouth 3 (three) times a day as needed for anxiety

## 2025-02-20 ENCOUNTER — APPOINTMENT (OUTPATIENT)
Dept: PHYSICAL THERAPY | Facility: CLINIC | Age: 80
End: 2025-02-20
Payer: MEDICARE

## 2025-02-22 DIAGNOSIS — E78.2 MIXED HYPERLIPIDEMIA: ICD-10-CM

## 2025-02-22 DIAGNOSIS — E11.22 TYPE 2 DIABETES MELLITUS WITH STAGE 3B CHRONIC KIDNEY DISEASE, WITHOUT LONG-TERM CURRENT USE OF INSULIN (HCC): ICD-10-CM

## 2025-02-22 DIAGNOSIS — N18.32 TYPE 2 DIABETES MELLITUS WITH STAGE 3B CHRONIC KIDNEY DISEASE, WITHOUT LONG-TERM CURRENT USE OF INSULIN (HCC): ICD-10-CM

## 2025-02-23 DIAGNOSIS — R41.3 MEMORY DIFFICULTIES: ICD-10-CM

## 2025-02-23 RX ORDER — ATORVASTATIN CALCIUM 20 MG/1
20 TABLET, FILM COATED ORAL DAILY
Qty: 30 TABLET | Refills: 0 | Status: SHIPPED | OUTPATIENT
Start: 2025-02-23

## 2025-02-24 DIAGNOSIS — E55.9 VITAMIN D DEFICIENCY: ICD-10-CM

## 2025-02-24 RX ORDER — DONEPEZIL HYDROCHLORIDE 10 MG/1
10 TABLET, FILM COATED ORAL
Qty: 90 TABLET | Refills: 1 | Status: SHIPPED | OUTPATIENT
Start: 2025-02-24

## 2025-02-25 ENCOUNTER — OFFICE VISIT (OUTPATIENT)
Dept: PHYSICAL THERAPY | Facility: CLINIC | Age: 80
End: 2025-02-25
Payer: MEDICARE

## 2025-02-25 ENCOUNTER — APPOINTMENT (OUTPATIENT)
Dept: PHYSICAL THERAPY | Facility: CLINIC | Age: 80
End: 2025-02-25
Payer: MEDICARE

## 2025-02-25 DIAGNOSIS — G20.A1 PARKINSON'S DISEASE, UNSPECIFIED WHETHER DYSKINESIA PRESENT, UNSPECIFIED WHETHER MANIFESTATIONS FLUCTUATE (HCC): Primary | ICD-10-CM

## 2025-02-25 PROCEDURE — 97110 THERAPEUTIC EXERCISES: CPT | Performed by: PHYSICAL MEDICINE & REHABILITATION

## 2025-02-25 PROCEDURE — 97112 NEUROMUSCULAR REEDUCATION: CPT | Performed by: PHYSICAL MEDICINE & REHABILITATION

## 2025-02-25 RX ORDER — ERGOCALCIFEROL 1.25 MG/1
50000 CAPSULE, LIQUID FILLED ORAL WEEKLY
Qty: 12 CAPSULE | Refills: 1 | Status: SHIPPED | OUTPATIENT
Start: 2025-02-25

## 2025-02-25 NOTE — PROGRESS NOTES
Daily Note     Today's date: 2025  Patient name: Kaleb Ching III  : 1945  MRN: [id number]  Referring provider: Wilfrid Mills DO  Dx:   Encounter Diagnosis     ICD-10-CM    1. Parkinson's disease, unspecified whether dyskinesia present, unspecified whether manifestations fluctuate (HCC)  G20.A1                      Subjective: Pt states his knees are killing him today R>L.  Pt states he would like to hit the speedbag at the gym. Pt discouraged from this activity to prevent forces through his shldrs and neck and to prevent LOB.       Objective: See treatment diary below      Assessment: Tolerated treatment fair. Patient demonstrated fatigue post treatment and would benefit from continued PT.  Completes ex with fair to fair+ technique. Req'd freq seated rest periods due to knee pain and SOB. C/o neck pain and B knee pain R>L throughout tx session. Conversation with pt's wife regarding pt's determination to go to the gym to hit the speedbag. Pt and wife given alternative options.      Plan: Continue per plan of care.      Daily Treatment Diary     Precautions: Fall risk, anemia , SMALLS, B THR's  CO-MORBIDITIES: T2DM, HTN, OA, A-Fib, Diabetic Neuropathy, Alzheimer's , CVA   HEP ACCESS CODE:   FOTO Completed On: 25    POC Expires Reeval for Medicare to be completed  Unit Limit Auth Expiration Date PT/OT/STVisit Limit   25 By visit 10 6 OT/PT same code N/a BOMN    Completed on visit                    Auth Status DATE       NA Visit # 1 2 3       Remaining         MANUAL THERAPY                                                               THERAPEUTIC EXERCISE HEP       Nustep  L2  10' L2  20' L3  10'      Knee ext mach   22#  30x 22#  30x      Knee flex mach   22#  30x 22#  30x      Leg press   50#  30x 60#  30x      UBE                                                                                                              NEUROMUSCULAR REEDUCATION          "  Static Balance           Dynamic Balance           Amb with head turns           HKM    20x 20x      Bwd amb    4 x 20feet  2 HHA 4 x 20 ft      Multidirectional stepping           SLS progressions           Ham stretch   Manual 2 x 1min B       Calf stretch   Incline  3 x 30\"       Low hurdles          sidestepping   5 laps @ mirror 5 laps @ mirror      UBE    100 rpm 10'                          THERAPEUTIC ACTIVITY                                                  GAIT TRAINING                                                  MODALITIES                                          "

## 2025-02-27 ENCOUNTER — APPOINTMENT (OUTPATIENT)
Dept: PHYSICAL THERAPY | Facility: CLINIC | Age: 80
End: 2025-02-27
Payer: MEDICARE

## 2025-03-04 ENCOUNTER — OFFICE VISIT (OUTPATIENT)
Dept: PHYSICAL THERAPY | Facility: CLINIC | Age: 80
End: 2025-03-04
Payer: MEDICARE

## 2025-03-04 DIAGNOSIS — G20.A1 PARKINSON'S DISEASE, UNSPECIFIED WHETHER DYSKINESIA PRESENT, UNSPECIFIED WHETHER MANIFESTATIONS FLUCTUATE (HCC): Primary | ICD-10-CM

## 2025-03-04 PROCEDURE — 97110 THERAPEUTIC EXERCISES: CPT

## 2025-03-04 PROCEDURE — 97112 NEUROMUSCULAR REEDUCATION: CPT

## 2025-03-04 NOTE — PROGRESS NOTES
"Daily Note     Today's date: 3/4/2025  Patient name: Kaleb Ching III  : 1945  MRN: [id number]  Referring provider: Wilfrid Mills DO  Dx:   Encounter Diagnosis     ICD-10-CM    1. Parkinson's disease, unspecified whether dyskinesia present, unspecified whether manifestations fluctuate (HCC)  G20.A1                      Subjective: No complaints or concerns voiced by pt today.       Objective: See treatment diary below      Assessment:  Tolerated treatment Fairly Well overall.  Pt. required rest periods in b/t each exer.  Patient demonstrated fatigue post treatment and would benefit from continued PT.      Plan: Continue per plan of care.  Progress treatment as tolerated.           Daily Treatment Diary     Precautions: Fall risk, anemia , SMALLS, B THR's  CO-MORBIDITIES: T2DM, HTN, OA, A-Fib, Diabetic Neuropathy, Alzheimer's , CVA   HEP ACCESS CODE:   FOTO Completed On: 25    POC Expires Reeval for Medicare to be completed  Unit Limit Auth Expiration Date PT/OT/STVisit Limit   25 By visit 10 6 OT/PT same code N/a BOMN    Completed on visit                    Auth Status DATE  3.4    NA Visit # 1 2 3 4     Remaining        MANUAL THERAPY                                                        THERAPEUTIC EXERCISE HEP  3.4    Nustep  L2  10' L2  20' L3  10' L3  10'      Knee ext mach   22#  30x 22#  30x 22#  2x20      Knee flex mach   22#  30x 22#  30x 22#  40x      Leg press   50#  30x 60#  30x 60#  2x20      UBE                                                                                                   NEUROMUSCULAR REEDUCATION      3.4    Static Balance          Dynamic Balance          Amb with head turns          HKM    20x 20x 25x      Bwd amb    4 x 20feet  2 HHA 4 x 20 ft 5 x 20 ft    Multidirectional stepping          SLS progressions          Ham stretch   Manual 2 x 1min B  Manual 2 x 1min B seated in chair    Calf stretch   Incline  3 x 30\"  Incline  3 x " "30\"    Low hurdles         sidestepping   5 laps @ mirror 5 laps @ mirror 5 laps @ mirror    UBE    100 rpm 10' 100 rpm 10'                      THERAPEUTIC ACTIVITY                                             GAIT TRAINING                                             MODALITIES                                         "

## 2025-03-06 ENCOUNTER — OFFICE VISIT (OUTPATIENT)
Dept: PHYSICAL THERAPY | Facility: CLINIC | Age: 80
End: 2025-03-06
Payer: MEDICARE

## 2025-03-06 DIAGNOSIS — G20.A1 PARKINSON'S DISEASE, UNSPECIFIED WHETHER DYSKINESIA PRESENT, UNSPECIFIED WHETHER MANIFESTATIONS FLUCTUATE (HCC): Primary | ICD-10-CM

## 2025-03-06 PROCEDURE — 97110 THERAPEUTIC EXERCISES: CPT

## 2025-03-06 PROCEDURE — 97112 NEUROMUSCULAR REEDUCATION: CPT

## 2025-03-06 NOTE — TELEPHONE ENCOUNTER
Patient called requesting refill for donepezil (ARICEPT) 10 mg tablet . Patient made aware medication was refilled on 02/24/25 for 90 with 1 refills to [id number]. Patient instructed to contact the pharmacy to obtain refills of medication. Patient verbalized understanding.

## 2025-03-06 NOTE — PROGRESS NOTES
Daily Note     Today's date: 3/6/2025  Patient name: Kaleb Ching III  : 1945  MRN: [id number]  Referring provider: Wilfrid Mills DO  Dx:   Encounter Diagnosis     ICD-10-CM    1. Parkinson's disease, unspecified whether dyskinesia present, unspecified whether manifestations fluctuate (HCC)  G20.A1                      Subjective:   Pt. states he had a good workout at last treatment session.       Objective: See treatment diary below      Assessment: Tolerated treatment  Fairly Well overall . Patient demonstrated fatigue post treatment and would benefit from continued PT.      Plan: Continue per plan of care.  Progress treatment as tolerated.             Daily Treatment Diary     Precautions: Fall risk, anemia , SMALLS, B THR's  CO-MORBIDITIES: T2DM, HTN, OA, A-Fib, Diabetic Neuropathy, Alzheimer's , CVA   HEP ACCESS CODE:   FOTO Completed On: 25    POC Expires Reeval for Medicare to be completed  Unit Limit Auth Expiration Date PT/OT/STVisit Limit   25 By visit 10 6 OT/PT same code N/a BOMN    Completed on visit                    Auth Status DATE  3.4 3.6   NA Visit # 1 2 3 4 5    Remaining        MANUAL THERAPY                                                        THERAPEUTIC EXERCISE HEP  3.4 3.6   Nustep  L2  10' L2  20' L3  10' L3  10'   L3  10'   Knee ext mach   22#  30x 22#  30x 22#  2x20   22#  2x20   Knee flex mach   22#  30x 22#  30x 22#  40x   22#  2x20   Leg press   50#  30x 60#  30x 60#  2x20   60#  2x20   UBE                                                                                                   NEUROMUSCULAR REEDUCATION      3.4 3.6   Static Balance          Dynamic Balance          Amb with head turns          HKM    20x 20x 25x   25x   Bwd amb    4 x 20feet  2 HHA 4 x 20 ft 5 x 20 ft 5 x 20 ft   Multidirectional stepping          SLS progressions          Ham stretch   Manual 2 x 1min B  Manual 2 x 1min B seated in chair Manual 3 x 1min B  "seated in chair   Calf stretch   Incline  3 x 30\"  Incline  3 x 30\" Incline  4 x 30\"   Low hurdles         sidestepping   5 laps @ mirror 5 laps @ mirror 5 laps @ mirror 6 laps @ mirror   UBE    100 rpm 10' 100 rpm 10' 95 rpm 10'                     THERAPEUTIC ACTIVITY                                             GAIT TRAINING                                             MODALITIES                                           "

## 2025-03-11 DIAGNOSIS — G30.9 ALZHEIMER'S DISEASE, UNSPECIFIED (CODE) (HCC): ICD-10-CM

## 2025-03-11 RX ORDER — MEMANTINE HYDROCHLORIDE 10 MG/1
10 TABLET ORAL 2 TIMES DAILY
Qty: 180 TABLET | Refills: 0 | Status: SHIPPED | OUTPATIENT
Start: 2025-03-11

## 2025-03-13 ENCOUNTER — OFFICE VISIT (OUTPATIENT)
Dept: PHYSICAL THERAPY | Facility: CLINIC | Age: 80
End: 2025-03-13
Payer: MEDICARE

## 2025-03-13 DIAGNOSIS — G20.A1 PARKINSON'S DISEASE, UNSPECIFIED WHETHER DYSKINESIA PRESENT, UNSPECIFIED WHETHER MANIFESTATIONS FLUCTUATE (HCC): Primary | ICD-10-CM

## 2025-03-13 PROCEDURE — 97110 THERAPEUTIC EXERCISES: CPT | Performed by: PHYSICAL MEDICINE & REHABILITATION

## 2025-03-13 NOTE — PROGRESS NOTES
Daily Note     Today's date: 3/13/2025  Patient name: Kaleb Ching III  : 1945  MRN: [id number]  Referring provider: Wilfrid Mills DO  Dx:   Encounter Diagnosis     ICD-10-CM    1. Parkinson's disease, unspecified whether dyskinesia present, unspecified whether manifestations fluctuate (HCC)  G20.A1                      Subjective: Pt states he is feeling pretty good today but his R knee is bothering him as it usually does.      Objective: See treatment diary below      Assessment: Tolerated treatment fair. Patient demonstrated fatigue post treatment, exhibited good technique with therapeutic exercises, and would benefit from continued PT  Pt fatigued fairly quickly and req'd rest periods after each ex.       Plan: Continue per plan of care.      Daily Treatment Diary     Precautions: Fall risk, anemia , SMALLS, B THR's  CO-MORBIDITIES: T2DM, HTN, OA, A-Fib, Diabetic Neuropathy, Alzheimer's , CVA   HEP ACCESS CODE:   FOTO Completed On: 25    POC Expires Reeval for Medicare to be completed  Unit Limit Auth Expiration Date PT/OT/STVisit Limit   25 By visit 10 6 OT/PT same code N/a BOMN    Completed on visit                    Auth Status DATE  3.4 3.6 3/13   NA Visit # 1 2 3 4 5 6    Remaining         MANUAL THERAPY                                                               THERAPEUTIC EXERCISE HEP  3.4 3.6 3/13   Nustep  L2  10' L2  20' L3  10' L3  10'   L3  10' L4 10'   Knee ext mach   22#  30x 22#  30x 22#  2x20   22#  2x20 22#  40x   Knee flex mach   22#  30x 22#  30x 22#  40x   22#  2x20 22#  40x   Leg press   50#  30x 60#  30x 60#  2x20   60#  2x20 65#  40x   UBE       100 Rpm  10'                                                                                                       NEUROMUSCULAR REEDUCATION      3.4 3.6    Static Balance           Dynamic Balance           Amb with head turns           HKM    20x 20x 25x   25x    Bwd amb    4 x 20feet  2 HHA 4 x  "20 ft 5 x 20 ft 5 x 20 ft    Multidirectional stepping           SLS progressions           Ham stretch   Manual 2 x 1min B  Manual 2 x 1min B seated in chair Manual 3 x 1min B seated in chair    Calf stretch   Incline  3 x 30\"  Incline  3 x 30\" Incline  4 x 30\" Incline   4 x 30\"   Low hurdles          sidestepping   5 laps @ mirror 5 laps @ mirror 5 laps @ mirror 6 laps @ mirror    UBE    100 rpm 10' 100 rpm 10' 95 rpm 10'                        THERAPEUTIC ACTIVITY                                                  GAIT TRAINING                                                  MODALITIES                                                "

## 2025-03-22 DIAGNOSIS — E11.22 TYPE 2 DIABETES MELLITUS WITH STAGE 3B CHRONIC KIDNEY DISEASE, WITHOUT LONG-TERM CURRENT USE OF INSULIN (HCC): ICD-10-CM

## 2025-03-22 DIAGNOSIS — N18.32 STAGE 3B CHRONIC KIDNEY DISEASE (HCC): ICD-10-CM

## 2025-03-22 DIAGNOSIS — N18.32 TYPE 2 DIABETES MELLITUS WITH STAGE 3B CHRONIC KIDNEY DISEASE, WITHOUT LONG-TERM CURRENT USE OF INSULIN (HCC): ICD-10-CM

## 2025-03-22 DIAGNOSIS — I10 PRIMARY HYPERTENSION: Chronic | ICD-10-CM

## 2025-03-24 RX ORDER — CANAGLIFLOZIN 300 MG/1
300 TABLET, FILM COATED ORAL DAILY
Qty: 90 TABLET | Refills: 1 | Status: SHIPPED | OUTPATIENT
Start: 2025-03-24

## 2025-04-08 ENCOUNTER — APPOINTMENT (OUTPATIENT)
Dept: LAB | Facility: HOSPITAL | Age: 80
End: 2025-04-08
Payer: MEDICARE

## 2025-04-08 ENCOUNTER — HOSPITAL ENCOUNTER (OUTPATIENT)
Dept: RADIOLOGY | Facility: HOSPITAL | Age: 80
Discharge: HOME | End: 2025-04-08
Payer: MEDICARE

## 2025-04-08 ENCOUNTER — OFFICE VISIT (OUTPATIENT)
Dept: INTERNAL MEDICINE CLINIC | Facility: CLINIC | Age: 80
End: 2025-04-08
Payer: MEDICARE

## 2025-04-08 VITALS
HEIGHT: 70 IN | WEIGHT: 199.6 LBS | TEMPERATURE: 97.8 F | SYSTOLIC BLOOD PRESSURE: 134 MMHG | OXYGEN SATURATION: 95 % | BODY MASS INDEX: 28.58 KG/M2 | DIASTOLIC BLOOD PRESSURE: 62 MMHG | HEART RATE: 92 BPM

## 2025-04-08 DIAGNOSIS — R26.9 GAIT ABNORMALITY: ICD-10-CM

## 2025-04-08 DIAGNOSIS — N18.32 TYPE 2 DIABETES MELLITUS WITH STAGE 3B CHRONIC KIDNEY DISEASE, WITHOUT LONG-TERM CURRENT USE OF INSULIN (HCC): ICD-10-CM

## 2025-04-08 DIAGNOSIS — L03.032 CELLULITIS OF TOE OF LEFT FOOT: ICD-10-CM

## 2025-04-08 DIAGNOSIS — F41.0 PANIC ATTACK: ICD-10-CM

## 2025-04-08 DIAGNOSIS — M54.42 CHRONIC MIDLINE LOW BACK PAIN WITH BILATERAL SCIATICA: ICD-10-CM

## 2025-04-08 DIAGNOSIS — E08.65 DIABETES MELLITUS DUE TO UNDERLYING CONDITION WITH HYPERGLYCEMIA, WITHOUT LONG-TERM CURRENT USE OF INSULIN (HCC): ICD-10-CM

## 2025-04-08 DIAGNOSIS — M54.41 CHRONIC MIDLINE LOW BACK PAIN WITH BILATERAL SCIATICA: ICD-10-CM

## 2025-04-08 DIAGNOSIS — G89.29 CHRONIC MIDLINE LOW BACK PAIN WITH BILATERAL SCIATICA: ICD-10-CM

## 2025-04-08 DIAGNOSIS — R79.89 ABNORMAL THYROID BLOOD TEST: ICD-10-CM

## 2025-04-08 DIAGNOSIS — E11.22 TYPE 2 DIABETES MELLITUS WITH STAGE 3B CHRONIC KIDNEY DISEASE, WITHOUT LONG-TERM CURRENT USE OF INSULIN (HCC): ICD-10-CM

## 2025-04-08 DIAGNOSIS — F41.1 GENERALIZED ANXIETY DISORDER: ICD-10-CM

## 2025-04-08 DIAGNOSIS — L03.032 CELLULITIS OF TOE OF LEFT FOOT: Primary | ICD-10-CM

## 2025-04-08 DIAGNOSIS — E03.9 ACQUIRED HYPOTHYROIDISM: ICD-10-CM

## 2025-04-08 LAB
ALBUMIN SERPL BCG-MCNC: 4.1 G/DL (ref 3.5–5)
ALP SERPL-CCNC: 70 U/L (ref 34–104)
ALT SERPL W P-5'-P-CCNC: 5 U/L (ref 7–52)
ANION GAP SERPL CALCULATED.3IONS-SCNC: 17 MMOL/L (ref 4–13)
AST SERPL W P-5'-P-CCNC: 9 U/L (ref 13–39)
BASOPHILS # BLD AUTO: 0.05 THOUSANDS/ÂΜL (ref 0–0.1)
BASOPHILS NFR BLD AUTO: 1 % (ref 0–1)
BILIRUB SERPL-MCNC: 0.72 MG/DL (ref 0.2–1)
BUN SERPL-MCNC: 32 MG/DL (ref 5–25)
CALCIUM SERPL-MCNC: 9.8 MG/DL (ref 8.4–10.2)
CHLORIDE SERPL-SCNC: 100 MMOL/L (ref 96–108)
CO2 SERPL-SCNC: 18 MMOL/L (ref 21–32)
CREAT SERPL-MCNC: 1.93 MG/DL (ref 0.6–1.3)
CRP SERPL QL: 134.9 MG/L
EOSINOPHIL # BLD AUTO: 0.05 THOUSAND/ÂΜL (ref 0–0.61)
EOSINOPHIL NFR BLD AUTO: 1 % (ref 0–6)
ERYTHROCYTE [DISTWIDTH] IN BLOOD BY AUTOMATED COUNT: 12.9 % (ref 11.6–15.1)
GFR SERPL CREATININE-BSD FRML MDRD: 32 ML/MIN/1.73SQ M
GLUCOSE P FAST SERPL-MCNC: 201 MG/DL (ref 65–99)
HCT VFR BLD AUTO: 40.8 % (ref 36.5–49.3)
HGB BLD-MCNC: 12.8 G/DL (ref 12–17)
IMM GRANULOCYTES # BLD AUTO: 0.05 THOUSAND/UL (ref 0–0.2)
IMM GRANULOCYTES NFR BLD AUTO: 1 % (ref 0–2)
LYMPHOCYTES # BLD AUTO: 0.88 THOUSANDS/ÂΜL (ref 0.6–4.47)
LYMPHOCYTES NFR BLD AUTO: 8 % (ref 14–44)
MCH RBC QN AUTO: 29.8 PG (ref 26.8–34.3)
MCHC RBC AUTO-ENTMCNC: 31.4 G/DL (ref 31.4–37.4)
MCV RBC AUTO: 95 FL (ref 82–98)
MONOCYTES # BLD AUTO: 0.59 THOUSAND/ÂΜL (ref 0.17–1.22)
MONOCYTES NFR BLD AUTO: 6 % (ref 4–12)
NEUTROPHILS # BLD AUTO: 8.92 THOUSANDS/ÂΜL (ref 1.85–7.62)
NEUTS SEG NFR BLD AUTO: 83 % (ref 43–75)
NRBC BLD AUTO-RTO: 0 /100 WBCS
PLATELET # BLD AUTO: 304 THOUSANDS/UL (ref 149–390)
PMV BLD AUTO: 10 FL (ref 8.9–12.7)
POTASSIUM SERPL-SCNC: 5.3 MMOL/L (ref 3.5–5.3)
PROT SERPL-MCNC: 8.4 G/DL (ref 6.4–8.4)
RBC # BLD AUTO: 4.29 MILLION/UL (ref 3.88–5.62)
SODIUM SERPL-SCNC: 135 MMOL/L (ref 135–147)
TSH SERPL DL<=0.05 MIU/L-ACNC: 4.84 UIU/ML (ref 0.45–4.5)
WBC # BLD AUTO: 10.54 THOUSAND/UL (ref 4.31–10.16)

## 2025-04-08 PROCEDURE — 84480 ASSAY TRIIODOTHYRONINE (T3): CPT

## 2025-04-08 PROCEDURE — 86140 C-REACTIVE PROTEIN: CPT

## 2025-04-08 PROCEDURE — 80053 COMPREHEN METABOLIC PANEL: CPT

## 2025-04-08 PROCEDURE — 36415 COLL VENOUS BLD VENIPUNCTURE: CPT

## 2025-04-08 PROCEDURE — 85025 COMPLETE CBC W/AUTO DIFF WBC: CPT

## 2025-04-08 PROCEDURE — 73660 X-RAY EXAM OF TOE(S): CPT

## 2025-04-08 PROCEDURE — 84439 ASSAY OF FREE THYROXINE: CPT

## 2025-04-08 PROCEDURE — 84443 ASSAY THYROID STIM HORMONE: CPT

## 2025-04-08 PROCEDURE — G2211 COMPLEX E/M VISIT ADD ON: HCPCS | Performed by: INTERNAL MEDICINE

## 2025-04-08 PROCEDURE — 99214 OFFICE O/P EST MOD 30 MIN: CPT | Performed by: INTERNAL MEDICINE

## 2025-04-08 RX ORDER — CLINDAMYCIN HYDROCHLORIDE 300 MG/1
300 CAPSULE ORAL 3 TIMES DAILY
Qty: 30 CAPSULE | Refills: 0 | Status: SHIPPED | OUTPATIENT
Start: 2025-04-08 | End: 2025-04-15

## 2025-04-08 RX ORDER — LEVOFLOXACIN 250 MG/1
250 TABLET, FILM COATED ORAL DAILY
Qty: 10 TABLET | Refills: 0 | Status: SHIPPED | OUTPATIENT
Start: 2025-04-08 | End: 2025-04-15

## 2025-04-08 NOTE — ASSESSMENT & PLAN NOTE
Lab Results   Component Value Date    HGBA1C 7.9 (A) 01/08/2025       Orders:    Empagliflozin 25 MG TABS; Take 1 tablet (25 mg total) by mouth daily    XR toe left great min 2 views; Future

## 2025-04-08 NOTE — PROGRESS NOTES
Name: Kaleb Ching III      : 1945      MRN: [id number]  Encounter Provider: Wilfrid Mills DO  Encounter Date: 2025   Encounter department: [facility]  :  Assessment & Plan  Type 2 diabetes mellitus with stage 3b chronic kidney disease, without long-term current use of insulin (HCC)    Lab Results   Component Value Date    HGBA1C 7.9 (A) 2025       Orders:    Empagliflozin 25 MG TABS; Take 1 tablet (25 mg total) by mouth daily    XR toe left great min 2 views; Future    Gait abnormality         Generalized anxiety disorder         Acquired hypothyroidism         Panic attack         Abnormal thyroid blood test         Cellulitis of toe of left foot    Orders:    XR toe left great min 2 views; Future    CBC and differential; Future    Comprehensive metabolic panel; Future    C-reactive protein; Future    Ambulatory referral to Podiatry; Future    levofloxacin (LEVAQUIN) 250 mg tablet; Take 1 tablet (250 mg total) by mouth daily for 10 days    clindamycin (CLEOCIN) 300 MG capsule; Take 1 capsule (300 mg total) by mouth 3 (three) times a day for 10 days    Chronic midline low back pain with bilateral sciatica    Orders:    Ambulatory referral to Spine & Pain Management; Future    A/P; Stable but lots going on. LBP unchanged and gait an issue. Will refer to pain management. Next, OAD not covered but sugars still ok. Will switch to jardiance. Next, ALMA better and continue SSRI and prn benzo. Pt to get Thyroid labs today. Big concern is the toe. Definite cellulits and concerned about OM. Will check labs and xray. Start abx. Refer to podiatry. Once labs back, may need MRI or bone scan r/o OM. RTC one week for f/u.        History of Present Illness   WM RTC with his wife for f/u multiple issues. First, worsening ALMA and pt continued on his meds and prn benzo added. Reports panic attacks and ALMA is better with infrequent benzo use. . Next, continued LBP with gait issues and pt  "continued with PT. Reports no better. Gait still off and pain continues and down both legs. . Third, abnormal TSH and pt was to get f/u labs, but has yet to get them done.  FOurth, left great toe swelling,redness, and bleeding for several weeks. Finally, f/u DM after continue new OAD. Reports reports insurance no longer covering invokana. .       Review of Systems   Constitutional:  Positive for activity change. Negative for chills, diaphoresis, fatigue and fever.   Respiratory:  Negative for cough, chest tightness, shortness of breath and wheezing.    Cardiovascular:  Negative for chest pain, palpitations and leg swelling.   Gastrointestinal:  Negative for abdominal pain, constipation, diarrhea, nausea and vomiting.   Genitourinary:  Negative for difficulty urinating, dysuria and frequency.   Musculoskeletal:  Positive for back pain and gait problem. Negative for arthralgias and myalgias.   Skin:  Positive for wound.   Neurological:  Negative for dizziness, seizures, syncope, weakness, light-headedness and headaches.   Psychiatric/Behavioral:  Negative for confusion. The patient is not nervous/anxious.        Objective   /62   Pulse 92   Temp 97.8 °F (36.6 °C)   Ht 5' 10\" (1.778 m)   Wt 90.5 kg (199 lb 9.6 oz)   SpO2 95%   BMI 28.64 kg/m²      Physical Exam  Vitals and nursing note reviewed.   Constitutional:       General: He is not in acute distress.     Appearance: Normal appearance. He is not ill-appearing.   HENT:      Head: Normocephalic and atraumatic.      Mouth/Throat:      Mouth: Mucous membranes are moist.   Eyes:      Extraocular Movements: Extraocular movements intact.      Conjunctiva/sclera: Conjunctivae normal.      Pupils: Pupils are equal, round, and reactive to light.   Cardiovascular:      Rate and Rhythm: Normal rate and regular rhythm.      Heart sounds: Normal heart sounds. No murmur heard.  Pulmonary:      Effort: Pulmonary effort is normal. No respiratory distress.      Breath " sounds: Normal breath sounds. No wheezing, rhonchi or rales.   Musculoskeletal:         General: Tenderness present.   Skin:     Comments: Left great toe swollen red and warm. No tenderness. Bloody d/c. Lateral nail    Neurological:      General: No focal deficit present.      Mental Status: He is alert and oriented to person, place, and time. Mental status is at baseline.   Psychiatric:         Mood and Affect: Mood normal.         Behavior: Behavior normal.         Thought Content: Thought content normal.         Judgment: Judgment normal.

## 2025-04-08 NOTE — PROGRESS NOTES
Name: Kaleb Ching III      : 1945      MRN: [id number]  Encounter Provider: Wilfrid Mills DO  Encounter Date: 2025   Encounter department: [facility]  :  Assessment & Plan       Preventive health issues were discussed with patient, and age appropriate screening tests were ordered as noted in patient's After Visit Summary. Personalized health advice and appropriate referrals for health education or preventive services given if needed, as noted in patient's After Visit Summary.    History of Present Illness   {?Quick Links Encounters * My Last Note * Last Note in Specialty * Snapshot * Since Last Visit * History :93198}  HPI   Patient Care Team:  Wilfrid Mills DO as PCP - General (Internal Medicine)    Review of Systems  Medical History Reviewed by provider this encounter:       Annual Wellness Visit Questionnaire   Kaleb is here for his Subsequent Wellness visit. Last Medicare Wellness visit information reviewed, patient interviewed and updates made to the record today.      Health Risk Assessment:   Patient rates overall health as good. Patient feels that their physical health rating is slightly worse. Patient is satisfied with their life. Eyesight was rated as same. Hearing was rated as same. Patient feels that their emotional and mental health rating is same. Patients states they are sometimes angry. Patient states they are often unusually tired/fatigued. Pain experienced in the last 7 days has been a lot. Patient's pain rating has been 10/10. Patient states that he has experienced weight loss or gain in last 6 months.     Depression Screening:   PHQ-2 Score: 0      Fall Risk Screening:   In the past year, patient has experienced: history of falling in past year    Number of falls: 2 or more  Injured during fall?: No    Feels unsteady when standing or walking?: Yes    Worried about falling?: Yes      Home Safety:  Patient has trouble with stairs inside or outside of their  home. Patient has working smoke alarms and has working carbon monoxide detector. Home safety hazards include: none.     Nutrition:   Current diet is Regular.     Medications:   Patient is not currently taking any over-the-counter supplements. Patient is able to manage medications.     Activities of Daily Living (ADLs)/Instrumental Activities of Daily Living (IADLs):   Walk and transfer into and out of bed and chair?: Yes  Dress and groom yourself?: Yes    Bathe or shower yourself?: No    Feed yourself? Yes  Do your laundry/housekeeping?: No  Manage your money, pay your bills and track your expenses?: No  Make your own meals?: No    Do your own shopping?: No    Previous Hospitalizations:   Any hospitalizations or ED visits within the last 12 months?: Yes    How many hospitalizations have you had in the last year?: 1-2    Advance Care Planning:   Living will: Yes    Durable POA for healthcare: Yes    Advanced directive: Yes    Five wishes given: No      Preventive Screenings      Cardiovascular Screening:    General: Screening Not Indicated and History Lipid Disorder      Diabetes Screening:     General: Screening Not Indicated and History Diabetes      Prostate Cancer Screening:    General: Screening Not Indicated      Lung Cancer Screening:     General: Screening Not Indicated      Hepatitis C Screening:    General: Screening Current    Immunizations:  - Immunizations due: Zoster (Shingrix)    Screening, Brief Intervention, and Referral to Treatment (SBIRT)     Screening  Typical number of drinks in a day: 0  Typical number of drinks in a week: 0  Interpretation: Low risk drinking behavior.    Single Item Drug Screening:  How often have you used an illegal drug (including marijuana) or a prescription medication for non-medical reasons in the past year? never    Single Item Drug Screen Score: 0  Interpretation: Negative screen for possible drug use disorder    Social Drivers of Health     Financial Resource Strain:  "Low Risk  (3/7/2023)    Overall Financial Resource Strain (CARDIA)     Difficulty of Paying Living Expenses: Not hard at all   Food Insecurity: No Food Insecurity (2024)    Nursing - Inadequate Food Risk Classification     Ran Out of Food in the Last Year: Never true   Transportation Needs: No Transportation Needs (2024)    Nursing - Transportation Risk Classification     Lack of Transportation: No   Housing Stability: Unknown (2024)    Nursing: Inadequate Housing Risk Classification     Unable to Pay for Housing in the Last Year: No     Has Housin   Utilities: Not At Risk (2024)    Nursing - Utilities Risk Classification     Threatened with loss of utilities: No     No results found.    Objective {?Quick Links Trend Vitals * Enter New Vitals * Results Review * Timeline (Adult) * Labs * Imaging * Cardiology * Procedures * Lung Cancer Screening * Surgical eConsent :17469}  Ht 5' 10\" (1.778 m)   Wt 90.5 kg (199 lb 9.6 oz)   BMI 28.64 kg/m²     Physical Exam  {Administrative / Billing Section (Optional):65821}  "

## 2025-04-09 ENCOUNTER — RESULTS FOLLOW-UP (OUTPATIENT)
Dept: INTERNAL MEDICINE CLINIC | Facility: CLINIC | Age: 80
End: 2025-04-09

## 2025-04-09 LAB
T3 SERPL-MCNC: 1.1 NG/ML (ref 0.9–1.8)
T4 FREE SERPL-MCNC: 1.12 NG/DL (ref 0.61–1.12)

## 2025-04-15 ENCOUNTER — OFFICE VISIT (OUTPATIENT)
Dept: INTERNAL MEDICINE CLINIC | Facility: CLINIC | Age: 80
End: 2025-04-15
Payer: MEDICARE

## 2025-04-15 ENCOUNTER — APPOINTMENT (EMERGENCY)
Dept: RADIOLOGY | Facility: HOSPITAL | Age: 80
DRG: 617 | End: 2025-04-15
Payer: MEDICARE

## 2025-04-15 ENCOUNTER — TELEPHONE (OUTPATIENT)
Age: 80
End: 2025-04-15

## 2025-04-15 ENCOUNTER — HOSPITAL ENCOUNTER (INPATIENT)
Facility: HOSPITAL | Age: 80
LOS: 4 days | Discharge: HOME HEALTH | DRG: 617 | End: 2025-04-19
Attending: EMERGENCY MEDICINE | Admitting: INTERNAL MEDICINE
Payer: MEDICARE

## 2025-04-15 VITALS
OXYGEN SATURATION: 99 % | SYSTOLIC BLOOD PRESSURE: 122 MMHG | HEART RATE: 76 BPM | DIASTOLIC BLOOD PRESSURE: 60 MMHG | BODY MASS INDEX: 30.21 KG/M2 | WEIGHT: 211 LBS | HEIGHT: 70 IN | TEMPERATURE: 96.5 F

## 2025-04-15 DIAGNOSIS — R79.82 ELEVATED C-REACTIVE PROTEIN (CRP): ICD-10-CM

## 2025-04-15 DIAGNOSIS — M86.9 OSTEOMYELITIS (HCC): Primary | ICD-10-CM

## 2025-04-15 DIAGNOSIS — R93.89 ABNORMAL X-RAY: ICD-10-CM

## 2025-04-15 DIAGNOSIS — N18.32 TYPE 2 DIABETES MELLITUS WITH STAGE 3B CHRONIC KIDNEY DISEASE, WITHOUT LONG-TERM CURRENT USE OF INSULIN (HCC): ICD-10-CM

## 2025-04-15 DIAGNOSIS — E11.22 TYPE 2 DIABETES MELLITUS WITH STAGE 3B CHRONIC KIDNEY DISEASE, WITHOUT LONG-TERM CURRENT USE OF INSULIN (HCC): ICD-10-CM

## 2025-04-15 DIAGNOSIS — G30.9 ALZHEIMER'S DISEASE, UNSPECIFIED (CODE) (HCC): ICD-10-CM

## 2025-04-15 DIAGNOSIS — G20.A1 PARKINSON'S DISEASE WITHOUT DYSKINESIA OR FLUCTUATING MANIFESTATIONS (HCC): ICD-10-CM

## 2025-04-15 DIAGNOSIS — M86.9 OSTEOMYELITIS OF GREAT TOE OF LEFT FOOT (HCC): ICD-10-CM

## 2025-04-15 DIAGNOSIS — M86.9 OSTEOMYELITIS OF LEFT FOOT, UNSPECIFIED TYPE (HCC): Primary | ICD-10-CM

## 2025-04-15 DIAGNOSIS — N17.9 AKI (ACUTE KIDNEY INJURY) (HCC): ICD-10-CM

## 2025-04-15 DIAGNOSIS — M86.9 BONE INFECTION (HCC): ICD-10-CM

## 2025-04-15 DIAGNOSIS — L03.032 CELLULITIS OF TOE OF LEFT FOOT: ICD-10-CM

## 2025-04-15 LAB
ANION GAP SERPL CALCULATED.3IONS-SCNC: 6 MMOL/L (ref 4–13)
APTT PPP: 39 SECONDS (ref 23–34)
BASOPHILS # BLD AUTO: 0.03 THOUSANDS/ÂΜL (ref 0–0.1)
BASOPHILS NFR BLD AUTO: 0 % (ref 0–1)
BUN SERPL-MCNC: 22 MG/DL (ref 5–25)
CALCIUM SERPL-MCNC: 8.4 MG/DL (ref 8.4–10.2)
CHLORIDE SERPL-SCNC: 114 MMOL/L (ref 96–108)
CO2 SERPL-SCNC: 19 MMOL/L (ref 21–32)
CREAT SERPL-MCNC: 1.48 MG/DL (ref 0.6–1.3)
CRP SERPL QL: 11.9 MG/L
EOSINOPHIL # BLD AUTO: 0.09 THOUSAND/ÂΜL (ref 0–0.61)
EOSINOPHIL NFR BLD AUTO: 1 % (ref 0–6)
ERYTHROCYTE [DISTWIDTH] IN BLOOD BY AUTOMATED COUNT: 13.2 % (ref 11.6–15.1)
ERYTHROCYTE [SEDIMENTATION RATE] IN BLOOD: 44 MM/HOUR (ref 0–19)
EST. AVERAGE GLUCOSE BLD GHB EST-MCNC: 140 MG/DL
GFR SERPL CREATININE-BSD FRML MDRD: 44 ML/MIN/1.73SQ M
GLUCOSE SERPL-MCNC: 101 MG/DL (ref 65–140)
GLUCOSE SERPL-MCNC: 183 MG/DL (ref 65–140)
GLUCOSE SERPL-MCNC: 43 MG/DL (ref 65–140)
GLUCOSE SERPL-MCNC: 88 MG/DL (ref 65–140)
HBA1C MFR BLD: 6.5 %
HCT VFR BLD AUTO: 33.1 % (ref 36.5–49.3)
HGB BLD-MCNC: 10.3 G/DL (ref 12–17)
IMM GRANULOCYTES # BLD AUTO: 0.03 THOUSAND/UL (ref 0–0.2)
IMM GRANULOCYTES NFR BLD AUTO: 0 % (ref 0–2)
INR PPP: 1.21 (ref 0.85–1.19)
LYMPHOCYTES # BLD AUTO: 1.09 THOUSANDS/ÂΜL (ref 0.6–4.47)
LYMPHOCYTES NFR BLD AUTO: 15 % (ref 14–44)
MAGNESIUM SERPL-MCNC: 1.5 MG/DL (ref 1.9–2.7)
MCH RBC QN AUTO: 29.6 PG (ref 26.8–34.3)
MCHC RBC AUTO-ENTMCNC: 31.1 G/DL (ref 31.4–37.4)
MCV RBC AUTO: 95 FL (ref 82–98)
MONOCYTES # BLD AUTO: 0.52 THOUSAND/ÂΜL (ref 0.17–1.22)
MONOCYTES NFR BLD AUTO: 7 % (ref 4–12)
NEUTROPHILS # BLD AUTO: 5.33 THOUSANDS/ÂΜL (ref 1.85–7.62)
NEUTS SEG NFR BLD AUTO: 77 % (ref 43–75)
NRBC BLD AUTO-RTO: 0 /100 WBCS
PLATELET # BLD AUTO: 236 THOUSANDS/UL (ref 149–390)
PMV BLD AUTO: 9.2 FL (ref 8.9–12.7)
POTASSIUM SERPL-SCNC: 4.9 MMOL/L (ref 3.5–5.3)
PROTHROMBIN TIME: 15.8 SECONDS (ref 12.3–15)
RBC # BLD AUTO: 3.48 MILLION/UL (ref 3.88–5.62)
SODIUM SERPL-SCNC: 139 MMOL/L (ref 135–147)
WBC # BLD AUTO: 7.09 THOUSAND/UL (ref 4.31–10.16)

## 2025-04-15 PROCEDURE — 99284 EMERGENCY DEPT VISIT MOD MDM: CPT

## 2025-04-15 PROCEDURE — 85610 PROTHROMBIN TIME: CPT | Performed by: EMERGENCY MEDICINE

## 2025-04-15 PROCEDURE — 96365 THER/PROPH/DIAG IV INF INIT: CPT

## 2025-04-15 PROCEDURE — 99215 OFFICE O/P EST HI 40 MIN: CPT | Performed by: INTERNAL MEDICINE

## 2025-04-15 PROCEDURE — 99223 1ST HOSP IP/OBS HIGH 75: CPT | Performed by: NURSE PRACTITIONER

## 2025-04-15 PROCEDURE — 86140 C-REACTIVE PROTEIN: CPT | Performed by: EMERGENCY MEDICINE

## 2025-04-15 PROCEDURE — 82948 REAGENT STRIP/BLOOD GLUCOSE: CPT

## 2025-04-15 PROCEDURE — 96375 TX/PRO/DX INJ NEW DRUG ADDON: CPT

## 2025-04-15 PROCEDURE — 85652 RBC SED RATE AUTOMATED: CPT | Performed by: EMERGENCY MEDICINE

## 2025-04-15 PROCEDURE — 80048 BASIC METABOLIC PNL TOTAL CA: CPT | Performed by: EMERGENCY MEDICINE

## 2025-04-15 PROCEDURE — 83036 HEMOGLOBIN GLYCOSYLATED A1C: CPT | Performed by: NURSE PRACTITIONER

## 2025-04-15 PROCEDURE — 87040 BLOOD CULTURE FOR BACTERIA: CPT | Performed by: NURSE PRACTITIONER

## 2025-04-15 PROCEDURE — 36415 COLL VENOUS BLD VENIPUNCTURE: CPT | Performed by: EMERGENCY MEDICINE

## 2025-04-15 PROCEDURE — G2211 COMPLEX E/M VISIT ADD ON: HCPCS | Performed by: INTERNAL MEDICINE

## 2025-04-15 PROCEDURE — 85025 COMPLETE CBC W/AUTO DIFF WBC: CPT | Performed by: EMERGENCY MEDICINE

## 2025-04-15 PROCEDURE — 99285 EMERGENCY DEPT VISIT HI MDM: CPT | Performed by: EMERGENCY MEDICINE

## 2025-04-15 PROCEDURE — 83735 ASSAY OF MAGNESIUM: CPT | Performed by: EMERGENCY MEDICINE

## 2025-04-15 PROCEDURE — 73660 X-RAY EXAM OF TOE(S): CPT

## 2025-04-15 PROCEDURE — 85730 THROMBOPLASTIN TIME PARTIAL: CPT | Performed by: EMERGENCY MEDICINE

## 2025-04-15 RX ORDER — METOPROLOL SUCCINATE 50 MG/1
100 TABLET, EXTENDED RELEASE ORAL DAILY
Status: DISCONTINUED | OUTPATIENT
Start: 2025-04-16 | End: 2025-04-19 | Stop reason: HOSPADM

## 2025-04-15 RX ORDER — CARBIDOPA AND LEVODOPA 25; 100 MG/1; MG/1
1 TABLET, EXTENDED RELEASE ORAL 3 TIMES DAILY
Qty: 270 TABLET | Refills: 1 | Status: SHIPPED | OUTPATIENT
Start: 2025-04-15

## 2025-04-15 RX ORDER — ACETAMINOPHEN 325 MG/1
650 TABLET ORAL EVERY 6 HOURS PRN
Status: DISCONTINUED | OUTPATIENT
Start: 2025-04-15 | End: 2025-04-19 | Stop reason: HOSPADM

## 2025-04-15 RX ORDER — DOXYCYCLINE 100 MG/1
1 CAPSULE ORAL 2 TIMES DAILY
COMMUNITY
Start: 2025-04-14 | End: 2025-04-19

## 2025-04-15 RX ORDER — INSULIN LISPRO 100 [IU]/ML
1-5 INJECTION, SOLUTION INTRAVENOUS; SUBCUTANEOUS
Status: DISCONTINUED | OUTPATIENT
Start: 2025-04-15 | End: 2025-04-19 | Stop reason: HOSPADM

## 2025-04-15 RX ORDER — LANOLIN ALCOHOL/MO/W.PET/CERES
400 CREAM (GRAM) TOPICAL 2 TIMES DAILY
Status: COMPLETED | OUTPATIENT
Start: 2025-04-15 | End: 2025-04-17

## 2025-04-15 RX ORDER — ONDANSETRON 2 MG/ML
4 INJECTION INTRAMUSCULAR; INTRAVENOUS EVERY 6 HOURS PRN
Status: DISCONTINUED | OUTPATIENT
Start: 2025-04-15 | End: 2025-04-19 | Stop reason: HOSPADM

## 2025-04-15 RX ORDER — SIMETHICONE 80 MG
80 TABLET,CHEWABLE ORAL 4 TIMES DAILY PRN
Status: DISCONTINUED | OUTPATIENT
Start: 2025-04-15 | End: 2025-04-19 | Stop reason: HOSPADM

## 2025-04-15 RX ORDER — DONEPEZIL HYDROCHLORIDE 10 MG/1
10 TABLET, FILM COATED ORAL
Status: DISCONTINUED | OUTPATIENT
Start: 2025-04-15 | End: 2025-04-19 | Stop reason: HOSPADM

## 2025-04-15 RX ORDER — MEMANTINE HYDROCHLORIDE 5 MG/1
10 TABLET ORAL 2 TIMES DAILY
Status: DISCONTINUED | OUTPATIENT
Start: 2025-04-15 | End: 2025-04-19 | Stop reason: HOSPADM

## 2025-04-15 RX ORDER — SODIUM CHLORIDE, SODIUM LACTATE, POTASSIUM CHLORIDE, CALCIUM CHLORIDE 600; 310; 30; 20 MG/100ML; MG/100ML; MG/100ML; MG/100ML
50 INJECTION, SOLUTION INTRAVENOUS ONCE
Status: COMPLETED | OUTPATIENT
Start: 2025-04-15 | End: 2025-04-16

## 2025-04-15 RX ORDER — ERGOCALCIFEROL 1.25 MG/1
50000 CAPSULE, LIQUID FILLED ORAL WEEKLY
Status: DISCONTINUED | OUTPATIENT
Start: 2025-04-21 | End: 2025-04-19 | Stop reason: HOSPADM

## 2025-04-15 RX ORDER — ALPRAZOLAM 0.25 MG
0.25 TABLET ORAL 3 TIMES DAILY PRN
Status: DISCONTINUED | OUTPATIENT
Start: 2025-04-15 | End: 2025-04-19 | Stop reason: HOSPADM

## 2025-04-15 RX ORDER — POLYETHYLENE GLYCOL 3350 17 G/17G
17 POWDER, FOR SOLUTION ORAL DAILY
Status: DISCONTINUED | OUTPATIENT
Start: 2025-04-16 | End: 2025-04-19 | Stop reason: HOSPADM

## 2025-04-15 RX ORDER — ATORVASTATIN CALCIUM 20 MG/1
20 TABLET, FILM COATED ORAL DAILY
Status: DISCONTINUED | OUTPATIENT
Start: 2025-04-16 | End: 2025-04-19 | Stop reason: HOSPADM

## 2025-04-15 RX ORDER — HEPARIN SODIUM 5000 [USP'U]/ML
5000 INJECTION, SOLUTION INTRAVENOUS; SUBCUTANEOUS EVERY 8 HOURS SCHEDULED
Status: DISCONTINUED | OUTPATIENT
Start: 2025-04-15 | End: 2025-04-15

## 2025-04-15 RX ORDER — CARBIDOPA AND LEVODOPA 50; 200 MG/1; MG/1
1 TABLET, EXTENDED RELEASE ORAL 3 TIMES DAILY
Status: DISCONTINUED | OUTPATIENT
Start: 2025-04-15 | End: 2025-04-19 | Stop reason: HOSPADM

## 2025-04-15 RX ORDER — DULOXETIN HYDROCHLORIDE 30 MG/1
30 CAPSULE, DELAYED RELEASE ORAL DAILY
Status: DISCONTINUED | OUTPATIENT
Start: 2025-04-16 | End: 2025-04-19 | Stop reason: HOSPADM

## 2025-04-15 RX ORDER — INSULIN LISPRO 100 [IU]/ML
1-6 INJECTION, SOLUTION INTRAVENOUS; SUBCUTANEOUS
Status: DISCONTINUED | OUTPATIENT
Start: 2025-04-15 | End: 2025-04-15

## 2025-04-15 RX ORDER — VANCOMYCIN HYDROCHLORIDE 1 G/200ML
1000 INJECTION, SOLUTION INTRAVENOUS EVERY 12 HOURS
Status: DISCONTINUED | OUTPATIENT
Start: 2025-04-15 | End: 2025-04-15

## 2025-04-15 RX ADMIN — CARBIDOPA AND LEVODOPA 1 TABLET: 50; 200 TABLET, EXTENDED RELEASE ORAL at 21:03

## 2025-04-15 RX ADMIN — INSULIN LISPRO 1 UNITS: 100 INJECTION, SOLUTION INTRAVENOUS; SUBCUTANEOUS at 21:03

## 2025-04-15 RX ADMIN — CEFEPIME 2000 MG: 2 INJECTION, POWDER, FOR SOLUTION INTRAVENOUS at 13:27

## 2025-04-15 RX ADMIN — VANCOMYCIN HYDROCHLORIDE 1500 MG: 1 INJECTION, POWDER, LYOPHILIZED, FOR SOLUTION INTRAVENOUS at 13:54

## 2025-04-15 RX ADMIN — CARBIDOPA AND LEVODOPA 1 TABLET: 50; 200 TABLET, EXTENDED RELEASE ORAL at 17:00

## 2025-04-15 RX ADMIN — Medication 400 MG: at 17:00

## 2025-04-15 RX ADMIN — DONEPEZIL HYDROCHLORIDE 10 MG: 10 TABLET ORAL at 21:03

## 2025-04-15 RX ADMIN — APIXABAN 5 MG: 5 TABLET, FILM COATED ORAL at 17:00

## 2025-04-15 RX ADMIN — SODIUM CHLORIDE, SODIUM LACTATE, POTASSIUM CHLORIDE, AND CALCIUM CHLORIDE 50 ML/HR: .6; .31; .03; .02 INJECTION, SOLUTION INTRAVENOUS at 17:50

## 2025-04-15 RX ADMIN — ALPRAZOLAM 0.25 MG: 0.25 TABLET ORAL at 23:29

## 2025-04-15 RX ADMIN — MEMANTINE 10 MG: 5 TABLET ORAL at 17:00

## 2025-04-15 NOTE — PROGRESS NOTES
Kaleb Ching III is a 79 y.o. male who is currently ordered Vancomycin IV with management by the Pharmacy Consult service.  Relevant clinical data and objective / subjective history reviewed.  Vancomycin Assessment:  Indication and Goal AUC/Trough: Bone/joint infection (goal -600, trough >10)  Clinical Status:  New  Micro:     Renal Function:  SCr: 1.48 mg/dL  CrCl: 38 mL/min  Renal replacement: Not on dialysis  Days of Therapy: 1  Current Dose: 1500mg IV q12h  Vancomycin Plan:  New Dosinmg IV q24h  Estimated AUC: 527 mcg*hr/mL  Estimated Trough: 15.4 mcg/mL  Next Level: 25 with AM labs  Renal Function Monitoring: Daily BMP and UOP  Pharmacy will continue to follow closely for s/sx of nephrotoxicity, infusion reactions and appropriateness of therapy.  BMP and CBC will be ordered per protocol. We will continue to follow the patient’s culture results and clinical progress daily.    Brendon Aleman, Pharmacist

## 2025-04-15 NOTE — ASSESSMENT & PLAN NOTE
Lab Results   Component Value Date    EGFR 44 04/15/2025    EGFR 32 04/08/2025    EGFR 49 12/09/2024    CREATININE 1.48 (H) 04/15/2025    CREATININE 1.93 (H) 04/08/2025    CREATININE 1.36 (H) 12/09/2024

## 2025-04-15 NOTE — TELEPHONE ENCOUNTER
Wife Libra called very upset stating patient is a bad diabetic and seen the foot doctor on Friday for an infected big toe.  The toenail was removed and he was put on an antibiotic.  Pt seen the foot doctor again yesterday because the infection has become worse. He was put on Duloxetine and said to take that for 4 weeks and if the infection continued to get worse he may lose his big toe.  Wife very worried because patient also has parkinson's and states he may never walk again if he loses his big toe.  Pt is scheduled for an appointment today at 3:30 with Dr. Mills but wife asking if he could call her back or give her further recommendations prior.  She does not feel comfortable with the treatment and waiting game. She would like to know if he could be admitted to the hospital and treated more aggressively with possible IV antibiotics?    Please advise

## 2025-04-15 NOTE — PROGRESS NOTES
Name: Kaleb Ching III      : 1945      MRN: [id number]  Encounter Provider: Wilfrid Mills DO  Encounter Date: 4/15/2025   Encounter department: [facility]  :  Assessment & Plan  Parkinson's disease without dyskinesia or fluctuating manifestations (HCC)         Type 2 diabetes mellitus with stage 3b chronic kidney disease, without long-term current use of insulin (HCC)    Lab Results   Component Value Date    HGBA1C 7.9 (A) 2025            Cellulitis of toe of left foot         Elevated C-reactive protein (CRP)         Abnormal x-ray         RAUDEL (acute kidney injury) (HCC)         Osteomyelitis of left foot, unspecified type (HCC)         Alzheimer's disease, unspecified (CODE) (HCC)       A/P: Stable and VSS ok, but wound/toe not much better. Less erythema, but more d/c and now some pain. Problem is pt mental status fluctuates. Labs need to be repeated and RAUDEL re-eval. Appreciate DPM input. Feel best course is ER eval for labs and ?MRI. May benefit from admission for IVF's and IV abx. RTC one week if not admitted. Pt triage to [facility] charge nurse as provider busy.          History of Present Illness   Diabetic WM with dementia, RTC one week for f/u cellulitis of the left great toe and concern for ?OM. Started on levaquin and clindamycin and sent for labs, xray, and DPM. Labs with slight elevated WBC, but CRP over 100. Xray with concern for OM. Seen by DPM and abx changed to doxy and sent home with OP f/u. Wife reports toe looks worse with bloody purulent d/c. Some pain. Difficulty walking. NO fever or chills. Nail removed.       Review of Systems   Constitutional:  Positive for activity change. Negative for chills, diaphoresis, fatigue and fever.   Respiratory:  Negative for cough, chest tightness, shortness of breath and wheezing.    Cardiovascular:  Negative for chest pain, palpitations and leg swelling.   Gastrointestinal:  Negative for abdominal pain, constipation, diarrhea,  "nausea and vomiting.   Genitourinary:  Negative for difficulty urinating, dysuria and frequency.   Musculoskeletal:  Negative for arthralgias, gait problem and myalgias.   Skin:  Positive for wound.   Neurological:  Negative for light-headedness and headaches.   Psychiatric/Behavioral:  Negative for confusion. The patient is not nervous/anxious.        Objective   /60 (BP Location: Right arm, Patient Position: Sitting)   Pulse 76   Temp (!) 96.5 °F (35.8 °C) (Tympanic)   Ht 5' 10\" (1.778 m)   Wt 95.7 kg (211 lb)   SpO2 99%   BMI 30.28 kg/m²      Physical Exam  Vitals and nursing note reviewed.   Constitutional:       General: He is not in acute distress.     Appearance: Normal appearance. He is not ill-appearing.   HENT:      Head: Normocephalic and atraumatic.      Mouth/Throat:      Mouth: Mucous membranes are moist.   Eyes:      Extraocular Movements: Extraocular movements intact.      Conjunctiva/sclera: Conjunctivae normal.      Pupils: Pupils are equal, round, and reactive to light.   Cardiovascular:      Rate and Rhythm: Normal rate and regular rhythm.      Heart sounds: Normal heart sounds. No murmur heard.  Pulmonary:      Effort: Pulmonary effort is normal. No respiratory distress.      Breath sounds: Normal breath sounds. No wheezing, rhonchi or rales.   Musculoskeletal:         General: Tenderness present.      Right lower leg: Edema present.      Left lower leg: Edema present.      Comments: Left great toe/foot with nail removed. Swollen, but less red with bloody purulent d/c noted at the tip of the toe with a deep wound.    Neurological:      General: No focal deficit present.      Mental Status: He is alert and oriented to person, place, and time. Mental status is at baseline.   Psychiatric:         Mood and Affect: Mood normal.         Behavior: Behavior normal.         Thought Content: Thought content normal.         Judgment: Judgment normal.         "

## 2025-04-15 NOTE — PLAN OF CARE
Problem: PAIN - ADULT  Goal: Verbalizes/displays adequate comfort level or baseline comfort level  Description: Interventions:- Encourage patient to monitor pain and request assistance- Assess pain using appropriate pain scale- Administer analgesics based on type and severity of pain and evaluate response- Implement non-pharmacological measures as appropriate and evaluate response- Consider cultural and social influences on pain and pain management- Notify physician/advanced practitioner if interventions unsuccessful or patient reports new pain  Outcome: Progressing     Problem: SAFETY ADULT  Goal: Patient will remain free of falls  Description: INTERVENTIONS:- Educate patient/family on patient safety including physical limitations- Instruct patient to call for assistance with activity - Consult OT/PT to assist with strengthening/mobility - Keep Call bell within reach- Keep bed low and locked with side rails adjusted as appropriate- Keep care items and personal belongings within reach- Initiate and maintain comfort rounds- Make Fall Risk Sign visible to staff- Offer Toileting every 2 Hours, in advance of need- Initiate/Maintain alarms- Obtain necessary fall risk management equipment: non slip socks- Apply yellow socks and bracelet for high fall risk patients- Consider moving patient to room near nurses station  Outcome: Progressing

## 2025-04-15 NOTE — ASSESSMENT & PLAN NOTE
A/P: Stable and VSS ok, but wound/toe not much better. Less erythema, but more d/c and now some pain. Problem is pt mental status fluctuates. Labs need to be repeated and RAUDEL re-eval. Appreciate DPM input. Feel best course is ER eval for labs and ?MRI. May benefit from admission for IVF's and IV abx. RTC one week if not admitted. Pt triage to [facility] charge nurse as provider busy.

## 2025-04-15 NOTE — PROGRESS NOTES
Kaleb Ching III is a 79 y.o. male who is currently ordered Vancomycin IV with management by the Pharmacy Consult service.  Relevant clinical data and objective / subjective history reviewed.  Vancomycin Assessment:  Indication and Goal AUC/Trough: Bone/joint infection (goal -600, trough >10)  Clinical Status:  New  Micro:     Renal Function:  SCr: 0.95 mg/dL  CrCl: 66 mL/min  Renal replacement: Not on dialysis  Days of Therapy: 1  Current Dose: 1500mg IV q12h  Vancomycin Plan:  New Dosinmg IV q12h  Estimated AUC: 490 mcg*hr/mL  Estimated Trough: 14.7 mcg/mL  Next Level: 25 with AM labs  Renal Function Monitoring: Daily BMP and UOP  Pharmacy will continue to follow closely for s/sx of nephrotoxicity, infusion reactions and appropriateness of therapy.  BMP and CBC will be ordered per protocol. We will continue to follow the patient’s culture results and clinical progress daily.    Brendon Aleman, Pharmacist

## 2025-04-15 NOTE — ED PROVIDER NOTES
Time reflects when diagnosis was documented in both MDM as applicable and the Disposition within this note       Time User Action Codes Description Comment    4/15/2025  2:10 PM Doug Gaytan Add [M86.9] Osteomyelitis (HCC)     4/15/2025  2:16 PM Santos Jaime Add [E11.22,  N18.32] Type 2 diabetes mellitus with stage 3b chronic kidney disease, without long-term current use of insulin (HCC)     4/15/2025  2:22 PM Santos Jaime Add [M86.9] Bone infection (HCC)           ED Disposition       ED Disposition   Admit    Condition   Stable    Date/Time   Tue Apr 15, 2025  2:10 PM    Comment   Case was discussed with andreea and the patient's admission status was agreed to be Admission Status: inpatient status to the service of Dr. doran .               Assessment & Plan       Medical Decision Making  79-year-old male with history of diabetes, hypertension, A-fib on Eliquis presents with foot wound.    Saw podiatrist on Friday for 5th metatarsal  Open sore yesterday; started on doxycycline  Started become painful today; Xray 1 week ago indicates osteomyelitis  Drainage from wound currently with surrounding cellulitis to the whole first toe.  Will get x-ray rule out gas producing infection, ESR CRP CBC chemistry.  Differential includes cellulitis, necrotizing infection, osteomyelitis.  Will start patient on vancomycin and cefepime for presumed osteomyelitis and admit patient to medicine team.        Problems Addressed:  Osteomyelitis (HCC): acute illness or injury    Amount and/or Complexity of Data Reviewed  Labs: ordered. Decision-making details documented in ED Course.    Risk  Decision regarding hospitalization.             Medications   acetaminophen (TYLENOL) tablet 650 mg (has no administration in time range)   polyethylene glycol (MIRALAX) packet 17 g (has no administration in time range)   magnesium hydroxide (MILK OF MAGNESIA) oral suspension 15 mL (has no administration in time range)   ondansetron (ZOFRAN)  injection 4 mg (has no administration in time range)   simethicone (MYLICON) chewable tablet 80 mg (has no administration in time range)   vancomycin (VANCOCIN) 1500 mg in sodium chloride 0.9% 250 mL IVPB (1,500 mg Intravenous New Bag 4/15/25 1354)   cefepime (MAXIPIME) 2 g/50 mL dextrose IVPB (0 mg Intravenous Stopped 4/15/25 1357)       ED Risk Strat Scores                    No data recorded        SBIRT 22yo+      Flowsheet Row Most Recent Value   Initial Alcohol Screen: US AUDIT-C     1. How often do you have a drink containing alcohol? 0 Filed at: 04/15/2025 1205   2. How many drinks containing alcohol do you have on a typical day you are drinking?  0 Filed at: 04/15/2025 1205   3a. Male UNDER 65: How often do you have five or more drinks on one occasion? 0 Filed at: 04/15/2025 1205   3b. FEMALE Any Age, or MALE 65+: How often do you have 4 or more drinks on one occassion? 0 Filed at: 04/15/2025 1205   Audit-C Score 0 Filed at: 04/15/2025 1205   ISABELLA: How many times in the past year have you...    Used an illegal drug or used a prescription medication for non-medical reasons? Never Filed at: 04/15/2025 1205                            History of Present Illness       Chief Complaint   Patient presents with    Toe Swelling     Left great toe with drainage and swelling.        Past Medical History:   Diagnosis Date    Acute on chronic renal insufficiency     Allergic rhinitis     Anemia 06/11/2012    Atrial fibrillation (HCC)     Cerebrovascular accident (CVA) (HCC)     Chest pain     Controlled type 2 diabetes mellitus without complication (HCC)     SMALLS (dyspnea on exertion)     Generalized anxiety disorder     GERD without esophagitis 06/11/2012    Hypertension     Memory difficulties 01/08/2019    Nephrolithiasis 03/17/2016    Obesity 06/11/2012    Osteoarthritis 06/11/2012      Past Surgical History:   Procedure Laterality Date    CATARACT EXTRACTION, BILATERAL      CYSTOSCOPY W/ LASER LITHOTRIPSY Left  1/20/2020    Procedure: CYSTOSCOPY; RETROGRADE; URETEROSCOPY; STONE EXTRACTION; POSSIBLE HOLMIUM LASER LITHOTRIPSY;  Surgeon: Carlos Moore MD;  Location:  MAIN OR;  Service: Urology    KNEE SURGERY      [place] CYSTOURETHROSCOPY W/URETERAL CATHETERIZATION Left 11/8/2019    Procedure: CYSTOSCOPY RETROGRADE PYELOGRAM WITH INSERTION STENT URETERAL;  Surgeon: Carlos Moore MD;  Location:  MAIN OR;  Service: Urology    TOTAL HIP ARTHROPLASTY      TOTAL HIP ARTHROPLASTY Bilateral 2011      Family History   Problem Relation Age of Onset    Arthritis Mother     Parkinsonism Mother     No Known Problems Father       Social History     Tobacco Use    Smoking status: Never     Passive exposure: Never    Smokeless tobacco: Never   Vaping Use    Vaping status: Never Used   Substance Use Topics    Alcohol use: Never     Comment: socially     Drug use: Never      E-Cigarette/Vaping    E-Cigarette Use Never User       E-Cigarette/Vaping Substances    Nicotine No     THC No     CBD No     Flavoring No     Other No     Unknown No       I have reviewed and agree with the history as documented.     79-year-old male presents with foot infection to the left great toe which has been ongoing, on multiple antibiotics however continued to worsen.  Saw primary today who referred him to ER for admission for IV antibiotics          Review of Systems        Objective       ED Triage Vitals [04/15/25 1158]   Temperature Pulse Blood Pressure Respirations SpO2 Patient Position - Orthostatic VS   97.7 °F (36.5 °C) 74 118/57 18 98 % --      Temp Source Heart Rate Source BP Location FiO2 (%) Pain Score    Temporal Monitor Left arm -- 8      Vitals      Date and Time Temp Pulse SpO2 Resp BP Pain Score FACES Pain Rating User   04/15/25 1948 -- -- 98 % -- -- No Pain -- KH   04/15/25 1431 -- -- -- -- -- No Pain -- ND   04/15/25 1431 97.4 °F (36.3 °C) 85 100 % 17 146/75 -- -- DII   04/15/25 1418 98 °F (36.7 °C) 80 100 % 18 122/64 -- -- TMS   04/15/25 1415 --  70 99 % 16 128/69 -- -- AM   04/15/25 1400 -- 75 99 % 16 126/60 -- -- AM   04/15/25 1158 97.7 °F (36.5 °C) 74 98 % 18 118/57 8 -- AC            Physical Exam  Vitals and nursing note reviewed.   Constitutional:       Appearance: Normal appearance. He is well-developed.   HENT:      Head: Normocephalic and atraumatic.   Eyes:      Conjunctiva/sclera: Conjunctivae normal.      Pupils: Pupils are equal, round, and reactive to light.   Neck:      Trachea: No tracheal deviation.   Cardiovascular:      Rate and Rhythm: Normal rate and regular rhythm.      Heart sounds: Normal heart sounds. No murmur heard.  Pulmonary:      Effort: Pulmonary effort is normal. No respiratory distress.      Breath sounds: Normal breath sounds. No wheezing or rales.   Abdominal:      General: Bowel sounds are normal. There is no distension.      Palpations: Abdomen is soft.      Tenderness: There is no abdominal tenderness.   Musculoskeletal:         General: No deformity.      Cervical back: Normal range of motion and neck supple.      Comments: Small ulceration on the tip of the left great toe, toenail missing, significant erythema and swelling, no crepitus no streaking redness   Skin:     General: Skin is warm and dry.      Capillary Refill: Capillary refill takes less than 2 seconds.   Neurological:      General: No focal deficit present.      Mental Status: He is alert and oriented to person, place, and time.      Sensory: No sensory deficit.   Psychiatric:         Mood and Affect: Mood normal.         Judgment: Judgment normal.         Results Reviewed       Procedure Component Value Units Date/Time    Hemoglobin A1c w/EAG Estimation (Prechecked if no A1C within 90 days) [[id number]] Collected: 04/15/25 1304    Lab Status: In process Specimen: Blood from Arm, Left Updated: 04/15/25 3946    Platelet count [[id number]]     Lab Status: No result Specimen: Blood     Basic metabolic panel [[id number]]  (Abnormal) Collected: 04/15/25 1304    Lab  Status: Final result Specimen: Blood from Arm, Left Updated: 04/15/25 1332     Sodium 139 mmol/L      Potassium 4.9 mmol/L      Chloride 114 mmol/L      CO2 19 mmol/L      ANION GAP 6 mmol/L      BUN 22 mg/dL      Creatinine 1.48 mg/dL      Glucose 88 mg/dL      Calcium 8.4 mg/dL      eGFR 44 ml/min/1.73sq m     Narrative:      National Kid[employer] guidelines for Chronic Kidney Disease (CKD):     Stage 1 with normal or high GFR (GFR > 90 mL/min/1.73 square meters)    Stage 2 Mild CKD (GFR = 60-89 mL/min/1.73 square meters)    Stage 3A Moderate CKD (GFR = 45-59 mL/min/1.73 square meters)    Stage 3B Moderate CKD (GFR = 30-44 mL/min/1.73 square meters)    Stage 4 Severe CKD (GFR = 15-29 mL/min/1.73 square meters)    Stage 5 End Stage CKD (GFR <15 mL/min/1.73 square meters)  Note: GFR calculation is accurate only with a steady state creatinine    C-reactive protein [[id number]]  (Abnormal) Collected: 04/15/25 1304    Lab Status: Final result Specimen: Blood from Arm, Left Updated: 04/15/25 1332     CRP 11.9 mg/L     Magnesium [[id number]]  (Abnormal) Collected: 04/15/25 1304    Lab Status: Final result Specimen: Blood from Arm, Left Updated: 04/15/25 1332     Magnesium 1.5 mg/dL     Protime-INR [[id number]]  (Abnormal) Collected: 04/15/25 1304    Lab Status: Final result Specimen: Blood from Arm, Left Updated: 04/15/25 1327     Protime 15.8 seconds      INR 1.21    Narrative:      INR Therapeutic Range    Indication                                             INR Range      Atrial Fibrillation                                               2.0-3.0  Hypercoagulable State                                    2.0.2.3  Left Ventricular Asist Device                            2.0-3.0  Mechanical Heart Valve                                  -    Aortic(with afib, MI, embolism, HF, LA enlargement,    and/or coagulopathy)                                     2.0-3.0 (2.5-3.5)     Mitral                                                              2.5-3.5  Prosthetic/Bioprosthetic Heart Valve               2.0-3.0  Venous thromboembolism (VTE: VT, PE        2.0-3.0    APTT [[id number]]  (Abnormal) Collected: 04/15/25 1304    Lab Status: Final result Specimen: Blood from Arm, Left Updated: 04/15/25 1327     PTT 39 seconds     Sedimentation rate, automated [[id number]]  (Abnormal) Collected: 04/15/25 1304    Lab Status: Final result Specimen: Blood from Arm, Left Updated: 04/15/25 1324     Sed Rate 44 mm/hour     CBC and differential [[id number]]  (Abnormal) Collected: 04/15/25 1304    Lab Status: Final result Specimen: Blood from Arm, Left Updated: 04/15/25 1312     WBC 7.09 Thousand/uL      RBC 3.48 Million/uL      Hemoglobin 10.3 g/dL      Hematocrit 33.1 %      MCV 95 fL      MCH 29.6 pg      MCHC 31.1 g/dL      RDW 13.2 %      MPV 9.2 fL      Platelets 236 Thousands/uL      nRBC 0 /100 WBCs      Segmented % 77 %      Immature Grans % 0 %      Lymphocytes % 15 %      Monocytes % 7 %      Eosinophils Relative 1 %      Basophils Relative 0 %      Absolute Neutrophils 5.33 Thousands/µL      Absolute Immature Grans 0.03 Thousand/uL      Absolute Lymphocytes 1.09 Thousands/µL      Absolute Monocytes 0.52 Thousand/µL      Eosinophils Absolute 0.09 Thousand/µL      Basophils Absolute 0.03 Thousands/µL             XR toe great min 2 views LEFT   Final Interpretation by Tor Perry MD (04/15 1943)      Punitive evidence of significant progression of osteomyelitis of the first distal phalanx with noticeable bony destruction/fragmentation and mild displacement of the tuft dorsally.      No soft tissue gas seen      The study was marked in EPIC for immediate notification.         Computerized Assisted Algorithm (CAA) may have been used to analyze all applicable images.            Workstation performed: [id number]             Procedures    ED Medication and Procedure Management   Prior to Admission Medications   Prescriptions Last  Dose Informant Patient Reported? Taking?   ALPRAZolam (XANAX) 0.25 mg tablet Past Week  No Yes   Sig: Take 1 tablet (0.25 mg total) by mouth 3 (three) times a day as needed for anxiety   DULoxetine (CYMBALTA) 30 mg delayed release capsule 4/15/2025  No Yes   Sig: take 1 capsule by mouth once daily   Empagliflozin 25 MG TABS 4/15/2025  No Yes   Sig: Take 1 tablet (25 mg total) by mouth daily   Lancets (freestyle) lancets   No No   Sig: Test twice a day   apixaban (ELIQUIS) 5 mg 4/15/2025  No Yes   Sig: Take 1 tablet (5 mg total) by mouth 2 (two) times a day   Patient taking differently: Take 5 mg by mouth in the morning   atorvastatin (LIPITOR) 20 mg tablet 4/15/2025  No Yes   Sig: take 1 tablet by mouth once daily   carbidopa-levodopa (SINEMET CR)  mg TBCR per ER tablet 4/15/2025  No Yes   Sig: Take 1 tablet by mouth 3 (three) times a day On  mg   donepezil (ARICEPT) 10 mg tablet 4/14/2025  No Yes   Sig: take 1 tablet by mouth at bedtime   doxycycline hyclate (VIBRAMYCIN) 100 mg capsule 4/15/2025  Yes Yes   Sig: Take 1 capsule by mouth 2 (two) times a day   ergocalciferol (VITAMIN D2) 50,000 units 4/14/2025  No Yes   Sig: take 1 capsule by mouth every week   glimepiride (AMARYL) 2 mg tablet 4/14/2025  No Yes   Sig: Take 1 tablet (2 mg total) by mouth daily with breakfast   glucose blood (FREESTYLE LITE) test strip   No No   Sig: Test twice a day   glucose monitoring kit (FREESTYLE) monitoring kit   No No   Sig: Use 1 each 2 (two) times a day Test twice  Day   memantine (NAMENDA) 10 mg tablet 4/15/2025  No Yes   Sig: take 1 tablet by mouth twice a day   metFORMIN (GLUCOPHAGE) 850 mg tablet 4/15/2025  No Yes   Sig: Take 1 tablet (850 mg total) by mouth 2 (two) times a day   metoprolol succinate (TOPROL-XL) 100 mg 24 hr tablet 4/15/2025  No Yes   Sig: take 1 tablet by mouth once daily      Facility-Administered Medications: None     Current Discharge Medication List        CONTINUE these medications which  have NOT CHANGED    Details   ALPRAZolam (XANAX) 0.25 mg tablet Take 1 tablet (0.25 mg total) by mouth 3 (three) times a day as needed for anxiety  Qty: 30 tablet, Refills: 0    Associated Diagnoses: Panic attack      apixaban (ELIQUIS) 5 mg Take 1 tablet (5 mg total) by mouth 2 (two) times a day  Qty: 180 tablet, Refills: 3    Associated Diagnoses: Atrial fibrillation, unspecified type ([facility])      atorvastatin (LIPITOR) 20 mg tablet take 1 tablet by mouth once daily  Qty: 30 tablet, Refills: 0    Associated Diagnoses: Type 2 diabetes mellitus with stage 3b chronic kidney disease, without long-term current use of insulin ([facility]); Mixed hyperlipidemia      carbidopa-levodopa (SINEMET CR)  mg TBCR per ER tablet Take 1 tablet by mouth 3 (three) times a day On  mg  Qty: 270 tablet, Refills: 1    Associated Diagnoses: Parkinson's disease without dyskinesia or fluctuating manifestations ([facility])      donepezil (ARICEPT) 10 mg tablet take 1 tablet by mouth at bedtime  Qty: 90 tablet, Refills: 1    Associated Diagnoses: Memory difficulties      doxycycline hyclate (VIBRAMYCIN) 100 mg capsule Take 1 capsule by mouth 2 (two) times a day      DULoxetine (CYMBALTA) 30 mg delayed release capsule take 1 capsule by mouth once daily  Qty: 90 capsule, Refills: 1    Associated Diagnoses: Diabetic polyneuropathy associated with type 2 diabetes mellitus ([facility]); Chronic pain of right knee; Metatarsalgia of both feet; Reactive depression; Chronic pain syndrome      Empagliflozin 25 MG TABS Take 1 tablet (25 mg total) by mouth daily  Qty: 90 tablet, Refills: 3    Associated Diagnoses: Type 2 diabetes mellitus with stage 3b chronic kidney disease, without long-term current use of insulin ([facility])      ergocalciferol (VITAMIN D2) 50,000 units take 1 capsule by mouth every week  Qty: 12 capsule, Refills: 1    Associated Diagnoses: Vitamin D deficiency      glimepiride (AMARYL) 2 mg tablet Take 1 tablet (2 mg total) by mouth daily with  breakfast  Qty: 100 tablet, Refills: 3    Associated Diagnoses: Type 2 diabetes mellitus with stage 3b chronic kidney disease, without long-term current use of insulin ([facility])      memantine (NAMENDA) 10 mg tablet take 1 tablet by mouth twice a day  Qty: 180 tablet, Refills: 0    Associated Diagnoses: Alzheimer's disease, unspecified (CODE) ([facility])      metFORMIN (GLUCOPHAGE) 850 mg tablet Take 1 tablet (850 mg total) by mouth 2 (two) times a day  Qty: 180 tablet, Refills: 1    Associated Diagnoses: Type 2 diabetes mellitus with complication ([facility])      metoprolol succinate (TOPROL-XL) 100 mg 24 hr tablet take 1 tablet by mouth once daily  Qty: 90 tablet, Refills: 1    Associated Diagnoses: Hypertension, unspecified type      glucose blood (FREESTYLE LITE) test strip Test twice a day  Qty: 100 each, Refills: 3    Associated Diagnoses: Type 2 diabetes mellitus with stage 3b chronic kidney disease, without long-term current use of insulin ([facility])      glucose monitoring kit (FREESTYLE) monitoring kit Use 1 each 2 (two) times a day Test twice  Day  Qty: 1 each, Refills: 0    Associated Diagnoses: Type 2 diabetes mellitus with stage 3b chronic kidney disease, without long-term current use of insulin ([facility])      Lancets (freestyle) lancets Test twice a day  Qty: 100 each, Refills: 3    Associated Diagnoses: Type 2 diabetes mellitus with stage 3b chronic kidney disease, without long-term current use of insulin ([facility])           No discharge procedures on file.  ED SEPSIS DOCUMENTATION   Time reflects when diagnosis was documented in both MDM as applicable and the Disposition within this note       Time User Action Codes Description Comment    4/15/2025  2:10 PM Doug Gaytan Add [M86.9] Osteomyelitis ([facility])     4/15/2025  2:16 PM Santos Jaime Add [E11.22,  N18.32] Type 2 diabetes mellitus with stage 3b chronic kidney disease, without long-term current use of insulin ([facility])     4/15/2025  2:22 PM Santos Jaime Add [M86.9] Bone  infection (HCC)                  Doug Gaytan,   04/15/25 8191

## 2025-04-15 NOTE — PLAN OF CARE
Problem: PAIN - ADULT  Goal: Verbalizes/displays adequate comfort level or baseline comfort level  Description: Interventions:- Encourage patient to monitor pain and request assistance- Assess pain using appropriate pain scale- Administer analgesics based on type and severity of pain and evaluate response- Implement non-pharmacological measures as appropriate and evaluate response- Consider cultural and social influences on pain and pain management- Notify physician/advanced practitioner if interventions unsuccessful or patient reports new pain  Outcome: Not Progressing

## 2025-04-15 NOTE — ASSESSMENT & PLAN NOTE
Left great toe infection since 2 weeks  Originally managed by PCP and PDM with levaquin and clindamycin and switched to doxycycline  Failed outpatient antibiotic course  On admission foot x-rays are consistent with first distal phalanx osteomyelitis in the setting of cellulitis  Sed rate 44, CRP 11.9.   Started on Rocephin and vancomycin  ID consulted  General pharmacy consult for Vanco mgn.  Will trend labs in the morning

## 2025-04-15 NOTE — H&P
"H&P - Hospitalist   Name: Kaleb Ching III 79 y.o. male I MRN: [id number]  Unit/Bed#: -01 I Date of Admission: 4/15/2025   Date of Service: 4/15/2025 I Hospital Day: 0     Assessment & Plan  Osteomyelitis (HCC)  Left great toe infection since 2 weeks  Originally managed by PCP and PDM with levaquin and clindamycin and switched to doxycycline  Failed outpatient antibiotic course  On admission foot x-rays are consistent with first distal phalanx osteomyelitis in the setting of cellulitis  Sed rate 44, CRP 11.9.   Started on Rocephin and vancomycin  ID consulted  General pharmacy consult for Vanco mgn.  Will trend labs in the morning  Hypertension  Blood pressure is controlled  Continue home metoprolol 100 mg daily  Monitor vital signs  Type 2 diabetes mellitus with stage 3b chronic kidney disease, without long-term current use of insulin (HCC)  Lab Results   Component Value Date    HGBA1C 7.9 (A) 01/08/2025   With check A1c  Hold home diabetes meds  ACHS  Started on ISF  Maintain hypoglycemia protocol    No results for input(s): \"POCGLU\" in the last 72 hours.    Blood Sugar Average: Last 72 hrs:      Atrial fibrillation (HCC)  Rate controlled  Continue home Eliquis  Stage 3 chronic kidney disease (HCC)  Lab Results   Component Value Date    EGFR 44 04/15/2025    EGFR 32 04/08/2025    EGFR 49 12/09/2024    CREATININE 1.48 (H) 04/15/2025    CREATININE 1.93 (H) 04/08/2025    CREATININE 1.36 (H) 12/09/2024     Alzheimer's disease, unspecified (CODE) (HCC)  Continue home meds  Monitor for behaviors  Parkinson's disease without dyskinesia or fluctuating manifestations (HCC)  Continue carbidopa levodopa      VTE Pharmacologic Prophylaxis:   Moderate Risk (Score 3-4) - Pharmacological DVT Prophylaxis Ordered: apixaban (Eliquis).  Code Status: Level 1 - Full Code   Discussion with family: Updated  (daughter) at bedside.    Anticipated Length of Stay: Patient will be admitted on an observation basis with " an anticipated length of stay of less than 2 midnights secondary to pending evaluations.    History of Present Illness   Chief Complaint: Left great toe infection    Kaleb Ching III is a 79 y.o. male with a PMH of diabetes type 2, hypertension ,dementia, CVA, parkinson and anxiety who presents with infection of left great toe.  Originally infection was managed by  PCP and Started on levaquin and clindamycin and sent for labs, xray. Labs with slight elevated WBC, but CRP over 100. Xray with concern for OM.  Referred to DPM.  Nail was removed  this Monday and abx changed to doxy and sent home with OP f/u.  Wife reports toe looks worse with bloody purulent discharge. Some pain. Difficulty walking. NO fever or chills.  They decided to come to the ER for evaluation.  On admission foot x-rays are consistent with first distal phalanx osteomyelitis in the setting of cellulitis.  Sed rate 44, CRP 11.9.  will admit the patient under hospitalist service for IV antibiotics.  Started on Rocephin and vancomycin. ID consulted.  General pharmacy consult for Vanco mgn. Creatinine on admission 1.48 ,will give gentle IV hydration overnight.       Review of Systems   Constitutional:  Negative for activity change and appetite change.   Respiratory:  Negative for cough and shortness of breath.    Cardiovascular:  Negative for chest pain.   Gastrointestinal:  Negative for abdominal distention.   Genitourinary:  Negative for difficulty urinating.   Musculoskeletal:  Negative for arthralgias.   Skin:  Positive for wound.        Left foot great toe     Neurological:  Negative for dizziness and headaches.   Psychiatric/Behavioral:  The patient is not nervous/anxious.        Historical Information   Past Medical History:   Diagnosis Date    Acute on chronic renal insufficiency     Allergic rhinitis     Anemia 06/11/2012    Atrial fibrillation (HCC)     Cerebrovascular accident (CVA) (HCC)     Chest pain     Controlled type 2 diabetes  mellitus without complication (HCC)     SMALLS (dyspnea on exertion)     Generalized anxiety disorder     GERD without esophagitis 06/11/2012    Hypertension     Memory difficulties 01/08/2019    Nephrolithiasis 03/17/2016    Obesity 06/11/2012    Osteoarthritis 06/11/2012     Past Surgical History:   Procedure Laterality Date    CATARACT EXTRACTION, BILATERAL      CYSTOSCOPY W/ LASER LITHOTRIPSY Left 1/20/2020    Procedure: CYSTOSCOPY; RETROGRADE; URETEROSCOPY; STONE EXTRACTION; POSSIBLE HOLMIUM LASER LITHOTRIPSY;  Surgeon: Carlos Moore MD;  Location:  MAIN OR;  Service: Urology    KNEE SURGERY      TN CYSTOURETHROSCOPY W/URETERAL CATHETERIZATION Left 11/8/2019    Procedure: CYSTOSCOPY RETROGRADE PYELOGRAM WITH INSERTION STENT URETERAL;  Surgeon: Carlos Moore MD;  Location:  MAIN OR;  Service: Urology    TOTAL HIP ARTHROPLASTY      TOTAL HIP ARTHROPLASTY Bilateral 2011     Social History     Tobacco Use    Smoking status: Never     Passive exposure: Never    Smokeless tobacco: Never   Vaping Use    Vaping status: Never Used   Substance and Sexual Activity    Alcohol use: Never     Comment: socially     Drug use: Never    Sexual activity: Yes     E-Cigarette/Vaping    E-Cigarette Use Never User      E-Cigarette/Vaping Substances    Nicotine No     THC No     CBD No     Flavoring No     Other No     Unknown No         Social History:  Marital Status: /Civil Union   Patient Pre-hospital Living Situation: Home  Patient Pre-hospital Level of Mobility: walks with walker  Patient Pre-hospital Diet Restrictions: none    Meds/Allergies   I have reviewed home medications with patient personally.  Prior to Admission medications    Medication Sig Start Date End Date Taking? Authorizing Provider   ALPRAZolam (XANAX) 0.25 mg tablet Take 1 tablet (0.25 mg total) by mouth 3 (three) times a day as needed for anxiety 2/19/25  Yes Wilfrid Mills, DO   apixaban (ELIQUIS) 5 mg Take 1 tablet (5 mg total) by mouth 2 (two) times a  day  Patient taking differently: Take 5 mg by mouth in the morning 2/15/24 4/15/25 Yes Wilfrid Mills DO   atorvastatin (LIPITOR) 20 mg tablet take 1 tablet by mouth once daily 2/23/25  Yes Wilfrid Mills DO   carbidopa-levodopa (SINEMET CR)  mg TBCR per ER tablet Take 1 tablet by mouth 3 (three) times a day On  mg 4/15/25  Yes Wilfrid Mills DO   donepezil (ARICEPT) 10 mg tablet take 1 tablet by mouth at bedtime 2/24/25  Yes Wilfrid Mills DO   doxycycline hyclate (VIBRAMYCIN) 100 mg capsule Take 1 capsule by mouth 2 (two) times a day 4/14/25  Yes Historical Provider, MD   DULoxetine (CYMBALTA) 30 mg delayed release capsule take 1 capsule by mouth once daily 1/20/25  Yes Wilfrid Mills DO   Empagliflozin 25 MG TABS Take 1 tablet (25 mg total) by mouth daily 4/8/25 4/3/26 Yes Wilfrid Mills DO   ergocalciferol (VITAMIN D2) 50,000 units take 1 capsule by mouth every week 2/25/25  Yes Wilfrid Mills DO   glimepiride (AMARYL) 2 mg tablet Take 1 tablet (2 mg total) by mouth daily with breakfast 1/8/25  Yes Wilfrid Mills DO   memantine (NAMENDA) 10 mg tablet take 1 tablet by mouth twice a day 3/11/25  Yes Eusebio Spann,    metFORMIN (GLUCOPHAGE) 850 mg tablet Take 1 tablet (850 mg total) by mouth 2 (two) times a day 1/3/25  Yes Wilfrid Mills DO   metoprolol succinate (TOPROL-XL) 100 mg 24 hr tablet take 1 tablet by mouth once daily 10/25/24  Yes Wilfrid Mills DO   glucose blood (FREESTYLE LITE) test strip Test twice a day 3/22/23   Wilfrid Mills DO   glucose monitoring kit (FREESTYLE) monitoring kit Use 1 each 2 (two) times a day Test twice  Day 3/22/23   Wilfrid Mills DO   Lancets (freestyle) lancets Test twice a day 3/22/23   Wilfrid Mills DO   carbidopa-levodopa (SINEMET CR)  mg TBCR per ER tablet Take 1 tablet by mouth 2 (two) times a day 9/11/24 4/15/25  Wilfrid Mills DO   clindamycin (CLEOCIN) 300 MG capsule Take 1 capsule (300 mg total) by mouth 3 (three) times a day for 10 days 4/8/25  4/15/25  Wilfrid Mills DO   levofloxacin (LEVAQUIN) 250 mg tablet Take 1 tablet (250 mg total) by mouth daily for 10 days 4/8/25 4/15/25  Wilfrid Mills DO     No Active Allergies    Objective :  Temp:  [96.5 °F (35.8 °C)-98 °F (36.7 °C)] 97.4 °F (36.3 °C)  HR:  [70-85] 85  BP: (118-146)/(57-75) 146/75  Resp:  [16-18] 17  SpO2:  [98 %-100 %] 100 %  O2 Device: None (Room air)    Physical Exam  Constitutional:       Appearance: Normal appearance.   Cardiovascular:      Rate and Rhythm: Normal rate.      Pulses: Normal pulses.      Heart sounds: Normal heart sounds.   Pulmonary:      Effort: Pulmonary effort is normal.      Breath sounds: Normal breath sounds.   Abdominal:      General: Bowel sounds are normal.      Palpations: Abdomen is soft.   Musculoskeletal:         General: No swelling. Normal range of motion.   Skin:     General: Skin is warm and dry.   Neurological:      General: No focal deficit present.      Mental Status: He is alert. Mental status is at baseline.   Psychiatric:         Mood and Affect: Mood normal.         Behavior: Behavior normal.                Lab Results: I have reviewed the following results:  Results from last 7 days   Lab Units 04/15/25  1304   WBC Thousand/uL 7.09   HEMOGLOBIN g/dL 10.3*   HEMATOCRIT % 33.1*   PLATELETS Thousands/uL 236   SEGS PCT % 77*   LYMPHO PCT % 15   MONO PCT % 7   EOS PCT % 1     Results from last 7 days   Lab Units 04/15/25  1304   SODIUM mmol/L 139   POTASSIUM mmol/L 4.9   CHLORIDE mmol/L 114*   CO2 mmol/L 19*   BUN mg/dL 22   CREATININE mg/dL 1.48*   ANION GAP mmol/L 6   CALCIUM mg/dL 8.4   GLUCOSE RANDOM mg/dL 88     Results from last 7 days   Lab Units 04/15/25  1304   INR  1.21*         Lab Results   Component Value Date    HGBA1C 7.9 (A) 01/08/2025    HGBA1C 7.7 (A) 09/11/2024    HGBA1C 5.9 07/17/2024           Imaging Results Review: I reviewed radiology reports from this admission including: xray(s).  Other Study Results Review: No additional  pertinent studies reviewed.    Administrative Statements   I have spent a total time of 40 minutes in caring for this patient on the day of the visit/encounter including Diagnostic results, Prognosis, Patient and family education, Importance of tx compliance, Risk factor reductions, Impressions, Counseling / Coordination of care, Documenting in the medical record, Reviewing/placing orders in the medical record (including tests, medications, and/or procedures), Obtaining or reviewing history  , and Communicating with other healthcare professionals .    ** Please Note: This note has been constructed using a voice recognition system. **

## 2025-04-15 NOTE — ASSESSMENT & PLAN NOTE
"Lab Results   Component Value Date    HGBA1C 7.9 (A) 01/08/2025   With check A1c  Hold home diabetes meds  ACHS  Started on ISF  Maintain hypoglycemia protocol    No results for input(s): \"POCGLU\" in the last 72 hours.    Blood Sugar Average: Last 72 hrs:      "

## 2025-04-16 ENCOUNTER — APPOINTMENT (INPATIENT)
Dept: NON INVASIVE DIAGNOSTICS | Facility: HOSPITAL | Age: 80
DRG: 617 | End: 2025-04-16
Payer: MEDICARE

## 2025-04-16 ENCOUNTER — APPOINTMENT (INPATIENT)
Dept: MRI IMAGING | Facility: HOSPITAL | Age: 80
DRG: 617 | End: 2025-04-16
Payer: MEDICARE

## 2025-04-16 LAB
GLUCOSE SERPL-MCNC: 146 MG/DL (ref 65–140)
GLUCOSE SERPL-MCNC: 147 MG/DL (ref 65–140)
GLUCOSE SERPL-MCNC: 167 MG/DL (ref 65–140)
GLUCOSE SERPL-MCNC: 237 MG/DL (ref 65–140)

## 2025-04-16 PROCEDURE — 73718 MRI LOWER EXTREMITY W/O DYE: CPT

## 2025-04-16 PROCEDURE — 82948 REAGENT STRIP/BLOOD GLUCOSE: CPT

## 2025-04-16 PROCEDURE — 87147 CULTURE TYPE IMMUNOLOGIC: CPT | Performed by: NURSE PRACTITIONER

## 2025-04-16 PROCEDURE — 87205 SMEAR GRAM STAIN: CPT | Performed by: NURSE PRACTITIONER

## 2025-04-16 PROCEDURE — 93923 UPR/LXTR ART STDY 3+ LVLS: CPT

## 2025-04-16 PROCEDURE — 93925 LOWER EXTREMITY STUDY: CPT

## 2025-04-16 PROCEDURE — 87070 CULTURE OTHR SPECIMN AEROBIC: CPT | Performed by: NURSE PRACTITIONER

## 2025-04-16 PROCEDURE — 99232 SBSQ HOSP IP/OBS MODERATE 35: CPT | Performed by: NURSE PRACTITIONER

## 2025-04-16 PROCEDURE — 99223 1ST HOSP IP/OBS HIGH 75: CPT | Performed by: STUDENT IN AN ORGANIZED HEALTH CARE EDUCATION/TRAINING PROGRAM

## 2025-04-16 PROCEDURE — 99222 1ST HOSP IP/OBS MODERATE 55: CPT | Performed by: PODIATRIST

## 2025-04-16 PROCEDURE — 87186 SC STD MICRODIL/AGAR DIL: CPT | Performed by: NURSE PRACTITIONER

## 2025-04-16 PROCEDURE — G0545 PR INHERENT VISIT TO INPT: HCPCS | Performed by: STUDENT IN AN ORGANIZED HEALTH CARE EDUCATION/TRAINING PROGRAM

## 2025-04-16 RX ORDER — CEFAZOLIN SODIUM 2 G/50ML
2000 SOLUTION INTRAVENOUS EVERY 8 HOURS
Status: DISCONTINUED | OUTPATIENT
Start: 2025-04-16 | End: 2025-04-19

## 2025-04-16 RX ADMIN — CEFAZOLIN SODIUM 2000 MG: 2 SOLUTION INTRAVENOUS at 20:37

## 2025-04-16 RX ADMIN — Medication 400 MG: at 08:57

## 2025-04-16 RX ADMIN — CEFTRIAXONE SODIUM 2000 MG: 10 INJECTION, POWDER, FOR SOLUTION INTRAVENOUS at 12:13

## 2025-04-16 RX ADMIN — APIXABAN 5 MG: 5 TABLET, FILM COATED ORAL at 17:32

## 2025-04-16 RX ADMIN — CARBIDOPA AND LEVODOPA 1 TABLET: 50; 200 TABLET, EXTENDED RELEASE ORAL at 17:32

## 2025-04-16 RX ADMIN — ATORVASTATIN CALCIUM 20 MG: 20 TABLET, FILM COATED ORAL at 08:57

## 2025-04-16 RX ADMIN — DONEPEZIL HYDROCHLORIDE 10 MG: 10 TABLET ORAL at 21:22

## 2025-04-16 RX ADMIN — APIXABAN 5 MG: 5 TABLET, FILM COATED ORAL at 08:57

## 2025-04-16 RX ADMIN — MEMANTINE 10 MG: 5 TABLET ORAL at 08:56

## 2025-04-16 RX ADMIN — VANCOMYCIN HYDROCHLORIDE 1250 MG: 1 INJECTION, POWDER, LYOPHILIZED, FOR SOLUTION INTRAVENOUS at 08:59

## 2025-04-16 RX ADMIN — DULOXETINE HYDROCHLORIDE 30 MG: 30 CAPSULE, DELAYED RELEASE ORAL at 08:57

## 2025-04-16 RX ADMIN — Medication 400 MG: at 17:32

## 2025-04-16 RX ADMIN — MEMANTINE 10 MG: 5 TABLET ORAL at 17:32

## 2025-04-16 RX ADMIN — METOPROLOL SUCCINATE 100 MG: 50 TABLET, EXTENDED RELEASE ORAL at 08:56

## 2025-04-16 RX ADMIN — CARBIDOPA AND LEVODOPA 1 TABLET: 50; 200 TABLET, EXTENDED RELEASE ORAL at 09:01

## 2025-04-16 RX ADMIN — ALPRAZOLAM 0.25 MG: 0.25 TABLET ORAL at 13:30

## 2025-04-16 RX ADMIN — ALPRAZOLAM 0.25 MG: 0.25 TABLET ORAL at 20:42

## 2025-04-16 RX ADMIN — CARBIDOPA AND LEVODOPA 1 TABLET: 50; 200 TABLET, EXTENDED RELEASE ORAL at 20:42

## 2025-04-16 NOTE — PLAN OF CARE
Problem: PAIN - ADULT  Goal: Verbalizes/displays adequate comfort level or baseline comfort level  Description: Interventions:- Encourage patient to monitor pain and request assistance- Assess pain using appropriate pain scale- Administer analgesics based on type and severity of pain and evaluate response- Implement non-pharmacological measures as appropriate and evaluate response- Consider cultural and social influences on pain and pain management- Notify physician/advanced practitioner if interventions unsuccessful or patient reports new pain  Outcome: Progressing     Problem: INFECTION - ADULT  Goal: Absence or prevention of progression during hospitalization  Description: INTERVENTIONS:- Assess and monitor for signs and symptoms of infection- Monitor lab/diagnostic results- Monitor all insertion sites, i.e. indwelling lines, tubes, and drains- Monitor endotracheal if appropriate and nasal secretions for changes in amount and color- Alna appropriate cooling/warming therapies per order- Administer medications as ordered- Instruct and encourage patient and family to use good hand hygiene technique- Identify and instruct in appropriate isolation precautions for identified infection/condition  Outcome: Progressing     Problem: INFECTION - ADULT  Goal: Absence of fever/infection during neutropenic period  Description: INTERVENTIONS:- Monitor WBC  Outcome: Progressing

## 2025-04-16 NOTE — PLAN OF CARE
Problem: PAIN - ADULT  Goal: Verbalizes/displays adequate comfort level or baseline comfort level  Description: Interventions:- Encourage patient to monitor pain and request assistance- Assess pain using appropriate pain scale- Administer analgesics based on type and severity of pain and evaluate response- Implement non-pharmacological measures as appropriate and evaluate response- Consider cultural and social influences on pain and pain management- Notify physician/advanced practitioner if interventions unsuccessful or patient reports new pain  Outcome: Progressing     Problem: INFECTION - ADULT  Goal: Absence of fever/infection during neutropenic period  Description: INTERVENTIONS:- Monitor WBC  Outcome: Progressing     Problem: SAFETY ADULT  Goal: Maintain or return to baseline ADL function  Description: INTERVENTIONS:-  Assess patient's ability to carry out ADLs; assess patient's baseline for ADL function and identify physical deficits which impact ability to perform ADLs (bathing, care of mouth/teeth, toileting, grooming, dressing, etc.)- Assess/evaluate cause of self-care deficits - Assess range of motion- Assess patient's mobility; develop plan if impaired- Assess patient's need for assistive devices and provide as appropriate- Encourage maximum independence but intervene and supervise when necessary- Involve family in performance of ADLs- Assess for home care needs following discharge - Consider OT consult to assist with ADL evaluation and planning for discharge- Provide patient education as appropriate  Outcome: Progressing

## 2025-04-16 NOTE — CONSULTS
Vancomycin IV Pharmacy-to-Dose Consultation     Vancomycin has been discontinued.  Pharmacy will sign off.  Please contact or re-consult with questions.    Milad Fan, Pharmacist

## 2025-04-16 NOTE — ASSESSMENT & PLAN NOTE
Lab Results   Component Value Date    HGBA1C 6.5 (H) 04/15/2025   With check A1c  Hold home diabetes meds  ACHS  Started on ISF  Maintain hypoglycemia protocol    Recent Labs     04/15/25  1826 04/15/25  2044 04/16/25  0704 04/16/25  1045   POCGLU 101 183* 167* 147*       Blood Sugar Average: Last 72 hrs:  (P) 128.2

## 2025-04-16 NOTE — PROGRESS NOTES
Kaleb Ching III is a 79 y.o. male who is currently ordered Vancomycin IV with management by the Pharmacy Consult service.  Relevant clinical data and objective / subjective history reviewed.  Vancomycin Assessment:  Indication and Goal AUC/Trough: Bone/joint infection (goal -600, trough >10), -600, trough >10  Clinical Status: stable  Micro:   pending  Renal Function:  SCr: 1.48 mg/dL  CrCl: 47 mL/min  Renal replacement: Not on dialysis  Days of Therapy: 2  Current Dose: 1250 mg q24  Vancomycin Plan:  New Dosing: continue 1250 q24  Estimated AUC: 546 mcg*hr/mL  Estimated Trough: 16.1 mcg/mL  Next Level: 4/17 6am  Renal Function Monitoring: Daily BMP and UOP  Pharmacy will continue to follow closely for s/sx of nephrotoxicity, infusion reactions and appropriateness of therapy.  BMP and CBC will be ordered per protocol. We will continue to follow the patient’s culture results and clinical progress daily.    Milad Fan, Pharmacist

## 2025-04-16 NOTE — CONSULTS
Consultation - Infectious Disease   Name: Kaleb Ching III 79 y.o. male I MRN: [id number]  Unit/Bed#: -01 I Date of Admission: 4/15/2025   Date of Service: 4/16/2025 I Hospital Day: 1     Inpatient consult to Infectious Diseases  Consult performed by: Maximilian Klein PA-C  Consult ordered by: ROMI Castelan        Physician Requesting Evaluation: Hanane Corbin DO   Reason for Evaluation / Principal Problem: L foot OM    Assessment & Plan  Osteomyelitis of great toe of left foot (HCC)  Patient presented with worsening left great toe wound with purulent drainage, erythema and pain.  X-rays with concern for distal phalanx osteomyelitis.  Recent antibiotic exposures since 4/8 include p.o. Levaquin, clindamycin and doxycycline by outpatient providers.  No known history of MRSA.  Patient is otherwise afebrile without leukocytosis and hemodynamically stable without sepsis.   Discontinue IV vancomycin and ceftriaxone  Start IV cefazolin 2 g every 8 hours  Follow-up blood cultures  Can obtain wound culture if wound is actively draining  Recommend podiatry consultation for surgical intervention  Serial skin exams and local wound care  Trend temps and hemodynamics  Type 2 diabetes mellitus with stage 3b chronic kidney disease, without long-term current use of insulin ([facility])  Ha1c 6.5%.  Recent improvement in A1c.  Remains a risk factor for infection.  Recommend tight glycemic control.  Stage 3 chronic kidney disease (HCC)  Cr at baseline. Will renally dose abx as needed.  Alzheimer's disease, unspecified (CODE) ([facility])  Supportive care per primary team  Parkinson's disease without dyskinesia or fluctuating manifestations ([facility])  Supportive care per primary team    I have discussed the above management plan in detail with the primary service.   Infectious Disease service will follow.    Antibiotics:  IV cefepime and vancomycin    History of Present Illness   Kaleb Ching III is a 79 y.o. year old  male who with history of cognitive disorder and Parkinson's disease who presented on 4/15 with diabetic foot wound of left great toe for about 2 weeks patient was seen by his PCP on Friday with concern for toe wound and cellulitis and started on Levaquin and clindamycin.  He was referred to podiatry and sent for outpatient x-ray.  He was found to have concern for osteomyelitis on x-ray.  He was apparently seen by podiatrist Dr. Dahl on Friday and sent home with prescription for doxycycline.  He went back to his PCP yesterday 4/15 and concern for bloody purulent drainage with associated pain and he was sent to the ED.  Repeat x-ray in the ED shows progression of osteomyelitis in the first distal phalanx without soft tissue gas.  Patient did not meet sepsis criteria.  Blood cultures obtained.  He was started on IV cefepime and vancomycin. Patients wife at bedside assists with history. Foot/toe erythema did improve with PO abx course initially. Pt denies fevers or chills. No known hx of mrsa.     A complete review of systems is negative other than that noted in the HPI.    Medical History Review: I have reviewed the patient's PMH, PSH, Social History, Family History, Meds, and Allergies     Objective :  Temp:  [96.5 °F (35.8 °C)-98.4 °F (36.9 °C)] 98.3 °F (36.8 °C)  HR:  [61-85] 71  BP: (117-146)/(56-75) 117/59  Resp:  [16-20] 16  SpO2:  [96 %-100 %] 97 %  O2 Device: None (Room air)    Physical exam  General:  No acute distress, elderly  Psychiatric:  Awake and alert, pleasant and cooperative  HEENT: MMM neck supple head normocephalic   Cardiovascular: RRR no murmur   Pulmonary:  Normal respiratory excursion without accessory muscle use  Abdomen:  Soft, nontender  Extremities:  No edema  Skin:  No rashes. L great toe dressing intact. Clinical media reviewed, minimal erythema with mild cellulitis. Streaking drainage noted. No malodor.       Lab Results: I have reviewed the following results:  Results from last 7 days    Lab Units 04/15/25  1304   WBC Thousand/uL 7.09   HEMOGLOBIN g/dL 10.3*   PLATELETS Thousands/uL 236     Results from last 7 days   Lab Units 04/15/25  1304   SODIUM mmol/L 139   POTASSIUM mmol/L 4.9   CHLORIDE mmol/L 114*   CO2 mmol/L 19*   BUN mg/dL 22   CREATININE mg/dL 1.48*   EGFR ml/min/1.73sq m 44   CALCIUM mg/dL 8.4     Results from last 7 days   Lab Units 04/15/25  1650   BLOOD CULTURE  Received in Microbiology Lab. Culture in Progress.  Received in Microbiology Lab. Culture in Progress.         Results from last 7 days   Lab Units 04/15/25  1304   CRP mg/L 11.9*               Imaging Results Review: I reviewed radiology reports from this admission including: xray(s).  Other Study Results Review: Other studies reviewed include: Micro

## 2025-04-16 NOTE — ASSESSMENT & PLAN NOTE
Patient presented with worsening left great toe wound with purulent drainage, erythema and pain.  X-rays with concern for distal phalanx osteomyelitis.  Recent antibiotic exposures since 4/8 include p.o. Levaquin, clindamycin and doxycycline by outpatient providers.  No known history of MRSA.  Patient is otherwise afebrile without leukocytosis and hemodynamically stable without sepsis.   Discontinue IV vancomycin and ceftriaxone  Start IV cefazolin 2 g every 8 hours  Follow-up blood cultures  Can obtain wound culture if wound is actively draining  Recommend podiatry consultation for surgical intervention  Serial skin exams and local wound care  Trend temps and hemodynamics

## 2025-04-16 NOTE — ASSESSMENT & PLAN NOTE
Ha1c 6.5%.  Recent improvement in A1c.  Remains a risk factor for infection.  Recommend tight glycemic control.

## 2025-04-16 NOTE — CONSULTS
[facility] Podiatry - Consultation    Patient Information:   Kaleb Ching III 79 y.o. male MRN: [id number]  Unit/Bed#: -01 Encounter: [id number]  PCP: Wilfrid Mills DO  Date of Admission:  4/15/2025  Date of Consultation: 04/16/25  Requesting Physician: Hanane Corbin DO      ASSESSMENT:    Kaleb Ching III is a 79 y.o. male with:    Controlled diabetes mellitus  Osteomyelitis left hallux  Peripheral arterial disease    PLAN:    X-ray reviewed and shows pathologic fracture with fragmentation of the distal tuft  - She will need to amputation we will plan for amputation this Friday pending good workup  - Arterial studies and MRI are pending, if arterial studies are poor he will need revascularization and vascular consult prior to podiatric intervention  - Will plan for Friday to amputation for now  Rest of care per primary team.        SUBJECTIVE    History of Present Illness:    Kaleb Ching III is a 79 y.o. male with past medical history of ulceration left hallux who presents with worsening erythema and cellulitis, no pain does not have any pain would like to keep his toe.     Review of Systems:    Constitutional: Negative.    HENT: Negative.    Eyes: Negative.    Respiratory: Negative.    Cardiovascular: Negative.    Gastrointestinal: Negative.    Musculoskeletal: neg   Skin:as above   Neurological: nu,mbness   Psych: Negative.     Past Medical and Surgical History:     Past Medical History:   Diagnosis Date    Acute on chronic renal insufficiency     Allergic rhinitis     Anemia 06/11/2012    Atrial fibrillation (HCC)     Cerebrovascular accident (CVA) (HCC)     Chest pain     Controlled type 2 diabetes mellitus without complication (HCC)     SMALLS (dyspnea on exertion)     Generalized anxiety disorder     GERD without esophagitis 06/11/2012    Hypertension     Memory difficulties 01/08/2019    Nephrolithiasis 03/17/2016    Obesity 06/11/2012    Osteoarthritis 06/11/2012       Past Surgical  History:   Procedure Laterality Date    CATARACT EXTRACTION, BILATERAL      CYSTOSCOPY W/ LASER LITHOTRIPSY Left 1/20/2020    Procedure: CYSTOSCOPY; RETROGRADE; URETEROSCOPY; STONE EXTRACTION; POSSIBLE HOLMIUM LASER LITHOTRIPSY;  Surgeon: Carlos Moore MD;  Location:  MAIN OR;  Service: Urology    KNEE SURGERY      [place] CYSTOURETHROSCOPY W/URETERAL CATHETERIZATION Left 11/8/2019    Procedure: CYSTOSCOPY RETROGRADE PYELOGRAM WITH INSERTION STENT URETERAL;  Surgeon: Carlos Moore MD;  Location:  MAIN OR;  Service: Urology    TOTAL HIP ARTHROPLASTY      TOTAL HIP ARTHROPLASTY Bilateral 2011       Meds/Allergies:      Medications Prior to Admission:     ALPRAZolam (XANAX) 0.25 mg tablet    apixaban (ELIQUIS) 5 mg    atorvastatin (LIPITOR) 20 mg tablet    carbidopa-levodopa (SINEMET CR)  mg TBCR per ER tablet    donepezil (ARICEPT) 10 mg tablet    doxycycline hyclate (VIBRAMYCIN) 100 mg capsule    DULoxetine (CYMBALTA) 30 mg delayed release capsule    Empagliflozin 25 MG TABS    ergocalciferol (VITAMIN D2) 50,000 units    glimepiride (AMARYL) 2 mg tablet    memantine (NAMENDA) 10 mg tablet    metFORMIN (GLUCOPHAGE) 850 mg tablet    metoprolol succinate (TOPROL-XL) 100 mg 24 hr tablet    glucose blood (FREESTYLE LITE) test strip    glucose monitoring kit (FREESTYLE) monitoring kit    Lancets (freestyle) lancets    No Active Allergies    Social History:     Marital Status: /Civil Union    Substance Use History:   Social History     Substance and Sexual Activity   Alcohol Use Never    Comment: socially      Social History     Tobacco Use   Smoking Status Never    Passive exposure: Never   Smokeless Tobacco Never     Social History     Substance and Sexual Activity   Drug Use Never       Family History:    Family History   Problem Relation Age of Onset    Arthritis Mother     Parkinsonism Mother     No Known Problems Father          OBJECTIVE:    Vitals:   Blood Pressure: 117/59 (04/16/25 0705)  Pulse: 71  "(04/16/25 0705)  Temperature: 98.3 °F (36.8 °C) (04/16/25 0705)  Temp Source: Oral (04/16/25 0705)  Respirations: 16 (04/16/25 0705)  Height: 5' 10\" (177.8 cm) (04/15/25 1431)  Weight - Scale: 95.7 kg (210 lb 15.7 oz) (04/15/25 1431)  SpO2: 97 % (04/16/25 0705)    Physical Exam:     General Appearance: Alert, cooperative, no distress.  HEENT: Head normocephalic, atraumatic, without obvious abnormality.  Heart: Normal rate and rhythm.  Lungs: Non-labored breathing. No respiratory distress.  Abdomen: Without distension.  Psychiatric: AAOx3  Lower Extremity:  Vascular:   Sausage appearance of the left hallux, there is serosanguineous drainage with erythema emanating from the left hallux.  No deep probing wound actually visible.  There is ulceration distal tip.  Additional Data:     Lab Results: I have personally reviewed pertinent labs including:    Results from last 7 days   Lab Units 04/15/25  1304   WBC Thousand/uL 7.09   HEMOGLOBIN g/dL 10.3*   HEMATOCRIT % 33.1*   PLATELETS Thousands/uL 236   SEGS PCT % 77*   LYMPHO PCT % 15   MONO PCT % 7   EOS PCT % 1     Results from last 7 days   Lab Units 04/15/25  1304   POTASSIUM mmol/L 4.9   CHLORIDE mmol/L 114*   CO2 mmol/L 19*   BUN mg/dL 22   CREATININE mg/dL 1.48*   CALCIUM mg/dL 8.4     Results from last 7 days   Lab Units 04/15/25  1304   INR  1.21*       Cultures: I have personally reviewed pertinent cultures including:    Results from last 7 days   Lab Units 04/15/25  1650   BLOOD CULTURE  Received in Microbiology Lab. Culture in Progress.  Received in Microbiology Lab. Culture in Progress.           Imaging: I have personally reviewed pertinent films in PACS.  EKG, Pathology, and Other Studies: I have personally reviewed pertinent reports.        ** Please Note: Portions of the record may have been created with voice recognition software. Occasional wrong word or \"sound a like\" substitutions may have occurred due to the inherent limitations of voice recognition " software. Read the chart carefully and recognize, using context, where substitutions have occurred. **

## 2025-04-16 NOTE — ASSESSMENT & PLAN NOTE
Left great toe infection since 2 weeks  Originally managed by PCP and PDM with levaquin and clindamycin and switched to doxycycline  Failed outpatient antibiotic course  On admission foot x-rays are consistent with first distal phalanx osteomyelitis in the setting of cellulitis  Sed rate 44, CRP 11.9.   ID consulted and recommending IV cefazolin 2 g every 8 hours  Wound cultures ordered  Podiatry consulted and is recommending MRI of the left foot   If no vascular concerns then possible amputation on Friday as per podiatry  Will trend labs in the morning

## 2025-04-16 NOTE — CASE MANAGEMENT
Case Management Assessment & Discharge Planning Note    Patient name Kaleb Ching III  Location /-01 MRN [id number]  : 1945 Date 2025       Current Admission Date: 4/15/2025  Current Admission Diagnosis:Osteomyelitis of great toe of left foot ([facility])   Patient Active Problem List    Diagnosis Date Noted Date Diagnosed    Osteomyelitis of great toe of left foot ([facility]) 04/15/2025     Panic attack 2025     Dependence on cane 2025     Dilated aortic root ([facility]) 2024     Biatrial enlargement 2024     Systemic lupus erythematosus with lung involvement, unspecified SLE type ([facility]) 2024     Neurogenic orthostatic hypotension ([facility]) 2024     Pulmonary HTN ([facility]) 2024     Parkinson's disease without dyskinesia or fluctuating manifestations ([facility]) 2024     Sleep disturbance 2023     Alzheimer's disease, unspecified (CODE) ([facility]) 2023     Localized edema 2021     Diabetic nephropathy associated with type 2 diabetes mellitus ([facility]) 12/10/2020     Proteinuria 2020     Hydronephrosis with obstructing calculus 2019     Right Adrenal nodule  2019     Stage 3 chronic kidney disease ([facility]) 2019     History of TIA (transient ischemic attack)      Chronic anticoagulation 2019     Mild concentric left ventricular hypertrophy (LVH) 2018     Atrial fibrillation ([facility]) 2018     Primary osteoarthritis of right hip 10/30/2018     Type 2 diabetes mellitus with stage 3b chronic kidney disease, without long-term current use of insulin ([facility]) 2017     Nephrolithiasis 2016     Diabetic polyneuropathy associated with type 2 diabetes mellitus ([facility]) 2016     Hyperlipidemia 2016     Eczema 2013     Anemia 2012     Erectile dysfunction of non-organic origin 2012     Generalized anxiety disorder 2012     GERD without esophagitis 2012     Hypertension 2012     Obesity  06/11/2012     Osteoarthritis 06/11/2012     Polyp of sigmoid colon 06/11/2012       LOS (days): 1  Geometric Mean LOS (GMLOS) (days):   Days to GMLOS:     OBJECTIVE:    Risk of Unplanned Readmission Score: 18.95      Current admission status: Inpatient    Preferred Pharmacy:   [id number] - [place], [place] - [address] [address]  Phone: [phone] Fax: [phone]    Primary Care Provider: Wilfrid Mills DO    Primary Insurance: MEDICARE  Secondary Insurance: AARP    ASSESSMENT:  Active Health Care Proxies    There are no active Health Care Proxies on file.       Advance Directives  Does patient have a Health Care POA?: Yes  Does patient have Advance Directives?: Yes  Advance Directives: Living will  Primary Contact: Libra Ching wife    Readmission Root Cause  30 Day Readmission: No    Patient Information  Admitted from:: Home  Mental Status: Alert  During Assessment patient was accompanied by: Not accompanied during assessment  Assessment information provided by:: Patient  Primary Caregiver: Self  Support Systems: Spouse/significant other  County of Residence: [facility] do you live in?: [place]  Home entry access options. Select all that apply.: No steps to enter home  Type of Current Residence: Merged with [facility]  Living Arrangements: Lives w/ Spouse/significant other  Is patient a ?: No    Activities of Daily Living Prior to Admission  Functional Status: Independent  Completes ADLs independently?: Yes  Ambulates independently?: Yes  Does patient use assisted devices?: Yes  Assisted Devices (DME) used: Straight Cane  Does patient currently own DME?: Yes  What DME does the patient currently own?: Straight Cane, Walker  Does patient have a history of Outpatient Therapy (PT/OT)?: Yes  Does the patient have a history of Short-Term Rehab?: Yes  Does patient have a history of HHC?: Yes  Does patient currently have HHC?: No    Patient Information  "Continued  Income Source: Pension/FCI  Does patient have prescription coverage?: Yes ([facility])  Can the patient afford their medications and any related supplies (such as glucometers or test strips)?: Yes  Does patient receive dialysis treatments?: No  Does patient have a history of substance abuse?: No  Does patient have a history of Mental Health Diagnosis?: No      DISCHARGE DETAILS:    Discharge planning discussed with:: Pt     Contacts  Patient Contacts: Libra Hersch wife  Relationship to Patient:: Family  Contact Method: Phone  Phone Number: [phone] Home [phone] cell  Reason/Outcome: Emergency Contact  Pt was admitted to the hospital for Osteomylitis.  Pt lives with his spouse, was IPA and drives.  Pt states he does not \"want to lose my toe\".  Pt has a Podiatry and ID consult.  Will follow for care needs.                                                                                          "

## 2025-04-16 NOTE — PROGRESS NOTES
Progress Note - Hospitalist   Name: Kaleb Ching III 79 y.o. male I MRN: [id number]  Unit/Bed#: -01 I Date of Admission: 4/15/2025   Date of Service: 4/16/2025 I Hospital Day: 1    Assessment & Plan  Osteomyelitis of great toe of left foot (HCC)  Left great toe infection since 2 weeks  Originally managed by PCP and PDM with levaquin and clindamycin and switched to doxycycline  Failed outpatient antibiotic course  On admission foot x-rays are consistent with first distal phalanx osteomyelitis in the setting of cellulitis  Sed rate 44, CRP 11.9.   ID consulted and recommending IV cefazolin 2 g every 8 hours  Wound cultures ordered  Podiatry consulted and is recommending MRI of the left foot   If no vascular concerns then possible amputation on Friday as per podiatry  Will trend labs in the morning  Hypertension  Blood pressure is controlled  Continue home metoprolol 100 mg daily  Monitor vital signs  Type 2 diabetes mellitus with stage 3b chronic kidney disease, without long-term current use of insulin ([facility])  Lab Results   Component Value Date    HGBA1C 6.5 (H) 04/15/2025   With check A1c  Hold home diabetes meds  ACHS  Started on ISF  Maintain hypoglycemia protocol    Recent Labs     04/15/25  1826 04/15/25  2044 04/16/25  0704 04/16/25  1045   POCGLU 101 183* 167* 147*       Blood Sugar Average: Last 72 hrs:  (P) 128.2    Atrial fibrillation (HCC)  Rate controlled  Continue home Eliquis  Stage 3 chronic kidney disease (HCC)  Lab Results   Component Value Date    EGFR 44 04/15/2025    EGFR 32 04/08/2025    EGFR 49 12/09/2024    CREATININE 1.48 (H) 04/15/2025    CREATININE 1.93 (H) 04/08/2025    CREATININE 1.36 (H) 12/09/2024     Alzheimer's disease, unspecified (CODE) (HCC)  Continue home meds  Monitor for behaviors  Parkinson's disease without dyskinesia or fluctuating manifestations (HCC)  Continue carbidopa levodopa    VTE Pharmacologic Prophylaxis:   Moderate Risk (Score 3-4) - Pharmacological DVT  Prophylaxis Ordered: apixaban (Eliquis).    Mobility:   Basic Mobility Inpatient Raw Score: 18  JH-HLM Goal: 6: Walk 10 steps or more  JH-HLM Achieved: 6: Walk 10 steps or more  JH-HLM Goal NOT achieved. Continue with multidisciplinary rounding and encourage appropriate mobility to improve upon JH-HLM goals.    Patient Centered Rounds: I performed bedside rounds with nursing staff today.   Discussions with Specialists or Other Care Team Provider: Primary RN, , podiatry, and ID    Education and Discussions with Family / Patient: Updated  (wife) at bedside.    Current Length of Stay: 1 day(s)  Current Patient Status: Inpatient   Certification Statement: The patient will continue to require additional inpatient hospital stay due to not medically ready  Discharge Plan: Anticipate discharge in 48 hrs to discharge location to be determined pending rehab evaluations.    Code Status: Level 1 - Full Code    Subjective   Patient is resting comfortably in bed this morning.  Denied any pain or any other discomfort.     Objective :  Temp:  [97.4 °F (36.3 °C)-98.4 °F (36.9 °C)] 98.3 °F (36.8 °C)  HR:  [61-85] 71  BP: (117-146)/(56-75) 117/59  Resp:  [16-20] 16  SpO2:  [96 %-100 %] 97 %  O2 Device: None (Room air)    Body mass index is 30.27 kg/m².     Input and Output Summary (last 24 hours):     Intake/Output Summary (Last 24 hours) at 4/16/2025 1159  Last data filed at 4/16/2025 0900  Gross per 24 hour   Intake 1049.17 ml   Output --   Net 1049.17 ml       Physical Exam  Constitutional:       Appearance: Normal appearance.   Cardiovascular:      Rate and Rhythm: Normal rate and regular rhythm.      Pulses: Normal pulses.      Heart sounds: Normal heart sounds.   Pulmonary:      Effort: Pulmonary effort is normal.      Breath sounds: Normal breath sounds.   Abdominal:      General: Bowel sounds are normal.      Palpations: Abdomen is soft.   Musculoskeletal:         General: No tenderness. Normal range of  motion.   Skin:     General: Skin is warm and dry.      Comments: Left great toe wound     Neurological:      General: No focal deficit present.      Mental Status: He is alert and oriented to person, place, and time.   Psychiatric:         Mood and Affect: Mood normal.         Behavior: Behavior normal.           Lab Results: I have reviewed the following results:   Results from last 7 days   Lab Units 04/15/25  1304   WBC Thousand/uL 7.09   HEMOGLOBIN g/dL 10.3*   HEMATOCRIT % 33.1*   PLATELETS Thousands/uL 236   SEGS PCT % 77*   LYMPHO PCT % 15   MONO PCT % 7   EOS PCT % 1     Results from last 7 days   Lab Units 04/15/25  1304   SODIUM mmol/L 139   POTASSIUM mmol/L 4.9   CHLORIDE mmol/L 114*   CO2 mmol/L 19*   BUN mg/dL 22   CREATININE mg/dL 1.48*   ANION GAP mmol/L 6   CALCIUM mg/dL 8.4   GLUCOSE RANDOM mg/dL 88     Results from last 7 days   Lab Units 04/15/25  1304   INR  1.21*     Results from last 7 days   Lab Units 04/16/25  1045 04/16/25  0704 04/15/25  2044 04/15/25  1826 04/15/25  1726   POC GLUCOSE mg/dl 147* 167* 183* 101 43*     Results from last 7 days   Lab Units 04/15/25  1304   HEMOGLOBIN A1C % 6.5*           Recent Cultures (last 7 days):   Results from last 7 days   Lab Units 04/15/25  1650   BLOOD CULTURE  Received in Microbiology Lab. Culture in Progress.  Received in Microbiology Lab. Culture in Progress.       Imaging Results Review: I reviewed radiology reports from this admission including: xray(s).  Other Study Results Review: No additional pertinent studies reviewed.    Last 24 Hours Medication List:     Current Facility-Administered Medications:     acetaminophen (TYLENOL) tablet 650 mg, Q6H PRN    ALPRAZolam (XANAX) tablet 0.25 mg, TID PRN    apixaban (ELIQUIS) tablet 5 mg, BID    atorvastatin (LIPITOR) tablet 20 mg, Daily    carbidopa-levodopa (SINEMET CR)  mg per ER tablet 1 tablet, TID    ceftriaxone (ROCEPHIN) 2 g/50 mL in dextrose IVPB, Q24H    donepezil (ARICEPT) tablet  10 mg, HS    DULoxetine (CYMBALTA) delayed release capsule 30 mg, Daily    [START ON 4/21/2025] ergocalciferol (VITAMIN D2) capsule 50,000 Units, Weekly    insulin lispro (HumALOG/ADMELOG) 100 units/mL subcutaneous injection 1-5 Units, HS    magnesium hydroxide (MILK OF MAGNESIA) oral suspension 15 mL, Daily PRN    magnesium Oxide (MAG-OX) tablet 400 mg, BID    memantine (NAMENDA) tablet 10 mg, BID    metoprolol succinate (TOPROL-XL) 24 hr tablet 100 mg, Daily    ondansetron (ZOFRAN) injection 4 mg, Q6H PRN    polyethylene glycol (MIRALAX) packet 17 g, Daily    simethicone (MYLICON) chewable tablet 80 mg, 4x Daily PRN    vancomycin (VANCOCIN) 1,250 mg in sodium chloride 0.9 % 250 mL IVPB, Q24H, Last Rate: 1,250 mg (04/16/25 0859)    Administrative Statements   Today, Patient Was Seen By: ROMI Castelan  I have spent a total time of 50 minutes in caring for this patient on the day of the visit/encounter including Patient and family education, Importance of tx compliance, Counseling / Coordination of care, Documenting in the medical record, Reviewing/placing orders in the medical record (including tests, medications, and/or procedures), Obtaining or reviewing history  , and Communicating with other healthcare professionals .    **Please Note: This note may have been constructed using a voice recognition system.**

## 2025-04-17 PROBLEM — N18.31 STAGE 3A CHRONIC KIDNEY DISEASE (HCC): Status: ACTIVE | Noted: 2019-05-07

## 2025-04-17 LAB
ANION GAP SERPL CALCULATED.3IONS-SCNC: 6 MMOL/L (ref 4–13)
BUN SERPL-MCNC: 25 MG/DL (ref 5–25)
CALCIUM SERPL-MCNC: 8.1 MG/DL (ref 8.4–10.2)
CHLORIDE SERPL-SCNC: 112 MMOL/L (ref 96–108)
CO2 SERPL-SCNC: 19 MMOL/L (ref 21–32)
CREAT SERPL-MCNC: 1.43 MG/DL (ref 0.6–1.3)
ERYTHROCYTE [DISTWIDTH] IN BLOOD BY AUTOMATED COUNT: 13.3 % (ref 11.6–15.1)
GFR SERPL CREATININE-BSD FRML MDRD: 46 ML/MIN/1.73SQ M
GLUCOSE SERPL-MCNC: 124 MG/DL (ref 65–140)
GLUCOSE SERPL-MCNC: 126 MG/DL (ref 65–140)
GLUCOSE SERPL-MCNC: 152 MG/DL (ref 65–140)
GLUCOSE SERPL-MCNC: 153 MG/DL (ref 65–140)
GLUCOSE SERPL-MCNC: 220 MG/DL (ref 65–140)
HCT VFR BLD AUTO: 31.7 % (ref 36.5–49.3)
HGB BLD-MCNC: 9.8 G/DL (ref 12–17)
MAGNESIUM SERPL-MCNC: 1.8 MG/DL (ref 1.9–2.7)
MCH RBC QN AUTO: 29.3 PG (ref 26.8–34.3)
MCHC RBC AUTO-ENTMCNC: 30.9 G/DL (ref 31.4–37.4)
MCV RBC AUTO: 95 FL (ref 82–98)
PLATELET # BLD AUTO: 237 THOUSANDS/UL (ref 149–390)
PMV BLD AUTO: 9.5 FL (ref 8.9–12.7)
POTASSIUM SERPL-SCNC: 4.6 MMOL/L (ref 3.5–5.3)
QRS AXIS: -7 DEGREES
QRS AXIS: 2 DEGREES
QRSD INTERVAL: 84 MS
QRSD INTERVAL: 84 MS
QT INTERVAL: 408 MS
QT INTERVAL: 410 MS
QTC INTERVAL: 413 MS
QTC INTERVAL: 428 MS
RBC # BLD AUTO: 3.34 MILLION/UL (ref 3.88–5.62)
SODIUM SERPL-SCNC: 137 MMOL/L (ref 135–147)
T WAVE AXIS: 32 DEGREES
T WAVE AXIS: 38 DEGREES
VENTRICULAR RATE: 61 BPM
VENTRICULAR RATE: 66 BPM
WBC # BLD AUTO: 6.57 THOUSAND/UL (ref 4.31–10.16)

## 2025-04-17 PROCEDURE — 80048 BASIC METABOLIC PNL TOTAL CA: CPT | Performed by: NURSE PRACTITIONER

## 2025-04-17 PROCEDURE — 93010 ELECTROCARDIOGRAM REPORT: CPT | Performed by: INTERNAL MEDICINE

## 2025-04-17 PROCEDURE — 85027 COMPLETE CBC AUTOMATED: CPT | Performed by: NURSE PRACTITIONER

## 2025-04-17 PROCEDURE — 99232 SBSQ HOSP IP/OBS MODERATE 35: CPT | Performed by: NURSE PRACTITIONER

## 2025-04-17 PROCEDURE — 82948 REAGENT STRIP/BLOOD GLUCOSE: CPT

## 2025-04-17 PROCEDURE — 93922 UPR/L XTREMITY ART 2 LEVELS: CPT | Performed by: STUDENT IN AN ORGANIZED HEALTH CARE EDUCATION/TRAINING PROGRAM

## 2025-04-17 PROCEDURE — 93005 ELECTROCARDIOGRAM TRACING: CPT

## 2025-04-17 PROCEDURE — 83735 ASSAY OF MAGNESIUM: CPT | Performed by: NURSE PRACTITIONER

## 2025-04-17 PROCEDURE — 93925 LOWER EXTREMITY STUDY: CPT | Performed by: STUDENT IN AN ORGANIZED HEALTH CARE EDUCATION/TRAINING PROGRAM

## 2025-04-17 RX ORDER — INSULIN LISPRO 100 [IU]/ML
1-6 INJECTION, SOLUTION INTRAVENOUS; SUBCUTANEOUS
Status: DISCONTINUED | OUTPATIENT
Start: 2025-04-17 | End: 2025-04-19 | Stop reason: HOSPADM

## 2025-04-17 RX ADMIN — CARBIDOPA AND LEVODOPA 1 TABLET: 50; 200 TABLET, EXTENDED RELEASE ORAL at 17:00

## 2025-04-17 RX ADMIN — CEFAZOLIN SODIUM 2000 MG: 2 SOLUTION INTRAVENOUS at 21:48

## 2025-04-17 RX ADMIN — INSULIN LISPRO 1 UNITS: 100 INJECTION, SOLUTION INTRAVENOUS; SUBCUTANEOUS at 11:57

## 2025-04-17 RX ADMIN — INSULIN LISPRO 2 UNITS: 100 INJECTION, SOLUTION INTRAVENOUS; SUBCUTANEOUS at 17:01

## 2025-04-17 RX ADMIN — METOPROLOL SUCCINATE 100 MG: 50 TABLET, EXTENDED RELEASE ORAL at 08:59

## 2025-04-17 RX ADMIN — CEFAZOLIN SODIUM 2000 MG: 2 SOLUTION INTRAVENOUS at 06:26

## 2025-04-17 RX ADMIN — MEMANTINE 10 MG: 5 TABLET ORAL at 17:00

## 2025-04-17 RX ADMIN — DONEPEZIL HYDROCHLORIDE 10 MG: 10 TABLET ORAL at 21:48

## 2025-04-17 RX ADMIN — CEFAZOLIN SODIUM 2000 MG: 2 SOLUTION INTRAVENOUS at 13:04

## 2025-04-17 RX ADMIN — APIXABAN 5 MG: 5 TABLET, FILM COATED ORAL at 08:59

## 2025-04-17 RX ADMIN — DULOXETINE HYDROCHLORIDE 30 MG: 30 CAPSULE, DELAYED RELEASE ORAL at 08:59

## 2025-04-17 RX ADMIN — Medication 400 MG: at 08:59

## 2025-04-17 RX ADMIN — INSULIN LISPRO 1 UNITS: 100 INJECTION, SOLUTION INTRAVENOUS; SUBCUTANEOUS at 21:48

## 2025-04-17 RX ADMIN — ATORVASTATIN CALCIUM 20 MG: 20 TABLET, FILM COATED ORAL at 08:59

## 2025-04-17 RX ADMIN — CARBIDOPA AND LEVODOPA 1 TABLET: 50; 200 TABLET, EXTENDED RELEASE ORAL at 21:48

## 2025-04-17 RX ADMIN — CARBIDOPA AND LEVODOPA 1 TABLET: 50; 200 TABLET, EXTENDED RELEASE ORAL at 09:04

## 2025-04-17 RX ADMIN — MEMANTINE 10 MG: 5 TABLET ORAL at 08:59

## 2025-04-17 NOTE — ASSESSMENT & PLAN NOTE
Most recent Echo 11/24: NWMA, mild LVH, LVEF 60%  Continue 100 mg Toprol XL daily with hold parameters

## 2025-04-17 NOTE — ASSESSMENT & PLAN NOTE
Lab Results   Component Value Date    EGFR 46 04/17/2025    EGFR 44 04/15/2025    EGFR 32 04/08/2025    CREATININE 1.43 (H) 04/17/2025    CREATININE 1.48 (H) 04/15/2025    CREATININE 1.93 (H) 04/08/2025   Baseline Cr 1.4-1.6 mg/dL   Currently at baseline   Will monitor renal function closely   Avoid nephrotoxins and hypotension

## 2025-04-17 NOTE — APP STUDENT NOTE
Progress Note - Internal Medicine   Name: Kaleb Ching III 79 y.o. male I MRN: [id number]  Unit/Bed#: -01 I Date of Admission: 4/15/2025   Date of Service: 4/17/2025 I Hospital Day: 2  { ?Quick Links I Problem List I PORESME I Billing Tip:83788}  Assessment & Plan  Osteomyelitis of great toe of left foot (HCC)  Left great toe infection x 2 weeks   Originally managed by PCP with Levaquin and clindamycin which was switched to doxycycline  Outpatient antibiotic course failed   Left foot x-ray: first distal  phalanx osteomyelitis in the setting of cellulitis   Sed rate 44, CRP 11.9  ID following, recommends IV cefazolin 2 g every 8 hours.   Would cultures sent, blood cultures NGTD   Podiatry following  4/16: MRI left foot: first ray distal skin ulceration with cellulitis and prominent fluid suspicious for phlegmon/developing abscess. Destructive changes at first distal tuft with T1 replacement first distal phalanx and corresponding T2 signal abnormality all consistent with osteomyelitis.  Arterial duplex pending  Pending results (no significant stenosis), tentative plan for OR tomorrow   Primary hypertension  Most recent Echo 11/24: NWMA, mild LVH, LVEF 60%  Continue 100 mg Toprol XL daily with hold parameters   Type 2 diabetes mellitus with stage 3b chronic kidney disease, without long-term current use of insulin ([facility])  Lab Results   Component Value Date    HGBA1C 6.5 (H) 04/15/2025       Recent Labs     04/16/25  1045 04/16/25  1623 04/16/25  2023 04/17/25  0723   POCGLU 147* 237* 146* 124       Blood Sugar Average: Last 72 hrs:  (P) 143.5  Most recent A1C 6.5  Sugars have been fairly well controlled  Continue SSI   Continue to check sugars ACHS     Atrial fibrillation (HCC)  EKG 4/17 controlled Afib  Continue 100 mg Toprol-XL daily with hold parameters   Hold eliquis possible OR tomorrow for amputation   Stage 3a chronic kidney disease (HCC)  Lab Results   Component Value Date    EGFR 46 04/17/2025    EGFR 44  04/15/2025    EGFR 32 04/08/2025    CREATININE 1.43 (H) 04/17/2025    CREATININE 1.48 (H) 04/15/2025    CREATININE 1.93 (H) 04/08/2025     Alzheimer's disease, unspecified (CODE) (HCC)  Continue Aricept and Namenda  Parkinson's disease without dyskinesia or fluctuating manifestations (HCC)  Continue Carbidopa/Levodopa       Subjective   Patient seen and examined. No acute events overnight. Patient is sitting in bed, he has just finished his breakfast. He appears in no acute distress. He denies pain/discomfort. Denies fevers, chills, shortness of breath, chest pain, nausea, vomiting. He is awaiting arterial duplexes. Tentative plan for OR tomorrow.    Objective :{?Quick Links I ICU Summary I Vitals I I/Os I LDAs I Mobility (PT/OT) I Code Status / ACP   ?Quick Links I Active Meds I Pain Meds I Antibiotics I Anticoagulants:26681}  Temp:  [98.5 °F (36.9 °C)] 98.5 °F (36.9 °C)  HR:  [67] 67  BP: (128-130)/(70) 130/70  Resp:  [18] 18  SpO2:  [96 %] 96 %  O2 Device: None (Room air)    I/O         04/15 0701 04/16 0700 04/16 0701  04/17 0700 04/17 0701  04/18 0700    P.O.  270     I.V. (mL/kg) 849.2 (8.9)      IV Piggyback 50 350     Total Intake(mL/kg) 899.2 (9.4) 620 (6.5)     Net +899.2 +620            Unmeasured Urine Occurrence 1 x 1 x     Unmeasured Stool Occurrence 1 x            Weights:   IBW (Ideal Body Weight): 73 kg    Body mass index is 30.27 kg/m².  Weight (last 2 days)       Date/Time Weight    04/15/25 14:31:24 95.7 (210.98)            Physical Exam  Vitals and nursing note reviewed.   Constitutional:       Appearance: Normal appearance. He is well-developed.      Comments: Forgetful   HENT:      Mouth/Throat:      Mouth: Mucous membranes are dry.   Cardiovascular:      Rate and Rhythm: Normal rate. Rhythm irregular.      Pulses:           Dorsalis pedis pulses are 1+ on the right side and 1+ on the left side.        Posterior tibial pulses are 1+ on the right side and 1+ on the left side.      Heart  sounds: Normal heart sounds, S1 normal and S2 normal. No murmur heard.     No friction rub.   Musculoskeletal:      Right lower le+ Edema present.      Left lower le+ Edema present.   Skin:     General: Skin is warm.      Capillary Refill: Capillary refill takes less than 2 seconds.      Findings: Wound present.      Comments: Left great toe, dressing in place    Neurological:      General: No focal deficit present.      Mental Status: He is alert.      Comments: Forgetful    Psychiatric:         Mood and Affect: Mood normal.         Behavior: Behavior normal.         {?Quick Links I Lab Review I Micro Results I Radiology I Cardiology:95271}  Lab Results: I have reviewed the following results:  Recent Labs     04/15/25  1304 25  0431   WBC 7.09 6.57   HGB 10.3* 9.8*   HCT 33.1* 31.7*    237   SODIUM 139 137   K 4.9 4.6   * 112*   CO2 19* 19*   BUN 22 25   CREATININE 1.48* 1.43*   GLUC 88 126   MG 1.5* 1.8*   PTT 39*  --    INR 1.21*  --        Imaging Results Review: No pertinent imaging studies reviewed.  Other Study Results Review: No additional pertinent studies reviewed.    Currently Ordered Meds:   Current Facility-Administered Medications:     acetaminophen (TYLENOL) tablet 650 mg, Q6H PRN    ALPRAZolam (XANAX) tablet 0.25 mg, TID PRN    [Held by provider] apixaban (ELIQUIS) tablet 5 mg, BID    atorvastatin (LIPITOR) tablet 20 mg, Daily    carbidopa-levodopa (SINEMET CR)  mg per ER tablet 1 tablet, TID    ceFAZolin (ANCEF) IVPB (premix in dextrose) 2,000 mg 50 mL, Q8H, Last Rate: 2,000 mg (25 0626)    donepezil (ARICEPT) tablet 10 mg, HS    DULoxetine (CYMBALTA) delayed release capsule 30 mg, Daily    [START ON 2025] ergocalciferol (VITAMIN D2) capsule 50,000 Units, Weekly    insulin lispro (HumALOG/ADMELOG) 100 units/mL subcutaneous injection 1-5 Units, HS    magnesium hydroxide (MILK OF MAGNESIA) oral suspension 15 mL, Daily PRN    memantine (NAMENDA) tablet 10 mg,  BID    metoprolol succinate (TOPROL-XL) 24 hr tablet 100 mg, Daily    ondansetron (ZOFRAN) injection 4 mg, Q6H PRN    polyethylene glycol (MIRALAX) packet 17 g, Daily    simethicone (MYLICON) chewable tablet 80 mg, 4x Daily PRN  VTE Pharmacologic Prophylaxis: Reason for no pharmacologic prophylaxis pending OR tomorrow   VTE Mechanical Prophylaxis: sequential compression device    Administrative Statements   Portions of the record may have been created with voice recognition software.

## 2025-04-17 NOTE — PLAN OF CARE
Problem: PAIN - ADULT  Goal: Verbalizes/displays adequate comfort level or baseline comfort level  Description: Interventions:- Encourage patient to monitor pain and request assistance- Assess pain using appropriate pain scale- Administer analgesics based on type and severity of pain and evaluate response- Implement non-pharmacological measures as appropriate and evaluate response- Consider cultural and social influences on pain and pain management- Notify physician/advanced practitioner if interventions unsuccessful or patient reports new pain  Outcome: Progressing     Problem: INFECTION - ADULT  Goal: Absence of fever/infection during neutropenic period  Description: INTERVENTIONS:- Monitor WBC  Outcome: Progressing     Problem: SAFETY ADULT  Goal: Maintains/Returns to pre admission functional level  Description: INTERVENTIONS:- Perform AM-PAC 6 Click Basic Mobility/ Daily Activity assessment daily.- Set and communicate daily mobility goal to care team and patient/family/caregiver. - Collaborate with rehabilitation services on mobility goals if consulted- Perform Range of Motion 3 times a day.- Reposition patient every 2 hours.- Dangle patient 3 times a day- Stand patient 3 times a day- Ambulate patient 3 times a day- Out of bed to chair 3 times a day - Out of bed for meals 3 times a day- Out of bed for toileting- Record patient progress and toleration of activity level   Outcome: Progressing      Problem: DISCHARGE PLANNING  Goal: Discharge to home or other facility with appropriate resources  Description: INTERVENTIONS:- Identify barriers to discharge w/patient and caregiver- Arrange for needed discharge resources and transportation as appropriate- Identify discharge learning needs (meds, wound care, etc.)- Arrange for interpretive services to assist at discharge as needed- Refer to Case Management Department for coordinating discharge planning if the patient needs post-hospital services based on  physician/advanced practitioner order or complex needs related to functional status, cognitive ability, or social support system  Outcome: Progressing

## 2025-04-17 NOTE — TREATMENT PLAN
ID  treatment plan:    Pt remains on IV cefazolin. MRI is concerning for abscess and distal phalanx osteomyelitis. Planning for amputation/debridement tomorrow. Will continue IV abx and follow up OR findings for additional abx recommendations.

## 2025-04-17 NOTE — ASSESSMENT & PLAN NOTE
Lab Results   Component Value Date    EGFR 46 04/17/2025    EGFR 44 04/15/2025    EGFR 32 04/08/2025    CREATININE 1.43 (H) 04/17/2025    CREATININE 1.48 (H) 04/15/2025    CREATININE 1.93 (H) 04/08/2025

## 2025-04-17 NOTE — ASSESSMENT & PLAN NOTE
EKG 4/17 controlled Afib  Continue 100 mg Toprol-XL daily with hold parameters   Hold eliquis possible OR tomorrow for amputation

## 2025-04-17 NOTE — ASSESSMENT & PLAN NOTE
Blood pressure is controlled  Continue home metoprolol succinate 100 mg daily  Monitor BP closely and adjust medication as indicated

## 2025-04-17 NOTE — ASSESSMENT & PLAN NOTE
Left great toe infection x 2 weeks   Originally managed by PCP with Levaquin and clindamycin which was switched to doxycycline  Outpatient antibiotic course failed   Left foot x-ray: first distal  phalanx osteomyelitis in the setting of cellulitis   Sed rate 44, CRP 11.9  ID following, recommends IV cefazolin 2 g every 8 hours.   Would cultures sent, blood cultures NGTD   Podiatry following  4/16: MRI left foot: first ray distal skin ulceration with cellulitis and prominent fluid suspicious for phlegmon/developing abscess. Destructive changes at first distal tuft with T1 replacement first distal phalanx and corresponding T2 signal abnormality all consistent with osteomyelitis.  Arterial duplex pending  Pending results (no significant stenosis), tentative plan for OR tomorrow

## 2025-04-17 NOTE — ASSESSMENT & PLAN NOTE
Lab Results   Component Value Date    HGBA1C 6.5 (H) 04/15/2025     Recent Labs     04/16/25  1045 04/16/25  1623 04/16/25 2023 04/17/25  0723   POCGLU 147* 237* 146* 124       Blood Sugar Average: Last 72 hrs:  (P) 143.5  Hold home diabetes meds- glimepiride, empagliflozin, metformin   ACHS  Started on ISF  Maintain hypoglycemia protocol

## 2025-04-17 NOTE — ASSESSMENT & PLAN NOTE
Lab Results   Component Value Date    HGBA1C 6.5 (H) 04/15/2025       Recent Labs     04/16/25  1045 04/16/25  1623 04/16/25 2023 04/17/25  0723   POCGLU 147* 237* 146* 124       Blood Sugar Average: Last 72 hrs:  (P) 143.5  Most recent A1C 6.5  Sugars have been fairly well controlled  Continue SSI   Continue to check sugars ACHS

## 2025-04-17 NOTE — PROGRESS NOTES
Progress Note - Hospitalist   Name: Kaleb Ching III 79 y.o. male I MRN: [id number]  Unit/Bed#: -01 I Date of Admission: 4/15/2025   Date of Service: 4/17/2025 I Hospital Day: 2    Assessment & Plan  Osteomyelitis of great toe of left foot (HCC)  Left great toe infection since 2 weeks  Originally managed by PCP and PDM with levaquin and clindamycin and switched to doxycycline  Foot x-rays- consistent with first distal phalanx osteomyelitis in the setting of cellulitis  MRI concerning for abscess and distal phalanx osteomyelitis  LEADs- pending; left great toe with 57 mmHg healing range   Sed rate 44, CRP 11.9   ID input appreciated- continue with cefazolin pending surgery   Podiatry input appreciated- planning for amputation on Friday   Hold Eliquis for now     Primary hypertension  Blood pressure is controlled  Continue home metoprolol succinate 100 mg daily  Monitor BP closely and adjust medication as indicated   Type 2 diabetes mellitus with stage 3b chronic kidney disease, without long-term current use of insulin ([facility])  Lab Results   Component Value Date    HGBA1C 6.5 (H) 04/15/2025     Recent Labs     04/16/25  1045 04/16/25  1623 04/16/25 2023 04/17/25  0723   POCGLU 147* 237* 146* 124       Blood Sugar Average: Last 72 hrs:  (P) 143.5  Hold home diabetes meds- glimepiride, empagliflozin, metformin   ACHS  Started on ISF  Maintain hypoglycemia protocol  Atrial fibrillation (HCC)  Rate controlled- continue toprol   AC- Eliquis; will hold for now   Stage 3a chronic kidney disease (HCC)  Lab Results   Component Value Date    EGFR 46 04/17/2025    EGFR 44 04/15/2025    EGFR 32 04/08/2025    CREATININE 1.43 (H) 04/17/2025    CREATININE 1.48 (H) 04/15/2025    CREATININE 1.93 (H) 04/08/2025   Baseline Cr 1.4-1.6 mg/dL   Currently at baseline   Will monitor renal function closely   Avoid nephrotoxins and hypotension  Alzheimer's disease, unspecified (CODE) ([facility])  Continue memantine and donepezil   Continue  supportive   Parkinson's disease without dyskinesia or fluctuating manifestations (HCC)  Continue carbidopa-levodopa  PT/OT evaluation     VTE Pharmacologic Prophylaxis: VTE Score: 4 Moderate Risk (Score 3-4) - Pharmacological DVT Prophylaxis Contraindicated. Sequential Compression Devices Ordered.    Mobility:   Basic Mobility Inpatient Raw Score: 18  JH-HLM Goal: 6: Walk 10 steps or more  JH-HLM Achieved: 6: Walk 10 steps or more  JH-HLM Goal NOT achieved. Continue with multidisciplinary rounding and encourage appropriate mobility to improve upon JH-HLM goals.    Patient Centered Rounds: I performed bedside rounds with nursing staff today.   Discussions with Specialists or Other Care Team Provider: podiatry, ID, CM, PT/OT     Education and Discussions with Family / Patient: Updated  (wife) via phone.    Current Length of Stay: 2 day(s)  Current Patient Status: Inpatient   Certification Statement: The patient will continue to require additional inpatient hospital stay due to osteomyelitis of the great toe of the left foot  Discharge Plan: Anticipate discharge in 48-72 hrs to discharge location to be determined pending rehab evaluations.    Code Status: Level 1 - Full Code    Subjective   Patient was seen and examined.  He states that he does not know where he was. He denies any pain currently. Denies any n/v.     Objective :  Temp:  [98.5 °F (36.9 °C)] 98.5 °F (36.9 °C)  HR:  [67] 67  BP: (128-130)/(70) 130/70  Resp:  [18] 18  SpO2:  [96 %] 96 %  O2 Device: None (Room air)    Body mass index is 30.27 kg/m².     Input and Output Summary (last 24 hours):     Intake/Output Summary (Last 24 hours) at 4/17/2025 1021  Last data filed at 4/16/2025 2202  Gross per 24 hour   Intake 220 ml   Output --   Net 220 ml       Physical Exam  Vitals and nursing note reviewed.   Constitutional:       General: He is not in acute distress.     Appearance: Normal appearance.      Comments: Frail and elderly    HENT:       Head: Normocephalic and atraumatic.      Right Ear: External ear normal.      Left Ear: External ear normal.      Nose: Nose normal.      Mouth/Throat:      Mouth: Mucous membranes are moist.      Pharynx: Oropharynx is clear.   Eyes:      General:         Right eye: No discharge.         Left eye: No discharge.      Extraocular Movements: Extraocular movements intact.      Pupils: Pupils are equal, round, and reactive to light.   Cardiovascular:      Rate and Rhythm: Normal rate and regular rhythm.      Pulses: Normal pulses.      Heart sounds: Normal heart sounds. No murmur heard.  Pulmonary:      Effort: Pulmonary effort is normal. No respiratory distress.      Breath sounds: Normal breath sounds. No wheezing or rales.   Abdominal:      General: Bowel sounds are normal. There is no distension.      Palpations: Abdomen is soft. There is no mass.      Tenderness: There is no abdominal tenderness.   Musculoskeletal:         General: No swelling, tenderness or deformity.      Cervical back: Normal range of motion and neck supple. No rigidity.      Comments: Left foot dressing on    Skin:     General: Skin is warm and dry.      Capillary Refill: Capillary refill takes less than 2 seconds.      Coloration: Skin is not pale.      Findings: No erythema.   Neurological:      General: No focal deficit present.      Mental Status: He is alert. Mental status is at baseline. He is disoriented.   Psychiatric:         Mood and Affect: Mood normal.         Behavior: Behavior normal.         Thought Content: Thought content normal.       Lines/Drains:              Lab Results: I have reviewed the following results:   Results from last 7 days   Lab Units 04/17/25  0431 04/15/25  1304   WBC Thousand/uL 6.57 7.09   HEMOGLOBIN g/dL 9.8* 10.3*   HEMATOCRIT % 31.7* 33.1*   PLATELETS Thousands/uL 237 236   SEGS PCT %  --  77*   LYMPHO PCT %  --  15   MONO PCT %  --  7   EOS PCT %  --  1     Results from last 7 days   Lab Units  04/17/25  0431   SODIUM mmol/L 137   POTASSIUM mmol/L 4.6   CHLORIDE mmol/L 112*   CO2 mmol/L 19*   BUN mg/dL 25   CREATININE mg/dL 1.43*   ANION GAP mmol/L 6   CALCIUM mg/dL 8.1*   GLUCOSE RANDOM mg/dL 126     Results from last 7 days   Lab Units 04/15/25  1304   INR  1.21*     Results from last 7 days   Lab Units 04/17/25  0723 04/16/25  2023 04/16/25  1623 04/16/25  1045 04/16/25  0704 04/15/25  2044 04/15/25  1826 04/15/25  1726   POC GLUCOSE mg/dl 124 146* 237* 147* 167* 183* 101 43*     Results from last 7 days   Lab Units 04/15/25  1304   HEMOGLOBIN A1C % 6.5*           Recent Cultures (last 7 days):   Results from last 7 days   Lab Units 04/16/25  1219 04/15/25  1650   BLOOD CULTURE   --  No Growth at 24 hrs.  No Growth at 24 hrs.   GRAM STAIN RESULT  No Polys or Bacteria seen  --        Imaging Results Review: I reviewed radiology reports from this admission including: MRI left foot .  Other Study Results Review: No additional pertinent studies reviewed.    Last 24 Hours Medication List:     Current Facility-Administered Medications:     acetaminophen (TYLENOL) tablet 650 mg, Q6H PRN    ALPRAZolam (XANAX) tablet 0.25 mg, TID PRN    [Held by provider] apixaban (ELIQUIS) tablet 5 mg, BID    atorvastatin (LIPITOR) tablet 20 mg, Daily    carbidopa-levodopa (SINEMET CR)  mg per ER tablet 1 tablet, TID    ceFAZolin (ANCEF) IVPB (premix in dextrose) 2,000 mg 50 mL, Q8H, Last Rate: 2,000 mg (04/17/25 0626)    donepezil (ARICEPT) tablet 10 mg, HS    DULoxetine (CYMBALTA) delayed release capsule 30 mg, Daily    [START ON 4/21/2025] ergocalciferol (VITAMIN D2) capsule 50,000 Units, Weekly    insulin lispro (HumALOG/ADMELOG) 100 units/mL subcutaneous injection 1-5 Units, HS    magnesium hydroxide (MILK OF MAGNESIA) oral suspension 15 mL, Daily PRN    memantine (NAMENDA) tablet 10 mg, BID    metoprolol succinate (TOPROL-XL) 24 hr tablet 100 mg, Daily    ondansetron (ZOFRAN) injection 4 mg, Q6H PRN    polyethylene  glycol (MIRALAX) packet 17 g, Daily    simethicone (MYLICON) chewable tablet 80 mg, 4x Daily PRN    Administrative Statements   Today, Patient Was Seen By: ROMI Kincaid  I have spent a total time of 37 minutes in caring for this patient on the day of the visit/encounter including Diagnostic results, Prognosis, Risks and benefits of tx options, Instructions for management, Patient and family education, Importance of tx compliance, Risk factor reductions, Impressions, Counseling / Coordination of care, Documenting in the medical record, Reviewing/placing orders in the medical record (including tests, medications, and/or procedures), Obtaining or reviewing history  , and Communicating with other healthcare professionals .    **Please Note: This note may have been constructed using a voice recognition system.**

## 2025-04-17 NOTE — RESULT ENCOUNTER NOTE
Spoke to Libra, pt is currently hospitalized and he will be having removal of this toe. She will keep us updated.

## 2025-04-17 NOTE — ASSESSMENT & PLAN NOTE
Left great toe infection since 2 weeks  Originally managed by PCP and PDM with levaquin and clindamycin and switched to doxycycline  Foot x-rays- consistent with first distal phalanx osteomyelitis in the setting of cellulitis  MRI concerning for abscess and distal phalanx osteomyelitis  LEADs- pending; left great toe with 57 mmHg healing range   Sed rate 44, CRP 11.9   ID input appreciated- continue with cefazolin pending surgery   Podiatry input appreciated- planning for amputation on Friday   Hold Eliquis for now

## 2025-04-18 ENCOUNTER — ANESTHESIA EVENT (INPATIENT)
Dept: PERIOP | Facility: HOSPITAL | Age: 80
DRG: 617 | End: 2025-04-18
Payer: MEDICARE

## 2025-04-18 ENCOUNTER — APPOINTMENT (INPATIENT)
Dept: RADIOLOGY | Facility: HOSPITAL | Age: 80
DRG: 617 | End: 2025-04-18
Payer: MEDICARE

## 2025-04-18 ENCOUNTER — ANESTHESIA (INPATIENT)
Dept: PERIOP | Facility: HOSPITAL | Age: 80
DRG: 617 | End: 2025-04-18
Payer: MEDICARE

## 2025-04-18 LAB
GLUCOSE SERPL-MCNC: 119 MG/DL (ref 65–140)
GLUCOSE SERPL-MCNC: 121 MG/DL (ref 65–140)
GLUCOSE SERPL-MCNC: 132 MG/DL (ref 65–140)
GLUCOSE SERPL-MCNC: 422 MG/DL (ref 65–140)

## 2025-04-18 PROCEDURE — 0Y6Q0Z0 DETACHMENT AT LEFT 1ST TOE, COMPLETE, OPEN APPROACH: ICD-10-PCS | Performed by: PODIATRIST

## 2025-04-18 PROCEDURE — 88311 DECALCIFY TISSUE: CPT | Performed by: PATHOLOGY

## 2025-04-18 PROCEDURE — 99232 SBSQ HOSP IP/OBS MODERATE 35: CPT | Performed by: NURSE PRACTITIONER

## 2025-04-18 PROCEDURE — 73630 X-RAY EXAM OF FOOT: CPT

## 2025-04-18 PROCEDURE — 99232 SBSQ HOSP IP/OBS MODERATE 35: CPT | Performed by: STUDENT IN AN ORGANIZED HEALTH CARE EDUCATION/TRAINING PROGRAM

## 2025-04-18 PROCEDURE — G0545 PR INHERENT VISIT TO INPT: HCPCS | Performed by: STUDENT IN AN ORGANIZED HEALTH CARE EDUCATION/TRAINING PROGRAM

## 2025-04-18 PROCEDURE — 88305 TISSUE EXAM BY PATHOLOGIST: CPT | Performed by: PATHOLOGY

## 2025-04-18 PROCEDURE — NC001 PR NO CHARGE: Performed by: PODIATRIST

## 2025-04-18 PROCEDURE — 82948 REAGENT STRIP/BLOOD GLUCOSE: CPT

## 2025-04-18 PROCEDURE — 28825 PARTIAL AMPUTATION OF TOE: CPT | Performed by: PODIATRIST

## 2025-04-18 RX ORDER — ONDANSETRON 2 MG/ML
INJECTION INTRAMUSCULAR; INTRAVENOUS AS NEEDED
Status: DISCONTINUED | OUTPATIENT
Start: 2025-04-18 | End: 2025-04-18

## 2025-04-18 RX ORDER — EPHEDRINE SULFATE 50 MG/ML
INJECTION INTRAVENOUS AS NEEDED
Status: DISCONTINUED | OUTPATIENT
Start: 2025-04-18 | End: 2025-04-18

## 2025-04-18 RX ORDER — PROPOFOL 10 MG/ML
INJECTION, EMULSION INTRAVENOUS AS NEEDED
Status: DISCONTINUED | OUTPATIENT
Start: 2025-04-18 | End: 2025-04-18

## 2025-04-18 RX ORDER — DEXAMETHASONE SODIUM PHOSPHATE 10 MG/ML
INJECTION, SOLUTION INTRAMUSCULAR; INTRAVENOUS AS NEEDED
Status: DISCONTINUED | OUTPATIENT
Start: 2025-04-18 | End: 2025-04-18

## 2025-04-18 RX ORDER — ONDANSETRON 2 MG/ML
4 INJECTION INTRAMUSCULAR; INTRAVENOUS ONCE AS NEEDED
Status: DISCONTINUED | OUTPATIENT
Start: 2025-04-18 | End: 2025-04-18 | Stop reason: HOSPADM

## 2025-04-18 RX ORDER — CEFAZOLIN SODIUM 2 G/50ML
SOLUTION INTRAVENOUS AS NEEDED
Status: DISCONTINUED | OUTPATIENT
Start: 2025-04-18 | End: 2025-04-18

## 2025-04-18 RX ORDER — SODIUM CHLORIDE, SODIUM LACTATE, POTASSIUM CHLORIDE, CALCIUM CHLORIDE 600; 310; 30; 20 MG/100ML; MG/100ML; MG/100ML; MG/100ML
INJECTION, SOLUTION INTRAVENOUS CONTINUOUS PRN
Status: DISCONTINUED | OUTPATIENT
Start: 2025-04-18 | End: 2025-04-18

## 2025-04-18 RX ORDER — FENTANYL CITRATE 50 UG/ML
INJECTION, SOLUTION INTRAMUSCULAR; INTRAVENOUS AS NEEDED
Status: DISCONTINUED | OUTPATIENT
Start: 2025-04-18 | End: 2025-04-18

## 2025-04-18 RX ORDER — SODIUM CHLORIDE, SODIUM LACTATE, POTASSIUM CHLORIDE, CALCIUM CHLORIDE 600; 310; 30; 20 MG/100ML; MG/100ML; MG/100ML; MG/100ML
20 INJECTION, SOLUTION INTRAVENOUS CONTINUOUS
Status: DISCONTINUED | OUTPATIENT
Start: 2025-04-18 | End: 2025-04-18

## 2025-04-18 RX ORDER — INSULIN LISPRO 100 [IU]/ML
10 INJECTION, SOLUTION INTRAVENOUS; SUBCUTANEOUS ONCE
Status: COMPLETED | OUTPATIENT
Start: 2025-04-18 | End: 2025-04-18

## 2025-04-18 RX ORDER — FENTANYL CITRATE/PF 50 MCG/ML
25 SYRINGE (ML) INJECTION
Status: DISCONTINUED | OUTPATIENT
Start: 2025-04-18 | End: 2025-04-18 | Stop reason: HOSPADM

## 2025-04-18 RX ORDER — LIDOCAINE HYDROCHLORIDE 20 MG/ML
INJECTION, SOLUTION EPIDURAL; INFILTRATION; INTRACAUDAL; PERINEURAL AS NEEDED
Status: DISCONTINUED | OUTPATIENT
Start: 2025-04-18 | End: 2025-04-18

## 2025-04-18 RX ADMIN — DONEPEZIL HYDROCHLORIDE 10 MG: 10 TABLET ORAL at 21:01

## 2025-04-18 RX ADMIN — ALPRAZOLAM 0.25 MG: 0.25 TABLET ORAL at 00:05

## 2025-04-18 RX ADMIN — INSULIN LISPRO 5 UNITS: 100 INJECTION, SOLUTION INTRAVENOUS; SUBCUTANEOUS at 21:02

## 2025-04-18 RX ADMIN — CARBIDOPA AND LEVODOPA 1 TABLET: 50; 200 TABLET, EXTENDED RELEASE ORAL at 21:01

## 2025-04-18 RX ADMIN — CEFAZOLIN SODIUM 2000 MG: 2 SOLUTION INTRAVENOUS at 06:21

## 2025-04-18 RX ADMIN — EPHEDRINE SULFATE 10 MG: 50 INJECTION, SOLUTION INTRAVENOUS at 14:09

## 2025-04-18 RX ADMIN — DEXAMETHASONE SODIUM PHOSPHATE 5 MG: 10 INJECTION, SOLUTION INTRAMUSCULAR; INTRAVENOUS at 13:55

## 2025-04-18 RX ADMIN — FENTANYL CITRATE 25 MCG: 50 INJECTION INTRAMUSCULAR; INTRAVENOUS at 13:45

## 2025-04-18 RX ADMIN — CARBIDOPA AND LEVODOPA 1 TABLET: 50; 200 TABLET, EXTENDED RELEASE ORAL at 17:23

## 2025-04-18 RX ADMIN — CARBIDOPA AND LEVODOPA 1 TABLET: 50; 200 TABLET, EXTENDED RELEASE ORAL at 09:31

## 2025-04-18 RX ADMIN — CEFAZOLIN SODIUM 2000 MG: 2 SOLUTION INTRAVENOUS at 13:44

## 2025-04-18 RX ADMIN — MEMANTINE 10 MG: 5 TABLET ORAL at 17:23

## 2025-04-18 RX ADMIN — LIDOCAINE HYDROCHLORIDE 100 MG: 20 INJECTION, SOLUTION EPIDURAL; INFILTRATION; INTRACAUDAL at 13:53

## 2025-04-18 RX ADMIN — INSULIN LISPRO 10 UNITS: 100 INJECTION, SOLUTION INTRAVENOUS; SUBCUTANEOUS at 21:02

## 2025-04-18 RX ADMIN — METOPROLOL SUCCINATE 100 MG: 50 TABLET, EXTENDED RELEASE ORAL at 09:27

## 2025-04-18 RX ADMIN — DULOXETINE HYDROCHLORIDE 30 MG: 30 CAPSULE, DELAYED RELEASE ORAL at 09:28

## 2025-04-18 RX ADMIN — ATORVASTATIN CALCIUM 20 MG: 20 TABLET, FILM COATED ORAL at 09:28

## 2025-04-18 RX ADMIN — MEMANTINE 10 MG: 5 TABLET ORAL at 09:28

## 2025-04-18 RX ADMIN — PROPOFOL 40 MG: 10 INJECTION, EMULSION INTRAVENOUS at 13:53

## 2025-04-18 RX ADMIN — PROPOFOL 60 MCG/KG/MIN: 10 INJECTION, EMULSION INTRAVENOUS at 13:54

## 2025-04-18 RX ADMIN — ONDANSETRON 4 MG: 2 INJECTION INTRAMUSCULAR; INTRAVENOUS at 14:07

## 2025-04-18 RX ADMIN — CEFAZOLIN SODIUM 2000 MG: 2 SOLUTION INTRAVENOUS at 21:01

## 2025-04-18 RX ADMIN — SODIUM CHLORIDE, SODIUM LACTATE, POTASSIUM CHLORIDE, AND CALCIUM CHLORIDE: .6; .31; .03; .02 INJECTION, SOLUTION INTRAVENOUS at 13:42

## 2025-04-18 RX ADMIN — EPHEDRINE SULFATE 10 MG: 50 INJECTION, SOLUTION INTRAVENOUS at 14:15

## 2025-04-18 NOTE — ANESTHESIA PREPROCEDURE EVALUATION
Procedure:  AMPUTATION TOE, left hallux (Left: Toe)    Relevant Problems   ANESTHESIA (within normal limits)      CARDIO   (+) Atrial fibrillation (HCC)   (+) Dilated aortic root (HCC)   (+) Hyperlipidemia   (+) Primary hypertension   (+) Pulmonary HTN (HCC)      ENDO   (+) Type 2 diabetes mellitus with stage 3b chronic kidney disease, without long-term current use of insulin (HCC)      GI/HEPATIC   (+) GERD without esophagitis      /RENAL   (+) Diabetic nephropathy associated with type 2 diabetes mellitus (HCC)   (+) Hydronephrosis with obstructing calculus   (+) Nephrolithiasis   (+) Stage 3a chronic kidney disease (HCC)      HEMATOLOGY   (+) Anemia      MUSCULOSKELETAL   (+) Osteoarthritis   (+) Primary osteoarthritis of right hip      NEURO/PSYCH   (+) Alzheimer's disease, unspecified (CODE) ([facility])   (+) Diabetic polyneuropathy associated with type 2 diabetes mellitus (HCC)   (+) Generalized anxiety disorder   (+) Panic attack      PULMONARY   (-) URI (upper respiratory infection)      Neurology/Sleep   (+) Parkinson's disease without dyskinesia or fluctuating manifestations (HCC)      Rheumatology   (+) Systemic lupus erythematosus with lung involvement, unspecified SLE type (HCC)      Other   (+) Osteomyelitis of great toe of left foot ([facility])        Physical Exam    Airway    Mallampati score: III  TM Distance: >3 FB  Neck ROM: full     Dental        Cardiovascular  Rate: abnormal    Pulmonary   Decreased breath sounds    Other Findings        Anesthesia Plan  ASA Score- 3     Anesthesia Type- IV sedation with anesthesia with ASA Monitors.         Additional Monitors:     Airway Plan:     Comment: I discussed the risks and benefits of IV sedation anesthesia including the possibility of the need to convert to general anesthesia and the potential risk of awareness.  The patient was given the opportunity to ask questions, which were answered..       Plan Factors-Exercise tolerance (METS): >4 METS.    Chart  reviewed. EKG reviewed.  Existing labs reviewed.     Patient is not a current smoker.              Induction- intravenous.    Postoperative Plan-     Perioperative Resuscitation Plan - Level 1 - Full Code.       Informed Consent- Anesthetic plan and risks discussed with patient.  I personally reviewed this patient with the CRNA. Discussed and agreed on the Anesthesia Plan with the CRNA..      NPO Status:  Vitals Value Taken Time   Date of last liquid 04/17/25 04/18/25 1243   Time of last liquid 2100 04/18/25 1243   Date of last solid 04/17/25 04/18/25 1243   Time of last solid 1800 04/18/25 1243       TTE 11/6/24:  Interpretation Summary    Left Ventricle: Left ventricular cavity size is normal. Wall thickness is mildly increased. The left ventricular ejection fraction is 60%. Systolic function is normal. Wall motion is normal.    Right Ventricle: Systolic function is low normal. Normal tricuspid annular plane systolic excursion (TAPSE) > 1.7 cm.    Left Atrium: The atrium is moderately dilated.    Right Atrium: The atrium is mildly dilated.    Aortic Valve: There is mild regurgitation.    Mitral Valve: There is annular calcification. There is mild to moderate regurgitation.    Tricuspid Valve: There is mild regurgitation. The right ventricular systolic pressure is mildly elevated. The estimated right ventricular systolic pressure is 47.00 mmHg.    Aorta: The aortic root is mildly dilated. The ascending aorta is mildly dilated. The aortic root is 4.40 cm. The ascending aorta is 4.2 cm.      Lab Results   Component Value Date    WBC 6.57 04/17/2025    HGB 9.8 (L) 04/17/2025    HCT 31.7 (L) 04/17/2025    MCV 95 04/17/2025     04/17/2025     Lab Results   Component Value Date    SODIUM 137 04/17/2025    K 4.6 04/17/2025     (H) 04/17/2025    CO2 19 (L) 04/17/2025    BUN 25 04/17/2025    CREATININE 1.43 (H) 04/17/2025    GLUC 126 04/17/2025    CALCIUM 8.1 (L) 04/17/2025     Lab Results   Component Value  Date    ALT 5 (L) 04/08/2025    AST 9 (L) 04/08/2025    ALKPHOS 70 04/08/2025    BILITOT 0.4 09/30/2014     Lab Results   Component Value Date    INR 1.21 (H) 04/15/2025    INR 1.05 11/05/2024    INR 1.01 01/20/2020    PROTIME 15.8 (H) 04/15/2025    PROTIME 14.3 11/05/2024    PROTIME 11.7 01/20/2020     Lab Results   Component Value Date    HGBA1C 6.5 (H) 04/15/2025

## 2025-04-18 NOTE — ASSESSMENT & PLAN NOTE
Patient presented with worsening left great toe wound with purulent drainage, erythema and pain.  X-rays with concern for distal phalanx osteomyelitis.  MRI is concerning for abscess and distal phalanx osteomyelitis.  Recent antibiotic exposures since 4/8 include p.o. Levaquin, clindamycin and doxycycline by outpatient providers.  No known history of MRSA. Blood cx are negative. Wound cx with staph aureus so far.  Patient is otherwise afebrile without leukocytosis and hemodynamically stable without sepsis.   Continue IV cefazolin 2 g every 8 hours  Follow-up blood cx  Follow-up wound cx   Podiatry following, planning for toe amputation today.   Pending OR findings and wound culture, if surgical cure obtained can transition to PO keflex 500mg qid for 5-7 day post op course for any residual soft tissue infection   Serial skin exams and local wound care  Trend temps and hemodynamics

## 2025-04-18 NOTE — ANESTHESIA POSTPROCEDURE EVALUATION
Post-Op Assessment Note    CV Status:  Stable    Pain management: satisfactory to patient       Mental Status:  Alert (baseline mental status)   Hydration Status:  Stable   PONV Controlled:  None   Airway Patency:  Patent     Post Op Vitals Reviewed: Yes    No anethesia notable event occurred.    Staff: CRNA           Last Filed PACU Vitals:  Vitals Value Taken Time   Temp     Pulse 63 04/18/25 1439   /57 04/18/25 1438   Resp 14 04/18/25 1439   SpO2 100 % 04/18/25 1439   Vitals shown include unfiled device data.

## 2025-04-18 NOTE — PROGRESS NOTES
Progress Note - Podiatry   Name: Kaleb Ching III 79 y.o. male I MRN: [id number]  Unit/Bed#: -01 I Date of Admission: 4/15/2025   Date of Service: 4/18/2025 I Hospital Day: 3     Assessment & Plan  Osteomyelitis of great toe of left foot (HCC)    Primary hypertension    Type 2 diabetes mellitus with stage 3b chronic kidney disease, without long-term current use of insulin ([facility])  Lab Results   Component Value Date    HGBA1C 6.5 (H) 04/15/2025       Recent Labs     04/17/25  1621 04/17/25  2102 04/18/25  0743 04/18/25  1122   POCGLU 220* 153* 132 121       Blood Sugar Average: Last 72 hrs:  (P) 148.6789862058991331    Atrial fibrillation ([facility])    Stage 3a chronic kidney disease ([facility])  Lab Results   Component Value Date    EGFR 46 04/17/2025    EGFR 44 04/15/2025    EGFR 32 04/08/2025    CREATININE 1.43 (H) 04/17/2025    CREATININE 1.48 (H) 04/15/2025    CREATININE 1.93 (H) 04/08/2025     Alzheimer's disease, unspecified (CODE) ([facility])    Parkinson's disease without dyskinesia or fluctuating manifestations ([facility])    - MRI reviewed and shows OM of the distal tip  - Arterial study showed good healing distally with triphasic waveforms  - N.p.o. for procedure today  - Will be having left second toe partial amputation hopefully today depending on ability for soft tissue coverage  - This will likely be a surgical cure of his underlying osteomyelitis    Subjective : Presents for evaluation management of his left foot.  Understands the need for toe amputation, states that he is ready. Has no other questions    Objective :  Temp:  [97.7 °F (36.5 °C)-98.7 °F (37.1 °C)] 97.7 °F (36.5 °C)  HR:  [64] 64  BP: (117-133)/(58-63) 117/58  Resp:  [16-18] 16  SpO2:  [94 %-97 %] 97 %  O2 Device: None (Room air)

## 2025-04-18 NOTE — PLAN OF CARE
Problem: PAIN - ADULT  Goal: Verbalizes/displays adequate comfort level or baseline comfort level  Description: Interventions:- Encourage patient to monitor pain and request assistance- Assess pain using appropriate pain scale- Administer analgesics based on type and severity of pain and evaluate response- Implement non-pharmacological measures as appropriate and evaluate response- Consider cultural and social influences on pain and pain management- Notify physician/advanced practitioner if interventions unsuccessful or patient reports new pain  Outcome: Progressing   Patient has no reports of pain at this time.

## 2025-04-18 NOTE — ANESTHESIA POSTPROCEDURE EVALUATION
Post-Op Assessment Note    CV Status:  Stable    Pain management: adequate       Mental Status:  Alert and awake   Hydration Status:  Stable   PONV Controlled:  None   Airway Patency:  Patent     Post Op Vitals Reviewed: Yes    No anethesia notable event occurred.    Staff: Anesthesiologist           Last Filed PACU Vitals:  Vitals Value Taken Time   Temp     Pulse 57 04/18/25 1501   /66 04/18/25 1500   Resp 17 04/18/25 1501   SpO2 98 % 04/18/25 1501   Vitals shown include unfiled device data.    Modified Wes:     Vitals Value Taken Time   Activity 2 04/18/25 1500   Respiration 2 04/18/25 1500   Circulation 2 04/18/25 1500   Consciousness 2 04/18/25 1500   Oxygen Saturation 2 04/18/25 1500     Modified Wes Score: 10

## 2025-04-18 NOTE — PROGRESS NOTES
Progress Note - Hospitalist   Name: Kaleb Ching III 79 y.o. male I MRN: [id number]  Unit/Bed#: -01 I Date of Admission: 4/15/2025   Date of Service: 4/18/2025 I Hospital Day: 3    Assessment & Plan  Osteomyelitis of great toe of left foot (HCC)  Left great toe infection since 2 weeks  Originally managed by PCP and PDM with levaquin and clindamycin and switched to doxycycline  Foot x-rays- consistent with first distal phalanx osteomyelitis in the setting of cellulitis  MRI concerning for abscess and distal phalanx osteomyelitis  LEADs- pending; left great toe with 57 mmHg healing range   Sed rate 44, CRP 11.9   ID input appreciated- continue with cefazolin pending surgery   If surgical cure obtained, he can transition to PO keflex 500mg qid for 5-7 day post-op for any residual soft tissue infection   Podiatry input appreciated- planning for amputation on Friday   Hold Eliquis for now     Primary hypertension  Blood pressure is controlled  Continue home metoprolol succinate 100 mg daily  Monitor BP closely and adjust medication as indicated    Type 2 diabetes mellitus with stage 3b chronic kidney disease, without long-term current use of insulin (HCC)  Lab Results   Component Value Date    HGBA1C 6.5 (H) 04/15/2025     Recent Labs     04/17/25  1118 04/17/25  1621 04/17/25  2102 04/18/25  0743   POCGLU 152* 220* 153* 132       Blood Sugar Average: Last 72 hrs:  (P) 150.2986192639743940  Hold home diabetes meds- glimepiride, empagliflozin, metformin   ACHS   Started on ISF  Maintain hypoglycemia protocol  Atrial fibrillation (HCC)  Rate controlled- continue toprol   AC- Eliquis; will hold for now    Stage 3a chronic kidney disease (HCC)  Lab Results   Component Value Date    EGFR 46 04/17/2025    EGFR 44 04/15/2025    EGFR 32 04/08/2025    CREATININE 1.43 (H) 04/17/2025    CREATININE 1.48 (H) 04/15/2025    CREATININE 1.93 (H) 04/08/2025   Baseline Cr 1.4-1.6 mg/dL   Currently at baseline   Will monitor renal  function closely   Avoid nephrotoxins and hypotension  Alzheimer's disease, unspecified (CODE) (HCC)  Continue memantine and donepezil   Continue supportive    Parkinson's disease without dyskinesia or fluctuating manifestations (HCC)  Continue carbidopa-levodopa  PT/OT input appreciated     VTE Pharmacologic Prophylaxis: VTE Score: 4 Moderate Risk (Score 3-4) - Pharmacological DVT Prophylaxis Contraindicated. Sequential Compression Devices Ordered.    Mobility:   Basic Mobility Inpatient Raw Score: 18  JH-HLM Goal: 6: Walk 10 steps or more  JH-HLM Achieved: 5: Stand (1 or more minutes)  JH-HLM Goal NOT achieved. Continue with multidisciplinary rounding and encourage appropriate mobility to improve upon JH-HLM goals.    Patient Centered Rounds: I performed bedside rounds with nursing staff today.   Discussions with Specialists or Other Care Team Provider: podiatry, ID, CM, PT/OT     Education and Discussions with Family / Patient: Updated  (wife) via phone.    Current Length of Stay: 3 day(s)  Current Patient Status: Inpatient   Certification Statement: The patient will continue to require additional inpatient hospital stay due to osteomyelitis of the great toe of the left foot  Discharge Plan: Anticipate discharge in 48-72 hrs to discharge location to be determined pending rehab evaluations.    Code Status: Level 1 - Full Code    Subjective   Patient was seen and examined.  He was wondering if he needed to take a shower before going in for surgery. He denies any pain. No acute event overnight.      Objective :  Temp:  [97.7 °F (36.5 °C)-98.7 °F (37.1 °C)] 97.7 °F (36.5 °C)  HR:  [64] 64  BP: (117-133)/(58-63) 117/58  Resp:  [16-18] 16  SpO2:  [94 %-97 %] 97 %  O2 Device: None (Room air)    Body mass index is 30.27 kg/m².     Input and Output Summary (last 24 hours):     Intake/Output Summary (Last 24 hours) at 4/18/2025 1030  Last data filed at 4/18/2025 0900  Gross per 24 hour   Intake 91.67 ml    Output --   Net 91.67 ml       Physical Exam  Vitals and nursing note reviewed.   Constitutional:       General: He is not in acute distress.     Appearance: Normal appearance.      Comments: Frail and elderly    HENT:      Head: Normocephalic and atraumatic.      Right Ear: External ear normal.      Left Ear: External ear normal.      Nose: Nose normal.      Mouth/Throat:      Mouth: Mucous membranes are moist.      Pharynx: Oropharynx is clear.   Eyes:      General:         Right eye: No discharge.         Left eye: No discharge.      Extraocular Movements: Extraocular movements intact.      Pupils: Pupils are equal, round, and reactive to light.   Cardiovascular:      Rate and Rhythm: Normal rate. Rhythm irregular.      Pulses: Normal pulses.      Heart sounds: Normal heart sounds. No murmur heard.  Pulmonary:      Effort: Pulmonary effort is normal. No respiratory distress.      Breath sounds: Normal breath sounds. No wheezing or rales.   Abdominal:      General: Bowel sounds are normal. There is no distension.      Palpations: Abdomen is soft. There is no mass.      Tenderness: There is no abdominal tenderness.   Musculoskeletal:         General: No swelling, tenderness or deformity.      Cervical back: Normal range of motion and neck supple. No rigidity.      Comments: Left foot dressing on    Skin:     General: Skin is warm and dry.      Capillary Refill: Capillary refill takes less than 2 seconds.      Coloration: Skin is not pale.      Findings: No erythema.   Neurological:      General: No focal deficit present.      Mental Status: He is alert. Mental status is at baseline. He is disoriented.   Psychiatric:         Mood and Affect: Mood normal.         Behavior: Behavior normal.         Thought Content: Thought content normal.       Lines/Drains:              Lab Results: I have reviewed the following results:   Results from last 7 days   Lab Units 04/17/25  0431 04/15/25  1304   WBC Thousand/uL 6.57  7.09   HEMOGLOBIN g/dL 9.8* 10.3*   HEMATOCRIT % 31.7* 33.1*   PLATELETS Thousands/uL 237 236   SEGS PCT %  --  77*   LYMPHO PCT %  --  15   MONO PCT %  --  7   EOS PCT %  --  1     Results from last 7 days   Lab Units 04/17/25  0431   SODIUM mmol/L 137   POTASSIUM mmol/L 4.6   CHLORIDE mmol/L 112*   CO2 mmol/L 19*   BUN mg/dL 25   CREATININE mg/dL 1.43*   ANION GAP mmol/L 6   CALCIUM mg/dL 8.1*   GLUCOSE RANDOM mg/dL 126     Results from last 7 days   Lab Units 04/15/25  1304   INR  1.21*     Results from last 7 days   Lab Units 04/18/25  0743 04/17/25  2102 04/17/25  1621 04/17/25  1118 04/17/25  0723 04/16/25  2023 04/16/25  1623 04/16/25  1045 04/16/25  0704 04/15/25  2044 04/15/25  1826 04/15/25  1726   POC GLUCOSE mg/dl 132 153* 220* 152* 124 146* 237* 147* 167* 183* 101 43*     Results from last 7 days   Lab Units 04/15/25  1304   HEMOGLOBIN A1C % 6.5*           Recent Cultures (last 7 days):   Results from last 7 days   Lab Units 04/16/25  1219 04/15/25  1650   BLOOD CULTURE   --  No Growth at 48 hrs.  No Growth at 48 hrs.   GRAM STAIN RESULT  No Polys or Bacteria seen  --    WOUND CULTURE  1+ Growth of Staphylococcus aureus*  --        Imaging Results Review: I reviewed radiology reports from this admission including: MRI left foot .  Other Study Results Review: No additional pertinent studies reviewed.    Last 24 Hours Medication List:     Current Facility-Administered Medications:     acetaminophen (TYLENOL) tablet 650 mg, Q6H PRN    ALPRAZolam (XANAX) tablet 0.25 mg, TID PRN    [Held by provider] apixaban (ELIQUIS) tablet 5 mg, BID    atorvastatin (LIPITOR) tablet 20 mg, Daily    carbidopa-levodopa (SINEMET CR)  mg per ER tablet 1 tablet, TID    ceFAZolin (ANCEF) IVPB (premix in dextrose) 2,000 mg 50 mL, Q8H, Last Rate: 2,000 mg (04/18/25 0621)    donepezil (ARICEPT) tablet 10 mg, HS    DULoxetine (CYMBALTA) delayed release capsule 30 mg, Daily    [START ON 4/21/2025] ergocalciferol (VITAMIN D2)  capsule 50,000 Units, Weekly    insulin lispro (HumALOG/ADMELOG) 100 units/mL subcutaneous injection 1-5 Units, HS    insulin lispro (HumALOG/ADMELOG) 100 units/mL subcutaneous injection 1-6 Units, TID AC **AND** Fingerstick Glucose (POCT), TID AC    magnesium hydroxide (MILK OF MAGNESIA) oral suspension 15 mL, Daily PRN    memantine (NAMENDA) tablet 10 mg, BID    metoprolol succinate (TOPROL-XL) 24 hr tablet 100 mg, Daily    ondansetron (ZOFRAN) injection 4 mg, Q6H PRN    polyethylene glycol (MIRALAX) packet 17 g, Daily    simethicone (MYLICON) chewable tablet 80 mg, 4x Daily PRN    Administrative Statements   Today, Patient Was Seen By: ROMI Kincaid  I have spent a total time of 35 minutes in caring for this patient on the day of the visit/encounter including Diagnostic results, Prognosis, Risks and benefits of tx options, Instructions for management, Patient and family education, Importance of tx compliance, Risk factor reductions, Impressions, Counseling / Coordination of care, Documenting in the medical record, Reviewing/placing orders in the medical record (including tests, medications, and/or procedures), Obtaining or reviewing history  , and Communicating with other healthcare professionals .    **Please Note: This note may have been constructed using a voice recognition system.**

## 2025-04-18 NOTE — ASSESSMENT & PLAN NOTE
Lab Results   Component Value Date    HGBA1C 6.5 (H) 04/15/2025       Recent Labs     04/17/25  1621 04/17/25  2102 04/18/25  0743 04/18/25  1122   POCGLU 220* 153* 132 121       Blood Sugar Average: Last 72 hrs:  (P) [id number]

## 2025-04-18 NOTE — OP NOTE
OPERATIVE REPORT - Podiatry  PATIENT NAME: Kaleb Ching III    :  1945  MRN: [id number]  Pt Location: [facility] OR ROOM 03    SURGERY DATE: 2025    Surgeons and Role:     * Adrián Jasmine DPM - Primary     * Scottie Frye DPM - Assisting     * Keo Alcantara DPM - Assisting    Pre-op Diagnosis:  Osteomyelitis of great toe of left foot (HCC) [M86.9]    Post-Op Diagnosis Codes:     * Osteomyelitis of great toe of left foot (HCC) [M86.9]    Procedure(s) (LRB):  AMPUTATION TOE, left hallux (Left)    Specimen(s):  ID Type Source Tests Collected by Time Destination   1 : left Hallux Toe Tissue Toe, Left TISSUE EXAM Adrián Jasmine DPM 2025 1412        Estimated Blood Loss:   Minimal    Drains:  * No LDAs found *    Anesthesia Type:   Conscious Sedation  with 10 ml of 1% Lidocaine and 0.5% Bupivacaine in a 1:1 mixture    Hemostasis:  -Atraumatic technique  - Direct compression    Materials:  * No implants in log *  4-0 Vicryl  4-0 nylon    Operative Findings:  -Soft nonviable necrotic bone consistent with osteomyelitis to the distal hallux phalanx with multiple fragments embedded in the soft tissue  - Soft tissue was healthy, bleeding, and viable  - All fragments were removed  - No noted purulent drainage or sinus tracking    Complications:   None    Procedure and Technique:     Under mild sedation, the patient was brought into the operating room and placed on the operating room table in the supine position. IV sedation was achieved by anesthesia team and a universal timeout was performed where all parties are in agreement of correct patient, correct procedure and correct site. A left hallux block was performed consisting of 10 ml of 1% Lidocaine and 0.5% Bupivacaine in a 1:1 mixture. The foot was then prepped and draped in the usual aseptic manner.  A pneumatic tourniquet was not applied to the left lower extremity for this procedure.    Attention was directed to the left  "hallux where a modified fishmouth type of incision was made at the level of the IPJ. Utilizing a sterile 15 blade, this incision was carried deep straight to bone. Soft tissue structures were then reflected off the left hallux distal phalanx base and hallux proximal phalanx head to disarticulate the hallux IPJ. The severed digit was then passed off to the back table.  It was noted that all tissue margins were bleeding and viable. No deep sinus tracts or areas of purulence were visualized. The remaining bone on the proximal aspect of the cut was noted to be of hard and viable quality. Any remaining tendinous structures were identified and severed proximally to remove any nidus of infection.  It was then irrigated with copious amounts of sterile saline.  Deep closure was then achieved utilizing 4-0 Vicryl.  As skin closure then achieved utilizing 4-0 nylon.  Incision was then cleansed and dried with sterile saline sterile laps.  Incision was then dressed with Betadine, Adaptic, 4 x 4 gauze, Yoel, and Ace wrap.    The patient tolerated the procedure and anesthesia well without immediate complications and transferred to PACU with vital signs stable.     As with many limb salvage procedures, we contemplate the possibility of performing further stages to this procedure. Procedures may include debridements, delayed closure, plastic surgery techniques, or more proximal amputations. This procedure may be considered part of a multi-staged limb salvage treatment plan.     Dr. Jasmine was present during the entire procedure and participated in all key aspects.    SIGNATURE: Scottie Frye DPM  DATE: April 18, 2025  TIME: 2:33 PM      Portions of the record may have been created with voice recognition software. Occasional wrong word or \"sound a like\" substitutions may have occurred due to the inherent limitations of voice recognition software. Read the chart carefully and recognize, using context, where substitutions have " occurred.

## 2025-04-18 NOTE — CASE MANAGEMENT
Case Management Discharge Planning Note    Patient name Kaleb Ching III  Location /-01 MRN [id number]  : 1945 Date 2025       Current Admission Date: 4/15/2025  Current Admission Diagnosis:Osteomyelitis of great toe of left foot ([facility])   Patient Active Problem List    Diagnosis Date Noted Date Diagnosed    Osteomyelitis of great toe of left foot ([facility]) 04/15/2025     Panic attack 2025     Dependence on cane 2025     Dilated aortic root ([facility]) 2024     Biatrial enlargement 2024     Systemic lupus erythematosus with lung involvement, unspecified SLE type ([facility]) 2024     Neurogenic orthostatic hypotension ([facility]) 2024     Pulmonary HTN ([facility]) 2024     Parkinson's disease without dyskinesia or fluctuating manifestations ([facility]) 2024     Sleep disturbance 2023     Alzheimer's disease, unspecified (CODE) ([facility]) 2023     Localized edema 2021     Diabetic nephropathy associated with type 2 diabetes mellitus ([facility]) 12/10/2020     Proteinuria 2020     Hydronephrosis with obstructing calculus 2019     Right Adrenal nodule  2019     Stage 3a chronic kidney disease ([facility]) 2019     History of TIA (transient ischemic attack)      Chronic anticoagulation 2019     Mild concentric left ventricular hypertrophy (LVH) 2018     Atrial fibrillation ([facility]) 2018     Primary osteoarthritis of right hip 10/30/2018     Type 2 diabetes mellitus with stage 3b chronic kidney disease, without long-term current use of insulin ([facility]) 2017     Nephrolithiasis 2016     Diabetic polyneuropathy associated with type 2 diabetes mellitus ([facility]) 2016     Hyperlipidemia 2016     Eczema 2013     Anemia 2012     Erectile dysfunction of non-organic origin 2012     Generalized anxiety disorder 2012     GERD without esophagitis 2012     Primary hypertension 2012     Obesity  06/11/2012     Osteoarthritis 06/11/2012     Polyp of sigmoid colon 06/11/2012       LOS (days): 3  Geometric Mean LOS (GMLOS) (days): 3  Days to GMLOS:-0.1     OBJECTIVE:  Risk of Unplanned Readmission Score: 21.43         Current admission status: Inpatient   Preferred Pharmacy:   [id number]  [place] - [address] [place] [id number]  Phone: [phone] Fax: [phone]    Primary Care Provider: Wilfrid Mills DO    Primary Insurance: MEDICARE  Secondary Insurance: [facility]    DISCHARGE DETAILS:       Freedom of Choice: Yes  Comments - Freedom of Choice: Pt will need RN HHC, will evaluate for PT for same.  Agreeable to referral via [facility]       Requested Home Health Care         Is the patient interested in HHC at discharge?: Yes  Home Health Discipline requested:: Nursing  HHA External Referral Reason (only applicable if external HHA name selected): Patient has established relationship with provider  Home Health Follow-Up Provider:: PCP  Home Health Services Needed:: Post-Op Care and Assessment, Gait/ADL Training, Evaluate Functional Status and Safety  Homebound Criteria Met:: Requires the Assistance of Another Person for Safe Ambulation or to Leave the Home, Uses an Assist Device (i.e. cane, walker, etc)  Supporting Clincal Findings:: Limited Endurance, Fatigues Easliy in Short Distances      Treatment Team Recommendation: Home with Home Health Care  Discharge Destination Plan:: Home with Home Health Care  Spoke to the pt and the wife at the bedside about HHC services.  Pt and wife are in agreement with referrals being made for HHC.

## 2025-04-18 NOTE — QUICK NOTE
- Successful toe amputation with surgical cure of osteomyelitis, the distal phalanx was notably hardly fragmented  - Weight-bear as tolerated to left foot in surgical shoe he will need VNA upon discharge with every other day dressing changes Adaptic, 4 x 4's, DSD  - He is to follow-up in my office following discharge  - Will sign off for now and I defer continued soft tissue coverage to ID

## 2025-04-18 NOTE — PROGRESS NOTES
Progress Note - Infectious Disease   Name: Kaleb Ching III 79 y.o. male I MRN: [id number]  Unit/Bed#: -01 I Date of Admission: 4/15/2025   Date of Service: 4/18/2025 I Hospital Day: 3    Assessment & Plan  Osteomyelitis of great toe of left foot (HCC)  Patient presented with worsening left great toe wound with purulent drainage, erythema and pain.  X-rays with concern for distal phalanx osteomyelitis.  MRI is concerning for abscess and distal phalanx osteomyelitis.  Recent antibiotic exposures since 4/8 include p.o. Levaquin, clindamycin and doxycycline by outpatient providers.  No known history of MRSA. Blood cx are negative. Wound cx with staph aureus so far.  Patient is otherwise afebrile without leukocytosis and hemodynamically stable without sepsis.   Continue IV cefazolin 2 g every 8 hours  Follow-up blood cx  Follow-up wound cx   Podiatry following, planning for toe amputation today.   Pending OR findings and wound culture, if surgical cure obtained can transition to PO keflex 500mg qid for 5-7 day post op course for any residual soft tissue infection   Serial skin exams and local wound care  Trend temps and hemodynamics  Type 2 diabetes mellitus with stage 3b chronic kidney disease, without long-term current use of insulin ([facility])  Ha1c 6.5%.  Recent improvement in A1c.  Remains a risk factor for infection.  Recommend tight glycemic control.  Stage 3a chronic kidney disease ([facility])  Cr at baseline. Will renally dose abx as needed.  Alzheimer's disease, unspecified (CODE) ([facility])  Supportive care per primary team  Parkinson's disease without dyskinesia or fluctuating manifestations ([facility])  Supportive care per primary team    I have discussed the above management plan in detail with the primary service.     Antibiotics:  IV cefazolin D3     Subjective   Patient has no fever, chills, sweats; no nausea, vomiting, diarrhea; no cough, shortness of breath; no pain. No new symptoms. Tolerating the abx.      Objective :  Temp:  [97.7 °F (36.5 °C)-98.7 °F (37.1 °C)] 97.7 °F (36.5 °C)  HR:  [64] 64  BP: (117-133)/(58-63) 117/58  Resp:  [16-18] 16  SpO2:  [94 %-97 %] 97 %  O2 Device: None (Room air)    Physical exam  General:  No acute distress, elderly, sitting in bedside recliner   Psychiatric:  Awake and alert, pleasant and cooperative  HEENT: MMM neck supple head normocephalic   Cardiovascular: RRR no murmur   Pulmonary:  Normal respiratory excursion without accessory muscle use  Abdomen:  Soft, nontender  Extremities:  No edema  Skin:  No rashes. L great toe dressing intact. Clinical media reviewed, minimal erythema with mild cellulitis. Streaking drainage noted. No malodor.       Lab Results: I have reviewed the following results:  Results from last 7 days   Lab Units 04/17/25  0431 04/15/25  1304   WBC Thousand/uL 6.57 7.09   HEMOGLOBIN g/dL 9.8* 10.3*   PLATELETS Thousands/uL 237 236     Results from last 7 days   Lab Units 04/17/25  0431 04/15/25  1304   SODIUM mmol/L 137 139   POTASSIUM mmol/L 4.6 4.9   CHLORIDE mmol/L 112* 114*   CO2 mmol/L 19* 19*   BUN mg/dL 25 22   CREATININE mg/dL 1.43* 1.48*   EGFR ml/min/1.73sq m 46 44   CALCIUM mg/dL 8.1* 8.4     Results from last 7 days   Lab Units 04/16/25  1219 04/15/25  1650   BLOOD CULTURE   --  No Growth at 48 hrs.  No Growth at 48 hrs.   GRAM STAIN RESULT  No Polys or Bacteria seen  --    WOUND CULTURE  1+ Growth of Staphylococcus aureus*  --          Results from last 7 days   Lab Units 04/15/25  1304   CRP mg/L 11.9*             Imaging Results Review: I reviewed radiology reports from this admission including: MRI foot.  Other Study Results Review: Other studies reviewed include: micro

## 2025-04-18 NOTE — OCCUPATIONAL THERAPY NOTE
Occupational Therapy Cancellation Note     04/18/25 0743   OT Last Visit   OT Visit Date 04/18/25   Note Type   Note type Cancelled Session   Cancel Reasons   (patient to OR today for toe amp)     OT orders received. Chart reviewed. Will follow-up as able and appropriate    Nery Brito OTR/L

## 2025-04-18 NOTE — ASSESSMENT & PLAN NOTE
Lab Results   Component Value Date    HGBA1C 6.5 (H) 04/15/2025     Recent Labs     04/17/25  1118 04/17/25  1621 04/17/25  2102 04/18/25  0743   POCGLU 152* 220* 153* 132       Blood Sugar Average: Last 72 hrs:  (P) [id number]  Hold home diabetes meds- glimepiride, empagliflozin, metformin   ACHS   Started on ISF  Maintain hypoglycemia protocol

## 2025-04-18 NOTE — DISCHARGE INSTR - OTHER ORDERS
Podiatry: Weight-bear as tolerated to the left foot in surgical shoe.  Every day dressing change with Adaptic, 4 x 4's, ABD, Kerlix, Ace

## 2025-04-18 NOTE — ASSESSMENT & PLAN NOTE
Left great toe infection since 2 weeks  Originally managed by PCP and PDM with levaquin and clindamycin and switched to doxycycline  Foot x-rays- consistent with first distal phalanx osteomyelitis in the setting of cellulitis  MRI concerning for abscess and distal phalanx osteomyelitis  LEADs- pending; left great toe with 57 mmHg healing range   Sed rate 44, CRP 11.9   ID input appreciated- continue with cefazolin pending surgery   If surgical cure obtained, he can transition to PO keflex 500mg qid for 5-7 day post-op for any residual soft tissue infection   Podiatry input appreciated- planning for amputation on Friday   Hold Eliquis for now

## 2025-04-18 NOTE — PHYSICAL THERAPY NOTE
Physical Therapy Cancellation Note       04/18/25 0711   PT Last Visit   PT Visit Date 04/18/25   Note Type   Note type Cancelled Session   Cancel Reasons Patient to operating room     Mary Oliva

## 2025-04-19 VITALS
RESPIRATION RATE: 20 BRPM | HEART RATE: 70 BPM | WEIGHT: 210.98 LBS | HEIGHT: 70 IN | DIASTOLIC BLOOD PRESSURE: 57 MMHG | OXYGEN SATURATION: 99 % | SYSTOLIC BLOOD PRESSURE: 130 MMHG | BODY MASS INDEX: 30.2 KG/M2 | TEMPERATURE: 98.3 F

## 2025-04-19 LAB
ANION GAP SERPL CALCULATED.3IONS-SCNC: 6 MMOL/L (ref 4–13)
BUN SERPL-MCNC: 29 MG/DL (ref 5–25)
CALCIUM SERPL-MCNC: 8.1 MG/DL (ref 8.4–10.2)
CHLORIDE SERPL-SCNC: 109 MMOL/L (ref 96–108)
CO2 SERPL-SCNC: 19 MMOL/L (ref 21–32)
CREAT SERPL-MCNC: 1.4 MG/DL (ref 0.6–1.3)
ERYTHROCYTE [DISTWIDTH] IN BLOOD BY AUTOMATED COUNT: 13.2 % (ref 11.6–15.1)
GFR SERPL CREATININE-BSD FRML MDRD: 47 ML/MIN/1.73SQ M
GLUCOSE SERPL-MCNC: 188 MG/DL (ref 65–140)
GLUCOSE SERPL-MCNC: 222 MG/DL (ref 65–140)
GLUCOSE SERPL-MCNC: 259 MG/DL (ref 65–140)
GLUCOSE SERPL-MCNC: 267 MG/DL (ref 65–140)
GLUCOSE SERPL-MCNC: 440 MG/DL (ref 65–140)
HCT VFR BLD AUTO: 31 % (ref 36.5–49.3)
HGB BLD-MCNC: 9.6 G/DL (ref 12–17)
MCH RBC QN AUTO: 28.7 PG (ref 26.8–34.3)
MCHC RBC AUTO-ENTMCNC: 31 G/DL (ref 31.4–37.4)
MCV RBC AUTO: 93 FL (ref 82–98)
PLATELET # BLD AUTO: 249 THOUSANDS/UL (ref 149–390)
PMV BLD AUTO: 9.5 FL (ref 8.9–12.7)
POTASSIUM SERPL-SCNC: 4.7 MMOL/L (ref 3.5–5.3)
RBC # BLD AUTO: 3.35 MILLION/UL (ref 3.88–5.62)
SODIUM SERPL-SCNC: 134 MMOL/L (ref 135–147)
WBC # BLD AUTO: 8.22 THOUSAND/UL (ref 4.31–10.16)

## 2025-04-19 PROCEDURE — 97163 PT EVAL HIGH COMPLEX 45 MIN: CPT

## 2025-04-19 PROCEDURE — 85027 COMPLETE CBC AUTOMATED: CPT | Performed by: NURSE PRACTITIONER

## 2025-04-19 PROCEDURE — 82948 REAGENT STRIP/BLOOD GLUCOSE: CPT

## 2025-04-19 PROCEDURE — 80048 BASIC METABOLIC PNL TOTAL CA: CPT | Performed by: NURSE PRACTITIONER

## 2025-04-19 RX ORDER — CEPHALEXIN 500 MG/1
500 CAPSULE ORAL EVERY 6 HOURS SCHEDULED
Status: DISCONTINUED | OUTPATIENT
Start: 2025-04-19 | End: 2025-04-19 | Stop reason: HOSPADM

## 2025-04-19 RX ORDER — CEPHALEXIN 500 MG/1
500 CAPSULE ORAL EVERY 6 HOURS SCHEDULED
Qty: 20 CAPSULE | Refills: 0 | Status: SHIPPED | OUTPATIENT
Start: 2025-04-19 | End: 2025-04-24

## 2025-04-19 RX ORDER — INSULIN LISPRO 100 [IU]/ML
10 INJECTION, SOLUTION INTRAVENOUS; SUBCUTANEOUS ONCE
Status: COMPLETED | OUTPATIENT
Start: 2025-04-19 | End: 2025-04-19

## 2025-04-19 RX ADMIN — METOPROLOL SUCCINATE 100 MG: 50 TABLET, EXTENDED RELEASE ORAL at 08:19

## 2025-04-19 RX ADMIN — POLYETHYLENE GLYCOL 3350 17 G: 17 POWDER, FOR SOLUTION ORAL at 08:18

## 2025-04-19 RX ADMIN — APIXABAN 5 MG: 5 TABLET, FILM COATED ORAL at 08:42

## 2025-04-19 RX ADMIN — INSULIN LISPRO 3 UNITS: 100 INJECTION, SOLUTION INTRAVENOUS; SUBCUTANEOUS at 11:42

## 2025-04-19 RX ADMIN — INSULIN LISPRO 1 UNITS: 100 INJECTION, SOLUTION INTRAVENOUS; SUBCUTANEOUS at 08:19

## 2025-04-19 RX ADMIN — ATORVASTATIN CALCIUM 20 MG: 20 TABLET, FILM COATED ORAL at 08:19

## 2025-04-19 RX ADMIN — CARBIDOPA AND LEVODOPA 1 TABLET: 50; 200 TABLET, EXTENDED RELEASE ORAL at 08:19

## 2025-04-19 RX ADMIN — INSULIN LISPRO 10 UNITS: 100 INJECTION, SOLUTION INTRAVENOUS; SUBCUTANEOUS at 00:06

## 2025-04-19 RX ADMIN — MEMANTINE 10 MG: 5 TABLET ORAL at 08:19

## 2025-04-19 RX ADMIN — CEFAZOLIN SODIUM 2000 MG: 2 SOLUTION INTRAVENOUS at 13:28

## 2025-04-19 RX ADMIN — CEFAZOLIN SODIUM 2000 MG: 2 SOLUTION INTRAVENOUS at 04:43

## 2025-04-19 RX ADMIN — DULOXETINE HYDROCHLORIDE 30 MG: 30 CAPSULE, DELAYED RELEASE ORAL at 08:19

## 2025-04-19 NOTE — ASSESSMENT & PLAN NOTE
Left great toe infection since 2 weeks  Originally managed by PCP and PDM with levaquin and clindamycin and switched to doxycycline  Foot x-rays- consistent with first distal phalanx osteomyelitis in the setting of cellulitis  MRI concerning for abscess and distal phalanx osteomyelitis  LEADs- left great toe with 57 mmHg healing range   Sed rate 44, CRP 11.9   ID input appreciated-   Received cefazolin   If surgical cure obtained, he can transition to PO keflex 500mg qid for 5-7 day post-op for any residual soft tissue infection   Podiatry input appreciated- s/p right great toe amputation with surgical cure   Weightbearing as tolerated on the left foot in surgical shoe  Dressing change every other day, use adaptic, 4x4's, DSD   Outpatient follow up with podiatry/wound care

## 2025-04-19 NOTE — DISCHARGE INSTR - AVS FIRST PAGE
Medication changes  Cephalexin 500 mg every 6 hours-antibiotic    Please follow-up with PCP and podiatry/wound care

## 2025-04-19 NOTE — PROGRESS NOTES
04/18/25 2001   Lab Notifications   Diagnostic Test blood sugar 422   Date critical lab was drawn 04/18/25   Time critical lab was drawn 2026   Critical Value received by constantino wylie   Nurse notified marco GAINES/AP notified if appropriate (name) Mikala Ma MD/AP notfied 2030       RTISTAN notified of elevated blood sugar, orders given, see MAR

## 2025-04-19 NOTE — PROGRESS NOTES
04/19/25 0002   Lab Notifications   Diagnostic Test Blood Glucose 440   Date critical lab was drawn 04/19/25   Time critical lab was drawn 0002   Critical Value received by Negar Montano   Nurse notified Martita GAINES/ENE notified if appropriate (name) Mikala Ma MD/ENE notfied 0002       Patient's blood glucose level still remains elevated at 440 after previous administration of insulin. Mikala HUDSON notified of same, new orders given.  See MAR

## 2025-04-19 NOTE — ASSESSMENT & PLAN NOTE
Lab Results   Component Value Date    HGBA1C 6.5 (H) 04/15/2025     Recent Labs     04/19/25  0000 04/19/25  0258 04/19/25  0729 04/19/25  1106   POCGLU 440* 267* 188* 259*         Blood Sugar Average: Last 72 hrs:  (P) 205.875  Continue home diabetes meds- glimepiride, empagliflozin, metformin   Follow up with PCP for further monitor and medication adjustment

## 2025-04-19 NOTE — NURSING NOTE
Went over discharge info with patient and with patient wife. All questions answered. Pca took patient to main lobby via wheel chair.

## 2025-04-19 NOTE — PLAN OF CARE
Problem: Nutrition/Hydration-ADULT  Goal: Nutrient/Hydration intake appropriate for improving, restoring or maintaining nutritional needs  Description: Monitor and assess patient's nutrition/hydration status for malnutrition. Collaborate with interdisciplinary team and initiate plan and interventions as ordered.  Monitor patient's weight and dietary intake as ordered or per policy. Utilize nutrition screening tool and intervene as necessary. Determine patient's food preferences and provide high-protein, high-caloric foods as appropriate. INTERVENTIONS:- Monitor oral intake, urinary output, labs, and treatment plans- Assess nutrition and hydration status and recommend course of action- Evaluate amount of meals eaten- Assist patient with eating if necessary - Allow adequate time for meals- Recommend/ encourage appropriate diets, oral nutritional supplements, and vitamin/mineral supplements- Order, calculate, and assess calorie counts as needed- Recommend, monitor, and adjust tube feedings and TPN/PPN based on assessed needs- Assess need for intravenous fluids- Provide specific nutrition/hydration education as appropriate- Include patient/family/caregiver in decisions related to nutrition  Outcome: Progressing     Problem: Knowledge Deficit  Goal: Patient/family/caregiver demonstrates understanding of disease process, treatment plan, medications, and discharge instructions  Description: Complete learning assessment and assess knowledge base.Interventions:- Provide teaching at level of understanding- Provide teaching via preferred learning methods  Outcome: Progressing     Problem: DISCHARGE PLANNING  Goal: Discharge to home or other facility with appropriate resources  Description: INTERVENTIONS:- Identify barriers to discharge w/patient and caregiver- Arrange for needed discharge resources and transportation as appropriate- Identify discharge learning needs (meds, wound care, etc.)- Arrange for interpretive services  to assist at discharge as needed- Refer to Case Management Department for coordinating discharge planning if the patient needs post-hospital services based on physician/advanced practitioner order or complex needs related to functional status, cognitive ability, or social support system  Outcome: Progressing     Problem: PAIN - ADULT  Goal: Verbalizes/displays adequate comfort level or baseline comfort level  Description: Interventions:- Encourage patient to monitor pain and request assistance- Assess pain using appropriate pain scale- Administer analgesics based on type and severity of pain and evaluate response- Implement non-pharmacological measures as appropriate and evaluate response- Consider cultural and social influences on pain and pain management- Notify physician/advanced practitioner if interventions unsuccessful or patient reports new pain  Outcome: Progressing

## 2025-04-19 NOTE — ASSESSMENT & PLAN NOTE
Blood pressure is controlled  Continue home metoprolol succinate 100 mg daily  Monitor BP closely and adjust medication as indicated outpatient

## 2025-04-19 NOTE — ASSESSMENT & PLAN NOTE
Lab Results   Component Value Date    EGFR 47 04/19/2025    EGFR 46 04/17/2025    EGFR 44 04/15/2025    CREATININE 1.40 (H) 04/19/2025    CREATININE 1.43 (H) 04/17/2025    CREATININE 1.48 (H) 04/15/2025   Baseline Cr 1.4-1.6 mg/dL   Currently at baseline   Will monitor renal function closely outpatient   Avoid nephrotoxins and hypotension

## 2025-04-19 NOTE — PLAN OF CARE
Problem: PHYSICAL THERAPY ADULT  Goal: Performs mobility at highest level of function for planned discharge setting.  See evaluation for individualized goals.  Description: Treatment/Interventions: Functional transfer training, Therapeutic exercise, Endurance training, Gait training, Bed mobility, Equipment eval/education  Equipment Recommended: Walker       See flowsheet documentation for full assessment, interventions and recommendations.  Outcome: Progressing  Note: Prognosis: Good     Assessment: Pt is 79 y.o. male seen for PT evaluation s/p admit to [facility] on 4/15/2025 w/ Osteomyelitis of great toe of left foot (HCC). PT consulted to assess pt's functional mobility and d/c needs. Order placed for PT eval and tx, w/ WBAT LLE order. Pt agreeable to PT  session upon arrival, pt found supine in bed.  PTA, pt was independent w/ all functional mobility w/ RW, ambulates community distances and elevations, lives w/ spouse in 1 level home, and retired.  Pt to benefit from continued PT tx to address deficits and maximize level of functional independent mobility and consistency. Upon conclusion pt  seated in recliner. Complexity: Comorbidities affecting pt's physical performance at time of assessment include: DM, htn, dementia, a fib, Parkinson's disease, CKD, and L hallux amputation . Personal factors affecting pt at time of IE include: limited insight into impairments, ambulating with assistive device, and decreased cognition. Please find objective findings from PT assessment regarding body systems outlined above with impairments and limitations including impaired balance, decreased endurance, gait deviations, decreased activity tolerance, decreased functional mobility tolerance, decreased safety awareness, impaired judgement, fall risk, decreased skin integrity, and decreased cognition.  Pt's clinical presentation is currently unstable/unpredictable seen in pt's presentation of abnormal renal values,  abnormal H&H, abnormal sodium levels, abnormal Co2 levels, abnormal calcium levels, abnormal blood sugar levels, recent surgery, and wounds. The patient's AM-PAC Basic Mobility Inpatient Short Form Raw Score is 19.  Based on patient presentations and impairments, pt would most appropriately benefit from Level 3 resource intensity upon discharge. A Raw score of greater than 16 suggests the patient may benefit from discharge to home. Please also refer to the recommendation of the Physical Therapist for safe discharge planning. RN verbalized pt appropriate for PT session.  Barriers to Discharge: None     Rehab Resource Intensity Level, PT: III (Minimum Resource Intensity)    See flowsheet documentation for full assessment.

## 2025-04-19 NOTE — PHYSICAL THERAPY NOTE
PHYSICAL THERAPY EVALUATION  NAME:  Kaleb Ching III  DATE: 04/19/25    AGE:   79 y.o.  Mrn:   [id number]  ADMIT DX:  Osteomyelitis (HCC) [M86.9]  Bone infection (HCC) [M86.9]  Toe infection [L08.9]  Type 2 diabetes mellitus with stage 3b chronic kidney disease, without long-term current use of insulin (HCC) [E11.22, N18.32]  Problem List:   Patient Active Problem List   Diagnosis    Anemia    Diabetic polyneuropathy associated with type 2 diabetes mellitus (HCC)    Eczema    Erectile dysfunction of non-organic origin    Generalized anxiety disorder    GERD without esophagitis    Hyperlipidemia    Primary hypertension    Nephrolithiasis    Obesity    Osteoarthritis    Polyp of sigmoid colon    Type 2 diabetes mellitus with stage 3b chronic kidney disease, without long-term current use of insulin (HCC)    Primary osteoarthritis of right hip    Mild concentric left ventricular hypertrophy (LVH)    Atrial fibrillation (HCC)    Chronic anticoagulation    History of TIA (transient ischemic attack)    Stage 3a chronic kidney disease (HCC)    Hydronephrosis with obstructing calculus    Right Adrenal nodule     Proteinuria    Diabetic nephropathy associated with type 2 diabetes mellitus (HCC)    Localized edema    Alzheimer's disease, unspecified (CODE) (HCC)    Sleep disturbance    Parkinson's disease without dyskinesia or fluctuating manifestations (HCC)    Pulmonary HTN (HCC)    Neurogenic orthostatic hypotension (HCC)    Biatrial enlargement    Systemic lupus erythematosus with lung involvement, unspecified SLE type (HCC)    Dilated aortic root (HCC)    Dependence on cane    Panic attack    Osteomyelitis of great toe of left foot (HCC)       Past Medical History  Past Medical History:   Diagnosis Date    Acute on chronic renal insufficiency     Allergic rhinitis     Anemia 06/11/2012    Atrial fibrillation (HCC)     Cerebrovascular accident (CVA) (HCC)     Chest pain     Controlled type 2 diabetes mellitus without  complication (HCC)     SMALLS (dyspnea on exertion)     Generalized anxiety disorder     GERD without esophagitis 06/11/2012    Hypertension     Memory difficulties 01/08/2019    Nephrolithiasis 03/17/2016    Obesity 06/11/2012    Osteoarthritis 06/11/2012       Past Surgical History  Past Surgical History:   Procedure Laterality Date    CATARACT EXTRACTION, BILATERAL      CYSTOSCOPY W/ LASER LITHOTRIPSY Left 1/20/2020    Procedure: CYSTOSCOPY; RETROGRADE; URETEROSCOPY; STONE EXTRACTION; POSSIBLE HOLMIUM LASER LITHOTRIPSY;  Surgeon: Carlos Moore MD;  Location:  MAIN OR;  Service: Urology    KNEE SURGERY      KS CYSTOURETHROSCOPY W/URETERAL CATHETERIZATION Left 11/8/2019    Procedure: CYSTOSCOPY RETROGRADE PYELOGRAM WITH INSERTION STENT URETERAL;  Surgeon: Carlos Moore MD;  Location:  MAIN OR;  Service: Urology    TOTAL HIP ARTHROPLASTY      TOTAL HIP ARTHROPLASTY Bilateral 2011       Length Of Stay: 4  Performed at least 2 patient identifiers during session: Name and ID bracelet       04/19/25 0757   PT Last Visit   PT Visit Date 04/19/25   Note Type   Note type Evaluation   Pain Assessment   Pain Assessment Tool 0-10   Pain Score No Pain   Restrictions/Precautions   Weight Bearing Precautions Per Order Yes   LLE Weight Bearing Per Order WBAT   Braces or Orthoses   (surgical shoe)   Other Precautions Fall Risk;Cognitive;Chair Alarm   Home Living   Type of Home House   Home Layout One level  (0 SABINA)   Bathroom Shower/Tub Tub/shower unit   Bathroom Toilet Raised   Bathroom Equipment Grab bars in shower;Grab bars around toilet   Home Equipment Cane;Walker  (RW used at baseline)   Prior Function   Level of [place] Independent with ADLs;Independent with functional mobility   Lives With Spouse   Receives Help From Family   IADLs Family/Friend/Other provides meals;Family/Friend/Other provides medication management;Independent with driving   Falls in the last 6 months 0   Vocational Retired   General    Family/Caregiver Present No   Cognition   Overall Cognitive Status Impaired   Arousal/Participation Alert   Orientation Level Oriented X4   Memory Decreased short term memory   Following Commands Follows one step commands with increased time or repetition  (repeated the same stories multiple times throughout session)   Subjective   Subjective pt is very happy with his care   RLE Assessment   RLE Assessment WFL   LLE Assessment   LLE Assessment WFL   Vision-Basic Assessment   Current Vision Wears glasses only for reading   Coordination   Sensation WFL   Bed Mobility   Supine to Sit 5  Supervision   Additional items HOB elevated;Increased time required;Verbal cues   Additional Comments pt denied dizziness with transitional movement  (assistance with donning surgical shoes)   Transfers   Sit to Stand 5  Supervision   Additional items Assist x 1;Increased time required;Verbal cues   Stand to Sit 5  Supervision   Additional items Assist x 1;Increased time required;Verbal cues;Armrests   Additional Comments pt required cues for proper hand and foot placement  (pt educated on WBS and use of surgical shoes)   Ambulation/Elevation   Gait pattern Improper Weight shift;Decreased foot clearance;Inconsistent shirley  (pt lifts RW instead of pushing forward; educated on proper use but resistant to education)   Gait Assistance   (CGA)   Additional items Verbal cues   Assistive Device Rolling walker   Distance 15 ft   Ambulation/Elevation Additional Comments fair safety awareness noted   Balance   Static Sitting Good   Dynamic Sitting Fair +   Static Standing Fair -   Dynamic Standing Fair -   Ambulatory Fair -   Endurance Deficit   Endurance Deficit Yes   Endurance Deficit Description pt reported fatigue with activity   Activity Tolerance   Activity Tolerance Patient limited by fatigue   Nurse Made Aware RN made aware of session results   Assessment   Prognosis Good   Assessment Pt is 79 y.o. male seen for PT evaluation s/p  admit to [facility] on 4[facility]15/20[facility] w/ Osteomyelitis of great toe of left foot (HCC). PT consulted to assess pt's functional mobility and d/c needs. Order placed for PT eval and tx, w/ WBAT LLE order. Pt agreeable to PT  session upon arrival, pt found supine in bed.  PTA, pt was independent w/ all functional mobility w/ RW, ambulates community distances and elevations, lives w/ spouse in 1 level home, and retired.  Pt to benefit from continued PT tx to address deficits and maximize level of functional independent mobility and consistency. Upon conclusion pt  seated in recliner. Complexity: Comorbidities affecting pt's physical performance at time of assessment include: DM, htn, dementia, a fib, Parkinson's disease, CKD, and L hallux amputation . Personal factors affecting pt at time of IE include: limited insight into impairments, ambulating with assistive device, and decreased cognition. Please find objective findings from PT assessment regarding body systems outlined above with impairments and limitations including impaired balance, decreased endurance, gait deviations, decreased activity tolerance, decreased functional mobility tolerance, decreased safety awareness, impaired judgement, fall risk, decreased skin integrity, and decreased cognition.  Pt's clinical presentation is currently unstable/unpredictable seen in pt's presentation of abnormal renal values, abnormal H&H, abnormal sodium levels, abnormal Co2 levels, abnormal calcium levels, abnormal blood sugar levels, recent surgery, and wounds. The patient's AM-PAC Basic Mobility Inpatient Short Form Raw Score is 19.  Based on patient presentations and impairments, pt would most appropriately benefit from Level 3 resource intensity upon discharge. A Raw score of greater than 16 suggests the patient may benefit from discharge to home. Please also refer to the recommendation of the Physical Therapist for safe discharge planning. RN verbalized pt appropriate  for PT session.   Barriers to Discharge None   Goals   Patient Goals to go home today   LTG Expiration Date 04/29/25   Long Term Goal #1 Pt will: Perform bed mobility tasks to modified I to improve ease of bed mobility. Perform transfers to modified I to improve ease of transfers. Perform ambulation with MI and RW for 250 feet to increase Indep in home environment. Increase dynamic standing balance to F+ to decrease fall risk. Increase OOB activity tolerance to 10 minutes without s/s of exertion to decrease fall risk.   Plan   Treatment/Interventions Functional transfer training;Therapeutic exercise;Endurance training;Gait training;Bed mobility;Equipment eval/education   PT Frequency 3-5x/wk   Discharge Recommendation   Rehab Resource Intensity Level, PT III (Minimum Resource Intensity)   Equipment Recommended Walker   Walker Package Recommended Wheeled walker   AM-PAC Basic Mobility Inpatient   Turning in Flat Bed Without Bedrails 4   Lying on Back to Sitting on Edge of Flat Bed Without Bedrails 3   Moving Bed to Chair 3   Standing Up From Chair Using Arms 3   Walk in Room 3   Climb 3-5 Stairs With Railing 3   Basic Mobility Inpatient Raw Score 19   Basic Mobility Standardized Score 42.48   [facility] Highest Level Of Mobility   JH-HLM Goal 6: Walk 10 steps or more   -HLM Achieved 6: Walk 10 steps or more       Time In: 9440  Time Out: 2148  Total Evaluation Minutes: 18    Mary Oliva, PT

## 2025-04-19 NOTE — CASE MANAGEMENT
Case Management Discharge Planning Note    Patient name Kaleb Ching III  Location /-01 MRN [id number]  : 1945 Date 2025       Current Admission Date: 4/15/2025  Current Admission Diagnosis:Osteomyelitis of great toe of left foot ([facility])   Patient Active Problem List    Diagnosis Date Noted Date Diagnosed    Osteomyelitis of great toe of left foot ([facility]) 04/15/2025     Panic attack 2025     Dependence on cane 2025     Dilated aortic root ([facility]) 2024     Biatrial enlargement 2024     Systemic lupus erythematosus with lung involvement, unspecified SLE type ([facility]) 2024     Neurogenic orthostatic hypotension ([facility]) 2024     Pulmonary HTN ([facility]) 2024     Parkinson's disease without dyskinesia or fluctuating manifestations ([facility]) 2024     Sleep disturbance 2023     Alzheimer's disease, unspecified (CODE) ([facility]) 2023     Localized edema 2021     Diabetic nephropathy associated with type 2 diabetes mellitus ([facility]) 12/10/2020     Proteinuria 2020     Hydronephrosis with obstructing calculus 2019     Right Adrenal nodule  2019     Stage 3a chronic kidney disease ([facility]) 2019     History of TIA (transient ischemic attack)      Chronic anticoagulation 2019     Mild concentric left ventricular hypertrophy (LVH) 2018     Atrial fibrillation ([facility]) 2018     Primary osteoarthritis of right hip 10/30/2018     Type 2 diabetes mellitus with stage 3b chronic kidney disease, without long-term current use of insulin ([facility]) 2017     Nephrolithiasis 2016     Diabetic polyneuropathy associated with type 2 diabetes mellitus ([facility]) 2016     Hyperlipidemia 2016     Eczema 2013     Anemia 2012     Erectile dysfunction of non-organic origin 2012     Generalized anxiety disorder 2012     GERD without esophagitis 2012     Primary hypertension 2012     Obesity  06/11/2012     Osteoarthritis 06/11/2012     Polyp of sigmoid colon 06/11/2012       LOS (days): 4  Geometric Mean LOS (GMLOS) (days): 3  Days to GMLOS:-1     OBJECTIVE:  Risk of Unplanned Readmission Score: 22.35         Current admission status: Inpatient   Preferred Pharmacy:   [id number] - [address] [address]  Phone: [phone] Fax: [phone]    Primary Care Provider: Wilfrid Mills DO    Primary Insurance: MEDICARE  Secondary Insurance: [facility]    DISCHARGE DETAILS:    Comments - Freedom of Choice: Wife chose [facility] from the choice list from [facility]     Requested Home Health Care         Is the patient interested in HHC at discharge?: Yes  Home Health Discipline requested:: Nursing, Physical Therapy  [facility] Reason (only applicable if external HHA name selected): Patient has established relationship with provider  Home Health Follow-Up Provider:: PCP  Home Health Services Needed:: Strengthening/Theraputic Exercises to Improve Function, Post-Op Care and Assessment  Homebound Criteria Met:: Uses an Assist Device (i.e. cane, walker, etc)  Supporting Clincal Findings:: Limited Endurance, Fatigues Easliy in Short Distances  Provided the choice list from [facility] for HHC.  Wife chose [facility].  Reserved in [facility].  Faxed the AVS to [facility]. PT has been cleared by [facility] for DC.

## 2025-04-20 LAB
BACTERIA BLD CULT: NORMAL
BACTERIA BLD CULT: NORMAL
BACTERIA WND AEROBE CULT: ABNORMAL
GRAM STN SPEC: ABNORMAL

## 2025-04-21 ENCOUNTER — TRANSITIONAL CARE MANAGEMENT (OUTPATIENT)
Dept: INTERNAL MEDICINE CLINIC | Facility: CLINIC | Age: 80
End: 2025-04-21

## 2025-04-21 ENCOUNTER — TELEPHONE (OUTPATIENT)
Age: 80
End: 2025-04-21

## 2025-04-21 NOTE — TELEPHONE ENCOUNTER
Called [facility], confirmed that they set patient up to have a nurse come out today, Erica will be seeing pt this afternoon and will be able to perform dressing changes. Attempted to call wife but no VM box set up.

## 2025-04-21 NOTE — TELEPHONE ENCOUNTER
Patient's spouse, Libra, called to follow up on hospital order for [facility] to come out to the house today to change the dressing on patient foot post surgery.    Spouse has not heard from [facility].  Orders are not in patient's chart.  Provided caller with [facility] phone number.  She will contact.  If any problems, spouse will call back.    Patient is to have dressing changed today, 4/21/25

## 2025-04-24 ENCOUNTER — OFFICE VISIT (OUTPATIENT)
Dept: PODIATRY | Facility: CLINIC | Age: 80
End: 2025-04-24
Payer: MEDICARE

## 2025-04-24 VITALS — WEIGHT: 210 LBS | HEIGHT: 71 IN | BODY MASS INDEX: 29.4 KG/M2

## 2025-04-24 DIAGNOSIS — E11.22 TYPE 2 DIABETES MELLITUS WITH STAGE 3B CHRONIC KIDNEY DISEASE, WITHOUT LONG-TERM CURRENT USE OF INSULIN (HCC): ICD-10-CM

## 2025-04-24 DIAGNOSIS — E78.2 MIXED HYPERLIPIDEMIA: ICD-10-CM

## 2025-04-24 DIAGNOSIS — L03.032 CELLULITIS OF TOE OF LEFT FOOT: ICD-10-CM

## 2025-04-24 DIAGNOSIS — N18.32 TYPE 2 DIABETES MELLITUS WITH STAGE 3B CHRONIC KIDNEY DISEASE, WITHOUT LONG-TERM CURRENT USE OF INSULIN (HCC): ICD-10-CM

## 2025-04-24 PROCEDURE — 88305 TISSUE EXAM BY PATHOLOGIST: CPT | Performed by: PATHOLOGY

## 2025-04-24 PROCEDURE — 99213 OFFICE O/P EST LOW 20 MIN: CPT | Performed by: PODIATRIST

## 2025-04-24 PROCEDURE — 88311 DECALCIFY TISSUE: CPT | Performed by: PATHOLOGY

## 2025-04-24 RX ORDER — ATORVASTATIN CALCIUM 20 MG/1
20 TABLET, FILM COATED ORAL DAILY
Qty: 90 TABLET | Refills: 1 | Status: SHIPPED | OUTPATIENT
Start: 2025-04-24

## 2025-04-24 NOTE — TELEPHONE ENCOUNTER
Reason for call:   [x] Refill   [] Prior Auth  [] Other:     Office:   [x] PCP/Provider -   [] Specialty/Provider -     Medication:   - Atorvastatin 20mg- take 1 tablet by mouth once daily      Pharmacy: [facility] [place]    [facility]   Does the patient have enough for 3 days?   [x] Yes   [] No - Send as HP to POD    Mail Away Pharmacy   Does the patient have enough for 10 days?   [] Yes   [] No - Send as HP to POD

## 2025-04-24 NOTE — PROGRESS NOTES
PATIENT:  Kaleb Ching III      1945    ASSESSMENT     1. Cellulitis of toe of left foot  Ambulatory referral to Podiatry             PLAN  Patient is doing well post-operatively.  Sutures left intact. Incision was cleaned with betadine and DSD applied to be changed daily.  Continue post-op care as instructed.  Stressed on patient compliance about proper off-loading, staying off of feet, and proper dressing care.  Call if any increase in pain, fevers, calf pain, shortness of breath, or general distress is noted. Patient instructed to go to ER if call is not returned immediately.      HISTORY OF PRESENT ILLNESS  Patient presents for post-op appointment.  Post-op pain is under control and resolving well.  The patient is feeling well and in good spirits.  Patient reported no post-op concern.  Patient relates compliance with surgical shoe, elevation, and keeping dressing clean, dry and intact.    REVIEW OF SYSTEMS  GENERAL: No fever or chills.    HEART: No chest pain, or palpitation  RESPIRATORY:  No SOB or cough  GI: No Nausea, vomit or diarrhea  NEUROLOGIC: No syncope or acute weakness  MUSCULOSKELETAL: No calf pain or edema.      PHYSICAL EXAMINATION  GENERAL  The patient appears in NAD / non-toxic. Afebrile. VSS    VASCULAR EXAM  Pedal pulses and vascular status are intact.  No calf pain or edema bilaterally.  No cyanosis.    DERMATOLOGIC EXAM  Incision is coapted and healing well.  No signs of infection. No active drainage. Normal post-op edema and ecchymosis. No necrosis or dehiscence.    NEUROLOGIC EXAM  AAO X 3.  No focal neurologic deficit.  Neurologic status is intact BLE.    MUSCULOSKELETAL EXAM  Good surgical correction.  Normal post-op findings. ROM intact.  No fluctuation or crepitus.

## 2025-04-30 ENCOUNTER — APPOINTMENT (OUTPATIENT)
Dept: LAB | Facility: CLINIC | Age: 80
End: 2025-04-30
Attending: INTERNAL MEDICINE
Payer: MEDICARE

## 2025-04-30 ENCOUNTER — OFFICE VISIT (OUTPATIENT)
Dept: INTERNAL MEDICINE CLINIC | Facility: CLINIC | Age: 80
End: 2025-04-30
Payer: MEDICARE

## 2025-04-30 VITALS
BODY MASS INDEX: 29.65 KG/M2 | HEIGHT: 71 IN | HEART RATE: 72 BPM | DIASTOLIC BLOOD PRESSURE: 62 MMHG | WEIGHT: 211.8 LBS | SYSTOLIC BLOOD PRESSURE: 112 MMHG | OXYGEN SATURATION: 99 % | TEMPERATURE: 97.8 F

## 2025-04-30 DIAGNOSIS — R26.9 GAIT ABNORMALITY: ICD-10-CM

## 2025-04-30 DIAGNOSIS — E87.1 HYPONATREMIA: ICD-10-CM

## 2025-04-30 DIAGNOSIS — S98.112A AMPUTATION OF LEFT GREAT TOE (HCC): ICD-10-CM

## 2025-04-30 DIAGNOSIS — G20.A1 PARKINSON'S DISEASE WITHOUT DYSKINESIA OR FLUCTUATING MANIFESTATIONS (HCC): ICD-10-CM

## 2025-04-30 DIAGNOSIS — R54 FRAIL ELDERLY: ICD-10-CM

## 2025-04-30 DIAGNOSIS — M86.9 OSTEOMYELITIS OF LEFT FOOT, UNSPECIFIED TYPE (HCC): Primary | ICD-10-CM

## 2025-04-30 DIAGNOSIS — G30.9 ALZHEIMER'S DISEASE, UNSPECIFIED (CODE) (HCC): ICD-10-CM

## 2025-04-30 DIAGNOSIS — E83.51 HYPOCALCEMIA: ICD-10-CM

## 2025-04-30 DIAGNOSIS — E11.22 TYPE 2 DIABETES MELLITUS WITH STAGE 3B CHRONIC KIDNEY DISEASE, WITHOUT LONG-TERM CURRENT USE OF INSULIN (HCC): ICD-10-CM

## 2025-04-30 DIAGNOSIS — D64.9 ANEMIA, UNSPECIFIED TYPE: ICD-10-CM

## 2025-04-30 DIAGNOSIS — N18.32 TYPE 2 DIABETES MELLITUS WITH STAGE 3B CHRONIC KIDNEY DISEASE, WITHOUT LONG-TERM CURRENT USE OF INSULIN (HCC): ICD-10-CM

## 2025-04-30 DIAGNOSIS — Z99.89 USES WALKER: ICD-10-CM

## 2025-04-30 PROBLEM — D68.2 HEREDITARY DEFICIENCY OF OTHER CLOTTING FACTORS (HCC): Status: ACTIVE | Noted: 2025-04-30

## 2025-04-30 LAB
ANION GAP SERPL CALCULATED.3IONS-SCNC: 9 MMOL/L (ref 4–13)
BUN SERPL-MCNC: 22 MG/DL (ref 5–25)
CALCIUM SERPL-MCNC: 8.9 MG/DL (ref 8.4–10.2)
CHLORIDE SERPL-SCNC: 110 MMOL/L (ref 96–108)
CO2 SERPL-SCNC: 23 MMOL/L (ref 21–32)
CREAT SERPL-MCNC: 1.39 MG/DL (ref 0.6–1.3)
FERRITIN SERPL-MCNC: 61 NG/ML (ref 30–336)
GFR SERPL CREATININE-BSD FRML MDRD: 47 ML/MIN/1.73SQ M
GLUCOSE P FAST SERPL-MCNC: 164 MG/DL (ref 65–99)
HCT VFR BLD AUTO: 35.3 % (ref 36.5–49.3)
HGB BLD-MCNC: 11.2 G/DL (ref 12–17)
IRON SATN MFR SERPL: 21 % (ref 15–50)
IRON SERPL-MCNC: 66 UG/DL (ref 50–212)
POTASSIUM SERPL-SCNC: 5.1 MMOL/L (ref 3.5–5.3)
SODIUM SERPL-SCNC: 142 MMOL/L (ref 135–147)
TIBC SERPL-MCNC: 320.6 UG/DL (ref 250–450)
TRANSFERRIN SERPL-MCNC: 229 MG/DL (ref 203–362)
UIBC SERPL-MCNC: 255 UG/DL (ref 155–355)

## 2025-04-30 PROCEDURE — 80048 BASIC METABOLIC PNL TOTAL CA: CPT

## 2025-04-30 PROCEDURE — 36415 COLL VENOUS BLD VENIPUNCTURE: CPT

## 2025-04-30 PROCEDURE — 85018 HEMOGLOBIN: CPT

## 2025-04-30 PROCEDURE — 83540 ASSAY OF IRON: CPT

## 2025-04-30 PROCEDURE — 99495 TRANSJ CARE MGMT MOD F2F 14D: CPT | Performed by: INTERNAL MEDICINE

## 2025-04-30 PROCEDURE — 85014 HEMATOCRIT: CPT

## 2025-04-30 PROCEDURE — 83550 IRON BINDING TEST: CPT

## 2025-04-30 PROCEDURE — 82728 ASSAY OF FERRITIN: CPT

## 2025-04-30 NOTE — PROGRESS NOTES
Transition of Care Visit:  Name: Kaleb Ching III      : 1945      MRN: [id number]  Encounter Provider: Wilfrid Mills DO  Encounter Date: 2025   Encounter department: [facility]    Assessment & Plan  Osteomyelitis of left foot, unspecified type (HCC)         Amputation of left great toe (HCC)         Type 2 diabetes mellitus with stage 3b chronic kidney disease, without long-term current use of insulin (HCC)    Lab Results   Component Value Date    HGBA1C 6.5 (H) 04/15/2025            Parkinson's disease without dyskinesia or fluctuating manifestations (HCC)         Alzheimer's disease, unspecified (CODE) (HCC)         Anemia, unspecified type        Orders:    Iron Panel (Includes Ferritin, Iron Sat%, Iron, and TIBC); Future    Hemoglobin and hematocrit, blood; Future    Hyponatremia    Orders:    Basic metabolic panel; Future    Hypocalcemia    Orders:    Basic metabolic panel; Future    Gait abnormality         Uses walker         Frail elderly       A/P: Doing well and just about back to baseline. Off abx and wound healing. No fever or chills. Activity level is good and no pain. Sugars controlled. Will repeat d/c labs. Discussed importance of daily foot inspection. Keep f/u with DPM for stitch removal. Finish VNA PT. Continue current treatment and RTC four weeks for routine.        History of Present Illness     Transitional Care Management Review:   Kaleb Ching III is a 79 y.o. male here for TCM follow up.     During the TCM phone call patient stated:  TCM Call (since 2025)       Date and time call was made  2025 11:01 AM    Hospital care reviewed  Records reviewed    Patient was hospitial[facility] at  [facility]    Date of Admission  04/15/25    Date of discharge  25    Diagnosis  osteomyelitis of great toe of left foot    Disposition  Home    Were the patients medications reviewed and updated  Yes    Current Symptoms  None          TCM Call (since  4/16/2025)       Post hospital issues  None    Scheduled for follow up?  Yes    Did you obtain your prescribed medications  Yes    Do you need help managing your prescriptions or medications  No    Is transportation to your appointment needed  No    I have advised the patient to call PCP with any new or worsening symptoms  ALLEN Hou    Living Arrangements  Spouse or Significiant other    Are you recieving home care services  Yes    Types of home care services  Home health aid          Diabetic WM RTC with his wife for f/u brief admission for worsening cellulitis with non healing diabetic wound of the left great toe. Was not improving on abx and f/u imaging with OM.  Admitted and underwent elective amputation of the left great toe. Post op course uncomplicated except for low sodium, low calcium, and anemia. D/c'd to home. Finished his abx and VNA going in. Doing much better No fever or chills. Activity level increasing and walking with his walker. No pain. Wound dry and intact w/o any d/c. Appetite is good and sugars back to baseline. Back on his meds and tolerating. Dementia no worse. No new issues.       Review of Systems   Constitutional:  Negative for activity change, chills, diaphoresis, fatigue and fever.   HENT: Negative.     Eyes:  Negative for visual disturbance.   Respiratory:  Negative for cough, chest tightness, shortness of breath and wheezing.    Cardiovascular:  Negative for chest pain, palpitations and leg swelling.   Gastrointestinal:  Negative for abdominal pain, constipation, diarrhea, nausea and vomiting.   Endocrine: Negative for cold intolerance and heat intolerance.   Genitourinary:  Negative for difficulty urinating, dysuria and frequency.   Musculoskeletal:  Negative for arthralgias, gait problem and myalgias.   Neurological:  Negative for dizziness, seizures, syncope, weakness, light-headedness and headaches.   Psychiatric/Behavioral:  Negative for confusion, dysphoric mood and  "sleep disturbance. The patient is not nervous/anxious.      Objective   /62   Pulse 72   Temp 97.8 °F (36.6 °C)   Ht 5' 11\" (1.803 m)   Wt 96.1 kg (211 lb 12.8 oz)   SpO2 99%   BMI 29.54 kg/m²     Physical Exam  Vitals and nursing note reviewed.   Constitutional:       General: He is not in acute distress.     Appearance: Normal appearance. He is not ill-appearing.   HENT:      Head: Normocephalic and atraumatic.      Mouth/Throat:      Mouth: Mucous membranes are moist.   Eyes:      Extraocular Movements: Extraocular movements intact.      Conjunctiva/sclera: Conjunctivae normal.      Pupils: Pupils are equal, round, and reactive to light.   Cardiovascular:      Rate and Rhythm: Normal rate and regular rhythm.      Heart sounds: Normal heart sounds. No murmur heard.  Pulmonary:      Effort: Pulmonary effort is normal. No respiratory distress.      Breath sounds: Normal breath sounds. No wheezing, rhonchi or rales.   Abdominal:      General: Bowel sounds are normal. There is no distension.      Palpations: Abdomen is soft.      Tenderness: There is no abdominal tenderness.   Musculoskeletal:         General: No tenderness.      Right lower leg: No edema.      Left lower leg: No edema.   Neurological:      General: No focal deficit present.      Mental Status: He is alert. Mental status is at baseline.   Psychiatric:         Mood and Affect: Mood normal.         Behavior: Behavior normal.         Thought Content: Thought content normal.         Judgment: Judgment normal.       Medications have been reviewed by provider in current encounter      "

## 2025-04-30 NOTE — ASSESSMENT & PLAN NOTE
Orders:    Iron Panel (Includes Ferritin, Iron Sat%, Iron, and TIBC); Future    Hemoglobin and hematocrit, blood; Future

## 2025-04-30 NOTE — PATIENT INSTRUCTIONS
"Patient Education     Foot care for people with diabetes   The Basics   Written by the doctors and editors at [facility]   Why is foot care important if I have diabetes? -- Diabetes can cause nerve damage if your blood sugar is high for a long time. The medical term for this is \"diabetic neuropathy.\"  If you have problems with the nerves in your feet, you might not be able to feel pain in your foot. Normally, people feel pain when they get a cut or a blister on their foot. The pain tells them that they need to treat their cut so it can heal. But people with nerve damage might not feel any pain when their feet get hurt. They might not even know that they have a cut, so they might not treat it. Problems that aren't treated right away can get much worse. For example, an untreated cut can get infected and turn into an open sore.  High blood sugar can also damage blood vessels and decrease blood flow to your feet. This can weaken your skin and make wounds take longer to heal. You are also more likely to get an infection if you have high blood sugar.  How do I take care of my feet? -- Taking good care of your feet can help prevent foot problems. You should:   Wash your feet every day with soap and warm water. Pat your feet dry, and be sure to dry the skin between your toes.   Keep your feet moisturized. Put lotion on the tops and bottoms of your feet, but not between your toes.   Check your feet every day (figure 1). Look for cuts, blisters, redness, or swelling. Use a mirror, or ask someone to help you check the bottoms of your feet. Check all parts of the foot, especially between the toes. Look for broken skin, ulcers, blisters, or redness.   Trim your toenails straight across when needed (figure 2). Do not cut the corners of your toenails. File rough edges. Do not cut your cuticles. Ask for help if you cannot see well or have problems reaching your feet.   Ask your doctor or nurse to check your feet at each visit. Take " your shoes and socks off for these checks.   See a foot care provider (such as a podiatrist) if you have an ingrown toenail, corn, or callus. Do not try to remove corns and calluses yourself.  How do I protect my feet from injury? -- There are several ways to protect your feet. You can:   Wear shoes and socks at all times, even at home. Do not walk barefoot. Wear swim shoes if you go to the beach or a swimming pool.   Choose shoes that fit well. They should not be not too tight or too loose. Your shoes should have plenty of room for your toes (figure 3). Your doctor might give you a prescription for special shoes. Check to see if they are covered by your insurance.   Check your shoes each time before you put them on to make sure that the lining is smooth. Also check to make sure that there is nothing inside the shoes before putting them on.   Do not wear shoes that expose any part of the foot, like sandals, thongs, or clogs.   Wear cotton socks that fit loosely. Do not wear shoes without socks.   Protect your feet from heat and cold. Test bath water before putting your feet in it to make sure that it is not too hot. Do not walk barefoot on hot ground. Take extra care when going outside in the cold and wear warm socks.  What else should I know? -- You can lower your risk for foot problems by keeping your blood sugar levels as close to your goal as possible. Other things you can do include:   Move your ankles and toes often to help with blood flow. You can wear a support stocking to help with swelling.   Walk often. Regular walking helps blood flow.   If you smoke, try to quit. Your doctor or nurse can help. Smoking causes poor blood flow to your feet and can damage your nerves.  When should I call the doctor? -- Call your doctor or nurse for advice if you have:   A fever of 100.4°F (38°C) or higher, chills, or a wound that will not heal   Swelling, redness, warmth around a wound, a foul smell coming from a wound, or  yellowish, greenish, or bloody discharge   Sores or blisters on your feet that hurt more or less than you would expect   Numbness or tingling in your foot or leg   Corns, calluses, blisters, or new sores on your foot   Very dry, scaly, or cracked skin on your feet   Changes in the way your foot joints or arch look  All topics are updated as new evidence becomes available and our peer review process is complete.  This topic retrieved from [employer] on: Mar 13, 2024.  Topic 162895 Version 2.0  Release: 32.2.4 - C32.71  © 2024 UpToDate, Inc. and/or its affiliates. All rights reserved.  figure 1: Foot check for people with diabetes     People with diabetes should check both of their feet every day. It is important to check your feet all over, including in between your toes. If you can't see the bottom of your foot, use a mirror or ask another person to check for you. Let your doctor or nurse know if you find any:  Redness   Cuts or cracks in the skin   Blisters   Swelling   Graphic 91849 Version 3.0  figure 2: Trim your toenails     Trim your toenails straight across and smooth them with a nail file.  Graphic 24844 Version 2.0  figure 3: Correct shoe shape     Choose shoes that fit the right way and are not too tight or too loose. Your shoes should have plenty of room for your toes.  Graphic 21096 Version 2.0  Consumer Information Use and Disclaimer   Disclaimer: This generalized information is a limited summary of diagnosis, treatment, and/or medication information. It is not meant to be comprehensive and should be used as a tool to help the user understand and/or assess potential diagnostic and treatment options. It does NOT include all information about conditions, treatments, medications, side effects, or risks that may apply to a specific patient. It is not intended to be medical advice or a substitute for the medical advice, diagnosis, or treatment of a health care provider based on the health care provider's  examination and assessment of a patient's specific and unique circumstances. Patients must speak with a health care provider for complete information about their health, medical questions, and treatment options, including any risks or benefits regarding use of medications. This information does not endorse any treatments or medications as safe, effective, or approved for treating a specific patient. UpToDate, Inc. and its affiliates disclaim any warranty or liability relating to this information or the use thereof.The use of this information is governed by the Terms of Use, available at https://www.woltersEaglEyeMeduwer.com/en/know/clinical-effectiveness-terms. 2024© UpToDate, Inc. and its affiliates and/or licensors. All rights reserved.  Copyright   © 2024 UpToDate, Inc. and/or its affiliates. All rights reserved.

## 2025-05-01 ENCOUNTER — RESULTS FOLLOW-UP (OUTPATIENT)
Dept: INTERNAL MEDICINE CLINIC | Facility: CLINIC | Age: 80
End: 2025-05-01

## 2025-05-01 ENCOUNTER — OFFICE VISIT (OUTPATIENT)
Dept: PODIATRY | Facility: CLINIC | Age: 80
End: 2025-05-01
Payer: MEDICARE

## 2025-05-01 VITALS — HEIGHT: 71 IN | BODY MASS INDEX: 29.54 KG/M2 | WEIGHT: 211 LBS

## 2025-05-01 DIAGNOSIS — E11.42 DIABETIC POLYNEUROPATHY ASSOCIATED WITH TYPE 2 DIABETES MELLITUS (HCC): ICD-10-CM

## 2025-05-01 DIAGNOSIS — B35.1 ONYCHOMYCOSIS: ICD-10-CM

## 2025-05-01 DIAGNOSIS — E11.8 DIABETIC FOOT (HCC): Primary | ICD-10-CM

## 2025-05-01 DIAGNOSIS — L84 CORNS: ICD-10-CM

## 2025-05-01 PROCEDURE — 11055 PARING/CUTG B9 HYPRKER LES 1: CPT | Performed by: PODIATRIST

## 2025-05-01 PROCEDURE — 99213 OFFICE O/P EST LOW 20 MIN: CPT | Performed by: PODIATRIST

## 2025-05-01 PROCEDURE — 11721 DEBRIDE NAIL 6 OR MORE: CPT | Performed by: PODIATRIST

## 2025-05-01 NOTE — PROGRESS NOTES
Diabetic Foot Exam    Patient's shoes and socks removed.    Right Foot/Ankle   Right Foot Inspection  Skin Exam: skin normal and skin intact. No dry skin, no warmth, no callus, no erythema, no maceration, no abnormal color, no pre-ulcer, no ulcer and no callus.     Toe Exam: ROM and strength within normal limits.     Sensory   Monofilament testing: absent    Vascular  Capillary refills: elevated  The right DP pulse is 1+. The right PT pulse is 1+.     Left Foot/Ankle  Left Foot Inspection  Amputation: amputation left foot (Comments: L partial hallux)    Toe Exam: ROM and strength within normal limits.     Sensory   Monofilament testing: absent    Vascular  Capillary refills: elevated  The left DP pulse is 1+. The left PT pulse is 1+.     Assign Risk Category  Deformity present  Loss of protective sensation  Weak pulses  Risk: 3                PATIENT:  Kaleb Ching III    1945    ASSESSMENT:     1. Diabetic foot (HCC)        2. Corns        3. Onychomycosis        4. Diabetic polyneuropathy associated with type 2 diabetes mellitus (HCC)              PLAN:  1.  Patient was counseled on the condition and diagnosis.    2.  Educated disease prevention and risks related to diabetes.    3.  Educated proper daily foot care and exam.  Instructed proper skin care / protection and footwear.  Instructed to identify any signs of infection and related foot problem.    4.  The recent blood work was reviewed / discussed and the last HbA1c was 6.5.  Discussed proper blood glucose control with diet and exercise.    5.  The patient has *3* risk diabetic foot and will return in 12 months for diabetic foot exam.      Imaging: I have personally reviewed pertinent films in PACS  Labs, pathology, and Other Studies: I have personally reviewed pertinent reports.      High risk  foot precautions reviewed with patient including the need to wear protective shoegear at all times when walking including in the home, the need to check  feet all surfaces daily with a mirror and report and skin breaks to podiatry, the need to apply an emollient to skin of feet daily.  Diabetic Foot Ulcer Risks    - Between 10-15% of diabetic foot ulcers do not heal.[v]  - Of diabetic foot ulcers that do not heal, 25% will require amputation.[v]    iv NOHELIA Moura, DENIA Loco, Daniela ROWE, and Charleen AYALA, Foot Ulcers in the Diabetic Patient, Prevention and Treatment. Vascular Health Risk Management. 2007 Feb; 3(1): 65-76  v BRITTNI King, RIMA Bautista, JAMIE Owens, MICHELE Vega., Merrill, T.T., Risk factors for lower extremity amputation in patients with diabetic foot ulcers: a hospital-based case-control study. Diabetic Foot & Ankle, 2015. 6:10.3402      Procedure: All mycotic toenails were reduced and debrided in length, width, and girth using a nail nipper and dremel.  All hyperkeratotic skin lesion(s) were sharply pared with a scalpel with no bleeding or evidence of ulceration.  Patient tolerated procedure(s) well without complications.  79631, 1055, q. 7  I discussed that the high risk area on his foot remains the left distal tip of the second toe, he is to ambulate all the time and she is comfortable in daily pedal checks and check inside his shoes return in 9 weeks for at risk footcare      Subjective:          Status post amputation, doing very well in surgical shoe compliant with dressing changes and change activities of daily living.  Does not have diabetic shoes      The patient presents for diabetic foot evaluation.  The patient has diabetes for multiple years.  The blood glucose is under control and the last HbA1c was 6.5.  The patient has history of diabetic foot ulcer or related foot infection.  significant umbness or paresthesia.  Denied weakness or significant functional deficit.  The patient denied any acute pedal disorder or injury.  Denied symptoms of arterial claudication in legs.  The patient presents with status post toe amputation.      The  following portions of the patient's history were reviewed and updated as appropriate: allergies, current medications, past family history, past medical history, past social history, past surgical history and problem list.  All pertinent labs and images were reviewed.    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    Hypertension     Memory difficulties 01/08/2019    Nephrolithiasis 03/17/2016    Obesity 06/11/2012    Osteoarthritis 06/11/2012       Past Surgical History  Past Surgical History:   Procedure Laterality Date    CATARACT EXTRACTION, BILATERAL      CYSTOSCOPY W/ LASER LITHOTRIPSY Left 1/20/2020    Procedure: CYSTOSCOPY; RETROGRADE; URETEROSCOPY; STONE EXTRACTION; POSSIBLE HOLMIUM LASER LITHOTRIPSY;  Surgeon: Carlos Moore MD;  Location: [facility] OR;  Service: Urology    KNEE SURGERY      ID CYSTOURETHROSCOPY W/URETERAL CATHETERIZATION Left 11/8/2019    Procedure: CYSTOSCOPY RETROGRADE PYELOGRAM WITH INSERTION STENT URETERAL;  Surgeon: Carlos Moore MD;  Location: [facility] OR;  Service: Urology    TOE AMPUTATION Left 4/18/2025    Procedure: AMPUTATION TOE, left hallux;  Surgeon: Adrián Jasmine DPM;  Location: [facility] OR;  Service: Podiatry    TOTAL HIP ARTHROPLASTY      TOTAL HIP ARTHROPLASTY Bilateral 2011        Allergies:  Patient has no known allergies.    Medications:  Current Outpatient Medications   Medication Sig Dispense Refill    ALPRAZolam (XANAX) 0.25 mg tablet Take 1 tablet (0.25 mg total) by mouth 3 (three) times a day as needed for anxiety 30 tablet 0    apixaban  (ELIQUIS) 5 mg Take 1 tablet (5 mg total) by mouth 2 (two) times a day 180 tablet 3    atorvastatin (LIPITOR) 20 mg tablet Take 1 tablet (20 mg total) by mouth daily 90 tablet 1    carbidopa-levodopa (SINEMET CR)  mg TBCR per ER tablet Take 1 tablet by mouth 3 (three) times a day On  mg 270 tablet 1    donepezil (ARICEPT) 10 mg tablet take 1 tablet by mouth at bedtime 90 tablet 1    DULoxetine (CYMBALTA) 30 mg delayed release capsule take 1 capsule by mouth once daily 90 capsule 1    Empagliflozin 25 MG TABS Take 1 tablet (25 mg total) by mouth daily 90 tablet 3    ergocalciferol (VITAMIN D2) 50,000 units take 1 capsule by mouth every week 12 capsule 1    glimepiride (AMARYL) 2 mg tablet Take 1 tablet (2 mg total) by mouth daily with breakfast 100 tablet 3    glucose blood (FREESTYLE LITE) test strip Test twice a day 100 each 3    glucose monitoring kit (FREESTYLE) monitoring kit Use 1 each 2 (two) times a day Test twice  Day 1 each 0    Lancets (freestyle) lancets Test twice a day 100 each 3    memantine (NAMENDA) 10 mg tablet take 1 tablet by mouth twice a day 180 tablet 0    metFORMIN (GLUCOPHAGE) 850 mg tablet Take 1 tablet (850 mg total) by mouth 2 (two) times a day 180 tablet 1    metoprolol succinate (TOPROL-XL) 100 mg 24 hr tablet take 1 tablet by mouth once daily 90 tablet 1     No current facility-administered medications for this visit.       Social History:  Social History     Socioeconomic History    Marital status: /Civil Union     Spouse name: None    Number of children: None    Years of education: None    Highest education level: None   Occupational History    Occupation: Retired   Tobacco Use    Smoking status: Never     Passive exposure: Never    Smokeless tobacco: Never   Vaping Use    Vaping status: Never Used   Substance and Sexual Activity    Alcohol use: Never     Comment: socially     Drug use: Never    Sexual activity: Yes   Other Topics Concern    None   Social History  "Narrative    RETIRED     Social Drivers of Health     Financial Resource Strain: Low Risk  (3/7/2023)    Overall Financial Resource Strain (CARDIA)     Difficulty of Paying Living Expenses: Not hard at all   Food Insecurity: No Food Insecurity (4/15/2025)    Nursing - Inadequate Food Risk Classification     Worried About Running Out of Food in the Last Year: Never true     Ran Out of Food in the Last Year: Never true     Ran Out of Food in the Last Year: Never true   Transportation Needs: No Transportation Needs (4/15/2025)    Nursing - Transportation Risk Classification     Lack of Transportation: Not on file     Lack of Transportation: No   Physical Activity: Not on file   Stress: Not on file   Social Connections: Unknown (2024)    Received from [employer]     How often do you feel lonely or isolated from those around you? (Adult - for ages 18 years and over): Not on file   Intimate Partner Violence: Patient Unable To Answer (4/15/2025)    Nursing IPS     Feels Physically and Emotionally Safe: Not on file     Physically Hurt by Someone: Not on file     Humiliated or Emotionally Abused by Someone: Not on file     Physically Hurt by Someone: Patient unable to answer     Hurt or Threatened by Someone: Patient unable to answer   Housing Stability: Unknown (4/15/2025)    Nursing: Inadequate Housing Risk Classification     Has Housing: Not on file     Worried About Losing Housing: Not on file     Unable to Get Utilities: Not on file     Unable to Pay for Housing in the Last Year: No     Has Housin        Review of Systems   All other systems reviewed and are negative.        Objective:      Ht 5' 11\" (1.803 m)   Wt 95.7 kg (211 lb)   BMI 29.43 kg/m²          Physical Exam  Cardiovascular:      Pulses: Pulses are weak.           Dorsalis pedis pulses are 1+ on the right side and 1+ on the left side.        Posterior tibial pulses are 1+ on the right side and 1+ on the left side. "   Musculoskeletal:        Feet:    Feet:      Right foot:      Skin integrity: No ulcer, skin breakdown, erythema, warmth, callus or dry skin.      Left foot: amputated  Skin:     Comments: Severe hammertoe to the left second digit with callusing on the distal tip, callosity on the medial aspect of the left   First metatarsal head.  Amputation site of the left partial hallux is doing very well, no callosities, no drainage.  Nails x 9 thickened elongated fungal debris

## 2025-05-16 PROBLEM — E11.69 OSTEOMYELITIS DUE TO TYPE 2 DIABETES MELLITUS (HCC): Status: ACTIVE | Noted: 2025-05-16

## 2025-05-16 PROBLEM — M86.9 OSTEOMYELITIS DUE TO TYPE 2 DIABETES MELLITUS (HCC): Status: ACTIVE | Noted: 2025-05-16

## 2025-05-24 NOTE — PATIENT INSTRUCTIONS
"Patient Education     Carb counting for adults with diabetes   The Basics   Written by the doctors and editors at [facility]   What is carb counting? -- This is a type of meal planning that many people with diabetes use. It is a way to figure out how many carbohydrates, or \"carbs,\" you eat.  The body breaks down the food we eat into 3 main types of nutrients: carbs, proteins, and fats. Carbs are sugars and starches that come from food. The body uses carbs for energy.  Why do I need to count carbs? -- People with diabetes need to pay attention to how many carbs they eat. This is because carbs raise your blood sugar level.  Carb counting helps you:   Choose the right amount of insulin to take before meals and snacks - If you take insulin before meals, the dose depends on several things, including how many carbs you plan to eat. (It also depends on how much you plan to exercise and your blood sugar level.)   Plan your meals and snacks for the day - You can use carb counting to figure out how many carbs to eat at each meal and snack. This helps you make sure that you eat the right amount over the entire day.   Keep your blood sugar levels well managed - Spreading out the carbs you eat over a whole day can help keep your blood sugar from getting too high. If you take insulin or another diabetes medicine that can cause low blood sugar, eating about the same amount of carbs at each meal every day also helps keep your blood sugar from getting too low. Reducing the amount of carbs you eat can help you manage your diabetes better and prevent medical problems that diabetes can cause.  Your doctor, nurse, or dietitian (food expert) can help you figure out how many carbs to try to eat each day. This will depend on your eating habits, weight, activity level, and which diabetes medicines you take.  People who take insulin before meals might need to be very careful when they count the carbs in every meal and snack. This is so they " "can give themselves the right amount of insulin. If the insulin dose doesn't match the amount of carbs, their blood sugar might get too low or too high. Other people might be able to be a little more flexible as long as they get about the same amount of carbs at each meal or throughout the day.  Which foods have carbs? -- Foods with a lot of carbs include:   Grains - These include bread, pasta, rice, and cereal.   Fruits and starchy vegetables - Starchy vegetables include potatoes, corn, and squash.   Milk and other dairy products - Dairy products include cheese and yogurt.   Foods with added sugar - These include sweets and baked goods likes cookies and cakes, as well as sugary drinks like juice and soda.  It is best to get most of your carbs from fruits, vegetables, whole grains (like whole-wheat bread, whole-grain cereals, and brown rice), and low-fat milk and dairy products.  How do I count carbs? -- To count carbs in packaged foods, check the food's nutrition label (if it has one).  On the label (figure 1), check for:   \"Total Carbohydrate\" number - This tells you how many carbs are in 1 serving size of the food. If you eat 1 serving, then the number of carbs you eat is the same as the number of total carbohydrates.   \"Serving size\" - This tells you how much food is in 1 serving. If you have 2 servings, the number of carbs will be 2 times the number of carbohydrates listed.   \"Dietary Fiber\" - Fiber is a carb that is not digested, which means that it does not raise blood sugar. Foods with a lot of fiber can help manage your blood sugar. If a food has more than 5 grams (g) of fiber, you need less insulin to cover the total carbs in that food. So, if you are calculating an insulin dose, only count the carbs that are not from fiber (figure 1).  What is exchange planning? -- Exchange planning, or the \"exchange system,\" is a way for people to plan their meals without reading labels. This can be helpful since many " "foods don't come with a nutrition label.  The exchange system involves knowing how much of different foods have about 15 grams of carbs (table 1 and table 2 and table 3). Your doctor, nurse, or dietitian gives you a certain number of \"carb choices\" to eat with each meal and snack (table 4). Each \"choice\" is a portion of food that has about 15 grams of carbs. Knowing your options makes it easier to \"exchange\" 1 carb choice for another as you plan your meals and snacks. For example, 1 small apple could be exchanged for 1/3 cup of pasta.  How can I plan my meals? -- First, make sure that you know how many carbs you should be eating each day. Ask your doctor, nurse, or dietitian if you are not sure.  Here are some tips that might help:   Spread out your carbs over 4 to 6 small meals each day instead of 3 big ones.   Eat a similar number of carbs at each meal, for example, at each dinner.   Eat your meals at a similar time each day.   Plan your meals ahead of time.   Use the \"plate method.\" This is a simpler way to make sure that you get a good balance of carbs and other nutrients with each meal. It is not as exact as counting all of your carbs, but it can be helpful for people who prefer a simpler approach. If you take insulin before meals, it is generally better to adjust your insulin dose by counting how many carbs you plan to eat or using the exchange planning strategy.  For the plate method, you start with a plate about 9 inches (23 cm) across. Fill it with (figure 2):   1/2 non-starchy vegetables   1/4 protein   1/4 carbs   Follow your doctor's instructions for how and when to check your blood sugar. This can help you learn how certain foods affect your blood sugar.   Keep track of your meals and blood sugar levels. Show this to your doctor or nurse so they can adjust your treatment if needed. If you take insulin, you will also need to keep track of your exercise patterns and how much insulin you give yourself with " "each dose.   If you take insulin, make sure that you understand how to use it. This includes knowing how to adjust the dose based on your blood sugar level and what you plan to eat. Foods that have a lot of protein or fat also can affect your blood sugar level. Some people need to adjust their insulin doses when they eat these foods.   Remember that other things besides carbs can raise or lower your blood sugar level. These things can include exercise, getting sick, drinking alcohol, traveling, and stress. If you take insulin, make sure that you know how and when to adjust your dose in these situations.  If you are having trouble counting carbs or managing your blood sugar, talk to your doctor or nurse. They can help. A dietitian can also help you plan specific menus that will give you the right amount of carbs each day.  For more information, you can also get a book on counting carbs or check the American Diabetes Association website (www.diabetes.org).  All topics are updated as new evidence becomes available and our peer review process is complete.  This topic retrieved from [employer] on: Mar 27, 2024.  Topic 00590 Version 11.0  Release: 32.2.4 - C32.85  © 2024 UpToDate, Inc. and/or its affiliates. All rights reserved.  figure 1: Counting carbohydrates     To figure out the \"carb count\" in 1 serving, start with the number of grams of total carbohydrates (46 grams), then subtract the number of grams of dietary fiber (7 grams). It's also important to look at the serving size. In this example, the carb count is 39 grams. You can use this number when counting carbs for your insulin dose.  Graphic 86329 Version 8.0  table 1: Bread and grains with 15 grams of carbs*  Bread    Food  Serving size    Bagel 1/4 large bagel (1 oz)   Biscuit 1 biscuit (2.5 inches across)   Bread, reduced calorie, light 2 slices (1.5 oz)   Cornbread 1.75 inch cube (1.5 oz)   English muffin 1/2 muffin   Hot dog or hamburger bun 1/2 bun (3/4 oz) " "  Naan, chapati, or roti 1 oz   Pancake 1 pancake (4 inches across, 1/4 inch thick)   Phoebe (6 inches across) 1/2 phoebe   Tortilla, corn 1 small tortilla (6 inches across)   Tortilla, flour (white or whole wheat) 1 small tortilla (6 inches across) or 1/3 large tortilla (10 inches across)   Waffle 1 waffle (4-inch square or 4 inches across)   Cereals and grains (including pasta and rice)    Food  Serving size (cooked)    Barley, couscous, millet, pasta (white or whole wheat, all shapes and sizes), polenta, quinoa (all colors), or rice (white, brown, and other colors and types) 1/3 cup   Bran cereal (twigs, buds, or flakes), shredded wheat (plain), or sugar-coated cereal 1/2 cup   Bulgur, kasha, tabbouleh (tabouli), or wild rice 1/2 cup   Granola cereal 1/4 cup   Hot cereal (oats, oatmeal, grits) 1/2 cup   Unsweetened, ready-to-eat cereal 3/4 cup   * For bread and grains, 15 grams of carbs is considered 1 serving or \"choice\" for people who need to count carbs.  Graphic 550130 Version 1.0  table 2: Fruits with 15 grams of carbs*  Food  Serving size    Applesauce, unsweetened 1/2 cup   Banana 1 extra small banana, about 4 inches long (4 oz)   Blueberries 3/4 cup   Dried fruits (blueberries, cherries, cranberries, mixed fruit, raisins) 2 tbsp   Fruit, canned 1/2 cup   Fruit, whole, small (apple) 1 small fruit (4 oz)   Fruit, whole, medium (nectarine, orange, pear, tangerine) 1 medium fruit (6 oz)   Fruit juice, unsweetened 1/2 cup   Grapes 17 small grapes (3 oz)   Melon, diced 1 cup   Strawberries, whole 1 and 1/4 cups   When listed, weight (oz) includes skin and seeds. If you are not sure if your fruit is the right size for 1 serving, you can use a food scale to check the weight.  * For fruits, 15 grams of carbs is considered 1 serving or \"choice\" for people who need to count carbs.  Graphic 404917 Version 1.0  table 3: Starchy vegetables with 15 grams of carbs*  Food  Serving size (cooked)    Cassava, dasheen, or " "plantain 1/3 cup   Corn, green peas, mixed vegetables, or parsnips 1/2 cup   Marinara, pasta, or spaghetti sauce 1/2 cup   Mixed vegetables (with corn or peas) 1 cup   Potato, baked with skin 1/4 large (3 oz)   Potato, Maori-fried (oven-baked) 1 cup (2 oz)   Potato, mashed with milk and fat 1/2 cup   Squash, winter (acorn, butternut) 1 cup   Yam or sweet potato, plain 1/2 cup (3 and 1/2 oz)   If you are not sure if your vegetable is the right size for 1 serving, you can use a food scale to check the weight.  * For starchy vegetables, 15 grams of carbs is considered 1 serving or \"choice\" for people who need to count carbs.  Graphic 832497 Version 1.0  table 4: Sample exchange system meal plan  Time  Exchange pattern  Sample menu  Carbohydrate count (g)    8 am 3 carbohydrate group    2 starch 1 English muffin 30    1 fruit 1 1/4 c strawberries 15    1 protein group 1/4 c cottage cheese -    1 fat group 1 tsp margarine -      Total: 45    12 noon 4 carbohydrate group    2 starch 2 slices of bread 30    1 fruit 1 orange 15    1 vegetable 1 c salad -    1 milk 8 oz skim milk 12    3 protein group 3 oz chicken -    1 fat group 1 tbsp low fat hernandez -      Total: 57    3 pm 1 carbohydrate group    1 fruit or 1 starch 1 apple or 6 crackers 15      Total: 15    6 pm 4 carbohydrate group    2 starch 1 c potato 30    1 fruit 1/2 c fruit salad 15    1 vegetable 1 c salad -    1 milk 8 oz skim milk 12    6 protein group 6 oz fish -    1 fat group 2 tbsp low fat salad dressing -      Total: 57    9 pm 1 carbohydrate group    1 starch 6 crackers 15    1 protein 2 tbsp peanut butter -      Total: 15    Graphic [id number] Version 3.0  figure 2: The \"plate method\"     For the plate method, you start with a plate about 9 inches (23 cm) across. Then fill it with 1/2 non-starchy vegetables, 1/4 protein, and 1/4 carbs.  Graphic 094886 Version 2.0  Consumer Information Use and Disclaimer   Disclaimer: This generalized information is a limited " summary of diagnosis, treatment, and/or medication information. It is not meant to be comprehensive and should be used as a tool to help the user understand and/or assess potential diagnostic and treatment options. It does NOT include all information about conditions, treatments, medications, side effects, or risks that may apply to a specific patient. It is not intended to be medical advice or a substitute for the medical advice, diagnosis, or treatment of a health care provider based on the health care provider's examination and assessment of a patient's specific and unique circumstances. Patients must speak with a health care provider for complete information about their health, medical questions, and treatment options, including any risks or benefits regarding use of medications. This information does not endorse any treatments or medications as safe, effective, or approved for treating a specific patient. UpToDate, Inc. and its affiliates disclaim any warranty or liability relating to this information or the use thereof.The use of this information is governed by the Terms of Use, available at https://www.[employer].com/en/know/clinical-effectiveness-terms. 2024© UpToDate, Inc. and its affiliates and/or licensors. All rights reserved.  Copyright   © 2024 UpToDate, Inc. and/or its affiliates. All rights reserved.    Medicare Preventive Visit Patient Instructions  Thank you for completing your Welcome to Medicare Visit or Medicare Annual Wellness Visit today. Your next wellness visit will be due in one year (6/4/2026).  The screening/preventive services that you may require over the next 5-10 years are detailed below. Some tests may not apply to you based off risk factors and/or age. Screening tests ordered at today's visit but not completed yet may show as past due. Also, please note that scanned in results may not display below.  Preventive Screenings:  Service Recommendations Previous Testing/Comments    Colorectal Cancer Screening  Colonoscopy    Fecal Occult Blood Test (FOBT)/Fecal Immunochemical Test (FIT)  Fecal DNA/Cologuard Test  Flexible Sigmoidoscopy Age: 45-75 years old   Colonoscopy: every 10 years (May be performed more frequently if at higher risk)  OR  FOBT/FIT: every 1 year  OR  Cologuard: every 3 years  OR  Sigmoidoscopy: every 5 years  Screening may be recommended earlier than age 45 if at higher risk for colorectal cancer. Also, an individualized decision between you and your healthcare provider will decide whether screening between the ages of 76-85 would be appropriate. Colonoscopy: 11/20/2019  FOBT/FIT: Not on file  Cologuard: Not on file  Sigmoidoscopy: Not on file          Prostate Cancer Screening Individualized decision between patient and health care provider in men between ages of 55-69   Medicare will cover every 12 months beginning on the day after your 50th birthday PSA: 5.829 ng/mL     Screening Not Indicated     Hepatitis C Screening Once for adults born between 1945 and 1965  More frequently in patients at high risk for Hepatitis C Hep C Antibody: 06/04/2018    Screening Current   Diabetes Screening 1-2 times per year if you're at risk for diabetes or have pre-diabetes Fasting glucose: 164 mg/dL (4/30/2025)  A1C: 6.1 (6/3/2025)  Screening Not Indicated  History Diabetes   Cholesterol Screening Once every 5 years if you don't have a lipid disorder. May order more often based on risk factors. Lipid panel: 01/28/2025  Screening Not Indicated  History Lipid Disorder      Other Preventive Screenings Covered by Medicare:  Abdominal Aortic Aneurysm (AAA) Screening: covered once if your at risk. You're considered to be at risk if you have a family history of AAA or a male between the age of 65-75 who smoking at least 100 cigarettes in your lifetime.  Lung Cancer Screening: covers low dose CT scan once per year if you meet all of the following conditions: (1) Age 55-77; (2) No signs or  symptoms of lung cancer; (3) Current smoker or have quit smoking within the last 15 years; (4) You have a tobacco smoking history of at least 20 pack years (packs per day x number of years you smoked); (5) You get a written order from a healthcare provider.  Glaucoma Screening: covered annually if you're considered high risk: (1) You have diabetes OR (2) Family history of glaucoma OR (3)  aged 50 and older OR (4)  American aged 65 and older  Osteoporosis Screening: covered every 2 years if you meet one of the following conditions: (1) Have a vertebral abnormality; (2) On glucocorticoid therapy for more than 3 months; (3) Have primary hyperparathyroidism; (4) On osteoporosis medications and need to assess response to drug therapy.  HIV Screening: covered annually if you're between the age of 15-65. Also covered annually if you are younger than 15 and older than 65 with risk factors for HIV infection. For pregnant patients, it is covered up to 3 times per pregnancy.    Immunizations:  Immunization Recommendations   Influenza Vaccine Annual influenza vaccination during flu season is recommended for all persons aged >= 6 months who do not have contraindications   Pneumococcal Vaccine   * Pneumococcal conjugate vaccine = PCV13 (Prevnar 13), PCV15 (Vaxneuvance), PCV20 (Prevnar 20)  * Pneumococcal polysaccharide vaccine = PPSV23 (Pneumovax) Adults 19-63 yo with certain risk factors or if 65+ yo  If never received any pneumonia vaccine: recommend Prevnar 20 (PCV20)  Give PCV20 if previously received 1 dose of PCV13 or PPSV23   Hepatitis B Vaccine 3 dose series if at intermediate or high risk (ex: diabetes, end stage renal disease, liver disease)   Respiratory syncytial virus (RSV) Vaccine - COVERED BY MEDICARE PART D  * RSVPreF3 (Arexvy) CDC recommends that adults 60 years of age and older may receive a single dose of RSV vaccine using shared clinical decision-making (SCDM)   Tetanus (Td) Vaccine -  COST NOT COVERED BY MEDICARE PART B Following completion of primary series, a booster dose should be given every 10 years to maintain immunity against tetanus. Td may also be given as tetanus wound prophylaxis.   Tdap Vaccine - COST NOT COVERED BY MEDICARE PART B Recommended at least once for all adults. For pregnant patients, recommended with each pregnancy.   Shingles Vaccine (Shingrix) - COST NOT COVERED BY MEDICARE PART B  2 shot series recommended in those 19 years and older who have or will have weakened immune systems or those 50 years and older     Health Maintenance Due:      Topic Date Due   • Hepatitis C Screening  Completed   • Colorectal Cancer Screening  Discontinued     Immunizations Due:      Topic Date Due   • COVID-19 Vaccine (5 - 2024-25 season) 09/01/2024     Advance Directives   What are advance directives?  Advance directives are legal documents that state your wishes and plans for medical care. These plans are made ahead of time in case you lose your ability to make decisions for yourself. Advance directives can apply to any medical decision, such as the treatments you want, and if you want to donate organs.   What are the types of advance directives?  There are many types of advance directives, and each state has rules about how to use them. You may choose a combination of any of the following:  Living will:  This is a written record of the treatment you want. You can also choose which treatments you do not want, which to limit, and which to stop at a certain time. This includes surgery, medicine, IV fluid, and tube feedings.   Durable power of  for healthcare (DPAHC):  This is a written record that states who you want to make healthcare choices for you when you are unable to make them for yourself. This person, called a proxy, is usually a family member or a friend. You may choose more than 1 proxy.  Do not resuscitate (DNR) order:  A DNR order is used in case your heart stops  beating or you stop breathing. It is a request not to have certain forms of treatment, such as CPR. A DNR order may be included in other types of advance directives.  Medical directive:  This covers the care that you want if you are in a coma, near death, or unable to make decisions for yourself. You can list the treatments you want for each condition. Treatment may include pain medicine, surgery, blood transfusions, dialysis, IV or tube feedings, and a ventilator (breathing machine).  Values history:  This document has questions about your views, beliefs, and how you feel and think about life. This information can help others choose the care that you would choose.  Why are advance directives important?  An advance directive helps you control your care. Although spoken wishes may be used, it is better to have your wishes written down. Spoken wishes can be misunderstood, or not followed. Treatments may be given even if you do not want them. An advance directive may make it easier for your family to make difficult choices about your care.   Weight Management   Why it is important to manage your weight:  Being overweight increases your risk of health conditions such as heart disease, high blood pressure, type 2 diabetes, and certain types of cancer. It can also increase your risk for osteoarthritis, sleep apnea, and other respiratory problems. Aim for a slow, steady weight loss. Even a small amount of weight loss can lower your risk of health problems.  How to lose weight safely:  A safe and healthy way to lose weight is to eat fewer calories and get regular exercise. You can lose up about 1 pound a week by decreasing the number of calories you eat by 500 calories each day.   Healthy meal plan for weight management:  A healthy meal plan includes a variety of foods, contains fewer calories, and helps you stay healthy. A healthy meal plan includes the following:  Eat whole-grain foods more often.  A healthy meal plan  should contain fiber. Fiber is the part of grains, fruits, and vegetables that is not broken down by your body. Whole-grain foods are healthy and provide extra fiber in your diet. Some examples of whole-grain foods are whole-wheat breads and pastas, oatmeal, brown rice, and bulgur.  Eat a variety of vegetables every day.  Include dark, leafy greens such as spinach, kale, haroon greens, and mustard greens. Eat yellow and orange vegetables such as carrots, sweet potatoes, and winter squash.   Eat a variety of fruits every day.  Choose fresh or canned fruit (canned in its own juice or light syrup) instead of juice. Fruit juice has very little or no fiber.  Eat low-fat dairy foods.  Drink fat-free (skim) milk or 1% milk. Eat fat-free yogurt and low-fat cottage cheese. Try low-fat cheeses such as mozzarella and other reduced-fat cheeses.  Choose meat and other protein foods that are low in fat.  Choose beans or other legumes such as split peas or lentils. Choose fish, skinless poultry (chicken or turkey), or lean cuts of red meat (beef or pork). Before you cook meat or poultry, cut off any visible fat.   Use less fat and oil.  Try baking foods instead of frying them. Add less fat, such as margarine, sour cream, regular salad dressing and mayonnaise to foods. Eat fewer high-fat foods. Some examples of high-fat foods include french fries, doughnuts, ice cream, and cakes.  Eat fewer sweets.  Limit foods and drinks that are high in sugar. This includes candy, cookies, regular soda, and sweetened drinks.  Exercise:  Exercise at least 30 minutes per day on most days of the week. Some examples of exercise include walking, biking, dancing, and swimming. You can also fit in more physical activity by taking the stairs instead of the elevator or parking farther away from stores. Ask your healthcare provider about the best exercise plan for you.    © Copyright [employer] 2018 Information is for End User's use only and may not  be sold, redistributed or otherwise used for commercial purposes. All illustrations and images included in CareNotes® are the copyrighted property of [email or web address] or [employer]

## 2025-05-27 DIAGNOSIS — I10 HYPERTENSION, UNSPECIFIED TYPE: ICD-10-CM

## 2025-05-27 RX ORDER — METOPROLOL SUCCINATE 100 MG/1
100 TABLET, EXTENDED RELEASE ORAL DAILY
Qty: 90 TABLET | Refills: 1 | Status: SHIPPED | OUTPATIENT
Start: 2025-05-27

## 2025-05-27 NOTE — TELEPHONE ENCOUNTER
Needs to  Thursday   Reason for call:   [x] Refill   [] Prior Auth  [] Other:     Office:   [x] PCP/Provider - Wilfrid Mills   [] Specialty/Provider -     Medication: Metoprolol Succinate XL     Dose/Frequency: 100 mg Daily     Quantity: 90    Pharmacy: [facility] [place] [facility]   Does the patient have enough for 3 days?   [x] Yes   [x] No - Send as HP to POD    Mail Away Pharmacy   Does the patient have enough for 10 days?   [] Yes   [] No - Send as HP to POD

## 2025-06-03 ENCOUNTER — OFFICE VISIT (OUTPATIENT)
Dept: INTERNAL MEDICINE CLINIC | Facility: CLINIC | Age: 80
End: 2025-06-03
Payer: MEDICARE

## 2025-06-03 VITALS
BODY MASS INDEX: 29.68 KG/M2 | SYSTOLIC BLOOD PRESSURE: 128 MMHG | WEIGHT: 212 LBS | HEIGHT: 71 IN | OXYGEN SATURATION: 98 % | HEART RATE: 69 BPM | DIASTOLIC BLOOD PRESSURE: 68 MMHG

## 2025-06-03 DIAGNOSIS — E78.2 MIXED HYPERLIPIDEMIA: ICD-10-CM

## 2025-06-03 DIAGNOSIS — D64.9 ANEMIA, UNSPECIFIED TYPE: ICD-10-CM

## 2025-06-03 DIAGNOSIS — D68.2 HEREDITARY DEFICIENCY OF OTHER CLOTTING FACTORS (HCC): ICD-10-CM

## 2025-06-03 DIAGNOSIS — E11.8 DIABETIC FOOT (HCC): ICD-10-CM

## 2025-06-03 DIAGNOSIS — N18.32 TYPE 2 DIABETES MELLITUS WITH STAGE 3B CHRONIC KIDNEY DISEASE, WITHOUT LONG-TERM CURRENT USE OF INSULIN (HCC): Primary | ICD-10-CM

## 2025-06-03 DIAGNOSIS — M19.91 PRIMARY OSTEOARTHRITIS, UNSPECIFIED SITE: ICD-10-CM

## 2025-06-03 DIAGNOSIS — K21.9 GERD WITHOUT ESOPHAGITIS: ICD-10-CM

## 2025-06-03 DIAGNOSIS — I48.91 ATRIAL FIBRILLATION, UNSPECIFIED TYPE (HCC): ICD-10-CM

## 2025-06-03 DIAGNOSIS — F41.1 GENERALIZED ANXIETY DISORDER: ICD-10-CM

## 2025-06-03 DIAGNOSIS — G20.A1 PARKINSON'S DISEASE WITHOUT DYSKINESIA OR FLUCTUATING MANIFESTATIONS (HCC): ICD-10-CM

## 2025-06-03 DIAGNOSIS — G30.9 ALZHEIMER'S DISEASE, UNSPECIFIED (CODE) (HCC): ICD-10-CM

## 2025-06-03 DIAGNOSIS — E83.42 HYPOMAGNESEMIA: ICD-10-CM

## 2025-06-03 DIAGNOSIS — N18.31 STAGE 3A CHRONIC KIDNEY DISEASE (HCC): ICD-10-CM

## 2025-06-03 DIAGNOSIS — I10 PRIMARY HYPERTENSION: ICD-10-CM

## 2025-06-03 DIAGNOSIS — E11.22 TYPE 2 DIABETES MELLITUS WITH STAGE 3B CHRONIC KIDNEY DISEASE, WITHOUT LONG-TERM CURRENT USE OF INSULIN (HCC): Primary | ICD-10-CM

## 2025-06-03 DIAGNOSIS — Z00.00 MEDICARE ANNUAL WELLNESS VISIT, SUBSEQUENT: ICD-10-CM

## 2025-06-03 DIAGNOSIS — Z79.01 CHRONIC ANTICOAGULATION: ICD-10-CM

## 2025-06-03 PROBLEM — N13.2 HYDRONEPHROSIS WITH OBSTRUCTING CALCULUS: Chronic | Status: RESOLVED | Noted: 2019-11-06 | Resolved: 2025-06-03

## 2025-06-03 PROBLEM — M32.13: Status: RESOLVED | Noted: 2024-11-18 | Resolved: 2025-06-03

## 2025-06-03 LAB — SL AMB POCT HEMOGLOBIN AIC: 6.1 (ref ?–6.5)

## 2025-06-03 PROCEDURE — G0439 PPPS, SUBSEQ VISIT: HCPCS | Performed by: INTERNAL MEDICINE

## 2025-06-03 PROCEDURE — G2211 COMPLEX E/M VISIT ADD ON: HCPCS | Performed by: INTERNAL MEDICINE

## 2025-06-03 PROCEDURE — 83036 HEMOGLOBIN GLYCOSYLATED A1C: CPT | Performed by: INTERNAL MEDICINE

## 2025-06-03 PROCEDURE — 99214 OFFICE O/P EST MOD 30 MIN: CPT | Performed by: INTERNAL MEDICINE

## 2025-06-03 NOTE — ASSESSMENT & PLAN NOTE
Lab Results   Component Value Date    HGBA1C 6.1 06/03/2025       Orders:    POCT hemoglobin A1c    LDL cholesterol, direct; Future    Triglycerides; Future

## 2025-06-03 NOTE — PROGRESS NOTES
Name: Kaleb Ching III      : 1945      MRN: [id number]  Encounter Provider: Wilfrid Mills DO  Encounter Date: 6/3/2025   Encounter department: [facility]  :  Assessment & Plan  Diabetic foot (HCC)    Lab Results   Component Value Date    HGBA1C 6.1 2025            Type 2 diabetes mellitus with stage 3b chronic kidney disease, without long-term current use of insulin (HCC)    Lab Results   Component Value Date    HGBA1C 6.1 2025       Orders:    POCT hemoglobin A1c    LDL cholesterol, direct; Future    Triglycerides; Future    Atrial fibrillation, unspecified type (HCC)    Orders:    TSH, 3rd generation; Future    Primary hypertension         GERD without esophagitis         Alzheimer's disease, unspecified (CODE) (HCC)         Parkinson's disease without dyskinesia or fluctuating manifestations (HCC)         Primary osteoarthritis, unspecified site         Stage 3a chronic kidney disease (HCC)  Lab Results   Component Value Date    EGFR 47 2025    EGFR 47 2025    EGFR 46 2025    CREATININE 1.39 (H) 2025    CREATININE 1.40 (H) 2025    CREATININE 1.43 (H) 2025       Orders:    POCT hemoglobin A1c    Magnesium; Future    LDL cholesterol, direct; Future    Triglycerides; Future    Generalized anxiety disorder    Orders:    TSH, 3rd generation; Future    Anemia, unspecified type             Mixed hyperlipidemia    Orders:    Hepatic function panel; Future    LDL cholesterol, direct; Future    Triglycerides; Future    Chronic anticoagulation         Medicare annual wellness visit, subsequent         Hypomagnesemia    Orders:    Magnesium; Future    Hereditary deficiency of other clotting factors (HCC)            Preventive health issues were discussed with patient, and age appropriate screening tests were ordered as noted in patient's After Visit Summary. Personalized health advice and appropriate referrals for health education or  preventive services given if needed, as noted in patient's After Visit Summary.    History of Present Illness     HPI   Patient Care Team:  Wilfrid Mills DO as PCP - General (Internal Medicine)    Review of Systems  Medical History Reviewed by provider this encounter:  Tobacco  Allergies  Meds  Problems  Med Hx  Surg Hx  Fam Hx       Annual Wellness Visit Questionnaire   Kaleb is here for his Subsequent Wellness visit. Last Medicare Wellness visit information reviewed, patient interviewed and updates made to the record today.      Health Risk Assessment:   Patient rates overall health as very good. Patient feels that their physical health rating is same. Patient is satisfied with their life. Eyesight was rated as same. Hearing was rated as same. Patient feels that their emotional and mental health rating is same. Patients states they are never, rarely angry. Patient states they are never, rarely unusually tired/fatigued. Pain experienced in the last 7 days has been a lot. Patient's pain rating has been 5/10. Patient states that he has experienced no weight loss or gain in last 6 months.     Depression Screening:   PHQ-2 Score: 0      Fall Risk Screening:   In the past year, patient has experienced: no history of falling in past year      Home Safety:  Patient does not have trouble with stairs inside or outside of their home. Patient has working smoke alarms and has working carbon monoxide detector. Home safety hazards include: none.     Nutrition:   Current diet is Regular.     Medications:   Patient is not currently taking any over-the-counter supplements. Patient is able to manage medications.     Activities of Daily Living (ADLs)/Instrumental Activities of Daily Living (IADLs):   Walk and transfer into and out of bed and chair?: Yes  Dress and groom yourself?: Yes    Bathe or shower yourself?: Yes    Feed yourself? Yes  Do your laundry/housekeeping?: Yes  Manage your money, pay your bills and track your  expenses?: Yes  Make your own meals?: Yes    Do your own shopping?: Yes    Previous Hospitalizations:   Any hospitalizations or ED visits within the last 12 months?: Yes    How many hospitalizations have you had in the last year?: 1-2    Advance Care Planning:   Living will: Yes    Durable POA for healthcare: Yes    Advanced directive: Yes    Advanced directive counseling given: No    Five wishes given: No    Patient declined ACP directive: No    End of Life Decisions reviewed with patient: Yes    Provider agrees with end of life decisions: Yes      Cognitive Screening:   Provider or family/friend/caregiver concerned regarding cognition?: Yes    Cognition Comments: SDAT/Parkinson's dementia. Has POA    Preventive Screenings      Cardiovascular Screening:    General: Screening Not Indicated and History Lipid Disorder    Due for: Lipid Panel      Diabetes Screening:     General: Screening Not Indicated and History Diabetes    Due for: Blood Glucose      Colorectal Cancer Screening:     General: Screening Not Indicated      Prostate Cancer Screening:    General: Screening Not Indicated      Osteoporosis Screening:    General: Screening Not Indicated      Abdominal Aortic Aneurysm (AAA) Screening:        General: Screening Not Indicated      Lung Cancer Screening:     General: Screening Not Indicated      Hepatitis C Screening:    General: Screening Current    Immunizations:  - Immunizations due: Zoster (Shingrix)    Screening, Brief Intervention, and Referral to Treatment (SBIRT)     Screening    Typical number of drinks in a week: 0    Single Item Drug Screening:  How often have you used an illegal drug (including marijuana) or a prescription medication for non-medical reasons in the past year? never    Single Item Drug Screen Score: 0  Interpretation: Negative screen for possible drug use disorder    Other Counseling Topics:   Car/seat belt/driving safety, sunscreen and calcium and vitamin D intake and regular  "weightbearing exercise.     Social Drivers of Health     Financial Resource Strain: Low Risk  (3/7/2023)    Overall Financial Resource Strain (CARDIA)     Difficulty of Paying Living Expenses: Not hard at all   Food Insecurity: No Food Insecurity (6/3/2025)    Nursing - Inadequate Food Risk Classification     Worried About Running Out of Food in the Last Year: Never true     Ran Out of Food in the Last Year: Never true     Ran Out of Food in the Last Year: Never true   Transportation Needs: No Transportation Needs (6/3/2025)    PRAPARE - Transportation     Lack of Transportation (Medical): No     Lack of Transportation (Non-Medical): No   Housing Stability: Low Risk  (6/3/2025)    Housing Stability Vital Sign     Unable to Pay for Housing in the Last Year: No     Number of Times Moved in the Last Year: 0     Homeless in the Last Year: No   Utilities: Not At Risk (6/3/2025)    [facility] Utilities     Threatened with loss of utilities: No     No results found.    Objective   /68   Pulse 69   Ht 5' 11\" (1.803 m)   Wt 96.2 kg (212 lb)   SpO2 98%   BMI 29.57 kg/m²     Physical Exam  Administrative Statements   I have spent a total time of 25 minutes in caring for this patient on the day of the visit/encounter including Diagnostic results, Prognosis, Patient and family education, Importance of tx compliance, Impressions, Documenting in the medical record, Reviewing/placing orders in the medical record (including tests, medications, and/or procedures), and Obtaining or reviewing history  .    A/P: Doing well and no falls reported. Denies depression and feels safe at home. Diverse diet. No problems operating a MV and uses seat belts. Has a living will and POA. No DME or referrals needed today. RTC one year for medicare wellness.     "

## 2025-06-03 NOTE — ASSESSMENT & PLAN NOTE
Lab Results   Component Value Date    EGFR 47 04/30/2025    EGFR 47 04/19/2025    EGFR 46 04/17/2025    CREATININE 1.39 (H) 04/30/2025    CREATININE 1.40 (H) 04/19/2025    CREATININE 1.43 (H) 04/17/2025       Orders:    POCT hemoglobin A1c    Magnesium; Future    LDL cholesterol, direct; Future    Triglycerides; Future

## 2025-06-03 NOTE — ASSESSMENT & PLAN NOTE
Orders:    Hepatic function panel; Future    LDL cholesterol, direct; Future    Triglycerides; Future

## 2025-06-03 NOTE — PROGRESS NOTES
Name: Kaleb Ching III      : 1945      MRN: [id number]  Encounter Provider: Wilfrid Mills DO  Encounter Date: 6/3/2025   Encounter department: [facility]  :  Assessment & Plan  Diabetic foot (HCC)    Lab Results   Component Value Date    HGBA1C 6.1 2025            Type 2 diabetes mellitus with stage 3b chronic kidney disease, without long-term current use of insulin (HCC)    Lab Results   Component Value Date    HGBA1C 6.1 2025       Orders:    POCT hemoglobin A1c    LDL cholesterol, direct; Future    Triglycerides; Future    Atrial fibrillation, unspecified type (HCC)    Orders:    TSH, 3rd generation; Future    Primary hypertension         GERD without esophagitis         Alzheimer's disease, unspecified (CODE) (HCC)         Parkinson's disease without dyskinesia or fluctuating manifestations (HCC)         Primary osteoarthritis, unspecified site         Stage 3a chronic kidney disease (HCC)  Lab Results   Component Value Date    EGFR 47 2025    EGFR 47 2025    EGFR 46 2025    CREATININE 1.39 (H) 2025    CREATININE 1.40 (H) 2025    CREATININE 1.43 (H) 2025       Orders:    POCT hemoglobin A1c    Magnesium; Future    LDL cholesterol, direct; Future    Triglycerides; Future    Generalized anxiety disorder    Orders:    TSH, 3rd generation; Future    Anemia, unspecified type    Mixed hyperlipidemia    Orders:    Hepatic function panel; Future    LDL cholesterol, direct; Future    Triglycerides; Future    Chronic anticoagulation         Medicare annual wellness visit, subsequent         Hypomagnesemia    Orders:    Magnesium; Future    Hereditary deficiency of other clotting factors (HCC)         A/P: Doing ok and will check labs. In office HgA1c was good at 6.1. Order eye exam. Continue current treatment and RTC three months for routine..        History of Present Illness   WM RTC with his wife for f/u DM, Afib, etc. Doing ok and no  "new issues. Continues to recover from recent toe amp. Remains as active as possible and no falls. Sugars less than 140 and no low sugar events. Denies CP, SOB, edema, palpitations, orthopnea or PND. Chronic pain and SLE are manageable. Remains on chronic anticoagulation and no bleeding events. No reflux. No stroke like events. Parkinson's and dementia now worse and no safety concerns per wife. Due for labs and eye exam.       Review of Systems   Constitutional:  Negative for activity change, chills, diaphoresis, fatigue and fever.   HENT: Negative.     Eyes:  Negative for visual disturbance.   Respiratory:  Negative for cough, chest tightness, shortness of breath and wheezing.    Cardiovascular:  Negative for chest pain, palpitations and leg swelling.   Gastrointestinal:  Negative for abdominal pain, constipation, diarrhea, nausea and vomiting.   Endocrine: Negative for cold intolerance and heat intolerance.   Genitourinary:  Negative for difficulty urinating, dysuria and frequency.   Musculoskeletal:  Negative for arthralgias, gait problem and myalgias.   Neurological:  Negative for dizziness, seizures, syncope, weakness, light-headedness and headaches.   Psychiatric/Behavioral:  Positive for confusion. Negative for agitation, behavioral problems and dysphoric mood. The patient is not nervous/anxious.        Objective   /68   Pulse 69   Ht 5' 11\" (1.803 m)   Wt 96.2 kg (212 lb)   SpO2 98%   BMI 29.57 kg/m²      Physical Exam  Vitals and nursing note reviewed.   Constitutional:       General: He is not in acute distress.     Appearance: Normal appearance. He is not ill-appearing.   HENT:      Head: Normocephalic and atraumatic.      Mouth/Throat:      Mouth: Mucous membranes are moist.     Eyes:      Extraocular Movements: Extraocular movements intact.      Conjunctiva/sclera: Conjunctivae normal.      Pupils: Pupils are equal, round, and reactive to light.     Neck:      Vascular: No carotid bruit. "     Cardiovascular:      Rate and Rhythm: Normal rate and regular rhythm.      Pulses: no weak pulses.           Dorsalis pedis pulses are 1+ on the right side and 1+ on the left side.        Posterior tibial pulses are 1+ on the right side and 1+ on the left side.      Heart sounds: Normal heart sounds. No murmur heard.  Pulmonary:      Effort: Pulmonary effort is normal. No respiratory distress.      Breath sounds: Normal breath sounds. No wheezing, rhonchi or rales.   Abdominal:      General: There is no distension.      Palpations: Abdomen is soft.      Tenderness: There is no abdominal tenderness.     Musculoskeletal:      Cervical back: Neck supple.      Right lower leg: No edema.      Left lower leg: No edema.   Feet:      Right foot:      Skin integrity: No ulcer, skin breakdown, erythema, warmth, callus or dry skin.      Left foot:      Skin integrity: No ulcer, skin breakdown, erythema, warmth, callus or dry skin.     Neurological:      General: No focal deficit present.      Mental Status: He is alert and oriented to person, place, and time. Mental status is at baseline.     Psychiatric:         Mood and Affect: Mood normal.         Behavior: Behavior normal.         Thought Content: Thought content normal.         Judgment: Judgment normal.       Administrative Statements   I have spent a total time of 25 minutes in caring for this patient on the day of the visit/encounter including Diagnostic results, Prognosis, Documenting in the medical record, Reviewing/placing orders in the medical record (including tests, medications, and/or procedures), and Obtaining or reviewing history  .    Patient's shoes and socks removed.    Right Foot/Ankle   Right Foot Inspection  Skin Exam: skin normal and skin intact. No dry skin, no warmth, no callus, no erythema, no maceration, no abnormal color, no pre-ulcer, no ulcer and no callus.     Toe Exam: ROM and strength within normal limits. No swelling, no tenderness,  erythema and  no right toe deformity    Sensory   Monofilament testing: intact    Vascular  Capillary refills: < 3 seconds  The right DP pulse is 1+. The right PT pulse is 1+.     Left Foot/Ankle  Left Foot Inspection  Skin Exam: skin normal and skin intact. No dry skin, no warmth, no erythema, no maceration, normal color, no pre-ulcer, no ulcer and no callus.     Toe Exam: ROM and strength within normal limits and left toe deformity (great toe amp). No swelling, no tenderness and no erythema.     Sensory   Monofilament testing: intact    Vascular  Capillary refills: < 3 seconds  The left DP pulse is 1+. The left PT pulse is 1+.     Assign Risk Category  Deformity present  No loss of protective sensation  No weak pulses  Risk: 0

## 2025-07-01 DIAGNOSIS — G30.9 ALZHEIMER'S DISEASE, UNSPECIFIED (CODE) (HCC): ICD-10-CM

## 2025-07-01 NOTE — TELEPHONE ENCOUNTER
Reason for call:   [x] Refill   [] Prior Auth  [x] Other: Not a Duplicate - Pharmacy Change    Office:   [x] PCP/Provider - [facility] -    Wilfrid Mills DO  [] Specialty/Provider -     Medication:   memantine (NAMENDA) 10 mg tablet    Dose/Frequency:  take 1 tablet by mouth twice a day,     Quantity: 180 tablet    Pharmacy: [facility] [address] [place] [address] DE[address] [phone]    Local Pharmacy   Does the patient have enough for 3 days?   [] Yes   [x] No - Send as HP to POD    Mail Away Pharmacy   Does the patient have enough for 10 days?   [] Yes   [] No - Send as HP to POD

## 2025-07-02 RX ORDER — MEMANTINE HYDROCHLORIDE 10 MG/1
10 TABLET ORAL 2 TIMES DAILY
Qty: 180 TABLET | Refills: 1 | Status: SHIPPED | OUTPATIENT
Start: 2025-07-02

## 2025-07-02 NOTE — TELEPHONE ENCOUNTER
Reason for call:   [x] Refill   [] Prior Auth  [x] Other: Patient's wife contacted Rx refill line checking on the status of memantine (NAMENDA) 10 mg tablet. Pt's wife stated that patient does not have enough to last for 3 days and will be leaving for vacation on Friday 7/4/25. If prescription can be processed ASAP.    Office:   [x] PCP/Provider - [facility] - Wilfrid Mills,        [] Specialty/Provider -     Medication: memantine (NAMENDA) 10 mg tablet  take 1 tablet by mouth twice a day     Quantity: 180 tablet (90 day supply)    Pharmacy: [facility] [address] [place] [address] [address]     Local Pharmacy   Does the patient have enough for 3 days?   [] Yes   [x] No - Send as HP to POD    Mail Away Pharmacy   Does the patient have enough for 10 days?   [] Yes   [] No - Send as HP to POD

## 2025-07-03 ENCOUNTER — APPOINTMENT (OUTPATIENT)
Dept: LAB | Facility: CLINIC | Age: 80
End: 2025-07-03
Attending: INTERNAL MEDICINE
Payer: MEDICARE

## 2025-07-03 ENCOUNTER — PROCEDURE VISIT (OUTPATIENT)
Dept: PODIATRY | Facility: CLINIC | Age: 80
End: 2025-07-03
Payer: MEDICARE

## 2025-07-03 VITALS — WEIGHT: 212 LBS | BODY MASS INDEX: 29.68 KG/M2 | HEIGHT: 71 IN

## 2025-07-03 DIAGNOSIS — R97.20 ELEVATED PROSTATE SPECIFIC ANTIGEN (PSA): ICD-10-CM

## 2025-07-03 DIAGNOSIS — E11.22 TYPE 2 DIABETES MELLITUS WITH STAGE 3B CHRONIC KIDNEY DISEASE, WITHOUT LONG-TERM CURRENT USE OF INSULIN (HCC): ICD-10-CM

## 2025-07-03 DIAGNOSIS — N18.32 TYPE 2 DIABETES MELLITUS WITH STAGE 3B CHRONIC KIDNEY DISEASE, WITHOUT LONG-TERM CURRENT USE OF INSULIN (HCC): ICD-10-CM

## 2025-07-03 DIAGNOSIS — E78.2 MIXED HYPERLIPIDEMIA: ICD-10-CM

## 2025-07-03 DIAGNOSIS — E11.8 DIABETIC FOOT (HCC): ICD-10-CM

## 2025-07-03 DIAGNOSIS — I48.91 ATRIAL FIBRILLATION, UNSPECIFIED TYPE (HCC): ICD-10-CM

## 2025-07-03 DIAGNOSIS — N18.31 STAGE 3A CHRONIC KIDNEY DISEASE (HCC): ICD-10-CM

## 2025-07-03 DIAGNOSIS — F41.1 GENERALIZED ANXIETY DISORDER: ICD-10-CM

## 2025-07-03 DIAGNOSIS — L84 CORNS: ICD-10-CM

## 2025-07-03 DIAGNOSIS — E83.42 HYPOMAGNESEMIA: ICD-10-CM

## 2025-07-03 DIAGNOSIS — B35.1 ONYCHOMYCOSIS: Primary | ICD-10-CM

## 2025-07-03 LAB
ALBUMIN SERPL BCG-MCNC: 4.2 G/DL (ref 3.5–5)
ALP SERPL-CCNC: 69 U/L (ref 34–104)
ALT SERPL W P-5'-P-CCNC: 3 U/L (ref 7–52)
AST SERPL W P-5'-P-CCNC: 10 U/L (ref 13–39)
BILIRUB DIRECT SERPL-MCNC: 0.17 MG/DL (ref 0–0.2)
BILIRUB SERPL-MCNC: 0.69 MG/DL (ref 0.2–1)
LDLC SERPL DIRECT ASSAY-MCNC: 38 MG/DL (ref 0–100)
MAGNESIUM SERPL-MCNC: 1.9 MG/DL (ref 1.9–2.7)
PROT SERPL-MCNC: 7.3 G/DL (ref 6.4–8.4)
PSA SERPL-MCNC: 7.13 NG/ML (ref 0–4)
TRIGL SERPL-MCNC: 91 MG/DL (ref ?–150)
TSH SERPL DL<=0.05 MIU/L-ACNC: 4.3 UIU/ML (ref 0.45–4.5)

## 2025-07-03 PROCEDURE — 84443 ASSAY THYROID STIM HORMONE: CPT

## 2025-07-03 PROCEDURE — 84478 ASSAY OF TRIGLYCERIDES: CPT

## 2025-07-03 PROCEDURE — 83721 ASSAY OF BLOOD LIPOPROTEIN: CPT

## 2025-07-03 PROCEDURE — 84153 ASSAY OF PSA TOTAL: CPT

## 2025-07-03 PROCEDURE — 83735 ASSAY OF MAGNESIUM: CPT

## 2025-07-03 PROCEDURE — RECHECK: Performed by: PODIATRIST

## 2025-07-03 PROCEDURE — 11721 DEBRIDE NAIL 6 OR MORE: CPT | Performed by: PODIATRIST

## 2025-07-03 PROCEDURE — 11055 PARING/CUTG B9 HYPRKER LES 1: CPT | Performed by: PODIATRIST

## 2025-07-03 PROCEDURE — 80076 HEPATIC FUNCTION PANEL: CPT

## 2025-07-03 PROCEDURE — 36415 COLL VENOUS BLD VENIPUNCTURE: CPT

## 2025-07-03 NOTE — PROGRESS NOTES
Kaleb Ching [facility]  1945  AT RISK FOOT CARE    1. Onychomycosis        2. Corns        3. Diabetic foot (HCC)            Patient presents for at-risk foot care.  Patient has no acute concerns today.  Patient has significant lower extremity risk due to previous amputation.    On exam patient has thickened, hypertrophic, discolored, brittle toenails with subungual debris and tenderness x9   Callus: Left 1st Mtpj  Amputation: L partial amp    Today's treatment includes:  Debridement of toenails. Using nail nipper, xiomara, and curette, nails were sharply debrided, reduced in thickness and length. Devitalized nail tissue and fungal debris excised and removed. Patient tolerated well.        Discussed proper shoe gear, daily inspections of feet, and general foot health with patient. Patient has Q7  findings and is recommended for at risk foot care every 9-10 weeks.      Procedure: All mycotic toenails were reduced and debrided in length, width, and girth using a nail nipper and dremel.  All hyperkeratotic skin lesion(s) were sharply pared with a scalpel with no bleeding or evidence of ulceration.  Patient tolerated procedure(s) well without complications.

## 2025-07-08 DIAGNOSIS — E11.8 TYPE 2 DIABETES MELLITUS WITH COMPLICATION (HCC): ICD-10-CM

## 2025-07-08 NOTE — TELEPHONE ENCOUNTER
Reason for call:   [x] Refill   [] Prior Auth  [] Other:     Office:   [x] PCP/Provider -  [facility]  Authorized By: Wilfrid Mills,  [] Specialty/Provider -     Medication: Metformin    Dose/Frequency: 850 mg tablet    Quantity: #180    Pharmacy: [facility] [address] [place] [address]    Local Pharmacy   Does the patient have enough for 3 days?   [] Yes   [x] No - Send as HP to POD    Mail Away Pharmacy   Does the patient have enough for 10 days?   [] Yes   [] No - Send as HP to POD

## 2025-07-11 DIAGNOSIS — G20.A1 PARKINSON'S DISEASE WITHOUT DYSKINESIA OR FLUCTUATING MANIFESTATIONS (HCC): ICD-10-CM

## 2025-07-11 DIAGNOSIS — N18.32 TYPE 2 DIABETES MELLITUS WITH STAGE 3B CHRONIC KIDNEY DISEASE, WITHOUT LONG-TERM CURRENT USE OF INSULIN (HCC): ICD-10-CM

## 2025-07-11 DIAGNOSIS — E11.22 TYPE 2 DIABETES MELLITUS WITH STAGE 3B CHRONIC KIDNEY DISEASE, WITHOUT LONG-TERM CURRENT USE OF INSULIN (HCC): ICD-10-CM

## 2025-07-11 NOTE — TELEPHONE ENCOUNTER
Reason for call:   [x] Refill   [] Prior Auth  [x] Other: Change in Pharmacy - previous pharmacy closed     Office:   [x] PCP/Provider - Wilfrid Mills DO   [] Specialty/Provider -     Medication: carbidopa-levodopa (SINEMET CR)   Dose/Frequency: 25-100mg  Quantity: 270    Medication: Empagliflozin   Dose/Frequency: 25 mg  Quantity: 90    Pharmacy: [place] [id number] DEA CRUZ [[id number]]     Local Pharmacy   Does the patient have enough for 3 days?   [x] Yes   [] No - Send as HP to POD

## 2025-07-14 ENCOUNTER — TELEPHONE (OUTPATIENT)
Age: 80
End: 2025-07-14

## 2025-07-14 DIAGNOSIS — G20.A1 PARKINSON'S DISEASE WITHOUT DYSKINESIA OR FLUCTUATING MANIFESTATIONS (HCC): ICD-10-CM

## 2025-07-14 RX ORDER — CARBIDOPA AND LEVODOPA 25; 100 MG/1; MG/1
1 TABLET, EXTENDED RELEASE ORAL 3 TIMES DAILY
Qty: 270 TABLET | Refills: 1 | Status: SHIPPED | OUTPATIENT
Start: 2025-07-14 | End: 2025-07-14

## 2025-07-14 RX ORDER — CARBIDOPA/LEVODOPA 25MG-250MG
1 TABLET ORAL 3 TIMES DAILY
Qty: 270 TABLET | Refills: 1 | Status: SHIPPED | OUTPATIENT
Start: 2025-07-14

## 2025-07-14 NOTE — TELEPHONE ENCOUNTER
Patient called to request a refill for their carbidopa-levodopa and empagliflozin advised a refill was requested on 07/11/2025 and is pending approval. Patient verbalized understanding and is in agreement.     Does the patient have enough for 3 days?   [] Yes   [x] No - Send as HP to POD     Pt is leaving wednesday for vacation and pharm told her that they are super busy with [facility] closing that it takes them 3 days till now as well.

## 2025-07-14 NOTE — TELEPHONE ENCOUNTER
[facility] called asking for Clarification on Sinemet order.    Prescription lists Sinemet CR 25-100mg 3 times daily,  However the instructions state to take Sinement CR 25-250mg 3x daily.    Please resend prescription to [facility] once corrected

## 2025-07-23 DIAGNOSIS — I48.91 ATRIAL FIBRILLATION, UNSPECIFIED TYPE (HCC): ICD-10-CM

## 2025-07-23 NOTE — TELEPHONE ENCOUNTER
Reason for call:   [x] Refill   [] Prior Auth  [] Other:     Office:   [x] PCP/Provider -   [] Specialty/Provider -     Medication: apixaban (ELIQUIS) 5 mg Take 1 tablet (5 mg total) by mouth 2 (two) times a day,       Pharmacy: [facility] [address] [place] [phone] [address]      Local Pharmacy   Does the patient have enough for 3 days?   [x] Yes   [] No - Send as HP to POD    Mail Away Pharmacy   Does the patient have enough for 10 days?   [] Yes   [] No - Send as HP to POD

## 2025-08-05 DIAGNOSIS — N18.32 TYPE 2 DIABETES MELLITUS WITH STAGE 3B CHRONIC KIDNEY DISEASE, WITHOUT LONG-TERM CURRENT USE OF INSULIN (HCC): ICD-10-CM

## 2025-08-05 DIAGNOSIS — E11.22 TYPE 2 DIABETES MELLITUS WITH STAGE 3B CHRONIC KIDNEY DISEASE, WITHOUT LONG-TERM CURRENT USE OF INSULIN (HCC): ICD-10-CM

## 2025-08-05 DIAGNOSIS — E78.2 MIXED HYPERLIPIDEMIA: ICD-10-CM

## 2025-08-07 RX ORDER — ATORVASTATIN CALCIUM 20 MG/1
20 TABLET, FILM COATED ORAL DAILY
Qty: 90 TABLET | Refills: 1 | Status: SHIPPED | OUTPATIENT
Start: 2025-08-07

## 2025-08-19 DIAGNOSIS — I10 HYPERTENSION, UNSPECIFIED TYPE: ICD-10-CM

## 2025-08-21 RX ORDER — METOPROLOL SUCCINATE 100 MG/1
100 TABLET, EXTENDED RELEASE ORAL DAILY
Qty: 90 TABLET | Refills: 0 | Status: SHIPPED | OUTPATIENT
Start: 2025-08-21

## (undated) DEVICE — DRAPE C-ARM X-RAY

## (undated) DEVICE — TOWEL SURG XR DETECT GREEN STRL RFD

## (undated) DEVICE — CURITY STRETCH BANDAGE: Brand: CURITY

## (undated) DEVICE — SPECIMEN CONTAINER: Brand: CARDINAL HEALTH

## (undated) DEVICE — CATH FOLEY COUNCIL 18FR 5ML 2 WAY LUBRICATH

## (undated) DEVICE — GUIDEWIRE STRGHT TIP 0.038 IN SOLO PLUS

## (undated) DEVICE — PACK CUSTOM GU CYSTO PACK RF

## (undated) DEVICE — STERILE SURGICAL LUBRICANT,  TUBE: Brand: SURGILUBE

## (undated) DEVICE — BAG URINE DRAINAGE 2000ML ANTI RFLX LF

## (undated) DEVICE — INTENDED FOR TISSUE SEPARATION, AND OTHER PROCEDURES THAT REQUIRE A SHARP SURGICAL BLADE TO PUNCTURE OR CUT.: Brand: BARD-PARKER ® CARBON RIB-BACK BLADES

## (undated) DEVICE — BETHLEHEM UNIVERSAL  MIONR EXT: Brand: CARDINAL HEALTH

## (undated) DEVICE — CATH URETERAL OPEN END 6FR X 70CM

## (undated) DEVICE — NEEDLE 25G X 1 1/2

## (undated) DEVICE — CATH URETERAL 5FR X 70 CM FLEX TIP POLYUR BARD

## (undated) DEVICE — GLOVE SRG BIOGEL 7.5

## (undated) DEVICE — GAUZE SPONGES,16 PLY: Brand: CURITY

## (undated) DEVICE — NEPTUNE E-SEP SMOKE EVACUATION PENCIL, COATED, 70MM BLADE, PUSH BUTTON SWITCH: Brand: NEPTUNE E-SEP

## (undated) DEVICE — BAG DECANTER

## (undated) DEVICE — POOLE SUCTION INSTRUMENT WITH REMOVABLE SHEATH AND PREATTACHED 6' (1.8 M) CLEAR PLASTIC TUBING: Brand: POOLE

## (undated) DEVICE — SYRINGE 20ML LL

## (undated) DEVICE — CURITY NON-ADHERENT STRIPS: Brand: CURITY

## (undated) DEVICE — BAG DRAINAGE UROCATCHER 4 SKYTRON

## (undated) DEVICE — GLOVE INDICATOR PI UNDERGLOVE SZ 7 BLUE

## (undated) DEVICE — GLOVE SRG LF STRL BGL SKNSNS 7 PF

## (undated) DEVICE — DISPOSABLE OR TOWEL: Brand: CARDINAL HEALTH